# Patient Record
Sex: MALE | Race: WHITE | NOT HISPANIC OR LATINO | Employment: FULL TIME
[De-identification: names, ages, dates, MRNs, and addresses within clinical notes are randomized per-mention and may not be internally consistent; named-entity substitution may affect disease eponyms.]

---

## 2017-04-08 DIAGNOSIS — I10 ESSENTIAL HYPERTENSION WITH GOAL BLOOD PRESSURE LESS THAN 140/90: ICD-10-CM

## 2017-04-08 DIAGNOSIS — E78.5 HYPERLIPIDEMIA LDL GOAL <130: ICD-10-CM

## 2017-04-10 RX ORDER — HYDROCHLOROTHIAZIDE 25 MG/1
TABLET ORAL
Qty: 90 TABLET | Refills: 1 | Status: SHIPPED | OUTPATIENT
Start: 2017-04-10 | End: 2017-10-03

## 2017-04-10 RX ORDER — ATORVASTATIN CALCIUM 40 MG/1
TABLET, FILM COATED ORAL
Qty: 90 TABLET | Refills: 1 | Status: SHIPPED | OUTPATIENT
Start: 2017-04-10 | End: 2017-10-03

## 2017-04-10 NOTE — TELEPHONE ENCOUNTER
atorvastatin (LIPITOR) 40 MG tablet   40 mg, DAILY 1 ordered  Edit     Summary: Take 1 tablet (40 mg) by mouth daily, Disp-90 tablet, R-1, E-Prescribe   Dose, Route, Frequency: 40 mg, Oral, DAILY  Start: 10/14/2016  Ord/Sold: 10/14/2016 (O)  Report  Taking:   Long-term:   Pharmacy: [place] [id number] [place] [address] Dose History       Patient Sig: Take 1 tablet (40 mg) by mouth daily       Ordered on: 10/14/2016       Authorized by: ANCA STEPHENSON       Dispense: 90 tablet          Last Office Visit with [facility], [facility] or [facility] prescribing provider: 10-       Lab Results   Component Value Date    CHOL 163 10/14/2016     Lab Results   Component Value Date    HDL 60 10/14/2016     Lab Results   Component Value Date    LDL 84 10/14/2016     Lab Results   Component Value Date    TRIG 93 10/14/2016     Lab Results   Component Value Date    CHOLHDLRATIO 3.1 07/13/2015               hydrochlorothiazide (HYDRODIURIL) 25 MG tablet   25 mg, DAILY 1 ordered  Edit     Summary: Take 1 tablet (25 mg) by mouth daily, Disp-90 tablet, R-1, E-Prescribe   Dose, Route, Frequency: 25 mg, Oral, DAILY  Start: 10/14/2016  Ord/Sold: 10/14/2016 (O)  Report  Taking:   Long-term:   Pharmacy: [place] [id number] [place] [address] Dose History       Patient Sig: Take 1 tablet (25 mg) by mouth daily       Ordered on: 10/14/2016       Authorized by: ANCA STEPHENSON       Dispense: 90 tablet          Last Office Visit with [facility] or [facility] prescribing provider: 10-       Potassium   Date Value Ref Range Status   10/14/2016 4.0 3.4 - 5.3 mmol/L Final     Creatinine   Date Value Ref Range Status   10/14/2016 1.02 0.66 - 1.25 mg/dL Final     BP Readings from Last 3 Encounters:   10/14/16 130/82   10/23/15 128/80   10/19/15 129/65

## 2017-08-05 ENCOUNTER — HEALTH MAINTENANCE LETTER (OUTPATIENT)
Age: 57
End: 2017-08-05

## 2017-10-03 DIAGNOSIS — E78.5 HYPERLIPIDEMIA LDL GOAL <130: ICD-10-CM

## 2017-10-03 DIAGNOSIS — I10 ESSENTIAL HYPERTENSION WITH GOAL BLOOD PRESSURE LESS THAN 140/90: ICD-10-CM

## 2017-10-03 NOTE — TELEPHONE ENCOUNTER
atorvastatin      Last Written Prescription Date: 4/10/17  Last Fill Quantity: 90, # refills: 1  Last Office Visit with [facility], [facility] or [facility] prescribing provider: 10/14/16       Lab Results   Component Value Date    CHOL 163 10/14/2016     Lab Results   Component Value Date    HDL 60 10/14/2016     Lab Results   Component Value Date    LDL 84 10/14/2016     Lab Results   Component Value Date    TRIG 93 10/14/2016     Lab Results   Component Value Date    CHOLHDLRATIO 3.1 07/13/2015     hydrochlorothiazide       Last Written Prescription Date: 4/10/17  Last Fill Quantity: 90, # refills: 1  Last Office Visit with [facility], [facility] or [facility] prescribing provider: 10/14/16       Potassium   Date Value Ref Range Status   10/14/2016 4.0 3.4 - 5.3 mmol/L Final     Creatinine   Date Value Ref Range Status   10/14/2016 1.02 0.66 - 1.25 mg/dL Final     BP Readings from Last 3 Encounters:   10/14/16 130/82   10/23/15 128/80   10/19/15 129/65

## 2017-10-04 RX ORDER — HYDROCHLOROTHIAZIDE 25 MG/1
25 TABLET ORAL DAILY
Qty: 30 TABLET | Refills: 0 | Status: SHIPPED | OUTPATIENT
Start: 2017-10-04 | End: 2017-10-31

## 2017-10-04 RX ORDER — ATORVASTATIN CALCIUM 40 MG/1
40 TABLET, FILM COATED ORAL DAILY
Qty: 30 TABLET | Refills: 0 | Status: SHIPPED | OUTPATIENT
Start: 2017-10-04 | End: 2017-11-01

## 2017-10-04 NOTE — TELEPHONE ENCOUNTER
Medication is being filled for 1 time refill only due to:  Patient needs to be seen because it has been more than one year since last visit.     Mahin Lerner RN

## 2017-10-31 DIAGNOSIS — I10 ESSENTIAL HYPERTENSION WITH GOAL BLOOD PRESSURE LESS THAN 140/90: ICD-10-CM

## 2017-11-01 DIAGNOSIS — E78.5 HYPERLIPIDEMIA LDL GOAL <130: ICD-10-CM

## 2017-11-02 RX ORDER — HYDROCHLOROTHIAZIDE 25 MG/1
TABLET ORAL
Qty: 30 TABLET | Refills: 0 | Status: SHIPPED | OUTPATIENT
Start: 2017-11-02 | End: 2017-12-22

## 2017-11-02 NOTE — TELEPHONE ENCOUNTER
Routing refill request to provider for review/approval because:  Juanita given x1 and patient did not follow up, please advise    Kelsi Barreto RN

## 2017-11-02 NOTE — TELEPHONE ENCOUNTER
Pharmacy calling to check on status of refill.    hydrochlorothiazide (HYDRODIURIL) 25 MG tablet      Last Written Prescription Date: 10-4-17  Last Fill Quantity: 30, # refills: 0  Last Office Visit with [facility] or [facility] prescribing provider: 10-14-16       Potassium   Date Value Ref Range Status   10/14/2016 4.0 3.4 - 5.3 mmol/L Final     Creatinine   Date Value Ref Range Status   10/14/2016 1.02 0.66 - 1.25 mg/dL Final     BP Readings from Last 3 Encounters:   10/14/16 130/82   10/23/15 128/80   10/19/15 129/65

## 2017-11-22 RX ORDER — ATORVASTATIN CALCIUM 40 MG/1
TABLET, FILM COATED ORAL
Qty: 30 TABLET | Refills: 0 | Status: SHIPPED | OUTPATIENT
Start: 2017-11-22 | End: 2017-12-22

## 2017-12-13 ENCOUNTER — MYC MEDICAL ADVICE (OUTPATIENT)
Dept: FAMILY MEDICINE | Facility: CLINIC | Age: 57
End: 2017-12-13

## 2017-12-13 DIAGNOSIS — Z12.5 SCREENING FOR PROSTATE CANCER: ICD-10-CM

## 2017-12-13 DIAGNOSIS — E78.5 HYPERLIPIDEMIA LDL GOAL <130: ICD-10-CM

## 2017-12-13 DIAGNOSIS — Z00.00 HEALTHCARE MAINTENANCE: Primary | ICD-10-CM

## 2017-12-13 NOTE — TELEPHONE ENCOUNTER
Ordered BMP and lipids per health maintenance. Will route to PCP in case other labs are needed.    Mahin Lerner RN

## 2017-12-20 ENCOUNTER — MYC REFILL (OUTPATIENT)
Dept: FAMILY MEDICINE | Facility: CLINIC | Age: 57
End: 2017-12-20

## 2017-12-20 DIAGNOSIS — E78.5 HYPERLIPIDEMIA LDL GOAL <130: ICD-10-CM

## 2017-12-20 DIAGNOSIS — Z12.5 SCREENING FOR PROSTATE CANCER: ICD-10-CM

## 2017-12-20 DIAGNOSIS — N52.9 IMPOTENCE OF ORGANIC ORIGIN: ICD-10-CM

## 2017-12-20 DIAGNOSIS — Z00.00 HEALTHCARE MAINTENANCE: ICD-10-CM

## 2017-12-20 LAB
ANION GAP SERPL CALCULATED.3IONS-SCNC: 8 MMOL/L (ref 3–14)
BUN SERPL-MCNC: 16 MG/DL (ref 7–30)
CALCIUM SERPL-MCNC: 8.8 MG/DL (ref 8.5–10.1)
CHLORIDE SERPL-SCNC: 104 MMOL/L (ref 94–109)
CHOLEST SERPL-MCNC: 142 MG/DL
CO2 SERPL-SCNC: 29 MMOL/L (ref 20–32)
CREAT SERPL-MCNC: 0.91 MG/DL (ref 0.66–1.25)
GFR SERPL CREATININE-BSD FRML MDRD: 86 ML/MIN/1.7M2
GLUCOSE SERPL-MCNC: 94 MG/DL (ref 70–99)
HDLC SERPL-MCNC: 52 MG/DL
LDLC SERPL CALC-MCNC: 62 MG/DL
NONHDLC SERPL-MCNC: 90 MG/DL
POTASSIUM SERPL-SCNC: 3.8 MMOL/L (ref 3.4–5.3)
PSA SERPL-ACNC: 0.6 UG/L (ref 0–4)
SODIUM SERPL-SCNC: 141 MMOL/L (ref 133–144)
TRIGL SERPL-MCNC: 141 MG/DL

## 2017-12-20 PROCEDURE — 80061 LIPID PANEL: CPT | Performed by: PHYSICIAN ASSISTANT

## 2017-12-20 PROCEDURE — G0103 PSA SCREENING: HCPCS | Performed by: PHYSICIAN ASSISTANT

## 2017-12-20 PROCEDURE — 36415 COLL VENOUS BLD VENIPUNCTURE: CPT | Performed by: PHYSICIAN ASSISTANT

## 2017-12-20 PROCEDURE — 80048 BASIC METABOLIC PNL TOTAL CA: CPT | Performed by: PHYSICIAN ASSISTANT

## 2017-12-21 RX ORDER — SILDENAFIL 100 MG/1
100 TABLET, FILM COATED ORAL DAILY PRN
Qty: 12 TABLET | Refills: 2 | Status: SHIPPED | OUTPATIENT
Start: 2017-12-21 | End: 2019-06-26

## 2017-12-22 ENCOUNTER — OFFICE VISIT (OUTPATIENT)
Dept: FAMILY MEDICINE | Facility: CLINIC | Age: 57
End: 2017-12-22
Payer: COMMERCIAL

## 2017-12-22 VITALS
HEART RATE: 60 BPM | WEIGHT: 313 LBS | DIASTOLIC BLOOD PRESSURE: 80 MMHG | BODY MASS INDEX: 43.82 KG/M2 | SYSTOLIC BLOOD PRESSURE: 144 MMHG | TEMPERATURE: 98.7 F | HEIGHT: 71 IN

## 2017-12-22 DIAGNOSIS — R91.1 PULMONARY NODULE: ICD-10-CM

## 2017-12-22 DIAGNOSIS — N28.9 FUNCTION KIDNEY DECREASED: ICD-10-CM

## 2017-12-22 DIAGNOSIS — Z12.11 SCREEN FOR COLON CANCER: ICD-10-CM

## 2017-12-22 DIAGNOSIS — R06.83 SNORING: ICD-10-CM

## 2017-12-22 DIAGNOSIS — E78.5 HYPERLIPIDEMIA LDL GOAL <130: ICD-10-CM

## 2017-12-22 DIAGNOSIS — Z00.00 ROUTINE GENERAL MEDICAL EXAMINATION AT A HEALTH CARE FACILITY: Primary | ICD-10-CM

## 2017-12-22 DIAGNOSIS — I10 ESSENTIAL HYPERTENSION WITH GOAL BLOOD PRESSURE LESS THAN 140/90: ICD-10-CM

## 2017-12-22 DIAGNOSIS — Z23 NEED FOR PROPHYLACTIC VACCINATION AND INOCULATION AGAINST INFLUENZA: ICD-10-CM

## 2017-12-22 DIAGNOSIS — N52.9 IMPOTENCE OF ORGANIC ORIGIN: ICD-10-CM

## 2017-12-22 DIAGNOSIS — M25.512 ACUTE PAIN OF LEFT SHOULDER: ICD-10-CM

## 2017-12-22 DIAGNOSIS — N28.1 RENAL CYST: ICD-10-CM

## 2017-12-22 PROCEDURE — 99213 OFFICE O/P EST LOW 20 MIN: CPT | Mod: 25 | Performed by: PHYSICIAN ASSISTANT

## 2017-12-22 PROCEDURE — 90686 IIV4 VACC NO PRSV 0.5 ML IM: CPT | Performed by: PHYSICIAN ASSISTANT

## 2017-12-22 PROCEDURE — 99396 PREV VISIT EST AGE 40-64: CPT | Mod: 25 | Performed by: PHYSICIAN ASSISTANT

## 2017-12-22 PROCEDURE — 90471 IMMUNIZATION ADMIN: CPT | Performed by: PHYSICIAN ASSISTANT

## 2017-12-22 RX ORDER — ATORVASTATIN CALCIUM 40 MG/1
40 TABLET, FILM COATED ORAL DAILY
Qty: 90 TABLET | Refills: 3 | Status: SHIPPED | OUTPATIENT
Start: 2017-12-22 | End: 2018-12-25

## 2017-12-22 RX ORDER — LISINOPRIL AND HYDROCHLOROTHIAZIDE 12.5; 2 MG/1; MG/1
1 TABLET ORAL DAILY
Qty: 90 TABLET | Refills: 1 | Status: SHIPPED | OUTPATIENT
Start: 2017-12-22 | End: 2018-06-23

## 2017-12-22 RX ORDER — HYDROCHLOROTHIAZIDE 25 MG/1
25 TABLET ORAL DAILY
Qty: 90 TABLET | Refills: 3 | Status: SHIPPED | OUTPATIENT
Start: 2017-12-22 | End: 2017-12-22

## 2017-12-22 NOTE — NURSING NOTE
Prior to injection verified patient identity using patient's name and date of birth.    Brandie Dewitt CMA ([facility])

## 2017-12-22 NOTE — PATIENT INSTRUCTIONS
1. Change to combo drug - Lisinopril / HCTZ 20/12.5 - recheck in 2 weeks with my nurse for a BP check and a kidney blood test   2. To Schedule CT scan and Ultrasound (probably do on the same day) call:  [facility] Scheduling Phone Number: [phone]  Or   [facility] Scheduling Phone number: [phone]   3. Look up rotator cuff exercises on YouTube     Preventive Health Recommendations  Male Ages 50 - 64    Yearly exam:             See your health care provider every year in order to  o   Review health changes.   o   Discuss preventive care.    o   Review your medicines if your doctor has prescribed any.     Have a cholesterol test every 5 years, or more frequently if you are at risk for high cholesterol/heart disease.     Have a diabetes test (fasting glucose) every three years. If you are at risk for diabetes, you should have this test more often.     Have a colonoscopy at age 50, or have a yearly FIT test (stool test). These exams will check for colon cancer.      Talk with your health care provider about whether or not a prostate cancer screening test (PSA) is right for you.    You should be tested each year for STDs (sexually transmitted diseases), if you re at risk.     Shots: Get a flu shot each year. Get a tetanus shot every 10 years.     Nutrition:    Eat at least 5 servings of fruits and vegetables daily.     Eat whole-grain bread, whole-wheat pasta and brown rice instead of white grains and rice.     Talk to your provider about Calcium and Vitamin D.     Lifestyle    Exercise for at least 150 minutes a week (30 minutes a day, 5 days a week). This will help you control your weight and prevent disease.     Limit alcohol to one drink per day.     No smoking.     Wear sunscreen to prevent skin cancer.     See your dentist every six months for an exam and cleaning.     See your eye doctor every 1 to 2 years.

## 2017-12-22 NOTE — PROGRESS NOTES
SUBJECTIVE:   CC: Car Torres is an 57 year old male who presents for preventative health visit.     Physical   Annual:     Getting at least 3 servings of Calcium per day::  Yes    Bi-annual eye exam::  NO    Dental care twice a year::  NO    Sleep apnea or symptoms of sleep apnea::  None    Diet::  Regular (no restrictions)    Frequency of exercise::  None    Taking medications regularly::  Yes    Medication side effects::  None    Additional concerns today::  YES (Sleep Referral)              Today's PHQ-2 Score: PHQ-2 ( 1999 Pfizer) 12/22/2017   Q1: Little interest or pleasure in doing things 0   Q2: Feeling down, depressed or hopeless 0   PHQ-2 Score 0   Q1: Little interest or pleasure in doing things Not at all   Q2: Feeling down, depressed or hopeless Not at all   PHQ-2 Score 0       Abuse: Current or Past(Physical, Sexual or Emotional)- No  Do you feel safe in your environment - Yes    Social History   Substance Use Topics     Smoking status: Former Smoker     Packs/day: 1.00     Years: 25.00     Types: Cigarettes, Cigars     Quit date: 2/1/1998     Smokeless tobacco: Never Used     Alcohol use 6.0 oz/week      Comment: case beer/week     Alcohol Use 12/22/2017   If you drink alcohol, do you typically have greater than 3 drinks per day OR greater than 7 drinks per week?   Yes   AUDIT SCORE  6     AUDIT - Alcohol Use Disorders Identification Test - Reproduced from the World Health Organization Audit 2001 (Second Edition) 12/22/2017   1.  How often do you have a drink containing alcohol? 4 or more times a week   2.  How many drinks containing alcohol do you have on a typical day when you are drinking? 1 or 2   3.  How often do you have five or more drinks on one occasion? Monthly   4.  How often during the last year have you found that you were not able to stop drinking once you had started? Never   5.  How often during the last year have you failed to do what was normally expected of you because of  drinking? Never   6.  How often during the last year have you needed a first drink in the morning to get yourself going after a heavy drinking session? Never   7.  How often during the last year have you had a feeling of guilt or remorse after drinking? Never   8.  How often during the last year have you been unable to remember what happened the night before because of your drinking? Never   9.  Have you or someone else been injured because of your drinking? No   10. Has a relative, friend, doctor or other health care worker been concerned about your drinking or suggested you cut down? No   TOTAL SCORE 6         Last PSA: PSA   Date Value Ref Range Status   12/20/2017 0.60 0 - 4 ug/L Final     Comment:     Assay Method:  Chemiluminescence using Siemens Vista analyzer       Reviewed orders with patient. Reviewed health maintenance and updated orders accordingly - Yes  Labs reviewed in EPIC    Reviewed and updated as needed this visit by clinical staff         Reviewed and updated as needed this visit by Provider            Review of Systems  C: NEGATIVE for fever, chills, change in weight  I: NEGATIVE for worrisome rashes, moles or lesions  E: NEGATIVE for vision changes or irritation  ENT: NEGATIVE for ear, mouth and throat problems  R: NEGATIVE for significant cough or SOB  CV: NEGATIVE for chest pain, palpitations or peripheral edema  GI: NEGATIVE for nausea, abdominal pain, heartburn, or change in bowel habits   male: negative for dysuria, hematuria, decreased urinary stream, erectile dysfunction, urethral discharge  M: NEGATIVE for significant arthralgias or myalgia  N: NEGATIVE for weakness, dizziness or paresthesias  P: NEGATIVE for changes in mood or affect    OBJECTIVE:   There were no vitals taken for this visit.    Physical Exam  EYES: Eyes grossly normal to inspection, PERRL and conjunctivae and sclerae normal  HENT: ear canals and TM's normal, nose and mouth without ulcers or lesions  NECK: no  adenopathy, no asymmetry, masses, or scars and thyroid normal to palpation  RESP: lungs clear to auscultation - no rales, rhonchi or wheezes  CV: regular rate and rhythm, normal S1 S2, no S3 or S4, no murmur, click or rub, no peripheral edema and peripheral pulses strong  ABDOMEN: soft, nontender, no hepatosplenomegaly, no masses and bowel sounds normal  MS: no gross musculoskeletal defects noted, no edema  SKIN: no suspicious lesions or rashes  NEURO: Normal strength and tone, mentation intact and speech normal  PSYCH: mentation appears normal, affect normal/bright  LYMPH: no cervical, supraclavicular, axillary, or inguinal adenopathy    ASSESSMENT/PLAN:   (Z00.00) Routine general medical examination at a health care facility  (primary encounter diagnosis)  Comment: Well person   Plan: Diet, exercise, wellness and other preventive recommendations related to health maintenance were discussed.  Follow up as needed for acute issues.  Physical exam in 1 year.     (Z12.11) Screen for colon cancer  Comment:   Plan: GASTROENTEROLOGY ADULT REF PROCEDURE ONLY        Reminder     (E78.5) Hyperlipidemia LDL goal <130  Comment:   Plan: atorvastatin (LIPITOR) 40 MG tablet        Refills     (I10) Essential hypertension with goal blood pressure less than 140/90  Comment:   Plan: lisinopril-hydrochlorothiazide         (PRINZIDE/ZESTORETIC) 20-12.5 MG per tablet,         Basic metabolic panel  (Ca, Cl, CO2, Creat,         Gluc, K, Na, BUN), DISCONTINUED:         hydrochlorothiazide (HYDRODIURIL) 25 MG tablet        Elevated x 2 - reviewed - recommend change, should probably be on ACE / HCTZ combo - changed, will have him return to recheck BPs in a few weeks. This prescription is given with a discussion of side effects, risks and proper use.  Instructions are given to follow up if not improving or symptoms change or worsen as discussed.     (Z23) Need for prophylactic vaccination and inoculation against influenza  Comment:   Plan:  "FLU VAC, SPLIT VIRUS IM > 3 YO (QUADRIVALENT)         [88377], Vaccine Administration, Initial         [83724]        Given     (N28.9) Function kidney decreased  Comment:   Plan: US Renal Complete        Chronic - history of a stable right renal cyst - recommend repeat US - if unable to fully visualize, CT scan    (R91.1) Pulmonary nodule  Comment:   Plan: CT Chest w/o Contrast        Needs repeat / retest. Orders placed. Was stable from last scan.     (R06.83) Snoring  Comment:   Plan: SLEEP EVALUATION & MANAGEMENT REFERRAL - ADULT         -[facility] [place]          [phone] (Age 15 and up)        Recommend sleep study. Orders placed.     (N28.1) Renal cyst  Comment:   Plan: US Renal Complete        As noted     (M25.512) Acute pain of left shoulder  Comment:   Plan: Left shoulder - pain - reviewed - recommend some home therapies, could consider sub acromial injection if needed.     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 His smoking use included Cigarettes and Cigars. He has a 25.00 pack-year smoking history. He has never used smokeless tobacco.      Estimated body mass index is 44.63 kg/(m^2) as calculated from the following:    Height as of 10/14/16: 5' 11\" (1.803 m).    Weight as of 10/14/16: 320 lb (145.2 kg).         Counseling Resources:  ATP IV Guidelines  Pooled Cohorts Equation Calculator  FRAX Risk Assessment  ICSI Preventive Guidelines  Dietary Guidelines for Americans, 2010  USDA's MyPlate  ASA Prophylaxis  Lung CA Screening    ANCA STEPHENSON PA-C  [facility] for HPI/ROS submitted by the patient on 12/22/2017   PHQ-2 Score: 0    Injectable Influenza Immunization Documentation    1.  Is the person to be vaccinated sick today?   No    2. Does the person to be vaccinated have an allergy to a " component   of the vaccine?   No  Egg Allergy Algorithm Link    3. Has the person to be vaccinated ever had a serious reaction   to influenza vaccine in the past?   No    4. Has the person to be vaccinated ever had Guillain-Barré syndrome?   No    Form completed by Brandie Dewitt CMA ([facility])

## 2018-01-04 ENCOUNTER — ALLIED HEALTH/NURSE VISIT (OUTPATIENT)
Dept: NURSING | Facility: CLINIC | Age: 58
End: 2018-01-04
Payer: COMMERCIAL

## 2018-01-04 VITALS
WEIGHT: 313 LBS | DIASTOLIC BLOOD PRESSURE: 72 MMHG | SYSTOLIC BLOOD PRESSURE: 122 MMHG | BODY MASS INDEX: 43.82 KG/M2 | HEIGHT: 71 IN | HEART RATE: 60 BPM

## 2018-01-04 DIAGNOSIS — I10 HYPERTENSION GOAL BP (BLOOD PRESSURE) < 140/90: Primary | ICD-10-CM

## 2018-01-04 DIAGNOSIS — I10 ESSENTIAL HYPERTENSION WITH GOAL BLOOD PRESSURE LESS THAN 140/90: ICD-10-CM

## 2018-01-04 DIAGNOSIS — R06.83 SNORING: ICD-10-CM

## 2018-01-04 PROCEDURE — 36415 COLL VENOUS BLD VENIPUNCTURE: CPT | Performed by: PHYSICIAN ASSISTANT

## 2018-01-04 PROCEDURE — 80048 BASIC METABOLIC PNL TOTAL CA: CPT | Performed by: PHYSICIAN ASSISTANT

## 2018-01-04 PROCEDURE — 99207 ZZC NO CHARGE NURSE ONLY: CPT

## 2018-01-04 NOTE — PATIENT INSTRUCTIONS
PATIENT INSTRUCTIONS     1.  You will continue current medication regimen unchanged    2.  FOLLOW UP:   Follow up with Provider - 6 months     3.  Limit total daily sodium to 1500-2000mg per day  Get some exercise, try for 8 hours of sleep a night.  Today we used a large cuff on your right arm.      BP Readings from Last 3 Encounters:   01/04/18 122/72   12/22/17 144/80   10/14/16 130/82     Pulse Readings from Last 1 Encounters:   01/04/18 60       Today we talked about...    You will need baseline lab tests done within 12 months of beginning a new blood pressure medication and during/after medication adjustment is complete.    Last Labs:  Sodium   Date Value Ref Range Status   12/20/2017 141 133 - 144 mmol/L Final      Potassium   Date Value Ref Range Status   12/20/2017 3.8 3.4 - 5.3 mmol/L Final     Urea Nitrogen   Date Value Ref Range Status   12/20/2017 16 7 - 30 mg/dL Final     Creatinine   Date Value Ref Range Status   12/20/2017 0.91 0.66 - 1.25 mg/dL Final     GFR Estimate   Date Value Ref Range Status   12/20/2017 86 >60 mL/min/1.7m2 Final     Comment:     Non  GFR Calc       Your pharmacist is a great resource for questions regarding your medication's side effects and potential interactions.     If you are having a side effect from your medication, please contact your primary provider.     If you believe you are experiencing a life threatening reaction to your medication call 911 immediately.     Nikole Peter RN

## 2018-01-04 NOTE — MR AVS SNAPSHOT
After Visit Summary   1/4/2018    Car Torres    MRN: [id number]           Patient Information     Date Of Birth          1960        Visit Information        Provider Department      1/4/2018 3:00 PM [facility] RN [facility]        Today's Diagnoses     Essential hypertension with goal blood pressure less than 140/90        Snoring          Care Instructions    PATIENT INSTRUCTIONS     1.  You will continue current medication regimen unchanged    2.  FOLLOW UP:   Follow up with Provider - 6 months     3.  Limit total daily sodium to 1500-2000mg per day    Your BP Goal is: less than <130/80 consistently    Today we used a large cuff on your right arm.      BP Readings from Last 3 Encounters:   01/04/18 122/72   12/22/17 144/80   10/14/16 130/82     Pulse Readings from Last 1 Encounters:   01/04/18 60       Today we talked about...    You will need baseline lab tests done within 12 months of beginning a new blood pressure medication and during/after medication adjustment is complete.    Last Labs:  Sodium   Date Value Ref Range Status   12/20/2017 141 133 - 144 mmol/L Final      Potassium   Date Value Ref Range Status   12/20/2017 3.8 3.4 - 5.3 mmol/L Final     Urea Nitrogen   Date Value Ref Range Status   12/20/2017 16 7 - 30 mg/dL Final     Creatinine   Date Value Ref Range Status   12/20/2017 0.91 0.66 - 1.25 mg/dL Final     GFR Estimate   Date Value Ref Range Status   12/20/2017 86 >60 mL/min/1.7m2 Final     Comment:     Non  GFR Calc       Your pharmacist is a great resource for questions regarding your medication's side effects and potential interactions.     If you are having a side effect from your medication, please contact your primary provider.     If you believe you are experiencing a life threatening reaction to your medication call 911 immediately.     Nikole Peter RN             Follow-ups after your visit        Your next 10 appointments already  "scheduled     Jan 04, 2018  3:30 PM CST   [facility]    [address] [address] [phone]           Please do not eat 10-12 hours before your appointment if you are coming in fasting for labs on lipids, cholesterol, or glucose (sugar). This does not apply to pregnant women. Water, hot tea and black coffee (with nothing added) are okay. Do not drink other fluids, diet soda or chew gum.              Who to contact     If you have questions or need follow up information about today's clinic visit or your schedule please contact [facility] [place] directly at [phone].  Normal or non-critical lab and imaging results will be communicated to you by ProTendershart, letter or phone within 4 business days after the clinic has received the results. If you do not hear from us within 7 days, please contact the clinic through Kickplayt or phone. If you have a critical or abnormal lab result, we will notify you by phone as soon as possible.  Submit refill requests through Crowdrally or call your pharmacy and they will forward the refill request to us. Please allow 3 business days for your refill to be completed.          Additional Information About Your Visit        Crowdrally Information     Crowdrally gives you secure access to your electronic health record. If you see a primary care provider, you can also send messages to your care team and make appointments. If you have questions, please call your primary care clinic.  If you do not have a primary care provider, please call [phone] and they will assist you.        Care EveryWhere ID     This is your Care EveryWhere ID. This could be used by other organizations to access your [place] medical records  [id number]        Your Vitals Were     Pulse Height BMI (Body Mass Index)             60 5' 11\" (1.803 m) 43.65 kg/m2          Blood Pressure from Last 3 Encounters: "   01/04/18 122/72   12/22/17 144/80   10/14/16 130/82    Weight from Last 3 Encounters:   01/04/18 (!) 313 lb (142 kg)   12/22/17 (!) 313 lb (142 kg)   10/14/16 (!) 320 lb (145.2 kg)              We Performed the Following     Basic metabolic panel  (Ca, Cl, CO2, Creat, Gluc, K, Na, BUN)     SLEEP EVALUATION & MANAGEMENT REFERRAL - ADULT -[facility] [place]  [phone] (Age 15 and up)        Primary Care Provider Office Phone # Fax #    Fransico Hylton PA-C [phone]       [address] [place] [id number]        Equal Access to Services     MONTSERRAT ESCOBAR : Hadii aad ku hadasho Soomaali, waaxda luqadaha, qaybta kaalmada adeegyada, dianne mcgrawin carmen hinojosa. So [facility] [phone].    ATENCIÓN: Si habla español, tiene a peres disposición servicios gratuitos de asistencia lingüística. Llame al [phone].    We comply with applicable federal civil rights laws and [place] laws. We do not discriminate on the basis of race, color, national origin, age, disability, sex, sexual orientation, or gender identity.            Thank you!     Thank you for choosing [facility]  for your care. Our goal is always to provide you with excellent care. Hearing back from our patients is one way we can continue to improve our services. Please take a few minutes to complete the written survey that you may receive in the mail after your visit with us. Thank you!             Your Updated Medication List - Protect others around you: Learn how to safely use, store and throw away your medicines at www.disposemymeds.org.          This list is accurate as of: 1/4/18  3:20 PM.  Always use your most recent med list.                   Brand Name Dispense Instructions for use Diagnosis    aspirin 325 MG EC tablet     100    ONE DAILY    Unspecified essential hypertension, Mixed hyperlipidemia       atorvastatin 40 MG tablet    LIPITOR    90 tablet    Take 1 tablet (40  mg) by mouth daily    Hyperlipidemia LDL goal <130       lisinopril-hydrochlorothiazide 20-12.5 MG per tablet    PRINZIDE/ZESTORETIC    90 tablet    Take 1 tablet by mouth daily (Cancel the HCTZ - new med)    Essential hypertension with goal blood pressure less than 140/90       MULTI VITAMIN MENS PO      None Entered        sildenafil 100 MG tablet    VIAGRA    12 tablet    Take 1 tablet (100 mg) by mouth daily as needed    Impotence of organic origin       vitamin B complex with vitamin C Tabs tablet      Take 1 tablet by mouth daily        VITAMIN D PO      Take 5,000 Units by mouth daily One tablet twice daily

## 2018-01-04 NOTE — Clinical Note
Blood pressure looks good today in clinic 122/72 after you added HCT to his Lisinopril labs repeated Just FYI.

## 2018-01-04 NOTE — PROGRESS NOTES
DATA: PCP: Naun Hylton    Indications for Blood Pressure (BP) monitoring: High reading in clinic         ACTION: Signs and Symptoms:  Headaches?: no  Chest pain?: no  Shortness of breath?: no  Edema?: no  Visual problems?: no  Parathesia?: no  Epitaxis?: no  Dizziness?: no  Hematuria?: no    BP:   BP Readings from Last 1 Encounters:   01/04/18 122/72     60     Diabetic?: no  Heart Disease?: no  Smoking?: no  Alcohol usage?: no  Low sodium diet?: no  Exercise?: no  Checks blood pressure at home?: na       *BP is 130/80 or greater for diabetics or heart disease? no  *BP is 140/90 or greater for non-diabetics? no  *Pulse under 60? no  *Pulse over 110? no    Discussed with patient symptoms of high blood pressure, best ways to measure blood pressure, how blood pressure affects health, risk factors for high blood pressure, and lifestyle changes to help prevent/control high blood pressure.        RESPONSE: Patient verbalizes understanding, denies questions or concerns, and agrees with plan.         PLAN: Patient left in ambulatory condition. Will call or follow-up in clinic during the interim as needed.        CC: Chart to   Naun Hylton

## 2018-01-05 LAB
ANION GAP SERPL CALCULATED.3IONS-SCNC: 6 MMOL/L (ref 3–14)
BUN SERPL-MCNC: 16 MG/DL (ref 7–30)
CALCIUM SERPL-MCNC: 8.5 MG/DL (ref 8.5–10.1)
CHLORIDE SERPL-SCNC: 103 MMOL/L (ref 94–109)
CO2 SERPL-SCNC: 28 MMOL/L (ref 20–32)
CREAT SERPL-MCNC: 1.03 MG/DL (ref 0.66–1.25)
GFR SERPL CREATININE-BSD FRML MDRD: 74 ML/MIN/1.7M2
GLUCOSE SERPL-MCNC: 99 MG/DL (ref 70–99)
POTASSIUM SERPL-SCNC: 4.1 MMOL/L (ref 3.4–5.3)
SODIUM SERPL-SCNC: 137 MMOL/L (ref 133–144)

## 2018-01-06 ENCOUNTER — OFFICE VISIT (OUTPATIENT)
Dept: URGENT CARE | Facility: URGENT CARE | Age: 58
End: 2018-01-06
Payer: COMMERCIAL

## 2018-01-06 VITALS
SYSTOLIC BLOOD PRESSURE: 142 MMHG | RESPIRATION RATE: 18 BRPM | WEIGHT: 315 LBS | DIASTOLIC BLOOD PRESSURE: 72 MMHG | HEART RATE: 64 BPM | BODY MASS INDEX: 45.05 KG/M2 | OXYGEN SATURATION: 95 % | TEMPERATURE: 98 F

## 2018-01-06 DIAGNOSIS — J02.9 SORE THROAT: ICD-10-CM

## 2018-01-06 DIAGNOSIS — I10 HYPERTENSION GOAL BP (BLOOD PRESSURE) < 140/90: ICD-10-CM

## 2018-01-06 DIAGNOSIS — H66.002 ACUTE SUPPURATIVE OTITIS MEDIA OF LEFT EAR WITHOUT SPONTANEOUS RUPTURE OF TYMPANIC MEMBRANE, RECURRENCE NOT SPECIFIED: Primary | ICD-10-CM

## 2018-01-06 LAB
DEPRECATED S PYO AG THROAT QL EIA: NORMAL
SPECIMEN SOURCE: NORMAL

## 2018-01-06 PROCEDURE — 87081 CULTURE SCREEN ONLY: CPT | Performed by: INTERNAL MEDICINE

## 2018-01-06 PROCEDURE — 87880 STREP A ASSAY W/OPTIC: CPT | Performed by: INTERNAL MEDICINE

## 2018-01-06 PROCEDURE — 99214 OFFICE O/P EST MOD 30 MIN: CPT | Performed by: INTERNAL MEDICINE

## 2018-01-06 RX ORDER — AMOXICILLIN 875 MG
875 TABLET ORAL 2 TIMES DAILY
Qty: 20 TABLET | Refills: 0 | Status: SHIPPED | OUTPATIENT
Start: 2018-01-06 | End: 2018-05-08

## 2018-01-06 NOTE — PROGRESS NOTES
SUBJECTIVE:  Car Torres is an 57 year old male who presents for bad cold.  Started about ten days ago.  Has a cough and nasal congestion.  Not seem to improve and is getting worse.  Has sore throat over past three days.  No fevers, chills or sweats.  No n/v/d.  Appetite a little less than usual.  Cough is not productive.  Getting some sinus and ear pressure.  No recent travel.  No known exposures but is around people at work.  Took some aspirin which helps some.  No skin rashes.         has a past medical history of Unspecified essential hypertension. hyperlipidemia, ED  Social History     Social History     Marital status:      Spouse name: Luzma     Number of children: 3     Years of education: 14     Occupational History     [employer] Other     Social History Main Topics     Smoking status: Former Smoker     Packs/day: 1.00     Years: 25.00     Types: Cigarettes, Cigars     Quit date: 2/1/1998     Smokeless tobacco: Never Used     Alcohol use 6.0 oz/week      Comment: case beer/week     Drug use: No     Sexual activity: Not Currently     Partners: Female     Birth control/ protection: Female Surgical, None      Comment:      Other Topics Concern     Parent/Sibling W/ Cabg, Mi Or Angioplasty Before 65f 55m? No     Social History Narrative     Family History   Problem Relation Age of Onset     CANCER Mother      uterine     Other Cancer Mother      endometrial     C.A.D. Father      Hypertension Father      Breast Cancer Maternal Grandmother      Other Cancer Maternal Grandmother      Lung/brain     Hypertension Brother      Breast Cancer Other      Breast Cancer Cousin      Other Cancer Other        ALLERGIES:  Review of patient's allergies indicates no known allergies.    Current Outpatient Prescriptions   Medication     amoxicillin (AMOXIL) 875 MG tablet     atorvastatin (LIPITOR) 40 MG tablet     vitamin B complex with vitamin C (VITAMIN  B COMPLEX) TABS tablet      lisinopril-hydrochlorothiazide (PRINZIDE/ZESTORETIC) 20-12.5 MG per tablet     sildenafil (VIAGRA) 100 MG tablet     Cholecalciferol (VITAMIN D PO)     MULTI VITAMIN MENS PO     ASPIRIN 325 MG OR TBEC     No current facility-administered medications for this visit.          ROS:  ROS is done and is negative for general, constitutional, eye, ENT, Respiratory, cardiovascular, GI, , Skin, musculoskeletal except as noted elsewhere.      OBJECTIVE:  /72  Pulse 64  Temp 98  F (36.7  C) (Oral)  Resp 18  Wt (!) 323 lb (146.5 kg)  SpO2 95%  BMI 45.05 kg/m2  GENERAL APPEARANCE: Alert, in no acute distress  EYES: normal  EARS: Rt TM: bulging with clear fluid, no erythema. Lt TM: erythematous and bulging  NOSE:mild clear discharge and mildly inflamed mucosa  OROPHARYNX:mild erythema, no tonsillar hypertrophy and no exudates present  NECK:No adenopathy,masses or thyromegaly  RESP: normal and clear to auscultation  CV:regular rate and rhythm and no murmurs, clicks, or gallops  ABDOMEN: Abdomen soft, non-tender. BS normal. No masses, organomegaly  SKIN: no ulcers, lesions or rash  MUSCULOSKELETAL:Musculoskeletal normal      RESULTS  Results for orders placed or performed in visit on 01/06/18   Strep, Rapid Screen   Result Value Ref Range    Specimen Description Throat     Rapid Strep A Screen       NEGATIVE: No Group A streptococcal antigen detected by immunoassay, await culture report.   .  No results found for this or any previous visit (from the past 48 hour(s)).    ASSESSMENT/PLAN:    ASSESSMENT / PLAN:  (H66.002) Acute suppurative otitis media of left ear without spontaneous rupture of tympanic membrane, recurrence not specified  (primary encounter diagnosis)  Comment: after having URI sxs for a while  Plan: amoxicillin (AMOXIL) 875 MG tablet        Reviewed medication instructions and side effects. Follow up if experiences side effects.. I reviewed supportive care, expected course, and signs of concern.  Follow  up as needed or if he does not improve within 7 day(s) or if worsens in any way.  Reviewed red flag symptoms and is to go to the ER if experiences any of these.    (I10) Hypertension goal BP (blood pressure) < 140/90  Comment: BP slightly elevated today, likely due to illness  Plan: continue meds and f/u with pharmacy or pcp for recheck of BP in 1-2 weeks.    (J02.9) Sore throat  Comment: negative rapid strep.  Likely due to post-nasal drainage  Plan: Strep, Rapid Screen, Beta strep group A culture        I reviewed supportive care, otc meds including ibuprofen and flonase, expected course, and signs of concern.  Follow up as needed or if he does not improve within 7 day(s) or if worsens in any way.  Reviewed red flag symptoms and is to go to the ER if experiences any of these.  If strep cx negative or positive, pt is to continue on amox as prescribed for OM.      See [facility] for orders, medications, letters, patient instructions    Kortney Ross M.D.

## 2018-01-06 NOTE — MR AVS SNAPSHOT
After Visit Summary   1/6/2018    Car Torres    MRN: [id number]           Patient Information     Date Of Birth          1960        Visit Information        Provider Department      1/6/2018 9:10 AM Kortney Ross MD [facility]        Today's Diagnoses     Acute suppurative otitis media of left ear without spontaneous rupture of tympanic membrane, recurrence not specified    -  1    Hypertension goal BP (blood pressure) < 140/90        Sore throat           Follow-ups after your visit        Follow-up notes from your care team     Return in about 1 week (around 1/13/2018), or if symptoms worsen or fail to improve, for follow up with primary doctor.      Who to contact     If you have questions or need follow up information about today's clinic visit or your schedule please contact [facility] directly at [phone].  Normal or non-critical lab and imaging results will be communicated to you by [employer], letter or phone within 4 business days after the clinic has received the results. If you do not hear from us within 7 days, please contact the clinic through [employer] or phone. If you have a critical or abnormal lab result, we will notify you by phone as soon as possible.  Submit refill requests through [employer] or call your pharmacy and they will forward the refill request to us. Please allow 3 business days for your refill to be completed.          Additional Information About Your Visit        MyChart Information     [employer] gives you secure access to your electronic health record. If you see a primary care provider, you can also send messages to your care team and make appointments. If you have questions, please call your primary care clinic.  If you do not have a primary care provider, please call [phone] and they will assist you.        Care EveryWhere ID     This is your Care EveryWhere ID. This could be used by other organizations to access your  [facility] medical records  [id number]        Your Vitals Were     Pulse Temperature Respirations Pulse Oximetry BMI (Body Mass Index)       64 98  F (36.7  C) (Oral) 18 95% 45.05 kg/m2        Blood Pressure from Last 3 Encounters:   01/06/18 142/72   01/04/18 122/72   12/22/17 144/80    Weight from Last 3 Encounters:   01/06/18 (!) 323 lb (146.5 kg)   01/04/18 (!) 313 lb (142 kg)   12/22/17 (!) 313 lb (142 kg)              We Performed the Following     Beta strep group A culture     Strep, Rapid Screen          Today's Medication Changes          These changes are accurate as of: 1/6/18  9:58 AM.  If you have any questions, ask your nurse or doctor.               Start taking these medicines.        Dose/Directions    amoxicillin 875 MG tablet   Commonly known as:  AMOXIL   Used for:  Acute suppurative otitis media of left ear without spontaneous rupture of tympanic membrane, recurrence not specified   Started by:  Kortney Ross MD        Dose:  875 mg   Take 1 tablet (875 mg) by mouth 2 times daily   Quantity:  20 tablet   Refills:  0            Where to get your medicines      These medications were sent to [place] [address] [place] [facility] [address]  [address] [facility] [address]     Phone:  [phone]     amoxicillin 875 MG tablet                Primary Care Provider Office Phone # Fax #    Fransico Leonardo Hylton PA-C [phone]       [address] [place] [address]        Equal Access to Services     ZAINAB ESCOBAR AH: Hadii gustavo ku hadasho Soomaali, waaxda luqadaha, qaybta kaalmada adeegyada, waxay idirizwan hinojosa. So [facility] [phone].    ATENCIÓN: Si habla español, tiene a peres disposición servicios gratuitos de asistencia lingüística. Llame al [phone].    We comply with applicable federal civil rights laws and [place] laws. We do not discriminate on the basis of race, color, national origin, age, disability, sex, sexual  orientation, or gender identity.            Thank you!     Thank you for choosing [facility]  for your [facility]. Our goal is always to provide you with excellent care. Hearing back from our patients is one way we can continue to improve our services. Please take a few minutes to complete the written survey that you may receive in the mail after your visit with us. Thank you!             Your Updated Medication List - Protect others around you: Learn how to safely use, store and throw away your medicines at www.disposemymeds.org.          This list is accurate as of: 1/6/18  9:58 AM.  Always use your most recent med list.                   Brand Name Dispense Instructions for use Diagnosis    amoxicillin 875 MG tablet    AMOXIL    20 tablet    Take 1 tablet (875 mg) by mouth 2 times daily    Acute suppurative otitis media of left ear without spontaneous rupture of tympanic membrane, recurrence not specified       aspirin 325 MG EC tablet     100    ONE DAILY    Unspecified essential hypertension, Mixed hyperlipidemia       atorvastatin 40 MG tablet    LIPITOR    90 tablet    Take 1 tablet (40 mg) by mouth daily    Hyperlipidemia LDL goal <130       lisinopril-hydrochlorothiazide 20-12.5 MG per tablet    PRINZIDE/ZESTORETIC    90 tablet    Take 1 tablet by mouth daily (Cancel the HCTZ - new med)    Essential hypertension with goal blood pressure less than 140/90       MULTI VITAMIN MENS PO      None Entered        sildenafil 100 MG tablet    VIAGRA    12 tablet    Take 1 tablet (100 mg) by mouth daily as needed    Impotence of organic origin       vitamin B complex with vitamin C Tabs tablet      Take 1 tablet by mouth daily        VITAMIN D PO      Take 5,000 Units by mouth daily One tablet twice daily

## 2018-01-06 NOTE — NURSING NOTE
"Car Torres's goals for this visit include: sore throat and sinus lenore  He requests these members of his care team be copied on today's visit information:     PCP: Fransico Hylton    Referring Provider:  No referring provider defined for this encounter.    Chief Complaint   Patient presents with     Sinus Problem     x 2 weeks     Pharyngitis       Initial /72  Pulse 64  Temp 98  F (36.7  C) (Oral)  Resp 18  Wt (!) 323 lb (146.5 kg)  SpO2 95%  BMI 45.05 kg/m2 Estimated body mass index is 45.05 kg/(m^2) as calculated from the following:    Height as of 1/4/18: 5' 11\" (1.803 m).    Weight as of this encounter: 323 lb (146.5 kg).  Medication Reconciliation: complete    "

## 2018-01-07 LAB
BACTERIA SPEC CULT: NORMAL
SPECIMEN SOURCE: NORMAL

## 2018-04-23 ENCOUNTER — RADIANT APPOINTMENT (OUTPATIENT)
Dept: ULTRASOUND IMAGING | Facility: CLINIC | Age: 58
End: 2018-04-23
Attending: PHYSICIAN ASSISTANT
Payer: COMMERCIAL

## 2018-04-23 ENCOUNTER — RADIANT APPOINTMENT (OUTPATIENT)
Dept: CT IMAGING | Facility: CLINIC | Age: 58
End: 2018-04-23
Attending: PHYSICIAN ASSISTANT
Payer: COMMERCIAL

## 2018-04-23 DIAGNOSIS — R91.1 PULMONARY NODULE: ICD-10-CM

## 2018-04-23 DIAGNOSIS — N28.1 RENAL CYST: ICD-10-CM

## 2018-04-23 DIAGNOSIS — N28.9 FUNCTION KIDNEY DECREASED: ICD-10-CM

## 2018-04-23 PROCEDURE — 76770 US EXAM ABDO BACK WALL COMP: CPT

## 2018-04-23 PROCEDURE — 71250 CT THORAX DX C-: CPT | Mod: TC

## 2018-05-08 ENCOUNTER — OFFICE VISIT (OUTPATIENT)
Dept: FAMILY MEDICINE | Facility: CLINIC | Age: 58
End: 2018-05-08
Payer: COMMERCIAL

## 2018-05-08 VITALS
HEART RATE: 68 BPM | TEMPERATURE: 98.1 F | HEIGHT: 71 IN | WEIGHT: 300 LBS | DIASTOLIC BLOOD PRESSURE: 68 MMHG | SYSTOLIC BLOOD PRESSURE: 126 MMHG | BODY MASS INDEX: 42 KG/M2

## 2018-05-08 DIAGNOSIS — R91.1 PULMONARY NODULE: ICD-10-CM

## 2018-05-08 DIAGNOSIS — N28.1 RENAL CYST: ICD-10-CM

## 2018-05-08 DIAGNOSIS — M79.672 PAIN IN BOTH FEET: ICD-10-CM

## 2018-05-08 DIAGNOSIS — I25.10 CORONARY ARTERY CALCIFICATION: ICD-10-CM

## 2018-05-08 DIAGNOSIS — N52.9 ERECTILE DYSFUNCTION, UNSPECIFIED ERECTILE DYSFUNCTION TYPE: Primary | ICD-10-CM

## 2018-05-08 DIAGNOSIS — M79.671 PAIN IN BOTH FEET: ICD-10-CM

## 2018-05-08 PROCEDURE — 99214 OFFICE O/P EST MOD 30 MIN: CPT | Performed by: PHYSICIAN ASSISTANT

## 2018-05-08 PROCEDURE — 36415 COLL VENOUS BLD VENIPUNCTURE: CPT | Performed by: PHYSICIAN ASSISTANT

## 2018-05-08 PROCEDURE — 84403 ASSAY OF TOTAL TESTOSTERONE: CPT | Performed by: PHYSICIAN ASSISTANT

## 2018-05-08 NOTE — MR AVS SNAPSHOT
After Visit Summary   5/8/2018    Car Torres    MRN: [id number]           Patient Information     Date Of Birth          1960        Visit Information        Provider Department      5/8/2018 5:20 PM Fransico Hylton PA-C [facility]        Today's Diagnoses     Erectile dysfunction, unspecified erectile dysfunction type    -  1    Renal cyst        Coronary artery calcification        Pulmonary nodule        Pain in both feet          Care Instructions    To Schedule Blood Flow / Ultrasound test for feet - call to schedule:  [facility] Scheduling Phone Number: [phone]  Or   [facility] Scheduling Phone number: [phone]     Topamax - read about for weight loss - can help keep weight off           Follow-ups after your visit        Additional Services     CARDIOLOGY EVAL ADULT REFERRAL       Preferred location:  [facility] [phone]   [email or web address]    Please be aware that coverage of these services is subject to the terms and limitations of your health insurance plan.  Call member services at your health plan with any benefit or coverage questions.      Please bring the following to your appointment:  Any x-rays, CTs or MRIs which have been performed. Contact the facility where they were done to arrange for  prior to your scheduled appointment.    List of current medications  This referral request   Any documents/labs given to you for this referral                  Your next 10 appointments already scheduled     Jun 08, 2018  3:00 PM CDT   New Sleep Patient with FANI Phillip   [facility] ([facility])    [address] [place] [id number] [phone]              Future tests that were ordered for you today     Open Future Orders        Priority Expected Expires  "Ordered    US REX Doppler No Exercise Routine  5/8/2019 5/8/2018            Who to contact     If you have questions or need follow up information about today's clinic visit or your schedule please contact [facility] directly at [phone].  Normal or non-critical lab and imaging results will be communicated to you by MyChart, letter or phone within 4 business days after the clinic has received the results. If you do not hear from us within 7 days, please contact the clinic through [employer] or phone. If you have a critical or abnormal lab result, we will notify you by phone as soon as possible.  Submit refill requests through [employer]olane or call your pharmacy and they will forward the refill request to us. Please allow 3 business days for your refill to be completed.          Additional Information About Your Visit        [employer] Information     [employer] gives you secure access to your electronic health record. If you see a primary care provider, you can also send messages to your care team and make appointments. If you have questions, please call your primary care clinic.  If you do not have a primary care provider, please call [phone] and they will assist you.        Care EveryWhere ID     This is your Care EveryWhere ID. This could be used by other organizations to access your [facility] medical records  [id number]        Your Vitals Were     Pulse Temperature Height BMI (Body Mass Index)          68 98.1  F (36.7  C) (Oral) 5' 11\" (1.803 m) 41.84 kg/m2         Blood Pressure from Last 3 Encounters:   05/08/18 126/68   01/06/18 142/72   01/04/18 122/72    Weight from Last 3 Encounters:   05/08/18 300 lb (136.1 kg)   01/06/18 (!) 323 lb (146.5 kg)   01/04/18 (!) 313 lb (142 kg)              We Performed the Following     CARDIOLOGY EVAL ADULT REFERRAL     Testosterone, total        Primary Care Provider Office Phone # Fax #    Fransico Hylton PA-C [phone]       [address] " [facility] [address]        Equal Access to Services     MONTSERRAT ESCOBAR : Hadii aad ku hadjose evanna Sojoon, waaxda luqadaha, qaybta kaalmahoma aguilar, dianne hinojosa. So [facility] [phone].    ATENCIÓN: Si habla español, tiene a peres disposición servicios gratuitos de asistencia lingüística. Ralphame al [phone].    We comply with applicable federal civil rights laws and [place] laws. We do not discriminate on the basis of race, color, national origin, age, disability, sex, sexual orientation, or gender identity.            Thank you!     Thank you for choosing [facility]  for your care. Our goal is always to provide you with excellent care. Hearing back from our patients is one way we can continue to improve our services. Please take a few minutes to complete the written survey that you may receive in the mail after your visit with us. Thank you!             Your Updated Medication List - Protect others around you: Learn how to safely use, store and throw away your medicines at www.disposemymeds.org.          This list is accurate as of 5/8/18  5:41 PM.  Always use your most recent med list.                   Brand Name Dispense Instructions for use Diagnosis    aspirin 325 MG EC tablet     100    ONE DAILY    Unspecified essential hypertension, Mixed hyperlipidemia       atorvastatin 40 MG tablet    LIPITOR    90 tablet    Take 1 tablet (40 mg) by mouth daily    Hyperlipidemia LDL goal <130       lisinopril-hydrochlorothiazide 20-12.5 MG per tablet    PRINZIDE/ZESTORETIC    90 tablet    Take 1 tablet by mouth daily (Cancel the HCTZ - new med)    Essential hypertension with goal blood pressure less than 140/90       MULTI VITAMIN MENS PO      None Entered        sildenafil 100 MG tablet    VIAGRA    12 tablet    Take 1 tablet (100 mg) by mouth daily as needed    Impotence of organic origin       vitamin B complex with vitamin C Tabs tablet      Take 1 tablet by  mouth daily        VITAMIN D PO      Take 5,000 Units by mouth daily One tablet twice daily

## 2018-05-08 NOTE — PROGRESS NOTES
SUBJECTIVE:   Car Torres is a 57 year old male who presents to clinic today for the following health issues:    Patient is here to follow up on erectile dysfunction. Reports issues getting and keeping an erection. He reports that his wife is worried it is a prostate problem. He doesn't have any LUTS symptoms however. She just wants to be sure it isn't his prostate.    Follow up on US Renal Complete from 4/23/18 - he had a repeat CT scan to confirm stability of a renal cyst. Right kidney stable non enhanced 3.5 cm x 2 - stable and reassuring.     CT scan chest to check on pulmonary nodules also reviewed and stable - he did have coronary artery calcifications that he wishes to discuss. No symptoms of chest pain or pressure.    Patient Active Problem List   Diagnosis     Sciatica     Obesity     Nonspecific abnormal electrocardiogram (ECG) (EKG)     Hyperlipidemia LDL goal <130     Hypertension goal BP (blood pressure) < 140/90     Function kidney decreased     Renal cyst     Diverticulosis of large intestine without hemorrhage     Pulmonary nodule     Essential hypertension with goal blood pressure less than 140/90     Coronary artery calcification      Current Outpatient Prescriptions   Medication     ASPIRIN 325 MG OR TBEC     atorvastatin (LIPITOR) 40 MG tablet     Cholecalciferol (VITAMIN D PO)     lisinopril-hydrochlorothiazide (PRINZIDE/ZESTORETIC) 20-12.5 MG per tablet     MULTI VITAMIN MENS PO     sildenafil (VIAGRA) 100 MG tablet     vitamin B complex with vitamin C (VITAMIN  B COMPLEX) TABS tablet     No current facility-administered medications for this visit.         Problem list and histories reviewed & adjusted, as indicated.  Additional history: as documented    Labs reviewed in EPIC    Reviewed and updated as needed this visit by clinical staff       Reviewed and updated as needed this visit by Provider         ROS:  Constitutional, HEENT, cardiovascular, pulmonary, gi and gu systems are  "negative, except as otherwise noted.    OBJECTIVE:     /68 (Cuff Size: Adult Large)  Pulse 68  Temp 98.1  F (36.7  C) (Oral)  Ht 5' 11\" (1.803 m)  Wt 300 lb (136.1 kg)  BMI 41.84 kg/m2  Body mass index is 41.84 kg/(m^2).  GENERAL: healthy, alert and no distress  HENT: ear canals and TM's normal, nose and mouth without ulcers or lesions  NECK: no adenopathy, no asymmetry, masses, or scars and thyroid normal to palpation  RESP: lungs clear to auscultation - no rales, rhonchi or wheezes  CV: regular rate and rhythm, normal S1 S2, no S3 or S4, no murmur, click or rub, no peripheral edema and peripheral pulses strong      ASSESSMENT/PLAN:     (N52.9) Erectile dysfunction, unspecified erectile dysfunction type  (primary encounter diagnosis)  Comment:   Plan: Testosterone, total        Reviewed - I reassured him that erectile dysfunction is not generally a symptom of prostate issues. Prostate health discussed. The Sildenafil didn't work. Will check Testosterone level     (N28.1) Renal cyst  Comment:   Plan: Reassurance given - this has been stable x 2 - 3.5 cm without change 2 years previous. Can simply monitor for symptoms.    (I25.10,  I25.84) Coronary artery calcification  Comment:   Plan: CARDIOLOGY EVAL ADULT REFERRAL        Reviewed - he is asymptomatic. Has had normal stress echo in the past. Discussed that he is currently on statin and aspirin and BP is controlled which are our main preventives - recommend see cardiology as it sounds like his wife is concerned about this.     (R91.1) Pulmonary nodule  Comment:   Plan: Reassurance - these are stable x a few rechecks    (M79.671,  M79.672) Pain in both feet  Comment:   Plan: US ERX Doppler No Exercise        Bilateral, toward end of day, spends hours on feet at work. Discussed options - wants to get US to check for claudicative cause.     ANCA STEPHENSON PA-C  [facility]  "

## 2018-05-08 NOTE — PATIENT INSTRUCTIONS
To Schedule Blood Flow / Ultrasound test for feet - call to schedule:  [facility] Scheduling Phone Number: [phone]  Or   [facility] Scheduling Phone number: [phone]     Topamax - read about for weight loss - can help keep weight off

## 2018-05-10 ENCOUNTER — RADIANT APPOINTMENT (OUTPATIENT)
Dept: ULTRASOUND IMAGING | Facility: CLINIC | Age: 58
End: 2018-05-10
Attending: PHYSICIAN ASSISTANT
Payer: COMMERCIAL

## 2018-05-10 DIAGNOSIS — M79.671 PAIN IN BOTH FEET: ICD-10-CM

## 2018-05-10 DIAGNOSIS — M79.672 PAIN IN BOTH FEET: ICD-10-CM

## 2018-05-10 LAB — TESTOST SERPL-MCNC: 100 NG/DL (ref 240–950)

## 2018-05-10 PROCEDURE — 93922 UPR/L XTREMITY ART 2 LEVELS: CPT

## 2018-06-05 ENCOUNTER — OFFICE VISIT (OUTPATIENT)
Dept: CARDIOLOGY | Facility: CLINIC | Age: 58
End: 2018-06-05
Payer: COMMERCIAL

## 2018-06-05 VITALS
BODY MASS INDEX: 41.42 KG/M2 | SYSTOLIC BLOOD PRESSURE: 133 MMHG | OXYGEN SATURATION: 98 % | HEART RATE: 58 BPM | DIASTOLIC BLOOD PRESSURE: 73 MMHG | WEIGHT: 297 LBS

## 2018-06-05 DIAGNOSIS — I10 HYPERTENSION GOAL BP (BLOOD PRESSURE) < 140/90: ICD-10-CM

## 2018-06-05 DIAGNOSIS — E78.5 HYPERLIPIDEMIA LDL GOAL <100: ICD-10-CM

## 2018-06-05 DIAGNOSIS — I25.10 CORONARY ARTERY CALCIFICATION: Primary | ICD-10-CM

## 2018-06-05 PROCEDURE — 99204 OFFICE O/P NEW MOD 45 MIN: CPT | Performed by: INTERNAL MEDICINE

## 2018-06-05 NOTE — LETTER
6/5/2018    RE: Car Torres  [address]  [facility] [address]       Dear Colleague,    Thank you for the opportunity to participate in the care of your patient, Car Torres, at the [facility] HEART AT [facility] at [facility]. Please see a copy of my visit note below.    Referring provider: Fransico Hylton PA-C  Chief complaint: No complaints    HPI: Mr. Car Torres is a 58 year old  male with PMH significant for HTN, HLD, and morbid obesity.     He has recently underwent CT of the chest without contrast for lung nodule follow-up which showed stable lung nodules and coronary artery atherosclerotic calcifications.  He is referred by FANI Hylton for for further follow-up on coronary calcification.  Mr. Torres works at a company for 12 hours a day, his work involves a lot of physical activity and night shifts. The patient denies a history of exertional chest discomfort or dyspnea, PND, orthopnea, pedal edema, palpitations, lightheadedness, and syncope.  He has hx of HTN and HLD both of which are well controlled on lisinopril hydrochlorothiazide 20/12.5 and atorvastatin 40 mg. He also takes  mg.  He is a former smoker. No history of diabetes.  No history of prior cardiovascular disease. No family history of premature coronary disease.     His prior cardiac workup include a nuclear stress test in 2012 which showed normal LV function with no ischemia.      Medications, personal, family, and social history reviewed with patient and revised.    PAST MEDICAL HISTORY:  Coronary artery calcification  Hypertension  Hyperlipidemia  Former smoker  Morbid obesity  Stable lung nodules    CURRENT MEDICATIONS:  Current Outpatient Prescriptions   Medication Sig Dispense Refill     ASPIRIN 325 MG OR TBEC ONE DAILY 100 0     atorvastatin (LIPITOR) 40 MG tablet Take 1 tablet (40 mg) by mouth daily 90 tablet 3     Cholecalciferol  (VITAMIN D PO) Take 5,000 Units by mouth daily One tablet twice daily       lisinopril-hydrochlorothiazide (PRINZIDE/ZESTORETIC) 20-12.5 MG per tablet Take 1 tablet by mouth daily (Cancel the HCTZ - new med) 90 tablet 1     MULTI VITAMIN MENS PO None Entered       sildenafil (VIAGRA) 100 MG tablet Take 1 tablet (100 mg) by mouth daily as needed 12 tablet 2     vitamin B complex with vitamin C (VITAMIN  B COMPLEX) TABS tablet Take 1 tablet by mouth daily         PAST SURGICAL HISTORY:  Past Surgical History:   Procedure Laterality Date     SURGICAL HISTORY OF -       surgery on left index finger       ALLERGIES:   No Known Allergies    FAMILY HISTORY:  Family History   Problem Relation Age of Onset     CANCER Mother      uterine     Other Cancer Mother      endometrial     C.A.D. Father      Hypertension Father      Breast Cancer Maternal Grandmother      Other Cancer Maternal Grandmother      Lung/brain     Hypertension Brother      Breast Cancer Other      Breast Cancer Cousin      Other Cancer Other          SOCIAL HISTORY:  Social History   Substance Use Topics     Smoking status: Former Smoker     Packs/day: 1.00     Years: 25.00     Types: Cigarettes, Cigars     Quit date: 2/1/1998     Smokeless tobacco: Never Used     Alcohol use 6.0 oz/week      Comment: case beer/week       ROS:   Constitutional: No fever, chills, or sweats. Weight stable.   ENT: No visual disturbance, ear ache, epistaxis, sore throat.   Cardiovascular: As per HPI.   Respiratory: No cough, hemoptysis.    GI: No nausea, vomiting, hematemesis, melena, or hematochezia.   : No hematuria.   Integument: Negative.   Psychiatric: Negative.   Hematologic:  No easy bruising, no easy bleeding.  Neuro: Negative.   Endocrinology: No significant heat or cold intolerance   Musculoskeletal: No myalgia.    Exam:  /73  Pulse 58  Wt 134.7 kg (297 lb)  SpO2 98%  BMI 41.42 kg/m2  GENERAL APPEARANCE: healthy, alert and no distress  HEENT: no icterus,  no central cyanosis  LYMPH/NECK: no adenopathy, no asymmetry, JVP not elevated, no carotid bruits.  RESPIRATORY: lungs clear to auscultation - no rales, rhonchi or wheezes, no use of accessory muscles, no retractions, respirations are unlabored, normal respiratory rate  CARDIOVASCULAR: regular rhythm, normal S1, S2, no S3 or S4 and no murmur, click or rub, precordium quiet with normal PMI.  GI: soft, non tender, obese +  EXTREMITIES: peripheral pulses normal, no edema  NEURO: alert and oriented to person/place/time, normal speech,and affect  VASC: Radial, dorsalis pedis and posterior tibialis pulses are normal in volumes and symmetric bilaterally.   SKIN: no ecchymoses, no rashes     I have reviewed the labs and made my comment in the assessment and plan.    Labs:  CBC RESULTS:   Lab Results   Component Value Date    WBC 6.4 10/19/2015    RBC 5.14 10/19/2015    HGB 16.1 10/19/2015    HCT 46.1 10/19/2015    MCV 90 10/19/2015    MCH 31.3 10/19/2015    MCHC 34.9 10/19/2015    RDW 12.7 10/19/2015     10/19/2015       BMP RESULTS:  Lab Results   Component Value Date     01/04/2018    POTASSIUM 4.1 01/04/2018    CHLORIDE 103 01/04/2018    CO2 28 01/04/2018    ANIONGAP 6 01/04/2018    GLC 99 01/04/2018    BUN 16 01/04/2018    CR 1.03 01/04/2018    GFRESTIMATED 74 01/04/2018    GFRESTBLACK 89 01/04/2018    SHAHID 8.5 01/04/2018     LE US REX Doppler 5/10/2018  FINDINGS:  Right REX: 1.09.  Left REX: 1.07.    IMPRESSION: Normal ABIs bilaterally without evidence of arterial  insufficiency.    CT Chest without contrast 4/23/2018    1. There are several small indeterminate lung nodules bilaterally  which are stable for more than two years and are considered benign.  2. Old granulomatous disease.  3. Coronary artery atherosclerotic calcifications.    EKG 10/19/2015  Sinus bradycardia 49 bpm, otherwise normal.    Assessment and Plan:   Mr. Car Torres is a 58 year old  male with PMH significant for HTN, HLD, and  morbid obesity. He has no complaints.  His blood pressure and hyperlipidemia are well controlled.  Since he has multiple coronary coronary risk factors (age, HTN, HL, former smoker)  I recommended coronary artery calcium scoring for CVD screening purposes.    I recommended exercise, losing weight and tracking blood pressure at home.  I will review his CAC score and give information to the patient.  I will defer follow-up to Larry Hylton.    I did recommend to decrease the dose of ASA but he said  mg helps with his back pain as well. Otherwise no medication changes during this visit.    A total of 40 minutes spent face-toface with greater than 50% of the time spent in counseling and coordinating cares of the issues above.     Please donot hesitate to contact me if you have any questions or concerns. Again, thank you for allowing me to participate in the care of your patient.    Nikita DONAHUE MD  [facility] Division of Cardiology  Pager [phone]    Fransico Carlos PA-C

## 2018-06-05 NOTE — PROGRESS NOTES
Referring provider: Fransico Hylton PA-C  Chief complaint: No complaints    HPI: Mr. Car Torres is a 58 year old  male with PMH significant for HTN, HLD, and morbid obesity.     He has recently underwent CT of the chest without contrast for lung nodule follow-up which showed stable lung nodules and coronary artery atherosclerotic calcifications.  He is referred by FANI Hylton for for further follow-up on coronary calcification.  Mr. Torres works at a company for 12 hours a day, his work involves a lot of physical activity and night shifts. The patient denies a history of exertional chest discomfort or dyspnea, PND, orthopnea, pedal edema, palpitations, lightheadedness, and syncope.  He has hx of HTN and HLD both of which are well controlled on lisinopril hydrochlorothiazide 20/12.5 and atorvastatin 40 mg. He also takes  mg.  He is a former smoker. No history of diabetes.  No history of prior cardiovascular disease. No family history of premature coronary disease.     His prior cardiac workup include a nuclear stress test in 2012 which showed normal LV function with no ischemia.      Medications, personal, family, and social history reviewed with patient and revised.    PAST MEDICAL HISTORY:  Coronary artery calcification  Hypertension  Hyperlipidemia  Former smoker  Morbid obesity  Stable lung nodules    CURRENT MEDICATIONS:  Current Outpatient Prescriptions   Medication Sig Dispense Refill     ASPIRIN 325 MG OR TBEC ONE DAILY 100 0     atorvastatin (LIPITOR) 40 MG tablet Take 1 tablet (40 mg) by mouth daily 90 tablet 3     Cholecalciferol (VITAMIN D PO) Take 5,000 Units by mouth daily One tablet twice daily       lisinopril-hydrochlorothiazide (PRINZIDE/ZESTORETIC) 20-12.5 MG per tablet Take 1 tablet by mouth daily (Cancel the HCTZ - new med) 90 tablet 1     MULTI VITAMIN MENS PO None Entered       sildenafil (VIAGRA) 100 MG tablet Take 1 tablet (100 mg) by mouth daily as needed 12 tablet 2      vitamin B complex with vitamin C (VITAMIN  B COMPLEX) TABS tablet Take 1 tablet by mouth daily         PAST SURGICAL HISTORY:  Past Surgical History:   Procedure Laterality Date     SURGICAL HISTORY OF -       surgery on left index finger       ALLERGIES:   No Known Allergies    FAMILY HISTORY:  Family History   Problem Relation Age of Onset     CANCER Mother      uterine     Other Cancer Mother      endometrial     C.A.D. Father      Hypertension Father      Breast Cancer Maternal Grandmother      Other Cancer Maternal Grandmother      Lung/brain     Hypertension Brother      Breast Cancer Other      Breast Cancer Cousin      Other Cancer Other          SOCIAL HISTORY:  Social History   Substance Use Topics     Smoking status: Former Smoker     Packs/day: 1.00     Years: 25.00     Types: Cigarettes, Cigars     Quit date: 2/1/1998     Smokeless tobacco: Never Used     Alcohol use 6.0 oz/week      Comment: case beer/week       ROS:   Constitutional: No fever, chills, or sweats. Weight stable.   ENT: No visual disturbance, ear ache, epistaxis, sore throat.   Cardiovascular: As per HPI.   Respiratory: No cough, hemoptysis.    GI: No nausea, vomiting, hematemesis, melena, or hematochezia.   : No hematuria.   Integument: Negative.   Psychiatric: Negative.   Hematologic:  No easy bruising, no easy bleeding.  Neuro: Negative.   Endocrinology: No significant heat or cold intolerance   Musculoskeletal: No myalgia.    Exam:  /73  Pulse 58  Wt 134.7 kg (297 lb)  SpO2 98%  BMI 41.42 kg/m2  GENERAL APPEARANCE: healthy, alert and no distress  HEENT: no icterus, no central cyanosis  LYMPH/NECK: no adenopathy, no asymmetry, JVP not elevated, no carotid bruits.  RESPIRATORY: lungs clear to auscultation - no rales, rhonchi or wheezes, no use of accessory muscles, no retractions, respirations are unlabored, normal respiratory rate  CARDIOVASCULAR: regular rhythm, normal S1, S2, no S3 or S4 and no murmur, click or rub,  precordium quiet with normal PMI.  GI: soft, non tender, obese +  EXTREMITIES: peripheral pulses normal, no edema  NEURO: alert and oriented to person/place/time, normal speech,and affect  VASC: Radial, dorsalis pedis and posterior tibialis pulses are normal in volumes and symmetric bilaterally.   SKIN: no ecchymoses, no rashes     I have reviewed the labs and made my comment in the assessment and plan.    Labs:  CBC RESULTS:   Lab Results   Component Value Date    WBC 6.4 10/19/2015    RBC 5.14 10/19/2015    HGB 16.1 10/19/2015    HCT 46.1 10/19/2015    MCV 90 10/19/2015    MCH 31.3 10/19/2015    MCHC 34.9 10/19/2015    RDW 12.7 10/19/2015     10/19/2015       BMP RESULTS:  Lab Results   Component Value Date     01/04/2018    POTASSIUM 4.1 01/04/2018    CHLORIDE 103 01/04/2018    CO2 28 01/04/2018    ANIONGAP 6 01/04/2018    GLC 99 01/04/2018    BUN 16 01/04/2018    CR 1.03 01/04/2018    GFRESTIMATED 74 01/04/2018    GFRESTBLACK 89 01/04/2018    SHAHID 8.5 01/04/2018     LE US REX Doppler 5/10/2018  FINDINGS:  Right REX: 1.09.  Left REX: 1.07.    IMPRESSION: Normal ABIs bilaterally without evidence of arterial  insufficiency.    CT Chest without contrast 4/23/2018    1. There are several small indeterminate lung nodules bilaterally  which are stable for more than two years and are considered benign.  2. Old granulomatous disease.  3. Coronary artery atherosclerotic calcifications.    EKG 10/19/2015  Sinus bradycardia 49 bpm, otherwise normal.    Assessment and Plan:   Mr. Car Torres is a 58 year old  male with PMH significant for HTN, HLD, and morbid obesity. He has no complaints.  His blood pressure and hyperlipidemia are well controlled.  Since he has multiple coronary coronary risk factors (age, HTN, HL, former smoker)  I recommended coronary artery calcium scoring for CVD screening purposes.    I recommended exercise, losing weight and tracking blood pressure at home.  I will review his CAC score  and give information to the patient.  I will defer follow-up to Larry Hylton.    I did recommend to decrease the dose of ASA but he said  mg helps with his back pain as well. Otherwise no medication changes during this visit.    A total of 40 minutes spent face-toface with greater than 50% of the time spent in counseling and coordinating cares of the issues above.     Please donot hesitate to contact me if you have any questions or concerns. Again, thank you for allowing me to participate in the care of your patient.    Nikita DONAHUE MD  [facility] Division of Cardiology  Pager [phone]    cc Fransico Hylton PA-C

## 2018-06-05 NOTE — NURSING NOTE
Med Reconcile: Reviewed and verified all current medications with the patient. The updated medication list was printed and given to the patient.    Return Appointment: Patient given instructions regarding scheduling next clinic visit, with PCP or PRN with cardiology. Patient demonstrated understanding of this information and agreed to call with further questions or concerns.    Please take BP's for 1 week and get back to us.    Pt to have CT calcium score completed. Pt to call U to schedule on his own due to work schedule.    Patient stated he understood all health information given and agreed to call with further questions or concerns.    Ada Marino RN

## 2018-06-05 NOTE — NURSING NOTE
"Chief Complaint   Patient presents with     New Patient     New pt appt with Dr. Grubbs for coronary artery calcification found on recent CT chest scan. Asymptomatic. Has had normal stress echo in the past.        Initial /75 (BP Location: Left arm, Patient Position: Chair, Cuff Size: Adult Large)  Pulse 61  Wt 134.7 kg (297 lb)  SpO2 98%  BMI 41.42 kg/m2 Estimated body mass index is 41.42 kg/(m^2) as calculated from the following:    Height as of 5/8/18: 1.803 m (5' 11\").    Weight as of this encounter: 134.7 kg (297 lb)..  BP completed using cuff size: keyana Finch MA    "

## 2018-06-05 NOTE — PATIENT INSTRUCTIONS
Thank you for coming to the [facility] @ [facility]; please note the following instructions:    1. There are no changes to your medications today.    2. Please call the [facility] scheduling line at [phone] to schedule your Calcium Score Test at your convenience.     3. Take your blood pressures for 1 week and get back to us with the readings either on [employer] or at [phone].    4. Follow up with your Primary Care Provider.      If you have any questions regarding your visit please contact your care team:     Cardiology  Telephone Number   [place] ELDER, RACHAEL TREADWELL, RACHAEL KELLEY, CARLTON HOLLAND MA   [phone]    *After hours: [phone]   For scheduling appts:     [phone] or    [phone] (select option 1)    *After hours: [phone]     For the Device Clinic (Pacemakers and ICD's)  RN's :  Tricia Jolley   During business hours: [phone]    *After business hours:  [phone] (select option 4)      Normal test result notifications will be released via [employer] or mailed within 7 business days.  All other test results, will be communicated via telephone once reviewed by your cardiologist.    If you need a medication refill please contact your pharmacy.  Please allow 3 business days for your refill to be completed.    As always, thank you for trusting us with your health care needs!

## 2018-06-07 PROBLEM — I25.10 CORONARY ARTERY CALCIFICATION: Chronic | Status: ACTIVE | Noted: 2018-05-08

## 2018-06-07 PROBLEM — N52.9 ED (ERECTILE DYSFUNCTION): Status: ACTIVE | Noted: 2018-06-07

## 2018-06-08 ENCOUNTER — OFFICE VISIT (OUTPATIENT)
Dept: SLEEP MEDICINE | Facility: CLINIC | Age: 58
End: 2018-06-08
Payer: COMMERCIAL

## 2018-06-08 VITALS
SYSTOLIC BLOOD PRESSURE: 126 MMHG | WEIGHT: 295 LBS | DIASTOLIC BLOOD PRESSURE: 80 MMHG | HEART RATE: 67 BPM | BODY MASS INDEX: 41.3 KG/M2 | HEIGHT: 71 IN

## 2018-06-08 DIAGNOSIS — I25.10 CORONARY ARTERY CALCIFICATION: Chronic | ICD-10-CM

## 2018-06-08 DIAGNOSIS — R53.83 MALAISE AND FATIGUE: ICD-10-CM

## 2018-06-08 DIAGNOSIS — R06.89 DYSPNEA AND RESPIRATORY ABNORMALITY: Primary | ICD-10-CM

## 2018-06-08 DIAGNOSIS — R53.81 MALAISE AND FATIGUE: ICD-10-CM

## 2018-06-08 DIAGNOSIS — R06.00 DYSPNEA AND RESPIRATORY ABNORMALITY: Primary | ICD-10-CM

## 2018-06-08 DIAGNOSIS — I10 ESSENTIAL HYPERTENSION: ICD-10-CM

## 2018-06-08 PROCEDURE — 99244 OFF/OP CNSLTJ NEW/EST MOD 40: CPT | Performed by: PHYSICIAN ASSISTANT

## 2018-06-08 NOTE — PROGRESS NOTES
Sleep Consultation:    Date on this visit: 6/8/2018    Car Torres is sent by Fransico Hylton for a sleep consultation regarding sleep disordered breathing.    Primary Physician: Fransico Hylton     Chief Complaint   Patient presents with     Sleep Problem     snoring      Car goes to sleep between 7 and 9 PM during the week. He wakes up between 1-3 AM with an alarm. He falls asleep in 5 minutes.  Car denies difficulty falling asleep.  He wakes up 0-3 times a night for 1-5 minutes before falling back to sleep.  Car wakes up to go to the bathroom.  On weekends, Car goes to sleep between 10 PM and 12 AM.  He wakes up at 9:00 AM without an alarm. He falls asleep in 5 minutes.  Patient gets 6-7 hours of sleep per night.     Patient does not use electronics in bed, watch TV in bed and read in bed.     Car does not do shift work.      Car does snore every night and snoring is very loud. Patient does not have a regular bed partner. There is report of kicking.  He does not have witnessed apneas. They always sleep separately.  Patient sleeps on his side and stomach. He denies no morning headaches, morning confusion and restless legs. Car denies any bruxism, sleep walking, sleep talking, dream enactment, cataplexy and hypnogogic/hypnopompic hallucinations. He reports infrequent sleep paralysis.     Car denies difficulty breathing through his nose, claustrophobia and reflux at night.      Car has gained 5-10 pounds in the last year.  Patient describes themself as a night person.  He would prefer to go to sleep at 11:00 PM and wake up at 8:00 AM.  Patient's Lowman Sleepiness score 11/24 consistent with some daytime sleepiness.      Car naps 5 times per week for 15 minutes, feels refreshed after naps. He takes some inadvertant naps.  He denies falling asleep while driving. Patient was counseled on the importance of driving while alert, to pull over if drowsy, or nap before  getting into the vehicle if sleepy.  He uses 1-2 cups/day of coffee. Last caffeine intake is usually before noon.    Allergies:    No Known Allergies    Medications:    Current Outpatient Prescriptions   Medication Sig Dispense Refill     ASPIRIN 325 MG OR TBEC ONE DAILY 100 0     atorvastatin (LIPITOR) 40 MG tablet Take 1 tablet (40 mg) by mouth daily 90 tablet 3     Blood Pressure Monitor KIT 1 Units daily 1 kit 0     Cholecalciferol (VITAMIN D PO) Take 5,000 Units by mouth daily One tablet twice daily       lisinopril-hydrochlorothiazide (PRINZIDE/ZESTORETIC) 20-12.5 MG per tablet Take 1 tablet by mouth daily (Cancel the HCTZ - new med) 90 tablet 1     MULTI VITAMIN MENS PO None Entered       sildenafil (VIAGRA) 100 MG tablet Take 1 tablet (100 mg) by mouth daily as needed 12 tablet 2     vitamin B complex with vitamin C (VITAMIN  B COMPLEX) TABS tablet Take 1 tablet by mouth daily         Problem List:  Patient Active Problem List    Diagnosis Date Noted     Hypertension goal BP (blood pressure) < 140/90 10/20/2010     Priority: High     Hyperlipidemia LDL goal <130 06/11/2010     Priority: High     Nonspecific abnormal electrocardiogram (ECG) (EKG) 08/23/2007     Priority: High     Stress testing normal.       History of sinus bradycardia 06/07/2018     Priority: Medium     ED (erectile dysfunction) 06/07/2018     Priority: Medium     Coronary artery calcification 05/08/2018     Priority: Medium     Per CT CHEST WITHOUT CONTRAST April 23, 2018       Renal cyst 11/18/2015     Priority: Medium     Simple, non enhanced, 3.5 cm on right kidney, Bosniak 1 - x 2 stable findings       Pulmonary nodule 11/18/2015     Priority: Medium     Incidental finding on CT scan, right posterior lung - considered benign / stable        Function kidney decreased 10/21/2010     Priority: Medium     60 to 80 - have discussed Lisinopril - will consider        Sciatica 02/27/2006     Priority: Medium     Obesity 02/27/2006      Priority: Medium        Past Medical/Surgical History:  Past Medical History:   Diagnosis Date     Coronary artery calcification 5/8/2018     Diverticulosis of large intestine without hemorrhage 11/18/2015    Incidental finding on CT scan       Unspecified essential hypertension      Past Surgical History:   Procedure Laterality Date     SURGICAL HISTORY OF -       surgery on left index finger       Social History:  Social History     Social History     Marital status:      Spouse name: Luzma     Number of children: 3     Years of education: 14     Occupational History     [employer] Other     Social History Main Topics     Smoking status: Former Smoker     Packs/day: 1.00     Years: 25.00     Types: Cigarettes, Cigars     Quit date: 2/1/1998     Smokeless tobacco: Never Used     Alcohol use 6.0 oz/week      Comment: case beer/week     Drug use: No     Sexual activity: Not Currently     Partners: Female     Birth control/ protection: Female Surgical, None      Comment:      Other Topics Concern     Parent/Sibling W/ Cabg, Mi Or Angioplasty Before 65f 55m? No     Social History Narrative       Family History:  Family History   Problem Relation Age of Onset     CANCER Mother      uterine     Other Cancer Mother      endometrial     C.A.D. Father      Hypertension Father      Breast Cancer Maternal Grandmother      Other Cancer Maternal Grandmother      Lung/brain     Hypertension Brother      Breast Cancer Other      Breast Cancer Cousin      Other Cancer Other        Review of Systems:  CONSTITUTIONAL: NEGATIVE for weight gain/loss, fever, chills, sweats or night sweats, drug allergies.  EYES: NEGATIVE for changes in vision, blind spots, double vision.  ENT: NEGATIVE for ear pain, sore throat, sinus pain, post-nasal drip, runny nose, bloody nose  CARDIAC: NEGATIVE for fast heartbeats or fluttering in chest, chest pain or pressure, breathlessness when lying flat, swollen legs or swollen  "feet.  NEUROLOGIC: NEGATIVE headaches, weakness or numbness in the arms or legs.  DERMATOLOGIC: NEGATIVE for rashes, new moles or change in mole(s)  PULMONARY: NEGATIVE SOB at rest, SOB with activity, dry cough, productive cough, coughing up blood, wheezing or whistling when breathing.    GASTROINTESTINAL: NEGATIVE for nausea or vomitting, loose or watery stools, fat or grease in stools, constipation, abdominal pain, bowel movements black in color or blood noted.  GENITOURINARY: NEGATIVE for pain during urination, blood in urine, urinating more frequently than usual, irregular menstrual periods.  MUSCULOSKELETAL: POSITIVE uscle pain, bone or joint pain. NEGATIVE for swollen joints.  ENDOCRINE: NEGATIVE for increased thirst or urination, diabetes.  LYMPHATIC: NEGATIVE for swollen lymph nodes, lumps or bumps in the breasts or nipple discharge.    Physical Examination:  Vitals: /80  Pulse 67  Ht 1.803 m (5' 11\")  Wt 133.8 kg (295 lb)  BMI 41.14 kg/m2  BMI= Body mass index is 41.14 kg/(m^2).         [place] Total Score 6/8/2018   Total score - [place] 11     GENERAL APPEARANCE: healthy, alert and no distress  EYES: Eyes grossly normal to inspection, PERRL and conjunctivae and sclerae normal  HENT: ear canals and TM's normal, nose and mouth without ulcers or lesions, oropharynx crowded and tongue base enlarged  NECK: no adenopathy and no asymmetry, masses, or scars  RESP: lungs clear to auscultation - no rales, rhonchi or wheezes  CV: regular rates and rhythm and normal S1 S2, no S3 or S4  MS: extremities normal- no gross deformities noted  NEURO: Normal strength and tone, mentation intact and speech normal  PSYCH: mentation appears normal and affect normal/bright  Mallampati Class: IV.  Tonsillar Stage: 3  extending beyond pillars.    Last Basic Metabolic Panel:  Lab Results   Component Value Date     01/04/2018      Lab Results   Component Value Date    POTASSIUM 4.1 01/04/2018     Lab Results " "  Component Value Date    CHLORIDE 103 01/04/2018     Lab Results   Component Value Date    SHAHID 8.5 01/04/2018     Lab Results   Component Value Date    CO2 28 01/04/2018     Lab Results   Component Value Date    BUN 16 01/04/2018     Lab Results   Component Value Date    CR 1.03 01/04/2018     Lab Results   Component Value Date    GLC 99 01/04/2018     TSH   Date Value Ref Range Status   10/14/2016 0.72 0.40 - 4.00 mU/L Final   ]  Impression:  Patient has features and risk factors for possible obstructive sleep apnea including: BMI of 41, loud snoring, daytime sleepiness(ESS 11), difficulty maintaining sleep, crowded oropharynx, neck circumference of 18.5\"  and co-morbid HTN. The STOP-BANG score is 7/8.     Plan:    1. Schedule a Home Sleep Apnea Testing to evaluate for obstructive sleep apnea.    2. Advised his against drowsy driving.    3. Recommend weight management.     Literature provided regarding sleep apnea and sleep hygiene.      He will follow up with me in approximately one day after his sleep study has been completed to review the results and discuss plan of care.       Home Sleep Apnea Testing reviewed.  Obstructive sleep apnea reviewed.  Complications of untreated sleep apnea were reviewed.    Jamarcus Solorio PA-C    CC: Fransico Hylton        "

## 2018-06-08 NOTE — PATIENT INSTRUCTIONS

## 2018-06-08 NOTE — MR AVS SNAPSHOT
After Visit Summary   6/8/2018    Car Torres    MRN: [id number]           Patient Information     Date Of Birth          1960        Visit Information        Provider Department      6/8/2018 3:00 PM Jamarcus Solorio PA [facility]        Today's Diagnoses     Dyspnea and respiratory abnormality    -  1    Class 3 obesity due to excess calories with serious comorbidity and body mass index (BMI) of 40.0 to 44.9 in adult (H)        Essential hypertension        Malaise and fatigue        Coronary artery calcification          Care Instructions      Your BMI is Body mass index is 41.14 kg/(m^2).  Weight management is a personal decision.  If you are interested in exploring weight loss strategies, the following discussion covers the approaches that may be successful. Body mass index (BMI) is one way to tell whether you are at a healthy weight, overweight, or obese. It measures your weight in relation to your height.  A BMI of 18.5 to 24.9 is in the healthy range. A person with a BMI of 25 to 29.9 is considered overweight, and someone with a BMI of 30 or greater is considered obese. More than two-thirds of American adults are considered overweight or obese.  Being overweight or obese increases the risk for further weight gain. Excess weight may lead to heart disease and diabetes.  Creating and following plans for healthy eating and physical activity may help you improve your health.  Weight control is part of healthy lifestyle and includes exercise, emotional health, and healthy eating habits. Careful eating habits lifelong are the mainstay of weight control. Though there are significant health benefits from weight loss, long-term weight loss with diet alone may be very difficult to achieve- studies show long-term success with dietary management in less than 10% of people. Attaining a healthy weight may be especially difficult to achieve in those with severe obesity. In some cases,  medications, devices and surgical management might be considered.  What can you do?  If you are overweight or obese and are interested in methods for weight loss, you should discuss this with your provider.     Consider reducing daily calorie intake by 500 calories.     Keep a food journal.     Avoiding skipping meals, consider cutting portions instead.    Diet combined with exercise helps maintain muscle while optimizing fat loss. Strength training is particularly important for building and maintaining muscle mass. Exercise helps reduce stress, increase energy, and improves fitness. Increasing exercise without diet control, however, may not burn enough calories to loose weight.       Start walking three days a week 10-20 minutes at a time    Work towards walking thirty minutes five days a week     Eventually, increase the speed of your walking for 1-2 minutes at time    In addition, we recommend that you review healthy lifestyles and methods for weight loss available through the National Institutes of Health patient information sites:  http://win.niddk.nih.gov/publications/index.htm    And look into health and wellness programs that may be available through your health insurance provider, employer, local community center, or sidney club.    Weight management plan: Patient was referred to their PCP to discuss a diet and exercise plan.              Follow-ups after your visit        Future tests that were ordered for you today     Open Future Orders        Priority Expected Expires Ordered    HST-Home Sleep Apnea Test Routine  12/8/2018 6/8/2018            Who to contact     If you have questions or need follow up information about today's clinic visit or your schedule please contact [facility] directly at [phone].  Normal or non-critical lab and imaging results will be communicated to you by MyChart, letter or phone within 4 business days after the clinic has received the results. If you do not  "hear from us within 7 days, please contact the clinic through Coferon or phone. If you have a critical or abnormal lab result, we will notify you by phone as soon as possible.  Submit refill requests through Coferon or call your pharmacy and they will forward the refill request to us. Please allow 3 business days for your refill to be completed.          Additional Information About Your Visit        mFoundryhart Information     Coferon gives you secure access to your electronic health record. If you see a primary care provider, you can also send messages to your care team and make appointments. If you have questions, please call your primary care clinic.  If you do not have a primary care provider, please call [phone] and they will assist you.        Care EveryWhere ID     This is your Care EveryWhere ID. This could be used by other organizations to access your [place] medical records  [id number]        Your Vitals Were     Pulse Height BMI (Body Mass Index)             67 1.803 m (5' 11\") 41.14 kg/m2          Blood Pressure from Last 3 Encounters:   06/08/18 126/80   06/05/18 133/73   05/08/18 126/68    Weight from Last 3 Encounters:   06/08/18 133.8 kg (295 lb)   06/05/18 134.7 kg (297 lb)   05/08/18 136.1 kg (300 lb)               Primary Care Provider Office Phone # Fax #    Fransico Hylton PA-C [phone]       [address] [facility] [address]        Equal Access to Services     MONTSERRAT ESCOBAR : Hadii gustavo ku hadasho Soomaali, waaxda luqadaha, qaybta kaalmada adeegyada, dianne kenny . So [facility] [phone].    ATENCIÓN: Si habla español, tiene a peres disposición servicios gratuitos de asistencia lingüística. Corbin al [phone].    We comply with applicable federal civil rights laws and [place] laws. We do not discriminate on the basis of race, color, national origin, age, disability, sex, sexual orientation, or gender identity.            Thank you!  "    Thank you for choosing [facility]  for your care. Our goal is always to provide you with excellent care. Hearing back from our patients is one way we can continue to improve our services. Please take a few minutes to complete the written survey that you may receive in the mail after your visit with us. Thank you!             Your Updated Medication List - Protect others around you: Learn how to safely use, store and throw away your medicines at www.disposemymeds.org.          This list is accurate as of 6/8/18  3:45 PM.  Always use your most recent med list.                   Brand Name Dispense Instructions for use Diagnosis    aspirin 325 MG EC tablet     100    ONE DAILY    Unspecified essential hypertension, Mixed hyperlipidemia       atorvastatin 40 MG tablet    LIPITOR    90 tablet    Take 1 tablet (40 mg) by mouth daily    Hyperlipidemia LDL goal <130       Blood Pressure Monitor Kit     1 kit    1 Units daily    Hypertension goal BP (blood pressure) < 140/90       lisinopril-hydrochlorothiazide 20-12.5 MG per tablet    PRINZIDE/ZESTORETIC    90 tablet    Take 1 tablet by mouth daily (Cancel the HCTZ - new med)    Essential hypertension with goal blood pressure less than 140/90       MULTI VITAMIN MENS PO      None Entered        sildenafil 100 MG tablet    VIAGRA    12 tablet    Take 1 tablet (100 mg) by mouth daily as needed    Impotence of organic origin       vitamin B complex with vitamin C Tabs tablet      Take 1 tablet by mouth daily        VITAMIN D PO      Take 5,000 Units by mouth daily One tablet twice daily

## 2018-06-23 DIAGNOSIS — I10 ESSENTIAL HYPERTENSION WITH GOAL BLOOD PRESSURE LESS THAN 140/90: ICD-10-CM

## 2018-06-25 RX ORDER — LISINOPRIL AND HYDROCHLOROTHIAZIDE 12.5; 2 MG/1; MG/1
TABLET ORAL
Qty: 90 TABLET | Refills: 1 | Status: SHIPPED | OUTPATIENT
Start: 2018-06-25 | End: 2018-12-25

## 2018-07-17 ENCOUNTER — MYC MEDICAL ADVICE (OUTPATIENT)
Dept: CARDIOLOGY | Facility: CLINIC | Age: 58
End: 2018-07-17

## 2018-07-17 ENCOUNTER — RADIANT APPOINTMENT (OUTPATIENT)
Dept: CT IMAGING | Facility: CLINIC | Age: 58
End: 2018-07-17
Attending: INTERNAL MEDICINE
Payer: COMMERCIAL

## 2018-07-17 DIAGNOSIS — R93.1 HIGH CORONARY ARTERY CALCIUM SCORE: Primary | ICD-10-CM

## 2018-07-17 DIAGNOSIS — I25.10 CORONARY ARTERY CALCIFICATION: ICD-10-CM

## 2018-07-17 PROCEDURE — 75571 CT HRT W/O DYE W/CA TEST: CPT | Performed by: INTERNAL MEDICINE

## 2018-07-18 ENCOUNTER — OFFICE VISIT (OUTPATIENT)
Dept: SLEEP MEDICINE | Facility: CLINIC | Age: 58
End: 2018-07-18
Payer: COMMERCIAL

## 2018-07-18 DIAGNOSIS — R06.83 SNORING: Primary | ICD-10-CM

## 2018-07-18 DIAGNOSIS — R53.81 MALAISE AND FATIGUE: ICD-10-CM

## 2018-07-18 DIAGNOSIS — R53.83 MALAISE AND FATIGUE: ICD-10-CM

## 2018-07-18 DIAGNOSIS — I10 ESSENTIAL HYPERTENSION: ICD-10-CM

## 2018-07-18 DIAGNOSIS — R06.00 DYSPNEA AND RESPIRATORY ABNORMALITY: ICD-10-CM

## 2018-07-18 DIAGNOSIS — R06.89 DYSPNEA AND RESPIRATORY ABNORMALITY: ICD-10-CM

## 2018-07-18 PROCEDURE — G0399 HOME SLEEP TEST/TYPE 3 PORTA: HCPCS | Performed by: INTERNAL MEDICINE

## 2018-07-18 NOTE — PROGRESS NOTES
Pt is completing a home sleep test. Patients wife was instructed on how to put on the Noxturnal T3 device and associated equipment before going to bed and given the opportunity to practice putting it on before leaving the sleep center. Patients wife was reminded to bring the home sleep test kit back to the center tomorrow, at agreed upon time for download and reporting.

## 2018-07-18 NOTE — MR AVS SNAPSHOT
After Visit Summary   7/18/2018    Car Torres    MRN: [id number]           Patient Information     Date Of Birth          1960        Visit Information        Provider Department      7/18/2018 10:00 AM BK BED 5 [facility]        Today's Diagnoses     Snoring    -  1       Follow-ups after your visit        Your next 10 appointments already scheduled     Jul 19, 2018  8:30 AM CDT   HST Drop Off with BK BED 5   [facility] [place]    [address] [facility] [address] [facility] [address]  [facility] [address]   [phone]            Jul 19, 2018  9:00 AM CDT   Return Sleep Patient with FANI Phillip   [facility] [place]    [address] [address]   [phone]            Jul 31, 2018 10:00 AM CDT   Ech Stress Test with FKECHR1, FK STRESS RM   [facility] ([facility])    [address] [place] [id number]   [phone]           1. Please bring or wear a comfortable two-piece outfit and walking shoes. 2. Stop eating 3 hours before the test. You may drink water or juice. 3. Stop all caffeine 12 hours before the test. This includes coffee, tea, soda pop, chocolate and certain medicines (such as Anacin and Excederin). Also avoid decaf coffee and tea, as these contain small amounts of caffeine. 4. No alcohol, smoking or use of other tobacco products for 12 hours before the test. 5. Refer to your provider instructions to see if you need to stop any medications (such as beta-blockers or nitrates) for this test. 6. For patients with diabetes: - If you take insulin, call your diabetes care team. Ask if you should take a   dose the morning of your test. - If you take diabetes medicine by mouth, dont take it on the morning of your test. Bring it with you to take after the test. (If you have  questions, call your diabetes care team) 7. When you arrive, please tell us if: - You have diabetes. - You have taken Viagra, Cialis or Levitra in the past 48 hours. 8. For any questions that cannot be answered, please contact the ordering physician 9. Please do not wear perfumes or scented lotions on the day of your exam.              Future tests that were ordered for you today     Open Future Orders        Priority Expected Expires Ordered    Exercise Stress Echocardiogram Routine 7/17/2018 7/17/2019 7/17/2018            Who to contact     If you have questions or need follow up information about today's clinic visit or your schedule please contact [facility] directly at [phone].  Normal or non-critical lab and imaging results will be communicated to you by Idle Gaminghart, letter or phone within 4 business days after the clinic has received the results. If you do not hear from us within 7 days, please contact the clinic through To8tot or phone. If you have a critical or abnormal lab result, we will notify you by phone as soon as possible.  Submit refill requests through CLASEMOVIL or call your pharmacy and they will forward the refill request to us. Please allow 3 business days for your refill to be completed.          Additional Information About Your Visit        CLASEMOVIL Information     CLASEMOVIL gives you secure access to your electronic health record. If you see a primary care provider, you can also send messages to your care team and make appointments. If you have questions, please call your primary care clinic.  If you do not have a primary care provider, please call [phone] and they will assist you.        Care EveryWhere ID     This is your Care EveryWhere ID. This could be used by other organizations to access your [facility] medical records  [id number]         Blood Pressure from Last 3 Encounters:   06/08/18 126/80   06/05/18 133/73   05/08/18 126/68    Weight from Last 3 Encounters:    06/08/18 133.8 kg (295 lb)   06/05/18 134.7 kg (297 lb)   05/08/18 136.1 kg (300 lb)              Today, you had the following     No orders found for display       Primary Care Provider Office Phone # Fax #    Fransico Hylton PA-C [phone]       [address] [facility] [address]        Equal Access to Services     MONTSERRAT ESCOBAR : Hadii aad ku hadasho Soomaali, waaxda luqadaha, qaybta kaalmada adeegyada, waxay idiin hayaan adeeg kharash ladarlene . So [facility] [phone].    ATENCIÓN: Si katrinala espmicah, tiene a peres disposición servicios gratuitos de asistencia lingüística. Ralphame al [phone].    We comply with applicable federal civil rights laws and [place] laws. We do not discriminate on the basis of race, color, national origin, age, disability, sex, sexual orientation, or gender identity.            Thank you!     Thank you for choosing [facility]  for your care. Our goal is always to provide you with excellent care. Hearing back from our patients is one way we can continue to improve our services. Please take a few minutes to complete the written survey that you may receive in the mail after your visit with us. Thank you!             Your Updated Medication List - Protect others around you: Learn how to safely use, store and throw away your medicines at www.disposemymeds.org.          This list is accurate as of 7/18/18 10:08 AM.  Always use your most recent med list.                   Brand Name Dispense Instructions for use Diagnosis    aspirin 325 MG EC tablet     100    ONE DAILY    Unspecified essential hypertension, Mixed hyperlipidemia       atorvastatin 40 MG tablet    LIPITOR    90 tablet    Take 1 tablet (40 mg) by mouth daily    Hyperlipidemia LDL goal <130       Blood Pressure Monitor Kit     1 kit    1 Units daily    Hypertension goal BP (blood pressure) < 140/90       lisinopril-hydrochlorothiazide 20-12.5 MG per tablet    PRINZIDE/ZESTORETIC    90  tablet    TAKE 1 TABLET BY MOUTH DAILY    Essential hypertension with goal blood pressure less than 140/90       MULTI VITAMIN MENS PO      None Entered        sildenafil 100 MG tablet    VIAGRA    12 tablet    Take 1 tablet (100 mg) by mouth daily as needed    Impotence of organic origin       vitamin B complex with vitamin C Tabs tablet      Take 1 tablet by mouth daily        VITAMIN D PO      Take 5,000 Units by mouth daily One tablet twice daily

## 2018-07-19 ENCOUNTER — APPOINTMENT (OUTPATIENT)
Dept: SLEEP MEDICINE | Facility: CLINIC | Age: 58
End: 2018-07-19
Payer: COMMERCIAL

## 2018-07-19 ENCOUNTER — OFFICE VISIT (OUTPATIENT)
Dept: SLEEP MEDICINE | Facility: CLINIC | Age: 58
End: 2018-07-19
Payer: COMMERCIAL

## 2018-07-19 VITALS
HEIGHT: 71 IN | WEIGHT: 302 LBS | BODY MASS INDEX: 42.28 KG/M2 | SYSTOLIC BLOOD PRESSURE: 131 MMHG | DIASTOLIC BLOOD PRESSURE: 71 MMHG | HEART RATE: 52 BPM | OXYGEN SATURATION: 96 %

## 2018-07-19 DIAGNOSIS — G47.33 OSA (OBSTRUCTIVE SLEEP APNEA): Primary | ICD-10-CM

## 2018-07-19 PROCEDURE — 99214 OFFICE O/P EST MOD 30 MIN: CPT | Performed by: PHYSICIAN ASSISTANT

## 2018-07-19 NOTE — NURSING NOTE
"Chief Complaint   Patient presents with     Study Results     HST results       Initial /71  Pulse 52  Ht 1.803 m (5' 11\")  Wt 137 kg (302 lb)  SpO2 96%  BMI 42.12 kg/m2 Estimated body mass index is 42.12 kg/(m^2) as calculated from the following:    Height as of this encounter: 1.803 m (5' 11\").    Weight as of this encounter: 137 kg (302 lb).    Medication Reconciliation: complete      "

## 2018-07-19 NOTE — PATIENT INSTRUCTIONS

## 2018-07-19 NOTE — PROGRESS NOTES
"Home Sleep Apnea Testing Results Visit:    Chief Complaint   Patient presents with     Study Results     HST results       Car Torres is a 58 year old male who returns to [facility] after having had Home Sleep Apnea Testing.  He presented with loud snoring, daytime sleepiness(ESS 11), difficulty maintaining sleep, crowded oropharynx, neck circumference of 18.5\"  and co-morbid HTN.    Estimated body mass index is 42.12 kg/(m^2) as calculated from the following:    Height as of this encounter: 1.803 m (5' 11\").    Weight as of this encounter: 137 kg (302 lb).  Total score - Barrett: 11 (2018  2:00 PM)   STOP-BAN/8      Home Sleep Apnea Testing - 18: 295 lbs 0 oz: AHI 45.3/hr. Supine AHI 61.9/hr.   Oxygen [place] of 66%.  Baseline 93%.  Sp02 =< 88% for 47 minutes  He slept on his back (22%), prone (16.8%), left (35.8) and right (20.7%) sides.   Analysis time: 299.2 minutes. Patient went to bed 2 hours earlier than HST was set for.     Signal quality of Oxymeter 99% Good  Nasal Cannula 100% Good  RIP belts 100% Good.     Car Torres reports that he slept well .     Home Sleep Apnea Testing was reviewed in detail today with Car and a copy given to him for his records.    Past medical/surgical history, family history, social history, medications and allergies were reviewed.      /71  Pulse 52  Ht 1.803 m (5' 11\")  Wt 137 kg (302 lb)  SpO2 96%  BMI 42.12 kg/m2    Impression/Plan:  Severe Obstructive Sleep Apnea.   Sleep associated hypoxemia was present.     Treatment options discussed today including  auto-CPAP at 6-16 cmH2O, positional therapy or polysomnography with full night PAP titration. Recommend an all night titration PSG.   Elected treatment with auto-CPAP at 6-16 cmH2O.  Overnight oximetry to follow up on sleep related hypoxemia once the patient is established on CPAP therapy.     25 minutes spent with patient with >50% spent in counseling and " coordination of care TIA, hypoxemia and hypoventilation.      Jamarcus Solorio PA-C    CC:  Fransico Hylton

## 2018-07-19 NOTE — PROGRESS NOTES
This HSAT was performed using a Noxturnal T3 device which recorded snore, sound, movement activity, body position, nasal pressure, oronasal thermal airflow, pulse, oximetry and both chest and abdominal respiratory effort. HSAT data was restricted to the time patient states they were in bed.     HSAT was scored using 1B 4% hypopnea rule.     HST AHI (Non-PAT): 45.3  Snoring was reported as moderate.  Time with SpO2 below 89% was 47 minutes.   Overall signal quality was good     Pt will follow up with sleep provider to determine appropriate therapy.

## 2018-07-19 NOTE — MR AVS SNAPSHOT
After Visit Summary   7/19/2018    Car Torres    MRN: [id number]           Patient Information     Date Of Birth          1960        Visit Information        Provider Department      7/19/2018 9:00 AM Jamarcus Solorio PA [facility]        Today's Diagnoses     TIA (obstructive sleep apnea)    -  1      Care Instructions      Your BMI is Body mass index is 42.12 kg/(m^2).  Weight management is a personal decision.  If you are interested in exploring weight loss strategies, the following discussion covers the approaches that may be successful. Body mass index (BMI) is one way to tell whether you are at a healthy weight, overweight, or obese. It measures your weight in relation to your height.  A BMI of 18.5 to 24.9 is in the healthy range. A person with a BMI of 25 to 29.9 is considered overweight, and someone with a BMI of 30 or greater is considered obese. More than two-thirds of American adults are considered overweight or obese.  Being overweight or obese increases the risk for further weight gain. Excess weight may lead to heart disease and diabetes.  Creating and following plans for healthy eating and physical activity may help you improve your health.  Weight control is part of healthy lifestyle and includes exercise, emotional health, and healthy eating habits. Careful eating habits lifelong are the mainstay of weight control. Though there are significant health benefits from weight loss, long-term weight loss with diet alone may be very difficult to achieve- studies show long-term success with dietary management in less than 10% of people. Attaining a healthy weight may be especially difficult to achieve in those with severe obesity. In some cases, medications, devices and surgical management might be considered.  What can you do?  If you are overweight or obese and are interested in methods for weight loss, you should discuss this with your provider.     Consider  reducing daily calorie intake by 500 calories.     Keep a food journal.     Avoiding skipping meals, consider cutting portions instead.    Diet combined with exercise helps maintain muscle while optimizing fat loss. Strength training is particularly important for building and maintaining muscle mass. Exercise helps reduce stress, increase energy, and improves fitness. Increasing exercise without diet control, however, may not burn enough calories to loose weight.       Start walking three days a week 10-20 minutes at a time    Work towards walking thirty minutes five days a week     Eventually, increase the speed of your walking for 1-2 minutes at time    In addition, we recommend that you review healthy lifestyles and methods for weight loss available through the National Institutes of Health patient information sites:  http://win.niddk.nih.gov/publications/index.htm    And look into health and wellness programs that may be available through your health insurance provider, employer, local community center, or sidney club.    Weight management plan: Patient was referred to their PCP to discuss a diet and exercise plan.              Follow-ups after your visit        Your next 10 appointments already scheduled     Jul 25, 2018  1:30 PM CDT   PAP SETUP with CHAPARRITA CARLOS   [facility] ([facility])    [address] [place] [id number]   [phone]            Jul 31, 2018 10:00 AM CDT   Ech Stress Test with FKECHR1, FK STRESS RM   [facility] ([facility])    [address] [place] [id number] [phone]           1. Please bring or wear a comfortable two-piece outfit and walking shoes. 2. Stop eating 3 hours before the test. You may drink water or juice. 3. Stop all caffeine 12 hours before the test. This includes coffee, tea, soda pop, chocolate and certain medicines (such as Anacin  and Excederin). Also avoid decaf coffee and tea, as these contain small amounts of caffeine. 4. No alcohol, smoking or use of other tobacco products for 12 hours before the test. 5. Refer to your provider instructions to see if you need to stop any medications (such as beta-blockers or nitrates) for this test. 6. For patients with diabetes: - If you take insulin, call your diabetes care team. Ask if you should take a   dose the morning of your test. - If you take diabetes medicine by mouth, dont take it on the morning of your test. Bring it with you to take after the test. (If you have questions, call your diabetes care team) 7. When you arrive, please tell us if: - You have diabetes. - You have taken Viagra, Cialis or Levitra in the past 48 hours. 8. For any questions that cannot be answered, please contact the ordering physician 9. Please do not wear perfumes or scented lotions on the day of your exam.            Sep 17, 2018  3:20 PM CDT   Return Sleep Patient with FANI Phillip   [facility] ([facility])    [address] [place] [id number] [phone]              Who to contact     If you have questions or need follow up information about today's clinic visit or your schedule please contact [facility] directly at [phone].  Normal or non-critical lab and imaging results will be communicated to you by MyChart, letter or phone within 4 business days after the clinic has received the results. If you do not hear from us within 7 days, please contact the clinic through Imcompanyhart or phone. If you have a critical or abnormal lab result, we will notify you by phone as soon as possible.  Submit refill requests through Slated or call your pharmacy and they will forward the refill request to us. Please allow 3 business days for your refill to be completed.          Additional Information About Your Visit        Slated  "Information     Jamil gives you secure access to your electronic health record. If you see a primary care provider, you can also send messages to your care team and make appointments. If you have questions, please call your primary care clinic.  If you do not have a primary care provider, please call [phone] and they will assist you.        Care EveryWhere ID     This is your Care EveryWhere ID. This could be used by other organizations to access your [place] medical records  [id number]        Your Vitals Were     Pulse Height Pulse Oximetry BMI (Body Mass Index)          52 1.803 m (5' 11\") 96% 42.12 kg/m2         Blood Pressure from Last 3 Encounters:   07/19/18 131/71   06/08/18 126/80   06/05/18 133/73    Weight from Last 3 Encounters:   07/19/18 137 kg (302 lb)   06/08/18 133.8 kg (295 lb)   06/05/18 134.7 kg (297 lb)              We Performed the Following     Sleep Comprehensive DME        Primary Care Provider Office Phone # Fax #    Fransico Hylton PA-C [phone]       [address] [facility] [place] [address]        Equal Access to Services     [facility] Center[facility] : Hadii aad ku hadasho Soomaali, waaxda luqadaha, qaybta kaalmada adeegyada, dianne hinojosa. So [facility] [phone].    ATENCIÓN: Si habla español, tiene a peres disposición servicios gratuitos de asistencia lingüística. Corbin al [phone].    We comply with applicable federal civil rights laws and [place] laws. We do not discriminate on the basis of race, color, national origin, age, disability, sex, sexual orientation, or gender identity.            Thank you!     Thank you for choosing [facility]  for your care. Our goal is always to provide you with excellent care. Hearing back from our patients is one way we can continue to improve our services. Please take a few minutes to complete the written survey that you may receive in the mail after your visit with us. Thank you!      "        Your Updated Medication List - Protect others around you: Learn how to safely use, store and throw away your medicines at www.disposemymeds.org.          This list is accurate as of 7/19/18  9:20 AM.  Always use your most recent med list.                   Brand Name Dispense Instructions for use Diagnosis    aspirin 325 MG EC tablet     100    ONE DAILY    Unspecified essential hypertension, Mixed hyperlipidemia       atorvastatin 40 MG tablet    LIPITOR    90 tablet    Take 1 tablet (40 mg) by mouth daily    Hyperlipidemia LDL goal <130       Blood Pressure Monitor Kit     1 kit    1 Units daily    Hypertension goal BP (blood pressure) < 140/90       lisinopril-hydrochlorothiazide 20-12.5 MG per tablet    PRINZIDE/ZESTORETIC    90 tablet    TAKE 1 TABLET BY MOUTH DAILY    Essential hypertension with goal blood pressure less than 140/90       MULTI VITAMIN MENS PO      None Entered        sildenafil 100 MG tablet    VIAGRA    12 tablet    Take 1 tablet (100 mg) by mouth daily as needed    Impotence of organic origin       vitamin B complex with vitamin C Tabs tablet      Take 1 tablet by mouth daily        VITAMIN D PO      Take 5,000 Units by mouth daily One tablet twice daily

## 2018-07-23 NOTE — PROCEDURES
"HOME SLEEP STUDY INTERPRETATION    Patient: Car Torres  MRN: [id number]  YOB: 1960  Study Date: 7/18/2018  Referring Provider: Fransico Hylton  Ordering Provider: FANI Joe     Indications for Home Study: Car Torres is a 58 year old male with symptoms suggestive of obstructive sleep apnea.    Estimated body mass index is 42.12 kg/(m^2) as calculated from the following:    Height as of 7/19/18: 1.803 m (5' 11\").    Weight as of 7/19/18: 137 kg (302 lb).  Total score - [place]: 11 (6/8/2018  2:00 PM)  StopBang Total Score: 7 (7/19/2018  9:13 AM)    Data: A full night home sleep study was performed recording the standard physiologic parameters including body position, movement, sound, nasal pressure, thermal oral airflow, chest and abdominal movements with respiratory inductance plethysmography, and oxygen saturation by pulse oximetry. Pulse rate was estimated by oximetry recording. This study was considered adequate based on > 4 hours of quality oximetry and respiratory recording. As specified by the AASM Manual for the Scoring of Sleep and Associated events, version 2.3, Rule VIII.D 1B, 4% oxygen desaturation scoring for hypopneas is used as a standard of care on all home sleep apnea testing.    Analysis Time:  299 minutes    Respiration:   Sleep Associated Hypoxemia: episodic hypoxemia was present. Baseline oxygen saturation was 91%.  Time with saturation less than or equal to 88% was 47 minutes. The lowest oxygen saturation was 66%.   Snoring: Snoring was present.  Respiratory events: The home study revealed a presence of 99 obstructive apneas and 5 mixed and central apneas. There were 122 hypopneas resulting in a combined apnea/hypopnea index [AHI] of 45.3 events per hour.  AHI was 61.9 per hour supine, 53.8 per hour prone, 40.9 per hour on left side, and 33.9 per hour on right side.   Pattern: Excluding events noted above, respiratory rate and pattern was Normal.    Position: " Percent of time spent: supine - 22%, prone - 17%, on left - 36%, on right - 21%.    Heart Rate: By pulse oximetry normal rate was noted.     Assessment:   Severe obstructive sleep apnea.  Sleep associated hypoxemia was present.    Recommendations:  Consider auto-CPAP at 5-18 cmH2O, polysomnography with full night PAP titration or surgical options.  Suggest optimizing sleep hygiene and avoiding sleep deprivation.  Weight management.    Diagnosis Code(s): Obstructive Sleep Apnea G47.33, Hypoxemia G47.36    Vincent Pimentel MD, July 23, 2018   Diplomate, American Board of Internal Medicine, Sleep Medicine

## 2018-07-25 ENCOUNTER — DOCUMENTATION ONLY (OUTPATIENT)
Dept: SLEEP MEDICINE | Facility: CLINIC | Age: 58
End: 2018-07-25
Payer: COMMERCIAL

## 2018-07-25 NOTE — PROGRESS NOTES
Patient was offered choice of vendor and chose [facility].  Car Torres was set up at [place] on July 25, 2018 Patient received a Resmed AirSense 10 Auto. Pressures were set at 6-16 cm H2O.   Patient s ramp is 5 cm H2O for Auto and FLEX/EPR is EPR, 2.  Patient received a [employer] Mask name: Eson 2 Nasal mask Size Large, heated tubing and heated humidifier.  Patient is enrolled in the STM Program and does need to meet compliance. Patient has a follow up on 09/17/18 with FANI Joe.    Fern Keller

## 2018-07-30 ENCOUNTER — DOCUMENTATION ONLY (OUTPATIENT)
Dept: SLEEP MEDICINE | Facility: CLINIC | Age: 58
End: 2018-07-30

## 2018-07-30 NOTE — PROGRESS NOTES
3 DAY STM VISIT    Diagnostic AHI:  45.3    Patient contacted for 3 day STM visit  Message left for patient to return call     Device type: Auto-CPAP  PAP settings from order::  CPAP min 6 cm  H20       CPAP max 16 cm  H20  Mask type:    Nasal Mask     Device settings from machine      Min CPAP 6.0            Max CPAP 16.0      Assessment: Nightly usage over four hours.  Action plan: Pt to have f/u 14 day STM visit.  Patient has a follow up visit scheduled:   yes within 31-90 days of set up.

## 2018-07-31 ENCOUNTER — RADIANT APPOINTMENT (OUTPATIENT)
Dept: CARDIOLOGY | Facility: CLINIC | Age: 58
End: 2018-07-31
Attending: INTERNAL MEDICINE
Payer: COMMERCIAL

## 2018-07-31 DIAGNOSIS — R93.1 HIGH CORONARY ARTERY CALCIUM SCORE: ICD-10-CM

## 2018-07-31 DIAGNOSIS — R93.1 AGATSTON CAC SCORE, >400: Primary | ICD-10-CM

## 2018-07-31 PROCEDURE — 40000264 ECHO STRESS TEST WITH DEFINITY: Performed by: INTERNAL MEDICINE

## 2018-07-31 PROCEDURE — 93016 CV STRESS TEST SUPVJ ONLY: CPT | Performed by: INTERNAL MEDICINE

## 2018-07-31 PROCEDURE — 93321 DOPPLER ECHO F-UP/LMTD STD: CPT | Mod: 26 | Performed by: INTERNAL MEDICINE

## 2018-07-31 PROCEDURE — 93325 DOPPLER ECHO COLOR FLOW MAPG: CPT | Mod: 26 | Performed by: INTERNAL MEDICINE

## 2018-07-31 PROCEDURE — 93325 DOPPLER ECHO COLOR FLOW MAPG: CPT | Mod: TC | Performed by: INTERNAL MEDICINE

## 2018-07-31 PROCEDURE — 93350 STRESS TTE ONLY: CPT | Mod: TC | Performed by: INTERNAL MEDICINE

## 2018-07-31 PROCEDURE — 93350 STRESS TTE ONLY: CPT | Mod: 26 | Performed by: INTERNAL MEDICINE

## 2018-07-31 PROCEDURE — 93352 ADMIN ECG CONTRAST AGENT: CPT | Performed by: INTERNAL MEDICINE

## 2018-07-31 PROCEDURE — 93321 DOPPLER ECHO F-UP/LMTD STD: CPT | Mod: TC | Performed by: INTERNAL MEDICINE

## 2018-07-31 PROCEDURE — 93017 CV STRESS TEST TRACING ONLY: CPT | Performed by: INTERNAL MEDICINE

## 2018-07-31 PROCEDURE — 93018 CV STRESS TEST I&R ONLY: CPT | Performed by: INTERNAL MEDICINE

## 2018-07-31 RX ADMIN — Medication 5 ML: at 10:30

## 2018-08-01 ENCOUNTER — TELEPHONE (OUTPATIENT)
Dept: CARDIOLOGY | Facility: CLINIC | Age: 58
End: 2018-08-01

## 2018-08-01 NOTE — TELEPHONE ENCOUNTER
Left message for patient to call clinic to discuss scheduling  SE.  India Stern RN      Notes Recorded by Nikita Grubbs MD on 2018 at 12:55 PM  Your test was normal but unfortunately you were not able to reach target heat rate. I have called you but could not reach you. I would like you to reach target heart to fully assess your heart function. I will order a chemical stress test. They will give you dobutamine to increase the heart rate.Then we will take echo pictures as they did today. Let us know what you think.

## 2018-08-08 NOTE — TELEPHONE ENCOUNTER
Patient is scheduled for a dobutamine stress echo at [facility] ([address], [facility] [address], [phone]) on August 15th 10:45 am arrival [employer].      Cardiac Testing: Patient given instructions regarding dobutamine stress echocardiogram . Discussed purpose, preparation, procedure and when to expect results reported back to the patient. Patient demonstrated understanding of this information and agreed to call with further questions or concerns.      India Stern RN

## 2018-08-09 ENCOUNTER — DOCUMENTATION ONLY (OUTPATIENT)
Dept: SLEEP MEDICINE | Facility: CLINIC | Age: 58
End: 2018-08-09

## 2018-08-09 NOTE — PROGRESS NOTES
14 DAY STM VISIT    Diagnostic AHI:  45.3 HST    Message left for patient to return call     Assessment: Pt not meeting objective benchmarks for compliance     Action plan: waiting for patient to return call.  and pt to have 30 day STM visit.    Device type: Auto-CPAP  PAP settings: CPAP min 6.0 cm  H20     CPAP max 16.0 cm  H20    95th% pressure 11.5 cm   Mask type:  Nasal Mask  Objective measures: 14 day rolling measures      Compliance  50 %      Leak  30.8 lpm  last  upload      AHI 2.72   last  upload      Average number of minutes 181     Average hours of usage 3          Objective measure goal  Compliance   Goal >70%  Leak   Goal < 24 lpm  AHI  Goal < 5  Usage  Goal >240

## 2018-08-15 ENCOUNTER — HOSPITAL ENCOUNTER (OUTPATIENT)
Dept: CARDIOLOGY | Facility: CLINIC | Age: 58
Discharge: HOME | End: 2018-08-15
Attending: INTERNAL MEDICINE | Admitting: INTERNAL MEDICINE
Payer: COMMERCIAL

## 2018-08-15 DIAGNOSIS — R93.1 AGATSTON CAC SCORE, >400: ICD-10-CM

## 2018-08-15 DIAGNOSIS — I25.10 CORONARY ARTERY DISEASE: ICD-10-CM

## 2018-08-15 PROCEDURE — 93018 CV STRESS TEST I&R ONLY: CPT | Performed by: INTERNAL MEDICINE

## 2018-08-15 PROCEDURE — 93325 DOPPLER ECHO COLOR FLOW MAPG: CPT | Mod: 26 | Performed by: INTERNAL MEDICINE

## 2018-08-15 PROCEDURE — 93350 STRESS TTE ONLY: CPT | Mod: 26 | Performed by: INTERNAL MEDICINE

## 2018-08-15 PROCEDURE — 93321 DOPPLER ECHO F-UP/LMTD STD: CPT | Mod: 26 | Performed by: INTERNAL MEDICINE

## 2018-08-15 PROCEDURE — 25000128 H RX IP 250 OP 636: Performed by: INTERNAL MEDICINE

## 2018-08-15 PROCEDURE — 40000264 ECHO DOBUTAMINE STRESS TEST WITH DEFINITY

## 2018-08-15 PROCEDURE — 25500064 ZZH RX 255 OP 636: Performed by: INTERNAL MEDICINE

## 2018-08-15 PROCEDURE — 93016 CV STRESS TEST SUPVJ ONLY: CPT | Performed by: INTERNAL MEDICINE

## 2018-08-15 PROCEDURE — 25000125 ZZHC RX 250: Performed by: INTERNAL MEDICINE

## 2018-08-15 RX ORDER — METOPROLOL TARTRATE 1 MG/ML
1-30 INJECTION, SOLUTION INTRAVENOUS
Status: DISPENSED | OUTPATIENT
Start: 2018-08-15 | End: 2018-08-15

## 2018-08-15 RX ORDER — SODIUM CHLORIDE 9 MG/ML
INJECTION, SOLUTION INTRAVENOUS CONTINUOUS
Status: ACTIVE | OUTPATIENT
Start: 2018-08-15 | End: 2018-08-15

## 2018-08-15 RX ORDER — DOBUTAMINE HYDROCHLORIDE 200 MG/100ML
10-50 INJECTION INTRAVENOUS CONTINUOUS
Status: ACTIVE | OUTPATIENT
Start: 2018-08-15 | End: 2018-08-15

## 2018-08-15 RX ADMIN — METOROPROLOL TARTRATE 5 MG: 5 INJECTION, SOLUTION INTRAVENOUS at 11:28

## 2018-08-15 RX ADMIN — PERFLUTREN 7 ML: 6.52 INJECTION, SUSPENSION INTRAVENOUS at 11:28

## 2018-08-15 RX ADMIN — DOBUTAMINE IN DEXTROSE 30 MCG/KG/MIN: 200 INJECTION, SOLUTION INTRAVENOUS at 11:09

## 2018-08-15 RX ADMIN — ATROPINE SULFATE 1 MG: 0.4 INJECTION, SOLUTION INTRAMUSCULAR; INTRAVENOUS; SUBCUTANEOUS at 11:15

## 2018-08-15 NOTE — PROGRESS NOTES
Pt here for dobutamine stress test. Test, meds and side effects reviewed with patient. Achieved target HR at 50 mcg Dobutamine and a total of 1.0mg IV atropine. Gave a total of 5mg IV Metoprolol to bring HR back to baseline. Post monitoring complete and VSS. Pt escorted out to the gold waiting room.

## 2018-08-27 ENCOUNTER — DOCUMENTATION ONLY (OUTPATIENT)
Dept: SLEEP MEDICINE | Facility: CLINIC | Age: 58
End: 2018-08-27

## 2018-08-27 NOTE — PROGRESS NOTES
30 DAY STM VISIT    Diagnostic AHI:  45.3 HST    Message left for patient to return call     Assessment: Pt not meeting objective benchmarks for compliance     Action plan: waiting for patient to return call.  and 2 week STM recheck appt scheduled  Patient has a follow up visit with FANI Joe on 9/17/18.   Device type: Auto-CPAP  PAP settings: CPAP min 6.0 cm  H20     CPAP max 16.0 cm  H20    95th% pressure 11.2 cm  H20   Mask type:  Nasal Mask  Objective measures: 14 day rolling measures      Compliance  28 %      Leak  27.6 lpm  last  upload      AHI 1.84   last  upload      Average number of minutes 175      Objective measure goal  Compliance   Goal >70%  Leak   Goal < 24 lpm  AHI  Goal < 5  Usage  Goal >240

## 2018-08-29 NOTE — ADDENDUM NOTE
Encounter addended by: Jennifer Shah on: 8/29/2018  3:20 PM<BR>     Actions taken: Imaging Exam begun, Order Reconciliation Section accessed

## 2018-09-11 ENCOUNTER — DOCUMENTATION ONLY (OUTPATIENT)
Dept: SLEEP MEDICINE | Facility: CLINIC | Age: 58
End: 2018-09-11
Payer: COMMERCIAL

## 2018-09-11 NOTE — PROGRESS NOTES
STM Re-Check: Patient at 50% compliance but uses his machine almost nightly. Patient see's his Provider on 9/17/2018 will do STM Re-Check after Provider visit.

## 2018-09-17 ENCOUNTER — OFFICE VISIT (OUTPATIENT)
Dept: SLEEP MEDICINE | Facility: CLINIC | Age: 58
End: 2018-09-17
Payer: COMMERCIAL

## 2018-09-17 VITALS
DIASTOLIC BLOOD PRESSURE: 72 MMHG | HEIGHT: 71 IN | OXYGEN SATURATION: 98 % | HEART RATE: 65 BPM | WEIGHT: 307 LBS | BODY MASS INDEX: 42.98 KG/M2 | SYSTOLIC BLOOD PRESSURE: 139 MMHG

## 2018-09-17 DIAGNOSIS — G47.33 OSA (OBSTRUCTIVE SLEEP APNEA): Primary | ICD-10-CM

## 2018-09-17 PROCEDURE — 99213 OFFICE O/P EST LOW 20 MIN: CPT | Performed by: PHYSICIAN ASSISTANT

## 2018-09-17 NOTE — PROGRESS NOTES
"Obstructive Sleep Apnea - PAP Follow-Up Visit:    Chief Complaint   Patient presents with     CPAP Follow Up       Car Torres comes in today for follow-up of their severe sleep apnea, managed with CPAP.     Car Torres is a 58 year old male who initially presented with loud snoring, daytime sleepiness(ESS 11), difficulty maintaining sleep, crowded oropharynx, neck circumference of 18.5\"  and co-morbid HTN.     Total score - [place]: 11 (2018  2:00 PM)   STOP-BAN/8        Home Sleep Apnea Testing - 18: 295 lbs 0 oz: AHI 45.3/hr. Supine AHI 61.9/hr.   Oxygen [place] of 66%.  Baseline 93%.  Sp02 =< 88% for 47 minutes  He slept on his back (22%), prone (16.8%), left (35.8) and right (20.7%) sides.   Analysis time: 299.2 minutes. Patient went to bed 2 hours earlier than HST was set for.    Overall, he rates the experience with PAP as 8 (0 poor, 10 great). The mask is comfortable.  He is not snoring with the mask on. He is not having gasp arousals.  He is not having significant oral/nasal dryness. The pressure is comfortable.     His PAP interface is Nasal Pillows.    Bedtime is typically 1900. Usually it takes about 5 min minutes to fall asleep with the mask on. Wake time is typically 0100.  Patient is using PAP therapy 5 hours per night. The patient is usually getting 5 hours of sleep per night.    He does feel rested in the morning.    Total score - [place]: 2 (2018  3:00 PM)    ResMed   Auto-PAP 6.0 - 16.0 cmH2O 30 day usage data:    56% of days with > 4 hours of use. 2/30 days with no use.   Average use 249 minutes per day.   95%ile Leak 29.4 L/min.   CPAP 95% pressure 10.8 cm.   AHI 1.99 events per hour.     He states he is still getting used to using CPAP, much better last 2 weeks. He also noted decrease in daytime sleepiness.     Past medical/surgical history, family history, social history, medications and allergies were reviewed.      Problem List:  Patient Active Problem List    " "Diagnosis Date Noted     Hypertension goal BP (blood pressure) < 140/90 10/20/2010     Priority: High     Hyperlipidemia LDL goal <130 06/11/2010     Priority: High     Nonspecific abnormal electrocardiogram (ECG) (EKG) 08/23/2007     Priority: High     Stress testing normal.       TIA (obstructive sleep apnea) 07/19/2018     Priority: Medium     Home Sleep Apnea Testing - 7/18/18: 295 lbs 0 oz: AHI 45.3/hr. Supine AHI 61.9/hr. Oxygen [place] of 66%.  Baseline 93%.  Sp02 =< 88% for 47 minutes.       History of sinus bradycardia 06/07/2018     Priority: Medium     ED (erectile dysfunction) 06/07/2018     Priority: Medium     Coronary artery calcification 05/08/2018     Priority: Medium     Per CT CHEST WITHOUT CONTRAST April 23, 2018. CT calcium score planned.        Renal cyst 11/18/2015     Priority: Medium     Simple, non enhanced, 3.5 cm on right kidney, Bosniak 1 - x 2 stable findings       Pulmonary nodule 11/18/2015     Priority: Medium     Incidental finding on CT scan, right posterior lung - considered benign / stable        Function kidney decreased 10/21/2010     Priority: Medium     60 to 80 - have discussed Lisinopril - will consider        Obesity 02/27/2006     Priority: Medium     Sciatica 02/27/2006     Priority: Low        /72  Pulse 65  Ht 1.803 m (5' 11\")  Wt 139.3 kg (307 lb)  SpO2 98%  BMI 42.82 kg/m2    Impression/Plan:     Severe Sleep apnea-  Modest compliance. AHI appears under good control on auto-CPAP 6-15 cm/H20.  Increase use.     Car Torres will follow up in about 4 week(s).     Fifteen minutes spent with patient, all of which were spent face-to-face counseling, consulting, coordinating plan of care regarding TIA.      Jamarcus Solorio PA-C      "

## 2018-09-17 NOTE — PATIENT INSTRUCTIONS

## 2018-09-17 NOTE — MR AVS SNAPSHOT
After Visit Summary   9/17/2018    Car Torres    MRN: [id number]           Patient Information     Date Of Birth          1960        Visit Information        Provider Department      9/17/2018 3:20 PM Jamarcus Solorio PA [facility]        Today's Diagnoses     TIA (obstructive sleep apnea)    -  1      Care Instructions      Your BMI is Body mass index is 42.82 kg/(m^2).  Weight management is a personal decision.  If you are interested in exploring weight loss strategies, the following discussion covers the approaches that may be successful. Body mass index (BMI) is one way to tell whether you are at a healthy weight, overweight, or obese. It measures your weight in relation to your height.  A BMI of 18.5 to 24.9 is in the healthy range. A person with a BMI of 25 to 29.9 is considered overweight, and someone with a BMI of 30 or greater is considered obese. More than two-thirds of American adults are considered overweight or obese.  Being overweight or obese increases the risk for further weight gain. Excess weight may lead to heart disease and diabetes.  Creating and following plans for healthy eating and physical activity may help you improve your health.  Weight control is part of healthy lifestyle and includes exercise, emotional health, and healthy eating habits. Careful eating habits lifelong are the mainstay of weight control. Though there are significant health benefits from weight loss, long-term weight loss with diet alone may be very difficult to achieve- studies show long-term success with dietary management in less than 10% of people. Attaining a healthy weight may be especially difficult to achieve in those with severe obesity. In some cases, medications, devices and surgical management might be considered.  What can you do?  If you are overweight or obese and are interested in methods for weight loss, you should discuss this with your provider.     Consider  reducing daily calorie intake by 500 calories.     Keep a food journal.     Avoiding skipping meals, consider cutting portions instead.    Diet combined with exercise helps maintain muscle while optimizing fat loss. Strength training is particularly important for building and maintaining muscle mass. Exercise helps reduce stress, increase energy, and improves fitness. Increasing exercise without diet control, however, may not burn enough calories to loose weight.       Start walking three days a week 10-20 minutes at a time    Work towards walking thirty minutes five days a week     Eventually, increase the speed of your walking for 1-2 minutes at time    In addition, we recommend that you review healthy lifestyles and methods for weight loss available through the National Institutes of Health patient information sites:  http://win.niddk.nih.gov/publications/index.htm    And look into health and wellness programs that may be available through your health insurance provider, employer, local community center, or sidney club.    Weight management plan: Patient was referred to their PCP to discuss a diet and exercise plan.              Follow-ups after your visit        Follow-up notes from your care team     Return in about 4 weeks (around 10/15/2018).      Your next 10 appointments already scheduled     Oct 22, 2018  3:20 PM CDT   Return Sleep Patient with FANI Phillip   [facility] ([facility] [place]    [address] [place] [address] [phone]              Who to contact     If you have questions or need follow up information about today's clinic visit or your schedule please contact [facility] directly at [phone].  Normal or non-critical lab and imaging results will be communicated to you by MyChart, letter or phone within 4 business days after the clinic has received the results. If you do not hear from us within 7  "days, please contact the clinic through [employer] or phone. If you have a critical or abnormal lab result, we will notify you by phone as soon as possible.  Submit refill requests through [employer] or call your pharmacy and they will forward the refill request to us. Please allow 3 business days for your refill to be completed.          Additional Information About Your Visit        Bioline[employer]hart Information     [employer] gives you secure access to your electronic health record. If you see a primary care provider, you can also send messages to your care team and make appointments. If you have questions, please call your primary care clinic.  If you do not have a primary care provider, please call [phone] and they will assist you.        Care EveryWhere ID     This is your Care EveryWhere ID. This could be used by other organizations to access your [facility] medical records  [id number]        Your Vitals Were     Pulse Height Pulse Oximetry BMI (Body Mass Index)          65 1.803 m (5' 11\") 98% 42.82 kg/m2         Blood Pressure from Last 3 Encounters:   09/17/18 139/72   07/19/18 131/71   06/08/18 126/80    Weight from Last 3 Encounters:   09/17/18 139.3 kg (307 lb)   07/19/18 137 kg (302 lb)   06/08/18 133.8 kg (295 lb)              Today, you had the following     No orders found for display       Primary Care Provider Office Phone # Fax #    Fransico Hylton PA-C [phone]       [address] [facility] [address]        Equal Access to Services     MONTSERRAT ESCOBAR : Hadii aad ku hadasho Soomaali, waaxda luqadaha, qaybta kaalmada adeegyada, waxay omer hinojosa. So [facility] [phone].    ATENCIÓN: Si habla español, tiene a peres disposición servicios gratuitos de asistencia lingüística. Llame al [phone].    We comply with applicable federal civil rights laws and [place] laws. We do not discriminate on the basis of race, color, national origin, age, disability, sex, sexual " orientation, or gender identity.            Thank you!     Thank you for choosing [facility]  for your care. Our goal is always to provide you with excellent care. Hearing back from our patients is one way we can continue to improve our services. Please take a few minutes to complete the written survey that you may receive in the mail after your visit with us. Thank you!             Your Updated Medication List - Protect others around you: Learn how to safely use, store and throw away your medicines at www.disposemymeds.org.          This list is accurate as of 9/17/18  3:42 PM.  Always use your most recent med list.                   Brand Name Dispense Instructions for use Diagnosis    aspirin 325 MG EC tablet     100    ONE DAILY    Unspecified essential hypertension, Mixed hyperlipidemia       atorvastatin 40 MG tablet    LIPITOR    90 tablet    Take 1 tablet (40 mg) by mouth daily    Hyperlipidemia LDL goal <130       Blood Pressure Monitor Kit     1 kit    1 Units daily    Hypertension goal BP (blood pressure) < 140/90       lisinopril-hydrochlorothiazide 20-12.5 MG per tablet    PRINZIDE/ZESTORETIC    90 tablet    TAKE 1 TABLET BY MOUTH DAILY    Essential hypertension with goal blood pressure less than 140/90       MULTI VITAMIN MENS PO      None Entered        sildenafil 100 MG tablet    VIAGRA    12 tablet    Take 1 tablet (100 mg) by mouth daily as needed    Impotence of organic origin       vitamin B complex with vitamin C Tabs tablet      Take 1 tablet by mouth daily        VITAMIN D PO      Take 5,000 Units by mouth daily One tablet twice daily

## 2018-09-17 NOTE — NURSING NOTE
"Chief Complaint   Patient presents with     CPAP Follow Up       Initial /75  Pulse 65  Ht 1.803 m (5' 11\")  Wt 139.3 kg (307 lb)  SpO2 98%  BMI 42.82 kg/m2 Estimated body mass index is 42.82 kg/(m^2) as calculated from the following:    Height as of this encounter: 1.803 m (5' 11\").    Weight as of this encounter: 139.3 kg (307 lb).    Medication Reconciliation: complete      "

## 2018-09-24 ENCOUNTER — DOCUMENTATION ONLY (OUTPATIENT)
Dept: SLEEP MEDICINE | Facility: CLINIC | Age: 58
End: 2018-09-24

## 2018-09-24 NOTE — PROGRESS NOTES
[facility] Recheck Visit     Data only recheck     Assessment: Pt meeting objective benchmarks.     Action plan: Patient has a follow up visit with FANI Joe on 10/22/18.   Device type: Auto-CPAP  PAP settings: CPAP min 6 cm  H20     CPAP max 16 cm  H20    95th% pressure 11.2 cm  H20   Objective measures: 14 day rolling measures      Compliance  78 %      Leak  22.63 lpm  last  upload      AHI 1.49   last  upload      Average number of minutes 339    Diagnostic AHI:  45.3    Objective measure goal  Compliance   Goal >70%  Leak   Goal < 10%  AHI  Goal < 5  Usage  Goal >240

## 2018-10-22 ENCOUNTER — OFFICE VISIT (OUTPATIENT)
Dept: SLEEP MEDICINE | Facility: CLINIC | Age: 58
End: 2018-10-22
Payer: COMMERCIAL

## 2018-10-22 VITALS
HEIGHT: 71 IN | SYSTOLIC BLOOD PRESSURE: 131 MMHG | WEIGHT: 298 LBS | HEART RATE: 65 BPM | BODY MASS INDEX: 41.72 KG/M2 | OXYGEN SATURATION: 97 % | DIASTOLIC BLOOD PRESSURE: 76 MMHG

## 2018-10-22 DIAGNOSIS — G47.33 OSA (OBSTRUCTIVE SLEEP APNEA): ICD-10-CM

## 2018-10-22 DIAGNOSIS — G47.33 OSA (OBSTRUCTIVE SLEEP APNEA): Primary | ICD-10-CM

## 2018-10-22 DIAGNOSIS — G47.33 OSA ON CPAP: Primary | ICD-10-CM

## 2018-10-22 PROCEDURE — 99213 OFFICE O/P EST LOW 20 MIN: CPT | Performed by: PHYSICIAN ASSISTANT

## 2018-10-22 NOTE — PATIENT INSTRUCTIONS

## 2018-10-22 NOTE — MR AVS SNAPSHOT
After Visit Summary   10/22/2018    Car Torres    MRN: [id number]           Patient Information     Date Of Birth          1960        Visit Information        Provider Department      10/22/2018 3:20 PM Jamarcus Solorio PA [facility]        Today's Diagnoses     TIA (obstructive sleep apnea)    -  1      Care Instructions      Your BMI is Body mass index is 41.56 kg/(m^2).  Weight management is a personal decision.  If you are interested in exploring weight loss strategies, the following discussion covers the approaches that may be successful. Body mass index (BMI) is one way to tell whether you are at a healthy weight, overweight, or obese. It measures your weight in relation to your height.  A BMI of 18.5 to 24.9 is in the healthy range. A person with a BMI of 25 to 29.9 is considered overweight, and someone with a BMI of 30 or greater is considered obese. More than two-thirds of American adults are considered overweight or obese.  Being overweight or obese increases the risk for further weight gain. Excess weight may lead to heart disease and diabetes.  Creating and following plans for healthy eating and physical activity may help you improve your health.  Weight control is part of healthy lifestyle and includes exercise, emotional health, and healthy eating habits. Careful eating habits lifelong are the mainstay of weight control. Though there are significant health benefits from weight loss, long-term weight loss with diet alone may be very difficult to achieve- studies show long-term success with dietary management in less than 10% of people. Attaining a healthy weight may be especially difficult to achieve in those with severe obesity. In some cases, medications, devices and surgical management might be considered.  What can you do?  If you are overweight or obese and are interested in methods for weight loss, you should discuss this with your provider.     Consider  reducing daily calorie intake by 500 calories.     Keep a food journal.     Avoiding skipping meals, consider cutting portions instead.    Diet combined with exercise helps maintain muscle while optimizing fat loss. Strength training is particularly important for building and maintaining muscle mass. Exercise helps reduce stress, increase energy, and improves fitness. Increasing exercise without diet control, however, may not burn enough calories to loose weight.       Start walking three days a week 10-20 minutes at a time    Work towards walking thirty minutes five days a week     Eventually, increase the speed of your walking for 1-2 minutes at time    In addition, we recommend that you review healthy lifestyles and methods for weight loss available through the National Institutes of Health patient information sites:  http://win.niddk.nih.gov/publications/index.htm    And look into health and wellness programs that may be available through your health insurance provider, employer, local community center, or sidney club.    Weight management plan: Patient was referred to their PCP to discuss a diet and exercise plan.                  Follow-ups after your visit        Follow-up notes from your care team     Return in 1 year (on 10/22/2019) for PAP follow up.      Your next 10 appointments already scheduled     Oct 23, 2018  2:30 PM CDT   Oximetry Drop Off with BK BED 5   [facility] ([facility] [place])    [address] [place] [address] [phone]              Future tests that were ordered for you today     Open Future Orders        Priority Expected Expires Ordered    Overnight oximetry study Routine  4/20/2019 10/22/2018            Who to contact     If you have questions or need follow up information about today's clinic visit or your schedule please contact [facility] directly at [phone].  Normal or non-critical lab and  "imaging results will be communicated to you by MyChart, letter or phone within 4 business days after the clinic has received the results. If you do not hear from us within 7 days, please contact the clinic through [employer] or phone. If you have a critical or abnormal lab result, we will notify you by phone as soon as possible.  Submit refill requests through [employer] or call your pharmacy and they will forward the refill request to us. Please allow 3 business days for your refill to be completed.          Additional Information About Your Visit        [employer] Information     [employer] gives you secure access to your electronic health record. If you see a primary care provider, you can also send messages to your care team and make appointments. If you have questions, please call your primary care clinic.  If you do not have a primary care provider, please call [phone] and they will assist you.        Care EveryWhere ID     This is your Care EveryWhere ID. This could be used by other organizations to access your [facility] medical records  [id number]        Your Vitals Were     Pulse Height Pulse Oximetry BMI (Body Mass Index)          65 1.803 m (5' 11\") 97% 41.56 kg/m2         Blood Pressure from Last 3 Encounters:   10/22/18 131/76   09/17/18 139/72   07/19/18 131/71    Weight from Last 3 Encounters:   10/22/18 135.2 kg (298 lb)   09/17/18 139.3 kg (307 lb)   07/19/18 137 kg (302 lb)              We Performed the Following     Sleep Comprehensive DME        Primary Care Provider Office Phone # Fax #    Fransico Hylton PA-C [phone]       [facility] [address]        Equal Access to Services     ZAINAB [facility] HospitalJOYCE : Hadii gustavo García, waaxda luqadaha, qaybta kaalmadianne goyal. So [facility] [phone].    ATENCIÓN: Si habla español, tiene a peres disposición servicios gratuitos de asistencia lingüística. Llame al " [phone].    We comply with applicable federal civil rights laws and [place] laws. We do not discriminate on the basis of race, color, national origin, age, disability, sex, sexual orientation, or gender identity.            Thank you!     Thank you for choosing [facility]  for your care. Our goal is always to provide you with excellent care. Hearing back from our patients is one way we can continue to improve our services. Please take a few minutes to complete the written survey that you may receive in the mail after your visit with us. Thank you!             Your Updated Medication List - Protect others around you: Learn how to safely use, store and throw away your medicines at www.disposemymeds.org.          This list is accurate as of 10/22/18  3:42 PM.  Always use your most recent med list.                   Brand Name Dispense Instructions for use Diagnosis    aspirin 325 MG EC tablet     100    ONE DAILY    Unspecified essential hypertension, Mixed hyperlipidemia       atorvastatin 40 MG tablet    LIPITOR    90 tablet    Take 1 tablet (40 mg) by mouth daily    Hyperlipidemia LDL goal <130       Blood Pressure Monitor Kit     1 kit    1 Units daily    Hypertension goal BP (blood pressure) < 140/90       lisinopril-hydrochlorothiazide 20-12.5 MG per tablet    PRINZIDE/ZESTORETIC    90 tablet    TAKE 1 TABLET BY MOUTH DAILY    Essential hypertension with goal blood pressure less than 140/90       MULTI VITAMIN MENS PO      None Entered        sildenafil 100 MG tablet    VIAGRA    12 tablet    Take 1 tablet (100 mg) by mouth daily as needed    Impotence of organic origin       vitamin B complex with vitamin C Tabs tablet      Take 1 tablet by mouth daily        VITAMIN D PO      Take 5,000 Units by mouth daily One tablet twice daily

## 2018-10-22 NOTE — MR AVS SNAPSHOT
After Visit Summary   10/22/2018    Car Torres    MRN: [id number]           Patient Information     Date Of Birth          1960        Visit Information        Provider Department      10/22/2018 3:00 PM BK BED 5 [facility]        Today's Diagnoses     TIA on CPAP    -  1       Follow-ups after your visit        Your next 10 appointments already scheduled     Oct 23, 2018  2:30 PM CDT   Oximetry Drop Off with BK BED 5   [facility] [place] [place]    [address] [place] [id number] [phone]              Future tests that were ordered for you today     Open Future Orders        Priority Expected Expires Ordered    Overnight oximetry study Routine  4/20/2019 10/22/2018            Who to contact     If you have questions or need follow up information about today's clinic visit or your schedule please contact [facility] directly at [phone].  Normal or non-critical lab and imaging results will be communicated to you by [employer], letter or phone within 4 business days after the clinic has received the results. If you do not hear from us within 7 days, please contact the clinic through [employer] or phone. If you have a critical or abnormal lab result, we will notify you by phone as soon as possible.  Submit refill requests through [employer] or call your pharmacy and they will forward the refill request to us. Please allow 3 business days for your refill to be completed.          Additional Information About Your Visit        MyChart Information     [employer] gives you secure access to your electronic health record. If you see a primary care provider, you can also send messages to your care team and make appointments. If you have questions, please call your primary care clinic.  If you do not have a primary care provider, please call [phone] and they will assist you.        Care EveryWhere  ID     This is your Care EveryWhere ID. This could be used by other organizations to access your [place] [facility] medical records  [id number]         Blood Pressure from Last 3 Encounters:   10/22/18 131/76   09/17/18 139/72   07/19/18 131/71    Weight from Last 3 Encounters:   10/22/18 135.2 kg (298 lb)   09/17/18 139.3 kg (307 lb)   07/19/18 137 kg (302 lb)              Today, you had the following     No orders found for display       Primary Care Provider Office Phone # Fax #    Fransico Hylton PA-C [phone]       [address] [facility] [address]        Equal Access to Services     MONTSERRAT ESCOBAR : Hadii gustavo García, waaxda luqadaha, qaybta kaalmada adeegyada, dianne hinojosa. So [facility] [phone].    ATENCIÓN: Si habla español, tiene a peres disposición servicios gratuitos de asistencia lingüística. LlSelect [facility], [place] [phone].    We comply with applicable federal civil rights laws and [place] laws. We do not discriminate on the basis of race, color, national origin, age, disability, sex, sexual orientation, or gender identity.            Thank you!     Thank you for choosing [facility]  for your care. Our goal is always to provide you with excellent care. Hearing back from our patients is one way we can continue to improve our services. Please take a few minutes to complete the written survey that you may receive in the mail after your visit with us. Thank you!             Your Updated Medication List - Protect others around you: Learn how to safely use, store and throw away your medicines at www.disposemymeds.org.          This list is accurate as of 10/22/18  3:48 PM.  Always use your most recent med list.                   Brand Name Dispense Instructions for use Diagnosis    aspirin 325 MG EC tablet     100    ONE DAILY    Unspecified essential hypertension, Mixed hyperlipidemia       atorvastatin 40 MG tablet    LIPITOR    90 tablet     Take 1 tablet (40 mg) by mouth daily    Hyperlipidemia LDL goal <130       Blood Pressure Monitor Kit     1 kit    1 Units daily    Hypertension goal BP (blood pressure) < 140/90       lisinopril-hydrochlorothiazide 20-12.5 MG per tablet    PRINZIDE/ZESTORETIC    90 tablet    TAKE 1 TABLET BY MOUTH DAILY    Essential hypertension with goal blood pressure less than 140/90       MULTI VITAMIN MENS PO      None Entered        sildenafil 100 MG tablet    VIAGRA    12 tablet    Take 1 tablet (100 mg) by mouth daily as needed    Impotence of organic origin       vitamin B complex with vitamin C Tabs tablet      Take 1 tablet by mouth daily        VITAMIN D PO      Take 5,000 Units by mouth daily One tablet twice daily

## 2018-10-22 NOTE — PROGRESS NOTES
"Obstructive Sleep Apnea - PAP Follow-Up Visit:    Chief Complaint   Patient presents with     CPAP Follow Up       Car Torres comes in today for follow-up of their severe sleep apnea, managed with CPAP.     Car Torres is a 58 year old male who initially presented with loud snoring, daytime sleepiness(ESS 11), difficulty maintaining sleep, crowded oropharynx, neck circumference of 18.5\"  and co-morbid HTN.     Total score - [place]: 11 (2018  2:00 PM)   STOP-BAN/8        Home Sleep Apnea Testing - 18: 295 lbs 0 oz: AHI 45.3/hr. Supine AHI 61.9/hr.   Oxygen [place] of 66%.  Baseline 93%.  Sp02 =< 88% for 47 minutes  He slept on his back (22%), prone (16.8%), left (35.8) and right (20.7%) sides.   Analysis time: 299.2 minutes. Patient went to bed 2 hours earlier than HST was set for.    2018 visit:  Modest compliance. AHI appears under good control on auto-CPAP 6-15 cm/H20.  Increase CPAP use.     Today:  Overall, he rates the experience with PAP as 8 (0 poor, 10 great). The mask is comfortable.     He is not snoring with the mask on. He is not having gasp arousals.  He is not having significant oral/nasal dryness. The pressure is comfortable.     His PAP interface is Nasal mask.    Bedtime is typically 1900. Usually it takes about 5 min minutes to fall asleep with the mask on. Wake time is typically 0100.  Patient is using PAP therapy 6 hours per night. The patient is usually getting 6 hours of sleep per night.    He does feel rested in the morning.    Total score - [place]: 2 (2018  3:00 PM)    [facility]   Auto-PAP 6.0 - 16.0 cmH2O 30 day usage data:    97% of days with > 4 hours of use. 0/30 days with no use.   Average use 5 hours and 50 minutes per day.   95%ile Leak 25.37 L/min.   CPAP 95% pressure 10.9 cm.   AHI 1.4 events per hour.     He reports CPAP is going well. No concerns.    Past medical/surgical history, family history, social history, medications and allergies were reviewed.  " "    Problem List:  Patient Active Problem List    Diagnosis Date Noted     Hypertension goal BP (blood pressure) < 140/90 10/20/2010     Priority: High     Hyperlipidemia LDL goal <130 06/11/2010     Priority: High     Nonspecific abnormal electrocardiogram (ECG) (EKG) 08/23/2007     Priority: High     Stress testing normal.       TIA (obstructive sleep apnea) 07/19/2018     Priority: Medium     Home Sleep Apnea Testing - 7/18/18: 295 lbs 0 oz: AHI 45.3/hr. Supine AHI 61.9/hr. Oxygen [place] of 66%.  Baseline 93%.  Sp02 =< 88% for 47 minutes.       History of sinus bradycardia 06/07/2018     Priority: Medium     ED (erectile dysfunction) 06/07/2018     Priority: Medium     Coronary artery calcification 05/08/2018     Priority: Medium     Per CT CHEST WITHOUT CONTRAST April 23, 2018. CT calcium score planned.        Renal cyst 11/18/2015     Priority: Medium     Simple, non enhanced, 3.5 cm on right kidney, Bosniak 1 - x 2 stable findings       Pulmonary nodule 11/18/2015     Priority: Medium     Incidental finding on CT scan, right posterior lung - considered benign / stable        Function kidney decreased 10/21/2010     Priority: Medium     60 to 80 - have discussed Lisinopril - will consider        Sciatica 02/27/2006     Priority: Medium     Obesity 02/27/2006     Priority: Medium        /76  Pulse 65  Ht 1.803 m (5' 11\")  Wt 135.2 kg (298 lb)  SpO2 97%  BMI 41.56 kg/m2    Impression/Plan:    Severe Sleep apnea with sleep related hypoxemia-  Tolerating PAP well. Daytime symptoms are improved.   Continue current treatment  Overnight oximetry.     Car Torres will follow up in about 1 year or sooner if any concerns.     Twenty minutes spent with patient, all of which were spent face-to-face counseling, consulting, coordinating plan of care regarding TIA and sleep related hypoxemia.      Jamarcus Solorio PA-C      "

## 2018-10-22 NOTE — NURSING NOTE
"Chief Complaint   Patient presents with     CPAP Follow Up       Initial /76  Pulse 65  Ht 1.803 m (5' 11\")  Wt 135.2 kg (298 lb)  SpO2 97%  BMI 41.56 kg/m2 Estimated body mass index is 41.56 kg/(m^2) as calculated from the following:    Height as of this encounter: 1.803 m (5' 11\").    Weight as of this encounter: 135.2 kg (298 lb).    Medication Reconciliation: complete      "

## 2018-10-23 ENCOUNTER — DOCUMENTATION ONLY (OUTPATIENT)
Dept: SLEEP MEDICINE | Facility: CLINIC | Age: 58
End: 2018-10-23
Payer: COMMERCIAL

## 2018-10-24 NOTE — NURSING NOTE
Pt brought in Over night oximetry. Did you CPAP, but he did not bring cpap with. Unable to get download off modem.     Mariama Carpenter MA on 10/24/2018 at 12:52 PM

## 2018-12-25 ENCOUNTER — TELEPHONE (OUTPATIENT)
Dept: FAMILY MEDICINE | Facility: CLINIC | Age: 58
End: 2018-12-25

## 2018-12-25 DIAGNOSIS — I10 ESSENTIAL HYPERTENSION WITH GOAL BLOOD PRESSURE LESS THAN 140/90: ICD-10-CM

## 2018-12-25 DIAGNOSIS — E78.5 HYPERLIPIDEMIA LDL GOAL <130: ICD-10-CM

## 2018-12-25 NOTE — LETTER
[address] [address] [phone]                                                                                                January 15, 2019    Car Torres  [address] [facility] [address]      Dear Aiyana Torres,      We have attempted to reach you regarding your refill request, but have been unsuccessful.    We have received a refill request for one of your medications. We have sent a refill of your medication to your pharmacy, however your provider has noted that you will need to be seen for a follow up visit in order to continue this medication.    Please contact us at [phone] to schedule an appointment with your provider before your next refill is due.      Thank you,      Your Health Care Team at the [facility]

## 2018-12-26 NOTE — TELEPHONE ENCOUNTER
"Requested Prescriptions   Pending Prescriptions Disp Refills     lisinopril-hydrochlorothiazide (PRINZIDE/ZESTORETIC) 20-12.5 MG tablet [Pharmacy Med Name: LISINOPRIL-HCTZ 20-12.5 MG TAB] 90 tablet 1     Sig: TAKE 1 TABLET BY MOUTH DAILY    Last Written Prescription Date:  06/25/18  Last Fill Quantity: 90,  # refills: 1   Last office visit: 5/8/2018 with prescribing provider:     Future Office Visit:      Diuretics (Including Combos) Protocol Passed - 12/25/2018  1:17 AM       Passed - Blood pressure under 140/90 in past 12 months    BP Readings from Last 3 Encounters:   10/22/18 131/76   09/17/18 139/72   07/19/18 131/71                Passed - Recent (12 mo) or future (30 days) visit within the authorizing provider's specialty    Patient had office visit in the last 12 months or has a visit in the next 30 days with authorizing provider or within the authorizing provider's specialty.  See \"Patient Info\" tab in inbasket, or \"Choose Columns\" in Meds & Orders section of the refill encounter.             Passed - Patient is age 18 or older       Passed - Normal serum creatinine on file in past 12 months    Recent Labs   Lab Test 01/04/18  1521   CR 1.03             Passed - Normal serum potassium on file in past 12 months    Recent Labs   Lab Test 01/04/18  1521   POTASSIUM 4.1                   Passed - Normal serum sodium on file in past 12 months    Recent Labs   Lab Test 01/04/18  1521                 atorvastatin (LIPITOR) 40 MG tablet [Pharmacy Med Name: ATORVASTATIN 40 MG TABLET] 90 tablet 3     Sig: TAKE 1 TABLET BY MOUTH EVERY DAY    Last Written Prescription Date:  12/22/17  Last Fill Quantity: 90,  # refills: 3   Last office visit: 5/8/2018 with prescribing provider:     Future Office Visit:      Statins Protocol Failed - 12/25/2018  1:17 AM       Failed - LDL on file in past 12 months    Recent Labs   Lab Test 12/20/17  0845   LDL 62            Passed - No abnormal creatine kinase in past 12 months    " "No lab results found.            Passed - Recent (12 mo) or future (30 days) visit within the authorizing provider's specialty    Patient had office visit in the last 12 months or has a visit in the next 30 days with authorizing provider or within the authorizing provider's specialty.  See \"Patient Info\" tab in inbasket, or \"Choose Columns\" in Meds & Orders section of the refill encounter.             Passed - Patient is age 18 or older          "

## 2018-12-27 RX ORDER — ATORVASTATIN CALCIUM 40 MG/1
TABLET, FILM COATED ORAL
Qty: 30 TABLET | Refills: 0 | Status: SHIPPED | OUTPATIENT
Start: 2018-12-27 | End: 2019-02-23

## 2018-12-27 RX ORDER — LISINOPRIL AND HYDROCHLOROTHIAZIDE 12.5; 2 MG/1; MG/1
TABLET ORAL
Qty: 90 TABLET | Refills: 1 | Status: SHIPPED | OUTPATIENT
Start: 2018-12-27 | End: 2019-06-21

## 2018-12-27 NOTE — TELEPHONE ENCOUNTER
Message left for patient to return call regarding message below. Patient overdue for annual physical.

## 2018-12-27 NOTE — TELEPHONE ENCOUNTER
Lipitor- due for fasting labs, vanessa sent. Prinizide prescription approved per [facility] Ref[facility] Protocol.  Zoe Bro RN

## 2019-01-04 NOTE — TELEPHONE ENCOUNTER
LMOM for patient to call back to schedule annual physical with fasting labs.    Thanks!  Mahin Schultz

## 2019-01-25 ENCOUNTER — DOCUMENTATION ONLY (OUTPATIENT)
Dept: SLEEP MEDICINE | Facility: CLINIC | Age: 59
End: 2019-01-25

## 2019-01-25 NOTE — PROGRESS NOTES
6 month STM    STM Recheck Visit     Diagnostic AHI:  45.3 HST    Data only recheck     Assessment: Pt meeting objective benchmarks.     Action plan:  pt to follow up per provider request       Device type: Auto-CPAP  PAP settings: CPAP min 6.0 cm  H20     CPAP max 16.0 cm  H20    95th% pressure 10.4 cm  H20   Objective measures: 14 day rolling measures      Compliance  70 %      Leak  30.08 lpm  last  upload      AHI 2.15   last  upload      Average number of minutes 264      Objective measure goal  Compliance   Goal >70%  Leak   Goal < 24 lpm  AHI  Goal < 5  Usage  Goal >240

## 2019-02-23 DIAGNOSIS — E78.5 HYPERLIPIDEMIA LDL GOAL <100: Primary | ICD-10-CM

## 2019-02-23 DIAGNOSIS — E78.5 HYPERLIPIDEMIA LDL GOAL <130: ICD-10-CM

## 2019-02-25 RX ORDER — ATORVASTATIN CALCIUM 40 MG/1
40 TABLET, FILM COATED ORAL DAILY
Qty: 30 TABLET | Refills: 0 | Status: SHIPPED | OUTPATIENT
Start: 2019-02-25 | End: 2019-03-29

## 2019-02-25 NOTE — TELEPHONE ENCOUNTER
Routing refill request to provider for review/approval because:  Juanita given x1 and patient did not follow up, please advise  It appears patient did not respond to calls or letter sent.    Radha Hoffmann RN  [facility]

## 2019-02-25 NOTE — TELEPHONE ENCOUNTER
"Requested Prescriptions   Pending Prescriptions Disp Refills     atorvastatin (LIPITOR) 40 MG tablet [Pharmacy Med Name: ATORVASTATIN 40 MG TABLET]  Last Written Prescription Date:  12/27/2018  Last Fill Quantity: 30 tablet,  # refills: 0   Last office visit: 5/8/2018 with prescribing provider:  ESPINOZA Hylton   Future Office Visit:     30 tablet 0     Sig: TAKE 1 TABLET BY MOUTH EVERY DAY    Statins Protocol Failed - 2/23/2019 11:16 AM       Failed - LDL on file in past 12 months    Recent Labs   Lab Test 12/20/17  0845   LDL 62            Passed - No abnormal creatine kinase in past 12 months    No lab results found.            Passed - Recent (12 mo) or future (30 days) visit within the authorizing provider's specialty    Patient had office visit in the last 12 months or has a visit in the next 30 days with authorizing provider or within the authorizing provider's specialty.  See \"Patient Info\" tab in inbasket, or \"Choose Columns\" in Meds & Orders section of the refill encounter.             Passed - Medication is active on med list       Passed - Patient is age 18 or older          "

## 2019-03-25 DIAGNOSIS — E78.5 HYPERLIPIDEMIA LDL GOAL <100: ICD-10-CM

## 2019-03-25 NOTE — TELEPHONE ENCOUNTER
"Requested Prescriptions   Pending Prescriptions Disp Refills     atorvastatin (LIPITOR) 40 MG tablet [Pharmacy Med Name: ATORVASTATIN 40 MG TABLET]  Last Written Prescription Date:  2/25/2019  Last Fill Quantity: 30 tablet,  # refills: 0   Last office visit: 5/8/2018 with prescribing provider:  ESPINOZA Hylton   Future Office Visit:     30 tablet 0     Sig: TAKE 1 TABLET (40 MG) BY MOUTH DAILY +++RELATED VISIT DUE+++    Statins Protocol Failed - 3/25/2019  1:35 AM       Failed - LDL on file in past 12 months    Recent Labs   Lab Test 12/20/17  0845   LDL 62            Passed - No abnormal creatine kinase in past 12 months    No lab results found.            Passed - Recent (12 mo) or future (30 days) visit within the authorizing provider's specialty    Patient had office visit in the last 12 months or has a visit in the next 30 days with authorizing provider or within the authorizing provider's specialty.  See \"Patient Info\" tab in inbasket, or \"Choose Columns\" in Meds & Orders section of the refill encounter.             Passed - Medication is active on med list       Passed - Patient is age 18 or older          "

## 2019-03-26 RX ORDER — ATORVASTATIN CALCIUM 40 MG/1
40 TABLET, FILM COATED ORAL DAILY
Qty: 30 TABLET | Refills: 0 | OUTPATIENT
Start: 2019-03-26

## 2019-03-26 NOTE — TELEPHONE ENCOUNTER
Juanita was already given by provider, and patient was notified he is due for a visit.    Request for refill denied.    Kristian Washington RN

## 2019-03-29 DIAGNOSIS — E78.5 HYPERLIPIDEMIA LDL GOAL <100: ICD-10-CM

## 2019-03-29 NOTE — TELEPHONE ENCOUNTER
"Requested Prescriptions   Pending Prescriptions Disp Refills     atorvastatin (LIPITOR) 40 MG tablet [Pharmacy Med Name: ATORVASTATIN 40 MG TABLET]  Last Written Prescription Date:  2/25/2019  Last Fill Quantity: 30 tablet,  # refills: 0   Last office visit: 5/8/2018 with prescribing provider:  ESPINOZA Hylton   Future Office Visit:     30 tablet 0     Sig: TAKE 1 TABLET (40 MG) BY MOUTH DAILY +++RELATED VISIT DUE+++    Statins Protocol Failed - 3/29/2019  4:42 PM       Failed - LDL on file in past 12 months    Recent Labs   Lab Test 12/20/17  0845   LDL 62            Passed - No abnormal creatine kinase in past 12 months    No lab results found.            Passed - Recent (12 mo) or future (30 days) visit within the authorizing provider's specialty    Patient had office visit in the last 12 months or has a visit in the next 30 days with authorizing provider or within the authorizing provider's specialty.  See \"Patient Info\" tab in inbasket, or \"Choose Columns\" in Meds & Orders section of the refill encounter.             Passed - Medication is active on med list       Passed - Patient is age 18 or older          "

## 2019-04-01 RX ORDER — ATORVASTATIN CALCIUM 40 MG/1
40 TABLET, FILM COATED ORAL DAILY
Qty: 21 TABLET | Refills: 0 | Status: SHIPPED | OUTPATIENT
Start: 2019-04-01 | End: 2019-04-20

## 2019-04-20 DIAGNOSIS — E78.5 HYPERLIPIDEMIA LDL GOAL <100: ICD-10-CM

## 2019-04-22 RX ORDER — ATORVASTATIN CALCIUM 40 MG/1
40 TABLET, FILM COATED ORAL DAILY
Qty: 21 TABLET | Refills: 0 | Status: SHIPPED | OUTPATIENT
Start: 2019-04-22 | End: 2019-05-11

## 2019-04-22 NOTE — TELEPHONE ENCOUNTER
"Requested Prescriptions   Pending Prescriptions Disp Refills     atorvastatin (LIPITOR) 40 MG tablet [Pharmacy Med Name: ATORVASTATIN 40 MG TABLET]  Last Written Prescription Date:  4/1/2019  Last Fill Quantity: 21 tablet,  # refills: 0   Last office visit: 5/8/2018 with prescribing provider:  ESPINOZA Hylton   Future Office Visit:     21 tablet 0     Sig: TAKE 1 TABLET (40 MG) BY MOUTH DAILY +++RELATED VISIT DUE+++       Statins Protocol Failed - 4/20/2019  8:31 AM        Failed - LDL on file in past 12 months     Recent Labs   Lab Test 12/20/17  0845   LDL 62             Passed - No abnormal creatine kinase in past 12 months     No lab results found.             Passed - Recent (12 mo) or future (30 days) visit within the authorizing provider's specialty     Patient had office visit in the last 12 months or has a visit in the next 30 days with authorizing provider or within the authorizing provider's specialty.  See \"Patient Info\" tab in inbasket, or \"Choose Columns\" in Meds & Orders section of the refill encounter.              Passed - Medication is active on med list        Passed - Patient is age 18 or older          "

## 2019-05-11 DIAGNOSIS — E78.5 HYPERLIPIDEMIA LDL GOAL <100: ICD-10-CM

## 2019-05-13 NOTE — TELEPHONE ENCOUNTER
"Requested Prescriptions   Pending Prescriptions Disp Refills     atorvastatin (LIPITOR) 40 MG tablet [Pharmacy Med Name: ATORVASTATIN 40 MG TABLET]  Last Written Prescription Date:  4/22/2019  Last Fill Quantity: 21 tablet,  # refills: 0   Last office visit: 5/8/2018 with prescribing provider:  ESPINOZA Hylton   Future Office Visit:     21 tablet 0     Sig: TAKE 1 TABLET (40 MG) BY MOUTH DAILY +++RELATED VISIT DUE+++       Statins Protocol Failed - 5/11/2019  8:32 AM        Failed - LDL on file in past 12 months     Recent Labs   Lab Test 12/20/17  0845   LDL 62             Failed - Recent (12 mo) or future (30 days) visit within the authorizing provider's specialty     Patient had office visit in the last 12 months or has a visit in the next 30 days with authorizing provider or within the authorizing provider's specialty.  See \"Patient Info\" tab in inbasket, or \"Choose Columns\" in Meds & Orders section of the refill encounter.              Passed - No abnormal creatine kinase in past 12 months     No lab results found.             Passed - Medication is active on med list        Passed - Patient is age 18 or older          "

## 2019-05-14 RX ORDER — ATORVASTATIN CALCIUM 40 MG/1
TABLET, FILM COATED ORAL
Qty: 1 TABLET | Refills: 0 | Status: SHIPPED | OUTPATIENT
Start: 2019-05-14 | End: 2019-07-09

## 2019-06-21 DIAGNOSIS — I10 ESSENTIAL HYPERTENSION WITH GOAL BLOOD PRESSURE LESS THAN 140/90: ICD-10-CM

## 2019-06-21 NOTE — TELEPHONE ENCOUNTER
"Requested Prescriptions   Pending Prescriptions Disp Refills     lisinopril-hydrochlorothiazide (PRINZIDE/ZESTORETIC) 20-12.5 MG tablet [Pharmacy Med Name: LISINOPRIL-HCTZ 20-12.5 MG TAB]  Last Written Prescription Date:  12/27/2018  Last Fill Quantity: 90 TABLET,  # refills: 1   Last office visit: 5/8/2018 with prescribing provider:  ESPINOZA STEPHENSON   Future Office Visit:     90 tablet 1     Sig: TAKE 1 TABLET BY MOUTH DAILY       Diuretics (Including Combos) Protocol Failed - 6/21/2019  1:02 AM        Failed - Recent (12 mo) or future (30 days) visit within the authorizing provider's specialty     Patient had office visit in the last 12 months or has a visit in the next 30 days with authorizing provider or within the authorizing provider's specialty.  See \"Patient Info\" tab in inbasket, or \"Choose Columns\" in Meds & Orders section of the refill encounter.              Failed - Normal serum creatinine on file in past 12 months     Recent Labs   Lab Test 01/04/18  1521   CR 1.03              Failed - Normal serum potassium on file in past 12 months     Recent Labs   Lab Test 01/04/18  1521   POTASSIUM 4.1                    Failed - Normal serum sodium on file in past 12 months     Recent Labs   Lab Test 01/04/18  1521                 Passed - Blood pressure under 140/90 in past 12 months     BP Readings from Last 3 Encounters:   10/22/18 131/76   09/17/18 139/72   07/19/18 131/71                 Passed - Medication is active on med list        Passed - Patient is age 18 or older          "

## 2019-06-25 RX ORDER — LISINOPRIL AND HYDROCHLOROTHIAZIDE 12.5; 2 MG/1; MG/1
1 TABLET ORAL DAILY
Qty: 30 TABLET | Refills: 0 | Status: SHIPPED | OUTPATIENT
Start: 2019-06-25 | End: 2019-07-09

## 2019-06-26 ENCOUNTER — MYC MEDICAL ADVICE (OUTPATIENT)
Dept: FAMILY MEDICINE | Facility: CLINIC | Age: 59
End: 2019-06-26

## 2019-06-26 ENCOUNTER — MYC REFILL (OUTPATIENT)
Dept: FAMILY MEDICINE | Facility: CLINIC | Age: 59
End: 2019-06-26

## 2019-06-26 DIAGNOSIS — N52.9 IMPOTENCE OF ORGANIC ORIGIN: ICD-10-CM

## 2019-06-26 DIAGNOSIS — I10 ESSENTIAL HYPERTENSION WITH GOAL BLOOD PRESSURE LESS THAN 140/90: ICD-10-CM

## 2019-06-26 DIAGNOSIS — E78.5 HYPERLIPIDEMIA LDL GOAL <100: ICD-10-CM

## 2019-06-26 RX ORDER — LISINOPRIL AND HYDROCHLOROTHIAZIDE 12.5; 2 MG/1; MG/1
1 TABLET ORAL DAILY
Qty: 30 TABLET | Refills: 0 | Status: CANCELLED | OUTPATIENT
Start: 2019-06-26

## 2019-06-26 RX ORDER — SILDENAFIL 100 MG/1
100 TABLET, FILM COATED ORAL DAILY PRN
Qty: 12 TABLET | Refills: 2 | Status: CANCELLED | OUTPATIENT
Start: 2019-06-26

## 2019-06-27 RX ORDER — ATORVASTATIN CALCIUM 40 MG/1
40 TABLET, FILM COATED ORAL DAILY
Qty: 14 TABLET | Refills: 0 | Status: SHIPPED | OUTPATIENT
Start: 2019-06-27 | End: 2019-07-09

## 2019-06-27 RX ORDER — SILDENAFIL 100 MG/1
100 TABLET, FILM COATED ORAL DAILY PRN
Qty: 6 TABLET | Refills: 5 | Status: SHIPPED | OUTPATIENT
Start: 2019-06-27 | End: 2019-07-09

## 2019-06-27 NOTE — TELEPHONE ENCOUNTER
"Requested Prescriptions   Pending Prescriptions Disp Refills     sildenafil (VIAGRA) 100 MG tablet [Pharmacy Med Name: SILDENAFIL 100 MG TABLET]  Last Written Prescription Date:  12/21/2017  Last Fill Quantity: 12 tablet,  # refills: 2   Last office visit: 5/8/2018 with prescribing provider:  ESPINOZA Hylton   Future Office Visit:   Next 5 appointments (look out 90 days)    Jul 09, 2019  4:20 PM CDT  PHYSICAL with Fransico Hylton PA-C  [address] [address]  [phone]          6 tablet 5     Sig: TAKE 1 TABLET (100 MG) BY MOUTH DAILY AS NEEDED       Erectile Dysfuction Protocol Passed - 6/26/2019  8:14 PM        Passed - Absence of nitrates on medication list        Passed - Absence of Alpha Blockers on Med list        Passed - Recent (12 mo) or future (30 days) visit within the authorizing provider's specialty     Patient had office visit in the last 12 months or has a visit in the next 30 days with authorizing provider or within the authorizing provider's specialty.  See \"Patient Info\" tab in inbasket, or \"Choose Columns\" in Meds & Orders section of the refill encounter.              Passed - Medication is active on med list        Passed - Patient is age 18 or older                atorvastatin (LIPITOR) 40 MG tablet [Pharmacy Med Name: ATORVASTATIN 40 MG TABLET]  Last Written Prescription Date:  5/14/2019  Last Fill Quantity: 1 tablet,  # refills: 0   Last office visit: 5/8/2018 with prescribing provider:  ESPINOZA Hylton   Future Office Visit:   Next 5 appointments (look out 90 days)    Jul 09, 2019  4:20 PM CDT  PHYSICAL with Fransico Hylton PA-C  [address] [id number] [phone]          21 tablet 0     Sig: TAKE 1 TABLET (40 MG) BY MOUTH DAILY +++RELATED VISIT DUE+++       Statins Protocol Failed - 6/26/2019  8:14 PM        Failed " "- LDL on file in past 12 months     Recent Labs   Lab Test 12/20/17  0845   LDL 62             Passed - No abnormal creatine kinase in past 12 months     No lab results found.             Passed - Recent (12 mo) or future (30 days) visit within the authorizing provider's specialty     Patient had office visit in the last 12 months or has a visit in the next 30 days with authorizing provider or within the authorizing provider's specialty.  See \"Patient Info\" tab in inbasket, or \"Choose Columns\" in Meds & Orders section of the refill encounter.              Passed - Medication is active on med list        Passed - Patient is age 18 or older          "

## 2019-06-27 NOTE — TELEPHONE ENCOUNTER
"Routing to PCP to please advise. Ok to refill? Patient has follow up appointment scheduled for 7/9/19.    Patient sent [employer] message that he will be leaving for vacation on Friday and requests refills prior to leaving. Patient has been given vanessa refills several times for Atorvastatin.    Sadia Solano RN       Requested Prescriptions   Pending Prescriptions Disp Refills     sildenafil (VIAGRA) 100 MG tablet [Pharmacy Med Name: SILDENAFIL 100 MG TABLET] 6 tablet 5     Sig: TAKE 1 TABLET (100 MG) BY MOUTH DAILY AS NEEDED       Erectile Dysfuction Protocol Passed - 6/26/2019  8:14 PM        Passed - Absence of nitrates on medication list        Passed - Absence of Alpha Blockers on Med list        Passed - Recent (12 mo) or future (30 days) visit within the authorizing provider's specialty     Patient had office visit in the last 12 months or has a visit in the next 30 days with authorizing provider or within the authorizing provider's specialty.  See \"Patient Info\" tab in inbasket, or \"Choose Columns\" in Meds & Orders section of the refill encounter.              Passed - Medication is active on med list        Passed - Patient is age 18 or older        atorvastatin (LIPITOR) 40 MG tablet [Pharmacy Med Name: ATORVASTATIN 40 MG TABLET] 21 tablet 0     Sig: TAKE 1 TABLET (40 MG) BY MOUTH DAILY +++RELATED VISIT DUE+++       Statins Protocol Failed - 6/26/2019  8:14 PM        Failed - LDL on file in past 12 months     Recent Labs   Lab Test 12/20/17  0845   LDL 62             Passed - No abnormal creatine kinase in past 12 months     No lab results found.             Passed - Recent (12 mo) or future (30 days) visit within the authorizing provider's specialty     Patient had office visit in the last 12 months or has a visit in the next 30 days with authorizing provider or within the authorizing provider's specialty.  See \"Patient Info\" tab in inbasket, or \"Choose Columns\" in Meds & Orders section of the refill " encounter.              Passed - Medication is active on med list        Passed - Patient is age 18 or older

## 2019-06-27 NOTE — TELEPHONE ENCOUNTER
"Requested Prescriptions   Pending Prescriptions Disp Refills     lisinopril-hydrochlorothiazide (PRINZIDE/ZESTORETIC) 20-12.5 MG tablet  This may be a duplicate refill request.  Last Written Prescription Date:  6/25/2019  Last Fill Quantity: 30 tablet,  # refills: 0   Last office visit: 5/8/2018 with prescribing provider:  ESPINOZA Hylton   Future Office Visit:   Next 5 appointments (look out 90 days)    Jul 09, 2019  4:20 PM CDT  PHYSICAL with Fransico Hylton PA-C  [facility] [address] [place] [id number] [phone]          30 tablet 0     Sig: Take 1 tablet by mouth daily +++DUE FOR OFFICE VISIT+++       Diuretics (Including Combos) Protocol Failed - 6/27/2019  8:42 AM        Failed - Normal serum creatinine on file in past 12 months     Recent Labs   Lab Test 01/04/18  1521   CR 1.03              Failed - Normal serum potassium on file in past 12 months     Recent Labs   Lab Test 01/04/18  1521   POTASSIUM 4.1              Failed - Normal serum sodium on file in past 12 months     Recent Labs   Lab Test 01/04/18  1521                 Passed - Blood pressure under 140/90 in past 12 months     BP Readings from Last 3 Encounters:   10/22/18 131/76   09/17/18 139/72   07/19/18 131/71             Passed - Recent (12 mo) or future (30 days) visit within the authorizing provider's specialty     Patient had office visit in the last 12 months or has a visit in the next 30 days with authorizing provider or within the authorizing provider's specialty.  See \"Patient Info\" tab in inbasket, or \"Choose Columns\" in Meds & Orders section of the refill encounter.              Passed - Medication is active on med list        Passed - Patient is age 18 or older          "

## 2019-06-28 NOTE — TELEPHONE ENCOUNTER
Refused, as is duplicate. Juanita sent by PCP on 6/25/19.  MyChart sent to patient and will close encounter.    Eli Posada RN

## 2019-07-09 ENCOUNTER — OFFICE VISIT (OUTPATIENT)
Dept: FAMILY MEDICINE | Facility: CLINIC | Age: 59
End: 2019-07-09
Payer: COMMERCIAL

## 2019-07-09 VITALS
SYSTOLIC BLOOD PRESSURE: 130 MMHG | WEIGHT: 306 LBS | BODY MASS INDEX: 42.68 KG/M2 | TEMPERATURE: 98 F | HEART RATE: 61 BPM | DIASTOLIC BLOOD PRESSURE: 79 MMHG | OXYGEN SATURATION: 98 %

## 2019-07-09 DIAGNOSIS — E78.5 HYPERLIPIDEMIA LDL GOAL <100: ICD-10-CM

## 2019-07-09 DIAGNOSIS — Z00.00 ROUTINE GENERAL MEDICAL EXAMINATION AT A HEALTH CARE FACILITY: Primary | ICD-10-CM

## 2019-07-09 DIAGNOSIS — Z13.220 SCREENING FOR HYPERLIPIDEMIA: ICD-10-CM

## 2019-07-09 DIAGNOSIS — N52.9 IMPOTENCE OF ORGANIC ORIGIN: ICD-10-CM

## 2019-07-09 DIAGNOSIS — M25.572 PAIN IN JOINT INVOLVING ANKLE AND FOOT, LEFT: ICD-10-CM

## 2019-07-09 DIAGNOSIS — E66.01 MORBID OBESITY (H): ICD-10-CM

## 2019-07-09 DIAGNOSIS — I10 ESSENTIAL HYPERTENSION WITH GOAL BLOOD PRESSURE LESS THAN 140/90: ICD-10-CM

## 2019-07-09 DIAGNOSIS — Z23 NEED FOR SHINGLES VACCINE: ICD-10-CM

## 2019-07-09 PROCEDURE — 80048 BASIC METABOLIC PNL TOTAL CA: CPT | Performed by: PHYSICIAN ASSISTANT

## 2019-07-09 PROCEDURE — 99396 PREV VISIT EST AGE 40-64: CPT | Performed by: PHYSICIAN ASSISTANT

## 2019-07-09 PROCEDURE — 36415 COLL VENOUS BLD VENIPUNCTURE: CPT | Performed by: PHYSICIAN ASSISTANT

## 2019-07-09 PROCEDURE — G0103 PSA SCREENING: HCPCS | Performed by: PHYSICIAN ASSISTANT

## 2019-07-09 PROCEDURE — 80061 LIPID PANEL: CPT | Performed by: PHYSICIAN ASSISTANT

## 2019-07-09 RX ORDER — LISINOPRIL AND HYDROCHLOROTHIAZIDE 12.5; 2 MG/1; MG/1
1 TABLET ORAL DAILY
Qty: 90 TABLET | Refills: 1 | Status: SHIPPED | OUTPATIENT
Start: 2019-07-09 | End: 2020-01-09

## 2019-07-09 RX ORDER — ATORVASTATIN CALCIUM 40 MG/1
40 TABLET, FILM COATED ORAL DAILY
Qty: 90 TABLET | Refills: 3 | Status: SHIPPED | OUTPATIENT
Start: 2019-07-09 | End: 2019-07-09

## 2019-07-09 RX ORDER — SILDENAFIL 100 MG/1
100 TABLET, FILM COATED ORAL DAILY PRN
Qty: 6 TABLET | Refills: 5 | Status: SHIPPED | OUTPATIENT
Start: 2019-07-09 | End: 2020-08-11

## 2019-07-09 RX ORDER — ATORVASTATIN CALCIUM 40 MG/1
40 TABLET, FILM COATED ORAL DAILY
Qty: 90 TABLET | Refills: 3 | COMMUNITY
Start: 2019-07-09 | End: 2020-07-07

## 2019-07-09 ASSESSMENT — ENCOUNTER SYMPTOMS
CHILLS: 0
NERVOUS/ANXIOUS: 0
DYSURIA: 0
MYALGIAS: 1
EYE PAIN: 0
ARTHRALGIAS: 1
ABDOMINAL PAIN: 0
HEMATOCHEZIA: 0
PALPITATIONS: 0
DIZZINESS: 0
FEVER: 0
FREQUENCY: 0
WEAKNESS: 0
JOINT SWELLING: 0
HEARTBURN: 0
PARESTHESIAS: 0
SORE THROAT: 0
SHORTNESS OF BREATH: 0
CONSTIPATION: 0
NAUSEA: 0
HEADACHES: 0
HEMATURIA: 0
COUGH: 0
DIARRHEA: 0

## 2019-07-09 NOTE — PROGRESS NOTES
SUBJECTIVE:   CC: Car Torres is an 59 year old male who presents for preventative health visit.     Healthy Habits:     Getting at least 3 servings of Calcium per day:  Yes    Bi-annual eye exam:  NO    Dental care twice a year:  NO    Sleep apnea or symptoms of sleep apnea:  Sleep apnea    Diet:  Other    Frequency of exercise:  None    Taking medications regularly:  Yes    Medication side effects:  None    PHQ-2 Total Score: 0    Additional concerns today:  Yes    Patient would like his left foot. Has been having pain for at least a year. Patient can't recall if X-Rays were ever done. Thinks overuse and standing long periods is the main cause.     Patient is fasting for labs.     Today's PHQ-2 Score:   PHQ-2 (  [employer]) 2019   Q1: Little interest or pleasure in doing things 0   Q2: Feeling down, depressed or hopeless 0   PHQ-2 Score 0   Q1: Little interest or pleasure in doing things Not at all   Q2: Feeling down, depressed or hopeless Not at all   PHQ-2 Score 0       Abuse: Current or Past(Physical, Sexual or Emotional)- No  Do you feel safe in your environment? Yes    Social History     Tobacco Use     Smoking status: Former Smoker     Packs/day: 1.00     Years: 25.00     Pack years: 25.00     Types: Cigarettes, Cigars     Last attempt to quit: 1998     Years since quittin.4     Smokeless tobacco: Never Used   Substance Use Topics     Alcohol use: Yes     Alcohol/week: 6.0 oz     Comment: case beer/week     If you drink alcohol do you typically have >3 drinks per day or >7 drinks per week? No     Alcohol Use 2019   Prescreen: >3 drinks/day or >7 drinks/week? No   Prescreen: >3 drinks/day or >7 drinks/week? -   AUDIT SCORE  -     AUDIT - Alcohol Use Disorders Identification Test - Reproduced from the World Health Organization Audit 2001 (Second Edition) 2017   1.  How often do you have a drink containing alcohol? 4 or more times a week   2.  How many drinks containing alcohol do you  have on a typical day when you are drinking? 1 or 2   3.  How often do you have five or more drinks on one occasion? Monthly   4.  How often during the last year have you found that you were not able to stop drinking once you had started? Never   5.  How often during the last year have you failed to do what was normally expected of you because of drinking? Never   6.  How often during the last year have you needed a first drink in the morning to get yourself going after a heavy drinking session? Never   7.  How often during the last year have you had a feeling of guilt or remorse after drinking? Never   8.  How often during the last year have you been unable to remember what happened the night before because of your drinking? Never   9.  Have you or someone else been injured because of your drinking? No   10. Has a relative, friend, doctor or other health care worker been concerned about your drinking or suggested you cut down? No   TOTAL SCORE 6       Last PSA:   PSA   Date Value Ref Range Status   12/20/2017 0.60 0 - 4 ug/L Final     Comment:     Assay Method:  Chemiluminescence using Siemens Vista analyzer       Reviewed orders with patient. Reviewed health maintenance and updated orders accordingly - Yes  Lab work is in process    Reviewed and updated as needed this visit by clinical staff  Tobacco  Allergies  Meds  Med Hx  Surg Hx  Fam Hx  Soc Hx        Reviewed and updated as needed this visit by Provider            Review of Systems   Constitutional: Negative for chills and fever.   HENT: Negative for congestion, ear pain, hearing loss and sore throat.    Eyes: Negative for pain and visual disturbance.   Respiratory: Negative for cough and shortness of breath.    Cardiovascular: Negative for chest pain, palpitations and peripheral edema.   Gastrointestinal: Negative for abdominal pain, constipation, diarrhea, heartburn, hematochezia and nausea.   Genitourinary: Negative for discharge, dysuria,  frequency, genital sores, hematuria, impotence and urgency.   Musculoskeletal: Positive for arthralgias and myalgias. Negative for joint swelling.   Skin: Negative for rash.   Neurological: Negative for dizziness, weakness, headaches and paresthesias.   Psychiatric/Behavioral: Negative for mood changes. The patient is not nervous/anxious.        OBJECTIVE:   /79 (BP Location: Left arm, Patient Position: Sitting, Cuff Size: Adult Large)   Pulse 61   Temp 98  F (36.7  C) (Oral)   Wt 138.8 kg (306 lb)   SpO2 98%   BMI 42.68 kg/m      Physical Exam  GENERAL: healthy, alert and no distress  EYES: Eyes grossly normal to inspection, PERRL and conjunctivae and sclerae normal  HENT: ear canals and TM's normal, nose and mouth without ulcers or lesions  NECK: no adenopathy, no asymmetry, masses, or scars and thyroid normal to palpation  RESP: lungs clear to auscultation - no rales, rhonchi or wheezes  CV: regular rate and rhythm, normal S1 S2, no S3 or S4, no murmur, click or rub, no peripheral edema and peripheral pulses strong  ABDOMEN: soft, nontender, no hepatosplenomegaly, no masses and bowel sounds normal  MS: tight right piriformis, left ankle joint is tender over the cubonavicular area, otherwise no gross musculoskeletal defects noted, no edema  SKIN: no suspicious lesions or rashes  NEURO: Normal strength and tone, mentation intact and speech normal  PSYCH: mentation appears normal, affect normal/bright  LYMPH: no cervical, supraclavicular, axillary, or inguinal adenopathy    Diagnostic Test Results:  Labs reviewed in Epic    ASSESSMENT/PLAN:   (Z00.00) Routine general medical examination at a health care facility  (primary encounter diagnosis)  Comment: Well person   Plan: Prostate spec antigen screen        Diet, exercise, wellness and other preventive recommendations related to health maintenance were discussed.  Follow up as needed for acute issues.  Physical exam in 1 year.     (I10) Essential  "hypertension with goal blood pressure less than 140/90  Comment:   Plan: BASIC METABOLIC PANEL,         lisinopril-hydrochlorothiazide         (PRINZIDE/ZESTORETIC) 20-12.5 MG tablet        BP is stable today     (E66.01) Morbid obesity (H)  Comment:   Plan: Counseling on weight loss provided     (E78.5) Hyperlipidemia LDL goal <100  Comment:   Plan: atorvastatin (LIPITOR) 40 MG tablet,         DISCONTINUED: atorvastatin (LIPITOR) 40 MG         tablet        Refills given. Stable. He has no symptoms of ASCVD    (N52.9) Impotence of organic origin  Comment:   Plan: sildenafil (VIAGRA) 100 MG tablet        Refills. Stable. Using occasionally.     (Z13.220) Screening for hyperlipidemia  Comment:   Plan: Lipid panel reflex to direct LDL Fasting        Check labs     (Z23) Need for shingles vaccine  Comment:   Plan: Will consider     (M25.572) Pain in joint involving ankle and foot, left  Comment:   Plan: PODIATRY/FOOT & ANKLE SURGERY REFERRAL        Left ankle - refer to podiatry.       COUNSELING:   Reviewed preventive health counseling, as reflected in patient instructions    Estimated body mass index is 42.68 kg/m  as calculated from the following:    Height as of 10/22/18: 1.803 m (5' 11\").    Weight as of this encounter: 138.8 kg (306 lb).          reports that he quit smoking about 21 years ago. His smoking use included cigarettes and cigars. He has a 25.00 pack-year smoking history. He has never used smokeless tobacco.      Counseling Resources:  ATP IV Guidelines  Pooled Cohorts Equation Calculator  FRAX Risk Assessment  ICSI Preventive Guidelines  Dietary Guidelines for Americans, 2010  USDA's MyPlate  ASA Prophylaxis  Lung CA Screening    ANCA STEPHENSON PA-C  [facility]  "

## 2019-07-10 LAB
ANION GAP SERPL CALCULATED.3IONS-SCNC: 6 MMOL/L (ref 3–14)
BUN SERPL-MCNC: 26 MG/DL (ref 7–30)
CALCIUM SERPL-MCNC: 9 MG/DL (ref 8.5–10.1)
CHLORIDE SERPL-SCNC: 106 MMOL/L (ref 94–109)
CHOLEST SERPL-MCNC: 166 MG/DL
CO2 SERPL-SCNC: 27 MMOL/L (ref 20–32)
CREAT SERPL-MCNC: 1.38 MG/DL (ref 0.66–1.25)
GFR SERPL CREATININE-BSD FRML MDRD: 55 ML/MIN/{1.73_M2}
GLUCOSE SERPL-MCNC: 83 MG/DL (ref 70–99)
HDLC SERPL-MCNC: 76 MG/DL
LDLC SERPL CALC-MCNC: 77 MG/DL
NONHDLC SERPL-MCNC: 90 MG/DL
POTASSIUM SERPL-SCNC: 4.2 MMOL/L (ref 3.4–5.3)
PSA SERPL-ACNC: 0.36 UG/L (ref 0–4)
SODIUM SERPL-SCNC: 139 MMOL/L (ref 133–144)
TRIGL SERPL-MCNC: 66 MG/DL

## 2019-07-16 ENCOUNTER — MYC MEDICAL ADVICE (OUTPATIENT)
Dept: FAMILY MEDICINE | Facility: CLINIC | Age: 59
End: 2019-07-16

## 2019-07-16 DIAGNOSIS — N52.9 IMPOTENCE OF ORGANIC ORIGIN: Primary | ICD-10-CM

## 2019-07-16 NOTE — TELEPHONE ENCOUNTER
"Routing [employer] message to PCP to please advise.     Patient saw you last in clinic on 7/9/19, note pasted below.     Sadia Solano, RN    \"(Z00.00) Routine general medical examination at a health care facility  (primary encounter diagnosis)  Comment: Well person   Plan: Prostate spec antigen screen        Diet, exercise, wellness and other preventive recommendations related to health maintenance were discussed.  Follow up as needed for acute issues.  Physical exam in 1 year.      (I10) Essential hypertension with goal blood pressure less than 140/90  Comment:   Plan: BASIC METABOLIC PANEL,         lisinopril-hydrochlorothiazide         (PRINZIDE/ZESTORETIC) 20-12.5 MG tablet        BP is stable today      (E66.01) Morbid obesity (H)  Comment:   Plan: Counseling on weight loss provided      (E78.5) Hyperlipidemia LDL goal <100  Comment:   Plan: atorvastatin (LIPITOR) 40 MG tablet,         DISCONTINUED: atorvastatin (LIPITOR) 40 MG         tablet        Refills given. Stable. He has no symptoms of ASCVD     (N52.9) Impotence of organic origin  Comment:   Plan: sildenafil (VIAGRA) 100 MG tablet        Refills. Stable. Using occasionally.      (Z13.220) Screening for hyperlipidemia  Comment:   Plan: Lipid panel reflex to direct LDL Fasting        Check labs      (Z23) Need for shingles vaccine  Comment:   Plan: Will consider      (M25.572) Pain in joint involving ankle and foot, left  Comment:   Plan: PODIATRY/FOOT & ANKLE SURGERY REFERRAL        Left ankle - refer to podiatry.\"  "

## 2019-07-17 RX ORDER — TADALAFIL 20 MG/1
TABLET ORAL
Qty: 6 TABLET | Refills: 5 | Status: SHIPPED | OUTPATIENT
Start: 2019-07-17 | End: 2020-08-11

## 2019-09-26 ENCOUNTER — OFFICE VISIT (OUTPATIENT)
Dept: OPTOMETRY | Facility: CLINIC | Age: 59
End: 2019-09-26
Payer: COMMERCIAL

## 2019-09-26 DIAGNOSIS — Z01.00 EMMETROPIA: ICD-10-CM

## 2019-09-26 DIAGNOSIS — Z01.00 ENCOUNTER FOR EXAMINATION OF EYES AND VISION WITHOUT ABNORMAL FINDINGS: Primary | ICD-10-CM

## 2019-09-26 DIAGNOSIS — H52.4 PRESBYOPIA: ICD-10-CM

## 2019-09-26 PROCEDURE — 92004 COMPRE OPH EXAM NEW PT 1/>: CPT | Performed by: OPTOMETRIST

## 2019-09-26 PROCEDURE — 92015 DETERMINE REFRACTIVE STATE: CPT | Performed by: OPTOMETRIST

## 2019-09-26 ASSESSMENT — VISUAL ACUITY
OS_SC: 20/80 -1
METHOD: SNELLEN - LINEAR
OS_SC: 20/20
OD_SC: 20/20
OD_SC: 20/80 -1

## 2019-09-26 ASSESSMENT — EXTERNAL EXAM - LEFT EYE: OS_EXAM: NORMAL

## 2019-09-26 ASSESSMENT — CONF VISUAL FIELD
OS_NORMAL: 1
OD_NORMAL: 1

## 2019-09-26 ASSESSMENT — EXTERNAL EXAM - RIGHT EYE: OD_EXAM: NORMAL

## 2019-09-26 ASSESSMENT — TONOMETRY
IOP_METHOD: APPLANATION
OD_IOP_MMHG: 19
OS_IOP_MMHG: 17

## 2019-09-26 ASSESSMENT — CUP TO DISC RATIO
OD_RATIO: 0.35
OS_RATIO: 0.25

## 2019-09-26 ASSESSMENT — REFRACTION_MANIFEST
OS_SPHERE: PLANO
OD_SPHERE: PLANO
OS_CYLINDER: SPHERE
OD_CYLINDER: SPHERE
OD_ADD: +2.25
OS_ADD: +2.25

## 2019-09-26 ASSESSMENT — SLIT LAMP EXAM - LIDS
COMMENTS: NORMAL
COMMENTS: NORMAL

## 2019-09-26 NOTE — PATIENT INSTRUCTIONS
Car was advised of today's exam findings.  Fill glasses prescription  Allow 2 weeks to adapt to change in glasses  Copy of glasses Rx provided today.    Return in 2 years for eye exam or sooner if needed.    The effects of the dilating drops last for 4- 6 hours.  You will be more sensitive to light and vision will be blurry up close.  Mydriatic sunglasses were given if needed.      Fransico Hines O.D.  [facility] [place]  [place]  [address]    [phone]

## 2019-09-26 NOTE — PROGRESS NOTES
Chief Complaint   Patient presents with     Annual Eye Exam      Accompanied by self  Last Eye Exam: 10 years ago  Dilated Previously: Yes    What are you currently using to see?  Readers +2.00       Distance Vision Acuity: Satisfied with vision    Near Vision Acuity: Not satisfied     Eye Comfort: good  Do you use eye drops? : No  Occupation or Hobbies: maintenance    Radha Focusak, [employer]          Medical, surgical and family histories reviewed and updated 9/26/2019.       OBJECTIVE: See Ophthalmology exam    ASSESSMENT:    ICD-10-CM    1. Encounter for examination of eyes and vision without abnormal findings Z01.00 EYE EXAM (SIMPLE-NONBILLABLE)   2. Emmetropia Z01.00 EYE EXAM (SIMPLE-NONBILLABLE)     REFRACTION   3. Presbyopia H52.4 EYE EXAM (SIMPLE-NONBILLABLE)     REFRACTION      PLAN:     Patient Instructions   Cra was advised of today's exam findings.  Fill glasses prescription  Allow 2 weeks to adapt to change in glasses  Copy of glasses Rx provided today.    Return in 2 years for eye exam or sooner if needed.    The effects of the dilating drops last for 4- 6 hours.  You will be more sensitive to light and vision will be blurry up close.  Mydriatic sunglasses were given if needed.      Fransico Hines O.D.  [facility] [place]  [address] [place] [address]. [place]  [place]  [address]    [phone]

## 2019-09-26 NOTE — LETTER
9/26/2019         RE: Car Torres  [address] [facility] [address]        Dear Colleague,    Thank you for referring your patient, Car Torres, to the [facility]. Please see a copy of my visit note below.    Chief Complaint   Patient presents with     Annual Eye Exam      Accompanied by self  Last Eye Exam: 10 years ago  Dilated Previously: Yes    What are you currently using to see?  Readers +2.00       Distance Vision Acuity: Satisfied with vision    Near Vision Acuity: Not satisfied     Eye Comfort: good  Do you use eye drops? : No  Occupation or Hobbies: maintenance    Radha Kosak, [employer]          Medical, surgical and family histories reviewed and updated 9/26/2019.       OBJECTIVE: See Ophthalmology exam    ASSESSMENT:    ICD-10-CM    1. Encounter for examination of eyes and vision without abnormal findings Z01.00 EYE EXAM (SIMPLE-NONBILLABLE)   2. Emmetropia Z01.00 EYE EXAM (SIMPLE-NONBILLABLE)     REFRACTION   3. Presbyopia H52.4 EYE EXAM (SIMPLE-NONBILLABLE)     REFRACTION      PLAN:     Patient Instructions   Car was advised of today's exam findings.  Fill glasses prescription  Allow 2 weeks to adapt to change in glasses  Copy of glasses Rx provided today.    Return in 2 years for eye exam or sooner if needed.    The effects of the dilating drops last for 4- 6 hours.  You will be more sensitive to light and vision will be blurry up close.  Mydriatic sunglasses were given if needed.      Fransico Hines O.D.  [facility]  [address]  [place]  [address]    [phone]           Again, thank you for allowing me to participate in the care of your patient.        Sincerely,        Fransico Hines OD

## 2019-10-28 ENCOUNTER — OFFICE VISIT (OUTPATIENT)
Dept: SLEEP MEDICINE | Facility: CLINIC | Age: 59
End: 2019-10-28
Payer: COMMERCIAL

## 2019-10-28 VITALS
BODY MASS INDEX: 42.7 KG/M2 | DIASTOLIC BLOOD PRESSURE: 84 MMHG | HEIGHT: 71 IN | OXYGEN SATURATION: 97 % | WEIGHT: 305 LBS | HEART RATE: 65 BPM | SYSTOLIC BLOOD PRESSURE: 149 MMHG

## 2019-10-28 DIAGNOSIS — G47.33 OSA (OBSTRUCTIVE SLEEP APNEA): Primary | ICD-10-CM

## 2019-10-28 PROCEDURE — 99213 OFFICE O/P EST LOW 20 MIN: CPT | Performed by: PHYSICIAN ASSISTANT

## 2019-10-28 ASSESSMENT — MIFFLIN-ST. JEOR: SCORE: 2210.6

## 2019-10-28 NOTE — PATIENT INSTRUCTIONS
Your BMI is Body mass index is 42.54 kg/m .  Weight management is a personal decision.  If you are interested in exploring weight loss strategies, the following discussion covers the approaches that may be successful. Body mass index (BMI) is one way to tell whether you are at a healthy weight, overweight, or obese. It measures your weight in relation to your height.  A BMI of 18.5 to 24.9 is in the healthy range. A person with a BMI of 25 to 29.9 is considered overweight, and someone with a BMI of 30 or greater is considered obese. More than two-thirds of American adults are considered overweight or obese.  Being overweight or obese increases the risk for further weight gain. Excess weight may lead to heart disease and diabetes.  Creating and following plans for healthy eating and physical activity may help you improve your health.  Weight control is part of healthy lifestyle and includes exercise, emotional health, and healthy eating habits. Careful eating habits lifelong are the mainstay of weight control. Though there are significant health benefits from weight loss, long-term weight loss with diet alone may be very difficult to achieve- studies show long-term success with dietary management in less than 10% of people. Attaining a healthy weight may be especially difficult to achieve in those with severe obesity. In some cases, medications, devices and surgical management might be considered.  What can you do?  If you are overweight or obese and are interested in methods for weight loss, you should discuss this with your provider.     Consider reducing daily calorie intake by 500 calories.     Keep a food journal.     Avoiding skipping meals, consider cutting portions instead.    Diet combined with exercise helps maintain muscle while optimizing fat loss. Strength training is particularly important for building and maintaining muscle mass. Exercise helps reduce stress, increase energy, and improves fitness.  Increasing exercise without diet control, however, may not burn enough calories to loose weight.       Start walking three days a week 10-20 minutes at a time    Work towards walking thirty minutes five days a week     Eventually, increase the speed of your walking for 1-2 minutes at time    In addition, we recommend that you review healthy lifestyles and methods for weight loss available through the National Institutes of Health patient information sites:  http://win.niddk.nih.gov/publications/index.htm    And look into health and wellness programs that may be available through your health insurance provider, employer, local community center, or sidney club.    Weight management plan: Patient was referred to their PCP to discuss a diet and exercise plan.

## 2019-10-28 NOTE — PROGRESS NOTES
"Obstructive Sleep Apnea - PAP Follow-Up Visit:    Chief Complaint   Patient presents with     CPAP Follow Up       Car Torres comes in today for follow-up of their severe sleep apnea, managed with CPAP.     aCr Torres is a 58 year old male who initially presented with loud snoring, daytime sleepiness(ESS 11), difficulty maintaining sleep, crowded oropharynx, neck circumference of 18.5\"  and co-morbid HTN.     Total score - [place]: 11 (2018 2:00 PM)   STOP-BAN/8     Home Sleep Apnea Testing - 18: 295 lbs 0 oz: AHI 45.3/hr. Supine AHI 61.9/hr.   Oxygen [place] of 66%. Baseline 93%. Sp02 =< 88% for 47 minutes  He slept on his back (22%), prone (16.8%), left (35.8) and right (20.7%) sides.   Analysis time: 299.2 minutes. Patient went to bed 2 hours earlier than HST was set for.    Oximetry results were obtained for the date of 10/22/2018.  The patient was on a treatment of CPAP 6-16 cm/H20.  The study showed a valid recording time of ~6 hours with a 4% desaturation index of 2.2.  The basal oxygen saturation was 93.8% and the lowest SpO2 was 88%.  The patient spent 0 minutes below 88%.    Overall, he rates the experience with PAP as 9 (0 poor, 10 great). The mask is comfortable.    The mask is not leaking .  He is not snoring with the mask on. He is not having gasp arousals.  He is not having significant oral/nasal dryness. The pressure is comfortable.     His PAP interface is Nasal Mask.    Bedtime is typically varies. Usually it takes about 10 min minutes to fall asleep with the mask on. Wake time is typically varies.  Patient is using PAP therapy 6 hours per night. The patient is usually getting 6 hours of sleep per night.    He does feel rested in the morning.    Total score - [place]: 6 (10/28/2019  3:00 PM)    MI Total Score: 0    ResMed   Auto-PAP 6.0 - 16.0 cmH2O 30 day usage data:    73% of days with > 4 hours of use. 2/30 days with no use.   Average use 279 minutes per day.   95%ile Leak " "24.04 L/min.   CPAP 95% pressure 11.1 cm.   AHI 1.09 events per hour.     Past medical/surgical history, family history, social history, medications and allergies were reviewed.      Problem List:  Patient Active Problem List    Diagnosis Date Noted     Hypertension goal BP (blood pressure) < 140/90 10/20/2010     Priority: High     Hyperlipidemia LDL goal <130 06/11/2010     Priority: High     Nonspecific abnormal electrocardiogram (ECG) (EKG) 08/23/2007     Priority: High     Stress testing normal.       TIA (obstructive sleep apnea) 07/19/2018     Priority: Medium     Home Sleep Apnea Testing - 7/18/18: 295 lbs 0 oz: AHI 45.3/hr. Supine AHI 61.9/hr. Oxygen [place] of 66%.  Baseline 93%.  Sp02 =< 88% for 47 minutes.       History of sinus bradycardia 06/07/2018     Priority: Medium     ED (erectile dysfunction) 06/07/2018     Priority: Medium     Coronary artery calcification 05/08/2018     Priority: Medium     Per CT CHEST WITHOUT CONTRAST April 23, 2018. CT calcium score planned.        Renal cyst 11/18/2015     Priority: Medium     Simple, non enhanced, 3.5 cm on right kidney, Bosniak 1 - x 2 stable findings       Pulmonary nodule 11/18/2015     Priority: Medium     Incidental finding on CT scan, right posterior lung - considered benign / stable        Function kidney decreased 10/21/2010     Priority: Medium     60 to 80 - have discussed Lisinopril - will consider        Obesity 02/27/2006     Priority: Medium     Sciatica 02/27/2006     Priority: Low        BP (!) 149/84   Pulse 65   Ht 1.803 m (5' 11\")   Wt 138.3 kg (305 lb)   SpO2 97%   BMI 42.54 kg/m      Impression/Plan:    Severe obstructive Sleep apnea.   Tolerating PAP well. Daytime symptoms are improved.   Continue current treatment.   Comprehensive DME    Car Torres will follow up in about 1 year or sooner if any concerns.     Fifteen minutes spent with patient, all of which were spent face-to-face counseling, consulting, coordinating plan of " care.      Jamarcus Solorio PA-C

## 2019-11-05 ENCOUNTER — HEALTH MAINTENANCE LETTER (OUTPATIENT)
Age: 59
End: 2019-11-05

## 2020-01-08 DIAGNOSIS — I10 ESSENTIAL HYPERTENSION WITH GOAL BLOOD PRESSURE LESS THAN 140/90: ICD-10-CM

## 2020-01-08 NOTE — TELEPHONE ENCOUNTER
"Requested Prescriptions   Pending Prescriptions Disp Refills     lisinopril-hydrochlorothiazide (PRINZIDE/ZESTORETIC) 20-12.5 MG tablet [Pharmacy Med Name: LISINOPRIL-HCTZ 20-12.5 MG TAB]  Last Written Prescription Date:  7/9/2019  Last Fill Quantity: 90 tabs,  # refills: 1   Last office visit: 7/9/2019 with prescribing provider:  Warner   Future Office Visit:   90 tablet 1     Sig: TAKE 1 TABLET BY MOUTH EVERY DAY       Diuretics (Including Combos) Protocol Failed - 1/8/2020  2:33 AM        Failed - Blood pressure under 140/90 in past 12 months     BP Readings from Last 3 Encounters:   10/28/19 (!) 149/84   07/09/19 130/79   10/22/18 131/76                 Failed - Normal serum creatinine on file in past 12 months     Recent Labs   Lab Test 07/09/19  1700   CR 1.38*              Passed - Recent (12 mo) or future (30 days) visit within the authorizing provider's specialty     Patient has had an office visit with the authorizing provider or a provider within the authorizing providers department within the previous 12 mos or has a future within next 30 days. See \"Patient Info\" tab in inbasket, or \"Choose Columns\" in Meds & Orders section of the refill encounter.              Passed - Medication is active on med list        Passed - Patient is age 18 or older        Passed - Normal serum potassium on file in past 12 months     Recent Labs   Lab Test 07/09/19  1700   POTASSIUM 4.2                    Passed - Normal serum sodium on file in past 12 months     Recent Labs   Lab Test 07/09/19  1700                  "

## 2020-01-09 RX ORDER — LISINOPRIL AND HYDROCHLOROTHIAZIDE 12.5; 2 MG/1; MG/1
1 TABLET ORAL DAILY
Qty: 90 TABLET | Refills: 1 | Status: SHIPPED | OUTPATIENT
Start: 2020-01-09 | End: 2020-07-07

## 2020-01-09 NOTE — TELEPHONE ENCOUNTER
Routing refill request to provider for review/approval because:  Labs out of range:  Cr  BP Readings from Last 3 Encounters:   10/28/19 (!) 149/84   07/09/19 130/79   10/22/18 131/76       Margo Sun RN, BSN, PHN  [facility]: [place]

## 2020-07-04 DIAGNOSIS — I10 ESSENTIAL HYPERTENSION WITH GOAL BLOOD PRESSURE LESS THAN 140/90: ICD-10-CM

## 2020-07-06 ENCOUNTER — TELEPHONE (OUTPATIENT)
Dept: FAMILY MEDICINE | Facility: CLINIC | Age: 60
End: 2020-07-06

## 2020-07-06 DIAGNOSIS — E78.5 HYPERLIPIDEMIA LDL GOAL <100: ICD-10-CM

## 2020-07-07 RX ORDER — ATORVASTATIN CALCIUM 40 MG/1
40 TABLET, FILM COATED ORAL DAILY
Qty: 90 TABLET | Refills: 0 | Status: SHIPPED | OUTPATIENT
Start: 2020-07-07 | End: 2020-09-30

## 2020-07-07 RX ORDER — LISINOPRIL AND HYDROCHLOROTHIAZIDE 12.5; 2 MG/1; MG/1
TABLET ORAL
Qty: 90 TABLET | Refills: 0 | Status: SHIPPED | OUTPATIENT
Start: 2020-07-07 | End: 2020-10-16

## 2020-07-07 NOTE — TELEPHONE ENCOUNTER
"Routing refill request to provider for review/approval because:  Medication is reported/historical  Patient needs to be seen because:  Due for yearly visit    Lipitor  Last Written Prescription Date:  7/9/2019  Last Fill Quantity: 90,  # refills: 3   Last office visit: 7/9/2019 with prescribing provider:  Sreedhar   Future Office Visit:      Requested Prescriptions   Pending Prescriptions Disp Refills     atorvastatin (LIPITOR) 40 MG tablet 90 tablet 3     Sig: Take 1 tablet (40 mg) by mouth daily       Statins Protocol Passed - 7/6/2020  9:51 AM        Passed - LDL on file in past 12 months     Recent Labs   Lab Test 07/09/19  1700   LDL 77             Passed - No abnormal creatine kinase in past 12 months     No lab results found.             Passed - Recent (12 mo) or future (30 days) visit within the authorizing provider's specialty     Patient has had an office visit with the authorizing provider or a provider within the authorizing providers department within the previous 12 mos or has a future within next 30 days. See \"Patient Info\" tab in inbasket, or \"Choose Columns\" in Meds & Orders section of the refill encounter.              Passed - Medication is active on med list        Passed - Patient is age 18 or older           Magaly Huerta RN on 7/7/2020 at 11:40 AM    "

## 2020-07-07 NOTE — TELEPHONE ENCOUNTER
Routing refill request to provider for review/approval because:  Labs not current:  Cr  BP out of range.     Margo Sun, RN, BSN, PHN  [facility]: [place]

## 2020-07-08 NOTE — TELEPHONE ENCOUNTER
Left voicemail for patient to return call to clinic to schedule visit for further refills.     Zora Couch MA

## 2020-07-10 NOTE — TELEPHONE ENCOUNTER
3rd attempt.    Called patient and left a VM to schedule physical/ med check.    Kylah Navarrete

## 2020-07-19 ENCOUNTER — MYC MEDICAL ADVICE (OUTPATIENT)
Dept: FAMILY MEDICINE | Facility: CLINIC | Age: 60
End: 2020-07-19

## 2020-07-20 ENCOUNTER — MYC MEDICAL ADVICE (OUTPATIENT)
Dept: FAMILY MEDICINE | Facility: CLINIC | Age: 60
End: 2020-07-20

## 2020-07-20 NOTE — TELEPHONE ENCOUNTER
He needs to go to the ER. I'd suggest the [facility] [place].    It may take some convincing, let them know I spoke with him - I want to make sure he doesn't have something wrong with the nerves in his ears, his brain, etc.     Thanks.  Fransico Hylton, AMALIA, PA-C

## 2020-07-20 NOTE — TELEPHONE ENCOUNTER
Routing to PCP.    Patient's wife is accessing his MyChart to communicate her concerns for his increased hallucinations, visual and audio, as well as paranoia.    There is no consent to communicate on file.    Please advise on what steps to take, ED?        Catalina Law RN

## 2020-07-20 NOTE — TELEPHONE ENCOUNTER
Patient/family was instructed to return call to [facility], directly on the RN Call back line at [phone].      There is no consent on file to speak with wife.  He will need to give verbal consent.  Please refer to previous [employer] message that was sent.  Patient having active hallucinations and Naun Hylton PA-C wants him to be evaluated at [facility]/ [facility]    Catalina Law RN

## 2020-07-29 ENCOUNTER — MYC MEDICAL ADVICE (OUTPATIENT)
Dept: FAMILY MEDICINE | Facility: CLINIC | Age: 60
End: 2020-07-29

## 2020-07-29 NOTE — TELEPHONE ENCOUNTER
I don't know how to evaluate or manage acute onset hallucinations / hearing voices, etc. It is vital that he have imaging, lab work done and we can't emergently order that in the clinic. I want to make sure he doesn't have some kind of brain injury or growth causing these things that needs immediate evaluation which is why sending him to the ER is the best option.    If he still 100% refuses, we could get him in anywhere, but I'd recommend sooner over later. We could reach out to Dr. Law with psychiatry to get her advice as well. I don't know her RN name or contact info.    Thanks.  Fransico Hylton, MPAS, PA-C

## 2020-07-29 NOTE — TELEPHONE ENCOUNTER
Family response noted, and there was also another response after this noting that patient agreed to go to ED. Closing encounter at this time.    Eli Posada RN

## 2020-07-29 NOTE — TELEPHONE ENCOUNTER
Provider response noted.  RN can see that patient/family has read [employer] message, but they're still not scheduled and no ED visit notes noted.  Another [employer] message sent with provider's recent recommendation and will await response.      Eli Posada RN

## 2020-08-02 ENCOUNTER — APPOINTMENT (OUTPATIENT)
Dept: CT IMAGING | Facility: CLINIC | Age: 60
End: 2020-08-02
Attending: EMERGENCY MEDICINE
Payer: COMMERCIAL

## 2020-08-02 ENCOUNTER — HOSPITAL ENCOUNTER (EMERGENCY)
Facility: CLINIC | Age: 60
Discharge: HOME | End: 2020-08-02
Attending: EMERGENCY MEDICINE | Admitting: EMERGENCY MEDICINE
Payer: COMMERCIAL

## 2020-08-02 VITALS
SYSTOLIC BLOOD PRESSURE: 115 MMHG | DIASTOLIC BLOOD PRESSURE: 62 MMHG | HEART RATE: 57 BPM | TEMPERATURE: 97.7 F | OXYGEN SATURATION: 95 % | RESPIRATION RATE: 16 BRPM

## 2020-08-02 DIAGNOSIS — R44.3 HALLUCINATIONS: ICD-10-CM

## 2020-08-02 DIAGNOSIS — F15.90 STIMULANT USE DISORDER: ICD-10-CM

## 2020-08-02 LAB
ALBUMIN SERPL-MCNC: 3.7 G/DL (ref 3.4–5)
ALP SERPL-CCNC: 86 U/L (ref 40–150)
ALT SERPL W P-5'-P-CCNC: 27 U/L (ref 0–70)
AMPHETAMINES UR QL SCN: POSITIVE
ANION GAP SERPL CALCULATED.3IONS-SCNC: 5 MMOL/L (ref 3–14)
AST SERPL W P-5'-P-CCNC: 15 U/L (ref 0–45)
BARBITURATES UR QL: NEGATIVE
BASOPHILS # BLD AUTO: 0 10E9/L (ref 0–0.2)
BASOPHILS NFR BLD AUTO: 0.5 %
BENZODIAZ UR QL: NEGATIVE
BILIRUB SERPL-MCNC: 0.5 MG/DL (ref 0.2–1.3)
BUN SERPL-MCNC: 28 MG/DL (ref 7–30)
CALCIUM SERPL-MCNC: 8.9 MG/DL (ref 8.5–10.1)
CANNABINOIDS UR QL SCN: NEGATIVE
CHLORIDE SERPL-SCNC: 105 MMOL/L (ref 94–109)
CO2 SERPL-SCNC: 28 MMOL/L (ref 20–32)
COCAINE UR QL: NEGATIVE
CREAT SERPL-MCNC: 1.17 MG/DL (ref 0.66–1.25)
DIFFERENTIAL METHOD BLD: NORMAL
EOSINOPHIL # BLD AUTO: 0.5 10E9/L (ref 0–0.7)
EOSINOPHIL NFR BLD AUTO: 8.5 %
ERYTHROCYTE [DISTWIDTH] IN BLOOD BY AUTOMATED COUNT: 12.9 % (ref 10–15)
ETHANOL UR QL SCN: NEGATIVE
GFR SERPL CREATININE-BSD FRML MDRD: 66 ML/MIN/{1.73_M2}
GLUCOSE SERPL-MCNC: 100 MG/DL (ref 70–99)
HCT VFR BLD AUTO: 43.9 % (ref 40–53)
HGB BLD-MCNC: 15 G/DL (ref 13.3–17.7)
IMM GRANULOCYTES # BLD: 0 10E9/L (ref 0–0.4)
IMM GRANULOCYTES NFR BLD: 0.2 %
LYMPHOCYTES # BLD AUTO: 1.7 10E9/L (ref 0.8–5.3)
LYMPHOCYTES NFR BLD AUTO: 29.2 %
MCH RBC QN AUTO: 31 PG (ref 26.5–33)
MCHC RBC AUTO-ENTMCNC: 34.2 G/DL (ref 31.5–36.5)
MCV RBC AUTO: 91 FL (ref 78–100)
MONOCYTES # BLD AUTO: 0.5 10E9/L (ref 0–1.3)
MONOCYTES NFR BLD AUTO: 8.8 %
NEUTROPHILS # BLD AUTO: 3 10E9/L (ref 1.6–8.3)
NEUTROPHILS NFR BLD AUTO: 52.8 %
NRBC # BLD AUTO: 0 10*3/UL
NRBC BLD AUTO-RTO: 0 /100
OPIATES UR QL SCN: NEGATIVE
PLATELET # BLD AUTO: 200 10E9/L (ref 150–450)
POTASSIUM SERPL-SCNC: 4 MMOL/L (ref 3.4–5.3)
PROT SERPL-MCNC: 6.9 G/DL (ref 6.8–8.8)
RBC # BLD AUTO: 4.84 10E12/L (ref 4.4–5.9)
SODIUM SERPL-SCNC: 138 MMOL/L (ref 133–144)
TSH SERPL DL<=0.005 MIU/L-ACNC: 1.42 MU/L (ref 0.4–4)
WBC # BLD AUTO: 5.7 10E9/L (ref 4–11)

## 2020-08-02 PROCEDURE — 99284 EMERGENCY DEPT VISIT MOD MDM: CPT | Mod: Z6 | Performed by: EMERGENCY MEDICINE

## 2020-08-02 PROCEDURE — 80053 COMPREHEN METABOLIC PANEL: CPT | Performed by: EMERGENCY MEDICINE

## 2020-08-02 PROCEDURE — 80320 DRUG SCREEN QUANTALCOHOLS: CPT | Performed by: EMERGENCY MEDICINE

## 2020-08-02 PROCEDURE — 85025 COMPLETE CBC W/AUTO DIFF WBC: CPT | Performed by: EMERGENCY MEDICINE

## 2020-08-02 PROCEDURE — 80307 DRUG TEST PRSMV CHEM ANLYZR: CPT | Performed by: EMERGENCY MEDICINE

## 2020-08-02 PROCEDURE — 99285 EMERGENCY DEPT VISIT HI MDM: CPT | Mod: 25 | Performed by: EMERGENCY MEDICINE

## 2020-08-02 PROCEDURE — 90791 PSYCH DIAGNOSTIC EVALUATION: CPT

## 2020-08-02 PROCEDURE — 70450 CT HEAD/BRAIN W/O DYE: CPT

## 2020-08-02 PROCEDURE — 84443 ASSAY THYROID STIM HORMONE: CPT | Performed by: EMERGENCY MEDICINE

## 2020-08-02 ASSESSMENT — ENCOUNTER SYMPTOMS
DIFFICULTY URINATING: 0
SHORTNESS OF BREATH: 0
ABDOMINAL PAIN: 0
HALLUCINATIONS: 1
ARTHRALGIAS: 0
NECK STIFFNESS: 0
FEVER: 0
CONFUSION: 0
EYE REDNESS: 0
HEADACHES: 0
COLOR CHANGE: 0

## 2020-08-02 NOTE — DISCHARGE INSTRUCTIONS
Follow-up with your primary care provider.  Please follow-up with psychiatry as needed.  I highly commend you cease any non-regulated dietary supplements as there is no way to tell exactly what they contain.    Follow-up with your primary care provider.  Return to the emergency department as needed for any new or worsening symptoms.

## 2020-08-02 NOTE — ED PROVIDER NOTES
"    [facility] EMERGENCY DEPARTMENT ([facility])   August 2, 2020  ED 16B  11:46 AM   History     Chief Complaint   Patient presents with     Hallucinations     in past few weeks pt believes that he is seeing and hearing recordings trhat have taken place in his house. pt blames it on Ring doorbell that was recently installed pt believes somehow the camera has recorded porn in his house some involving his wife and him.     The history is provided by the patient, the spouse and medical records.     Car Torres is a 60 year old male with history of hypertension, obstructive sleep apnea who presents with new auditory hallucinations.  Patient and wife recently installed a Nest security system and then afterwards started experiencing auditory and visual hallucinations, hearing a man and a woman talking and having sex and seeing shadowy silhouettes of him and his wife around the house and on the security system.  He became suspicious that she was having an affair, and noted that he sleeps downstairs and works 12-hour days and so they do have some time apart.  He notes hearing voices and conversations on his phone that sounded like an open juliane.  He felt the voices were \"burned into the phone\" and it was distressing to him to the point where he made his wife get him a new phone and get rid of the old one.  She has done so and he has not had any further strange voices from the phone.  He also asked his wife to uninstall Nest from his phone.  He states that his wife had nicknamed security system \"Love Shack\" and wonders if the Nest  is playing a prank on them. No history of prior mental health diagnosis. 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  Wife states that the job is very stressful because the  is horrible to work for and he just has chronically felt unwell with this.  Patient does " drink beer once in a while, not every day.  Wife wonders if stress is causing his symptoms.    Epic records reviewed, patient's wife had contacted patient's care team concerned for new auditory and visual hallucinations and paranoia on on 2020. They recommended he be brought to the ER for evaluation.     Later patient had a positive drug screen.  Patient was asked if he was using any substances and he admitted to using Kratom.    PAST MEDICAL HISTORY:   Past Medical History:   Diagnosis Date     Coronary artery calcification 2018     Diverticulosis of large intestine without hemorrhage 2015    Incidental finding on CT scan         PAST SURGICAL HISTORY:   Past Surgical History:   Procedure Laterality Date     SURGICAL HISTORY OF -       surgery on left index finger       Past medical history, past surgical history, medications, and allergies were reviewed with the patient. Additional pertinent items: None    FAMILY HISTORY:   Family History   Problem Relation Age of Onset     Cancer Mother         uterine     Other Cancer Mother         endometrial     C.A.D. Father      Hypertension Father      Breast Cancer Maternal Grandmother      Other Cancer Maternal Grandmother         Lung/brain     Hypertension Brother      Breast Cancer Other      Breast Cancer Cousin      Other Cancer Other      Glaucoma No family hx of      Macular Degeneration No family hx of        SOCIAL HISTORY:   Social History     Tobacco Use     Smoking status: Former Smoker     Packs/day: 1.00     Years: 25.00     Pack years: 25.00     Types: Cigarettes, Cigars     Last attempt to quit: 1998     Years since quittin.5     Smokeless tobacco: Never Used   Substance Use Topics     Alcohol use: Yes     Alcohol/week: 10.0 standard drinks     Comment: drinking some during the week.      Social history was reviewed with the patient. Additional pertinent items: None      Discharge Medication List as of 2020  3:11 PM       CONTINUE these medications which have NOT CHANGED    Details   ASPIRIN 325 MG OR TBEC ONE DAILY, Disp-100, R-0, Historical      atorvastatin (LIPITOR) 40 MG tablet Take 1 tablet (40 mg) by mouth daily, Disp-90 tablet,R-0, E-Prescribe      Blood Pressure Monitor KIT 1 Units daily, Disp-1 kit, R-0, Local Print      Cholecalciferol (VITAMIN D PO) Take 5,000 Units by mouth daily One tablet twice daily, Historical      lisinopril-hydrochlorothiazide (ZESTORETIC) 20-12.5 MG tablet TAKE 1 TABLET BY MOUTH EVERY DAY, Disp-90 tablet,R-0, E-Prescribe      MULTI VITAMIN MENS PO None Entered, Historical      sildenafil (VIAGRA) 100 MG tablet Take 1 tablet (100 mg) by mouth daily as needed, Disp-6 tablet, R-5, E-Prescribe      tadalafil (CIALIS) 20 MG tablet 1/2 to 1 tablet, 30 minutes to 1 hour before sex, Disp-6 tablet, R-5, E-Prescribe      vitamin B complex with vitamin C (VITAMIN  B COMPLEX) TABS tablet Take 1 tablet by mouth daily, Historical              No Known Allergies     Review of Systems   Constitutional: Negative for fever.   HENT: Negative for congestion.    Eyes: Negative for redness.   Respiratory: Negative for shortness of breath.    Cardiovascular: Negative for chest pain.   Gastrointestinal: Negative for abdominal pain.   Genitourinary: Negative for difficulty urinating.   Musculoskeletal: Negative for arthralgias and neck stiffness.   Skin: Negative for color change.   Neurological: Negative for headaches.   Psychiatric/Behavioral: Positive for hallucinations. Negative for confusion.     A complete review of systems was performed with pertinent positives and negatives noted in the HPI, and all other systems negative.       Physical Exam   BP: (!) 140/78  Pulse: 57  Heart Rate: 56  Temp: 97.7  F (36.5  C)  Resp: 16  SpO2: 98 %      Physical Exam  Constitutional:       General: He is not in acute distress.     Appearance: He is not diaphoretic.   HENT:      Head: Normocephalic.      Mouth/Throat:       Pharynx: No oropharyngeal exudate.   Eyes:      Extraocular Movements: Extraocular movements intact.   Neck:      Musculoskeletal: Neck supple.   Cardiovascular:      Heart sounds: Normal heart sounds.   Pulmonary:      Effort: No respiratory distress.      Breath sounds: Normal breath sounds.   Abdominal:      General: There is no distension.      Palpations: Abdomen is soft.      Tenderness: There is no abdominal tenderness.   Musculoskeletal:         General: No deformity.   Skin:     General: Skin is dry.   Neurological:      Mental Status: He is alert.      Comments: alert   Psychiatric:         Behavior: Behavior normal.         ED Course        Procedures            Results for orders placed or performed during the hospital encounter of 08/02/20 (from the past 24 hour(s))   CBC with platelets differential   Result Value Ref Range    WBC 5.7 4.0 - 11.0 10e9/L    RBC Count 4.84 4.4 - 5.9 10e12/L    Hemoglobin 15.0 13.3 - 17.7 g/dL    Hematocrit 43.9 40.0 - 53.0 %    MCV 91 78 - 100 fl    MCH 31.0 26.5 - 33.0 pg    MCHC 34.2 31.5 - 36.5 g/dL    RDW 12.9 10.0 - 15.0 %    Platelet Count 200 150 - 450 10e9/L    Diff Method Automated Method     % Neutrophils 52.8 %    % Lymphocytes 29.2 %    % Monocytes 8.8 %    % Eosinophils 8.5 %    % Basophils 0.5 %    % Immature Granulocytes 0.2 %    Nucleated RBCs 0 0 /100    Absolute Neutrophil 3.0 1.6 - 8.3 10e9/L    Absolute Lymphocytes 1.7 0.8 - 5.3 10e9/L    Absolute Monocytes 0.5 0.0 - 1.3 10e9/L    Absolute Eosinophils 0.5 0.0 - 0.7 10e9/L    Absolute Basophils 0.0 0.0 - 0.2 10e9/L    Abs Immature Granulocytes 0.0 0 - 0.4 10e9/L    Absolute Nucleated RBC 0.0    TSH with free T4 reflex   Result Value Ref Range    TSH 1.42 0.40 - 4.00 mU/L   Comprehensive metabolic panel   Result Value Ref Range    Sodium 138 133 - 144 mmol/L    Potassium 4.0 3.4 - 5.3 mmol/L    Chloride 105 94 - 109 mmol/L    Carbon Dioxide 28 20 - 32 mmol/L    Anion Gap 5 3 - 14 mmol/L    Glucose 100 (H)  70 - 99 mg/dL    Urea Nitrogen 28 7 - 30 mg/dL    Creatinine 1.17 0.66 - 1.25 mg/dL    GFR Estimate 66 >60 mL/min/[1.73_m2]    GFR Estimate If Black 77 >60 mL/min/[1.73_m2]    Calcium 8.9 8.5 - 10.1 mg/dL    Bilirubin Total 0.5 0.2 - 1.3 mg/dL    Albumin 3.7 3.4 - 5.0 g/dL    Protein Total 6.9 6.8 - 8.8 g/dL    Alkaline Phosphatase 86 40 - 150 U/L    ALT 27 0 - 70 U/L    AST 15 0 - 45 U/L   CT Head w/o Contrast    Narrative    CT SCAN OF THE HEAD WITHOUT CONTRAST   8/2/2020 1:30 PM     HISTORY: New delusions/hallucinations with no previous history of  psychotic symptoms.    TECHNIQUE:  Axial images of the head and coronal reformations without  IV contrast material. Radiation dose for this scan was reduced using  automated exposure control, adjustment of the mA and/or kV according  to patient size, or iterative reconstruction technique.    COMPARISON: None.    FINDINGS:  Mild volume loss is present. The cerebral hemispheres,  brainstem, and cerebellum otherwise demonstrate normal morphology and  attenuation. No evidence of acute ischemia, hemorrhage, mass, mass  effect or hydrocephalus. Mild to moderate paranasal sinus mucosal  thickening. Tympanic and mastoid cavities are well aerated.      Impression    IMPRESSION:  No acute intracranial abnormality.    JASMINA KHAN MD   Drug abuse screen 6 urine (chem dep)   Result Value Ref Range    Amphetamine Qual Urine Positive (A) NEG^Negative    Barbiturates Qual Urine Negative NEG^Negative    Benzodiazepine Qual Urine Negative NEG^Negative    Cannabinoids Qual Urine Negative NEG^Negative    Cocaine Qual Urine Negative NEG^Negative    Ethanol Qual Urine Negative NEG^Negative    Opiates Qualitative Urine Negative NEG^Negative     Medications - No data to display   Results for orders placed or performed during the hospital encounter of 08/02/20   CT Head w/o Contrast     Status: None    Narrative    CT SCAN OF THE HEAD WITHOUT CONTRAST   8/2/2020 1:30 PM     HISTORY: New  delusions/hallucinations with no previous history of  psychotic symptoms.    TECHNIQUE:  Axial images of the head and coronal reformations without  IV contrast material. Radiation dose for this scan was reduced using  automated exposure control, adjustment of the mA and/or kV according  to patient size, or iterative reconstruction technique.    COMPARISON: None.    FINDINGS:  Mild volume loss is present. The cerebral hemispheres,  brainstem, and cerebellum otherwise demonstrate normal morphology and  attenuation. No evidence of acute ischemia, hemorrhage, mass, mass  effect or hydrocephalus. Mild to moderate paranasal sinus mucosal  thickening. Tympanic and mastoid cavities are well aerated.      Impression    IMPRESSION:  No acute intracranial abnormality.    JASMINA KHAN MD   CBC with platelets differential     Status: None   Result Value Ref Range    WBC 5.7 4.0 - 11.0 10e9/L    RBC Count 4.84 4.4 - 5.9 10e12/L    Hemoglobin 15.0 13.3 - 17.7 g/dL    Hematocrit 43.9 40.0 - 53.0 %    MCV 91 78 - 100 fl    MCH 31.0 26.5 - 33.0 pg    MCHC 34.2 31.5 - 36.5 g/dL    RDW 12.9 10.0 - 15.0 %    Platelet Count 200 150 - 450 10e9/L    Diff Method Automated Method     % Neutrophils 52.8 %    % Lymphocytes 29.2 %    % Monocytes 8.8 %    % Eosinophils 8.5 %    % Basophils 0.5 %    % Immature Granulocytes 0.2 %    Nucleated RBCs 0 0 /100    Absolute Neutrophil 3.0 1.6 - 8.3 10e9/L    Absolute Lymphocytes 1.7 0.8 - 5.3 10e9/L    Absolute Monocytes 0.5 0.0 - 1.3 10e9/L    Absolute Eosinophils 0.5 0.0 - 0.7 10e9/L    Absolute Basophils 0.0 0.0 - 0.2 10e9/L    Abs Immature Granulocytes 0.0 0 - 0.4 10e9/L    Absolute Nucleated RBC 0.0    TSH with free T4 reflex     Status: None   Result Value Ref Range    TSH 1.42 0.40 - 4.00 mU/L   Drug abuse screen 6 urine (chem dep)     Status: Abnormal   Result Value Ref Range    Amphetamine Qual Urine Positive (A) NEG^Negative    Barbiturates Qual Urine Negative NEG^Negative    Benzodiazepine  Qual Urine Negative NEG^Negative    Cannabinoids Qual Urine Negative NEG^Negative    Cocaine Qual Urine Negative NEG^Negative    Ethanol Qual Urine Negative NEG^Negative    Opiates Qualitative Urine Negative NEG^Negative   Comprehensive metabolic panel     Status: Abnormal   Result Value Ref Range    Sodium 138 133 - 144 mmol/L    Potassium 4.0 3.4 - 5.3 mmol/L    Chloride 105 94 - 109 mmol/L    Carbon Dioxide 28 20 - 32 mmol/L    Anion Gap 5 3 - 14 mmol/L    Glucose 100 (H) 70 - 99 mg/dL    Urea Nitrogen 28 7 - 30 mg/dL    Creatinine 1.17 0.66 - 1.25 mg/dL    GFR Estimate 66 >60 mL/min/[1.73_m2]    GFR Estimate If Black 77 >60 mL/min/[1.73_m2]    Calcium 8.9 8.5 - 10.1 mg/dL    Bilirubin Total 0.5 0.2 - 1.3 mg/dL    Albumin 3.7 3.4 - 5.0 g/dL    Protein Total 6.9 6.8 - 8.8 g/dL    Alkaline Phosphatase 86 40 - 150 U/L    ALT 27 0 - 70 U/L    AST 15 0 - 45 U/L            Assessments & Plan (with Medical Decision Making)   60-year-old male presents to us with a chief complaint of hallucinations.  Differential includes but not limited to psychosis, substance abuse, metabolic abnormality, intercranial abnormality.  Patient appeared alert and oriented while he was here.  CT scan of the patient's head was unremarkable.  TSH and labs are normal as well.  Drug screen was significant for positive amphetamines.  Discussed this finding with the patient.  He denied any methamphetamine speed or abuse of stimulant medications.  He did note to us that he did he had been taking the supplement kratom.  Is possible this has been altered with an amphetamine.  Patient's wife did note that he had used stimulants such as methamphetamine at times in the past.  Patient denies using it recently.  It is possible the patient is not being truthful about methamphetamine use.  Regardless I advised him to cease any unregulated supplements.  He was eval by the mental health  as well.  He was given the number to follow-up with psychiatry  should he feel the need.  He has intermittent hallucinations could certainly be accounted for by stimulant and methamphetamine abuse.  He and his wife are comfortable with discharge home and will follow-up as recommended.  I have reviewed the nursing notes.    I have reviewed the findings, diagnosis, plan and need for follow up with the patient.    Discharge Medication List as of 8/2/2020  3:11 PM          Final diagnoses:   Stimulant use disorder   Hallucinations       8/2/2020   [facility], [place], EMERGENCY DEPARTMENT     Chuck Gee,   08/02/20 1551

## 2020-08-02 NOTE — ED AVS SNAPSHOT
[facility], [place], Emergency Department  [address]  [facility] [address]  Phone:  [phone]  Fax:  [phone]                                    Car Torres   MRN: [id number]    Department:  [facility], Emergency Department   Date of Visit:  8/2/2020           After Visit Summary Signature Page    I have received my discharge instructions, and my questions have been answered. I have discussed any challenges I see with this plan with the nurse or doctor.    ..........................................................................................................................................  Patient/Patient Representative Signature      ..........................................................................................................................................  Patient Representative Print Name and Relationship to Patient    ..................................................               ................................................  Date                                   Time    ..........................................................................................................................................  Reviewed by Signature/Title    ...................................................              ..............................................  Date                                               Time          22EPIC Rev 08/18

## 2020-08-11 ENCOUNTER — OFFICE VISIT (OUTPATIENT)
Dept: FAMILY MEDICINE | Facility: CLINIC | Age: 60
End: 2020-08-11
Payer: COMMERCIAL

## 2020-08-11 VITALS
DIASTOLIC BLOOD PRESSURE: 68 MMHG | WEIGHT: 287.5 LBS | SYSTOLIC BLOOD PRESSURE: 121 MMHG | HEART RATE: 63 BPM | BODY MASS INDEX: 41.16 KG/M2 | OXYGEN SATURATION: 96 % | HEIGHT: 70 IN | TEMPERATURE: 99.3 F

## 2020-08-11 DIAGNOSIS — I25.10 CORONARY ARTERY CALCIFICATION: Chronic | ICD-10-CM

## 2020-08-11 DIAGNOSIS — R79.89 LOW TESTOSTERONE: ICD-10-CM

## 2020-08-11 DIAGNOSIS — I10 HYPERTENSION GOAL BP (BLOOD PRESSURE) < 140/90: Chronic | ICD-10-CM

## 2020-08-11 DIAGNOSIS — F19.90 DRUG USE: ICD-10-CM

## 2020-08-11 DIAGNOSIS — E66.01 MORBID OBESITY (H): ICD-10-CM

## 2020-08-11 DIAGNOSIS — Z00.00 ROUTINE GENERAL MEDICAL EXAMINATION AT A HEALTH CARE FACILITY: Primary | ICD-10-CM

## 2020-08-11 DIAGNOSIS — G47.33 OSA (OBSTRUCTIVE SLEEP APNEA): Chronic | ICD-10-CM

## 2020-08-11 DIAGNOSIS — N52.9 IMPOTENCE OF ORGANIC ORIGIN: ICD-10-CM

## 2020-08-11 DIAGNOSIS — E78.5 HYPERLIPIDEMIA LDL GOAL <130: Chronic | ICD-10-CM

## 2020-08-11 DIAGNOSIS — M79.672 LEFT FOOT PAIN: ICD-10-CM

## 2020-08-11 PROCEDURE — 99000 SPECIMEN HANDLING OFFICE-LAB: CPT | Performed by: PHYSICIAN ASSISTANT

## 2020-08-11 PROCEDURE — 36415 COLL VENOUS BLD VENIPUNCTURE: CPT | Performed by: PHYSICIAN ASSISTANT

## 2020-08-11 PROCEDURE — 84403 ASSAY OF TOTAL TESTOSTERONE: CPT | Performed by: PHYSICIAN ASSISTANT

## 2020-08-11 PROCEDURE — 80307 DRUG TEST PRSMV CHEM ANLYZR: CPT | Mod: 90 | Performed by: PHYSICIAN ASSISTANT

## 2020-08-11 PROCEDURE — 99396 PREV VISIT EST AGE 40-64: CPT | Performed by: PHYSICIAN ASSISTANT

## 2020-08-11 RX ORDER — TADALAFIL 20 MG/1
TABLET ORAL
Qty: 6 TABLET | Refills: 5 | Status: SHIPPED | OUTPATIENT
Start: 2020-08-11 | End: 2021-10-07

## 2020-08-11 ASSESSMENT — MIFFLIN-ST. JEOR: SCORE: 2112.21

## 2020-08-11 NOTE — PROGRESS NOTES
3  SUBJECTIVE:   CC: Car Torres is an 60 year old male who presents for preventive health visit.     Healthy Habits:    Do you get at least three servings of calcium containing foods daily (dairy, green leafy vegetables, etc.)? yes    Amount of exercise or daily activities, outside of work: No    Problems taking medications regularly No    Medication side effects: No    Have you had an eye exam in the past two years? yes    Do you see a dentist twice per year? no    Do you have sleep apnea, excessive snoring or daytime drowsiness?yes    Today's PHQ-2 Score:   PHQ-2 (  [employer]) 2020   Q1: Little interest or pleasure in doing things 0 0   Q2: Feeling down, depressed or hopeless 0 0   PHQ-2 Score 0 0   Q1: Little interest or pleasure in doing things - Not at all   Q2: Feeling down, depressed or hopeless - Not at all   PHQ-2 Score - 0   Abuse: Current or Past(Physical, Sexual or Emotional)- No  Do you feel safe in your environment? Yes    Have you ever done Advance Care Planning? (For example, a Health Directive, POLST, or a discussion with a medical provider or your loved ones about your wishes): No, advance care planning information given to patient to review.  Patient declined advance care planning discussion at this time.    Social History     Tobacco Use     Smoking status: Former Smoker     Packs/day: 1.00     Years: 25.00     Pack years: 25.00     Types: Cigarettes, Cigars     Last attempt to quit: 1998     Years since quittin.5     Smokeless tobacco: Never Used   Substance Use Topics     Alcohol use: Yes     Alcohol/week: 10.0 standard drinks     Comment: drinking some during the week.      If you drink alcohol do you typically have >3 drinks per day or >7 drinks per week? No                      Last PSA:   PSA   Date Value Ref Range Status   2019 0.36 0 - 4 ug/L Final     Comment:     Assay Method:  Chemiluminescence using Siemens Vista analyzer       Reviewed orders with  "patient. Reviewed health maintenance and updated orders accordingly - Yes  Lab work is in process    Reviewed and updated as needed this visit by clinical staff  Tobacco  Allergies  Meds  Med Hx  Surg Hx  Fam Hx  Soc Hx        Reviewed and updated as needed this visit by Provider            ROS:  CONSTITUTIONAL: NEGATIVE for fever, chills, change in weight  INTEGUMENTARY/SKIN: NEGATIVE for worrisome rashes, moles or lesions  EYES: NEGATIVE for vision changes or irritation  ENT: NEGATIVE for ear, mouth and throat problems  RESP: NEGATIVE for significant cough or SOB  CV: NEGATIVE for chest pain, palpitations or peripheral edema  GI: NEGATIVE for nausea, abdominal pain, heartburn, or change in bowel habits   male: negative for dysuria, hematuria, decreased urinary stream, erectile dysfunction, urethral discharge  MUSCULOSKELETAL: NEGATIVE for significant arthralgias or myalgia  NEURO: NEGATIVE for weakness, dizziness or paresthesias  PSYCHIATRIC: NEGATIVE for changes in mood or affect    OBJECTIVE:   Ht 1.781 m (5' 10.12\")   Wt 130.4 kg (287 lb 8 oz)   BMI 41.11 kg/m    EXAM:  GENERAL: healthy, alert and no distress  EYES: Eyes grossly normal to inspection, PERRL and conjunctivae and sclerae normal  HENT: ear canals and TM's normal, nose and mouth without ulcers or lesions  NECK: no adenopathy, no asymmetry, masses, or scars and thyroid normal to palpation  RESP: lungs clear to auscultation - no rales, rhonchi or wheezes  CV: regular rate and rhythm, normal S1 S2, no S3 or S4, no murmur, click or rub, no peripheral edema and peripheral pulses strong  ABDOMEN: soft, nontender, no hepatosplenomegaly, no masses and bowel sounds normal  MS: no gross musculoskeletal defects noted, no edema  SKIN: no suspicious lesions or rashes  NEURO: Normal strength and tone, mentation intact and speech normal  PSYCH: mentation appears normal, affect normal/bright  LYMPH: no cervical, supraclavicular, axillary, or inguinal " "adenopathy    Diagnostic Test Results:  Labs reviewed in Epic    ASSESSMENT/PLAN:   (Z00.00) Routine general medical examination at a health care facility  (primary encounter diagnosis)  Comment: Well person   Plan: Diet, exercise, wellness and other preventive recommendations related to health maintenance were discussed.  Follow up as needed for acute issues.  Physical exam in 1 year.     (I10) Hypertension goal BP (blood pressure) < 140/90  Comment:   Plan: Stable     (E78.5) Hyperlipidemia LDL goal <130  Comment:   Plan: Stable     (G47.33) TIA (obstructive sleep apnea)  Comment:   Plan: Using CPAP     (E66.01) Morbid obesity (H)  Comment:   Plan: Counseling     (M79.672) Left foot pain  Comment:   Plan: Orthopedic & Spine  Referral        Ongoing issues. Chronic. Refer to ortho / foot ankle for help    (I25.10,  I25.84) Coronary artery calcification  Comment:   Plan: History of. Is on statin, aspirin. 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 Matthew     (F19.90) Drug use  Comment:   Plan: Drug  Screen Comprehensive, Urine w/o Reported         Meds (Pain Care Package)        Counseling on cessation of Kratom. See ER notes. He's not longer hallucinating since being off.     (R79.89) Low testosterone  Comment:   Plan: Testosterone, total        He wants to recheck. History of. \"Tired\" a lot - but works 12 hour days.     COUNSELING:  Reviewed preventive health counseling, as reflected in patient instructions    Estimated body mass index is 41.11 kg/m  as calculated from the following:    Height as of this encounter: 1.781 m (5' 10.12\").    Weight as of this encounter: 130.4 kg (287 lb 8 oz).     reports that he quit smoking about 22 years ago. His smoking use included cigarettes and cigars. He has a 25.00 pack-year smoking history. He has never used smokeless tobacco.    Counseling Resources:  ATP IV Guidelines  Pooled Cohorts " Equation Calculator  FRAX Risk Assessment  ICSI Preventive Guidelines  Dietary Guidelines for Americans, 2010  USDA's MyPlate  ASA Prophylaxis  Lung CA Screening    ANCA STEPHENSON PA-C  [facility]

## 2020-08-13 LAB — TESTOST SERPL-MCNC: 356 NG/DL (ref 240–950)

## 2020-08-15 LAB — COMPREHEN DRUG ANALYSIS UR: NORMAL

## 2020-08-25 ENCOUNTER — ANCILLARY PROCEDURE (OUTPATIENT)
Dept: GENERAL RADIOLOGY | Facility: CLINIC | Age: 60
End: 2020-08-25
Attending: PODIATRIST
Payer: COMMERCIAL

## 2020-08-25 ENCOUNTER — OFFICE VISIT (OUTPATIENT)
Dept: PODIATRY | Facility: CLINIC | Age: 60
End: 2020-08-25
Attending: PHYSICIAN ASSISTANT
Payer: COMMERCIAL

## 2020-08-25 VITALS
DIASTOLIC BLOOD PRESSURE: 78 MMHG | WEIGHT: 280.6 LBS | OXYGEN SATURATION: 98 % | SYSTOLIC BLOOD PRESSURE: 151 MMHG | HEART RATE: 70 BPM | BODY MASS INDEX: 40.13 KG/M2

## 2020-08-25 DIAGNOSIS — M21.6X2 PRONATION OF BOTH FEET: Primary | ICD-10-CM

## 2020-08-25 DIAGNOSIS — M21.6X1 PRONATION OF BOTH FEET: Primary | ICD-10-CM

## 2020-08-25 DIAGNOSIS — M21.6X2 PRONATION OF BOTH FEET: ICD-10-CM

## 2020-08-25 DIAGNOSIS — M79.672 LEFT FOOT PAIN: ICD-10-CM

## 2020-08-25 DIAGNOSIS — M25.572 SINUS TARSI SYNDROME OF LEFT ANKLE: ICD-10-CM

## 2020-08-25 DIAGNOSIS — M19.072 ARTHRITIS OF FOOT, LEFT: ICD-10-CM

## 2020-08-25 DIAGNOSIS — M21.6X1 PRONATION OF BOTH FEET: ICD-10-CM

## 2020-08-25 PROCEDURE — 99243 OFF/OP CNSLTJ NEW/EST LOW 30: CPT | Performed by: PODIATRIST

## 2020-08-25 PROCEDURE — 73630 X-RAY EXAM OF FOOT: CPT | Mod: LT

## 2020-08-25 PROCEDURE — 73610 X-RAY EXAM OF ANKLE: CPT | Mod: LT

## 2020-08-25 NOTE — PATIENT INSTRUCTIONS
We wish you continued good healing. If you have any questions or concerns, please do not hesitate to contact us at [phone]    Please remember to call and schedule a follow up appointment if one was recommended at your earliest convenience.   PODIATRY CLINIC HOURS  TELEPHONE NUMBER    Dr. Mihir Torres D.P.[facility]    Clinics:  [facility]    [facility]   Tuesday 1PM-6PM  [place]  Wednesday 7AM-2PM  [facility]  Thursday 10AM-6PM  [place]  Friday 7AM-3PM  [place]  Specialty schedulers:   [phone] to make an appointment with any Specialty Provider.        Urgent Care locations:    [facility] Monday-Friday 5 pm - 9 pm. Saturday-Sunday 9 am -5pm    Monday-Friday 11 am - 9 pm Saturday 9 am - 5 pm     Monday-Sunday 12 noon-8PM [phone]     If you need a medication refill, please contact us you may need lab work and/or a follow up visit prior to your refill (i.e. Antifungal medications).    [employer] (secure e-mail communication and access to your chart) to send a message or to make an appointment.    If MRI needed please call [place] [facility] at [phone]        Jerry to follow up with Primary Care provider regarding elevated blood pressure.

## 2020-08-25 NOTE — LETTER
8/25/2020         RE: Car Torres  [address]  [place] [address]        Dear Colleague,    Thank you for referring your patient, Car Torres, to the [facility]. Please see a copy of my visit note below.    S: Patient seen today in consult from Naun Hylton and complains of foot pain.  Points to sinus tarsi of left foot.  Has had this for many years.  Describes it as a burning pain.  Aggrevated by activity and relieved by rest.  Slowly getting worse.    Positive history of post static dyskinesia.  Works at a job where standing a lot.  OTC orthotics minimal help.   Had orthotics many years ago.    ROS:  A 10-point review of systems was performed and is positive for that noted in the HPI and as seen above.  All other areas are negative.        No Known Allergies    Current Outpatient Medications   Medication Sig Dispense Refill     ASPIRIN 325 MG OR TBEC ONE DAILY 100 0     atorvastatin (LIPITOR) 40 MG tablet Take 1 tablet (40 mg) by mouth daily 90 tablet 0     Cholecalciferol (VITAMIN D PO) Take 5,000 Units by mouth daily One tablet twice daily       lisinopril-hydrochlorothiazide (ZESTORETIC) 20-12.5 MG tablet TAKE 1 TABLET BY MOUTH EVERY DAY 90 tablet 0     MULTI VITAMIN MENS PO None Entered       tadalafil (CIALIS) 20 MG tablet 1/2 to 1 tablet, 30 minutes to 1 hour before sex 6 tablet 5     vitamin B complex with vitamin C (VITAMIN  B COMPLEX) TABS tablet Take 1 tablet by mouth daily         Patient Active Problem List   Diagnosis     Sciatica     Morbid obesity (H)     Nonspecific abnormal electrocardiogram (ECG) (EKG)     Hyperlipidemia LDL goal <130     Hypertension goal BP (blood pressure) < 140/90     Function kidney decreased     Renal cyst     Pulmonary nodule     Coronary artery calcification     History of sinus bradycardia     ED (erectile dysfunction)     TIA (obstructive sleep apnea)       Past Medical History:   Diagnosis Date     Coronary artery calcification  2018     Diverticulosis of large intestine without hemorrhage 2015    Incidental finding on CT scan         Past Surgical History:   Procedure Laterality Date     SURGICAL HISTORY OF -       surgery on left index finger       Family History   Problem Relation Age of Onset     Cancer Mother         uterine     Other Cancer Mother         endometrial     C.A.D. Father      Hypertension Father      Breast Cancer Maternal Grandmother      Other Cancer Maternal Grandmother         Lung/brain     Hypertension Brother      Breast Cancer Other      Breast Cancer Cousin      Other Cancer Other      Glaucoma No family hx of      Macular Degeneration No family hx of        Social History     Tobacco Use     Smoking status: Former Smoker     Packs/day: 1.00     Years: 25.00     Pack years: 25.00     Types: Cigarettes, Cigars     Last attempt to quit: 1998     Years since quittin.5     Smokeless tobacco: Never Used   Substance Use Topics     Alcohol use: Yes     Alcohol/week: 10.0 standard drinks     Comment: drinking some during the week.          Exam:    Vitals: BP (!) 151/78   Pulse 70   Wt 127.3 kg (280 lb 9.6 oz)   SpO2 98%   BMI 40.13 kg/m    BMI: Body mass index is 40.13 kg/m .  Height: Data Unavailable    Constitutional/ general:  Pt is in no apparent distress, appears well-nourished.  Cooperative with history and physical exam.     Psych:  The patient answered questions appropriately.  Normal affect.  Seems to have reasonable expectations, in terms of treatment.     Eyes:  Visual scanning/ tracking without deficit.     Ears:  Response to auditory stimuli is normal.  negative hearing aid devices.  Auricles in proper alignment.     Lymphatic:  Popliteal lymph nodes not enlarged.     Lungs:  Non labored breathing, non labored speech. No cough.  No audible wheezing. Even, quiet breathing.       Vascular:  positive pedal pulses bilaterally for both the DP and PT arteries.  CFT < 3 sec. bilateral ankle  edema and varicosities noted.  Scant pedal hair growth noted.    Neuro:  Alert and oriented x 3. Coordinated gait.  Light touch sensation is intact to the L4, L5, S1 distributions. No obvious deficits.  No evidence of neurological-based weakness, spasticity, or contracture in the lower extremities.      Derm: Skin thin and shiny with trophic changes noted.     Musculoskeletal:    Lower extremity muscle strength is normal.  Patient is ambulatory without an assistive device or brace.   Pronated arch with weightbearing with the left worse than right.  No forefoot or rear foot deformities noted.  Normal ROM all fore foot and rearfoot joints on the right and the left rear foot has limited range of motion..     No pain with stressing any muscle compartments.  No erythema edema or ecchymosis or masses noted.  Pain over left sinus tarsi.   Slight left medial arch pain but hard to localize    Radiographic Exam:  X-Ray Findings:   FOOT LEFT THREE OR MORE VIEWS   ANKLE LEFT THREE OR MORE VIEWS   8/25/2020 6:07 PM      HISTORY: Left foot pain. Pronation of both feet.     COMPARISON: None.                                                                      IMPRESSION: Mild hallux valgus with bulbous first metatarsal head.  Mild pes planus with mild midfoot degenerative changes. No evidence of  fracture.     Ankle mortise is intact. No evidence of acute fracture or subluxation  involving the ankle. Posterior subtalar joint poorly seen likely due  to patient positioning.    A:  Pronation with left sinus tarsi syndrome.       Left rear foot arthritis    P:  X-rays taken today.  Explained to patient that his left foot is more pronated than his right.  Discussed the cause of this with the patient and how this relates to flat foot.   Discussed that he does have some midfoot arthritis and discussed cause of sinus tarsi syndrome Rx for orthotics.  Discussed importance of wearing these in a good shoe at all times until this resolves.   Patient standing long hours at work.  If orthotics not helping then also wrote prescription for [place] AFO to wear while he is at work.  We discussed how this brace is worn and I will do a better job of supporting his left rear foot.  RETURN TO CLINIC PRN.  Thank you for allowing me participate in the care of this patient.        Mihir Torres DPM DPM, FACFAS      Again, thank you for allowing me to participate in the care of your patient.        Sincerely,        Mihir Torres DPM

## 2020-08-26 NOTE — PROGRESS NOTES
S: Patient seen today in consult from Naun Hylton and complains of foot pain.  Points to sinus tarsi of left foot.  Has had this for many years.  Describes it as a burning pain.  Aggrevated by activity and relieved by rest.  Slowly getting worse.    Positive history of post static dyskinesia.  Works at a job where standing a lot.  OTC orthotics minimal help.   Had orthotics many years ago.    ROS:  A 10-point review of systems was performed and is positive for that noted in the HPI and as seen above.  All other areas are negative.        No Known Allergies    Current Outpatient Medications   Medication Sig Dispense Refill     ASPIRIN 325 MG OR TBEC ONE DAILY 100 0     atorvastatin (LIPITOR) 40 MG tablet Take 1 tablet (40 mg) by mouth daily 90 tablet 0     Cholecalciferol (VITAMIN D PO) Take 5,000 Units by mouth daily One tablet twice daily       lisinopril-hydrochlorothiazide (ZESTORETIC) 20-12.5 MG tablet TAKE 1 TABLET BY MOUTH EVERY DAY 90 tablet 0     MULTI VITAMIN MENS PO None Entered       tadalafil (CIALIS) 20 MG tablet 1/2 to 1 tablet, 30 minutes to 1 hour before sex 6 tablet 5     vitamin B complex with vitamin C (VITAMIN  B COMPLEX) TABS tablet Take 1 tablet by mouth daily         Patient Active Problem List   Diagnosis     Sciatica     Morbid obesity (H)     Nonspecific abnormal electrocardiogram (ECG) (EKG)     Hyperlipidemia LDL goal <130     Hypertension goal BP (blood pressure) < 140/90     Function kidney decreased     Renal cyst     Pulmonary nodule     Coronary artery calcification     History of sinus bradycardia     ED (erectile dysfunction)     TIA (obstructive sleep apnea)       Past Medical History:   Diagnosis Date     Coronary artery calcification 5/8/2018     Diverticulosis of large intestine without hemorrhage 11/18/2015    Incidental finding on CT scan         Past Surgical History:   Procedure Laterality Date     SURGICAL HISTORY OF -       surgery on left index finger       Family  History   Problem Relation Age of Onset     Cancer Mother         uterine     Other Cancer Mother         endometrial     C.A.D. Father      Hypertension Father      Breast Cancer Maternal Grandmother      Other Cancer Maternal Grandmother         Lung/brain     Hypertension Brother      Breast Cancer Other      Breast Cancer Cousin      Other Cancer Other      Glaucoma No family hx of      Macular Degeneration No family hx of        Social History     Tobacco Use     Smoking status: Former Smoker     Packs/day: 1.00     Years: 25.00     Pack years: 25.00     Types: Cigarettes, Cigars     Last attempt to quit: 1998     Years since quittin.5     Smokeless tobacco: Never Used   Substance Use Topics     Alcohol use: Yes     Alcohol/week: 10.0 standard drinks     Comment: drinking some during the week.          Exam:    Vitals: BP (!) 151/78   Pulse 70   Wt 127.3 kg (280 lb 9.6 oz)   SpO2 98%   BMI 40.13 kg/m    BMI: Body mass index is 40.13 kg/m .  Height: Data Unavailable    Constitutional/ general:  Pt is in no apparent distress, appears well-nourished.  Cooperative with history and physical exam.     Psych:  The patient answered questions appropriately.  Normal affect.  Seems to have reasonable expectations, in terms of treatment.     Eyes:  Visual scanning/ tracking without deficit.     Ears:  Response to auditory stimuli is normal.  negative hearing aid devices.  Auricles in proper alignment.     Lymphatic:  Popliteal lymph nodes not enlarged.     Lungs:  Non labored breathing, non labored speech. No cough.  No audible wheezing. Even, quiet breathing.       Vascular:  positive pedal pulses bilaterally for both the DP and PT arteries.  CFT < 3 sec. bilateral ankle edema and varicosities noted.  Scant pedal hair growth noted.    Neuro:  Alert and oriented x 3. Coordinated gait.  Light touch sensation is intact to the L4, L5, S1 distributions. No obvious deficits.  No evidence of neurological-based  weakness, spasticity, or contracture in the lower extremities.      Derm: Skin thin and shiny with trophic changes noted.     Musculoskeletal:    Lower extremity muscle strength is normal.  Patient is ambulatory without an assistive device or brace.   Pronated arch with weightbearing with the left worse than right.  No forefoot or rear foot deformities noted.  Normal ROM all fore foot and rearfoot joints on the right and the left rear foot has limited range of motion..     No pain with stressing any muscle compartments.  No erythema edema or ecchymosis or masses noted.  Pain over left sinus tarsi.   Slight left medial arch pain but hard to localize    Radiographic Exam:  X-Ray Findings:   FOOT LEFT THREE OR MORE VIEWS   ANKLE LEFT THREE OR MORE VIEWS   8/25/2020 6:07 PM      HISTORY: Left foot pain. Pronation of both feet.     COMPARISON: None.                                                                      IMPRESSION: Mild hallux valgus with bulbous first metatarsal head.  Mild pes planus with mild midfoot degenerative changes. No evidence of  fracture.     Ankle mortise is intact. No evidence of acute fracture or subluxation  involving the ankle. Posterior subtalar joint poorly seen likely due  to patient positioning.    A:  Pronation with left sinus tarsi syndrome.       Left rear foot arthritis    P:  X-rays taken today.  Explained to patient that his left foot is more pronated than his right.  Discussed the cause of this with the patient and how this relates to flat foot.   Discussed that he does have some midfoot arthritis and discussed cause of sinus tarsi syndrome Rx for orthotics.  Discussed importance of wearing these in a good shoe at all times until this resolves.  Patient standing long hours at work.  If orthotics not helping then also wrote prescription for [place] AFO to wear while he is at work.  We discussed how this brace is worn and I will do a better job of supporting his left rear foot.   RETURN TO CLINIC PRN.  Thank you for allowing me participate in the care of this patient.        Mihir Torres, MERLENEM DPM, FACFAS

## 2020-09-22 ENCOUNTER — VIRTUAL VISIT (OUTPATIENT)
Dept: FAMILY MEDICINE | Facility: CLINIC | Age: 60
End: 2020-09-22
Payer: COMMERCIAL

## 2020-09-22 ENCOUNTER — MYC MEDICAL ADVICE (OUTPATIENT)
Dept: SLEEP MEDICINE | Facility: CLINIC | Age: 60
End: 2020-09-22

## 2020-09-22 DIAGNOSIS — R44.0 AUDITORY HALLUCINATIONS: ICD-10-CM

## 2020-09-22 DIAGNOSIS — F22 PARANOIA (H): Primary | ICD-10-CM

## 2020-09-22 DIAGNOSIS — F52.8 HYPERSEXUALITY: ICD-10-CM

## 2020-09-22 DIAGNOSIS — G47.33 OSA (OBSTRUCTIVE SLEEP APNEA): Primary | ICD-10-CM

## 2020-09-22 PROCEDURE — 99214 OFFICE O/P EST MOD 30 MIN: CPT | Mod: 95 | Performed by: PHYSICIAN ASSISTANT

## 2020-09-22 RX ORDER — OLANZAPINE 5 MG/1
5 TABLET ORAL AT BEDTIME
Qty: 30 TABLET | Refills: 0 | Status: SHIPPED | OUTPATIENT
Start: 2020-09-22 | End: 2020-10-13

## 2020-09-22 NOTE — PROGRESS NOTES
"Car Torres is a 60 year old male who is being evaluated via a billable video visit.      The patient has been notified of following:     \"This video visit will be conducted via a call between you and your physician/provider. We have found that certain health care needs can be provided without the need for an in-person physical exam.  This service lets us provide the care you need with a video conversation.  If a prescription is necessary we can send it directly to your pharmacy.  If lab work is needed we can place an order for that and you can then stop by our lab to have the test done at a later time.    Video visits are billed at different rates depending on your insurance coverage. Please reach out to your insurance provider with any questions.    If during the course of the call the physician/provider feels a video visit is not appropriate, you will not be charged for this service.\"    Patient has given verbal consent for Video visit? Yes, (Mary Torres, Patient's spouse consented)  How would you like to obtain your AVS? MyChart  If you are dropped from the video visit, the video invite should be resent to: Text to cell phone: [phone]  Will anyone else be joining your video visit? No    Subjective     Car Torres is a 60 year old male who presents today via video visit for the following health issues:    HPI      Patient's spouse Mary would like to discuss patient's mental health. Jerry is still paranoid. Convinced there are voices \"upstairs.\" Thinks his wife has another man in the house. He thinks he is seeing things at times. He texts his wife all day long about things he's heard or seen.    See ER report. CT brain normal and blood work up normal. Urine showing positive methamphetamine. Recheck by me was also positive. Jerry's wife Destiny says that he swears he's off meth. He was also using Kratom which he swears he's been off of.    Destiny says he's also hypersexual and always wants to " be by her, wants to initiate sex, is verbally irritable toward her and accusatory. This is not at all who he has been in the past.     No head injuries or known family history of dementia.     Patient Active Problem List   Diagnosis     Sciatica     Morbid obesity (H)     Nonspecific abnormal electrocardiogram (ECG) (EKG)     Hyperlipidemia LDL goal <130     Hypertension goal BP (blood pressure) < 140/90     Function kidney decreased     Renal cyst     Pulmonary nodule     Coronary artery calcification     History of sinus bradycardia     ED (erectile dysfunction)     TIA (obstructive sleep apnea)      Current Outpatient Medications   Medication     ASPIRIN 325 MG OR TBEC     atorvastatin (LIPITOR) 40 MG tablet     Cholecalciferol (VITAMIN D PO)     lisinopril-hydrochlorothiazide (ZESTORETIC) 20-12.5 MG tablet     MULTI VITAMIN MENS PO     OLANZapine (ZYPREXA) 5 MG tablet     tadalafil (CIALIS) 20 MG tablet     vitamin B complex with vitamin C (VITAMIN  B COMPLEX) TABS tablet     No current facility-administered medications for this visit.            Video Start Time: 4:00 pm      Review of Systems   Constitutional, HEENT, cardiovascular, pulmonary, gi and gu systems are negative, except as otherwise noted.      Objective           Vitals:  No vitals were obtained today due to virtual visit.    Physical Exam     GENERAL: Healthy, alert and no distress            Assessment & Plan       ICD-10-CM    1. Paranoia (H)  F22 OLANZapine (ZYPREXA) 5 MG tablet     MENTAL HEALTH REFERRAL  - Adult; Psychiatry; Psychiatry; FMG: Collaborative Care Psychiatry Service/Bridge to Long-Term Psychiatry as indicated ([phone]); Yes; Psychosis; Yes; We will contact you to schedule the appointment or please call w...     MR Brain w/o & w Contrast   2. Auditory hallucinations  R44.0 OLANZapine (ZYPREXA) 5 MG tablet     MENTAL HEALTH REFERRAL  - Adult; Psychiatry; Psychiatry; FMG: Collaborative Care Psychiatry Service/Bridge to  Long-Term Psychiatry as indicated ([phone]); Yes; Psychosis; Yes; We will contact you to schedule the appointment or please call w...     MR Brain w/o & w Contrast   3. Hypersexuality  F52.8 MR Brain w/o & w Contrast      Unclear. Concerning.  Recommend MRI with contrast to further delineate for brain changes.  Advise seeing psychiatry, but I think this may be more neurologic.   It could absolutely be related to substance abuse so a further drug test may be indicated.  Offered medication to reduce paranoia, anxiety, his auditory hallucinations.  Start Zyprexa, very low dose.     Follow up after MRI    No follow-ups on file.    ANCA STEPHENSON PA-C  [facility]      Video-Visit Details    Type of service:  Video Visit    Video End Time:430 PM    Originating Location (pt. Location): Home    Distant Location (provider location):  [facility]     Platform used for Video Visit: AMALIA Jerome, PA-C

## 2020-09-22 NOTE — PATIENT INSTRUCTIONS
[facility] Imaging Scheduling Phone Number: [phone]  Or   [facility] Imaging Scheduling Phone number: [phone]

## 2020-09-30 DIAGNOSIS — E78.5 HYPERLIPIDEMIA LDL GOAL <100: ICD-10-CM

## 2020-09-30 RX ORDER — ATORVASTATIN CALCIUM 40 MG/1
TABLET, FILM COATED ORAL
Qty: 90 TABLET | Refills: 1 | Status: SHIPPED | OUTPATIENT
Start: 2020-09-30 | End: 2021-04-16

## 2020-09-30 NOTE — TELEPHONE ENCOUNTER
Routing refill request to provider for review/approval because:  Juanita given x1 and patient did not follow up, please advise      Ashlee Shaikh RN

## 2020-09-30 NOTE — TELEPHONE ENCOUNTER
Routing refill request to provider for review/approval because:  Labs not current:  MARCELLO Shaikh RN

## 2020-10-01 ENCOUNTER — MYC MEDICAL ADVICE (OUTPATIENT)
Dept: SLEEP MEDICINE | Facility: CLINIC | Age: 60
End: 2020-10-01

## 2020-10-02 NOTE — TELEPHONE ENCOUNTER
Called 10/2 and left voicemail for Car to please return call to schedule CPAP follow up.    Mahin Leigh  Sleep Clinic Specialist - [place]

## 2020-10-06 ENCOUNTER — ANCILLARY PROCEDURE (OUTPATIENT)
Dept: MRI IMAGING | Facility: CLINIC | Age: 60
End: 2020-10-06
Attending: PHYSICIAN ASSISTANT
Payer: COMMERCIAL

## 2020-10-06 ENCOUNTER — ANCILLARY PROCEDURE (OUTPATIENT)
Dept: GENERAL RADIOLOGY | Facility: CLINIC | Age: 60
End: 2020-10-06
Attending: PHYSICIAN ASSISTANT
Payer: COMMERCIAL

## 2020-10-06 DIAGNOSIS — R44.0 AUDITORY HALLUCINATIONS: ICD-10-CM

## 2020-10-06 DIAGNOSIS — F52.8 HYPERSEXUALITY: ICD-10-CM

## 2020-10-06 DIAGNOSIS — F22 PARANOIA (H): ICD-10-CM

## 2020-10-06 DIAGNOSIS — Z98.890 HX OF METAL REMOVED FROM EYE: ICD-10-CM

## 2020-10-06 PROCEDURE — A9585 GADOBUTROL INJECTION: HCPCS | Mod: JW | Performed by: PHYSICIAN ASSISTANT

## 2020-10-06 PROCEDURE — 70030 X-RAY EYE FOR FOREIGN BODY: CPT | Mod: TC

## 2020-10-06 PROCEDURE — 70553 MRI BRAIN STEM W/O & W/DYE: CPT | Mod: TC

## 2020-10-06 RX ORDER — GADOBUTROL 604.72 MG/ML
12.5 INJECTION INTRAVENOUS ONCE
Status: COMPLETED | OUTPATIENT
Start: 2020-10-06 | End: 2020-10-06

## 2020-10-06 RX ADMIN — GADOBUTROL 12.5 ML: 604.72 INJECTION INTRAVENOUS at 11:58

## 2020-10-07 ENCOUNTER — MYC MEDICAL ADVICE (OUTPATIENT)
Dept: FAMILY MEDICINE | Facility: CLINIC | Age: 60
End: 2020-10-07

## 2020-10-08 ENCOUNTER — DOCUMENTATION ONLY (OUTPATIENT)
Dept: SLEEP MEDICINE | Facility: CLINIC | Age: 60
End: 2020-10-08

## 2020-10-08 NOTE — PROGRESS NOTES
Called patient on October 8, 2020 regarding travel CPAP.  Left detailed message stating that insurance does not cover travel CPAP, so it would be an out-of-pocket expense.  Provided list of travel devices carried by [employer] for research and reference for possible online purchase options as well.     Travel CPAP [facility]:  1) Resmed AirMini - Patient would also need to purchase AirMini tube and Resmed mask (Airfit F20, Airfit N20 or Airfit P10 only).  Patient's current [employer] Nasal mask would not work with the AirMini.  2) Transcend 365 - Can use with current mask.  3) Transcend 3 - Can use with current mask.    Provided [employer] [facility] phone number ([phone]) to call if patient would like to schedule an appointment to purchase travel CPAP.

## 2020-10-12 ENCOUNTER — TELEPHONE (OUTPATIENT)
Dept: SLEEP MEDICINE | Facility: CLINIC | Age: 60
End: 2020-10-12

## 2020-10-12 DIAGNOSIS — R44.0 AUDITORY HALLUCINATIONS: ICD-10-CM

## 2020-10-12 DIAGNOSIS — F22 PARANOIA (H): ICD-10-CM

## 2020-10-12 NOTE — TELEPHONE ENCOUNTER
RETURNED PT CALL AND LVM FOR THE PT TO CALL [facility] IF FURTHER QUESTIONS REGARDING TRAVEL CPAP MACHINES [phone].

## 2020-10-13 RX ORDER — OLANZAPINE 5 MG/1
TABLET ORAL
Qty: 30 TABLET | Refills: 0 | Status: SHIPPED | OUTPATIENT
Start: 2020-10-13 | End: 2020-11-13

## 2020-10-13 NOTE — TELEPHONE ENCOUNTER
"Requested Prescriptions   Pending Prescriptions Disp Refills     OLANZapine (ZYPREXA) 5 MG tablet [Pharmacy Med Name: OLANZAPINE 5 MG TABLET] 30 tablet 0     Sig: TAKE 1 TABLET BY MOUTH AT BEDTIME       Antipsychotic Medications Failed - 10/12/2020  3:05 AM        Failed - Blood pressure under 140/90 in past 12 months     BP Readings from Last 3 Encounters:   08/25/20 (!) 151/78   08/11/20 121/68   08/02/20 115/62                 Failed - Lipid panel on file within the past 12 months     Recent Labs   Lab Test 07/09/19  1700 07/13/15  1222 07/13/15  1222   CHOL 166   < > 154   TRIG 66   < > 182*   HDL 76   < > 50   LDL 77   < > 68   NHDL 90   < >  --    VLDL  --   --  36*   CHOLHDLRATIO  --   --  3.1    < > = values in this interval not displayed.               Passed - Patient is 12 years of age or older        Passed - CBC on file in past 12 months     Recent Labs   Lab Test 08/02/20  1221   WBC 5.7   RBC 4.84   HGB 15.0   HCT 43.9                    Passed - Heart Rate on file within past 12 months     Pulse Readings from Last 3 Encounters:   08/25/20 70   08/11/20 63   08/02/20 57               Passed - A1c or Glucose on file in past 12 months     Recent Labs   Lab Test 08/02/20  1221   *       Please review patients last 3 weights. If a weight gain of >10 lbs exists, you may refill the prescription once after instructing the patient to schedule an appointment within the next 30 days.    Wt Readings from Last 3 Encounters:   08/25/20 127.3 kg (280 lb 9.6 oz)   08/11/20 130.4 kg (287 lb 8 oz)   10/28/19 138.3 kg (305 lb)             Passed - Medication is active on med list        Passed - Recent (6 mo) or future (30 days) visit within the authorizing provider's specialty     Patient had office visit in the last 6 months or has a visit in the next 30 days with authorizing provider or within the authorizing provider's specialty.  See \"Patient Info\" tab in inbasket, or \"Choose Columns\" in Meds & " Orders section of the refill encounter.

## 2020-10-13 NOTE — TELEPHONE ENCOUNTER
Routing refill request to provider for review/approval because:   Labs are not up to date      Rossy Beck RN

## 2020-10-16 DIAGNOSIS — I10 ESSENTIAL HYPERTENSION WITH GOAL BLOOD PRESSURE LESS THAN 140/90: ICD-10-CM

## 2020-10-20 ENCOUNTER — VIRTUAL VISIT (OUTPATIENT)
Dept: FAMILY MEDICINE | Facility: CLINIC | Age: 60
End: 2020-10-20
Payer: COMMERCIAL

## 2020-10-20 DIAGNOSIS — R46.89 BEHAVIORAL CHANGE: Primary | ICD-10-CM

## 2020-10-20 DIAGNOSIS — R82.5 POSITIVE URINE DRUG SCREEN: ICD-10-CM

## 2020-10-20 PROCEDURE — 99213 OFFICE O/P EST LOW 20 MIN: CPT | Mod: 95 | Performed by: PHYSICIAN ASSISTANT

## 2020-10-20 RX ORDER — LISINOPRIL AND HYDROCHLOROTHIAZIDE 12.5; 2 MG/1; MG/1
1 TABLET ORAL DAILY
Qty: 90 TABLET | Refills: 1 | Status: SHIPPED | OUTPATIENT
Start: 2020-10-20 | End: 2021-04-13

## 2020-10-20 RX ORDER — OLANZAPINE 10 MG/1
10 TABLET ORAL AT BEDTIME
Qty: 30 TABLET | Refills: 1 | Status: SHIPPED | OUTPATIENT
Start: 2020-10-20 | End: 2020-11-17

## 2020-10-20 NOTE — PROGRESS NOTES
"Car Torres is a 60 year old male who is being evaluated via a billable video visit.      The patient has been notified of following:     \"This video visit will be conducted via a call between you and your physician/provider. We have found that certain health care needs can be provided without the need for an in-person physical exam.  This service lets us provide the care you need with a video conversation.  If a prescription is necessary we can send it directly to your pharmacy.  If lab work is needed we can place an order for that and you can then stop by our lab to have the test done at a later time.    Video visits are billed at different rates depending on your insurance coverage.  Please reach out to your insurance provider with any questions.    If during the course of the call the physician/provider feels a video visit is not appropriate, you will not be charged for this service.\"    Patient has given verbal consent for Video visit? Yes  How would you like to obtain your AVS? MyChart  If you are dropped from the video visit, the video invite should be resent to: Text to cell phone: [phone]  Will anyone else be joining your video visit? NoSubjective     Car Torres is a 60 year old male who presents today via video visit for the following health issues:    HPI     Medication Followup of Olanzapine    Taking Medication as prescribed: yes    Side Effects:  No    Medication Helping Symptoms:  Yes    Destiny spouse is wanting to discuss neurology referral.   His wife says he is still \"not himself.\" Irritable, hearing some voices. Convinced she has men over at his house all the time. He sits in the garage and listens for sounds.  He's better since the start of Olanzapine, but not resolved  Very hypersexual  Seems like some of his behaviors are \"neurologic.\" No focal symptoms.  MRI brain normal  He denies use of meth or Kratom, but his wife Destiny isn't as convinced.     Patient Active Problem List "   Diagnosis     Sciatica     Morbid obesity (H)     Nonspecific abnormal electrocardiogram (ECG) (EKG)     Hyperlipidemia LDL goal <130     Hypertension goal BP (blood pressure) < 140/90     Function kidney decreased     Renal cyst     Pulmonary nodule     Coronary artery calcification     History of sinus bradycardia     ED (erectile dysfunction)     TIA (obstructive sleep apnea)      Current Outpatient Medications   Medication     ASPIRIN 325 MG OR TBEC     atorvastatin (LIPITOR) 40 MG tablet     Cholecalciferol (VITAMIN D PO)     lisinopril-hydrochlorothiazide (ZESTORETIC) 20-12.5 MG tablet     MULTI VITAMIN MENS PO     OLANZapine (ZYPREXA) 10 MG tablet     OLANZapine (ZYPREXA) 5 MG tablet     tadalafil (CIALIS) 20 MG tablet     vitamin B complex with vitamin C (VITAMIN  B COMPLEX) TABS tablet     No current facility-administered medications for this visit.            Video Start Time: This turned into a phone visit. Was not able to connect.   530 PM start time.    Patient Active Problem List   Diagnosis     Sciatica     Morbid obesity (H)     Nonspecific abnormal electrocardiogram (ECG) (EKG)     Hyperlipidemia LDL goal <130     Hypertension goal BP (blood pressure) < 140/90     Function kidney decreased     Renal cyst     Pulmonary nodule     Coronary artery calcification     History of sinus bradycardia     ED (erectile dysfunction)     TIA (obstructive sleep apnea)      Current Outpatient Medications   Medication     ASPIRIN 325 MG OR TBEC     atorvastatin (LIPITOR) 40 MG tablet     Cholecalciferol (VITAMIN D PO)     lisinopril-hydrochlorothiazide (ZESTORETIC) 20-12.5 MG tablet     MULTI VITAMIN MENS PO     OLANZapine (ZYPREXA) 10 MG tablet     OLANZapine (ZYPREXA) 5 MG tablet     tadalafil (CIALIS) 20 MG tablet     vitamin B complex with vitamin C (VITAMIN  B COMPLEX) TABS tablet     No current facility-administered medications for this visit.         Review of Systems   Constitutional, HEENT,  "cardiovascular, pulmonary, gi and gu systems are negative, except as otherwise noted.      Objective           Vitals:  No vitals were obtained today due to virtual visit.    Physical Exam   No exam, unable to connect via video.          Assessment & Plan     Behavioral change  Still unclear  Concern for continued substance use  Recommend recheck urine when able  Refer to neurology as family thinks \"he has symptoms of parkinson's.\" I am not as convinced, but I think a neurology work up is reasonable.   Increase Zyprex to 10 mg daily.  Follow up with psychiatry when that appointment is available.  - NEUROLOGY ADULT REFERRAL  - OLANZapine (ZYPREXA) 10 MG tablet; Take 1 tablet (10 mg) by mouth At Bedtime    Positive urine drug screen  Concern for meth or Kratom use. I think his paranoia and behavioral changes could absolutely be meth related. I pressured a further work up on this issue again.              No follow-ups on file.    ANCA STEPHENSON PA-C  [facility]      Video-Visit Details    Type of service:  Video Visit    Video End Time:NA - this phone visit was 20 minutes long    Originating Location (pt. Location): Home    Distant Location (provider location):  [facility]     Platform used for Video Visit: NA - unable to connect.           "

## 2020-10-26 NOTE — NURSING NOTE
"Chief Complaint   Patient presents with     Sleep Problem     snoring        Initial /80  Pulse 67  Ht 1.803 m (5' 11\")  Wt 133.8 kg (295 lb)  BMI 41.14 kg/m2 Estimated body mass index is 41.14 kg/(m^2) as calculated from the following:    Height as of this encounter: 1.803 m (5' 11\").    Weight as of this encounter: 133.8 kg (295 lb).    Medication Reconciliation: complete    Neck circumference: 18.5 inches / 47 centimeters.      " Pt informed would need to be seen to get rx for ringworm.

## 2020-11-05 ENCOUNTER — MYC MEDICAL ADVICE (OUTPATIENT)
Dept: FAMILY MEDICINE | Facility: CLINIC | Age: 60
End: 2020-11-05

## 2020-11-05 NOTE — TELEPHONE ENCOUNTER
RN replied to patient via [facility]. See message for details.     Margo Sun RN, BSN, PHN  [facility]: [place]

## 2020-11-13 ENCOUNTER — OFFICE VISIT (OUTPATIENT)
Dept: FAMILY MEDICINE | Facility: CLINIC | Age: 60
End: 2020-11-13
Payer: COMMERCIAL

## 2020-11-13 VITALS
OXYGEN SATURATION: 97 % | BODY MASS INDEX: 41.46 KG/M2 | HEIGHT: 70 IN | DIASTOLIC BLOOD PRESSURE: 78 MMHG | WEIGHT: 289.6 LBS | SYSTOLIC BLOOD PRESSURE: 144 MMHG | TEMPERATURE: 97 F | HEART RATE: 61 BPM

## 2020-11-13 DIAGNOSIS — M25.511 ACUTE PAIN OF RIGHT SHOULDER: Primary | ICD-10-CM

## 2020-11-13 PROCEDURE — 99213 OFFICE O/P EST LOW 20 MIN: CPT | Performed by: PHYSICIAN ASSISTANT

## 2020-11-13 RX ORDER — PREDNISONE 20 MG/1
40 TABLET ORAL DAILY
Qty: 10 TABLET | Refills: 0 | Status: SHIPPED | OUTPATIENT
Start: 2020-11-13 | End: 2020-11-18

## 2020-11-13 ASSESSMENT — MIFFLIN-ST. JEOR: SCORE: 2121.78

## 2020-11-13 NOTE — PROGRESS NOTES
Subjective     Car Torres is a 60 year old male who presents to clinic today for the following health issues:    HPI         Musculoskeletal problem/pain  Onset/Duration: 3 weeks ago  Description  Location: shoulder - right, feels like pinching in the shoulder blade  Joint Swelling: no  Redness: no  Pain: YES, achy pain when patient reaches for something, lifting it up, turning steering wheel, handing something to someone  Warmth: no  Intensity:  mild  Progression of Symptoms:  improving  Accompanying signs and symptoms:   Fevers: no  Numbness/tingling/weakness: YES, weakness  History  Trauma to the area: no  Recent illness:  no  Previous similar problem: no  Previous evaluation:  no  Precipitating or alleviating factors:  Aggravating factors include: lifting  Therapies tried and outcome: Tens unit, Ice, massage made it hurt more    Patient Active Problem List   Diagnosis     Sciatica     Morbid obesity (H)     Nonspecific abnormal electrocardiogram (ECG) (EKG)     Hyperlipidemia LDL goal <130     Hypertension goal BP (blood pressure) < 140/90     Function kidney decreased     Renal cyst     Pulmonary nodule     Coronary artery calcification     History of sinus bradycardia     ED (erectile dysfunction)     TIA (obstructive sleep apnea)      Current Outpatient Medications   Medication     ASPIRIN 325 MG OR TBEC     atorvastatin (LIPITOR) 40 MG tablet     Cholecalciferol (VITAMIN D PO)     lisinopril-hydrochlorothiazide (ZESTORETIC) 20-12.5 MG tablet     MULTI VITAMIN MENS PO     OLANZapine (ZYPREXA) 10 MG tablet     predniSONE (DELTASONE) 20 MG tablet     tadalafil (CIALIS) 20 MG tablet     vitamin B complex with vitamin C (VITAMIN  B COMPLEX) TABS tablet     No current facility-administered medications for this visit.         Review of Systems   Constitutional, HEENT, cardiovascular, pulmonary, gi and gu systems are negative, except as otherwise noted.      Objective    BP (!) 144/78 (BP Location: Right arm,  "Patient Position: Chair, Cuff Size: Adult Large)   Pulse 61   Temp 97  F (36.1  C) (Tympanic)   Ht 1.781 m (5' 10.12\")   Wt 131.4 kg (289 lb 9.6 oz)   SpO2 97%   BMI 41.41 kg/m    Body mass index is 41.41 kg/m .  Physical Exam   GENERAL: healthy, alert and no distress  MS: Right shoulder moderately positive neer, hawkin, empty can, tender supraspinatus, normal Bicep tendon exam otherwise.             Assessment & Plan     Acute pain of right shoulder  Ongoing for a few weeks. BERRY, home therapies advised. Recommend steroid taper. Declines injection, PT, etc. Follow up if this fails to resolve.   - predniSONE (DELTASONE) 20 MG tablet; Take 2 tablets (40 mg) by mouth daily for 5 days        No follow-ups on file.    ANCA STEPHENSON PA-C  [facility]    "

## 2020-11-14 DIAGNOSIS — R46.89 BEHAVIORAL CHANGE: ICD-10-CM

## 2020-11-17 RX ORDER — OLANZAPINE 10 MG/1
TABLET ORAL
Qty: 30 TABLET | Refills: 1 | Status: SHIPPED | OUTPATIENT
Start: 2020-11-17 | End: 2020-12-15

## 2020-11-17 NOTE — TELEPHONE ENCOUNTER
Routing refill request to provider for review/approval because:  Labs out of range:  blood pressure  Labs not current:  lipids

## 2020-12-10 ENCOUNTER — VIRTUAL VISIT (OUTPATIENT)
Dept: PSYCHIATRY | Facility: CLINIC | Age: 60
End: 2020-12-10
Attending: PHYSICIAN ASSISTANT
Payer: COMMERCIAL

## 2020-12-10 DIAGNOSIS — F33.1 MAJOR DEPRESSIVE DISORDER, RECURRENT EPISODE, MODERATE (H): Primary | ICD-10-CM

## 2020-12-10 PROCEDURE — 99204 OFFICE O/P NEW MOD 45 MIN: CPT | Mod: 95 | Performed by: NURSE PRACTITIONER
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
## 2020-12-10 NOTE — PATIENT INSTRUCTIONS
1.  Add Fluoxetine 20 mg daily  2.  Continue Olanzapine 10 mg at bedtime        Continue all other medical directions per primary care provider.     Continue all other medications as reviewed per electronic medical record today.     Safety plan reviewed. To the Emergency Department as needed or call after hours crisis line at [phone] or [phone]. [place] Crisis Text Line: Text [place] to 974458  or  Suicide LifeLine Chat: suicideLiquid Accounts.org/chat/    To schedule individual or family therapy, call [facility] at [phone].     Schedule an appointment with me in 6 weeks or sooner as needed.  Call [facility] at [phone] to schedule.    Follow up with primary care provider as planned or for acute medical concerns.    Call the psychiatric nurse line with medication questions or concerns at [phone].    [employer] may be used to communicate with your provider, but this is not intended to be used for emergencies.    Crisis Resources:    National Suicide Prevention Lifeline: 933.854.2810 (TTY: 832.295.2003). Call anytime for help.  (www.suicidepreventionlifeline.org)  National [place] on Mental Illness (www.ana maría.org): 832.605.9369 or 928-325-8975.   Mental Health Association (www.mentalhealth.org): 806.812.5381 or 144-658-0218.  [place] Crisis Text Line: Text [place] to 556000  Suicide LifeLine Chat: suicideLiquid Accounts.org/chat

## 2020-12-11 ENCOUNTER — VIRTUAL VISIT (OUTPATIENT)
Dept: FAMILY MEDICINE | Facility: CLINIC | Age: 60
End: 2020-12-11
Payer: COMMERCIAL

## 2020-12-11 DIAGNOSIS — M79.89 LEG SWELLING: Primary | ICD-10-CM

## 2020-12-11 DIAGNOSIS — M79.672 PAIN IN BOTH FEET: ICD-10-CM

## 2020-12-11 DIAGNOSIS — R53.83 FATIGUE, UNSPECIFIED TYPE: ICD-10-CM

## 2020-12-11 DIAGNOSIS — M79.671 PAIN IN BOTH FEET: ICD-10-CM

## 2020-12-11 PROCEDURE — 99207 PR NO CHARGE LOS: CPT | Performed by: NURSE PRACTITIONER

## 2020-12-11 NOTE — PROGRESS NOTES
"Car Torres is a 60 year old male who is being evaluated via a billable video visit.      The patient has been notified of following:     \"This video visit will be conducted via a call between you and your physician/provider. We have found that certain health care needs can be provided without the need for an in-person physical exam.  This service lets us provide the care you need with a video conversation.  If a prescription is necessary we can send it directly to your pharmacy.  If lab work is needed we can place an order for that and you can then stop by our lab to have the test done at a later time.    Video visits are billed at different rates depending on your insurance coverage.  Please reach out to your insurance provider with any questions.    If during the course of the call the physician/provider feels a video visit is not appropriate, you will not be charged for this service.\"     Patient has given verbal consent for Video visit? Yes  How would you like to obtain your AVS? MyChart  If you are dropped from the video visit, the video invite should be resent to:   Will anyone else be joining your video visit? wife      Subjective     Car Torres is a 60 year old male who presents today via video visit for the following health issues:    HPI     Musculoskeletal problem/pain  Onset/Duration: started today  Description  Location: bilateral leg & ankles   Joint Swelling: YES both, worse in the left   Redness: wife states theres redness in the front of his legs that's chronic   Pain: YES   Warmth: no  Progression of Symptoms:  Unsure   Accompanying signs and symptoms:   Fevers: no fevers, but tired/fatigue more than normal   Numbness/tingling/weakness: no  History  Trauma to the area: no  Recent illness:  no   Previous similar problem: not to this extent.   Precipitating or alleviating factors:  Aggravating factors include: walking  Therapies tried and outcome: Nothing, he does have an orthotic boot " "he uses on the left side for flat feet.     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 He is on his feet for 12 hours per day. His wife states that he does not remember coming home last night but he reports it being from being exhausted. He has chronic pain to his feet/ankles but today it is severe to a point he can hardly walk. He also has some degree of leg swelling at baseline but today it is worse, significantly worse on the left side. Denies recent leg injury. Denies shortness of breath, chest pain and palpitations. His wife states he gets winded at times which isn't new for him but maybe a little worse lately. She states he is falling asleep during todays appointment. He has an appointment with neurology on 11/23/20 for overall \"health stuff\" per report. He had a brain MRI on 10/6/20. Last had a stress echo in 2018. He started prozac today but the pain and swelling was present prior to taking the medication. He started zyprexa in September 2020. Denies recent diet changes. He does not add salt to his diet. He has ate today. Denies dizziness, light headedness and headaches. He has not taken anything for pain yet.     BP was obtained during visit: 109/49, re-checked and it was 115/49 to right upper arm while pt was supine. Pulse 71.     Video Start Time: 1338    Review of Systems   Constitutional, HEENT, pulmonary, gi and gu systems are negative, except leg swelling, fatigue, foot pain       Objective           Vitals:  No vitals were obtained today due to virtual visit.    Physical Exam     GENERAL: alert and no distress  RESP: No audible wheeze, cough, or visible cyanosis.  No visible retractions or increased work of breathing.    SKIN: Visible skin clear. No significant rash, abnormal pigmentation or lesions.  LEGS: visible swelling noted to bilat lower extremities without significant erythema noted    NEURO: Mentation and speech appropriate for age.  PSYCH: " Mentation appears normal, affect normal/bright, judgement and insight intact, normal speech and appearance well-groomed.          Assessment & Plan     Leg swelling  Due to significant foot/ankle pain, fatigue and sudden increase in leg swelling, recommended that he be evaluated in urgent care today. pts spouse states she will  Let him rest for an hour and then take him however hes not happy about it.     Pain in both feet  See above     Fatigue, unspecified type  See above             Return today (on 12/11/2020).    Radha Culp NP  [facility]      Video-Visit Details    Type of service:  Video Visit    Video End Time: 1359    Originating Location (pt. Location): Home    Distant Location (provider location):  [facility]     Platform used for Video Visit: [employer]Well

## 2020-12-14 ENCOUNTER — VIRTUAL VISIT (OUTPATIENT)
Dept: FAMILY MEDICINE | Facility: CLINIC | Age: 60
End: 2020-12-14
Payer: COMMERCIAL

## 2020-12-14 DIAGNOSIS — R53.83 OTHER FATIGUE: Primary | ICD-10-CM

## 2020-12-14 DIAGNOSIS — R60.9 EDEMA, UNSPECIFIED TYPE: ICD-10-CM

## 2020-12-14 DIAGNOSIS — D64.9 LOW HEMOGLOBIN: ICD-10-CM

## 2020-12-14 DIAGNOSIS — E66.01 MORBID OBESITY (H): ICD-10-CM

## 2020-12-14 DIAGNOSIS — F22 PARANOIA (H): ICD-10-CM

## 2020-12-14 DIAGNOSIS — R53.1 WEAKNESS: ICD-10-CM

## 2020-12-14 DIAGNOSIS — G47.33 OSA (OBSTRUCTIVE SLEEP APNEA): Chronic | ICD-10-CM

## 2020-12-14 PROCEDURE — 99214 OFFICE O/P EST MOD 30 MIN: CPT | Mod: 95 | Performed by: PHYSICIAN ASSISTANT

## 2020-12-14 NOTE — PROGRESS NOTES
"Car Torres is a 60 year old male who is being evaluated via a billable video visit.      The patient has been notified of following:     \"This video visit will be conducted via a call between you and your physician/provider. We have found that certain health care needs can be provided without the need for an in-person physical exam.  This service lets us provide the care you need with a video conversation.  If a prescription is necessary we can send it directly to your pharmacy.  If lab work is needed we can place an order for that and you can then stop by our lab to have the test done at a later time.    Video visits are billed at different rates depending on your insurance coverage.  Please reach out to your insurance provider with any questions.    If during the course of the call the physician/provider feels a video visit is not appropriate, you will not be charged for this service.\"    Patient has given verbal consent for Video visit? Yes  How would you like to obtain your AVS? MyChart  If you are dropped from the video visit, the video invite should be resent to: Text to cell phone: [phone]  Will anyone else be joining your video visit? No    Subjective     Car Torres is a 60 year old male who presents today via video visit for the following health issues:    HPI     ED/UC Followup:    Facility:  [facility]  Date of visit: 12/11/2020  Reason for visit: Leg swelling  Current Status: Patient has been sleeping, weak, swelling is not as bad.      See ER note    Behavior changes this past year. Paranoid, worried, thinks his wife is keeping a man in their attic. This is improving. Is on Zyprexa and now Psychiatry added Fluoxetine. His wife says he continues to exhibit strange behaviors, checking, driving by the house to see if there's someone with her. She notes that she has never and would never engage in any cheating behaviors, so his concerns are unclear and " "strange.    Did see psychiatry to establish care last week. Note not complete, but Jerry's wife says the visit was good.    More recently, this past 3-4 weeks, he's been extremely fatigued, no energy \"sleeping 12 hours every day and napping\" when not working. His CPAP is being used and his wife says he doesn't snore when wearing it.    He can \"barely get up\" because he's weak. He has bilateral leg edema. Did go to ER last Friday, work up included cardiac work up which was negative. BNP normal, CBC showed mildly low hgb, otherwise no findings on lab, EKG. They recommended outpatient echo and recommended a reevaluation of sleep apnea.    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 His wife says she doesn't think he's used in a few months.     Patient Active Problem List   Diagnosis     Sciatica     Morbid obesity (H)     Nonspecific abnormal electrocardiogram (ECG) (EKG)     Hyperlipidemia LDL goal <130     Hypertension goal BP (blood pressure) < 140/90     Function kidney decreased     Renal cyst     Pulmonary nodule     Coronary artery calcification     History of sinus bradycardia     ED (erectile dysfunction)     TIA (obstructive sleep apnea)      Current Outpatient Medications   Medication     ASPIRIN 325 MG OR TBEC     atorvastatin (LIPITOR) 40 MG tablet     Cholecalciferol (VITAMIN D PO)     FLUoxetine (PROZAC) 20 MG capsule     lisinopril-hydrochlorothiazide (ZESTORETIC) 20-12.5 MG tablet     MULTI VITAMIN MENS PO     OLANZapine (ZYPREXA) 10 MG tablet     tadalafil (CIALIS) 20 MG tablet     vitamin B complex with vitamin C (VITAMIN  B COMPLEX) TABS tablet     No current facility-administered medications for this visit.          Video Start Time: 725 AM      Review of Systems   Constitutional, HEENT, cardiovascular, pulmonary, GI, , musculoskeletal, neuro, skin, endocrine and psych systems are negative, except as otherwise noted.      Objective           Vitals:  No vitals " were obtained today due to virtual visit.    Physical Exam     GENERAL: Healthy, alert and no distress  Video visit primarily with wife who has authority / clearance to act on his behalf.          Assessment & Plan     Other fatigue  Unclear    Paranoia (H)  Ongoing, unclear. Substance use vs neurologic cause vs? Will be seeing neurology in a week.     Weakness  Unclear. Recommend outpatient echo. Needs follow up on his lower hemoglobin to evaluate for anemia  - Echocardiogram Complete; Future    Edema, unspecified type  As noted. Unclear. Eval as noted  - Echocardiogram Complete; Future    Morbid obesity (H)  Not contributing in this case    TIA (obstructive sleep apnea)  Sounds like his machine is working. I advised they contact sleep clinic to determine if checking it is a good idea.    Low hemoglobin  As noted - check labs. Severe anemia may be a cause for fatigue, his hgb is just slightly low - but this can be normal in the setting of very low hemoglobin.  - Ferritin; Future  - CBC with platelets and differential; Future  - Vitamin B12; Future  - Folate; Future  Follow up after results          No follow-ups on file.    ANCA STEPHENSON PA-C  [facility]      Video-Visit Details    Type of service:  Video Visit    Video End Time:755    Originating Location (pt. Location): Home    Distant Location (provider location):  [facility]     Platform used for Video Visit: Rec[employer]

## 2020-12-14 NOTE — PATIENT INSTRUCTIONS
To schedule imaging:   [facility] Scheduling Phone number: [phone]

## 2020-12-15 ENCOUNTER — MYC REFILL (OUTPATIENT)
Dept: FAMILY MEDICINE | Facility: CLINIC | Age: 60
End: 2020-12-15

## 2020-12-15 DIAGNOSIS — R46.89 BEHAVIORAL CHANGE: ICD-10-CM

## 2020-12-15 DIAGNOSIS — D64.9 LOW HEMOGLOBIN: ICD-10-CM

## 2020-12-15 LAB
BASOPHILS # BLD AUTO: 0 10E9/L (ref 0–0.2)
BASOPHILS NFR BLD AUTO: 0.4 %
DIFFERENTIAL METHOD BLD: NORMAL
EOSINOPHIL # BLD AUTO: 0.4 10E9/L (ref 0–0.7)
EOSINOPHIL NFR BLD AUTO: 5.1 %
ERYTHROCYTE [DISTWIDTH] IN BLOOD BY AUTOMATED COUNT: 13.2 % (ref 10–15)
FERRITIN SERPL-MCNC: 27 NG/ML (ref 26–388)
FOLATE SERPL-MCNC: 86.1 NG/ML
HCT VFR BLD AUTO: 47 % (ref 40–53)
HGB BLD-MCNC: 15.7 G/DL (ref 13.3–17.7)
LYMPHOCYTES # BLD AUTO: 1.6 10E9/L (ref 0.8–5.3)
LYMPHOCYTES NFR BLD AUTO: 23.1 %
MCH RBC QN AUTO: 30 PG (ref 26.5–33)
MCHC RBC AUTO-ENTMCNC: 33.4 G/DL (ref 31.5–36.5)
MCV RBC AUTO: 90 FL (ref 78–100)
MONOCYTES # BLD AUTO: 0.6 10E9/L (ref 0–1.3)
MONOCYTES NFR BLD AUTO: 8.5 %
NEUTROPHILS # BLD AUTO: 4.5 10E9/L (ref 1.6–8.3)
NEUTROPHILS NFR BLD AUTO: 62.9 %
PLATELET # BLD AUTO: 238 10E9/L (ref 150–450)
RBC # BLD AUTO: 5.24 10E12/L (ref 4.4–5.9)
VIT B12 SERPL-MCNC: 740 PG/ML (ref 193–986)
WBC # BLD AUTO: 7.1 10E9/L (ref 4–11)

## 2020-12-15 PROCEDURE — 82728 ASSAY OF FERRITIN: CPT | Performed by: PHYSICIAN ASSISTANT

## 2020-12-15 PROCEDURE — 36415 COLL VENOUS BLD VENIPUNCTURE: CPT | Performed by: PHYSICIAN ASSISTANT

## 2020-12-15 PROCEDURE — 85025 COMPLETE CBC W/AUTO DIFF WBC: CPT | Performed by: PHYSICIAN ASSISTANT

## 2020-12-15 PROCEDURE — 82746 ASSAY OF FOLIC ACID SERUM: CPT | Performed by: PHYSICIAN ASSISTANT

## 2020-12-15 PROCEDURE — 82607 VITAMIN B-12: CPT | Performed by: PHYSICIAN ASSISTANT

## 2020-12-16 ENCOUNTER — TELEPHONE (OUTPATIENT)
Dept: SLEEP MEDICINE | Facility: CLINIC | Age: 60
End: 2020-12-16

## 2020-12-17 ENCOUNTER — VIRTUAL VISIT (OUTPATIENT)
Dept: SLEEP MEDICINE | Facility: CLINIC | Age: 60
End: 2020-12-17
Payer: COMMERCIAL

## 2020-12-17 ENCOUNTER — ANCILLARY PROCEDURE (OUTPATIENT)
Dept: CARDIOLOGY | Facility: CLINIC | Age: 60
End: 2020-12-17
Attending: PHYSICIAN ASSISTANT
Payer: COMMERCIAL

## 2020-12-17 DIAGNOSIS — G47.34 NOCTURNAL HYPOXEMIA: ICD-10-CM

## 2020-12-17 DIAGNOSIS — R60.9 EDEMA, UNSPECIFIED TYPE: ICD-10-CM

## 2020-12-17 DIAGNOSIS — G47.33 OSA (OBSTRUCTIVE SLEEP APNEA): Primary | ICD-10-CM

## 2020-12-17 DIAGNOSIS — R53.1 WEAKNESS: ICD-10-CM

## 2020-12-17 PROCEDURE — 93306 TTE W/DOPPLER COMPLETE: CPT | Performed by: INTERNAL MEDICINE

## 2020-12-17 PROCEDURE — 99214 OFFICE O/P EST MOD 30 MIN: CPT | Mod: 95 | Performed by: PHYSICIAN ASSISTANT

## 2020-12-17 RX ORDER — OLANZAPINE 10 MG/1
10 TABLET ORAL AT BEDTIME
Qty: 90 TABLET | Refills: 3 | Status: SHIPPED | OUTPATIENT
Start: 2020-12-17 | End: 2021-01-18 | Stop reason: DRUGHIGH

## 2020-12-17 NOTE — PROGRESS NOTES
"Car Torres is a 60 year old male who is being evaluated via a billable video visit.      The patient has been notified of following:     \"This video visit will be conducted via a call between you and your physician/provider. We have found that certain health care needs can be provided without the need for an in-person physical exam.  This service lets us provide the care you need with a video conversation.  If a prescription is necessary we can send it directly to your pharmacy.  If lab work is needed we can place an order for that and you can then stop by our lab to have the test done at a later time.    Video visits are billed at different rates depending on your insurance coverage.  Please reach out to your insurance provider with any questions.    If during the course of the call the physician/provider feels a video visit is not appropriate, you will not be charged for this service.\"    Patient has given verbal consent for Video visit? Yes  How would you like to obtain your AVS? MyChart  If you are dropped from the video visit, the video invite should be resent to: Send to e-mail at: [email or web address]  Will anyone else be joining your video visit? Yes: wife. How would they like to receive their invitation? Text to cell phone: same      Video-Visit Details    Type of service:  Video Visit    Video Start Time: 11:24 AM  Video End Time: 11:49 AM    Originating Location (pt. Location): Home    Distant Location (provider location):  [facility]     Platform used for Video Visit: Well    Obstructive Sleep Apnea - PAP Follow-Up Visit:    Chief Complaint   Patient presents with     RECHECK     mariya        Car Torres comes in today for follow-up of their severe sleep apnea, managed with CPAP.     I spoke with his wife. She reports that he sleeps too much. He is sleepy during the day. He was recently seen at the emergency room for bilateral leg swelling. He was advised " "to optimize sleep apnea treatment.     Car Torres is a 58 year old male who initially presented with loud snoring, daytime sleepiness(ESS 11), difficulty maintaining sleep, crowded oropharynx, neck circumference of 18.5\"  and co-morbid HTN.     Total score - [place]: 11 (2018 2:00 PM)   STOP-BAN/8     Home Sleep Apnea Testing - 18: 295 lbs 0 oz: AHI 45.3/hr. Supine AHI 61.9/hr.   Oxygen [place] of 66%. Baseline 93%. Sp02 =< 88% for 47 minutes  He slept on his back (22%), prone (16.8%), left (35.8) and right (20.7%) sides.   Analysis time: 299.2 minutes. Patient went to bed 2 hours earlier than HST was set for.    Oximetry results were obtained for the date of 10/22/2018.  The patient was on a treatment of CPAP 6-16 cm/H20.  The study showed a valid recording time of ~6 hours with a 4% desaturation index of 2.2.  The basal oxygen saturation was 93.8% and the lowest SpO2 was 88%.  The patient spent 0 minutes below 88%.    Overall, the patient rates his experience with PAP as --- (0 poor, 10 great). The mask is comfortable. The mask is not leaking.  He is not snoring with the mask on. He is not having gasp arousals. He is not having any oral or nasal dryness. The pressure settings are comfortable.    His PAP interface is Nasal Mask.    He works rotating shifts. Bedtime is typically 3-4 AM. Usually it takes about 1--15 minutes to fall asleep with the mask on. Wake time is typically 11 AM.  Patient is using PAP therapy --- hours per night. The patient is usually getting 6 hours of sleep per night.    He does not feel rested in the morning.    Total score - [place]: 22 (2020  9:27 AM)      MI Total Score: 0      ResMed   Auto-PAP 6.0 - 16.0 cmH2O 30 day usage data:    36% of days with > 4 hours of use. 6/30 days with no use.   Average use 228 minutes per day.   95%ile Leak 52.5 L/min.   CPAP 95% pressure 11.2 cm.   AHI 4.02 events per hour.     Echo 2020  Interpretation Summary  Global and " regional left ventricular function is normal with an EF of 60-65%.  Mild concentric wall thickening consistent with left ventricular hypertrophy  is present.  Right ventricular function, chamber size, wall motion, and thickness are  normal.  Grade II or moderate diastolic dysfunction.  IVC diameter and respiratory changes fall into an intermediate range  suggesting an RA pressure of 8 mmHg.  No significant valvular abnormalities present.  No significant change from prior.    Past medical/surgical history, family history, social history, medications and allergies were reviewed.      Problem List:  Patient Active Problem List    Diagnosis Date Noted     Hypertension goal BP (blood pressure) < 140/90 10/20/2010     Priority: High     Hyperlipidemia LDL goal <130 06/11/2010     Priority: High     Nonspecific abnormal electrocardiogram (ECG) (EKG) 08/23/2007     Priority: High     Stress testing normal.       TIA (obstructive sleep apnea) 07/19/2018     Priority: Medium     Home Sleep Apnea Testing - 7/18/18: 295 lbs 0 oz: AHI 45.3/hr. Supine AHI 61.9/hr. Oxygen [place] of 66%.  Baseline 93%.  Sp02 =< 88% for 47 minutes.       History of sinus bradycardia 06/07/2018     Priority: Medium     ED (erectile dysfunction) 06/07/2018     Priority: Medium     Coronary artery calcification 05/08/2018     Priority: Medium     Per CT CHEST WITHOUT CONTRAST April 23, 2018. CT calcium score planned.        Renal cyst 11/18/2015     Priority: Medium     Simple, non enhanced, 3.5 cm on right kidney, Bosniak 1 - x 2 stable findings       Pulmonary nodule 11/18/2015     Priority: Medium     Incidental finding on CT scan, right posterior lung - considered benign / stable        Function kidney decreased 10/21/2010     Priority: Medium     60 to 80 - have discussed Lisinopril - will consider        Morbid obesity (H) 02/27/2006     Priority: Medium     Sciatica 02/27/2006     Priority: Low        There were no vitals taken for this  visit.    Impression/Plan:  1. Severe obstructive sleep apnea with sleep related hypoxemia-  Modest CPAP compliance. Patient was counseled to use CPAP with all sleep.   Sleep apnea reviewed. Also reviewed consequences of untreated severe sleep apnea.   Reviewed coping with rotating shifts.   Will change to auto-CPAP 8-16 cm/H20.  WatchPAT to follow up on hypoxemia and check efficacy of CPAP.       The patient is instructed to follow up with me after the results of WatchPat are available to discuss the results.     Jamarcus Solorio PA-C

## 2020-12-17 NOTE — TELEPHONE ENCOUNTER
Routing refill request to provider for review/approval because:  Labs not current:  Lipid panel  Patient needs to be seen because:  Blood pressure    Pending Prescriptions:                       Disp   Refills    OLANZapine (ZYPREXA) 10 MG tablet          30 tab*1        Sig: Take 1 tablet (10 mg) by mouth At Bedtime      Catalina Law RN

## 2020-12-18 ENCOUNTER — VIRTUAL VISIT (OUTPATIENT)
Dept: FAMILY MEDICINE | Facility: CLINIC | Age: 60
End: 2020-12-18
Payer: COMMERCIAL

## 2020-12-18 DIAGNOSIS — R79.0 LOW FERRITIN: ICD-10-CM

## 2020-12-18 DIAGNOSIS — Z11.59 ENCOUNTER FOR SCREENING FOR OTHER VIRAL DISEASES: ICD-10-CM

## 2020-12-18 DIAGNOSIS — I10 HYPERTENSION GOAL BP (BLOOD PRESSURE) < 140/90: Chronic | ICD-10-CM

## 2020-12-18 DIAGNOSIS — Z12.11 SPECIAL SCREENING FOR MALIGNANT NEOPLASMS, COLON: ICD-10-CM

## 2020-12-18 DIAGNOSIS — E66.01 MORBID OBESITY (H): ICD-10-CM

## 2020-12-18 DIAGNOSIS — R53.83 OTHER FATIGUE: ICD-10-CM

## 2020-12-18 DIAGNOSIS — G47.33 OSA (OBSTRUCTIVE SLEEP APNEA): Primary | Chronic | ICD-10-CM

## 2020-12-18 DIAGNOSIS — Z11.59 ENCOUNTER FOR SCREENING FOR OTHER VIRAL DISEASES: Primary | ICD-10-CM

## 2020-12-18 PROCEDURE — 99213 OFFICE O/P EST LOW 20 MIN: CPT | Mod: 95 | Performed by: PHYSICIAN ASSISTANT

## 2020-12-18 PROCEDURE — U0003 INFECTIOUS AGENT DETECTION BY NUCLEIC ACID (DNA OR RNA); SEVERE ACUTE RESPIRATORY SYNDROME CORONAVIRUS 2 (SARS-COV-2) (CORONAVIRUS DISEASE [COVID-19]), AMPLIFIED PROBE TECHNIQUE, MAKING USE OF HIGH THROUGHPUT TECHNOLOGIES AS DESCRIBED BY CMS-2020-01-R: HCPCS | Performed by: INTERNAL MEDICINE

## 2020-12-18 NOTE — PROGRESS NOTES
"Car Torres is a 60 year old male who is being evaluated via a billable video visit.      The patient has been notified of following:     \"This video visit will be conducted via a call between you and your physician/provider. We have found that certain health care needs can be provided without the need for an in-person physical exam.  This service lets us provide the care you need with a video conversation.  If a prescription is necessary we can send it directly to your pharmacy.  If lab work is needed we can place an order for that and you can then stop by our lab to have the test done at a later time.    Video visits are billed at different rates depending on your insurance coverage.  Please reach out to your insurance provider with any questions.    If during the course of the call the physician/provider feels a video visit is not appropriate, you will not be charged for this service.\"    Patient has given verbal consent for Video visit? Yes  How would you like to obtain your AVS? MyChart  If you are dropped from the video visit, the video invite should be resent to: Text to cell phone: [phone]  Will anyone else be joining your video visit? No    Subjective     Car Torres is a 60 year old male who presents today via video visit for the following health issues:    MESHA Hernandez wanted to discuss about his current condition and test results.   Discuss Jerry's plan for returning to work.     Ongoing fatigue, weakness, see prior note.    Sleep clinic suggested adjustments and better adherence to CPAP.     They want to reduce hours at work and put on some limitations    Labs and echocardiogram were normal. He did have a mild finding of LVH which is consistent with his hypertension but EF was normal.     Labs showed mild low ferritin, otherwise no concerns.    Patient Active Problem List   Diagnosis     Sciatica     Morbid obesity (H)     Nonspecific abnormal electrocardiogram (ECG) (EKG)     " Hyperlipidemia LDL goal <130     Hypertension goal BP (blood pressure) < 140/90     Function kidney decreased     Renal cyst     Pulmonary nodule     Coronary artery calcification     History of sinus bradycardia     ED (erectile dysfunction)     TIA (obstructive sleep apnea)      Current Outpatient Medications   Medication     ASPIRIN 325 MG OR TBEC     atorvastatin (LIPITOR) 40 MG tablet     Cholecalciferol (VITAMIN D PO)     FLUoxetine (PROZAC) 20 MG capsule     lisinopril-hydrochlorothiazide (ZESTORETIC) 20-12.5 MG tablet     MULTI VITAMIN MENS PO     OLANZapine (ZYPREXA) 10 MG tablet     tadalafil (CIALIS) 20 MG tablet     vitamin B complex with vitamin C (VITAMIN  B COMPLEX) TABS tablet     No current facility-administered medications for this visit.            Video Start Time: 920 AM    Review of Systems   Constitutional, HEENT, cardiovascular, pulmonary, GI, , musculoskeletal, neuro, skin, endocrine and psych systems are negative, except as otherwise noted.      Objective             Physical Exam   Video visit failed so moved to a phone visit           Assessment & Plan     TIA (obstructive sleep apnea)  Adjusted CPAP per his sleep clinic, continue monitoring     Other fatigue  Cause is not completely delineated. Will monitor to see if the CPAP changes help    Special screening for malignant neoplasms, colon  Due and advised due to low normal ferritin  - GASTROENTEROLOGY ADULT REF PROCEDURE ONLY; Future    Low ferritin  As noted - recommend iron replacement for 4-6 weeks.   - GASTROENTEROLOGY ADULT REF PROCEDURE ONLY; Future        No follow-ups on file.    ANCA STEPHENSON PA-C  [facility]      Video-Visit Details    Type of service:  Video Visit    Video End Time:940 AM    Originating Location (pt. Location): Home    Distant Location (provider location):  [facility]     Platform used for Video Visit: Vilma

## 2020-12-18 NOTE — LETTER
[facility]  [address] [id number] [phone]          December 18, 2020    RE:  Car Torres                                                                                                                                                       [address] [facility] [address]            To whom it may concern:    Jerry returns to work on 12/28/2020. We want him to start back with some restrictions going forward for 3 weeks.     The following requirements:   8 hour shifts  No climbing on anything over 3 steps  Needs to be allowed to take scheduled 15 minute breaks for every 4 hours of working, not including his lunch break.  This starts on 12/28/20 and goes to January 1/17/2020. We will reassess the last week and adjust if needed.     Sincerely,    Fransico Hylton

## 2020-12-19 LAB
SARS-COV-2 RNA SPEC QL NAA+PROBE: NOT DETECTED
SPECIMEN SOURCE: NORMAL

## 2020-12-21 ENCOUNTER — MYC MEDICAL ADVICE (OUTPATIENT)
Dept: FAMILY MEDICINE | Facility: CLINIC | Age: 60
End: 2020-12-21

## 2020-12-22 ENCOUNTER — HOSPITAL ENCOUNTER (OUTPATIENT)
Facility: AMBULATORY SURGERY CENTER | Age: 60
Discharge: HOME | End: 2020-12-22
Attending: INTERNAL MEDICINE | Admitting: INTERNAL MEDICINE
Payer: COMMERCIAL

## 2020-12-22 VITALS
BODY MASS INDEX: 40.6 KG/M2 | OXYGEN SATURATION: 97 % | DIASTOLIC BLOOD PRESSURE: 54 MMHG | HEART RATE: 55 BPM | HEIGHT: 71 IN | WEIGHT: 290 LBS | TEMPERATURE: 97.5 F | SYSTOLIC BLOOD PRESSURE: 103 MMHG | RESPIRATION RATE: 16 BRPM

## 2020-12-22 LAB — COLONOSCOPY: NORMAL

## 2020-12-22 PROCEDURE — 88305 TISSUE EXAM BY PATHOLOGIST: CPT | Performed by: PATHOLOGY

## 2020-12-22 PROCEDURE — 45385 COLONOSCOPY W/LESION REMOVAL: CPT | Mod: 33

## 2020-12-22 RX ORDER — SIMETHICONE
LIQUID (ML) MISCELLANEOUS PRN
Status: DISCONTINUED | OUTPATIENT
Start: 2020-12-22 | End: 2020-12-22 | Stop reason: HOSPADM

## 2020-12-22 RX ORDER — FENTANYL CITRATE 50 UG/ML
INJECTION, SOLUTION INTRAMUSCULAR; INTRAVENOUS PRN
Status: DISCONTINUED | OUTPATIENT
Start: 2020-12-22 | End: 2020-12-22 | Stop reason: HOSPADM

## 2020-12-22 RX ORDER — LIDOCAINE 40 MG/G
CREAM TOPICAL
Status: DISCONTINUED | OUTPATIENT
Start: 2020-12-22 | End: 2020-12-23 | Stop reason: HOSPADM

## 2020-12-22 RX ORDER — ONDANSETRON 2 MG/ML
4 INJECTION INTRAMUSCULAR; INTRAVENOUS
Status: DISCONTINUED | OUTPATIENT
Start: 2020-12-22 | End: 2020-12-23 | Stop reason: HOSPADM

## 2020-12-22 ASSESSMENT — MIFFLIN-ST. JEOR: SCORE: 2137.56

## 2020-12-22 NOTE — DISCHARGE INSTRUCTIONS
Discharge Instructions after Colonoscopy  or Sigmoidoscopy    Today you had a ____ Colonoscopy ____ Sigmoidoscopy    Activity and Diet  You were given medicine for pain. You may be dizzy or sleepy.  For 24 hours:    Do not drive or use heavy equipment.    Do not make important decisions.    Do not drink any alcohol.  You may return to your normal diet and medicines.    Discomfort    Air was placed in your colon during the exam in order to see it. Walking helps to pass the air.    You may take Tylenol (acetaminophen) for pain unless your doctor has told you not to.  Do not take aspirin or ibuprofen (Advil, Motrin, or other anti-inflammatory  drugs) for _____ days.    Follow-up  ____ We took small tissue samples or polyps to study. Your doctor will call you with the results  within two weeks.    When to call:    Call right away if you have:    Unusual pain in belly or chest pain not relieved with passing air.    More than 1 to 2 Tablespoons of bleeding from your rectum.    Fever above 100.6  F (37.5  C).    If you have severe pain, bleeding, or shortness of breath, go to an emergency room.    If you have questions, call:  Monday to Friday, 7 a.m. to 4:30 p.m.  Endoscopy: [phone] (We may have to call you back)    After hours  Hospital: [phone] (Ask for the GI fellow on call)

## 2020-12-23 ENCOUNTER — VIRTUAL VISIT (OUTPATIENT)
Dept: NEUROLOGY | Facility: CLINIC | Age: 60
End: 2020-12-23
Attending: PHYSICIAN ASSISTANT
Payer: COMMERCIAL

## 2020-12-23 DIAGNOSIS — R41.89 COGNITIVE CHANGE: ICD-10-CM

## 2020-12-23 DIAGNOSIS — F22 PARANOIA (H): Primary | ICD-10-CM

## 2020-12-23 LAB — COPATH REPORT: NORMAL

## 2020-12-23 PROCEDURE — 99203 OFFICE O/P NEW LOW 30 MIN: CPT | Mod: 95 | Performed by: INTERNAL MEDICINE

## 2020-12-23 ASSESSMENT — PAIN SCALES - GENERAL: PAINLEVEL: MILD PAIN (2)

## 2020-12-23 NOTE — PROGRESS NOTES
"Car Torres is a 60 year old male who is being evaluated via a billable video visit.      The patient has been notified of following:     \"This video visit will be conducted via a call between you and your physician/provider. We have found that certain health care needs can be provided without the need for an in-person physical exam.  This service lets us provide the care you need with a video conversation.  If a prescription is necessary we can send it directly to your pharmacy.  If lab work is needed we can place an order for that and you can then stop by our lab to have the test done at a later time.    Video visits are billed at different rates depending on your insurance coverage.  Please reach out to your insurance provider with any questions.    If during the course of the call the physician/provider feels a video visit is not appropriate, you will not be charged for this service.\"    Patient has given verbal consent for Video visit? Yes  How would you like to obtain your AVS? MyChart  If you are dropped from the video visit, the video invite should be resent to: Send to e-mail at: [email or web address]  Will anyone else be joining your video visit? No        Video-Visit Details    Type of service:  Video Visit    Video Start Time: 10:02 AM  Video End Time: 10:52 AM    Originating Location (pt. Location): Home    Distant Location (provider location):  [facility] [place]     Platform used for Video Visit: Amol Varela MD        "

## 2020-12-23 NOTE — PROGRESS NOTES
"[facility] Neurology New Patient Visit    Car Torres MRN# [id number]   Age: 60 year old YOB: 1960     Requesting physician: Fransico Archer     Reason for Consultation: neurological cause of cognitive changes      History of Presenting Symptoms:   Car Torres is a 60 year old male who presents today for evaluation of neurological cause of cognitive changes.    Wife has noticed symptoms of exhaustion, difficulties staying awake, slowness of movement over the last 2 years or so. 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     Since the summer wife has noticed new paranoia, delusions, hallucinations, and anxiety.      and wife got a NEST camera over the summer. Jerry believed that the camera was hacked by someone and this person was recording things. This person would send things to him over the phone like pornographic images. Wife never saw any of these images. They decided to get rid of the NEST camera, but Jerry still brings it up.     At the end of July, beginning of August, patient was having visual hallucinations. Patient described a party going on, people dancing, abnormal stripes. He would drive around the neighborhood looking for the party. Wife took him to the ER. They did a drug screen and it was positive for amphetamines. He had been taking supplement \"Kratom\" for pain. It was thought the Kratom caused the false positive and the ER doctor told them that this medication was associated with hallucinations. He has not taken the Kratom since and he is no longer having visual hallucination now.    Sometime in the fall patient started hearing a voice outside his room at night. The voice was more prominent when the fan was on. He could never understand the voice due to mumbling. Patient was started on Zyprexa and this has helped the auditory hallucinations. There is plan to reduce the dose due " to somnolence.     He uses a CPAP at night and they increase oxygen use up recently. He has sleep apnea. Patient takes multiple naps throughout the day due to somnolence. According to wife there is not a clear fluctuating alertness to him throughout the day, but he is tired a lot.     Patient has become hypersexual as well since the summer, which is a big change from prior. Patient is paranoid that wife has a boyfriend. Patient has frequently come home expecting wife there with her boyfriend.     Jerry denies any problems with his memory. Wife states he will forget conversations from time to time.     Wife has noticed that his hand will intermittently jump, but no clear tremors.     Wife denies any history of patient acting out his dreams.     Patient is currently on medical leave due to psychiatric symptoms. He has no history of psychiatric symptoms prior to this summer. He has never needed to be on a medicine for anxiety or depression.       Past Medical History:     Patient Active Problem List   Diagnosis     Sciatica     Morbid obesity (H)     Nonspecific abnormal electrocardiogram (ECG) (EKG)     Hyperlipidemia LDL goal <130     Hypertension goal BP (blood pressure) < 140/90     Function kidney decreased     Renal cyst     Pulmonary nodule     Coronary artery calcification     History of sinus bradycardia     ED (erectile dysfunction)     TIA (obstructive sleep apnea)     Past Medical History:   Diagnosis Date     Coronary artery calcification 5/8/2018     Diverticulosis of large intestine without hemorrhage 11/18/2015    Incidental finding on CT scan          Past Surgical History:     Past Surgical History:   Procedure Laterality Date     COLONOSCOPY N/A 12/22/2020    Procedure: COLONOSCOPY, WITH POLYPECTOMY, CLIP;  Surgeon: Mihir Lang MD;  Location: [facility] OR     SURGICAL HISTORY OF -       surgery on left index finger        Social History:     Social History     Tobacco Use     Smoking status: Former  Smoker     Packs/day: 1.00     Years: 25.00     Pack years: 25.00     Types: Cigarettes, Cigars     Quit date: 1998     Years since quittin.9     Smokeless tobacco: Never Used   Substance Use Topics     Alcohol use: Yes     Alcohol/week: 10.0 standard drinks     Comment: drinking some during the week.      Drug use: No        Family History:     Family History   Problem Relation Age of Onset     Cancer Mother         uterine     Other Cancer Mother         endometrial     C.A.D. Father      Hypertension Father      Breast Cancer Maternal Grandmother      Other Cancer Maternal Grandmother         Lung/brain     Hypertension Brother      Breast Cancer Other      Breast Cancer Cousin      Other Cancer Other      Glaucoma No family hx of      Macular Degeneration No family hx of         Medications:     Current Outpatient Medications   Medication Sig     ASPIRIN 325 MG OR TBEC ONE DAILY     atorvastatin (LIPITOR) 40 MG tablet TAKE 1 TABLET BY MOUTH EVERY DAY     Cholecalciferol (VITAMIN D PO) Take 5,000 Units by mouth daily One tablet twice daily     FLUoxetine (PROZAC) 20 MG capsule Take 1 capsule (20 mg) by mouth daily     lisinopril-hydrochlorothiazide (ZESTORETIC) 20-12.5 MG tablet Take 1 tablet by mouth daily     MULTI VITAMIN MENS PO None Entered     OLANZapine (ZYPREXA) 10 MG tablet Take 1 tablet (10 mg) by mouth At Bedtime     tadalafil (CIALIS) 20 MG tablet 1/2 to 1 tablet, 30 minutes to 1 hour before sex     vitamin B complex with vitamin C (VITAMIN  B COMPLEX) TABS tablet Take 1 tablet by mouth daily     No current facility-administered medications for this visit.         Allergies:   No Known Allergies     Review of Systems:   As above     Physical Exam:   Vitals: There were no vitals taken for this visit.   General: Seated comfortably in no acute distress.  HEENT: Neck supple with normal range of motion.   Skin: No rashes  Neurologic:     Mental Status: TICS-m score of 31/51 (-1 date, -6 immediate  recall, -6 delayed recall, -5 serial 7s, -1 name of , -1 for repetition of [employer]). Fully alert and oriented with the exception of saying date was 12/22 instead of 12/23. Mildly impaired attention. Unable to do serial 7s correctly. Can count backwards from 20-1. On immediate and delayed memory recall score 4/10. Fund of knowledge is slightly limited. Language normal, speech clear and fluent, no paraphasic errors. Normal speech volume.     Cranial Nerves: EOMI with normal smooth pursuit. Facial movements symmetric. Hearing not formally tested but intact to conversation.  No dysarthria. Normal facial expression, no masked fasces.      Motor: No tremors or other abnormal movements observed. No bradykinesia on bilateral finger taps of hand open/close bilaterally.     Sensory:Negative Romberg.      Coordination: Finger-nose-finger without dysmetria.     Gait: Antalgic and slightly slowed, but steady casual gait. Unable to appreciate shuffling component on video.          Data: Pertinent prior to visit   Imaging:  MRI brain 10/16/2020  IMPRESSION:  1. Exam mildly limited by motion artifact.  2. Few minimal nonspecific white matter lesions.  3. No evidence for intracranial hemorrhage, acute infarct, or any  focal mass lesions.    Procedures:  None    Laboratory:  TSH, CMP wnl 8/2020  B12, CBC wnl 12/2020         Assessment and Plan:   Assessment:  Car Torres is a 60 year old male who presents today for evaluation of neurological cause of cognitive changes. Since summer of 2020 family has noticed new onset paranoia, delusions, auditory hallucinations, brief visual hallucinations (now resolved), hypersexuality and anxiety. He is currently on medical leave for these symptoms. He is currently on Zyprexa and Prosac. In addition wife has noticed symptoms of increasing fatigue, slowness of gait, daytime somnolence. TICS-m score of 31/51 with deficits in attention, memory, fund of knowledge.    It is unusual  to develop psychiatric symptoms later in life, without preceding history of such symptoms. Kratom use over the summer could have potentially resulted in psychosis, but would have expected symptoms to resolve if this was the only cause. These factors raise the possible of a neurodegenerative process. Lewy body disease most commonly mimics psychiatric disease. Limited physical examination as above did not show any evidence of parkinsonism. MRI brain performed was unremarkable. Discussed pursuing more detailed cognitive assessment with neuropsychology and then follow-up in neurology clinic for in person examination.     Miguel Varela MD   of Neurology  [facility]

## 2020-12-23 NOTE — LETTER
"    12/23/2020         RE: Car Torres  [address]  [place] [address]        Dear Colleague,    Thank you for referring your patient, Car Torres, to the [facility] [place]. Please see a copy of my visit note below.    [facility] Neurology New Patient Visit    Car Torres MRN# [id number]   Age: 60 year old YOB: 1960     Requesting physician: Fransico Archer     Reason for Consultation: neurological cause of cognitive changes      History of Presenting Symptoms:   Car Torres is a 60 year old male who presents today for evaluation of neurological cause of cognitive changes.    Wife has noticed symptoms of exhaustion, difficulties staying awake, slowness of movement over the last 2 years or so. 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     Since the summer wife has noticed new paranoia, delusions, hallucinations, and anxiety.      and wife got a NEST camera over the summer. Jerry believed that the camera was hacked by someone and this person was recording things. This person would send things to him over the phone like pornographic images. Wife never saw any of these images. They decided to get rid of the NEST camera, but Jerry still brings it up.     At the end of July, beginning of August, patient was having visual hallucinations. Patient described a party going on, people dancing, abnormal stripes. He would drive around the neighborhood looking for the party. Wife took him to the ER. They did a drug screen and it was positive for amphetamines. He had been taking supplement \"Kratom\" for pain. It was thought the Kratom caused the false positive and the ER doctor told them that this medication was associated with hallucinations. He has not taken the Kratom since and he is no longer having visual hallucination now.    Sometime in the fall patient " started hearing a voice outside his room at night. The voice was more prominent when the fan was on. He could never understand the voice due to mumbling. Patient was started on Zyprexa and this has helped the auditory hallucinations. There is plan to reduce the dose due to somnolence.     He uses a CPAP at night and they increase oxygen use up recently. He has sleep apnea. Patient takes multiple naps throughout the day due to somnolence. According to wife there is not a clear fluctuating alertness to him throughout the day, but he is tired a lot.     Patient has become hypersexual as well since the summer, which is a big change from prior. Patient is paranoid that wife has a boyfriend. Patient has frequently come home expecting wife there with her boyfriend.     Jerry denies any problems with his memory. Wife states he will forget conversations from time to time.     Wife has noticed that his hand will intermittently jump, but no clear tremors.     Wife denies any history of patient acting out his dreams.     Patient is currently on medical leave due to psychiatric symptoms. He has no history of psychiatric symptoms prior to this summer. He has never needed to be on a medicine for anxiety or depression.       Past Medical History:     Patient Active Problem List   Diagnosis     Sciatica     Morbid obesity (H)     Nonspecific abnormal electrocardiogram (ECG) (EKG)     Hyperlipidemia LDL goal <130     Hypertension goal BP (blood pressure) < 140/90     Function kidney decreased     Renal cyst     Pulmonary nodule     Coronary artery calcification     History of sinus bradycardia     ED (erectile dysfunction)     TIA (obstructive sleep apnea)     Past Medical History:   Diagnosis Date     Coronary artery calcification 5/8/2018     Diverticulosis of large intestine without hemorrhage 11/18/2015    Incidental finding on CT scan          Past Surgical History:     Past Surgical History:   Procedure Laterality Date      COLONOSCOPY N/A 2020    Procedure: COLONOSCOPY, WITH POLYPECTOMY, CLIP;  Surgeon: Mihir Lang MD;  Location: [facility] OR     SURGICAL HISTORY OF -       surgery on left index finger        Social History:     Social History     Tobacco Use     Smoking status: Former Smoker     Packs/day: 1.00     Years: 25.00     Pack years: 25.00     Types: Cigarettes, Cigars     Quit date: 1998     Years since quittin.9     Smokeless tobacco: Never Used   Substance Use Topics     Alcohol use: Yes     Alcohol/week: 10.0 standard drinks     Comment: drinking some during the week.      Drug use: No        Family History:     Family History   Problem Relation Age of Onset     Cancer Mother         uterine     Other Cancer Mother         endometrial     C.A.D. Father      Hypertension Father      Breast Cancer Maternal Grandmother      Other Cancer Maternal Grandmother         Lung/brain     Hypertension Brother      Breast Cancer Other      Breast Cancer Cousin      Other Cancer Other      Glaucoma No family hx of      Macular Degeneration No family hx of         Medications:     Current Outpatient Medications   Medication Sig     ASPIRIN 325 MG OR TBEC ONE DAILY     atorvastatin (LIPITOR) 40 MG tablet TAKE 1 TABLET BY MOUTH EVERY DAY     Cholecalciferol (VITAMIN D PO) Take 5,000 Units by mouth daily One tablet twice daily     FLUoxetine (PROZAC) 20 MG capsule Take 1 capsule (20 mg) by mouth daily     lisinopril-hydrochlorothiazide (ZESTORETIC) 20-12.5 MG tablet Take 1 tablet by mouth daily     MULTI VITAMIN MENS PO None Entered     OLANZapine (ZYPREXA) 10 MG tablet Take 1 tablet (10 mg) by mouth At Bedtime     tadalafil (CIALIS) 20 MG tablet 1/2 to 1 tablet, 30 minutes to 1 hour before sex     vitamin B complex with vitamin C (VITAMIN  B COMPLEX) TABS tablet Take 1 tablet by mouth daily     No current facility-administered medications for this visit.         Allergies:   No Known Allergies     Review of Systems:   As  above     Physical Exam:   Vitals: There were no vitals taken for this visit.   General: Seated comfortably in no acute distress.  HEENT: Neck supple with normal range of motion.   Skin: No rashes  Neurologic:     Mental Status: TICS-m score of 31/51 (-1 date, -6 immediate recall, -6 delayed recall, -5 serial 7s, -1 name of , -1 for repetition of [employer]). Fully alert and oriented with the exception of saying date was 12/22 instead of 12/23. Mildly impaired attention. Unable to do serial 7s correctly. Can count backwards from 20-1. On immediate and delayed memory recall score 4/10. Fund of knowledge is slightly limited. Language normal, speech clear and fluent, no paraphasic errors. Normal speech volume.     Cranial Nerves: EOMI with normal smooth pursuit. Facial movements symmetric. Hearing not formally tested but intact to conversation.  No dysarthria. Normal facial expression, no masked fasces.      Motor: No tremors or other abnormal movements observed. No bradykinesia on bilateral finger taps of hand open/close bilaterally.     Sensory:Negative Romberg.      Coordination: Finger-nose-finger without dysmetria.     Gait: Antalgic and slightly slowed, but steady casual gait. Unable to appreciate shuffling component on video.          Data: Pertinent prior to visit   Imaging:  MRI brain 10/16/2020  IMPRESSION:  1. Exam mildly limited by motion artifact.  2. Few minimal nonspecific white matter lesions.  3. No evidence for intracranial hemorrhage, acute infarct, or any  focal mass lesions.    Procedures:  None    Laboratory:  TSH, CMP wnl 8/2020  B12, CBC wnl 12/2020         Assessment and Plan:   Assessment:  Car Torres is a 60 year old male who presents today for evaluation of neurological cause of cognitive changes. Since summer of 2020 family has noticed new onset paranoia, delusions, auditory hallucinations, brief visual hallucinations (now resolved), hypersexuality and anxiety. He is  "currently on medical leave for these symptoms. He is currently on Zyprexa and Prosac. In addition wife has noticed symptoms of increasing fatigue, slowness of gait, daytime somnolence. TICS-m score of 31/51 with deficits in attention, memory, fund of knowledge.    It is unusual to develop psychiatric symptoms later in life, without preceding history of such symptoms. Kratom use over the summer could have potentially resulted in psychosis, but would have expected symptoms to resolve if this was the only cause. These factors raise the possible of a neurodegenerative process. Lewy body disease most commonly mimics psychiatric disease. Limited physical examination as above did not show any evidence of parkinsonism. MRI brain performed was unremarkable. Discussed pursuing more detailed cognitive assessment with neuropsychology and then follow-up in neurology clinic for in person examination.     Miguel Varela MD   of Neurology  [facility]            Car Torres is a 60 year old male who is being evaluated via a billable video visit.      The patient has been notified of following:     \"This video visit will be conducted via a call between you and your physician/provider. We have found that certain health care needs can be provided without the need for an in-person physical exam.  This service lets us provide the care you need with a video conversation.  If a prescription is necessary we can send it directly to your pharmacy.  If lab work is needed we can place an order for that and you can then stop by our lab to have the test done at a later time.    Video visits are billed at different rates depending on your insurance coverage.  Please reach out to your insurance provider with any questions.    If during the course of the call the physician/provider feels a video visit is not appropriate, you will not be charged for this service.\"    Patient has given verbal consent for Video " visit? Yes  How would you like to obtain your AVS? MyChart  If you are dropped from the video visit, the video invite should be resent to: Send to e-mail at: [email or web address]  Will anyone else be joining your video visit? No        Video-Visit Details    Type of service:  Video Visit    Video Start Time: 10:02 AM  Video End Time: 10:52 AM    Originating Location (pt. Location): Home    Distant Location (provider location):  [facility] [place]     Platform used for Video Visit: Amol Varela MD            Again, thank you for allowing me to participate in the care of your patient.        Sincerely,        Miguel Varela MD

## 2020-12-23 NOTE — NURSING NOTE
"Car Torres is a 60 year old male who is being evaluated via a billable video visit.      The patient has been notified of following:      \"This video visit will be conducted via a call between you and your physician/provider. We have found that certain health care needs can be provided without the need for an in-person physical exam.  This service lets us provide the care you need with a video conversation.  If a prescription is necessary we can send it directly to your pharmacy.  If lab work is needed we can place an order for that and you can then stop by our lab to have the test done at a later time.    Video visits are billed at different rates depending on your insurance coverage.  Please reach out to your insurance provider with any questions.    If during the course of the call the physician/provider feels a video visit is not appropriate, you will not be charged for this service.\"    Patient has given verbal consent for Video visit? Yes  How would you like to obtain your AVS? MyChart  If you are dropped from the video visit, the video invite should be resent to: Text to cell phone: [phone]  Will anyone else be joining your video visit? Wife Destiny       [id number], MEDICAL ASSISTANT         "

## 2020-12-23 NOTE — PATIENT INSTRUCTIONS
Schedule neuropsychological appointment     Schedule follow-up for in person neurology visit 1-2 weeks after neuropsychological testing

## 2020-12-28 ENCOUNTER — TELEPHONE (OUTPATIENT)
Dept: GASTROENTEROLOGY | Facility: OUTPATIENT CENTER | Age: 60
End: 2020-12-28

## 2020-12-29 ENCOUNTER — MYC MEDICAL ADVICE (OUTPATIENT)
Dept: FAMILY MEDICINE | Facility: CLINIC | Age: 60
End: 2020-12-29

## 2020-12-29 NOTE — RESULT ENCOUNTER NOTE
Jerry,   During your colonoscopy we removed a polyp.  The polyp was examined and found  to be a tubular adenoma by our pathologists.  This is a benign (not cancerous) growth that can be easily removed at the time of colonoscopy.  There is a small risk of adenomas progressing to cancer over years if they are left in the colon unattended.  This polyp was removed completely.      Given these results and the fact that your colon didn't get cleared out completely after that preparation, I recommend you have another colonoscopy in 1 year.  It would be best to do a 2 day preparation for the next colonoscopy.      This information is being copied to your usual provider and or anyone else who referred you to see me.      Mihir Lang MD  Associate Professor of Medicine  [facility]   Division of Gastroenterology, Hepatology, and Nutrition

## 2020-12-30 NOTE — TELEPHONE ENCOUNTER
Form completed by Dania Snider, faxed to [phone], and [employer] message sent.    Mahin Schultz

## 2021-01-05 DIAGNOSIS — Z11.59 ENCOUNTER FOR SCREENING FOR OTHER VIRAL DISEASES: Primary | ICD-10-CM

## 2021-01-11 ENCOUNTER — VIRTUAL VISIT (OUTPATIENT)
Dept: FAMILY MEDICINE | Facility: CLINIC | Age: 61
End: 2021-01-11
Payer: COMMERCIAL

## 2021-01-11 DIAGNOSIS — R53.1 WEAKNESS: ICD-10-CM

## 2021-01-11 DIAGNOSIS — R41.89 COGNITIVE CHANGE: Primary | ICD-10-CM

## 2021-01-11 DIAGNOSIS — F22 PARANOIA (H): ICD-10-CM

## 2021-01-11 DIAGNOSIS — F52.8 HYPERSEXUALITY: ICD-10-CM

## 2021-01-11 PROCEDURE — 99213 OFFICE O/P EST LOW 20 MIN: CPT | Mod: 95 | Performed by: PHYSICIAN ASSISTANT

## 2021-01-11 NOTE — PROGRESS NOTES
"Jerry is a 60 year old who is being evaluated via a billable video visit.      How would you like to obtain your AVS? MyChart  If the video visit is dropped, the invitation should be resent by: Text to cell phone: [phone]  Will anyone else be joining your video visit? No    Video Start Time: Unable to get Amwell to connect. Changed to a phone visit today. Video attempt start was 3:00 PM.     Subjective     Jerry is a 60 year old who presents to clinic today for the following health issues  accompanied by his spouse:    MESHA Hernandez wanted to follow up regarding patient going back to work.     See neurology notes.    He is going to have a neuropsych work up at some point.    Jerry is apparently more rested, less pain, less confusion and fatigue. He's not as paranoid and worried that she is \"with her boyfriend.\" His hypersexual behaviors have improved a bit. He's more himself. Sleep, rest, stress reduction has been helpful.    Patient Active Problem List   Diagnosis     Sciatica     Morbid obesity (H)     Nonspecific abnormal electrocardiogram (ECG) (EKG)     Hyperlipidemia LDL goal <130     Hypertension goal BP (blood pressure) < 140/90     Function kidney decreased     Renal cyst     Pulmonary nodule     Coronary artery calcification     History of sinus bradycardia     ED (erectile dysfunction)     TIA (obstructive sleep apnea)      Current Outpatient Medications   Medication     ASPIRIN 325 MG OR TBEC     atorvastatin (LIPITOR) 40 MG tablet     Cholecalciferol (VITAMIN D PO)     FLUoxetine (PROZAC) 20 MG capsule     lisinopril-hydrochlorothiazide (ZESTORETIC) 20-12.5 MG tablet     MULTI VITAMIN MENS PO     OLANZapine (ZYPREXA) 10 MG tablet     tadalafil (CIALIS) 20 MG tablet     vitamin B complex with vitamin C (VITAMIN  B COMPLEX) TABS tablet     No current facility-administered medications for this visit.           Review of Systems   Constitutional, HEENT, cardiovascular, pulmonary, GI, , " musculoskeletal, neuro, skin, endocrine and psych systems are negative, except as otherwise noted.      Objective           Vitals:  No vitals were obtained today due to virtual visit.    Physical Exam   GENERAL: Healthy, alert and no distress      ICD-10-CM    1. Cognitive change  R41.89    2. Paranoia (H)  F22    3. Hypersexuality  F52.8    4. Weakness  R53.1       Improving. Some mild paranoia and hypersexual behaviors, but improving.   Sleep has helped.  Neuro visit reviewed.  Planned further neuropsych evaluation at some point  Follow up with me as needed, they have visits with specialty coming up to see if his symptoms are worth working up further.            Video-Visit Details    Type of service:  Video Visit transitioned to phone due to Luminate being useless.     Video End Time:320 PM - phone     Originating Location (pt. Location): Home    Distant Location (provider location):  [facility]     Platform used for Video Visit: Vilma redman

## 2021-01-15 ENCOUNTER — OFFICE VISIT (OUTPATIENT)
Dept: SLEEP MEDICINE | Facility: CLINIC | Age: 61
End: 2021-01-15
Payer: COMMERCIAL

## 2021-01-15 DIAGNOSIS — G47.33 OSA (OBSTRUCTIVE SLEEP APNEA): ICD-10-CM

## 2021-01-15 DIAGNOSIS — G47.34 NOCTURNAL HYPOXEMIA: ICD-10-CM

## 2021-01-15 PROCEDURE — 95800 SLP STDY UNATTENDED: CPT | Performed by: INTERNAL MEDICINE

## 2021-01-15 NOTE — Clinical Note
Interesting that clinic notes mentioning recent hypersexuality along with worsening hypersomnolence.....

## 2021-01-15 NOTE — PROGRESS NOTES
Device has been registered and shipped via [employer] on 1/15/21. Patient was notified that package was mailed out.     Instructed pt's wife and labeled watchpat that he needs to use cpap when doing test.     Mariama Carpenter MA on 1/15/2021 at 12:38 PM

## 2021-01-18 ENCOUNTER — VIRTUAL VISIT (OUTPATIENT)
Dept: PSYCHOLOGY | Facility: CLINIC | Age: 61
End: 2021-01-18
Payer: COMMERCIAL

## 2021-01-18 DIAGNOSIS — F33.0 MAJOR DEPRESSIVE DISORDER, RECURRENT EPISODE, MILD WITH ATYPICAL FEATURES (H): Primary | ICD-10-CM

## 2021-01-18 PROCEDURE — 99214 OFFICE O/P EST MOD 30 MIN: CPT | Mod: 95 | Performed by: NURSE PRACTITIONER

## 2021-01-18 RX ORDER — FLUOXETINE 10 MG/1
10 CAPSULE ORAL DAILY
Qty: 90 CAPSULE | Refills: 0 | Status: SHIPPED | OUTPATIENT
Start: 2021-01-18 | End: 2021-02-02 | Stop reason: DRUGHIGH

## 2021-01-18 RX ORDER — OLANZAPINE 5 MG/1
5 TABLET ORAL AT BEDTIME
Qty: 90 TABLET | Refills: 0 | Status: SHIPPED | OUTPATIENT
Start: 2021-01-18 | End: 2021-02-02 | Stop reason: DRUGHIGH

## 2021-01-18 RX ORDER — FERROUS SULFATE 325(65) MG
325 TABLET ORAL
COMMUNITY
End: 2021-02-26

## 2021-01-18 ASSESSMENT — ANXIETY QUESTIONNAIRES
6. BECOMING EASILY ANNOYED OR IRRITABLE: NOT AT ALL
7. FEELING AFRAID AS IF SOMETHING AWFUL MIGHT HAPPEN: NOT AT ALL
IF YOU CHECKED OFF ANY PROBLEMS ON THIS QUESTIONNAIRE, HOW DIFFICULT HAVE THESE PROBLEMS MADE IT FOR YOU TO DO YOUR WORK, TAKE CARE OF THINGS AT HOME, OR GET ALONG WITH OTHER PEOPLE: NOT DIFFICULT AT ALL
1. FEELING NERVOUS, ANXIOUS, OR ON EDGE: NOT AT ALL
GAD7 TOTAL SCORE: 0
5. BEING SO RESTLESS THAT IT IS HARD TO SIT STILL: NOT AT ALL
2. NOT BEING ABLE TO STOP OR CONTROL WORRYING: NOT AT ALL
3. WORRYING TOO MUCH ABOUT DIFFERENT THINGS: NOT AT ALL

## 2021-01-18 ASSESSMENT — PATIENT HEALTH QUESTIONNAIRE - PHQ9
5. POOR APPETITE OR OVEREATING: NOT AT ALL
SUM OF ALL RESPONSES TO PHQ QUESTIONS 1-9: 10

## 2021-01-18 NOTE — PATIENT INSTRUCTIONS
1.  Increase fluoxetine to 30 mg daily    2.  Decrease olanzapine to 5 mg at bedtime    3.  Consider changing to Abilify from olanzapine at your next visit    4.  Message sent to neurologist to inquire about the status neuropsychology testing      Continue all other medications as reviewed per electronic medical record today.     Safety plan reviewed. To the Emergency Department as needed or call after hours crisis line at [phone] or [phone]. [place] Crisis Text Line. Text [place] to 146018 or Suicide LifeLine Chat: Lucidworks.org/chat/    To schedule individual or family therapy, call [facility] at [phone].    Schedule an appointment with me in February or sooner as needed. Call [facility] at [phone] to schedule.    Follow up with primary care provider as planned or for acute medical concerns.    Call the psychiatric nurse line with medication questions or concerns at [phone].    [employer] may be used to communicate with your provider, but this is not intended to be used for emergencies.    Crisis Resources:    National Suicide Prevention Lifeline: 273.435.6260 (TTY: 928.822.4171). Call anytime for help.  (www.suicidepreventionlifeline.org)  National [place] on Mental Illness (www.ana maría.org): 157.623.2301 or 735-016-9786.   Mental Health Association (www.mentalhealth.org): 644.630.8577 or 441-891-6146.  [place] Crisis Text Line: Text [place] to 665642  Suicide LifeLine Chat: suicideNfoshare.org/chat

## 2021-01-18 NOTE — PROGRESS NOTES
"    Outpatient Psychiatric Progress Note    Name: Car Torres   : 1960                    Primary Care Provider: ANCA STEPHENSON PA-C   Therapist: None       Chief Complaint   Patient presents with     Medication Follow-up     LOV: 12/10/20 - Pt's wife states that the Alanzapine has been cut in half and all medications need to be rx'd for 90 days or the insurance company will not cover them.         PHQ-9 scores:  PHQ-9 SCORE 2021   PHQ-9 Total Score 10       SHAHNAZ-7 scores:  SHAHNAZ-7 SCORE 2021   Total Score 0       Patient Identification:    Patient is a 60 year old year old,   White American male  who presents for return visit with me.  Patient is currently employed full time. Patient attended the session with his wife , who they agreed to have interview with. Patient prefers to be called: \"Jerry\".    Interim History:    I last saw Car Torres for outpatient psychiatry Consultation on December 10, 2020.     During that appointment, he and his wife shared information about their concerns Car is mood changes.  In July of this year he began using methamphetamine and  Kratom.  His wife is concerned about cognitive changes that have happened including increased thoughts that his house was being watched by cameras he had been accusing his wife of having other people in the home.  Testing has yielded negative findings up to this point.  At this visit , Car admits to work stress that precipitated his mood symptoms of depression and anxiety.  Recently he changed his work shift and it is anticipated that things will go better for him.  Due to his ongoing psychosis, the olanzapine will continue at 10 mg at bedtime.  I am adding fluoxetine at 20 mg to assist with his depression state. .     Current medications include:      ASPIRIN 325 MG OR TBEC, ONE DAILY       atorvastatin (LIPITOR) 40 MG tablet, TAKE 1 TABLET BY MOUTH EVERY DAY       Cholecalciferol (VITAMIN D PO), Take 5,000 Units " by mouth daily One tablet twice daily       ferrous sulfate (FEROSUL) 325 (65 Fe) MG tablet, Take 325 mg by mouth daily (with breakfast)       FLUoxetine (PROZAC) 20 MG capsule, Take 1 capsule (20 mg) by mouth daily       lisinopril-hydrochlorothiazide (ZESTORETIC) 20-12.5 MG tablet, Take 1 tablet by mouth daily       MULTI VITAMIN MENS PO, None Entered       NONFORMULARY, PROMAX - Testosterone Booster       OLANZapine (ZYPREXA) 10 MG tablet, Take 1 tablet (10 mg) by mouth At Bedtime (Patient taking differently: Take 5 mg by mouth At Bedtime )       tadalafil (CIALIS) 20 MG tablet, 1/2 to 1 tablet, 30 minutes to 1 hour before sex       vitamin B complex with vitamin C (VITAMIN  B COMPLEX) TABS tablet, Take 1 tablet by mouth daily    No current facility-administered medications on file prior to visit.        The [place] Prescription Monitoring Program has been reviewed and there are no concerns about diversionary activity for controlled substances at this time.      I was able to review most recent Primary Care Provider, specialty provider, and therapy visit notes that I have access to.     Today, patient reports that he has gone back to work the week after the holidays.    She is waiting for neuropsych testing to be scheduled.  He feels tired today.  He is anxious of going back to work.  Since taking the Prozac he noticed at first that he felt better able to get out of bed.  Since decreasing the dose of Olanzapine he noticed no changes in his sleep.  When he goes to bed at night he sometimes feels like people are getting  Into his house.  He does not drink alcohol.  He is not using Kratom.  He does not smoke cigarettes since age 38 years old.       has a past medical history of Coronary artery calcification (5/8/2018) and Diverticulosis of large intestine without hemorrhage (11/18/2015). He also has no past medical history of Arthritis, Cancer (H), Cerebral infarction (H), Congestive heart failure (H), Congestive  heart failure, unspecified, COPD (chronic obstructive pulmonary disease) (H), Depressive disorder, Diabetes (H), History of blood transfusion, Thyroid disease, or Uncomplicated asthma.    Social history updates:    Jerry lives with his wife of over 40 years.  Sometimes he worries that she is going to leave him.  His wife expressed some concerns about him purchasing a microphone in order to listen to her talking to her friends.    Substance use updates:    He does not drink alcohol nor does he use craton  Tobacco use: No    Vital Signs:   There were no vitals taken for this visit.    Labs:    Most recent laboratory results reviewed and no new labs.     Review of Systems:  10 systems (general, cardiovascular, respiratory, eyes, ENT, endocrine, GI, , M/S, neurological) were reviewed. Most pertinent finding(s) is/are: Some memory deficits, generalized pain, no chest pain, no skin rashes. The remaining systems are all unremarkable.    Mental Status Examination:  Appearance:  awake, alert  Attitude:  cooperative   Eye Contact:  Unable to assess  Gait and Station: No assistive Devices used and No dizziness or falls  Psychomotor Behavior:  Unable to assess  Oriented to:  time, person, and place  Attention Span and Concentration:  Normal  Speech:   paucity of speech and Speaks: English  Mood:  depressed  Affect:  restricted range  Associations:  no loose associations  Thought Process:  linear  Thought Content:  no evidence of suicidal ideation or homicidal ideation, auditory hallucinations present, no visual hallucinations present, obsessions present and Distrust of his wife's intentions  Recent and Remote Memory:  fair Not formally assessed. No amnesia.  Fund of Knowledge: appropriate  Insight:  fair  Judgment:  fair  Impulse Control:  fair    Suicide Risk Assessment:  Today Car Torres reports that he is having no thoughts to want to harm himself or to hurt other people. In addition, there are notable risk factors  for self-harm, including anxiety, withdrawing and mood change. However, risk is mitigated by commitment to family, sobriety, history of seeking help when needed, future oriented, denies suicidal intent or plan and denies homicidal ideation, intent, or plan. Therefore, based on all available evidence including the factors cited above, Car Torres does not appear to be at imminent risk for self-harm, does not meet criteria for a 72-hr hold, and therefore remains appropriate for ongoing outpatient level of care.  A thorough assessment of risk factors related to suicide and self-harm have been reviewed and are noted above. The patient convincingly denies suicidality on several occasions. Local community safety resources printed and reviewed for patient to use if needed. There was no deceit detected, and the patient presented in a manner that was believable.     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 atypical features    Medical comorbidities include:   Patient Active Problem List    Diagnosis Date Noted     Hypertension goal BP (blood pressure) < 140/90 10/20/2010     Priority: High     Hyperlipidemia LDL goal <130 06/11/2010     Priority: High     Nonspecific abnormal electrocardiogram (ECG) (EKG) 08/23/2007     Priority: High     Stress testing normal.       TIA (obstructive sleep apnea) 07/19/2018     Priority: Medium     Home Sleep Apnea Testing - 7/18/18: 295 lbs 0 oz: AHI 45.3/hr. Supine AHI 61.9/hr. Oxygen [place] of 66%.  Baseline 93%.  Sp02 =< 88% for 47 minutes.       History of sinus bradycardia 06/07/2018     Priority: Medium     ED (erectile dysfunction) 06/07/2018     Priority: Medium     Coronary artery calcification 05/08/2018     Priority: Medium     Per CT CHEST WITHOUT CONTRAST April 23, 2018. CT calcium score planned.        Renal cyst 11/18/2015     Priority: Medium     Simple, non enhanced, 3.5 cm on right kidney, Bosniak 1 - x 2 stable  findings       Pulmonary nodule 11/18/2015     Priority: Medium     Incidental finding on CT scan, right posterior lung - considered benign / stable        Function kidney decreased 10/21/2010     Priority: Medium     60 to 80 - have discussed Lisinopril - will consider        Sciatica 02/27/2006     Priority: Medium     Morbid obesity (H) 02/27/2006     Priority: Medium       Assessment:    Car Torres visited with me along with his wife.  His primary care provider had decreased his dose of olanzapine to 5 mg at bedtime.  Due to concerns of side effects, at our next visit we will discuss the possibility of changing from olanzapine to Abilify to help with his depression and ruminating thoughts.  Jerry's wife continues to report ongoing suspicions that Jerry has to the point that he purchased a microphone to turn on in the house to listen to her conversations.  He tells me that he is afraid that she is going to leave him.  Jerry also admits to continued thoughts of thinking people are breaking into his home when he goes to bed at night.  Recently he had an extended leave from his job but now has started back.  He tells me that just thinking about going back to work today has created some anxiety with him him.  Since starting the Prozac, he initially felt like it helped him be better able to get out of bed in the morning.  I increased the dose to 30 mg daily.  Finally, he and his wife are awaiting to get neuropsychological testing ordered and I sent a message to his neurologist to inquire about the status..    Medication side effects and alternatives were reviewed. Health promotion activities recommended and reviewed today. All questions addressed. Education and counseling completed regarding risks and benefits of medications and psychotherapy options.    Treatment Plan:        1.  Increase fluoxetine to 30 mg daily    2.  Decrease olanzapine to 5 mg at bedtime    3.  Consider changing to Abilify from olanzapine at  your next visit    4.  Message sent to neurologist to inquire about the status neuropsychology testing      Continue all other medications as reviewed per electronic medical record today.     Safety plan reviewed. To the Emergency Department as needed or call after hours crisis line at [phone] or [phone]. [place] Crisis Text Line. Text [place] to 418459 or Suicide LifeLine Chat: suicideDigitalMR.org/chat/    To schedule individual or family therapy, call [facility] at [phone].    Schedule an appointment with me in February or sooner as needed. Call [facility] at [phone] to schedule.    Follow up with primary care provider as planned or for acute medical concerns.    Call the psychiatric nurse line with medication questions or concerns at [phone].    [employer] may be used to communicate with your provider, but this is not intended to be used for emergencies.    Crisis Resources:    National Suicide Prevention Lifeline: 185.652.8329 (TTY: 808.699.9926). Call anytime for help.  (www.suicidepreventionlifeline.org)  National [place] on Mental Illness (www.ana maría.org): 903.386.3249 or 103-916-4527.   Mental Health Association (www.mentalhealth.org): 220.888.3237 or 334-565-9802.  [place] Crisis Text Line: Text [place] to 463612  Suicide LifeLine Chat: suicideDigitalMR.org/chat    Administrative Billing:   Time spent with patient was 30 minutes and greater than 50% of time or 20 minutes was spent in counseling and coordination of care regarding above diagnoses and treatment plan.    Patient Status:  Patient will continue to be seen for ongoing consultation and stabilization.    Signed:   BELEN Singh-BC   Psychiatry

## 2021-01-19 ASSESSMENT — ANXIETY QUESTIONNAIRES: GAD7 TOTAL SCORE: 0

## 2021-01-20 PROBLEM — N52.9 ED (ERECTILE DYSFUNCTION): Chronic | Status: ACTIVE | Noted: 2018-06-07

## 2021-01-20 PROBLEM — Z86.79 HISTORY OF SINUS BRADYCARDIA: Status: ACTIVE | Noted: 2018-06-07

## 2021-01-20 NOTE — PROCEDURES
"WatchPAT - HOME SLEEP STUDY INTERPRETATION    Patient: Car Torres  MRN: [id number]  YOB: 1960  Study Date: 1/15/2021  Referring Provider: Fransico Hylton;  Ordering Provider: FANI Joe    Chain of custody patient verification was not enabled.       Indications for Home Study: Car Torres is a 60 year old male with a history of severe obstructive sleep apnea on CPAP treatment    Estimated body mass index is 40.45 kg/m  as calculated from the following:    Height as of 12/22/20: 1.803 m (5' 11\").    Weight as of 12/22/20: 131.5 kg (290 lb).  Total score - [place]: 22 (12/17/2020  9:27 AM)      Data: A full night home sleep study was performed recording the standard physiologic parameters including peripheral arterial tonometry (PAT), sound/snoring, body position,  movement, sound, and oxygen saturation by pulse oximetry. Pulse rate was estimated by oximetry recording. Sleep staging (wake, REM, light, and deep sleep) was derived from PAT signal.  This study was considered adequate based on > 4 hours of quality oximetry and respiratory recording. As specified by the AASM Manual for the Scoring of Sleep and Associated events, version 2.3, Rule VIII.D 1B, 4% oxygen desaturation scoring for hypopneas is used as a standard of care on all home sleep apnea testing.    Total Recording Time: 9 hrs, 39 min  Total Sleep Time: 8 hrs, 24 min  % of Sleep Time REM: 16%    Respiratory:  Respiratory events: The PAT respiratory disturbance index [pRDI] was 5.9 events per hour.  The PAT apnea/hypopnea index [pAHI] was 4.8 events per hour.  VITO was 4.6 events per hour.  During REM sleep the pAHI was 8.5.  Sleep Associated Hypoxemia: sustained hypoxemia was not present. Mean oxygen saturation was 97%.  Minimum was 85%.  Time with saturation less than 88% was 0.3 minutes.    Heart Rate: By pulse oximetry normal rate was noted.     Position: Percent of time spent: supine - 67%, prone - 4%, on right - 7%, " on left - 23%.  pAHI was 4.5 per hour supine, n/a per hour prone, 5.9 per hour on right side, and 3.8 per hour on left side.     Assessment:   Mild residual REM-related obstructive sleep apnea.  Sleep associated hypoxemia was not present.  Study presumably done on PAP     Recommendations:  Continue current treatments.   Consider increase in pressures  Suggest optimizing sleep hygiene and avoiding sleep deprivation.  Weight management.    Diagnosis Code(s): Obstructive Sleep Apnea G47.33, Hypoxemia G47.36    Vincent Pimentel MD, January 20, 2021   Diplomate, American Board of Internal Medicine, Sleep Medicine

## 2021-01-20 NOTE — PROGRESS NOTES
WATCHPAT was scored using 1B 4% hypopnea rule and it is ready for interpretation.     PAHI: 4.8.     Pt will follow up with sleep provider to determine appropriate therapy.     Ordering Provider, Jamarcus Solorio PA Charles O. BA, [facility], [facility] System Clinical Specialist 1/20/2021

## 2021-01-25 ENCOUNTER — OFFICE VISIT (OUTPATIENT)
Dept: LAB | Facility: CLINIC | Age: 61
End: 2021-01-25
Payer: COMMERCIAL

## 2021-01-25 DIAGNOSIS — Z11.59 ENCOUNTER FOR SCREENING FOR OTHER VIRAL DISEASES: ICD-10-CM

## 2021-01-25 LAB
LABORATORY COMMENT REPORT: NORMAL
SARS-COV-2 RNA RESP QL NAA+PROBE: NEGATIVE
SARS-COV-2 RNA RESP QL NAA+PROBE: NORMAL
SPECIMEN SOURCE: NORMAL
SPECIMEN SOURCE: NORMAL

## 2021-01-25 PROCEDURE — U0003 INFECTIOUS AGENT DETECTION BY NUCLEIC ACID (DNA OR RNA); SEVERE ACUTE RESPIRATORY SYNDROME CORONAVIRUS 2 (SARS-COV-2) (CORONAVIRUS DISEASE [COVID-19]), AMPLIFIED PROBE TECHNIQUE, MAKING USE OF HIGH THROUGHPUT TECHNOLOGIES AS DESCRIBED BY CMS-2020-01-R: HCPCS | Performed by: INTERNAL MEDICINE

## 2021-01-25 PROCEDURE — U0005 INFEC AGEN DETEC AMPLI PROBE: HCPCS | Performed by: INTERNAL MEDICINE

## 2021-01-28 ENCOUNTER — HOSPITAL ENCOUNTER (OUTPATIENT)
Facility: AMBULATORY SURGERY CENTER | Age: 61
Discharge: HOME | End: 2021-01-28
Attending: INTERNAL MEDICINE | Admitting: INTERNAL MEDICINE
Payer: COMMERCIAL

## 2021-01-28 VITALS
TEMPERATURE: 97.3 F | SYSTOLIC BLOOD PRESSURE: 111 MMHG | RESPIRATION RATE: 12 BRPM | BODY MASS INDEX: 40.6 KG/M2 | WEIGHT: 290 LBS | OXYGEN SATURATION: 97 % | DIASTOLIC BLOOD PRESSURE: 57 MMHG | HEART RATE: 49 BPM | HEIGHT: 71 IN

## 2021-01-28 LAB — UPPER GI ENDOSCOPY: NORMAL

## 2021-01-28 PROCEDURE — 88305 TISSUE EXAM BY PATHOLOGIST: CPT | Mod: GC | Performed by: PATHOLOGY

## 2021-01-28 PROCEDURE — 43239 EGD BIOPSY SINGLE/MULTIPLE: CPT

## 2021-01-28 RX ORDER — FENTANYL CITRATE 50 UG/ML
INJECTION, SOLUTION INTRAMUSCULAR; INTRAVENOUS PRN
Status: DISCONTINUED | OUTPATIENT
Start: 2021-01-28 | End: 2021-01-28 | Stop reason: HOSPADM

## 2021-01-28 RX ORDER — LIDOCAINE 40 MG/G
CREAM TOPICAL
Status: DISCONTINUED | OUTPATIENT
Start: 2021-01-28 | End: 2021-01-29 | Stop reason: HOSPADM

## 2021-01-28 RX ORDER — ONDANSETRON 2 MG/ML
4 INJECTION INTRAMUSCULAR; INTRAVENOUS
Status: DISCONTINUED | OUTPATIENT
Start: 2021-01-28 | End: 2021-01-29 | Stop reason: HOSPADM

## 2021-01-28 ASSESSMENT — MIFFLIN-ST. JEOR: SCORE: 2137.56

## 2021-01-28 NOTE — DISCHARGE INSTRUCTIONS
Discharge Instructions after  Upper Endoscopy (EGD)    Activity and Diet  You were given medicine for pain. You may be dizzy or sleepy.  For 24 hours:    Do not drive or use heavy equipment.    Do not make important decisions.    Do not drink any alcohol.  ___ You may return to your regular diet.    Discomfort  You may have a sore throat for 2 to 3 days. It may help to:    Avoid hot liquids for 24 hours.    Use sore throat lozenges.    Gargle as needed with salt water up to 4 times a day. Mix 1 cup of warm water  with 1 teaspoon of salt. Do not swallow.  ___ Your esophagus was dilated (opened) or banded during the exam:    Drink only cool liquids for the rest of the day. Eat a soft diet for the next few days.    You may have a sore chest for 2 to 3 days.    You may take Tylenol (acetaminophen) for pain unless your doctor has told you not to.    Do not take aspirin or ibuprofen (Advil, Motrin) or other NSAIDS  (anti-inflammatory drugs) for ___ days.    Follow-up  ___ We took small tissue samples for study. If you do not have a follow-up visit scheduled,  call your provider s office in 2 weeks for the results.    Other instructions________________________________________________________    When to call us:  Problems are rare. Call right away if you have:    Unusual throat pain or trouble swallowing    Unusual pain in belly or chest that is not relieved by belching or passing air    Black stools (tar-like looking bowel movement)    Temperature above 100.6  F. (37.5  C).    If you vomit blood or have severe pain, go to an emergency room.    If you have questions, call:  Monday to Friday, 8 a.m. to 4:30 p.m.: Endoscopy: [phone] (We may have to call you back)    After hours: Hospital: [phone] (Ask for the GI fellow on call)

## 2021-01-29 LAB — COPATH REPORT: NORMAL

## 2021-02-01 ENCOUNTER — MYC MEDICAL ADVICE (OUTPATIENT)
Dept: PSYCHOLOGY | Facility: CLINIC | Age: 61
End: 2021-02-01

## 2021-02-01 DIAGNOSIS — F33.0 MAJOR DEPRESSIVE DISORDER, RECURRENT EPISODE, MILD WITH ATYPICAL FEATURES (H): Primary | ICD-10-CM

## 2021-02-01 NOTE — TELEPHONE ENCOUNTER
Have Jerry decrease the fluoxetine to 20 mg daily to decrease over activity.  We could also increase his olanzapine to  7.5 mg at bedtime too decrease racing thoughts and paranoia.

## 2021-02-02 RX ORDER — OLANZAPINE 7.5 MG/1
7.5 TABLET, FILM COATED ORAL AT BEDTIME
Qty: 90 TABLET | Refills: 0 | Status: SHIPPED | OUTPATIENT
Start: 2021-02-02 | End: 2021-04-16

## 2021-02-02 NOTE — TELEPHONE ENCOUNTER
Sent script for 7.5 mg olanzapine tab to pharmacy and discontinued Fluoxetine 10 mg dose.  He can also take 1 and 1/2 tabs of the 5 mg dose until the 7.5 mg dose is picked up.

## 2021-02-05 ENCOUNTER — VIRTUAL VISIT (OUTPATIENT)
Dept: FAMILY MEDICINE | Facility: CLINIC | Age: 61
End: 2021-02-05
Payer: COMMERCIAL

## 2021-02-05 ENCOUNTER — VIRTUAL VISIT (OUTPATIENT)
Dept: SLEEP MEDICINE | Facility: CLINIC | Age: 61
End: 2021-02-05
Payer: COMMERCIAL

## 2021-02-05 DIAGNOSIS — F22 PARANOIA (H): Primary | ICD-10-CM

## 2021-02-05 DIAGNOSIS — F69 BEHAVIOR PROBLEM, ADULT: ICD-10-CM

## 2021-02-05 DIAGNOSIS — G47.33 OSA (OBSTRUCTIVE SLEEP APNEA): Primary | ICD-10-CM

## 2021-02-05 PROCEDURE — 99213 OFFICE O/P EST LOW 20 MIN: CPT | Mod: 95 | Performed by: PHYSICIAN ASSISTANT

## 2021-02-05 PROCEDURE — 99214 OFFICE O/P EST MOD 30 MIN: CPT | Mod: 95 | Performed by: PHYSICIAN ASSISTANT

## 2021-02-05 NOTE — PROGRESS NOTES
Jerry is a 60 year old who is being evaluated via a billable video visit.      How would you like to obtain your AVS? MyChart  If the video visit is dropped, the invitation should be resent by: Text to cell phone: [phone]  Will anyone else be joining your video visit? No    Video Start Time: 300 PM   Assessment & Plan     Paranoia (H)  See neurology visit. Planning to have neuropsych eval. They might check [facility] as Jerry's wife is not wanting to wait as long as it will take to go to [facility]. Still unclear. 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 Defer to neurology, sleep and neuropsych as the work ups continue. Things are improved however per Destiny. He is working. He is less paranoid and not talking about the boyfriend that he keeps imagining she has. He is not violent or dangerous. She is overwhelmed but holding on. Denies other concerns.     Behavior problem, adult      No follow-ups on file.    ANCA STEPHENSON PA-C  [facility]     Jerry is a 60 year old who presents to clinic today for the following health issues accompanied by his spouse:    HPI       Wife Mary wanted to follow up on patient's condition. See notes by neurology and sleep medicine.    Patient Active Problem List   Diagnosis     Sciatica     Morbid obesity (H)     Hyperlipidemia LDL goal <130     Hypertension goal BP (blood pressure) < 140/90     CKD (chronic kidney disease) stage 2, GFR 60-89 ml/min     Renal cyst     Diverticulosis of large intestine without hemorrhage     Pulmonary nodule     Coronary artery calcification     History of sinus bradycardia     ED (erectile dysfunction)     TIA (obstructive sleep apnea)      Current Outpatient Medications   Medication     ASPIRIN 325 MG OR TBEC     atorvastatin (LIPITOR) 40 MG tablet     Cholecalciferol (VITAMIN D PO)     ferrous  sulfate (FEROSUL) 325 (65 Fe) MG tablet     FLUoxetine (PROZAC) 20 MG capsule     lisinopril-hydrochlorothiazide (ZESTORETIC) 20-12.5 MG tablet     MULTI VITAMIN MENS PO     NONFORMULARY     OLANZapine (ZYPREXA) 7.5 MG tablet     tadalafil (CIALIS) 20 MG tablet     vitamin B complex with vitamin C (VITAMIN  B COMPLEX) TABS tablet     No current facility-administered medications for this visit.          Review of Systems   Constitutional, HEENT, cardiovascular, pulmonary, GI, , musculoskeletal, neuro, skin, endocrine and psych systems are negative, except as otherwise noted.      Objective           Vitals:  No vitals were obtained today due to virtual visit.    Physical Exam   GENERAL: Healthy, alert and no distress  EYES: Eyes grossly normal to inspection.  No discharge or erythema, or obvious scleral/conjunctival abnormalities.  RESP: No audible wheeze, cough, or visible cyanosis.  No visible retractions or increased work of breathing.    SKIN: Visible skin clear. No significant rash, abnormal pigmentation or lesions.  NEURO: Cranial nerves grossly intact.  Mentation and speech appropriate for age.  PSYCH: Mentation appears normal, affect normal/bright, judgement and insight intact, normal speech and appearance well-groomed.            Video-Visit Details    Type of service:  Video Visit    Video End Time: 320 PM     Originating Location (pt. Location): Home    Distant Location (provider location):  [facility]     Platform used for Video Visit: OfficeDrop

## 2021-02-05 NOTE — PROGRESS NOTES
Jerry is a 60 year old who is being evaluated via a billable video visit.      How would you like to obtain your AVS? MyChart  If the video visit is dropped, the invitation should be resent by: Text to cell phone: [phone]  Will anyone else be joining your video visit? No     Mariama Carpenter MA on 2/5/2021 at 9:46 AM        Video Start Time: 10:32 AM  Video-Visit Details    Type of service:  Video Visit    Video End Time:10:56 AM    Originating Location (pt. Location): Home    Distant Location (provider location):  [facility]     Platform used for Video Visit: qcue    Home Sleep Apnea Testing Results Visit:    Chief Complaint   Patient presents with     Study Results       Car Torres is a 60 year old male who returns to [facility] after having had Home Sleep Apnea Testing.  He presented with history of TIA on CPAP treatment and sleepiness.    This was a watchPAT and interpreted by Dr Pimentel:  Data: A full night home sleep study was performed recording the standard physiologic parameters including peripheral arterial tonometry (PAT), sound/snoring, body position,  movement, sound, and oxygen saturation by pulse oximetry. Pulse rate was estimated by oximetry recording. Sleep staging (wake, REM, light, and deep sleep) was derived from PAT signal.  This study was considered adequate based on > 4 hours of quality oximetry and respiratory recording. As specified by the AASM Manual for the Scoring of Sleep and Associated events, version 2.3, Rule VIII.D 1B, 4% oxygen desaturation scoring for hypopneas is used as a standard of care on all home sleep apnea testing.     Total Recording Time: 9 hrs, 39 min  Total Sleep Time: 8 hrs, 24 min  % of Sleep Time REM: 16%     Respiratory:  Respiratory events: The PAT respiratory disturbance index [pRDI] was 5.9 events per hour.  The PAT apnea/hypopnea index [pAHI] was 4.8 events per hour.  VITO was 4.6 events  "per hour.  During REM sleep the pAHI was 8.5.  Sleep Associated Hypoxemia: sustained hypoxemia was not present. Mean oxygen saturation was 97%.  Minimum was 85%.  Time with saturation less than 88% was 0.3 minutes.     Heart Rate: By pulse oximetry normal rate was noted.      Position: Percent of time spent: supine - 67%, prone - 4%, on right - 7%, on left - 23%.  pAHI was 4.5 per hour supine, n/a per hour prone, 5.9 per hour on right side, and 3.8 per hour on left side.       Home Sleep Apnea Testing was reviewed in detail today with Car and a copy given to him for his records.      In Review:  Car Torres is a 60 year old male who initially presented with loud snoring, daytime sleepiness(ESS 11), difficulty maintaining sleep, crowded oropharynx, neck circumference of 18.5\"  and co-morbid HTN.       Total score - [place]: 11 (2018 2:00 PM)   STOP-BAN/8       Home Sleep Apnea Testing - 18: 295 lbs 0 oz: AHI 45.3/hr. Supine AHI 61.9/hr.   Oxygen [place] of 66%. Baseline 93%. Sp02 =< 88% for 47 minutes   He slept on his back (22%), prone (16.8%), left (35.8) and right (20.7%) sides.   Analysis time: 299.2 minutes. Patient went to bed 2 hours earlier than HST was set for.     Oximetry results were obtained for the date of 10/22/2018.  The patient was on a treatment of CPAP 6-16 cm/H20.  The study showed a valid recording time of ~6 hours with a 4% desaturation index of 2.2.  The basal oxygen saturation was 93.8% and the lowest SpO2 was 88%.  The patient spent 0 minutes below 88%.      Bedtime is typically 1-3 AM. Usually it takes about 10 minutes to fall asleep with the mask on. Wake time is typically 10-11 AM.  Patient is using PAP therapy 7 hours per night. The patient is usually getting 7 hours of sleep per night.    He does feel rested in the morning.    Total score - [place]: 22 (2020  9:27 AM). ESS is 6 today.       No data recorded    ResMed   Auto-PAP 8.0 - 16.0 cmH2O 30 day usage data: "    93% of days with > 4 hours of use. 0/30 days with no use.   Average use 404 minutes per day.   95%ile Leak 44.86 L/min.   CPAP 95% pressure 11.5 cm.   AHI 5.36 events per hour.     They report excessive daytime sleepiness has improved. The wife thinks that there was some sexual hyperactivity associated with his sleepiness which has also resolved.       Past medical/surgical history, family history, social history, medications and allergies were reviewed.      Problem List:  Patient Active Problem List    Diagnosis Date Noted     Hypertension goal BP (blood pressure) < 140/90 10/20/2010     Priority: High     Hyperlipidemia LDL goal <130 06/11/2010     Priority: High     TIA (obstructive sleep apnea) 07/19/2018     Priority: Medium     Home Sleep Apnea Testing - 7/18/18: 295 lbs 0 oz: AHI 45.3/hr. Supine AHI 61.9/hr. Oxygen [place] of 66%.  Baseline 93%.  Sp02 =< 88% for 47 minutes.       History of sinus bradycardia 06/07/2018     Priority: Medium     ED (erectile dysfunction) 06/07/2018     Priority: Medium     Coronary artery calcification 05/08/2018     Priority: Medium     Per CT CHEST WITHOUT CONTRAST April 23, 2018. CT calcium score planned.        Renal cyst 11/18/2015     Priority: Medium     Simple, non enhanced, 3.5 cm on right kidney, Bosniak 1 - x 2 stable findings       Diverticulosis of large intestine without hemorrhage 11/18/2015     Priority: Medium     Incidental finding on CT scan        Pulmonary nodule 11/18/2015     Priority: Medium     Incidental finding on CT scan, right posterior lung - considered benign / stable        CKD (chronic kidney disease) stage 2, GFR 60-89 ml/min 10/21/2010     Priority: Medium     60 to 80 - have discussed Lisinopril - will consider        Morbid obesity (H) 02/27/2006     Priority: Medium     Sciatica 02/27/2006     Priority: Low        There were no vitals taken for this visit.    Impression/Plan:  Mild residual REM-related obstructive sleep apnea.  Sleep  associated hypoxemia was not present.    Tolerating PAP well. Daytime symptoms are improved.   Change to auto-CPAP 10-16 cm/H20.  Comprehensive DME    Follow up in 3 months.     Jamarcus Solorio PA-C

## 2021-02-07 NOTE — RESULT ENCOUNTER NOTE
Jerry,  During your upper endoscopy we took biopsies from your stomach duodenum (the first part of your small intestine right below the stomach).  These samples were assessed by our pathologists and found to be unremarkable.  There is nothing about these pathology  results that requires changing management such as further  treatments, tests or follow up.      This is good news.  There is nothing else that needs to be done at this time.     This information has been communicated to your usual physician or if there was another referring physician.     Mihir Lang MD  Associate Professor of Medicine  [facility]   Division of Gastroenterology, Hepatology, and Nutrition

## 2021-02-26 ENCOUNTER — VIRTUAL VISIT (OUTPATIENT)
Dept: PSYCHIATRY | Facility: CLINIC | Age: 61
End: 2021-02-26
Payer: COMMERCIAL

## 2021-02-26 DIAGNOSIS — F33.0 MAJOR DEPRESSIVE DISORDER, RECURRENT EPISODE, MILD WITH ATYPICAL FEATURES (H): Primary | ICD-10-CM

## 2021-02-26 PROCEDURE — 99214 OFFICE O/P EST MOD 30 MIN: CPT | Mod: 95 | Performed by: NURSE PRACTITIONER

## 2021-02-26 ASSESSMENT — ANXIETY QUESTIONNAIRES
2. NOT BEING ABLE TO STOP OR CONTROL WORRYING: NOT AT ALL
GAD7 TOTAL SCORE: 0
5. BEING SO RESTLESS THAT IT IS HARD TO SIT STILL: NOT AT ALL
3. WORRYING TOO MUCH ABOUT DIFFERENT THINGS: NOT AT ALL
IF YOU CHECKED OFF ANY PROBLEMS ON THIS QUESTIONNAIRE, HOW DIFFICULT HAVE THESE PROBLEMS MADE IT FOR YOU TO DO YOUR WORK, TAKE CARE OF THINGS AT HOME, OR GET ALONG WITH OTHER PEOPLE: NOT DIFFICULT AT ALL
1. FEELING NERVOUS, ANXIOUS, OR ON EDGE: NOT AT ALL
6. BECOMING EASILY ANNOYED OR IRRITABLE: NOT AT ALL
7. FEELING AFRAID AS IF SOMETHING AWFUL MIGHT HAPPEN: NOT AT ALL

## 2021-02-26 ASSESSMENT — PATIENT HEALTH QUESTIONNAIRE - PHQ9
SUM OF ALL RESPONSES TO PHQ QUESTIONS 1-9: 8
5. POOR APPETITE OR OVEREATING: NOT AT ALL

## 2021-02-26 NOTE — PATIENT INSTRUCTIONS
1.  Continue olanzapine 7.5 mg at bedtime    2.  Continue fluoxetine 30 mg daily    3.  Complete neuropsych testing March 26    4.  We will meet after your follow-up appointment with your neurologist to consider future medication choices.      Continue all other medications as reviewed per electronic medical record today.     Safety plan reviewed. To the Emergency Department as needed or call after hours crisis line at [phone] or [phone]. [place] Crisis Text Line. Text [place] to 443855 or Suicide LifeLine Chat: IdealSeat.org/chat/    To schedule individual or family therapy, call [facility] at [phone].    Schedule an appointment with me in April or sooner as needed. Call [facility] at [phone] to schedule.    Follow up with primary care provider as planned or for acute medical concerns.    Call the psychiatric nurse line with medication questions or concerns at [phone].    [employer] may be used to communicate with your provider, but this is not intended to be used for emergencies.    Crisis Resources:    National Suicide Prevention Lifeline: 725.275.8663 (TTY: 123.465.9726). Call anytime for help.  (www.suicidepreventionlifeline.org)  National [place] on Mental Illness (www.ana maría.org): 992.119.5122 or 257-685-3878.   Mental Health Association (www.mentalhealth.org): 756.940.1673 or 823-291-1749.  [place] Crisis Text [place]: Text [place] to 832403  Suicide LifeLine Chat: IdealSeat.org/chat

## 2021-02-26 NOTE — PROGRESS NOTES
"Jerry is a 60 year old who is being evaluated via a billable telephone visit.      What phone number would you like to be contacted at? [phone]  How would you like to obtain your AVS? [facility]            Outpatient Psychiatric Progress Note    Name: aCr Torres   : 1960                    Primary Care Provider: ANCA STEPHENSON PA-C   Therapist: None    PHQ-9 scores:  PHQ-9 SCORE 2021   PHQ-9 Total Score 10 8       SHAHNAZ-7 scores:  SHAHNAZ-7 SCORE 2021   Total Score 0 0       Patient Identification:    Patient is a 60 year old year old,   White American male  who presents for return visit with me.  Patient is currently employed full time. Patient attended the session with His wife , who they agreed to have interview with. Patient prefers to be called: \" Jerry\".    Interim History:    I last saw Car Torres for outpatient psychiatry Return Visit on 2021.     During that appointment, he visited with me along with his wife.  His primary care provider had decreased his dose of olanzapine to 5 mg at bedtime.  Due to concerns of side effects, at our next visit we will discuss the possibility of changing from olanzapine to Abilify to help with his depression and ruminating thoughts.  Jerry's wife continues to report ongoing suspicions that Jerry has to the point that he purchased a microphone to turn on in the house to listen to her conversations.  He tells me that he is afraid that she is going to leave him.  Jerry also admits to continued thoughts of thinking people are breaking into his home when he goes to bed at night.  Recently he had an extended leave from his job but now has started back.  He tells me that just thinking about going back to work today has created some anxiety with him him.  Since starting the Prozac, he initially felt like it helped him be better able to get out of bed in the morning.  I increased the dose to 30 mg daily.  Finally, he and " his wife are awaiting to get neuropsychological testing ordered and I sent a message to his neurologist to inquire about the status..    .     Current medications include: ASPIRIN 325 MG OR TBEC, ONE DAILY  atorvastatin (LIPITOR) 40 MG tablet, TAKE 1 TABLET BY MOUTH EVERY DAY  Cholecalciferol (VITAMIN D PO), Take 5,000 Units by mouth daily One tablet twice daily  FLUoxetine (PROZAC) 20 MG capsule, Take 1 capsule (20 mg) by mouth daily With 10 mg for a total of 30 mg daily  lisinopril-hydrochlorothiazide (ZESTORETIC) 20-12.5 MG tablet, Take 1 tablet by mouth daily  MULTI VITAMIN MENS PO, None Entered  OLANZapine (ZYPREXA) 7.5 MG tablet, Take 1 tablet (7.5 mg) by mouth At Bedtime  tadalafil (CIALIS) 20 MG tablet, 1/2 to 1 tablet, 30 minutes to 1 hour before sex  vitamin B complex with vitamin C (VITAMIN  B COMPLEX) TABS tablet, Take 1 tablet by mouth daily  ferrous sulfate (FEROSUL) 325 (65 Fe) MG tablet, Take 325 mg by mouth daily (with breakfast)  NONFORMULARY, PROMAX - Testosterone Booster    No current facility-administered medications on file prior to visit.        The [place] Prescription Monitoring Program has been reviewed and there are no concerns about diversionary activity for controlled substances at this time.      I was able to review most recent Primary Care Provider, specialty provider, and therapy visit notes that I have access to.     Today, patient reports that his mother .  He feels like he has been handling this fine.  He feels like he has been doing fine on the medications.    He tells me that he has been having no ups and downs.  He denies being suspicious.  He reports no suicide thoughts. He has no thoughts that his wife has a boyfriend.  He thinks that she deserves better.  He has nothing to give to her after feeling spent from work.      His wife reports that since the medication change he feels less depressed.  His paranoia has lessened and has less thoughts of people spying on him.   She wonders if he is covering up how he is really feeling.  His wife feels like her presence can be comforting to Jerry neuropsych testing is ordered for March 26.  She wants him to meet in person. with the psychologist.  She has not heard him talk about not wanting to live.       has a past medical history of Nonspecific abnormal electrocardiogram (ECG) (EKG) (08/23/2007). He also has no past medical history of Arthritis, Cancer (H), Cerebral infarction (H), Congestive heart failure (H), Congestive heart failure, unspecified, COPD (chronic obstructive pulmonary disease) (H), Depressive disorder, Diabetes (H), History of blood transfusion, Thyroid disease, or Uncomplicated asthma.    Social history updates:    Jerry lives with his wife and works full-time.    Substance use updates:    He has occasional alcohol use  Tobacco use: Yes Cigarettes  Ready to quit?  No  Nicotine Replacement Therapy tried: None    Vital Signs:   There were no vitals taken for this visit.    Labs:    Most recent laboratory results reviewed and no new labs.     Review of Systems:  10 systems (general, cardiovascular, respiratory, eyes, ENT, endocrine, GI, , M/S, neurological) were reviewed. Most pertinent finding(s) is/are: She denies any chest pain, no shortness of breath, no skin rashes, no headaches. The remaining systems are all unremarkable.    Mental Status Examination:  Appearance:  awake, alert  Attitude:  evasive   Eye Contact:  Unable to assess  Gait and Station: No assistive Devices used and No dizziness or falls  Psychomotor Behavior:  Unable to assess  Oriented to:  time, person, and place  Attention Span and Concentration:  Normal  Speech:   clear, coherent and Speaks: English  Mood:  anxious and depressed  Affect:  restricted range  Associations:  no loose associations  Thought Process:  linear  Thought Content:  no evidence of suicidal ideation or homicidal ideation, no auditory hallucinations present and no visual  hallucinations present  Recent and Remote Memory:  fair Not formally assessed. No amnesia.  Fund of Knowledge: appropriate  Insight:  fair  Judgment:  fair  Impulse Control:  fair    Suicide Risk Assessment:  Today Car Torres reports that he is having no thoughts to want to end his life or to harm other people. In addition, there are notable risk factors for self-harm, including anxiety, psychosis, hopelessness, withdrawing and mood change. However, risk is mitigated by commitment to family, history of seeking help when needed, future oriented, denies suicidal intent or plan and denies homicidal ideation, intent, or plan. Therefore, based on all available evidence including the factors cited above, Car Torres does not appear to be at imminent risk for self-harm, does not meet criteria for a 72-hr hold, and therefore remains appropriate for ongoing outpatient level of care.  A thorough assessment of risk factors related to suicide and self-harm have been reviewed and are noted above. The patient convincingly denies suicidality on several occasions. Local community safety resources printed and reviewed for patient to use if needed. There was no deceit detected, and the patient presented in a manner that was believable.     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 Supine AHI 61.9/hr. Oxygen [place] of 66%.  Baseline 93%.  Sp02 =< 88% for 47 minutes.       History of sinus bradycardia 06/07/2018     Priority: Medium     ED (erectile dysfunction) 06/07/2018     Priority: Medium      Coronary artery calcification 05/08/2018     Priority: Medium     Per CT CHEST WITHOUT CONTRAST April 23, 2018. CT calcium score planned.        Renal cyst 11/18/2015     Priority: Medium     Simple, non enhanced, 3.5 cm on right kidney, Bosniak 1 - x 2 stable findings       Diverticulosis of large intestine without hemorrhage 11/18/2015     Priority: Medium     Incidental finding on CT scan        Pulmonary nodule 11/18/2015     Priority: Medium     Incidental finding on CT scan, right posterior lung - considered benign / stable        CKD (chronic kidney disease) stage 2, GFR 60-89 ml/min 10/21/2010     Priority: Medium     60 to 80 - have discussed Lisinopril - will consider        Sciatica 02/27/2006     Priority: Medium     Morbid obesity (H) 02/27/2006     Priority: Medium       Assessment:    Car Torres seems to have settled down with the medication changes.  His wife thinks that he may be hiding his true thoughts related to thinking she has a boyfriend and suspicions of people coming into their home, despite his denial of any such thoughts.  Neuropsychological testing is starting in March to rule out reasons for cognitive changes.  In the meantime he will continue with olanzapine 7.5 mg at bedtime and fluoxetine 30 mg daily.  We will meet after the findings are reviewed with Jerry and his wife to determine future medication options, if needed..    Medication side effects and alternatives were reviewed. Health promotion activities recommended and reviewed today. All questions addressed. Education and counseling completed regarding risks and benefits of medications and psychotherapy options.     Treatment Plan:    1.  Continue olanzapine 7.5 mg at bedtime    2.  Continue fluoxetine 30 mg daily    3.  Complete neuropsych testing March 26    4.  We will meet after your follow-up appointment with your neurologist to consider future medication choices.      Continue all other medications as reviewed per  electronic medical record today.     Safety plan reviewed. To the Emergency Department as needed or call after hours crisis line at [phone] or [phone]. [place] Crisis Text Line. Text [place] to 785224 or Suicide LifeLine Chat: [email or web address].org/chat/    To schedule individual or family therapy, call [facility] at [phone].    Schedule an appointment with me in April or sooner as needed. Call [facility] at [phone] to schedule.    Follow up with primary care provider as planned or for acute medical concerns.    Call the psychiatric nurse line with medication questions or concerns at [phone].    Service Seeking may be used to communicate with your provider, but this is not intended to be used for emergencies.    Crisis Resources:    National Suicide Prevention Lifeline: 945.294.1852 (TTY: 670.973.2540). Call anytime for help.  (www.suicidepreventionlifeline.org)  National [place] on Mental Illness (www.ana maría.org): 278.442.7687 or 226-576-1170.   Mental Health Association (www.mentalhealth.org): 722.953.4790 or 853-088-6889.  [place] Crisis Text Line: Text [place] to 169059  Suicide LifeLine Chat: suicideAppHarbor.org/chat      Administrative Billing:   Time spent with patient was 30 minutes and greater than 50% of time or 20 minutes was spent in counseling and coordination of care regarding above diagnoses and treatment plan.    Patient Status:  Patient will continue to be seen for ongoing consultation and stabilization.    Signed:   BELEN Singh-BC   Psychiatry

## 2021-02-27 ASSESSMENT — ANXIETY QUESTIONNAIRES: GAD7 TOTAL SCORE: 0

## 2021-03-08 NOTE — MR AVS SNAPSHOT
After Visit Summary   6/5/2018    Car Torres    MRN: [id number]           Patient Information     Date Of Birth          1960        Visit Information        Provider Department      6/5/2018 3:30 PM Nikita Grubbs MD [facility] PHYSICIANS HEART AT [facility]        Today's Diagnoses     Coronary artery calcification    -  1    Hypertension goal BP (blood pressure) < 140/90          Care Instructions    Thank you for coming to the [facility] @ [facility]; please note the following instructions:    1. There are no changes to your medications today.    2. Please call the [facility] scheduling line at [phone] to schedule your Calcium Score Test at your convenience.     3. Take your blood pressures for 1 week and get back to us with the readings either on Qingguo or at [phone].    4. Follow up with your Primary Care Provider.      If you have any questions regarding your visit please contact your care team:     Cardiology  Telephone Number   India ELDER, RACHAEL TREADWELL, RACHAEL KELLEY, CARLTON HOLLAND MA   [phone]    *After hours: [phone]   For scheduling appts:     [phone] or    [phone] (select option 1)    *After hours: [phone]     For the Device Clinic (Pacemakers and ICD's)  RN's :  Tricia Jolley   During business hours: [phone]    *After business hours:  [phone] (select option 4)      Normal test result notifications will be released via Qingguo or mailed within 7 business days.  All other test results, will be communicated via telephone once reviewed by your cardiologist.    If you need a medication refill please contact your pharmacy.  Please allow 3 business days for your refill to be completed.    As always, thank you for trusting us with your health care needs!            Follow-ups after your visit        Your next 10 appointments already scheduled     Jun 08, 2018  3:00 PM CDT    New Sleep Patient with FANI Phillip   [facility] ([facility])    [address] [address] [id number] [phone]              Future tests that were ordered for you today     Open Future Orders        Priority Expected Expires Ordered    CT Coronary Calcium Scan Routine  6/5/2019 6/5/2018            Who to contact     If you have questions or need follow up information about today's clinic visit or your schedule please contact [facility] PHYSICIANS HEART AT [facility] directly at [phone].  Normal or non-critical lab and imaging results will be communicated to you by MyChart, letter or phone within 4 business days after the clinic has received the results. If you do not hear from us within 7 days, please contact the clinic through AmVact or phone. If you have a critical or abnormal lab result, we will notify you by phone as soon as possible.  Submit refill requests through Contin[employer] Control or call your pharmacy and they will forward the refill request to us. Please allow 3 business days for your refill to be completed.          Additional Information About Your Visit        MyChart Information     [employer] gives you secure access to your electronic health record. If you see a primary care provider, you can also send messages to your care team and make appointments. If you have questions, please call your primary care clinic.  If you do not have a primary care provider, please call [phone] and they will assist you.        Care EveryWhere ID     This is your Care EveryWhere ID. This could be used by other organizations to access your [place] medical records  [id number]        Your Vitals Were     Pulse Pulse Oximetry BMI (Body Mass Index)             61 98% 41.42 kg/m2          Blood Pressure from Last 3 Encounters:   06/05/18 154/75   05/08/18 126/68   01/06/18 142/72    Weight from Last 3 Encounters:   06/05/18 134.7  kg (297 lb)   05/08/18 136.1 kg (300 lb)   01/06/18 (!) 146.5 kg (323 lb)                 Today's Medication Changes          These changes are accurate as of 6/5/18  4:17 PM.  If you have any questions, ask your nurse or doctor.               Start taking these medicines.        Dose/Directions    Blood Pressure Monitor Kit   Used for:  Hypertension goal BP (blood pressure) < 140/90   Started by:  Nikita Grubbs MD        Dose:  1 Units   1 Units daily   Quantity:  1 kit   Refills:  0            Where to get your medicines      Some of these will need a paper prescription and others can be bought over the counter.  Ask your nurse if you have questions.     Bring a paper prescription for each of these medications     Blood Pressure Monitor Kit                Primary Care Provider Office Phone # Fax #    Fransico Hylton PA-C [phone]       [address] [facility] [address]        Equal Access to Services     MONTSERRAT ESCOBAR : Hadii aad zonia hadasho Soomaali, waaxda luqadaha, qaybta kaalmada adeegyada, waxay mariluzin hayeverton kenny . So [facility] [phone].    ATENCIÓN: Si habla español, tiene a peres disposición servicios gratuitos de asistencia lingüística. Ralphindia al [phone].    We comply with applicable federal civil rights laws and [place] laws. We do not discriminate on the basis of race, color, national origin, age, disability, sex, sexual orientation, or gender identity.            Thank you!     Thank you for choosing [facility] PHYSICIANS HEART AT [facility]  for your care. Our goal is always to provide you with excellent care. Hearing back from our patients is one way we can continue to improve our services. Please take a few minutes to complete the written survey that you may receive in the mail after your visit with us. Thank you!             Your Updated Medication List - Protect others around you: Learn how to safely use, store and throw away your  medicines at www.disposemymeds.org.          This list is accurate as of 6/5/18  4:17 PM.  Always use your most recent med list.                   Brand Name Dispense Instructions for use Diagnosis    aspirin 325 MG EC tablet     100    ONE DAILY    Unspecified essential hypertension, Mixed hyperlipidemia       atorvastatin 40 MG tablet    LIPITOR    90 tablet    Take 1 tablet (40 mg) by mouth daily    Hyperlipidemia LDL goal <130       Blood Pressure Monitor Kit     1 kit    1 Units daily    Hypertension goal BP (blood pressure) < 140/90       lisinopril-hydrochlorothiazide 20-12.5 MG per tablet    PRINZIDE/ZESTORETIC    90 tablet    Take 1 tablet by mouth daily (Cancel the HCTZ - new med)    Essential hypertension with goal blood pressure less than 140/90       MULTI VITAMIN MENS PO      None Entered        sildenafil 100 MG tablet    VIAGRA    12 tablet    Take 1 tablet (100 mg) by mouth daily as needed    Impotence of organic origin       vitamin B complex with vitamin C Tabs tablet      Take 1 tablet by mouth daily        VITAMIN D PO      Take 5,000 Units by mouth daily One tablet twice daily           Hemigard Intro: Due to skin fragility and wound tension, it was decided to use HEMIGARD adhesive retention suture devices to permit a linear closure. The skin was cleaned and dried for a 6cm distance away from the wound. Excessive hair, if present, was removed to allow for adhesion.

## 2021-03-16 NOTE — MR AVS SNAPSHOT
After Visit Summary   12/22/2017    Car Torres    MRN: [id number]           Patient Information     Date Of Birth          1960        Visit Information        Provider Department      12/22/2017 1:00 PM Fransico Hylton PA-C [facility]        Today's Diagnoses     Routine general medical examination at a health care facility    -  1    Screen for colon cancer        Hyperlipidemia LDL goal <130        Essential hypertension with goal blood pressure less than 140/90        Need for prophylactic vaccination and inoculation against influenza        Function kidney decreased        Pulmonary nodule        Snoring        Renal cyst        Acute pain of left shoulder        Impotence of organic origin          Care Instructions      1. Change to combo drug - Lisinopril / HCTZ 20/12.5 - recheck in 2 weeks with my nurse for a BP check and a kidney blood test   2. To Schedule CT scan and Ultrasound (probably do on the same day) call:  [facility] Imaging Scheduling Phone Number: [phone]  Or   [facility] Imaging Scheduling Phone number: [phone]   3. Look up rotator cuff exercises on YouTube     Preventive Health Recommendations  Male Ages 50 - 64    Yearly exam:             See your health care provider every year in order to  o   Review health changes.   o   Discuss preventive care.    o   Review your medicines if your doctor has prescribed any.     Have a cholesterol test every 5 years, or more frequently if you are at risk for high cholesterol/heart disease.     Have a diabetes test (fasting glucose) every three years. If you are at risk for diabetes, you should have this test more often.     Have a colonoscopy at age 50, or have a yearly FIT test (stool test). These exams will check for colon cancer.      Talk with your health care provider about whether or not a prostate cancer screening test (PSA) is right for you.    You should be  tested each year for STDs (sexually transmitted diseases), if you re at risk.     Shots: Get a flu shot each year. Get a tetanus shot every 10 years.     Nutrition:    Eat at least 5 servings of fruits and vegetables daily.     Eat whole-grain bread, whole-wheat pasta and brown rice instead of white grains and rice.     Talk to your provider about Calcium and Vitamin D.     Lifestyle    Exercise for at least 150 minutes a week (30 minutes a day, 5 days a week). This will help you control your weight and prevent disease.     Limit alcohol to one drink per day.     No smoking.     Wear sunscreen to prevent skin cancer.     See your dentist every six months for an exam and cleaning.     See your eye doctor every 1 to 2 years.            Follow-ups after your visit        Additional Services     GASTROENTEROLOGY ADULT REF PROCEDURE ONLY       Last Lab Result: Creatinine (mg/dL)       Date                     Value                 12/20/2017               0.91             ----------  Body mass index is 43.65 kg/(m^2).     Needed:  No  Language:  English    Patient will be contacted to schedule procedure.     Please be aware that coverage of these services is subject to the terms and limitations of your health insurance plan.  Call member services at your health plan with any benefit or coverage questions.  Any procedures must be performed at a [place] facility OR coordinated by your clinic's referral office.    Please bring the following with you to your appointment:    (1) Any X-Rays, CTs or MRIs which have been performed.  Contact the facility where they were done to arrange for  prior to your scheduled appointment.    (2) List of current medications   (3) This referral request   (4) Any documents/labs given to you for this referral            SLEEP EVALUATION & MANAGEMENT REFERRAL - [facility] [place]  [phone] (Age 15 and up)       Please be aware that coverage of  these services is subject to the terms and limitations of your health insurance plan.  Call member services at your health plan with any benefit or coverage questions.      Please bring the following to your appointment:    >>   List of current medications   >>   This referral request   >>   Any documents/labs given to you for this referral                      Future tests that were ordered for you today     Open Future Orders        Priority Expected Expires Ordered    US Renal Complete Routine  12/22/2018 12/22/2017    CT Chest w/o Contrast Routine  12/22/2018 12/22/2017    SLEEP EVALUATION & MANAGEMENT REFERRAL - ADULT -[facility]  [phone] (Age 15 and up) Routine  12/22/2018 12/22/2017    Basic metabolic panel  (Ca, Cl, CO2, Creat, Gluc, K, Na, BUN) Routine 1/5/2018 1/26/2018 12/22/2017            Who to contact     If you have questions or need follow up information about today's clinic visit or your schedule please contact [facility] directly at [phone].  Normal or non-critical lab and imaging results will be communicated to you by MyChart, letter or phone within 4 business days after the clinic has received the results. If you do not hear from us within 7 days, please contact the clinic through Franchise Fund[employer] or phone. If you have a critical or abnormal lab result, we will notify you by phone as soon as possible.  Submit refill requests through [email or web address] or call your pharmacy and they will forward the refill request to us. Please allow 3 business days for your refill to be completed.          Additional Information About Your Visit        [email or web address] Information     [email or web address] gives you secure access to your electronic health record. If you see a primary care provider, you can also send messages to your care team and make appointments. If you have questions, please call your primary care clinic.  If you do not have a primary care provider, please call [phone] and  "they will assist you.        Care EveryWhere ID     This is your Care EveryWhere ID. This could be used by other organizations to access your [place] medical records  [id number]        Your Vitals Were     Pulse Temperature Height BMI (Body Mass Index)          60 98.7  F (37.1  C) (Oral) 5' 11\" (1.803 m) 43.65 kg/m2         Blood Pressure from Last 3 Encounters:   12/22/17 164/80   10/14/16 130/82   10/23/15 128/80    Weight from Last 3 Encounters:   12/22/17 (!) 313 lb (142 kg)   10/14/16 (!) 320 lb (145.2 kg)   10/23/15 (!) 307 lb (139.3 kg)              We Performed the Following     FLU VAC, SPLIT VIRUS IM > 3 YO (QUADRIVALENT) [30138]     GASTROENTEROLOGY ADULT REF PROCEDURE ONLY     Vaccine Administration, Initial [05006]          Today's Medication Changes          These changes are accurate as of: 12/22/17  1:56 PM.  If you have any questions, ask your nurse or doctor.               Start taking these medicines.        Dose/Directions    lisinopril-hydrochlorothiazide 20-12.5 MG per tablet   Commonly known as:  PRINZIDE/ZESTORETIC   Used for:  Essential hypertension with goal blood pressure less than 140/90   Started by:  Fransico Hylton PA-C        Dose:  1 tablet   Take 1 tablet by mouth daily (Cancel the HCTZ - new med)   Quantity:  90 tablet   Refills:  1         These medicines have changed or have updated prescriptions.        Dose/Directions    atorvastatin 40 MG tablet   Commonly known as:  LIPITOR   This may have changed:  See the new instructions.   Used for:  Hyperlipidemia LDL goal <130   Changed by:  Fransico Hylton PA-C        Dose:  40 mg   Take 1 tablet (40 mg) by mouth daily   Quantity:  90 tablet   Refills:  3         Stop taking these medicines if you haven't already. Please contact your care team if you have questions.     hydrochlorothiazide 25 MG tablet   Commonly known as:  HYDRODIURIL   Stopped by:  Fransico Hylton PA-C                Where to get your medicines    "   These medications were sent to [place] [id number] [facility] [place] - [address]  [address] [place] [address]     Phone:  [phone]     atorvastatin 40 MG tablet    lisinopril-hydrochlorothiazide 20-12.5 MG per tablet                Primary Care Provider Office Phone # Fax #    Fransico Leonardo Hylton PA-C [phone]       [address] [address]        Equal Access to Services     MONTSERRAT ESCOBAR : Hadii aad ku hadasho Soomaali, waaxda luqadaha, qaybta kaalmada adeegyada, waxay idiin hayaan adeeg kharash zoya . So [facility] [phone].    ATENCIÓN: Si habla español, tiene a peres disposición servicios gratuitos de asistencia lingüística. [facility] [phone].    We comply with applicable federal civil rights laws and [place] laws. We do not discriminate on the basis of race, color, national origin, age, disability, sex, sexual orientation, or gender identity.            Thank you!     Thank you for choosing [facility]  for your care. Our goal is always to provide you with excellent care. Hearing back from our patients is one way we can continue to improve our services. Please take a few minutes to complete the written survey that you may receive in the mail after your visit with us. Thank you!             Your Updated Medication List - Protect others around you: Learn how to safely use, store and throw away your medicines at www.disposemymeds.org.          This list is accurate as of: 12/22/17  1:56 PM.  Always use your most recent med list.                   Brand Name Dispense Instructions for use Diagnosis    aspirin 325 MG EC tablet     100    ONE DAILY    Unspecified essential hypertension, Mixed hyperlipidemia       atorvastatin 40 MG tablet    LIPITOR    90 tablet    Take 1 tablet (40 mg) by mouth daily    Hyperlipidemia LDL goal <130       lisinopril-hydrochlorothiazide 20-12.5 MG per tablet    PRINZIDE/ZESTORETIC    90 tablet     Take 1 tablet by mouth daily (Cancel the HCTZ - new med)    Essential hypertension with goal blood pressure less than 140/90       MULTI VITAMIN MENS PO      None Entered        sildenafil 100 MG tablet    VIAGRA    12 tablet    Take 1 tablet (100 mg) by mouth daily as needed    Impotence of organic origin       vitamin B complex with vitamin C Tabs tablet      Take 1 tablet by mouth daily        VITAMIN D PO      Take 5,000 Units by mouth daily One tablet twice daily           English

## 2021-03-24 ENCOUNTER — DOCUMENTATION ONLY (OUTPATIENT)
Dept: SLEEP MEDICINE | Facility: CLINIC | Age: 61
End: 2021-03-24

## 2021-03-24 NOTE — PROGRESS NOTES
3/24/2021- JL- SCHEDULED APPT FOR 3/25/2021 IN [facility] TO CHANGE PRESURES ON S9 MACHINE TO [id number].

## 2021-03-26 ENCOUNTER — OFFICE VISIT (OUTPATIENT)
Dept: NEUROPSYCHOLOGY | Facility: CLINIC | Age: 61
End: 2021-03-26
Attending: INTERNAL MEDICINE
Payer: COMMERCIAL

## 2021-03-26 DIAGNOSIS — F41.9 ANXIETY: ICD-10-CM

## 2021-03-26 DIAGNOSIS — F33.1 MAJOR DEPRESSIVE DISORDER, RECURRENT EPISODE, MODERATE (H): ICD-10-CM

## 2021-03-26 DIAGNOSIS — F03.90 SENILE DEMENTIA, UNCOMPLICATED (H): Primary | ICD-10-CM

## 2021-03-26 DIAGNOSIS — R41.844 FRONTAL LOBE AND EXECUTIVE FUNCTION DEFICIT: ICD-10-CM

## 2021-03-26 PROCEDURE — 96133 NRPSYC TST EVAL PHYS/QHP EA: CPT | Performed by: CLINICAL NEUROPSYCHOLOGIST

## 2021-03-26 PROCEDURE — 96116 NUBHVL XM PHYS/QHP 1ST HR: CPT | Performed by: CLINICAL NEUROPSYCHOLOGIST

## 2021-03-26 PROCEDURE — 96139 PSYCL/NRPSYC TST TECH EA: CPT | Performed by: CLINICAL NEUROPSYCHOLOGIST

## 2021-03-26 PROCEDURE — 96138 PSYCL/NRPSYC TECH 1ST: CPT | Performed by: CLINICAL NEUROPSYCHOLOGIST

## 2021-03-26 PROCEDURE — 96132 NRPSYC TST EVAL PHYS/QHP 1ST: CPT | Performed by: CLINICAL NEUROPSYCHOLOGIST
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
## 2021-03-26 NOTE — NURSING NOTE
Pt was seen for neuropsychological evaluation at the request of Dr. Miguel Varela for the purposes of diagnostic clarification and treatment planning. 210 minutes of test administration and scoring were provided by this writer. Please see Dr. Jorgito Adam's report for a full interpretation of the findings.    Mak Greene  Psychometrist

## 2021-03-27 NOTE — PROGRESS NOTES
NAME  Car Torres   MCKNIGHT  3/26/21     MRN  [id number]   PROVIDER  JANNETTE       [birth year]    [facility]     AGE  60    STATION  Outpatient     SEX  Male           HANDEDNESS Right           EDUCATION 13                        ORIENTATION            Time  -5           Personal Info.           Place            Presidents                         WAIS-IV             FSIQ: 0 WMI: 74          VSI: 0 PSI: 66          CHARITY: ~86 RDS: 8                         Raw SS          Similarities  28 11          Block Design 32 9          Matrix Reasoning 9 6          Digit Span  19 6          Arithmetic  8 5          Symbol Search 12 4          Coding  29 4                       CAM[place]-O COMPLEX FIGURE             Raw T %ile         Copy  29  2-5         Time to Copy 194  >16                      WRAT-4               SS %ile GE         Reading  83 13 8.1                      COWAT FAS             Raw 33            SS 8            T 42                         ANIMAL FLUENCY            Raw 12            SS 6            T 34                          [place]STON NAMING TEST           Raw 56 /60           SS 10            %ile 62-79                         COMPLEX IDEATIONAL MATERIAL           Raw 11            SS 9            T 44                         TRAIL MAKING TEST             Time Errors SSa %ile         A 47 0 7 28         B 198 2 4 <7                      WCST               Raw %ile          # Categories 1 6-10          Total Errors  38 2          Perseveative Err. 31 1          FTMS:  0                                      FABY   H         Raw 23            %ile 40                         GROOVED PEGBOARD             Raw Drops SS T          0 5 38          1 5 37                      BDI-II             Raw 20            Interp. MODERATE                         JAIRO             Raw 9            Interp. MILD                          WMS-IV LOGICAL MEMORY YA          Raw SSa/%ile           LM I 10 3           LM II 9  "5           Recog. 21 10-16                         HVLT   1           Raw T          Trial 1  4           Trial 2  4           Trial 3  5           Learning  1           Total Recall  13 <20          Delayed Recall 2 <20          Percent Retention 40% <20          True Positives 11           False Positives 1           Disc. Index  10 44                        BVMT   1           Raw T/%ile          Trial 1  2           Trial 2  3           Trial 3  3           Learning  1           Total Recall  8 24          Delayed Recall 2 <20          Percent Retention 33% <1          Recognition Hits 3           Recognition F.P 0           Disc. Index  3 3-5                       LETTER CANCELLATION            Time 157\"            Errors 13                         DCT             E-Score 11                "

## 2021-04-06 ENCOUNTER — IMMUNIZATION (OUTPATIENT)
Dept: PEDIATRICS | Facility: CLINIC | Age: 61
End: 2021-04-06
Payer: COMMERCIAL

## 2021-04-06 PROCEDURE — 0001A PR COVID VAC PFIZER DIL RECON 30 MCG/0.3 ML IM: CPT

## 2021-04-06 PROCEDURE — 91300 PR COVID VAC PFIZER DIL RECON 30 MCG/0.3 ML IM: CPT

## 2021-04-10 DIAGNOSIS — I10 ESSENTIAL HYPERTENSION WITH GOAL BLOOD PRESSURE LESS THAN 140/90: ICD-10-CM

## 2021-04-12 NOTE — PROGRESS NOTES
[facility] Neurology Follow Up Visit    Car Torres MRN# [id number]   Age: 60 year old YOB: 1960     Brief history of symptoms: The patient was initially seen in neurologic consultation on 12/23/2020 for evaluation of cognitive changes. Please see the comprehensive neurologic consultation note from that date in the Epic records for details.     Interval history:   Patient recently completed neuropsychological testing and is here to discuss results. Patient denies any significant issues today. He has not noticed any issues with his cognition. He continues to work full time. He had a fall at work recently where he tripped, which resulted in him burning his left arm. Wife is very concerned about the safety of his work. He works as a . If mistakes are made at work it could result in a serious injury to himself or someone else. Patient does not share these concerns. Patient denies any issues with his driving either.     Patient is following with a psychiatrist for his psychosis. He recently decreased the dose of prosac due to sexual side effects. He is also taking zyprexa.       Past Medical History:     Patient Active Problem List   Diagnosis     Sciatica     Morbid obesity (H)     Hyperlipidemia LDL goal <130     Hypertension goal BP (blood pressure) < 140/90     CKD (chronic kidney disease) stage 2, GFR 60-89 ml/min     Renal cyst     Diverticulosis of large intestine without hemorrhage     Pulmonary nodule     Coronary artery calcification     History of sinus bradycardia     ED (erectile dysfunction)     TIA (obstructive sleep apnea)     Past Medical History:   Diagnosis Date     Nonspecific abnormal electrocardiogram (ECG) (EKG) 08/23/2007    Stress testing normal.         Past Surgical History:     Past Surgical History:   Procedure Laterality Date     COLONOSCOPY N/A 12/22/2020    Procedure: COLONOSCOPY, WITH POLYPECTOMY, CLIP;  Surgeon: Mihir Lang MD;  Location[place] [facility] [place] [place]      ESOPHAGOSCOPY, GASTROSCOPY, DUODENOSCOPY (EGD), COMBINED N/A 2021    Procedure: ESOPHAGOGASTRODUODENOSCOPY, WITH BIOPSY;  Surgeon: Mihir Lang MD;  Location: [facility] OR     SURGICAL HISTORY OF -       surgery on left index finger        Social History:     Social History     Tobacco Use     Smoking status: Former Smoker     Packs/day: 1.00     Years: 25.00     Pack years: 25.00     Types: Cigarettes, Cigars     Quit date: 1998     Years since quittin.2     Smokeless tobacco: Never Used   Substance Use Topics     Alcohol use: Yes     Alcohol/week: 10.0 standard drinks     Comment: 2-3 beers intermittently     Drug use: No        Family History:     Family History   Problem Relation Age of Onset     Cancer Mother         uterine     Other Cancer Mother         endometrial     C.A.D. Father      Hypertension Father      Breast Cancer Maternal Grandmother      Other Cancer Maternal Grandmother         Lung/brain     Hypertension Brother      Breast Cancer Other      Breast Cancer Cousin      Other Cancer Other      Glaucoma No family hx of      Macular Degeneration No family hx of         Medications:     Current Outpatient Medications   Medication Sig     ASPIRIN 325 MG OR TBEC ONE DAILY     atorvastatin (LIPITOR) 40 MG tablet TAKE 1 TABLET BY MOUTH EVERY DAY     Cholecalciferol (VITAMIN D PO) Take 5,000 Units by mouth daily One tablet twice daily     FLUoxetine (PROZAC) 20 MG capsule Take 1 capsule (20 mg) by mouth daily With 10 mg for a total of 30 mg daily     lisinopril-hydrochlorothiazide (ZESTORETIC) 20-12.5 MG tablet Take 1 tablet by mouth daily     MULTI VITAMIN MENS PO None Entered     NONFORMULARY PROMAX - Testosterone Booster     OLANZapine (ZYPREXA) 7.5 MG tablet Take 1 tablet (7.5 mg) by mouth At Bedtime     tadalafil (CIALIS) 20 MG tablet 1/2 to 1 tablet, 30 minutes to 1 hour before sex     vitamin B complex with vitamin C (VITAMIN  B COMPLEX) TABS tablet Take 1 tablet by mouth daily     No  current facility-administered medications for this visit.         Allergies:   No Known Allergies     Review of Systems:   As above     Physical Exam:   Vitals: /64 (BP Location: Right arm, Patient Position: Sitting)   Pulse 55   Wt 130.5 kg (287 lb 11.2 oz)   BMI 40.13 kg/m     General: Seated comfortably in no acute distress.  Lungs: breathing comfortably  Neurologic:     Mental Status: MoCA score 24/30 (5/5 visuospatial/executive, 3/3 naming, 2/2 list of digits, 0/1 list of letters, 1/3 serial 7s, 1/2 repeating sentence, 1/1 fluency, 2/2 abstraction, 3/5 delayed recall, 6/6 orientation).     Cranial Nerves: Visual fields intact. EOMI with normal smooth pursuit. Facial sensation intact/symmetric. Facial movements symmetric. Hearing not formally tested but intact to conversation. Palate elevation symmetric, uvula midline. No dysarthria. Shoulder shrug strong bilaterally. Tongue protrusion midline.     Motor: No tremors or other abnormal movements observed. Muscle tone normal throughout. Normal/symmetric rapid finger tapping. Strength 5/5 throughout upper and lower extremities.     Deep Tendon Reflexes: 2+/symmetric throughout upper and lower extremities.     Sensory: Intact/symmetric to light touch throughout upper and lower extremities. Negative Romberg.      Coordination: Finger-nose-finger and heel-shin intact without dysmetria. Rapid alternating movements intact/symmetric with normal speed and rhythm.     Gait: Slight slowed, but otherwise steady casual gait. Able to walk on toes, heels and tandem with mild difficulties.     Data reviewed on previous visits    Imaging:  MRI brain 10/16/2020  IMPRESSION:  1. Exam mildly limited by motion artifact.  2. Few minimal nonspecific white matter lesions.  3. No evidence for intracranial hemorrhage, acute infarct, or any  focal mass lesions.     Laboratory:  TSH, CMP wnl 8/2020  B12, CBC wnl 12/2020    Pertinent Investigations since last visit:   None          Assessment and Plan:   Assessment:  Patient is a 61 y/o male who presents for follow-up of paranoia, hallucinations, delusions, and cognitive changes, which have developed over the last year. Patient recently had neuropsychological testing with Dr Adam which was concerning for significant dysfunction in frontal and subcortical networks. In the clinical context this is concerning for a neurodegenerative process causing mild dementia. On MoCA testing today patient scored 24/30 with most deficits related to inattentiveness. Etiology of cognitive changes is unclear at this juncture. Frontotemporal dementia is a strong consideration. Lewy body dementia can present with psychosis but patient does not have other features of the disease. An atypical presentation of Alzheimer's disease is possible. We discussed that PET brain would be helpful in this case to further clarify diagnosis. We discussed that I do not think it is safe for patient to perform his job, given he works in a setting where mistakes could lead to serious injury of himself or someone else. We discussed that the best option at this juncture is to look into applying for long term disability. Patient should also refrain from driving until he has a driving evaluation. Information for driving evaluation at [facility] was given. Patient is currently following with psychiatry for management of psychosis.        Plan:  - PET brain   - Driving evaluation   - Start application for long term disability   - Continue to follow-up with psychiatrist    Follow up in Neurology clinic in 4 weeks or earlier as needed should new concerns arise.    Miguel Varela MD   of Neurology  [facility]    The total time of this encounter amounted to 78 minutes. This time included time spent with the patient, prep work, ordering tests, and performing post visit documentation.

## 2021-04-13 ENCOUNTER — OFFICE VISIT (OUTPATIENT)
Dept: NEUROLOGY | Facility: CLINIC | Age: 61
End: 2021-04-13
Payer: COMMERCIAL

## 2021-04-13 VITALS
BODY MASS INDEX: 40.13 KG/M2 | DIASTOLIC BLOOD PRESSURE: 64 MMHG | HEART RATE: 55 BPM | SYSTOLIC BLOOD PRESSURE: 120 MMHG | WEIGHT: 287.7 LBS

## 2021-04-13 DIAGNOSIS — G31.09 FRONTOTEMPORAL DEMENTIA (H): ICD-10-CM

## 2021-04-13 DIAGNOSIS — F02.80 FRONTOTEMPORAL DEMENTIA (H): ICD-10-CM

## 2021-04-13 DIAGNOSIS — R41.89 COGNITIVE CHANGE: Primary | ICD-10-CM

## 2021-04-13 DIAGNOSIS — F03.91 DEMENTIA WITH BEHAVIORAL DISTURBANCE, UNSPECIFIED DEMENTIA TYPE: ICD-10-CM

## 2021-04-13 PROCEDURE — 99215 OFFICE O/P EST HI 40 MIN: CPT | Performed by: INTERNAL MEDICINE

## 2021-04-13 PROCEDURE — 99417 PROLNG OP E/M EACH 15 MIN: CPT | Performed by: INTERNAL MEDICINE

## 2021-04-13 RX ORDER — LISINOPRIL AND HYDROCHLOROTHIAZIDE 12.5; 2 MG/1; MG/1
TABLET ORAL
Qty: 90 TABLET | Refills: 1 | Status: SHIPPED | OUTPATIENT
Start: 2021-04-13 | End: 2021-10-05

## 2021-04-13 RX ORDER — OMEGA-3 FATTY ACIDS/FISH OIL 300-1000MG
CAPSULE ORAL DAILY
COMMUNITY
End: 2023-06-05

## 2021-04-13 NOTE — NURSING NOTE
Car Torres's goals for this visit include: return      PCP: Fransico Hylton    Referring Provider:  No referring provider defined for this encounter.    /64 (BP Location: Right arm, Patient Position: Sitting)   Pulse 55   Wt 130.5 kg (287 lb 11.2 oz)   BMI 40.13 kg/m      Do you need any medication refills at today's visit? Lashell Watson LPN

## 2021-04-13 NOTE — LETTER
4/13/2021         RE: Car Torres  [address]  [place] [facility] [address]        Dear Colleague,    Thank you for referring your patient, Car Torres, to the [facility]UROLOG[facility] [place]. Please see a copy of my visit note below.    [facility] Neurology Follow Up Visit    Car Torres MRN# [id number]   Age: 60 year old YOB: 1960     Brief history of symptoms: The patient was initially seen in neurologic consultation on 12/23/2020 for evaluation of cognitive changes. Please see the comprehensive neurologic consultation note from that date in the Epic records for details.     Interval history:   Patient recently completed neuropsychological testing and is here to discuss results. Patient denies any significant issues today. He has not noticed any issues with his cognition. He continues to work full time. He had a fall at work recently where he tripped, which resulted in him burning his left arm. Wife is very concerned about the safety of his work. He works as a . If mistakes are made at work it could result in a serious injury to himself or someone else. Patient does not share these concerns. Patient denies any issues with his driving either.     Patient is following with a psychiatrist for his psychosis. He recently decreased the dose of prosac due to sexual side effects. He is also taking zyprexa.       Past Medical History:     Patient Active Problem List   Diagnosis     Sciatica     Morbid obesity (H)     Hyperlipidemia LDL goal <130     Hypertension goal BP (blood pressure) < 140/90     CKD (chronic kidney disease) stage 2, GFR 60-89 ml/min     Renal cyst     Diverticulosis of large intestine without hemorrhage     Pulmonary nodule     Coronary artery calcification     History of sinus bradycardia     ED (erectile dysfunction)     TIA (obstructive sleep apnea)     Past Medical History:   Diagnosis Date     Nonspecific abnormal electrocardiogram (ECG)  (EKG) 2007    Stress testing normal.         Past Surgical History:     Past Surgical History:   Procedure Laterality Date     COLONOSCOPY N/A 2020    Procedure: COLONOSCOPY, WITH POLYPECTOMY, CLIP;  Surgeon: Mihir Lang MD;  Location: [facility] [place]     ESOPHAGOSCOPY, GASTROSCOPY, DUODENOSCOPY (EGD), COMBINED N/A 2021    Procedure: ESOPHAGOGASTRODUODENOSCOPY, WITH BIOPSY;  Surgeon: Mihir Lang MD;  Location: [facility] [place] OR     SURGICAL HISTORY OF -       surgery on left index finger        Social History:     Social History     Tobacco Use     Smoking status: Former Smoker     Packs/day: 1.00     Years: 25.00     Pack years: 25.00     Types: Cigarettes, Cigars     Quit date: 1998     Years since quittin.2     Smokeless tobacco: Never Used   Substance Use Topics     Alcohol use: Yes     Alcohol/week: 10.0 standard drinks     Comment: 2-3 beers intermittently     Drug use: No        Family History:     Family History   Problem Relation Age of Onset     Cancer Mother         uterine     Other Cancer Mother         endometrial     C.A.D. Father      Hypertension Father      Breast Cancer Maternal Grandmother      Other Cancer Maternal Grandmother         Lung/brain     Hypertension Brother      Breast Cancer Other      Breast Cancer Cousin      Other Cancer Other      Glaucoma No family hx of      Macular Degeneration No family hx of         Medications:     Current Outpatient Medications   Medication Sig     ASPIRIN 325 MG OR TBEC ONE DAILY     atorvastatin (LIPITOR) 40 MG tablet TAKE 1 TABLET BY MOUTH EVERY DAY     Cholecalciferol (VITAMIN D PO) Take 5,000 Units by mouth daily One tablet twice daily     FLUoxetine (PROZAC) 20 MG capsule Take 1 capsule (20 mg) by mouth daily With 10 mg for a total of 30 mg daily     lisinopril-hydrochlorothiazide (ZESTORETIC) 20-12.5 MG tablet Take 1 tablet by mouth daily     MULTI VITAMIN MENS PO None Entered     NONFORMULARY PROMAX - Testosterone Booster      OLANZapine (ZYPREXA) 7.5 MG tablet Take 1 tablet (7.5 mg) by mouth At Bedtime     tadalafil (CIALIS) 20 MG tablet 1/2 to 1 tablet, 30 minutes to 1 hour before sex     vitamin B complex with vitamin C (VITAMIN  B COMPLEX) TABS tablet Take 1 tablet by mouth daily     No current facility-administered medications for this visit.         Allergies:   No Known Allergies     Review of Systems:   As above     Physical Exam:   Vitals: /64 (BP Location: Right arm, Patient Position: Sitting)   Pulse 55   Wt 130.5 kg (287 lb 11.2 oz)   BMI 40.13 kg/m     General: Seated comfortably in no acute distress.  Lungs: breathing comfortably  Neurologic:     Mental Status: MoCA score 24/30 (5/5 visuospatial/executive, 3/3 naming, 2/2 list of digits, 0/1 list of letters, 1/3 serial 7s, 1/2 repeating sentence, 1/1 fluency, 2/2 abstraction, 3/5 delayed recall, 6/6 orientation).     Cranial Nerves: Visual fields intact. EOMI with normal smooth pursuit. Facial sensation intact/symmetric. Facial movements symmetric. Hearing not formally tested but intact to conversation. Palate elevation symmetric, uvula midline. No dysarthria. Shoulder shrug strong bilaterally. Tongue protrusion midline.     Motor: No tremors or other abnormal movements observed. Muscle tone normal throughout. Normal/symmetric rapid finger tapping. Strength 5/5 throughout upper and lower extremities.     Deep Tendon Reflexes: 2+/symmetric throughout upper and lower extremities.     Sensory: Intact/symmetric to light touch throughout upper and lower extremities. Negative Romberg.      Coordination: Finger-nose-finger and heel-shin intact without dysmetria. Rapid alternating movements intact/symmetric with normal speed and rhythm.     Gait: Slight slowed, but otherwise steady casual gait. Able to walk on toes, heels and tandem with mild difficulties.     Data reviewed on previous visits    Imaging:  MRI brain 10/16/2020  IMPRESSION:  1. Exam mildly limited by motion  artifact.  2. Few minimal nonspecific white matter lesions.  3. No evidence for intracranial hemorrhage, acute infarct, or any  focal mass lesions.     Laboratory:  TSH, CMP wnl 8/2020  B12, CBC wnl 12/2020    Pertinent Investigations since last visit:   None         Assessment and Plan:   Assessment:  Patient is a 61 y/o male who presents for follow-up of paranoia, hallucinations, delusions, and cognitive changes, which have developed over the last year. Patient recently had neuropsychological testing with Dr Adam which was concerning for significant dysfunction in frontal and subcortical networks. In the clinical context this is concerning for a neurodegenerative process causing mild dementia. On MoCA testing today patient scored 24/30 with most deficits related to inattentiveness. Etiology of cognitive changes is unclear at this juncture. Frontotemporal dementia is a strong consideration. Lewy body dementia can present with psychosis but patient does not have other features of the disease. An atypical presentation of Alzheimer's disease is possible. We discussed that PET brain would be helpful in this case to further clarify diagnosis. We discussed that I do not think it is safe for patient to perform his job, given he works in a setting where mistakes could lead to serious injury of himself or someone else. We discussed that the best option at this juncture is to look into applying for long term disability. Patient should also refrain from driving until he has a driving evaluation. Information for driving evaluation at [facility] was given. Patient is currently following with psychiatry for management of psychosis.        Plan:  - PET brain   - Driving evaluation   - Start application for long term disability   - Continue to follow-up with psychiatrist    Follow up in Neurology clinic in 4 weeks or earlier as needed should new concerns arise.    Miguel Varela MD   of  Neurology  [facility]    The total time of this encounter amounted to 78 minutes. This time included time spent with the patient, prep work, ordering tests, and performing post visit documentation.        Again, thank you for allowing me to participate in the care of your patient.        Sincerely,        Miguel Varela MD

## 2021-04-15 ENCOUNTER — MYC MEDICAL ADVICE (OUTPATIENT)
Dept: PSYCHIATRY | Facility: CLINIC | Age: 61
End: 2021-04-15

## 2021-04-15 ENCOUNTER — PATIENT OUTREACH (OUTPATIENT)
Dept: CARE COORDINATION | Facility: CLINIC | Age: 61
End: 2021-04-15

## 2021-04-15 DIAGNOSIS — F33.0 MAJOR DEPRESSIVE DISORDER, RECURRENT EPISODE, MILD WITH ATYPICAL FEATURES (H): ICD-10-CM

## 2021-04-15 NOTE — PROGRESS NOTES
Social Work Telephone  Note  [facility]     Patient Name:  Car Torres  /Age:  1960 (60 year old)    Referral Source: Dr Varela - neurology  Reason for Referral:  Financial      received a return call from patient's wife, Akanksha via telephone on 4/15/21. Akanksha shared additional information pertaining to her and Jerry's financial.s  Discussed options and plans to start process in filing for SSDI.  Provided wife with information for Disability HUB - [phone].   Akanksha is planning to forward Jerry's supervisor letter from [employer] stating unable to work and planning to see if employer will tell them to file for STD or if they will lay him off.  Akanksha understands the process to both of these and plans to await Jerry's employers recommendations.  Also discussed medical insurance coverage options.  Akanksha shared that she receives SSDI and has Medicare with Jerry's insurance as her secondary insurance.  Guided Akanksha to connect with [facility]  to file for MA or MNSure.      Akanksha was very appreciative of information and resources.  Encouraged her to call with any additional concerns or questions. Sw will continue to assist as needed.             Rossy Parks, EVA, [facility]    [facility]  [phone]  [email or web address]

## 2021-04-15 NOTE — PROGRESS NOTES
Social Work Telephone Note  [facility]     Patient Name:  Car Torres  /Age:  1960 (60 year old)    Referral Source: Neurology - Dr Varela  Reason for Referral:  resources     contacted Patient's wifeKoko via telephone on 4/15/21. Messages were left on Patient's voicemail to be called back.  Sw had received a consult following a neurology appointment to connect with patient and family regarding assistance with STD and LTD process.  Spoke with wife this morning and she was at a medical appointment and unable to talk at this time.  Provided writer contact information and plan is for wifeLuzma to call writer at a more convenient time.  Sw will continue to assist as needed.          EVA Ramos, [facility]    [facility]  [phone]  [email or web address]

## 2021-04-15 NOTE — TELEPHONE ENCOUNTER
Behavioral Intake : Please contact patient to make follow up appt with Taniya Monahan: Medication inconsistencies in patient medication list with Fluoxetine 10 mg, please review.    Date of Last Office Visit: 2/26/21  Date of Next Office Visit: none scheduled   No shows since last visit: none  Cancellations since last visit: none    Medication requested: Fluoxetine 20 mg cap Date last ordered: 1/18/21 Qty: 90 Refills: 0   Medication requested: Fluoxetine 10 mg cap Date last ordered: 1/18/21 Qty: 90 Refills: 0  Medication requested: Olanzapine 7.5 mg  Date last ordered: 2/2/21 Qty: 90 Refills: 0    Review of MN ?: NA    Lapse in medication adherence greater than 5 days?: no  If yes, call patient and gather details: NA  Medication refill request verified as identical to current order?: identical to provider's note  Result of Last DAM, VPA, Li+ Level, CBC, or Carbamazepine Level (at or since last visit): NA    []Medication refilled per  Medication Refill in Ambulatory Care  policy.  [x]Medication unable to be refilled by RN due to criteria not met as indicated below:    []Eligibility - not seen in the last year   [x]Supervision - no future appointment   []Compliance - no shows, cancellations or lapse in therapy   [x]Verification - order discrepancy    []Controlled medication   []Medication not included in policy   []90-day supply request   []Other

## 2021-04-16 ENCOUNTER — TELEPHONE (OUTPATIENT)
Dept: FAMILY MEDICINE | Facility: CLINIC | Age: 61
End: 2021-04-16

## 2021-04-16 ENCOUNTER — TRANSFERRED RECORDS (OUTPATIENT)
Dept: HEALTH INFORMATION MANAGEMENT | Facility: CLINIC | Age: 61
End: 2021-04-16

## 2021-04-16 DIAGNOSIS — E78.5 HYPERLIPIDEMIA LDL GOAL <100: ICD-10-CM

## 2021-04-16 RX ORDER — OLANZAPINE 7.5 MG/1
7.5 TABLET, FILM COATED ORAL AT BEDTIME
Qty: 90 TABLET | Refills: 0 | Status: CANCELLED | OUTPATIENT
Start: 2021-04-16

## 2021-04-16 RX ORDER — OLANZAPINE 7.5 MG/1
7.5 TABLET, FILM COATED ORAL AT BEDTIME
Qty: 90 TABLET | Refills: 0 | Status: SHIPPED | OUTPATIENT
Start: 2021-04-16 | End: 2021-05-28

## 2021-04-16 RX ORDER — FLUOXETINE 10 MG/1
10 CAPSULE ORAL DAILY
Qty: 90 CAPSULE | Refills: 0 | Status: SHIPPED | OUTPATIENT
Start: 2021-04-16 | End: 2021-05-28

## 2021-04-17 NOTE — TELEPHONE ENCOUNTER
Routing refill request to provider for review/approval because:  Labs not current:    Lab Results   Component Value Date    CHOL 166 07/09/2019     Lab Results   Component Value Date    HDL 76 07/09/2019     Lab Results   Component Value Date    LDL 77 07/09/2019     Lab Results   Component Value Date    TRIG 66 07/09/2019     Lab Results   Component Value Date    CHOLHDLRATIO 3.1 07/13/2015

## 2021-04-19 RX ORDER — ATORVASTATIN CALCIUM 40 MG/1
40 TABLET, FILM COATED ORAL DAILY
Qty: 90 TABLET | Refills: 1 | Status: SHIPPED | OUTPATIENT
Start: 2021-04-19 | End: 2021-10-05

## 2021-04-21 ENCOUNTER — ANCILLARY PROCEDURE (OUTPATIENT)
Dept: PET IMAGING | Facility: CLINIC | Age: 61
End: 2021-04-21
Attending: INTERNAL MEDICINE
Payer: COMMERCIAL

## 2021-04-21 DIAGNOSIS — F02.80 FRONTOTEMPORAL DEMENTIA (H): ICD-10-CM

## 2021-04-21 DIAGNOSIS — G31.09 FRONTOTEMPORAL DEMENTIA (H): ICD-10-CM

## 2021-04-21 DIAGNOSIS — R41.89 COGNITIVE CHANGE: ICD-10-CM

## 2021-04-21 LAB — GLUCOSE SERPL-MCNC: 100 MG/DL (ref 70–99)

## 2021-04-22 ENCOUNTER — OFFICE VISIT (OUTPATIENT)
Dept: FAMILY MEDICINE | Facility: CLINIC | Age: 61
End: 2021-04-22
Payer: COMMERCIAL

## 2021-04-22 VITALS
BODY MASS INDEX: 41.66 KG/M2 | WEIGHT: 291 LBS | SYSTOLIC BLOOD PRESSURE: 118 MMHG | HEART RATE: 53 BPM | HEIGHT: 70 IN | DIASTOLIC BLOOD PRESSURE: 54 MMHG | RESPIRATION RATE: 13 BRPM | TEMPERATURE: 98.5 F | OXYGEN SATURATION: 98 %

## 2021-04-22 DIAGNOSIS — L97.529 ULCER OF LEFT FOOT, UNSPECIFIED ULCER STAGE (H): Primary | ICD-10-CM

## 2021-04-22 PROCEDURE — 99213 OFFICE O/P EST LOW 20 MIN: CPT | Performed by: FAMILY MEDICINE

## 2021-04-22 RX ORDER — CEPHALEXIN 500 MG/1
500 CAPSULE ORAL 2 TIMES DAILY
Qty: 14 CAPSULE | Refills: 0 | Status: SHIPPED | OUTPATIENT
Start: 2021-04-22 | End: 2021-08-30

## 2021-04-22 ASSESSMENT — MIFFLIN-ST. JEOR: SCORE: 2130.6

## 2021-04-22 ASSESSMENT — PAIN SCALES - GENERAL: PAINLEVEL: MODERATE PAIN (5)

## 2021-04-22 NOTE — PROGRESS NOTES
"    Assessment & Plan     Ulcer of left foot, unspecified ulcer stage (H)  More like venous ulcer at lateral left ankle.  No history of Diabetes.  Good sensations and capillary refills  Keep clean and dry  - cephALEXin (KEFLEX) 500 MG capsule; Take 1 capsule (500 mg) by mouth 2 times daily      No follow-ups on file.    Christ Silva MD  [facility] is a 60 year old who presents for the following health issues  Patient is here with sore to his left ankle lasting about 5 or more days. Patient stated that he soaked them water with honey.   No history of injuries, no history of diabetes    Review of Systems   Constitutional, HEENT, cardiovascular, pulmonary, gi and gu systems are negative, except as otherwise noted.      Objective    Pulse 53   Temp 98.5  F (36.9  C) (Oral)   Resp 13   Ht 1.785 m (5' 10.28\")   Wt 132 kg (291 lb)   SpO2 98%   BMI 41.43 kg/m    Body mass index is 41.43 kg/m .  Physical Exam   GENERAL: healthy, alert and no distress  SKIN: left, lateral ankle, an area of 1 cm by 1 cm of redness, surrounded by erythema, no discharges  PSYCH: mentation appears normal, affect normal/bright  Diabetic foot exam: normal DP and PT pulses, no trophic changes or ulcerative lesions, normal sensory exam and normal monofilament exam    Christ Silva MD        "

## 2021-04-23 ENCOUNTER — DOCUMENTATION ONLY (OUTPATIENT)
Dept: NEUROLOGY | Facility: CLINIC | Age: 61
End: 2021-04-23

## 2021-04-23 ENCOUNTER — TELEPHONE (OUTPATIENT)
Dept: NEUROLOGY | Facility: CLINIC | Age: 61
End: 2021-04-23

## 2021-04-23 DIAGNOSIS — E78.5 HYPERLIPIDEMIA LDL GOAL <100: ICD-10-CM

## 2021-04-23 DIAGNOSIS — F03.91 DEMENTIA WITH BEHAVIORAL DISTURBANCE, UNSPECIFIED DEMENTIA TYPE: Primary | ICD-10-CM

## 2021-04-23 LAB
CHOLEST SERPL-MCNC: 161 MG/DL
HDLC SERPL-MCNC: 79 MG/DL
LDLC SERPL CALC-MCNC: 65 MG/DL
NONHDLC SERPL-MCNC: 82 MG/DL
TRIGL SERPL-MCNC: 87 MG/DL

## 2021-04-23 PROCEDURE — 80061 LIPID PANEL: CPT | Performed by: PHYSICIAN ASSISTANT

## 2021-04-23 PROCEDURE — 36415 COLL VENOUS BLD VENIPUNCTURE: CPT | Performed by: PHYSICIAN ASSISTANT

## 2021-04-23 RX ORDER — DONEPEZIL HYDROCHLORIDE 5 MG/1
5 TABLET, FILM COATED ORAL DAILY
Qty: 30 TABLET | Refills: 4 | Status: SHIPPED | OUTPATIENT
Start: 2021-04-23 | End: 2021-05-19

## 2021-04-23 NOTE — TELEPHONE ENCOUNTER
Called wife regarding PET scan which was unrevealing. We discussed trial of low dose donepezil 5 mg nightly to see if this improves cognition at all.     Miguel Varela MD

## 2021-04-26 ENCOUNTER — PATIENT OUTREACH (OUTPATIENT)
Dept: CARE COORDINATION | Facility: CLINIC | Age: 61
End: 2021-04-26

## 2021-04-26 NOTE — PROGRESS NOTES
Social Work Telephone Message Note  [facility]     Patient Name:  Car Torres  /Age:  1960 (60 year old)    Referral Source: Neurology  Reason for Referral:  Disability guidance     contacted patient's wife, Akanksha via telephone on 21.  Sw had received a consult to connect with patients wife regarding disability.  We had previously spoken regarding this and wife's plan was to see if he would qualify for STD via his employer and waiting for a response from them.  At that time had also provided her with Disability HUB contact information and guided her through process.  Akanksha, herself, is on SSDI and is aware and understanding of process due to her own experience.    When speaking to Akanksha today, she shared that they are in the midst of applying for STD via Jerry's employer.  She had just sent them a release, so she is able to speak to them on behalf of Jerry.   Jerry doesn't have a LTD benefit because it was waived.  Encouraged her to connect with Disability HUB to start process for SSDI.  Akanksha shared they they are going to [place] at the end of this week with their adult children and she is planning to start that process when they return.  Confirmed that she still had their contact information from previous conversation.  Also, confirmed she had writer contact information and encouraged her to call with any additional concerns or questions. Nando will continue to assist as needed.          EVA Ramos, [facility]    [facility]  [phone]  [email or web address]

## 2021-04-27 ENCOUNTER — IMMUNIZATION (OUTPATIENT)
Dept: PEDIATRICS | Facility: CLINIC | Age: 61
End: 2021-04-27
Attending: INTERNAL MEDICINE
Payer: COMMERCIAL

## 2021-04-27 PROCEDURE — 91300 PR COVID VAC PFIZER DIL RECON 30 MCG/0.3 ML IM: CPT

## 2021-04-27 PROCEDURE — 0002A PR COVID VAC PFIZER DIL RECON 30 MCG/0.3 ML IM: CPT

## 2021-04-30 ENCOUNTER — DOCUMENTATION ONLY (OUTPATIENT)
Dept: NEUROLOGY | Facility: CLINIC | Age: 61
End: 2021-04-30

## 2021-05-19 ENCOUNTER — OFFICE VISIT (OUTPATIENT)
Dept: NEUROLOGY | Facility: CLINIC | Age: 61
End: 2021-05-19
Payer: COMMERCIAL

## 2021-05-19 VITALS
WEIGHT: 310 LBS | BODY MASS INDEX: 44.13 KG/M2 | SYSTOLIC BLOOD PRESSURE: 138 MMHG | DIASTOLIC BLOOD PRESSURE: 81 MMHG | HEART RATE: 52 BPM | RESPIRATION RATE: 16 BRPM

## 2021-05-19 DIAGNOSIS — F03.91 DEMENTIA WITH BEHAVIORAL DISTURBANCE, UNSPECIFIED DEMENTIA TYPE: ICD-10-CM

## 2021-05-19 PROCEDURE — 99215 OFFICE O/P EST HI 40 MIN: CPT | Performed by: INTERNAL MEDICINE

## 2021-05-19 RX ORDER — DONEPEZIL HYDROCHLORIDE 10 MG/1
10 TABLET, FILM COATED ORAL DAILY
Qty: 90 TABLET | Refills: 1 | Status: SHIPPED | OUTPATIENT
Start: 2021-05-19 | End: 2021-08-09

## 2021-05-19 NOTE — PROGRESS NOTES
[facility] Neurology Follow Up Visit    Car Torres MRN# [id number]   Age: 60 year old YOB: 1960     Brief history of symptoms: The patient was initially seen in neurologic consultation on 12/23/2020 for evaluation of cognitive changes. Please see the comprehensive neurologic consultation note from that date in the Epic records for details.     Interval history:   He has not heard any of the same voices. He thinks the last time was about a month ago. Patient and  were in [place] a few weeks ago and this was enjoyable. Patient's mood have been much improved. They were watching TV in [place] and there was a muffled voice on TV. Jerry told his wife that this is what the voices in his head sound like.     Short term memory has been a little worse. He will forget some conversations that he had with his wife. Patient started Aricept without side effects. No clear positive benefits have been appreciated with the medication either.     Patient had driving evaluation and passed without restrictions.     Jerry sleeps a lot during the day. He is moving slower compared to before.       Past Medical History:     Patient Active Problem List   Diagnosis     Sciatica     Morbid obesity (H)     Hyperlipidemia LDL goal <130     Hypertension goal BP (blood pressure) < 140/90     CKD (chronic kidney disease) stage 2, GFR 60-89 ml/min     Renal cyst     Diverticulosis of large intestine without hemorrhage     Pulmonary nodule     Coronary artery calcification     History of sinus bradycardia     ED (erectile dysfunction)     TIA (obstructive sleep apnea)     Past Medical History:   Diagnosis Date     Nonspecific abnormal electrocardiogram (ECG) (EKG) 08/23/2007    Stress testing normal.         Past Surgical History:     Past Surgical History:   Procedure Laterality Date     COLONOSCOPY N/A 12/22/2020    Procedure: COLONOSCOPY, WITH POLYPECTOMY, CLIP;  Surgeon: Mihir Lang MD;  Location: [facility] OR     ESOPHAGOSCOPY,  GASTROSCOPY, DUODENOSCOPY (EGD), COMBINED N/A 2021    Procedure: ESOPHAGOGASTRODUODENOSCOPY, WITH BIOPSY;  Surgeon: Mihir Lang MD;  Location: [facility] OR     SURGICAL HISTORY OF -       surgery on left index finger        Social History:     Social History     Tobacco Use     Smoking status: Former Smoker     Packs/day: 1.00     Years: 25.00     Pack years: 25.00     Types: Cigarettes, Cigars     Quit date: 1998     Years since quittin.3     Smokeless tobacco: Never Used   Substance Use Topics     Alcohol use: Yes     Alcohol/week: 10.0 standard drinks     Comment: 2-3 beers intermittently     Drug use: No        Family History:     Family History   Problem Relation Age of Onset     Cancer Mother         uterine     Other Cancer Mother         endometrial     C.A.D. Father      Hypertension Father      Breast Cancer Maternal Grandmother      Other Cancer Maternal Grandmother         Lung/brain     Hypertension Brother      Breast Cancer Other      Breast Cancer Cousin      Other Cancer Other      Glaucoma No family hx of      Macular Degeneration No family hx of         Medications:     Current Outpatient Medications   Medication Sig     ASPIRIN 325 MG OR TBEC ONE DAILY     atorvastatin (LIPITOR) 40 MG tablet Take 1 tablet (40 mg) by mouth daily     cephALEXin (KEFLEX) 500 MG capsule Take 1 capsule (500 mg) by mouth 2 times daily     Cholecalciferol (VITAMIN D PO) Take 5,000 Units by mouth daily One tablet twice daily     donepezil (ARICEPT) 5 MG tablet Take 1 tablet (5 mg) by mouth daily     FLUoxetine (PROZAC) 10 MG capsule Take 1 capsule (10 mg) by mouth daily     FLUoxetine (PROZAC) 20 MG capsule Take 1 capsule (20 mg) by mouth daily With 10 mg for a total of 30 mg daily     lisinopril-hydrochlorothiazide (ZESTORETIC) 20-12.5 MG tablet TAKE 1 TABLET BY MOUTH EVERY DAY     MULTI VITAMIN MENS PO None Entered     NEW MED daily Multivitamin shake- levell brand     NONFORMULARY PROMAX - Testosterone  Booster     OLANZapine (ZYPREXA) 7.5 MG tablet Take 1 tablet (7.5 mg) by mouth At Bedtime     omega 3 1000 MG CAPS Take by mouth daily     tadalafil (CIALIS) 20 MG tablet 1/2 to 1 tablet, 30 minutes to 1 hour before sex     TURMERIC PO Take by mouth daily     vitamin B complex with vitamin C (VITAMIN  B COMPLEX) TABS tablet Take 1 tablet by mouth daily     No current facility-administered medications for this visit.         Allergies:   No Known Allergies     Review of Systems:   As above     Physical Exam:   Vitals: There were no vitals taken for this visit.   General: Seated comfortably in no acute distress.  Lungs: breathing comfortably  Neurologic:     Mental Status: Patient is alert and fully oriented. Mildly inattentive. Able to do initial serial 7, but none after that. 4/5 on delayed recall. Able to repeat sentence if he is attentive enough. Able to name the current and last president. Forgot the name of the president before Trump. Able to do simple math questions (dollar amount of 6 quarters).      Cranial Nerves: Visual fields intact. EOMI with normal smooth pursuit. Facial sensation intact/symmetric. Facial movements symmetric. Hearing not formally tested but intact to conversation. Palate elevation symmetric, uvula midline. No dysarthria. Shoulder shrug strong bilaterally. Tongue protrusion midline.     Motor: No tremors or other abnormal movements observed. Muscle tone normal throughout. Normal/symmetric rapid finger tapping. Strength 5/5 throughout upper and lower extremities.     Sensory: Intact/symmetric to light touch throughout upper and lower extremities. Negative Romberg.      Coordination: Finger-nose-finger and heel-shin intact without dysmetria. Rapid alternating movements intact/symmetric with normal speed and rhythm.     Gait: Slight slowed, but otherwise steady casual gait. Able to walk on toes, heels and tandem with mild difficulties.     Data reviewed on previous visits    Imaging:  MRI  brain 10/16/2020  IMPRESSION:  1. Exam mildly limited by motion artifact.  2. Few minimal nonspecific white matter lesions.  3. No evidence for intracranial hemorrhage, acute infarct, or any  focal mass lesions.     Laboratory:  TSH, CMP wnl 8/2020  B12, CBC wnl 12/2020    Pertinent Investigations since last visit:   PET brain 4/2021  IMPRESSION:  No significant metabolic deficits when compared to  control database. This may be due to the possible underlying dementia  being too early to characterize, versus the patient's symptomatology  being due to some other process than dementia.         Assessment and Plan:   Assessment:  Patient is a 59 y/o male who presents for follow-up of paranoia, hallucinations, delusions, and cognitive changes, which slowly progressed starting spring 2020. Patient has no prior history of psychiatric disease. Patient had neuropsychological testing with Dr Adam in 3/2021 which was concerning for significant dysfunction in frontal and subcortical networks, and in the clinical context, concerning for a neurodegenerative process causing mild dementia. PET brain was completed without any revealing abnormalities. In terms of patient's specific diagnosis frontotemporal dementia is a strong consideration. Lewy body dementia can present with psychosis but patient does not have other features of the disease. An atypical presentation of Alzheimer's disease is possible. We discussed increasing Aricept to 10 mg nightly. I do not think it is safe for Jerry to complete the duties of his job at this time.     Plan:  - Increase Donepezil to 10 mg nightly   - Continue to follow-up with psychiatrist - currently on Zyprexa 7.5 mg nightly and Prosac 30 mg daily    Follow up in Neurology clinic in 2 months or earlier as needed should new concerns arise.    Miguel Varela MD   of Neurology  [facility]    The total time of this encounter amounted to 40 minutes. This time included  time spent with the patient, prep work, ordering tests, and performing post visit documentation.

## 2021-05-19 NOTE — LETTER
5/19/2021         RE: Car Torres  [address]  [facility] [address]        Dear Colleague,    Thank you for referring your patient, Car Torres, to the [facility] [place]. Please see a copy of my visit note below.    [facility] Neurology Follow Up Visit    Car Torres MRN# [id number]   Age: 60 year old YOB: 1960     Brief history of symptoms: The patient was initially seen in neurologic consultation on 12/23/2020 for evaluation of cognitive changes. Please see the comprehensive neurologic consultation note from that date in the Epic records for details.     Interval history:   He has not heard any of the same voices. He thinks the last time was about a month ago. Patient and  were in [place] a few weeks ago and this was enjoyable. Patient's mood have been much improved. They were watching TV in [place] and there was a muffled voice on TV. Jerry told his wife that this is what the voices in his head sound like.     Short term memory has been a little worse. He will forget some conversations that he had with his wife. Patient started Aricept without side effects. No clear positive benefits have been appreciated with the medication either.     Patient had driving evaluation and passed without restrictions.     Jerry sleeps a lot during the day. He is moving slower compared to before.       Past Medical History:     Patient Active Problem List   Diagnosis     Sciatica     Morbid obesity (H)     Hyperlipidemia LDL goal <130     Hypertension goal BP (blood pressure) < 140/90     CKD (chronic kidney disease) stage 2, GFR 60-89 ml/min     Renal cyst     Diverticulosis of large intestine without hemorrhage     Pulmonary nodule     Coronary artery calcification     History of sinus bradycardia     ED (erectile dysfunction)     TIA (obstructive sleep apnea)     Past Medical History:   Diagnosis Date     Nonspecific abnormal electrocardiogram (ECG) (EKG)  2007    Stress testing normal.         Past Surgical History:     Past Surgical History:   Procedure Laterality Date     COLONOSCOPY N/A 2020    Procedure: COLONOSCOPY, WITH POLYPECTOMY, CLIP;  Surgeon: Mihir Lang MD;  Location: [facility] OR     ESOPHAGOSCOPY, GASTROSCOPY, DUODENOSCOPY (EGD), COMBINED N/A 2021    Procedure: ESOPHAGOGASTRODUODENOSCOPY, WITH BIOPSY;  Surgeon: Mihir Lang MD;  Location: [facility] OR     SURGICAL HISTORY OF -       surgery on left index finger        Social History:     Social History     Tobacco Use     Smoking status: Former Smoker     Packs/day: 1.00     Years: 25.00     Pack years: 25.00     Types: Cigarettes, Cigars     Quit date: 1998     Years since quittin.3     Smokeless tobacco: Never Used   Substance Use Topics     Alcohol use: Yes     Alcohol/week: 10.0 standard drinks     Comment: 2-3 beers intermittently     Drug use: No        Family History:     Family History   Problem Relation Age of Onset     Cancer Mother         uterine     Other Cancer Mother         endometrial     C.A.D. Father      Hypertension Father      Breast Cancer Maternal Grandmother      Other Cancer Maternal Grandmother         Lung/brain     Hypertension Brother      Breast Cancer Other      Breast Cancer Cousin      Other Cancer Other      Glaucoma No family hx of      Macular Degeneration No family hx of         Medications:     Current Outpatient Medications   Medication Sig     ASPIRIN 325 MG OR TBEC ONE DAILY     atorvastatin (LIPITOR) 40 MG tablet Take 1 tablet (40 mg) by mouth daily     cephALEXin (KEFLEX) 500 MG capsule Take 1 capsule (500 mg) by mouth 2 times daily     Cholecalciferol (VITAMIN D PO) Take 5,000 Units by mouth daily One tablet twice daily     donepezil (ARICEPT) 5 MG tablet Take 1 tablet (5 mg) by mouth daily     FLUoxetine (PROZAC) 10 MG capsule Take 1 capsule (10 mg) by mouth daily     FLUoxetine (PROZAC) 20 MG capsule Take 1 capsule (20 mg) by mouth  daily With 10 mg for a total of 30 mg daily     lisinopril-hydrochlorothiazide (ZESTORETIC) 20-12.5 MG tablet TAKE 1 TABLET BY MOUTH EVERY DAY     MULTI VITAMIN MENS PO None Entered     NEW MED daily Multivitamin shake- levell brand     NONFORMULARY PROMAX - Testosterone Booster     OLANZapine (ZYPREXA) 7.5 MG tablet Take 1 tablet (7.5 mg) by mouth At Bedtime     omega 3 1000 MG CAPS Take by mouth daily     tadalafil (CIALIS) 20 MG tablet 1/2 to 1 tablet, 30 minutes to 1 hour before sex     TURMERIC PO Take by mouth daily     vitamin B complex with vitamin C (VITAMIN  B COMPLEX) TABS tablet Take 1 tablet by mouth daily     No current facility-administered medications for this visit.         Allergies:   No Known Allergies     Review of Systems:   As above     Physical Exam:   Vitals: There were no vitals taken for this visit.   General: Seated comfortably in no acute distress.  Lungs: breathing comfortably  Neurologic:     Mental Status: Patient is alert and fully oriented. Mildly inattentive. Able to do initial serial 7, but none after that. 4/5 on delayed recall. Able to repeat sentence if he is attentive enough. Able to name the current and last president. Forgot the name of the president before Trump. Able to do simple math questions (dollar amount of 6 quarters).      Cranial Nerves: Visual fields intact. EOMI with normal smooth pursuit. Facial sensation intact/symmetric. Facial movements symmetric. Hearing not formally tested but intact to conversation. Palate elevation symmetric, uvula midline. No dysarthria. Shoulder shrug strong bilaterally. Tongue protrusion midline.     Motor: No tremors or other abnormal movements observed. Muscle tone normal throughout. Normal/symmetric rapid finger tapping. Strength 5/5 throughout upper and lower extremities.     Sensory: Intact/symmetric to light touch throughout upper and lower extremities. Negative Romberg.      Coordination: Finger-nose-finger and heel-shin intact  without dysmetria. Rapid alternating movements intact/symmetric with normal speed and rhythm.     Gait: Slight slowed, but otherwise steady casual gait. Able to walk on toes, heels and tandem with mild difficulties.     Data reviewed on previous visits    Imaging:  MRI brain 10/16/2020  IMPRESSION:  1. Exam mildly limited by motion artifact.  2. Few minimal nonspecific white matter lesions.  3. No evidence for intracranial hemorrhage, acute infarct, or any  focal mass lesions.     Laboratory:  TSH, CMP wnl 8/2020  B12, CBC wnl 12/2020    Pertinent Investigations since last visit:   PET brain 4/2021  IMPRESSION:  No significant metabolic deficits when compared to  control database. This may be due to the possible underlying dementia  being too early to characterize, versus the patient's symptomatology  being due to some other process than dementia.         Assessment and Plan:   Assessment:  Patient is a 61 y/o male who presents for follow-up of paranoia, hallucinations, delusions, and cognitive changes, which slowly progressed starting spring 2020. Patient has no prior history of psychiatric disease. Patient had neuropsychological testing with Dr Adam in 3/2021 which was concerning for significant dysfunction in frontal and subcortical networks, and in the clinical context, concerning for a neurodegenerative process causing mild dementia. PET brain was completed without any revealing abnormalities. In terms of patient's specific diagnosis frontotemporal dementia is a strong consideration. Lewy body dementia can present with psychosis but patient does not have other features of the disease. An atypical presentation of Alzheimer's disease is possible. We discussed increasing Aricept to 10 mg nightly. I do not think it is safe for Jerry to complete the duties of his job at this time.     Plan:  - Increase Donepezil to 10 mg nightly   - Continue to follow-up with psychiatrist - currently on Zyprexa 7.5 mg nightly and  Prosac 30 mg daily    Follow up in Neurology clinic in 2 months or earlier as needed should new concerns arise.    Miguel Varela MD   of Neurology  [facility]    The total time of this encounter amounted to 40 minutes. This time included time spent with the patient, prep work, ordering tests, and performing post visit documentation.        Again, thank you for allowing me to participate in the care of your patient.        Sincerely,        Miguel Varela MD

## 2021-05-25 ENCOUNTER — MYC MEDICAL ADVICE (OUTPATIENT)
Dept: NEUROLOGY | Facility: CLINIC | Age: 61
End: 2021-05-25

## 2021-05-25 NOTE — TELEPHONE ENCOUNTER
Office note from 5/19 was fax to [phone], fax number and case number was provided in [employer] message, per patient office note to be fax

## 2021-05-28 ENCOUNTER — VIRTUAL VISIT (OUTPATIENT)
Dept: PSYCHIATRY | Facility: CLINIC | Age: 61
End: 2021-05-28
Payer: COMMERCIAL

## 2021-05-28 DIAGNOSIS — F33.0 MAJOR DEPRESSIVE DISORDER, RECURRENT EPISODE, MILD WITH ATYPICAL FEATURES (H): ICD-10-CM

## 2021-05-28 PROCEDURE — 99214 OFFICE O/P EST MOD 30 MIN: CPT | Mod: 95 | Performed by: NURSE PRACTITIONER

## 2021-05-28 RX ORDER — FLUOXETINE 10 MG/1
10 CAPSULE ORAL DAILY
Qty: 90 CAPSULE | Refills: 0 | Status: SHIPPED | OUTPATIENT
Start: 2021-05-28 | End: 2021-08-30 | Stop reason: DRUGHIGH

## 2021-05-28 RX ORDER — OLANZAPINE 7.5 MG/1
7.5 TABLET, FILM COATED ORAL AT BEDTIME
Qty: 90 TABLET | Refills: 0 | Status: SHIPPED | OUTPATIENT
Start: 2021-05-28 | End: 2021-08-30

## 2021-05-28 ASSESSMENT — PATIENT HEALTH QUESTIONNAIRE - PHQ9
SUM OF ALL RESPONSES TO PHQ QUESTIONS 1-9: 0
5. POOR APPETITE OR OVEREATING: NOT AT ALL

## 2021-05-28 ASSESSMENT — ANXIETY QUESTIONNAIRES
GAD7 TOTAL SCORE: 0
3. WORRYING TOO MUCH ABOUT DIFFERENT THINGS: NOT AT ALL
IF YOU CHECKED OFF ANY PROBLEMS ON THIS QUESTIONNAIRE, HOW DIFFICULT HAVE THESE PROBLEMS MADE IT FOR YOU TO DO YOUR WORK, TAKE CARE OF THINGS AT HOME, OR GET ALONG WITH OTHER PEOPLE: NOT DIFFICULT AT ALL
7. FEELING AFRAID AS IF SOMETHING AWFUL MIGHT HAPPEN: NOT AT ALL
6. BECOMING EASILY ANNOYED OR IRRITABLE: NOT AT ALL
5. BEING SO RESTLESS THAT IT IS HARD TO SIT STILL: NOT AT ALL
1. FEELING NERVOUS, ANXIOUS, OR ON EDGE: NOT AT ALL
2. NOT BEING ABLE TO STOP OR CONTROL WORRYING: NOT AT ALL

## 2021-05-28 NOTE — PROGRESS NOTES
"How would you like to obtain your AVS? Jamil  Telephone visit [phone]      Location of patient:  home   [address] [facility] [address]  Any new OTC medications: No  Recent height or weight: Yes  Recent BP/HR: Yes  Alcohol use:  rare If yes, how many drinks per week: zero   Current other substance use (including Vaping):  Denies  Any updates with mental health (hospital stay, ER, etc) that Lara should know about: No  Taking medications every day: Yes  If female: Birth control: No  What is the most important thing you would like addressed today: recheck on meds            Outpatient Psychiatric Progress Note    Name: Car Torres   : 1960                    Primary Care Provider: ANCA STEPHENSON PA-C   Therapist: None    PHQ-9 scores:  PHQ-9 SCORE 2021   PHQ-9 Total Score 10 8       SHAHNAZ-7 scores:  SHAHNAZ-7 SCORE 2021   Total Score 0 0       Patient Identification:    Patient is a 61 year old year old,   White Not  or  male  who presents for return visit with me.  Patient is currently on medical leave from His place of employment. Patient attended the session with His wife , who they agreed to have interview with. Patient prefers to be called: \" Jerry\".    Interim History:    I last saw Car Torres for outpatient psychiatry Return Visit on 2021.     During that appointment, he seemed to have settled down with the medication changes.  His wife thinks that he may be hiding his true thoughts related to thinking she has a boyfriend and suspicions of people coming into their home, despite his denial of any such thoughts.  Neuropsychological testing is starting in March to rule out reasons for cognitive changes.  In the meantime he will continue with olanzapine 7.5 mg at bedtime and fluoxetine 30 mg daily.  We will meet after the findings are reviewed with Jerry and his wife to determine future medication options, if " needed..    .     Current medications include: ASPIRIN 325 MG OR TBEC, ONE DAILY  atorvastatin (LIPITOR) 40 MG tablet, Take 1 tablet (40 mg) by mouth daily  cephALEXin (KEFLEX) 500 MG capsule, Take 1 capsule (500 mg) by mouth 2 times daily  Cholecalciferol (VITAMIN D PO), Take 5,000 Units by mouth daily One tablet twice daily  donepezil (ARICEPT) 10 MG tablet, Take 1 tablet (10 mg) by mouth daily  FLUoxetine (PROZAC) 10 MG capsule, Take 1 capsule (10 mg) by mouth daily  FLUoxetine (PROZAC) 20 MG capsule, Take 1 capsule (20 mg) by mouth daily With 10 mg for a total of 30 mg daily  lisinopril-hydrochlorothiazide (ZESTORETIC) 20-12.5 MG tablet, TAKE 1 TABLET BY MOUTH EVERY DAY  MULTI VITAMIN MENS PO, None Entered  NEW MED, daily Multivitamin shake- levell brand  NONFORMULARY, PROMAX - Testosterone Booster  OLANZapine (ZYPREXA) 7.5 MG tablet, Take 1 tablet (7.5 mg) by mouth At Bedtime  omega 3 1000 MG CAPS, Take by mouth daily  tadalafil (CIALIS) 20 MG tablet, 1/2 to 1 tablet, 30 minutes to 1 hour before sex  TURMERIC PO, Take by mouth daily  vitamin B complex with vitamin C (VITAMIN  B COMPLEX) TABS tablet, Take 1 tablet by mouth daily    No current facility-administered medications on file prior to visit.        The [place] Prescription Monitoring Program has been reviewed and there are no concerns about diversionary activity for controlled substances at this time.      I was able to review most recent Primary Care Provider, specialty provider, and therapy visit notes that I have access to.     Today, patient reports that he has made no changes in his medications.  He feels like they have helped him be in a better mood and has gotten his sense.   of humor back.  He has been working on crafts.  He has not been working.  He is on short term disability.  He is not overly worried about money yet.  He started medication for short term memory loss.  Abnormalities in frontal lobe detected.   No issues with trusting his  wife and no fears of people breaking into his house.  He has had less episodes of getting stuck with coming up with the right words to say.  He goes to bed at midnight and wakes up at 10 AM.  His wife feels like Jerry is doing better.       has a past medical history of Nonspecific abnormal electrocardiogram (ECG) (EKG) (08/23/2007). He also has no past medical history of Arthritis, Cancer (H), Cerebral infarction (H), Congestive heart failure (H), Congestive heart failure, unspecified, COPD (chronic obstructive pulmonary disease) (H), Depressive disorder, Diabetes (H), History of blood transfusion, Thyroid disease, or Uncomplicated asthma.    Social history updates:    He lives with his wife. He is on medical leave from work with the possibility of applying for disability.      Substance use updates:    Rare use of alcohol - limited to two beers per social event  Tobacco use: No    Vital Signs:   There were no vitals taken for this visit.    Labs:    Most recent laboratory results reviewed and no new labs.     Review of Systems:  10 systems (general, cardiovascular, respiratory, eyes, ENT, endocrine, GI, , M/S, neurological) were reviewed. Most pertinent finding(s) is/are: no severe pain, sciatica left leg, no chest pain, no shortness of breath, no headaches, no skin rashes. The remaining systems are all unremarkable.    Mental Status Examination:  Appearance:  awake, alert  Attitude:  cooperative   Eye Contact:  unable to assess  Gait and Station: No assistive Devices used and No dizziness or falls  Psychomotor Behavior:  unable to assess  Oriented to:  time, person, and place  Attention Span and Concentration:  Normal  Speech:   clear, coherent and Speaks: English  Mood:  anxious, depressed and better  Affect:  appropriate and in normal range  Associations:  no loose associations  Thought Process:  goal oriented  Thought Content:  no evidence of suicidal ideation or homicidal ideation, no auditory hallucinations  present and no visual hallucinations present  Recent and Remote Memory:  fair Not formally assessed. No amnesia.  Fund of Knowledge: appropriate  Insight:  good  Judgment:  intact  Impulse Control:  intact    Suicide Risk Assessment:  Today Car Torres reports that he is having no thoughts of wanting to end his life or to harm others. In addition, there are notable risk factors for self-harm, including anxiety and comorbid medical condition of Cognitive decline. However, risk is mitigated by commitment to family, sobriety, history of seeking help when needed, future oriented, denies suicidal intent or plan and denies homicidal ideation, intent, or plan. Therefore, based on all available evidence including the factors cited above, Car Torres does not appear to be at imminent risk for self-harm, does not meet criteria for a 72-hr hold, and therefore remains appropriate for ongoing outpatient level of care.  A thorough assessment of risk factors related to suicide and self-harm have been reviewed and are noted above. The patient convincingly denies suicidality on several occasions. Local community safety resources printed and reviewed for patient to use if needed. There was no deceit detected, and the patient presented in a manner that was believable.     DSM5 Diagnosis:  Other Neurodevelopmental Disorders  315.8 (F88) Other Specified Neurodevelopmental Disorder  296.31 (F33.0) Major Depressive Disorder, Recurrent Episode, Mild _ and With atypical features  300.02 (F41.1) Generalized Anxiety Disorder    Medical comorbidities include:   Patient Active Problem List    Diagnosis Date Noted     Hypertension goal BP (blood pressure) < 140/90 10/20/2010     Priority: High     Hyperlipidemia LDL goal <130 06/11/2010     Priority: High     TIA (obstructive sleep apnea) 07/19/2018     Priority: Medium     Home Sleep Apnea Testing - 7/18/18: 295 lbs 0 oz: AHI 45.3/hr. Supine AHI 61.9/hr. Oxygen [place] of 66%.   Baseline 93%.  Sp02 =< 88% for 47 minutes.       History of sinus bradycardia 06/07/2018     Priority: Medium     ED (erectile dysfunction) 06/07/2018     Priority: Medium     Coronary artery calcification 05/08/2018     Priority: Medium     Per CT CHEST WITHOUT CONTRAST April 23, 2018. CT calcium score planned.        Renal cyst 11/18/2015     Priority: Medium     Simple, non enhanced, 3.5 cm on right kidney, Bosniak 1 - x 2 stable findings       Diverticulosis of large intestine without hemorrhage 11/18/2015     Priority: Medium     Incidental finding on CT scan        Pulmonary nodule 11/18/2015     Priority: Medium     Incidental finding on CT scan, right posterior lung - considered benign / stable        CKD (chronic kidney disease) stage 2, GFR 60-89 ml/min 10/21/2010     Priority: Medium     60 to 80 - have discussed Lisinopril - will consider        Sciatica 02/27/2006     Priority: Medium     Morbid obesity (H) 02/27/2006     Priority: Medium       Assessment:    Car Torres and his wife Mary are pleased with Jerry's progress.  He was started on Aricept by his neurologist.  He is also on leave from work and it is highly unlikely that he will return due to safety concerns.  Since being at home he has engaged in craft activities, sleeping better, and his depression and anxiety symptoms have diminished.  He still has occasional short-term memory lapses but seems to be less paranoid and fearful of people breaking into his home.  He will continue with fluoxetine 30 mg daily and olanzapine 7.5 mg at bedtime.  He reports no sleep problems.  I will see him 1 more visit in 3 months and if things are stable he will return to his primary care provider for future medication refills..    Medication side effects and alternatives were reviewed. Health promotion activities recommended and reviewed today. All questions addressed. Education and counseling completed regarding risks and benefits of medications and  psychotherapy options.    Treatment Plan:        1.  Donepezil 10 mg daily-prescribed by neurologist    2.  Olanzapine 7.5 mg at bedtime    3.  Fluoxetine 30 mg daily      Continue all other medications as reviewed per electronic medical record today.     Safety plan reviewed. To the Emergency Department as needed or call after hours crisis line at [phone] or [phone]. [place] Crisis Text [place]. Text [place] to 638208 or Suicide LifeLine Chat: suicidetracx.org/chat/    To schedule individual or family therapy, call [facility] at [phone].    Schedule an appointment with me in 3 months or sooner as needed. Call [facility] at [phone] to schedule.    Follow up with primary care provider as planned or for acute medical concerns.    Call the psychiatric nurse line with medication questions or concerns at [phone].    AcEmpirehart may be used to communicate with your provider, but this is not intended to be used for emergencies.    Crisis Resources:    National Suicide Prevention Lifeline: 536.409.2992 (TTY: 852.586.9570). Call anytime for help.  (www.suicidepreventionlifeline.org)  National [place] on Mental Illness (www.ana maría.org): 898.249.3677 or 854-048-8945.   Mental Health Association (www.mentalhealth.org): 283.382.2846 or 463-830-1244.  [place] Crisis [place]: Text [place] to 520562  Suicide LifeLine Chat: suicidetracx.org/chat    Administrative Billing:   Time spent with patient was 30 minutes and greater than 50% of time or 20 minutes was spent in counseling and coordination of care regarding above diagnoses and treatment plan.    Patient Status:  Patient will continue to be seen for ongoing consultation and stabilization.    Signed:   BELEN Singh-BC   Psychiatry

## 2021-05-28 NOTE — PATIENT INSTRUCTIONS
1.  Donepezil 10 mg daily-prescribed by neurologist    2.  Olanzapine 7.5 mg at bedtime    3.  Fluoxetine 30 mg daily      Continue all other medications as reviewed per electronic medical record today.     Safety plan reviewed. To the Emergency Department as needed or call after hours crisis line at [phone] or [phone]. [place] Crisis [place]. Text [place] to 525681 or Suicide LifeLine Chat: Snappy Chow.org/chat/    To schedule individual or family therapy, call [facility] at [phone].    Schedule an appointment with me in 3 months or sooner as needed. Call [facility] at [phone] to schedule.    Follow up with primary care provider as planned or for acute medical concerns.    Call the psychiatric nurse line with medication questions or concerns at [phone].    Donya Labshart may be used to communicate with your provider, but this is not intended to be used for emergencies.    Crisis Resources:    National Suicide Prevention Lifeline: 857.109.6859 (TTY: 255.993.1884). Call anytime for help.  (www.suicidepreventionlifeline.org)  National [place] on Mental Illness (www.ana maría.org): 205.302.3653 or 040-520-9608.   Mental Health Association (www.mentalhealth.org): 876.151.7464 or 511-320-5284.  [place] Crisis [place]: Text [place] to 996760  Suicide LifeLine Chat: suicideMetropolist.org/chat

## 2021-05-29 ASSESSMENT — ANXIETY QUESTIONNAIRES: GAD7 TOTAL SCORE: 0

## 2021-07-12 ENCOUNTER — MYC REFILL (OUTPATIENT)
Dept: PSYCHIATRY | Facility: CLINIC | Age: 61
End: 2021-07-12

## 2021-07-12 DIAGNOSIS — F33.0 MAJOR DEPRESSIVE DISORDER, RECURRENT EPISODE, MILD WITH ATYPICAL FEATURES (H): ICD-10-CM

## 2021-07-14 RX ORDER — FLUOXETINE 10 MG/1
10 CAPSULE ORAL DAILY
Qty: 90 CAPSULE | Refills: 0 | OUTPATIENT
Start: 2021-07-14

## 2021-07-21 ENCOUNTER — OFFICE VISIT (OUTPATIENT)
Dept: NEUROLOGY | Facility: CLINIC | Age: 61
End: 2021-07-21
Payer: COMMERCIAL

## 2021-07-21 VITALS
DIASTOLIC BLOOD PRESSURE: 78 MMHG | RESPIRATION RATE: 20 BRPM | SYSTOLIC BLOOD PRESSURE: 146 MMHG | BODY MASS INDEX: 45.98 KG/M2 | HEART RATE: 55 BPM | WEIGHT: 315 LBS

## 2021-07-21 DIAGNOSIS — F02.80 FRONTOTEMPORAL DEMENTIA (H): Primary | ICD-10-CM

## 2021-07-21 DIAGNOSIS — G31.09 FRONTOTEMPORAL DEMENTIA (H): Primary | ICD-10-CM

## 2021-07-21 PROCEDURE — 99215 OFFICE O/P EST HI 40 MIN: CPT | Performed by: INTERNAL MEDICINE

## 2021-07-21 NOTE — LETTER
July 21, 2021    Reg: Car Torres  [address]  [facility] [place] [address]    To whom it may concern,     Jerry is a patient of mine who follows in Neurology clinic for cognitive changes. I have seen him at multiple visits, most recently in clinic today. Patient has been given a diagnosis of probable frontotemporal dementia. Given his cognitive changes, he is unable to perform the duties of his job and I am recommending that he stop working.     Please reach out to me with any questions or concerns.     Miguel Varela MD   of Neurology  [facility]

## 2021-07-21 NOTE — Clinical Note
Hi All,     Can someone fax this letter to the following number?     [phone]    Also include patient's case number on the fax.   Case #: [id number]    Thanks    Miguel

## 2021-07-21 NOTE — NURSING NOTE
Letter regarding disability was faxed to [phone]. Confirmation of delivery was received.   Gisselle Mobley RN   Neurology Care Coordinator

## 2021-07-21 NOTE — PROGRESS NOTES
[facility] Neurology Follow Up Visit    Car Torres MRN# [id number]   Age: 60 year old YOB: 1960     Brief history of symptoms: The patient was initially seen in neurologic consultation on 12/23/2020 for evaluation of cognitive changes. Please see the comprehensive neurologic consultation note from that date in the Epic records for details.     Interval history:   Jerry denies any significant paranoia, similar to what he was experiencing previously. He denies seeing anything that is not there or hearing voices.  He reports good mood. He hasn't noticed significant problems with his memory, but wife notices that he is sometimes forgetful. He spends much of his day drawing or looking up information on watches. Both of these habits are relatively new. Patient's walking has slowed down and he had one fall.        Past Medical History:     Patient Active Problem List   Diagnosis     Sciatica     Morbid obesity (H)     Hyperlipidemia LDL goal <130     Hypertension goal BP (blood pressure) < 140/90     CKD (chronic kidney disease) stage 2, GFR 60-89 ml/min     Renal cyst     Diverticulosis of large intestine without hemorrhage     Pulmonary nodule     Coronary artery calcification     History of sinus bradycardia     ED (erectile dysfunction)     TIA (obstructive sleep apnea)     Past Medical History:   Diagnosis Date     Nonspecific abnormal electrocardiogram (ECG) (EKG) 08/23/2007    Stress testing normal.         Past Surgical History:     Past Surgical History:   Procedure Laterality Date     COLONOSCOPY N/A 12/22/2020    Procedure: COLONOSCOPY, WITH POLYPECTOMY, CLIP;  Surgeon: Mihir Lang MD;  Location: [facility] OR     ESOPHAGOSCOPY, GASTROSCOPY, DUODENOSCOPY (EGD), COMBINED N/A 1/28/2021    Procedure: ESOPHAGOGASTRODUODENOSCOPY, WITH BIOPSY;  Surgeon: Mihir Lang MD;  Location: [facility] OR     SURGICAL HISTORY OF -       surgery on left index finger        Social History:     Social History     Tobacco Use      Smoking status: Former Smoker     Packs/day: 1.00     Years: 25.00     Pack years: 25.00     Types: Cigarettes, Cigars     Quit date: 1998     Years since quittin.4     Smokeless tobacco: Never Used   Substance Use Topics     Alcohol use: Yes     Alcohol/week: 10.0 standard drinks     Comment: 2-3 beers intermittently     Drug use: No        Family History:     Family History   Problem Relation Age of Onset     Cancer Mother         uterine     Other Cancer Mother         endometrial     C.A.D. Father      Hypertension Father      Breast Cancer Maternal Grandmother      Other Cancer Maternal Grandmother         Lung/brain     Hypertension Brother      Breast Cancer Other      Breast Cancer Cousin      Other Cancer Other      Glaucoma No family hx of      Macular Degeneration No family hx of         Medications:     Current Outpatient Medications   Medication Sig     ASPIRIN 325 MG OR TBEC ONE DAILY     atorvastatin (LIPITOR) 40 MG tablet Take 1 tablet (40 mg) by mouth daily     cephALEXin (KEFLEX) 500 MG capsule Take 1 capsule (500 mg) by mouth 2 times daily (Patient not taking: Reported on 2021)     Cholecalciferol (VITAMIN D PO) Take 5,000 Units by mouth daily One tablet twice daily     donepezil (ARICEPT) 10 MG tablet Take 1 tablet (10 mg) by mouth daily     FLUoxetine (PROZAC) 10 MG capsule Take 1 capsule (10 mg) by mouth daily     FLUoxetine (PROZAC) 20 MG capsule Take 1 capsule (20 mg) by mouth daily With 10 mg for a total of 30 mg daily     lisinopril-hydrochlorothiazide (ZESTORETIC) 20-12.5 MG tablet TAKE 1 TABLET BY MOUTH EVERY DAY     MULTI VITAMIN MENS PO None Entered     NEW MED daily Multivitamin shake- levell brand     NONFORMULARY PROMAX - Testosterone Booster     OLANZapine (ZYPREXA) 7.5 MG tablet Take 1 tablet (7.5 mg) by mouth At Bedtime     omega 3 1000 MG CAPS Take by mouth daily     tadalafil (CIALIS) 20 MG tablet 1/2 to 1 tablet, 30 minutes to 1 hour before sex     TURMERIC PO  Take by mouth daily     vitamin B complex with vitamin C (VITAMIN  B COMPLEX) TABS tablet Take 1 tablet by mouth daily     No current facility-administered medications for this visit.        Allergies:   No Known Allergies     Review of Systems:   As above     Physical Exam:   Vitals: There were no vitals taken for this visit.   General: Seated comfortably in no acute distress.  Lungs: breathing comfortably  Neurologic:     Mental Status: MoCA 25/30 (-1 repeating sentence, -2 serial 7s, -2 delayed recall)     Cranial Nerves: Visual fields intact. EOMI with normal smooth pursuit. Facial sensation intact/symmetric. Facial movements symmetric. Hearing not formally tested but intact to conversation. Palate elevation symmetric, uvula midline. No dysarthria. Shoulder shrug strong bilaterally. Tongue protrusion midline.     Motor: No tremors or other abnormal movements observed. Muscle tone normal throughout. Normal/symmetric rapid finger tapping. Strength 5/5 throughout upper and lower extremities.     Sensory: Intact/symmetric to light touch throughout upper and lower extremities. Negative Romberg. Vibration is mildly decreased at the great toes, otherwise intact.      Coordination: Finger-nose-finger and heel-shin intact without dysmetria. Rapid alternating movements intact/symmetric with normal speed and rhythm.     Gait: Slowed, but otherwise steady casual gait. Able to walk on toes, heels and tandem with moderate difficulties.     Data reviewed on previous visits    Imaging:  MRI brain 10/16/2020  IMPRESSION:  1. Exam mildly limited by motion artifact.  2. Few minimal nonspecific white matter lesions.  3. No evidence for intracranial hemorrhage, acute infarct, or any  focal mass lesions.     Laboratory:  TSH, CMP wnl 8/2020  B12, CBC wnl 12/2020    PET brain 4/2021  IMPRESSION:  No significant metabolic deficits when compared to  control database. This may be due to the possible underlying dementia  being too early  to characterize, versus the patient's symptomatology  being due to some other process than dementia.    Pertinent Investigations since last visit:   None         Assessment and Plan:   Assessment:  Patient is a 61 y/o male who presents for follow-up of paranoia, hallucinations, delusions, and cognitive changes, which slowly progressed starting spring 2020. Patient has no prior history of psychiatric disease. Patient had neuropsychological testing with Dr Adam in 3/2021 which was concerning for significant dysfunction in frontal and subcortical networks, and in the clinical context, concerning for a neurodegenerative process causing mild dementia. PET brain was completed without any revealing abnormalities. In terms of patient's specific diagnosis frontotemporal dementia is the leading consideration. Lewy body dementia can present with psychosis but patient does not have other clear features of the disease. There is some prior history suggestive of possible REM sleep behavioral disorder, but this is not a current issue. An atypical presentation of Alzheimer's disease is also possible. We we increase Aricept to 10 mg nightly (previously not done due to confusion over dosage). Given cognitive deficits that have been demonstrated across multiple visits I am recommending that Jerry not return to work. Family will look into applying for long term disability.     Plan:  - Increase Donepezil to 10 mg nightly   - Continue to follow-up with psychiatrist - currently on Zyprexa 7.5 mg nightly and Prosac 30 mg daily    Follow up in Neurology clinic in 3 months or earlier as needed should new concerns arise.    Miguel Varela MD   of Neurology  [facility]    The total time of this encounter amounted to 50 minutes. This time included time spent with the patient, prep work, ordering tests, and performing post visit documentation.

## 2021-07-21 NOTE — LETTER
7/21/2021         RE: Car Torres  [address]  [facility] [address]        Dear Colleague,    Thank you for referring your patient, Car Torres, to the [facility] NEUROLOG[facility] [place]. Please see a copy of my visit note below.    [facility] Neurology Follow Up Visit    Car Torres MRN# [id number]   Age: 60 year old YOB: 1960     Brief history of symptoms: The patient was initially seen in neurologic consultation on 12/23/2020 for evaluation of cognitive changes. Please see the comprehensive neurologic consultation note from that date in the Epic records for details.     Interval history:   Jerry denies any significant paranoia, similar to what he was experiencing previously. He denies seeing anything that is not there or hearing voices.  He reports good mood. He hasn't noticed significant problems with his memory, but wife notices that he is sometimes forgetful. He spends much of his day drawing or looking up information on watches. Both of these habits are relatively new. Patient's walking has slowed down and he had one fall.        Past Medical History:     Patient Active Problem List   Diagnosis     Sciatica     Morbid obesity (H)     Hyperlipidemia LDL goal <130     Hypertension goal BP (blood pressure) < 140/90     CKD (chronic kidney disease) stage 2, GFR 60-89 ml/min     Renal cyst     Diverticulosis of large intestine without hemorrhage     Pulmonary nodule     Coronary artery calcification     History of sinus bradycardia     ED (erectile dysfunction)     TIA (obstructive sleep apnea)     Past Medical History:   Diagnosis Date     Nonspecific abnormal electrocardiogram (ECG) (EKG) 08/23/2007    Stress testing normal.         Past Surgical History:     Past Surgical History:   Procedure Laterality Date     COLONOSCOPY N/A 12/22/2020    Procedure: COLONOSCOPY, WITH POLYPECTOMY, CLIP;  Surgeon: Mihir Lang MD;  Location: [facility] OR     ESOPHAGOSCOPY,  GASTROSCOPY, DUODENOSCOPY (EGD), COMBINED N/A 2021    Procedure: ESOPHAGOGASTRODUODENOSCOPY, WITH BIOPSY;  Surgeon: Mihir Lang MD;  Location: [facility] OR     SURGICAL HISTORY OF -       surgery on left index finger        Social History:     Social History     Tobacco Use     Smoking status: Former Smoker     Packs/day: 1.00     Years: 25.00     Pack years: 25.00     Types: Cigarettes, Cigars     Quit date: 1998     Years since quittin.4     Smokeless tobacco: Never Used   Substance Use Topics     Alcohol use: Yes     Alcohol/week: 10.0 standard drinks     Comment: 2-3 beers intermittently     Drug use: No        Family History:     Family History   Problem Relation Age of Onset     Cancer Mother         uterine     Other Cancer Mother         endometrial     C.A.D. Father      Hypertension Father      Breast Cancer Maternal Grandmother      Other Cancer Maternal Grandmother         Lung/brain     Hypertension Brother      Breast Cancer Other      Breast Cancer Cousin      Other Cancer Other      Glaucoma No family hx of      Macular Degeneration No family hx of         Medications:     Current Outpatient Medications   Medication Sig     ASPIRIN 325 MG OR TBEC ONE DAILY     atorvastatin (LIPITOR) 40 MG tablet Take 1 tablet (40 mg) by mouth daily     cephALEXin (KEFLEX) 500 MG capsule Take 1 capsule (500 mg) by mouth 2 times daily (Patient not taking: Reported on 2021)     Cholecalciferol (VITAMIN D PO) Take 5,000 Units by mouth daily One tablet twice daily     donepezil (ARICEPT) 10 MG tablet Take 1 tablet (10 mg) by mouth daily     FLUoxetine (PROZAC) 10 MG capsule Take 1 capsule (10 mg) by mouth daily     FLUoxetine (PROZAC) 20 MG capsule Take 1 capsule (20 mg) by mouth daily With 10 mg for a total of 30 mg daily     lisinopril-hydrochlorothiazide (ZESTORETIC) 20-12.5 MG tablet TAKE 1 TABLET BY MOUTH EVERY DAY     MULTI VITAMIN MENS PO None Entered     NEW MED daily Multivitamin shake- [facility]  brand     NONFORMULARY PROMAX - Testosterone Booster     OLANZapine (ZYPREXA) 7.5 MG tablet Take 1 tablet (7.5 mg) by mouth At Bedtime     omega 3 1000 MG CAPS Take by mouth daily     tadalafil (CIALIS) 20 MG tablet 1/2 to 1 tablet, 30 minutes to 1 hour before sex     TURMERIC PO Take by mouth daily     vitamin B complex with vitamin C (VITAMIN  B COMPLEX) TABS tablet Take 1 tablet by mouth daily     No current facility-administered medications for this visit.        Allergies:   No Known Allergies     Review of Systems:   As above     Physical Exam:   Vitals: There were no vitals taken for this visit.   General: Seated comfortably in no acute distress.  Lungs: breathing comfortably  Neurologic:     Mental Status: MoCA 25/30 (-1 repeating sentence, -2 serial 7s, -2 delayed recall)     Cranial Nerves: Visual fields intact. EOMI with normal smooth pursuit. Facial sensation intact/symmetric. Facial movements symmetric. Hearing not formally tested but intact to conversation. Palate elevation symmetric, uvula midline. No dysarthria. Shoulder shrug strong bilaterally. Tongue protrusion midline.     Motor: No tremors or other abnormal movements observed. Muscle tone normal throughout. Normal/symmetric rapid finger tapping. Strength 5/5 throughout upper and lower extremities.     Sensory: Intact/symmetric to light touch throughout upper and lower extremities. Negative Romberg. Vibration is mildly decreased at the great toes, otherwise intact.      Coordination: Finger-nose-finger and heel-shin intact without dysmetria. Rapid alternating movements intact/symmetric with normal speed and rhythm.     Gait: Slowed, but otherwise steady casual gait. Able to walk on toes, heels and tandem with moderate difficulties.     Data reviewed on previous visits    Imaging:  MRI brain 10/16/2020  IMPRESSION:  1. Exam mildly limited by motion artifact.  2. Few minimal nonspecific white matter lesions.  3. No evidence for intracranial  hemorrhage, acute infarct, or any  focal mass lesions.     Laboratory:  TSH, CMP wnl 8/2020  B12, CBC wnl 12/2020    PET brain 4/2021  IMPRESSION:  No significant metabolic deficits when compared to  control database. This may be due to the possible underlying dementia  being too early to characterize, versus the patient's symptomatology  being due to some other process than dementia.    Pertinent Investigations since last visit:   None         Assessment and Plan:   Assessment:  Patient is a 61 y/o male who presents for follow-up of paranoia, hallucinations, delusions, and cognitive changes, which slowly progressed starting spring 2020. Patient has no prior history of psychiatric disease. Patient had neuropsychological testing with Dr Adam in 3/2021 which was concerning for significant dysfunction in frontal and subcortical networks, and in the clinical context, concerning for a neurodegenerative process causing mild dementia. PET brain was completed without any revealing abnormalities. In terms of patient's specific diagnosis frontotemporal dementia is the leading consideration. Lewy body dementia can present with psychosis but patient does not have other clear features of the disease. There is some prior history suggestive of possible REM sleep behavioral disorder, but this is not a current issue. An atypical presentation of Alzheimer's disease is also possible. We we increase Aricept to 10 mg nightly (previously not done due to confusion over dosage). Given cognitive deficits that have been demonstrated across multiple visits I am recommending that Jerry not return to work. Family will look into applying for long term disability.     Plan:  - Increase Donepezil to 10 mg nightly   - Continue to follow-up with psychiatrist - currently on Zyprexa 7.5 mg nightly and Prosac 30 mg daily    Follow up in Neurology clinic in 3 months or earlier as needed should new concerns arise.    Miguel Varela MD  Assistant  Professor of Neurology  [facility]    The total time of this encounter amounted to 50 minutes. This time included time spent with the patient, prep work, ordering tests, and performing post visit documentation.        Again, thank you for allowing me to participate in the care of your patient.        Sincerely,        Miguel Varela MD

## 2021-07-21 NOTE — LETTER
July 21, 2021    Car Torres  [address]  [facility] [address]    To whom it may concern,     Jerry is a possible of mine who follows in Neurology clinic for cognitive changes. I have seen him at multiple visits, most recently in clinic today. Patient has been given a diagnosis of probable frontotemporal dementia. Given his cognitive changes, he is unable to perform the duties of his job and I am recommending that he stop working.     Please reach out to me with any questions or concerns.     Miguel Varela MD   of Neurology  [facility]

## 2021-07-21 NOTE — LETTER
July 21, 2021    Reg: Car Torres  [address]  [facility] [address]    To whom it may concern,     Jerry is a patient of mine who follows in Neurology clinic for cognitive changes. I have seen him at multiple visits, most recently in clinic today. Patient has been given a diagnosis of probable frontotemporal dementia. Given his cognitive changes, he is unable to perform the duties of his job and I am recommending that he stop working.     Please reach out to me with any questions or concerns.     Miguel Varela MD   of Neurology  [facility]

## 2021-08-30 ENCOUNTER — VIRTUAL VISIT (OUTPATIENT)
Dept: PSYCHIATRY | Facility: CLINIC | Age: 61
End: 2021-08-30
Payer: COMMERCIAL

## 2021-08-30 DIAGNOSIS — F33.0 MAJOR DEPRESSIVE DISORDER, RECURRENT EPISODE, MILD WITH ATYPICAL FEATURES (H): ICD-10-CM

## 2021-08-30 PROCEDURE — 99214 OFFICE O/P EST MOD 30 MIN: CPT | Mod: 95 | Performed by: NURSE PRACTITIONER

## 2021-08-30 RX ORDER — OLANZAPINE 7.5 MG/1
7.5 TABLET, FILM COATED ORAL AT BEDTIME
Qty: 90 TABLET | Refills: 1 | Status: SHIPPED | OUTPATIENT
Start: 2021-08-30 | End: 2021-11-19

## 2021-08-30 RX ORDER — FLUOXETINE 40 MG/1
40 CAPSULE ORAL DAILY
Qty: 90 CAPSULE | Refills: 1 | Status: SHIPPED | OUTPATIENT
Start: 2021-08-30 | End: 2021-11-19

## 2021-08-30 ASSESSMENT — ANXIETY QUESTIONNAIRES
2. NOT BEING ABLE TO STOP OR CONTROL WORRYING: NOT AT ALL
1. FEELING NERVOUS, ANXIOUS, OR ON EDGE: NOT AT ALL
3. WORRYING TOO MUCH ABOUT DIFFERENT THINGS: NOT AT ALL
7. FEELING AFRAID AS IF SOMETHING AWFUL MIGHT HAPPEN: NOT AT ALL
5. BEING SO RESTLESS THAT IT IS HARD TO SIT STILL: NOT AT ALL
6. BECOMING EASILY ANNOYED OR IRRITABLE: NOT AT ALL
GAD7 TOTAL SCORE: 0

## 2021-08-30 ASSESSMENT — PATIENT HEALTH QUESTIONNAIRE - PHQ9
5. POOR APPETITE OR OVEREATING: NOT AT ALL
SUM OF ALL RESPONSES TO PHQ QUESTIONS 1-9: 1

## 2021-08-30 NOTE — PROGRESS NOTES
"Location of patient:  [place], [place]    Any new OTC medications: No  Recent height or weight: No  Recent BP/HR: No  Alcohol use:  Yes If yes, how many drinks per week: only in social setting, max of 2 drinks   Current other substance use (including Vaping):  Denies  Any updates with mental health (hospital stay, ER, etc) that Taniya should know about: No  Taking medications every day: Yes  If female: Birth control: n/a  What is the most important thing you would like addressed today: discuss medications.           Outpatient Psychiatric Progress Note    Name: Car Torres   : 1960                    Primary Care Provider: ANCA STEPHENSON PA-C   Therapist: None    PHQ-9 scores:  PHQ-9 SCORE 2021   PHQ-9 Total Score 8 0 1       SHAHNAZ-7 scores:  SHAHNAZ-7 SCORE 2021   Total Score 0 0 0       Patient Identification:    Patient is a 61 year old year old,   White Not  or  male  who presents for return visit with me.  Patient is currently on medical leave from Full-time job. Patient attended the session with His wife Mary , who they agreed to have interview with. Patient prefers to be called: \" Jerry\".    Interim History:    I last saw Car Torres for outpatient psychiatry Return Visit on May 28, 2021.     During that appointment, he and his wife Mary were pleased with Jerry's progress.  He was started on Aricept by his neurologist.  He is also on leave from work and it is highly unlikely that he will return due to safety concerns.  Since being at home he has engaged in craft activities, sleeping better, and his depression and anxiety symptoms have diminished.  He still has occasional short-term memory lapses but seems to be less paranoid and fearful of people breaking into his home.  He will continue with fluoxetine 30 mg daily and olanzapine 7.5 mg at bedtime.  He reports no sleep problems.  I will see him 1 more visit in 3 months and " if things are stable he will return to his primary care provider for future medication refills..    .     Current medications include: ASPIRIN 325 MG OR TBEC, ONE DAILY  atorvastatin (LIPITOR) 40 MG tablet, Take 1 tablet (40 mg) by mouth daily  Cholecalciferol (VITAMIN D PO), Take 5,000 Units by mouth daily One tablet twice daily  donepezil (ARICEPT) 10 MG tablet, Take 1 tablet (10 mg) by mouth daily  FLUoxetine (PROZAC) 10 MG capsule, Take 1 capsule (10 mg) by mouth daily  FLUoxetine (PROZAC) 20 MG capsule, Take 1 capsule (20 mg) by mouth daily With 10 mg for a total of 30 mg daily  lisinopril-hydrochlorothiazide (ZESTORETIC) 20-12.5 MG tablet, TAKE 1 TABLET BY MOUTH EVERY DAY  MULTI VITAMIN MENS PO, None Entered  NEW MED, daily Multivitamin shake- [employer] brand  NONFORMULARY, PROMAX - Testosterone Booster  OLANZapine (ZYPREXA) 7.5 MG tablet, Take 1 tablet (7.5 mg) by mouth At Bedtime  omega 3 1000 MG CAPS, Take by mouth daily  tadalafil (CIALIS) 20 MG tablet, 1/2 to 1 tablet, 30 minutes to 1 hour before sex  TURMERIC PO, Take by mouth daily  vitamin B complex with vitamin C (VITAMIN  B COMPLEX) TABS tablet, Take 1 tablet by mouth daily    No current facility-administered medications on file prior to visit.       The [place] Prescription Monitoring Program has been reviewed and there are no concerns about diversionary activity for controlled substances at this time.      I was able to review most recent Primary Care Provider, specialty provider, and therapy visit notes that I have access to.     Today, patient reports that he is not able to go back to work any more.  He wished that he knew how his finances will work.  He is getting short term and long term disability payments.  He tells me that he has fronto temporal dementia.  He remembers that he had hallucinations and had thoughts of things going on that were not.  His wife tells him that he sleeps too much.  Sometimes he sleeps during the day.  He feels like  he is in a holding pattern.  He does not want to start anything without knowing what his finances are like.   His wife has had to work with Jerry's employer to make sure Jerry gets his benefits.       has a past medical history of Nonspecific abnormal electrocardiogram (ECG) (EKG) (2007). He also has no past medical history of Arthritis, Cancer (H), Cerebral infarction (H), Congestive heart failure (H), Congestive heart failure, unspecified, COPD (chronic obstructive pulmonary disease) (H), Depressive disorder, Diabetes (H), History of blood transfusion, Thyroid disease, or Uncomplicated asthma.    Social history updates:    He has not left his house for leisure activities.  He visits with his children regularly.  His mother  2021.  Substance use updates:    No alcohol use reported - rare in social settings  Tobacco use: No    Vital Signs:   There were no vitals taken for this visit.    Labs:    Most recent laboratory results reviewed and no new labs.     Review of Systems:  10 systems (general, cardiovascular, respiratory, eyes, ENT, endocrine, GI, , M/S, neurological) were reviewed. Most pertinent finding(s) is/are: he reports no chest pain, no shortness of breath, his fingers hurt from arthritis. The remaining systems are all unremarkable.    Mental Status Examination:  Appearance:  awake, alert  Attitude:  cooperative   Eye Contact:  unable to assess  Gait and Station: No assistive Devices used and No dizziness or falls  Psychomotor Behavior:  unable to assess  Oriented to:  time, person, and place  Attention Span and Concentration:  Normal  Speech:   clear, coherent and Speaks: English  Mood:  anxious and depressed  Affect:  mood congruent  Associations:  no loose associations  Thought Process:  linear  Thought Content:  no evidence of suicidal ideation or homicidal ideation, no auditory hallucinations present and no visual hallucinations present  Recent and Remote Memory:  fair Not formally  assessed. No amnesia.  Fund of Knowledge: appropriate  Insight:  fair  Judgment:  fair  Impulse Control:  intact    Suicide Risk Assessment:  Today Car Torres reports that he has no thoughts to harm himself or others. In addition, there are notable risk factors for self-harm, including anxiety, comorbid medical condition of Dementia and withdrawing. However, risk is mitigated by commitment to family, sobriety, future oriented, denies suicidal intent or plan and denies homicidal ideation, intent, or plan. Therefore, based on all available evidence including the factors cited above, Car Torres does not appear to be at imminent risk for self-harm, does not meet criteria for a 72-hr hold, and therefore remains appropriate for ongoing outpatient level of care.  A thorough assessment of risk factors related to suicide and self-harm have been reviewed and are noted above. The patient convincingly denies suicidality on several occasions. Local community safety resources printed and reviewed for patient to use if needed. There was no deceit detected, and the patient presented in a manner that was believable.     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    Medical comorbidities include:   Patient Active Problem List    Diagnosis Date Noted     Hypertension goal BP (blood pressure) < 140/90 10/20/2010     Priority: High     Hyperlipidemia LDL goal <130 06/11/2010     Priority: High     TIA (obstructive sleep apnea) 07/19/2018     Priority: Medium     Home Sleep Apnea Testing - 7/18/18: 295 lbs 0 oz: AHI 45.3/hr. Supine AHI 61.9/hr. Oxygen [place] of 66%.  Baseline 93%.  Sp02 =< 88% for 47 minutes.       History of sinus bradycardia 06/07/2018     Priority: Medium     ED (erectile dysfunction) 06/07/2018     Priority: Medium     Coronary artery calcification 05/08/2018     Priority: Medium     Per CT CHEST WITHOUT CONTRAST April 23, 2018.  "CT calcium score planned.        Renal cyst 11/18/2015     Priority: Medium     Simple, non enhanced, 3.5 cm on right kidney, Bosniak 1 - x 2 stable findings       Diverticulosis of large intestine without hemorrhage 11/18/2015     Priority: Medium     Incidental finding on CT scan        Pulmonary nodule 11/18/2015     Priority: Medium     Incidental finding on CT scan, right posterior lung - considered benign / stable        CKD (chronic kidney disease) stage 2, GFR 60-89 ml/min 10/21/2010     Priority: Medium     60 to 80 - have discussed Lisinopril - will consider        Sciatica 02/27/2006     Priority: Medium     Morbid obesity (H) 02/27/2006     Priority: Medium       Assessment:    Car Torres has been on medical leave from his job with plans to go on long-term disability followed by Social Security disability.  He will not be returning to his position.  As he adjusts to the new way of living his life, his wife is concerned about depression.  While Jerry tells me things are all right he tells me he is in a \"holding pattern\".  His main concern is being able to manage his life financially without being gainfully employed.  His days consist of watching television or working on craft projects in his shop.  His wife is concerned that he is sleeping \"too much\".  Since taking the olanzapine he has stopped hallucinating and having feelings that his house is being broken into by intruders.  He will continue with the olanzapine at 7.5 mg at bedtime.  Fluoxetine is been increased to 40 mg daily to assist with depression symptoms..    Medication side effects and alternatives were reviewed. Health promotion activities recommended and reviewed today. All questions addressed. Education and counseling completed regarding risks and benefits of medications and psychotherapy options.    Treatment Plan:        1.  Increase fluoxetine to 40 mg daily    2.  Continue olanzapine 7.5 mg at bedtime    3.  Continue follow-up with " your neurologist      Continue all other medications as reviewed per electronic medical record today.     Safety plan reviewed. To the Emergency Department as needed or call after hours crisis line at [phone] or [phone]. [place] Crisis Text Line. Text [place] to 164408 or Suicide LifeLine Chat: suicideGood Eggs.org/chat/    To schedule individual or family therapy, call [facility] at [phone].    Schedule an appointment with me in November or sooner as needed. Call [facility] at [phone] to schedule.    Follow up with primary care provider as planned or for acute medical concerns.    Call the psychiatric nurse line with medication questions or concerns at [phone].    Tour Raiser may be used to communicate with your provider, but this is not intended to be used for emergencies.    Crisis Resources:    National Suicide Prevention Lifeline: 795.103.2952 (TTY: 554.546.9264). Call anytime for help.  (www.suicidepreventionlifeline.org)  National [place] on Mental Illness (www.ana maría.org): 657.211.8615 or 470-029-5900.   Mental Health Association (www.mentalhealth.org): 823.182.1681 or 568-336-5382.  [place] Crisis Text Line: Text [place] to 688242  Suicide LifeLine Chat: suicideGood Eggs.org/chat    Administrative Billing:   Time spent with patient was 30 minutes and greater than 50% of time or 20 minutes was spent in counseling and coordination of care regarding above diagnoses and treatment plan.    Patient Status:  Patient will continue to be seen for ongoing consultation and stabilization.    Signed:   BELEN Singh-BC   Psychiatry

## 2021-08-30 NOTE — PATIENT INSTRUCTIONS
1.  Increase fluoxetine to 40 mg daily    2.  Continue olanzapine 7.5 mg at bedtime    3.  Continue follow-up with your neurologist      Continue all other medications as reviewed per electronic medical record today.     Safety plan reviewed. To the Emergency Department as needed or call after hours crisis line at [phone] or [phone]. [place] Crisis Text Line. Text [place] to 134729 or Suicide LifeLine Chat: suicideGdeSlon.org/chat/    To schedule individual or family therapy, call [facility] at [phone].    Schedule an appointment with me in November or sooner as needed. Call [facility] at [phone] to schedule.    Follow up with primary care provider as planned or for acute medical concerns.    Call the psychiatric nurse line with medication questions or concerns at [phone].    NewsCastict may be used to communicate with your provider, but this is not intended to be used for emergencies.    Crisis Resources:    National Suicide Prevention Lifeline: 602.917.4377 (TTY: 246.917.3954). Call anytime for help.  (www.suicidepreventionlifeline.org)  National [place] on Mental Illness (www.ana maría.org): 910.167.4825 or 572-200-8809.   Mental Health Association (www.mentalhealth.org): 833.405.4527 or 171-694-3785.  [place] Crisis Text Line: Text [place] to 804667  Suicide LifeLine Chat: suicideGdeSlon.org/chat

## 2021-08-31 ASSESSMENT — ANXIETY QUESTIONNAIRES: GAD7 TOTAL SCORE: 0

## 2021-09-19 ENCOUNTER — HEALTH MAINTENANCE LETTER (OUTPATIENT)
Age: 61
End: 2021-09-19

## 2021-10-05 DIAGNOSIS — I10 ESSENTIAL HYPERTENSION WITH GOAL BLOOD PRESSURE LESS THAN 140/90: ICD-10-CM

## 2021-10-05 DIAGNOSIS — E78.5 HYPERLIPIDEMIA LDL GOAL <100: ICD-10-CM

## 2021-10-05 RX ORDER — ATORVASTATIN CALCIUM 40 MG/1
TABLET, FILM COATED ORAL
Qty: 90 TABLET | Refills: 0 | Status: SHIPPED | OUTPATIENT
Start: 2021-10-05 | End: 2022-01-03

## 2021-10-05 RX ORDER — LISINOPRIL AND HYDROCHLOROTHIAZIDE 12.5; 2 MG/1; MG/1
TABLET ORAL
Qty: 90 TABLET | Refills: 1 | Status: SHIPPED | OUTPATIENT
Start: 2021-10-05 | End: 2022-04-22

## 2021-10-05 NOTE — TELEPHONE ENCOUNTER
Routing refill request to provider for review/approval because:  Labs not current:     BP under 140/90 in past 12 months    **Scheduled for next visit 10/28/21    Last Comprehensive Metabolic Panel:    Sodium   Date Value Ref Range Status   08/02/2020 138 133 - 144 mmol/L Final     Potassium   Date Value Ref Range Status   08/02/2020 4.0 3.4 - 5.3 mmol/L Final     Chloride   Date Value Ref Range Status   08/02/2020 105 94 - 109 mmol/L Final     Carbon Dioxide   Date Value Ref Range Status   08/02/2020 28 20 - 32 mmol/L Final     Anion Gap   Date Value Ref Range Status   08/02/2020 5 3 - 14 mmol/L Final     Glucose   Date Value Ref Range Status   04/21/2021 100 (A) 70 - 99 mg/dL Final     Urea Nitrogen   Date Value Ref Range Status   08/02/2020 28 7 - 30 mg/dL Final     Creatinine   Date Value Ref Range Status   08/02/2020 1.17 0.66 - 1.25 mg/dL Final     GFR Estimate   Date Value Ref Range Status   08/02/2020 66 >60 mL/min/[1.73_m2] Final     Comment:     Non  GFR Calc  Starting 12/18/2018, serum creatinine based estimated GFR (eGFR) will be   calculated using the Chronic Kidney Disease Epidemiology Collaboration   (CKD-EPI) equation.       Calcium   Date Value Ref Range Status   08/02/2020 8.9 8.5 - 10.1 mg/dL Final      Destiny Reed RN

## 2021-10-13 ASSESSMENT — ENCOUNTER SYMPTOMS
SHORTNESS OF BREATH: 0
DIARRHEA: 0
EYE PAIN: 0
PALPITATIONS: 0
NAUSEA: 0
HEMATOCHEZIA: 0
HEMATURIA: 0
FREQUENCY: 0
ABDOMINAL PAIN: 0
FEVER: 0
JOINT SWELLING: 1
MYALGIAS: 0
WEAKNESS: 0
SORE THROAT: 0
ARTHRALGIAS: 1
CHILLS: 0
NERVOUS/ANXIOUS: 0
DIZZINESS: 0
HEARTBURN: 0
PARESTHESIAS: 0
CONSTIPATION: 0
COUGH: 0
HEADACHES: 0
DYSURIA: 0

## 2021-10-14 ENCOUNTER — OFFICE VISIT (OUTPATIENT)
Dept: FAMILY MEDICINE | Facility: CLINIC | Age: 61
End: 2021-10-14
Payer: COMMERCIAL

## 2021-10-14 VITALS
BODY MASS INDEX: 44.1 KG/M2 | DIASTOLIC BLOOD PRESSURE: 68 MMHG | HEIGHT: 71 IN | WEIGHT: 315 LBS | OXYGEN SATURATION: 98 % | TEMPERATURE: 97.1 F | SYSTOLIC BLOOD PRESSURE: 124 MMHG | HEART RATE: 49 BPM | RESPIRATION RATE: 16 BRPM

## 2021-10-14 DIAGNOSIS — F02.80 FRONTAL DEMENTIA (H): ICD-10-CM

## 2021-10-14 DIAGNOSIS — I10 HYPERTENSION GOAL BP (BLOOD PRESSURE) < 140/90: Chronic | ICD-10-CM

## 2021-10-14 DIAGNOSIS — E66.01 MORBID OBESITY (H): Chronic | ICD-10-CM

## 2021-10-14 DIAGNOSIS — I25.10 CORONARY ARTERY CALCIFICATION: Chronic | ICD-10-CM

## 2021-10-14 DIAGNOSIS — G47.33 OSA (OBSTRUCTIVE SLEEP APNEA): Chronic | ICD-10-CM

## 2021-10-14 DIAGNOSIS — N18.2 CKD (CHRONIC KIDNEY DISEASE) STAGE 2, GFR 60-89 ML/MIN: Chronic | ICD-10-CM

## 2021-10-14 DIAGNOSIS — G31.09 FRONTAL DEMENTIA (H): ICD-10-CM

## 2021-10-14 DIAGNOSIS — Z00.00 ROUTINE GENERAL MEDICAL EXAMINATION AT A HEALTH CARE FACILITY: Primary | ICD-10-CM

## 2021-10-14 PROCEDURE — 90471 IMMUNIZATION ADMIN: CPT | Performed by: PHYSICIAN ASSISTANT

## 2021-10-14 PROCEDURE — 90682 RIV4 VACC RECOMBINANT DNA IM: CPT | Performed by: PHYSICIAN ASSISTANT

## 2021-10-14 PROCEDURE — G0103 PSA SCREENING: HCPCS | Performed by: PHYSICIAN ASSISTANT

## 2021-10-14 PROCEDURE — 99396 PREV VISIT EST AGE 40-64: CPT | Mod: 25 | Performed by: PHYSICIAN ASSISTANT

## 2021-10-14 PROCEDURE — 90472 IMMUNIZATION ADMIN EACH ADD: CPT | Performed by: PHYSICIAN ASSISTANT

## 2021-10-14 PROCEDURE — 80048 BASIC METABOLIC PNL TOTAL CA: CPT | Performed by: PHYSICIAN ASSISTANT

## 2021-10-14 PROCEDURE — 36415 COLL VENOUS BLD VENIPUNCTURE: CPT | Performed by: PHYSICIAN ASSISTANT

## 2021-10-14 PROCEDURE — 80061 LIPID PANEL: CPT | Performed by: PHYSICIAN ASSISTANT

## 2021-10-14 PROCEDURE — 90750 HZV VACC RECOMBINANT IM: CPT | Performed by: PHYSICIAN ASSISTANT

## 2021-10-14 ASSESSMENT — ENCOUNTER SYMPTOMS
FEVER: 0
HEADACHES: 0
COUGH: 0
ARTHRALGIAS: 1
DIZZINESS: 0
CONSTIPATION: 0
SHORTNESS OF BREATH: 0
FREQUENCY: 0
DYSURIA: 0
EYE PAIN: 0
HEMATURIA: 0
HEMATOCHEZIA: 0
HEARTBURN: 0
DIARRHEA: 0
MYALGIAS: 0
PALPITATIONS: 0
CHILLS: 0
JOINT SWELLING: 1
ABDOMINAL PAIN: 0
SORE THROAT: 0
NAUSEA: 0
NERVOUS/ANXIOUS: 0
WEAKNESS: 0
PARESTHESIAS: 0

## 2021-10-14 ASSESSMENT — MIFFLIN-ST. JEOR: SCORE: 2264.29

## 2021-10-14 ASSESSMENT — PAIN SCALES - GENERAL: PAINLEVEL: NO PAIN (0)

## 2021-10-14 NOTE — PATIENT INSTRUCTIONS
1. Hand pain - can do hot soaks if you want. Epsom salt soaks - then do cold water / back and forth, 30 seconds hot/30 seconds cold, repeat for a few minutes, Voltaren Gel - sold over the counter. Do 3-4 times a day.     Preventive Health Recommendations  Male Ages 50 - 64    Yearly exam:             See your health care provider every year in order to  o   Review health changes.   o   Discuss preventive care.    o   Review your medicines if your doctor has prescribed any.     Have a cholesterol test every 5 years, or more frequently if you are at risk for high cholesterol/heart disease.     Have a diabetes test (fasting glucose) every three years. If you are at risk for diabetes, you should have this test more often.     Have a colonoscopy at age 50, or have a yearly FIT test (stool test). These exams will check for colon cancer.      Talk with your health care provider about whether or not a prostate cancer screening test (PSA) is right for you.    You should be tested each year for STDs (sexually transmitted diseases), if you re at risk.     Shots: Get a flu shot each year. Get a tetanus shot every 10 years.     Nutrition:    Eat at least 5 servings of fruits and vegetables daily.     Eat whole-grain bread, whole-wheat pasta and brown rice instead of white grains and rice.     Get adequate Calcium and Vitamin D.     Lifestyle    Exercise for at least 150 minutes a week (30 minutes a day, 5 days a week). This will help you control your weight and prevent disease.     Limit alcohol to one drink per day.     No smoking.     Wear sunscreen to prevent skin cancer.     See your dentist every six months for an exam and cleaning.     See your eye doctor every 1 to 2 years.

## 2021-10-14 NOTE — PROGRESS NOTES
SUBJECTIVE:   CC: Car Torres is an 61 year old male who presents for preventative health visit.     Patient has been advised of split billing requirements and indicates understanding: Yes  Healthy Habits:     Getting at least 3 servings of Calcium per day:  NO    Bi-annual eye exam:  Yes    Dental care twice a year:  NO    Sleep apnea or symptoms of sleep apnea:  Daytime drowsiness, Excessive snoring and Sleep apnea    Diet:  Regular (no restrictions)    Frequency of exercise:  None    Taking medications regularly:  Yes    Medication side effects:  None    PHQ-2 Total Score: 0    Additional concerns today:  Yes      Patient is fasting today.       Today's PHQ-2 Score:   PHQ-2 (  [employer]) 10/13/2021   Q1: Little interest or pleasure in doing things 0   Q2: Feeling down, depressed or hopeless 0   PHQ-2 Score 0   Q1: Little interest or pleasure in doing things Not at all   Q2: Feeling down, depressed or hopeless Not at all   PHQ-2 Score 0     Abuse: Current or Past(Physical, Sexual or Emotional)- No  Do you feel safe in your environment? Yes      Social History     Tobacco Use     Smoking status: Former Smoker     Packs/day: 1.00     Years: 25.00     Pack years: 25.00     Types: Cigarettes, Cigars     Quit date: 1998     Years since quittin.7     Smokeless tobacco: Never Used   Substance Use Topics     Alcohol use: Yes     Alcohol/week: 10.0 standard drinks     Comment: 2-3 beers intermittently     If you drink alcohol do you typically have >3 drinks per day or >7 drinks per week? No    Alcohol Use 10/14/2021   Prescreen: >3 drinks/day or >7 drinks/week? -   Prescreen: >3 drinks/day or >7 drinks/week? No   AUDIT SCORE  -       Last PSA:   PSA   Date Value Ref Range Status   2019 0.36 0 - 4 ug/L Final     Comment:     Assay Method:  Chemiluminescence using Siemens Vista analyzer       Reviewed orders with patient. Reviewed health maintenance and updated orders accordingly - Yes  Lab work is in  "process    Reviewed and updated as needed this visit by clinical staff  Tobacco  Allergies  Meds  Problems  Med Hx  Surg Hx  Fam Hx  Soc Hx          Reviewed and updated as needed this visit by Provider  Tobacco  Allergies  Meds  Problems  Med Hx  Surg Hx  Fam Hx             Review of Systems   Constitutional: Negative for chills and fever.   HENT: Negative for congestion, ear pain, hearing loss and sore throat.    Eyes: Negative for pain and visual disturbance.   Respiratory: Negative for cough and shortness of breath.    Cardiovascular: Negative for chest pain, palpitations and peripheral edema.   Gastrointestinal: Negative for abdominal pain, constipation, diarrhea, heartburn, hematochezia and nausea.   Genitourinary: Positive for impotence and urgency. Negative for discharge, dysuria, frequency, genital sores and hematuria.   Musculoskeletal: Positive for arthralgias and joint swelling. Negative for myalgias.   Skin: Negative for rash.   Neurological: Negative for dizziness, weakness, headaches and paresthesias.   Psychiatric/Behavioral: Negative for mood changes. The patient is not nervous/anxious.        OBJECTIVE:   /68 (BP Location: Right arm, Patient Position: Chair, Cuff Size: Adult Large)   Pulse (!) 49   Temp 97.1  F (36.2  C) (Tympanic)   Resp 16   Ht 1.795 m (5' 10.67\")   Wt 145.2 kg (320 lb 3.2 oz)   SpO2 98%   BMI 45.08 kg/m      Physical Exam  GENERAL: healthy, alert and no distress  EYES: Eyes grossly normal to inspection, PERRL and conjunctivae and sclerae normal  HENT: ear canals and TM's normal, nose and mouth without ulcers or lesions  NECK: no adenopathy, no asymmetry, masses, or scars and thyroid normal to palpation  RESP: lungs clear to auscultation - no rales, rhonchi or wheezes  CV: regular rate and rhythm, normal S1 S2, no S3 or S4, no murmur, click or rub, no peripheral edema and peripheral pulses strong  ABDOMEN: soft, nontender, no hepatosplenomegaly, no " "masses and bowel sounds normal  MS: no gross musculoskeletal defects noted, no edema  SKIN: no suspicious lesions or rashes  NEURO: Normal strength and tone, mentation intact and speech normal  PSYCH: mentation appears normal, affect normal/bright  LYMPH: no cervical, supraclavicular, axillary, or inguinal adenopathy    Diagnostic Test Results:  Labs reviewed in Epic    ASSESSMENT/PLAN:   (Z00.00) Routine general medical examination at a health care facility  (primary encounter diagnosis)  Comment: Well person   Plan: BASIC METABOLIC PANEL, Basic metabolic panel          (Ca, Cl, CO2, Creat, Gluc, K, Na, BUN), Lipid         panel reflex to direct LDL Fasting, PSA, screen        Diet, exercise, wellness and other preventive recommendations related to health maintenance were discussed.  Follow up as needed for acute issues.  Physical exam in 1 year.     (N18.2) CKD (chronic kidney disease) stage 2, GFR 60-89 ml/min  Comment:   Plan: Stable. Doing well.    (I25.10,  I25.84) Coronary artery calcification  Comment:   Plan: Asymptomatic. On aspirin and statin. No concerns.     (I10) Hypertension goal BP (blood pressure) < 140/90  Comment:   Plan: BP stable. Pulse is always low and asymptomatic.     (E66.01) Morbid obesity (H)  Comment:   Plan: Counseling on weight. He isn't working and is sedentary. We discussed a plan to add more exercise and better diet. He declines anything else     (G47.33) TIA (obstructive sleep apnea)  Comment:   Plan: Using CPAP    (G31.09,  F02.80) Frontal dementia (H)  Comment:   Plan: Seeing neurology. See their notes.        Patient has been advised of split billing requirements and indicates understanding: Yes  COUNSELING:   Reviewed preventive health counseling, as reflected in patient instructions    Estimated body mass index is 45.08 kg/m  as calculated from the following:    Height as of this encounter: 1.795 m (5' 10.67\").    Weight as of this encounter: 145.2 kg (320 lb 3.2 oz).   "       He reports that he quit smoking about 23 years ago. His smoking use included cigarettes and cigars. He has a 25.00 pack-year smoking history. He has never used smokeless tobacco.      Counseling Resources:  ATP IV Guidelines  Pooled Cohorts Equation Calculator  FRAX Risk Assessment  ICSI Preventive Guidelines  Dietary Guidelines for Americans, 2010  USDA's MyPlate  ASA Prophylaxis  Lung CA Screening    ANCA STEPHENSON PA-C  [facility]

## 2021-10-15 LAB
ANION GAP SERPL CALCULATED.3IONS-SCNC: 7 MMOL/L (ref 3–14)
BUN SERPL-MCNC: 28 MG/DL (ref 7–30)
CALCIUM SERPL-MCNC: 9.3 MG/DL (ref 8.5–10.1)
CHLORIDE BLD-SCNC: 106 MMOL/L (ref 94–109)
CHOLEST SERPL-MCNC: 156 MG/DL
CO2 SERPL-SCNC: 22 MMOL/L (ref 20–32)
CREAT SERPL-MCNC: 1.27 MG/DL (ref 0.66–1.25)
FASTING STATUS PATIENT QL REPORTED: YES
GFR SERPL CREATININE-BSD FRML MDRD: 60 ML/MIN/1.73M2
GLUCOSE BLD-MCNC: 107 MG/DL (ref 70–99)
HDLC SERPL-MCNC: 59 MG/DL
LDLC SERPL CALC-MCNC: 69 MG/DL
NONHDLC SERPL-MCNC: 97 MG/DL
POTASSIUM BLD-SCNC: 4.3 MMOL/L (ref 3.4–5.3)
PSA SERPL-MCNC: 0.37 UG/L (ref 0–4)
SODIUM SERPL-SCNC: 135 MMOL/L (ref 133–144)
TRIGL SERPL-MCNC: 140 MG/DL

## 2021-10-15 NOTE — TELEPHONE ENCOUNTER
"Requested Prescriptions   Pending Prescriptions Disp Refills     lisinopril-hydrochlorothiazide (PRINZIDE/ZESTORETIC) 20-12.5 MG per tablet [Pharmacy Med Name: LISINOPRIL-HCTZ 20-12.5 MG TAB]  Last Written Prescription Date:  12/22/2017  Last Fill Quantity: 90 tablet,  # refills: 1   Last office visit: 5/8/2018 with prescribing provider:  ernestine robles   Future Office Visit:     90 tablet 1     Sig: TAKE 1 TABLET BY MOUTH DAILY    Diuretics (Including Combos) Protocol Passed    6/23/2018  5:52 PM       Passed - Blood pressure under 140/90 in past 12 months    BP Readings from Last 3 Encounters:   06/08/18 126/80   06/05/18 133/73   05/08/18 126/68          Passed - Recent (12 mo) or future (30 days) visit within the authorizing provider's specialty    Patient had office visit in the last 12 months or has a visit in the next 30 days with authorizing provider or within the authorizing provider's specialty.  See \"Patient Info\" tab in inbasket, or \"Choose Columns\" in Meds & Orders section of the refill encounter.           Passed - Patient is age 18 or older       Passed - Normal serum creatinine on file in past 12 months    Recent Labs   Lab Test  01/04/18   1521   CR  1.03          Passed - Normal serum potassium on file in past 12 months    Recent Labs   Lab Test  01/04/18   1521   POTASSIUM  4.1          Passed - Normal serum sodium on file in past 12 months    Recent Labs   Lab Test  01/04/18   1521   NA  137                "
Prescription approved per [facility] Refill Protocol.  Zoe Bro RN   
180.28

## 2021-10-20 ENCOUNTER — OFFICE VISIT (OUTPATIENT)
Dept: NEUROLOGY | Facility: CLINIC | Age: 61
End: 2021-10-20
Payer: COMMERCIAL

## 2021-10-20 VITALS
DIASTOLIC BLOOD PRESSURE: 61 MMHG | HEART RATE: 57 BPM | SYSTOLIC BLOOD PRESSURE: 111 MMHG | WEIGHT: 315 LBS | BODY MASS INDEX: 45.05 KG/M2

## 2021-10-20 DIAGNOSIS — F03.91 DEMENTIA WITH BEHAVIORAL DISTURBANCE, UNSPECIFIED DEMENTIA TYPE: Primary | ICD-10-CM

## 2021-10-20 PROCEDURE — 99214 OFFICE O/P EST MOD 30 MIN: CPT | Performed by: INTERNAL MEDICINE

## 2021-10-20 ASSESSMENT — PAIN SCALES - GENERAL: PAINLEVEL: NO PAIN (0)

## 2021-10-20 NOTE — PROGRESS NOTES
[facility] Neurology Follow Up Visit    Car Torres MRN# [id number]   Age: 60 year old YOB: 1960     Brief history of symptoms: The patient was initially seen in neurologic consultation on 12/23/2020 for evaluation of cognitive changes. Please see the comprehensive neurologic consultation note from that date in the Epic records for details.     Interval history:   Things are stable today. The only paranoia that wife has noticed is he needs to have phones out of the bedroom, but he gets concerns that people may be recording activities in the bedroom. He reports good mood. He denies any hallucinations.     Patient's walking is slow, but he hasn't had any falls.     He continues to art projects frequently. He also watches a lot of TV.    They are still waiting on disability approval.     He denies any side effects with medications.       Past Medical History:     Patient Active Problem List   Diagnosis     Sciatica     Morbid obesity (H)     Hyperlipidemia LDL goal <130     Hypertension goal BP (blood pressure) < 140/90     CKD (chronic kidney disease) stage 2, GFR 60-89 ml/min     Renal cyst     Diverticulosis of large intestine without hemorrhage     Pulmonary nodule     Coronary artery calcification     History of sinus bradycardia     ED (erectile dysfunction)     TIA (obstructive sleep apnea)     Past Medical History:   Diagnosis Date     Nonspecific abnormal electrocardiogram (ECG) (EKG) 08/23/2007    Stress testing normal.         Past Surgical History:     Past Surgical History:   Procedure Laterality Date     COLONOSCOPY N/A 12/22/2020    Procedure: COLONOSCOPY, WITH POLYPECTOMY, CLIP;  Surgeon: Mihir Lang MD;  Location: [facility] [place]     ESOPHAGOSCOPY, GASTROSCOPY, DUODENOSCOPY (EGD), COMBINED N/A 1/28/2021    Procedure: ESOPHAGOGASTRODUODENOSCOPY, WITH BIOPSY;  Surgeon: Mihir Lang MD;  Location: [facility] [place]     SURGICAL HISTORY OF -       surgery on left index finger        Social History:      Social History     Tobacco Use     Smoking status: Former Smoker     Packs/day: 1.00     Years: 25.00     Pack years: 25.00     Types: Cigarettes, Cigars     Quit date: 1998     Years since quittin.7     Smokeless tobacco: Never Used   Substance Use Topics     Alcohol use: Yes     Alcohol/week: 10.0 standard drinks     Comment: 2-3 beers intermittently     Drug use: No        Family History:     Family History   Problem Relation Age of Onset     Cancer Mother         uterine     Other Cancer Mother         endometrial     C.A.D. Father      Hypertension Father      Breast Cancer Maternal Grandmother      Other Cancer Maternal Grandmother         Lung/brain     Hypertension Brother      Breast Cancer Other      Breast Cancer Cousin      Other Cancer Other      Glaucoma No family hx of      Macular Degeneration No family hx of         Medications:     Current Outpatient Medications   Medication Sig     ASPIRIN 325 MG OR TBEC ONE DAILY     atorvastatin (LIPITOR) 40 MG tablet TAKE 1 TABLET BY MOUTH EVERY DAY     Cholecalciferol (VITAMIN D PO) Take 5,000 Units by mouth daily One tablet twice daily     donepezil (ARICEPT) 10 MG tablet Take 1 tablet (10 mg) by mouth daily     FLUoxetine (PROZAC) 40 MG capsule Take 1 capsule (40 mg) by mouth daily     lisinopril-hydrochlorothiazide (ZESTORETIC) 20-12.5 MG tablet TAKE 1 TABLET BY MOUTH EVERY DAY     MULTI VITAMIN MENS PO None Entered     NEW MED daily Multivitamin shake- levell brand     NONFORMULARY PROMAX - Testosterone Booster     OLANZapine (ZYPREXA) 7.5 MG tablet Take 1 tablet (7.5 mg) by mouth At Bedtime     omega 3 1000 MG CAPS Take by mouth daily     tadalafil (CIALIS) 20 MG tablet TAKE 1/2 TO 1 TABLET BY MOUTH 30 MINUTES TO 1 HOUR BEFORE SEX     TURMERIC PO Take by mouth daily     vitamin B complex with vitamin C (VITAMIN  B COMPLEX) TABS tablet Take 1 tablet by mouth daily     No current facility-administered medications for this visit.         Allergies:   No Known Allergies     Review of Systems:   As above     Physical Exam:   Vitals: /61 (BP Location: Right arm, Patient Position: Sitting, Cuff Size: Adult Large)   Pulse 57   Wt 145.2 kg (320 lb)   BMI 45.05 kg/m     General: Seated comfortably in no acute distress.  Lungs: breathing comfortably  Neurologic:     Mental Status: MoCA 22/30 (-1 repeating sentence, -2 serial 7s, -1 delayed recall, -2 orientation, -1 list of letters, -1 fluency)     Cranial Nerves:  No dysarthria.      Motor: No tremors or other abnormal movements observed. Normal/symmetric rapid finger tapping.      Sensory: Intact/symmetric to light touch throughout upper and lower extremities. Negative Romberg.     Coordination: Finger-nose-finger and heel-shin intact without dysmetria.      Gait: Slowed, but otherwise steady casual gait. Able to walk on toes, heels and tandem with moderate difficulties.     Data reviewed on previous visits    Imaging:  MRI brain 10/16/2020  IMPRESSION:  1. Exam mildly limited by motion artifact.  2. Few minimal nonspecific white matter lesions.  3. No evidence for intracranial hemorrhage, acute infarct, or any  focal mass lesions.     Laboratory:  TSH, CMP wnl 8/2020  B12, CBC wnl 12/2020    PET brain 4/2021  IMPRESSION:  No significant metabolic deficits when compared to  control database. This may be due to the possible underlying dementia  being too early to characterize, versus the patient's symptomatology  being due to some other process than dementia.    Pertinent Investigations since last visit:   None         Assessment and Plan:   Assessment:  Patient is a 61 y/o male who presents for follow-up of paranoia, hallucinations, delusions, and cognitive changes, which slowly progressed starting spring 2020. Patient has no prior history of psychiatric disease. Patient had neuropsychological testing with Dr Adam in 3/2021 which was concerning for significant dysfunction in frontal and  subcortical networks, and in the clinical context, concerning for a neurodegenerative process causing mild dementia. PET brain was completed without any revealing abnormalities. In terms of patient's specific diagnosis frontotemporal dementia is the leading consideration. Lewy body dementia can present with psychosis but patient does not have other clear features of the disease. There is some prior history suggestive of possible REM sleep behavioral disorder, but this is not a current issue. An atypical presentation of Alzheimer's disease is also possible. Symptoms are stable to improved at follow-up today. We will continue current medications. Patient/family is going to consider repeating neuropsychology testing with Dr Adam in the upcoming year.    Plan:  - Continue Donepezil 10 mg nightly   - Continue to follow-up with psychiatrist - currently on Zyprexa 7.5 mg nightly and Prosac 40 mg daily  - Consider repeat neuropsychology testing    Follow up in Neurology clinic in 6 months or earlier as needed should new concerns arise.    Miguel Varela MD   of Neurology  [facility]

## 2021-10-20 NOTE — LETTER
10/20/2021         RE: Car Torres  [address]  [facility] [address]        Dear Colleague,    Thank you for referring your patient, Car Torres, to the [facility] [place]. Please see a copy of my visit note below.    [facility] Neurology Follow Up Visit    Car Torres MRN# [id number]   Age: 60 year old YOB: 1960     Brief history of symptoms: The patient was initially seen in neurologic consultation on 12/23/2020 for evaluation of cognitive changes. Please see the comprehensive neurologic consultation note from that date in the Epic records for details.     Interval history:   Things are stable today. The only paranoia that wife has noticed is he needs to have phones out of the bedroom, but he gets concerns that people may be recording activities in the bedroom. He reports good mood. He denies any hallucinations.     Patient's walking is slow, but he hasn't had any falls.     He continues to art projects frequently. He also watches a lot of TV.    They are still waiting on disability approval.     He denies any side effects with medications.       Past Medical History:     Patient Active Problem List   Diagnosis     Sciatica     Morbid obesity (H)     Hyperlipidemia LDL goal <130     Hypertension goal BP (blood pressure) < 140/90     CKD (chronic kidney disease) stage 2, GFR 60-89 ml/min     Renal cyst     Diverticulosis of large intestine without hemorrhage     Pulmonary nodule     Coronary artery calcification     History of sinus bradycardia     ED (erectile dysfunction)     TIA (obstructive sleep apnea)     Past Medical History:   Diagnosis Date     Nonspecific abnormal electrocardiogram (ECG) (EKG) 08/23/2007    Stress testing normal.         Past Surgical History:     Past Surgical History:   Procedure Laterality Date     COLONOSCOPY N/A 12/22/2020    Procedure: COLONOSCOPY, WITH POLYPECTOMY, CLIP;  Surgeon: Mihir Lang MD;  Location: [facility]  OR     ESOPHAGOSCOPY, GASTROSCOPY, DUODENOSCOPY (EGD), COMBINED N/A 2021    Procedure: ESOPHAGOGASTRODUODENOSCOPY, WITH BIOPSY;  Surgeon: Mihir Lang MD;  Location: [facility] OR     SURGICAL HISTORY OF -       surgery on left index finger        Social History:     Social History     Tobacco Use     Smoking status: Former Smoker     Packs/day: 1.00     Years: 25.00     Pack years: 25.00     Types: Cigarettes, Cigars     Quit date: 1998     Years since quittin.7     Smokeless tobacco: Never Used   Substance Use Topics     Alcohol use: Yes     Alcohol/week: 10.0 standard drinks     Comment: 2-3 beers intermittently     Drug use: No        Family History:     Family History   Problem Relation Age of Onset     Cancer Mother         uterine     Other Cancer Mother         endometrial     C.A.D. Father      Hypertension Father      Breast Cancer Maternal Grandmother      Other Cancer Maternal Grandmother         Lung/brain     Hypertension Brother      Breast Cancer Other      Breast Cancer Cousin      Other Cancer Other      Glaucoma No family hx of      Macular Degeneration No family hx of         Medications:     Current Outpatient Medications   Medication Sig     ASPIRIN 325 MG OR TBEC ONE DAILY     atorvastatin (LIPITOR) 40 MG tablet TAKE 1 TABLET BY MOUTH EVERY DAY     Cholecalciferol (VITAMIN D PO) Take 5,000 Units by mouth daily One tablet twice daily     donepezil (ARICEPT) 10 MG tablet Take 1 tablet (10 mg) by mouth daily     FLUoxetine (PROZAC) 40 MG capsule Take 1 capsule (40 mg) by mouth daily     lisinopril-hydrochlorothiazide (ZESTORETIC) 20-12.5 MG tablet TAKE 1 TABLET BY MOUTH EVERY DAY     MULTI VITAMIN MENS PO None Entered     NEW MED daily Multivitamin shake- levell brand     NONFORMULARY PROMAX - Testosterone Booster     OLANZapine (ZYPREXA) 7.5 MG tablet Take 1 tablet (7.5 mg) by mouth At Bedtime     omega 3 1000 MG CAPS Take by mouth daily     tadalafil (CIALIS) 20 MG tablet TAKE 1/2 TO  1 TABLET BY MOUTH 30 MINUTES TO 1 HOUR BEFORE SEX     TURMERIC PO Take by mouth daily     vitamin B complex with vitamin C (VITAMIN  B COMPLEX) TABS tablet Take 1 tablet by mouth daily     No current facility-administered medications for this visit.        Allergies:   No Known Allergies     Review of Systems:   As above     Physical Exam:   Vitals: /61 (BP Location: Right arm, Patient Position: Sitting, Cuff Size: Adult Large)   Pulse 57   Wt 145.2 kg (320 lb)   BMI 45.05 kg/m     General: Seated comfortably in no acute distress.  Lungs: breathing comfortably  Neurologic:     Mental Status: MoCA 22/30 (-1 repeating sentence, -2 serial 7s, -1 delayed recall, -2 orientation, -1 list of letters, -1 fluency)     Cranial Nerves:  No dysarthria.      Motor: No tremors or other abnormal movements observed. Normal/symmetric rapid finger tapping.      Sensory: Intact/symmetric to light touch throughout upper and lower extremities. Negative Romberg.     Coordination: Finger-nose-finger and heel-shin intact without dysmetria.      Gait: Slowed, but otherwise steady casual gait. Able to walk on toes, heels and tandem with moderate difficulties.     Data reviewed on previous visits    Imaging:  MRI brain 10/16/2020  IMPRESSION:  1. Exam mildly limited by motion artifact.  2. Few minimal nonspecific white matter lesions.  3. No evidence for intracranial hemorrhage, acute infarct, or any  focal mass lesions.     Laboratory:  TSH, CMP wnl 8/2020  B12, CBC wnl 12/2020    PET brain 4/2021  IMPRESSION:  No significant metabolic deficits when compared to  control database. This may be due to the possible underlying dementia  being too early to characterize, versus the patient's symptomatology  being due to some other process than dementia.    Pertinent Investigations since last visit:   None         Assessment and Plan:   Assessment:  Patient is a 61 y/o male who presents for follow-up of paranoia, hallucinations, delusions,  and cognitive changes, which slowly progressed starting spring 2020. Patient has no prior history of psychiatric disease. Patient had neuropsychological testing with Dr Adam in 3/2021 which was concerning for significant dysfunction in frontal and subcortical networks, and in the clinical context, concerning for a neurodegenerative process causing mild dementia. PET brain was completed without any revealing abnormalities. In terms of patient's specific diagnosis frontotemporal dementia is the leading consideration. Lewy body dementia can present with psychosis but patient does not have other clear features of the disease. There is some prior history suggestive of possible REM sleep behavioral disorder, but this is not a current issue. An atypical presentation of Alzheimer's disease is also possible. Symptoms are stable to improved at follow-up today. We will continue current medications. Patient/family is going to consider repeating neuropsychology testing with Dr Adam in the upcoming year.    Plan:  - Continue Donepezil 10 mg nightly   - Continue to follow-up with psychiatrist - currently on Zyprexa 7.5 mg nightly and Prosac 40 mg daily  - Consider repeat neuropsychology testing    Follow up in Neurology clinic in 6 months or earlier as needed should new concerns arise.    Miguel Varela MD   of Neurology  [facility]        Again, thank you for allowing me to participate in the care of your patient.        Sincerely,        Miguel Varela MD

## 2021-10-21 ENCOUNTER — MYC MEDICAL ADVICE (OUTPATIENT)
Dept: FAMILY MEDICINE | Facility: CLINIC | Age: 61
End: 2021-10-21

## 2021-10-22 ENCOUNTER — ALLIED HEALTH/NURSE VISIT (OUTPATIENT)
Dept: FAMILY MEDICINE | Facility: CLINIC | Age: 61
End: 2021-10-22
Payer: COMMERCIAL

## 2021-10-22 DIAGNOSIS — Z23 NEED FOR TETANUS BOOSTER: Primary | ICD-10-CM

## 2021-10-22 PROCEDURE — 90714 TD VACC NO PRESV 7 YRS+ IM: CPT

## 2021-10-22 PROCEDURE — 99207 PR NO CHARGE NURSE ONLY: CPT

## 2021-10-22 PROCEDURE — 90471 IMMUNIZATION ADMIN: CPT

## 2021-11-19 ENCOUNTER — VIRTUAL VISIT (OUTPATIENT)
Dept: PSYCHIATRY | Facility: CLINIC | Age: 61
End: 2021-11-19
Payer: MEDICAID

## 2021-11-19 DIAGNOSIS — F33.0 MAJOR DEPRESSIVE DISORDER, RECURRENT EPISODE, MILD WITH ATYPICAL FEATURES (H): Primary | ICD-10-CM

## 2021-11-19 PROCEDURE — 99214 OFFICE O/P EST MOD 30 MIN: CPT | Mod: 95 | Performed by: NURSE PRACTITIONER

## 2021-11-19 RX ORDER — OLANZAPINE 7.5 MG/1
7.5 TABLET, FILM COATED ORAL AT BEDTIME
Qty: 90 TABLET | Refills: 1 | Status: SHIPPED | OUTPATIENT
Start: 2021-11-19 | End: 2022-01-03 | Stop reason: DRUGHIGH

## 2021-11-19 RX ORDER — FLUOXETINE 40 MG/1
40 CAPSULE ORAL DAILY
Qty: 90 CAPSULE | Refills: 1 | Status: SHIPPED | OUTPATIENT
Start: 2021-11-19 | End: 2022-04-26

## 2021-11-19 NOTE — PROGRESS NOTES
"Jerry is a 61 year old who is being evaluated via a billable telephone visit.      What phone number would you like to be contacted at? [phone]   How would you like to obtain your AVS? Jamil  Phone call duration: 30 minutes        Outpatient Psychiatric Progress Note    Name: Car Torres   : 1960                    Primary Care Provider: ANCA STEPHENSON PA-C   Therapist: None    PHQ-9 scores:  PHQ-9 SCORE 2021   PHQ-9 Total Score 8 0 1       SHAHNAZ-7 scores:  SHAHNAZ-7 SCORE 2021   Total Score 0 0 0       Patient Identification:    Patient is a 61 year old year old,   White Not  or  male  who presents for return visit with me.  Patient is currently unemployed. Patient attended the session with His wife , who they agreed to have interview with. Patient prefers to be called: \" Jerry\".    Current medications include: ASPIRIN 325 MG OR TBEC, ONE DAILY  atorvastatin (LIPITOR) 40 MG tablet, TAKE 1 TABLET BY MOUTH EVERY DAY  Cholecalciferol (VITAMIN D PO), Take 5,000 Units by mouth daily One tablet twice daily  donepezil (ARICEPT) 10 MG tablet, Take 1 tablet (10 mg) by mouth daily  FLUoxetine (PROZAC) 40 MG capsule, Take 1 capsule (40 mg) by mouth daily  lisinopril-hydrochlorothiazide (ZESTORETIC) 20-12.5 MG tablet, TAKE 1 TABLET BY MOUTH EVERY DAY  MULTI VITAMIN MENS PO, None Entered  NEW MED, daily Multivitamin shake- levell brand  NONFORMULARY, PROMAX - Testosterone Booster  OLANZapine (ZYPREXA) 7.5 MG tablet, Take 1 tablet (7.5 mg) by mouth At Bedtime  omega 3 1000 MG CAPS, Take by mouth daily  tadalafil (CIALIS) 20 MG tablet, TAKE 1/2 TO 1 TABLET BY MOUTH 30 MINUTES TO 1 HOUR BEFORE SEX  TURMERIC PO, Take by mouth daily  vitamin B complex with vitamin C (VITAMIN  B COMPLEX) TABS tablet, Take 1 tablet by mouth daily    No current facility-administered medications on file prior to visit.       The [place] Prescription Monitoring " Program has been reviewed and there are no concerns about diversionary activity for controlled substances at this time.      I was able to review most recent Primary Care Provider, specialty provider, and therapy visit notes that I have access to.     Today, patient reports that he was terminated in October and has been approved for disability longterm.  They are awaiting approval for social security disability.  He has applied for food stamps and energy assistance.  He has medical assistance.  He walks on his treadmill and watches television.  He is stressed out about financial problems.  He has a dementia diagnosis.  His wife reported that he has become incontinent and has had some weakness.       has a past medical history of Nonspecific abnormal electrocardiogram (ECG) (EKG) (08/23/2007).He has no past medical history of Arthritis, Cancer (H), Cerebral infarction (H), Congestive heart failure (H), Congestive heart failure, unspecified, COPD (chronic obstructive pulmonary disease) (H), Depressive disorder, Diabetes (H), History of blood transfusion, Thyroid disease, or Uncomplicated asthma.    Social history updates:    Jerry is living with his wife and is now unemployed with financial stressors.    Substance use updates:    No alcohol use reported  Tobacco use: No    Vital Signs:   There were no vitals taken for this visit.    Labs:    Most recent laboratory results reviewed and no new labs.     Mental Status Examination:  Appearance:  awake, alert  Attitude:  cooperative   Eye Contact:  Unable to assess  Gait and Station: No assistive Devices used and No dizziness or falls  Psychomotor Behavior:  Unable to assess  Oriented to:  time, person, and place  Attention Span and Concentration:  Normal  Speech:   clear, coherent and Speaks: English  Mood:  depressed  Affect:  mood congruent  Associations:  no loose associations  Thought Process:  linear  Thought Content:  no evidence of suicidal ideation or homicidal  ideation, no auditory hallucinations present and no visual hallucinations present  Recent and Remote Memory:  fair Not formally assessed. No amnesia.  Fund of Knowledge: appropriate  Insight:  fair  Judgment:  fair  Impulse Control:  fair    Suicide Risk Assessment:  Today Car Torres reports that he is having no thoughts to want to end his life or to harm other people. In addition, there are notable risk factors for self-harm, including anxiety, comorbid medical condition of Dementia and withdrawing. However, risk is mitigated by commitment to family, history of seeking help when needed, future oriented, denies suicidal intent or plan and denies homicidal ideation, intent, or plan. Therefore, based on all available evidence including the factors cited above, Car Torres does not appear to be at imminent risk for self-harm, does not meet criteria for a 72-hr hold, and therefore remains appropriate for ongoing outpatient level of care.  A thorough assessment of risk factors related to suicide and self-harm have been reviewed and are noted above. The patient convincingly denies suicidality on several occasions. Local community safety resources printed and reviewed for patient to use if needed. There was no deceit detected, and the patient presented in a manner that was believable.     DSM5 Diagnosis:  296.32 (F33.1) Major Depressive Disorder, Recurrent Episode, Moderate _ and With atypical features    Medical comorbidities include:   Patient Active Problem List    Diagnosis Date Noted     Hypertension goal BP (blood pressure) < 140/90 10/20/2010     Priority: High     Hyperlipidemia LDL goal <130 06/11/2010     Priority: High     TIA (obstructive sleep apnea) 07/19/2018     Priority: Medium     Home Sleep Apnea Testing - 7/18/18: 295 lbs 0 oz: AHI 45.3/hr. Supine AHI 61.9/hr. Oxygen [place] of 66%.  Baseline 93%.  Sp02 =< 88% for 47 minutes.       History of sinus bradycardia 06/07/2018     Priority: Medium      ED (erectile dysfunction) 06/07/2018     Priority: Medium     Coronary artery calcification 05/08/2018     Priority: Medium     Per CT CHEST WITHOUT CONTRAST April 23, 2018. CT calcium score planned.        Renal cyst 11/18/2015     Priority: Medium     Simple, non enhanced, 3.5 cm on right kidney, Bosniak 1 - x 2 stable findings       Diverticulosis of large intestine without hemorrhage 11/18/2015     Priority: Medium     Incidental finding on CT scan        Pulmonary nodule 11/18/2015     Priority: Medium     Incidental finding on CT scan, right posterior lung - considered benign / stable        CKD (chronic kidney disease) stage 2, GFR 60-89 ml/min 10/21/2010     Priority: Medium     60 to 80 - have discussed Lisinopril - will consider        Sciatica 02/27/2006     Priority: Medium     Morbid obesity (H) 02/27/2006     Priority: Medium       Assessment:    Car Torres recently was terminated from his job in October.  His wife informed me that dog is received a diagnosis of dementia and is now applying for benefits.  In the meantime they are accessing community support.  With the dementia diagnosis he has been prescribed donepezil by his neurologist.  I did refer Jerry to behavior health home care services in order to access other community supports that might be available to him during this transition time.  He will continue Abilify 7.5 mg daily as he reports lessening of paranoia and thoughts that have a delusional content regarding his wife.  He will continue fluoxetine 40 mg daily.  His wife voiced no other concerns at this time other than continued forgetfulness and now the emergence of some incontinence..    Medication side effects and alternatives were reviewed. Health promotion activities recommended and reviewed today. All questions addressed. Education and counseling completed regarding risks and benefits of medications and psychotherapy options.    Treatment Plan:        1.  Continue  fluoxetine 40 mg daily    2.  Continue Abilify 7.5 mg daily    3.  Refer to behavior health home care services to access community supports    4.  Donepezil being prescribed by your neurologist        Continue all other medications as reviewed per electronic medical record today.     Safety plan reviewed. To the Emergency Department as needed or call after hours crisis line at [phone] or [phone]. [place] Crisis Text [place]. Text [place] to 664798 or Suicide LifeLine Chat: suicidebCommunities.org/chat/    To schedule individual or family therapy, call [facility] at [phone].    Schedule an appointment with me in 2 months or sooner as needed. Call [facility] at [phone] to schedule.    Follow up with primary care provider as planned or for acute medical concerns.    Call the psychiatric nurse line with medication questions or concerns at [phone].    The Campaign Solutionhart may be used to communicate with your provider, but this is not intended to be used for emergencies.    Crisis Resources:    National Suicide Prevention Lifeline: 338.506.2534 (TTY: 197.159.7768). Call anytime for help.  (www.suicidepreventionlifeline.org)  National [place] on Mental Illness (www.ana maría.org): 711.335.1184 or 589-741-2881.   Mental Health Association (www.mentalhealth.org): 908.199.1089 or 315-853-5073.  [place] Crisis Text [place]: Text [place] to 293416  Suicide LifeLine Chat: suicidebCommunities.org/chat    Administrative Billing:   Time spent with patient was 30 minutes and greater than 50% of time or 20 minutes was spent in counseling and coordination of care regarding above diagnoses and treatment plan.    Patient Status:  Patient will continue to be seen for ongoing consultation and stabilization.    Signed:   BELEN Singh-BC   Psychiatry

## 2021-11-19 NOTE — PATIENT INSTRUCTIONS
1.  Continue fluoxetine 40 mg daily    2.  Continue Abilify 7.5 mg daily    3.  Refer to behavior health home care services to access community supports    4.  Donepezil being prescribed by your neurologist        Continue all other medications as reviewed per electronic medical record today.     Safety plan reviewed. To the Emergency Department as needed or call after hours crisis line at [phone] or [phone]. [place] Crisis Text [place]. Text [place] to 987644 or Suicide LifeLine Chat: suicideDreamDry.org/chat/    To schedule individual or family therapy, call [facility] at [phone].    Schedule an appointment with me in 2 months or sooner as needed. Call [facility] at [phone] to schedule.    Follow up with primary care provider as planned or for acute medical concerns.    Call the psychiatric nurse line with medication questions or concerns at [phone].    Telirishart may be used to communicate with your provider, but this is not intended to be used for emergencies.    Crisis Resources:    National Suicide Prevention Lifeline: 961.966.2032 (TTY: 953.406.4906). Call anytime for help.  (www.suicidepreventionlifeline.org)  National [place] on Mental Illness (www.ana maría.org): 501.875.4559 or 485-407-1644.   Mental Health Association (www.mentalhealth.org): 221.798.8983 or 861-883-4111.  [place] Crisis [place]: Text [place] to 573063  Suicide LifeLine Chat: suicideDreamDry.org/chat

## 2021-11-22 ENCOUNTER — DOCUMENTATION ONLY (OUTPATIENT)
Dept: OTHER | Facility: CLINIC | Age: 61
End: 2021-11-22
Payer: MEDICAID

## 2021-11-23 ENCOUNTER — VIRTUAL VISIT (OUTPATIENT)
Dept: BEHAVIORAL HEALTH | Facility: CLINIC | Age: 61
End: 2021-11-23
Payer: MEDICAID

## 2021-11-23 DIAGNOSIS — R69 DIAGNOSIS DEFERRED: Primary | ICD-10-CM

## 2021-11-23 NOTE — PROGRESS NOTES
Behavioral Health Home Services  No data recorded      Social Work Care Navigator Note      Patient: Car Torres  Date: November 23, 2021  Preferred Name: Jerry    Previous PHQ-9:   PHQ-9 SCORE 2/26/2021 5/28/2021 8/30/2021   PHQ-9 Total Score 8 0 1     Previous SHAHNAZ-7:   SHAHNAZ-7 SCORE 2/26/2021 5/28/2021 8/30/2021   Total Score 0 0 0     ALMA LEVEL:  ALMA Score (Last Two) 5/4/2012   ALMA Raw Score 37   Activation Score 49.9   ALMA Level 2       Preferred Contact:  No data recorded    Type of Contact Today: Phone call (patient / identified key support person reached)      Data: (subjective / Objective):    [facility] Introduction:  Hi my name is FRANCESCA Almonte from your [facility] primary care clinic.     I work closely with your primary care provider, Fransico Hylton.     If it's ok I'd like to talk about some new services available to you, at no out of pocket cost to you.      Before we get started can you verify your insurance for me? Medicaid    What social work or case management services do you receive? (If so, are you receiving ACT or TCM?).  None      Getting to Know You - Whole Person Care:  This new service is called Behavioral Health Home services, which is designed to support you as a whole person beyond just your medical needs.      I'm here to be a central point of contact for your healthcare needs and to help with:    Housing    Transportation    Financial resources    Comprehensive Health needs (appointment help, medication costs, etc.)    Employment    Education    Health Insurance applications    And connecting with social supports or community resources    Out of the things I mentioned what would you find helpful?  Financial Resources, Comprehensive Health Needs, SSDI, and connecting with social supports or community resources.    Patient's spouse and POA met with [facility] today to discuss Behavioral Health Home (BHH) services for patient. Patient's spouse reports patient recently diagnosed with  frontal lobe dementia, possibly Lewy Body Dementia, but further diagnostics need to completed for comprehensive diagnosis.    Patient's spouse reports patient has not worked since April and short-term disability ended in Oct. Spouse reports she receives SSDI income and patient has applied for SSDI, currently at 43% completion.     Spouse reports they own their own home with the mortgage being paid until June of next year. Spouse reports supportive friends and family helping with basic needs and food. Spouse reports patient recently (Nov) applied for EAP, SNAP and cash benefits through the [facility] but have not heard back yet about approval.    Patient reports she does have a durable POA and [facility] does have Health Care POA in place, paperwork filed. Patient reports she will work on getting durable POA forwarded to [facility]. [facility] and spouse discussed future needs such as, [facility] waver program, [facility] worker, Elder Law  and additional group supports for caregivers and their families for frontal lobe dementia. CC, patient and spouse to meet via video for Health & Wellness Assessment in early December.      Diagnostic Assessment completed 12/10/2020 by Taniya Estrada NP    Patient response to [facility] Service offering:   Patient enrolled in [facility] today - Patient provided verbal authorization for Behavioral Health Home ([facility]) enrollment forms and electronic Release of Information, faxed to HIM. [facility] sent Jalousiert message to patient and spouse with [facility] welcome letter and Behavioral Health Home ([facility]) brochure today.    Edgardo Lee [facility]  [facility] [place] ([facility])   [facility]  [phone]

## 2021-11-23 NOTE — Clinical Note
Hello,    I was able to meet with patient's spouse and POA today and enrolled the patient in the Behavioral Health Home ([facility]) program.     I have an upcoming Health & Wellness video visit with the patient and his spouse for the Behavioral Health Home ([facility]) Health & Wellness Assessment in early Dec. I will be following up with the patient and his spouse at least once a month to meet the patient's wellness goals and provide additional mental health support.    Take harika,  Edgardo Lee, LICSW Behavioral Health Home ([facility])   [facility]  [phone]  November 23, 2021

## 2021-12-02 ENCOUNTER — VIRTUAL VISIT (OUTPATIENT)
Dept: BEHAVIORAL HEALTH | Facility: CLINIC | Age: 61
End: 2021-12-02

## 2021-12-02 DIAGNOSIS — R69 DIAGNOSIS DEFERRED: Primary | ICD-10-CM

## 2021-12-02 DIAGNOSIS — Z12.11 SCREENING FOR COLON CANCER: Primary | ICD-10-CM

## 2021-12-02 ASSESSMENT — ANXIETY QUESTIONNAIRES
4. TROUBLE RELAXING: NOT AT ALL
IF YOU CHECKED OFF ANY PROBLEMS ON THIS QUESTIONNAIRE, HOW DIFFICULT HAVE THESE PROBLEMS MADE IT FOR YOU TO DO YOUR WORK, TAKE CARE OF THINGS AT HOME, OR GET ALONG WITH OTHER PEOPLE: NOT DIFFICULT AT ALL
1. FEELING NERVOUS, ANXIOUS, OR ON EDGE: NOT AT ALL
7. FEELING AFRAID AS IF SOMETHING AWFUL MIGHT HAPPEN: NOT AT ALL
2. NOT BEING ABLE TO STOP OR CONTROL WORRYING: NOT AT ALL
5. BEING SO RESTLESS THAT IT IS HARD TO SIT STILL: NOT AT ALL
3. WORRYING TOO MUCH ABOUT DIFFERENT THINGS: NOT AT ALL
6. BECOMING EASILY ANNOYED OR IRRITABLE: NOT AT ALL
GAD7 TOTAL SCORE: 0

## 2021-12-02 NOTE — LETTER
Behavioral Health Home ([facility]): Health Action Plan  [facility] Clinic: [place]    Well and Beyond      Name: Car Torres  Preferred Name: Jerry  : 1960  MRN: [id number]      My Goals  Goal Areas: Health; Mental Health; Employment / Volunteer; Financial and Social Service Benefits    Patient stated goals: Jerry would like assistance with his physical and mental health for creating a balanced and healthy lifestyle. Jerry would like assistance/support with SSDI, budgeting and social service assistance to maintain/increase his financial stability.    Strengths related to each goal: Jerry identifies his strengths are he is a good , mentor, good listener, patience, and forgiving.    Services and Supports Needed: The [facility] Team will provide monthly contact with patient in order to monitor progress towards goals.    Activities / Actions of Team to support goal(s): [facility] team will locate appropriate resources that align with patient's goals and promote health and wellness.    Activities / Actions of Patient / Parent / Guardian to support goal(s): It is a requirement that patient's primary care physician is through the [facility] system and that they are on Medical Assistance/Medicaid. If either of these were to change or if patient needs any type of assistance, they are to reach out to their Behavioral Health Home ([facility]) team.        Recommended Referral  Tobacco cessation referrals made?: NA  Mental Health / Chemical Dependency Referrals: Yes  Substance Use Referrals: Not Applicable  Mental Health Referrals: MH Services: [facility], [facility], Community MH Provider; Psychiatry  Community Health Referrals: [facility] Financial Services; [facility] ; Other (see comments)  Dental Referral: Yes        My Team Members and Their Contact Information  Patient Care Team       Relationship Specialty Notifications Start End    Mihir Perez MD PCP - General Internal Medicine - Pediatrics All results, Admissions 21      Phone: [phone] Fax: [phone] [address] [facility] [place] [address]    Fransico Hylton PA-C Assigned PCP   9/27/20     Phone: [phone] Fax: [phone]         [address] [place] [address]    Mihir Torres DPM Assigned Musculoskeletal Provider   10/23/20     Phone: [phone] Fax: [phone] [address] [facility] [address]    Taniya Estrada NP Assigned Behavioral Health Provider   12/13/20     Phone: [phone] Fax: [phone]         [address] [place] [address]    Miguel Varela MD Assigned Neuroscience Provider   12/27/20     Phone: [phone] Fax: [phone] [address] [address]    Edgardo Lee LSW  Clinic All results, Admissions 11/23/21     [facility] - direct phone [phone]          My Wellness Plan  Safety Concerns: None Reported / Observed  Recommendations / Plan for safety concerns: A safety and risk management plan has not been developed at this time, however patient was encouraged to call [facility] / 911 should there be a change in any of these risk factors.  Crisis Plan (emergencies / when urgent support needed): Jerry states he feels comfortable contacting his spouse, Destiny Torres and/or calling the National Suicide Prevention Lifeline at 257-997-9524 or 911 in a crisis or emergency situation.          Car Torres co-developed the Health Action Plan with the [facility] Team and received a copy of this document.  Date Health Action Plan Completed/Updated: 12/02/21

## 2021-12-02 NOTE — Clinical Note
Hello,    I was able to meet with this patient and his spouse. We were able to complete the Health & Wellness Assessment for the Behavioral Health Home ([facility]) Social Work and Case Management program. The patient and his spouse were able to identify wellness goal areas and specific goals they would like to accomplish in the next 6-months. I look forward to meeting with them at least once a month to provide additional support and resources to increase patient engagement and meet his wellness goals.    Take Edgardo shabazz, LICSW Behavioral Health Home ([facility])   [facility]  [phone]

## 2021-12-02 NOTE — LETTER
"Behavioral Health [place] ([facility]): Health Action Plan  [facility] Clinic: [place]    Well and Beyond      Name: Car Torres  Preferred Name: Jerry KAMINSKIB: 1960  MRN: [id number]    2021    [facility]  [address] [place] [address]    Dear Car,    I have enclosed the Health & Wellness Assessment that we completed together,  It includes your goals for Behavioral Health Home ([facility]) Services. As you continue being enrolled in Behavioral Health Home ([facility]) services, I wanted to give you some information so you know what to expect moving forward.    What to Expect on a Monthly Basis: It is a requirement that I make contact with you on a monthly basis (by phone or meeting with you in clinic) to check in on how things are going and if you need any assistance. Please feel free to reach out to your [facility] team between these contacts if you need assistance or have any questions.    What to Expect every Six Months: Every six months, it is a requirement that we complete a Health Action Plan (HAP) review. During this in-clinic appointment, we will monitor our progress towards existing goals and set new goals for the next 6 month time period.    What kinds of support can [facility] offer now that I am enrolled?   - Housing Coordination  - Transportation Resources  - Financial Resources  - Coordination with the [facility] for Benefits (MA, SNAP benefits, etc)  - Disability Eligibility and Benefits  - Employment and Education Coordination  - Disability Related Information and Education Resources  - Referrals for mental health services, chemical dependency assessment/treatment, etc     If you or someone you know is experiencing a mental health crisis and you need help, the following crisis hotlines are available to help.    If you are in immediate danger, call 911.  Suicide Prevention Lifeline: 7-708-632-TALK (6493)  Crisis Text Line Service: Text \"[place]\" to 739262.    [facility]" [facility] - Behavioral Emergency Center  [address], [place] [address]  [phone] [facility] - [employer] [facility] Crisis Response Services (Adults & Children)  [phone]   [facility] - Acute Psychiatric Services (APS)  Assessment & Referral: [phone]  Suicide Hotline: 426.740.3449 [facility] Crisis Team  [phone]   [facility] Adult Mental Health Services   [phone]  Referrals: [phone]  Crisis: [phone] [facility] - Emergency Department  [address] [place] [address]  [phone]   [place] [facility]  4-194-JgecTto (1-952.574.8867) [facility] Mental Health and Resource Crisis Line  [phone]   [facility] - Community Outreach for Psychiatric Emergencies  [phone] [facility] Crisis Services - [facility] Adult Mental Health Services - Crisis Services (24/7)  Information & Referrals: [phone]  Crisis Line: 310.381.2417   [facility] Emergency Center (24/7)  [phone] TDD Crisis Help Line Serving [facility]  [phone]  or [facility]  to 672491    [facility]  Mental Health  [address] [place] [address]  [phone]  [address] [place] [address]  [phone]  web: [email or web address].  Mental-Health    [facility]  Mental Health  [address] [place] [address]  [phone]  web: [facility]  Family Services  [address], [place] [address]  [phone]  web: [facility] Family [facility]   Family Services  [address] [place] [address]  [phone]  web: [email or web address] [email or web address]    [facility] and Human Services Department  [address], [place]  [address]  [phone]  [address] [place] [address]  [phone]  Toll Free: [phone]  web: [place] and human services     Please let me know if you have any questions or if there is anything that we can assist with. I can be reached by phone, Leeviahart message, or by email. I look forward to continuing to work with you!    Sincerely,      Edgardo Lee, [facility]  Behavioral Health Home ([facility])   [phone]  [email or web address]

## 2021-12-02 NOTE — PROGRESS NOTES
Behavioral Health Home Services  [facility] Clinic: [place]        Social Work Care Navigator Note      Patient: Car Torres  Date: December 2, 2021  Preferred Name: Jerry    Previous PHQ-9:   PHQ-9 SCORE 2/26/2021 5/28/2021 8/30/2021   PHQ-9 Total Score 8 0 1     Previous BUNNY-7:   BUNNY-7 SCORE 2/26/2021 5/28/2021 8/30/2021   Total Score 0 0 0     ALMA LEVEL:  ALMA Score (Last Two) 5/4/2012   ALMA Raw Score 37   Activation Score 49.9   ALMA Level 2       Preferred Contact:  Need for : No  Preferred Contact: Cell      Type of Contact Today: Phone call (patient / identified key support person reached)      Data: (subjective / Objective):    Patient came in to complete the comprehensive wellness assessment for Behavioral Health Home Services.  See [facility] Flowsheets for details on the assessment.  See [facility], Behavioral Health Home for a copy of the patient's care plan.    Patient completed PHQ-9 and Bunny-7 assessment today.  CC, patient and patient's spouse Destiny met today to complete Behavioral Health Home ([facility]) Health & Wellness Assessment.    Patient stated Goal Areas:  Health;Mental Health;Employment / Volunteer;Financial and Social Service Benefits    Patient Stated Goals:    Jerry would like assistance with his physical and mental health for creating a balanced and healthy lifestyle. Jerry would like assistance/support with SSDI, budgeting and social service assistance to maintain/increase his financial stability.    Jerry would like assistance with his physical and mental health for creating a balanced and healthy lifestyle.      Jerry would like assistance/support with SSDI, budgeting and social service assistance to maintain/increase his financial stability.      Patient identified strengths:  Jerry identifies his strengths are he is a good , mentor, good listener, patience, and forgiving.      Dental - provider - email     Nutritionist - weight management - daily life     Care giver - FCC therapy -  [employer] Medicare - report back - currently in support group    [place] Choice [place] - [place]     SSDI - 43% completed - [employer] agency    Approved - [employer] disability $1250 and $165 - starting in Jan    Patient reports she applied with [facility] financial assistance for food and [employer] CAP energy assistance - November    [facility] - credit card debt - email

## 2021-12-03 ASSESSMENT — PATIENT HEALTH QUESTIONNAIRE - PHQ9: SUM OF ALL RESPONSES TO PHQ QUESTIONS 1-9: 6

## 2021-12-03 ASSESSMENT — ANXIETY QUESTIONNAIRES: GAD7 TOTAL SCORE: 0

## 2021-12-06 ENCOUNTER — DOCUMENTATION ONLY (OUTPATIENT)
Dept: BEHAVIORAL HEALTH | Facility: CLINIC | Age: 61
End: 2021-12-06
Payer: MEDICAID

## 2021-12-06 DIAGNOSIS — F33.0 MAJOR DEPRESSIVE DISORDER, RECURRENT EPISODE, MILD WITH ATYPICAL FEATURES (H): ICD-10-CM

## 2021-12-08 NOTE — PROGRESS NOTES
Behavioral Health Home Services  [facility] Clinic: [place]        Social Work Care Navigator Note      Patient: Car Torres  Date: December 2, 2021  Preferred Name: Jerry    Previous PHQ-9:   PHQ-9 SCORE 2/26/2021 5/28/2021 8/30/2021   PHQ-9 Total Score 8 0 1     Previous BUNNY-7:   BUNNY-7 SCORE 2/26/2021 5/28/2021 8/30/2021   Total Score 0 0 0     ALMA LEVEL:  ALMA Score (Last Two) 5/4/2012   ALMA Raw Score 37   Activation Score 49.9   ALMA Level 2       Preferred Contact:  Need for : No  Preferred Contact: Cell      Type of Contact Today: Phone call (patient / identified key support person reached)      Data: (subjective / Objective):    Patient came in to complete the comprehensive wellness assessment for Behavioral Health Home Services.  See [facility] Flowsheets for details on the assessment.  See [facility], Behavioral Health Home for a copy of the patient's care plan.    Patient completed PHQ-9 and Bunny-7 assessment today.  CC, patient and patient's spouse Destiny met today to complete Behavioral Health Home ([facility]) Health & Wellness Assessment.    Patient stated Goal Areas:  Health;Mental Health;Employment / Volunteer;Financial and Social Service Benefits    Patient Stated Goals:    Jerry would like assistance with his physical and mental health for creating a balanced and healthy lifestyle. Jerry would like assistance/support with SSDI, budgeting and social service assistance to maintain/increase his financial stability.     Jerry would like assistance with his physical and mental health for creating a balanced and healthy lifestyle. Patient would like assistance and support to manage his physical and mental health issues that can impact his daily living. 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 Patient will need additional testing and support for diagnosis clarification. Patient reports he feels his mental health can be up and down due to his dementia. Patient and  spouse report good and not so good days with memory and mood.     Patient reports he is currently in the process of establishing new primary care provider to help manage his physical wellbeing and support any home services that may need to be implemented moving forward. Patient reports he is working with neurologist to help manage his memory loss. Patient reports he is working with psychiatry to help stabilize mood. Patient reports at this time he is not interested in therapy but may be open to this in the future. [facility] to discuss [facility] provider for additional community mental health support.     Patient's spouse reports she is currently in dementia support group for care givers. CC, patient and spouse discussed possibility of spouse connecting to additional therapeutic support in the future. Patient and spouse are open and agreeable to this possibility but concerned if spouse's insurance (are Medicare) will cover therapy. [facility] to investigate and follow-up with spouse and patient at next appt.  [facility] to monitor and check in with patient and spouse periodically.    Patient reports since his insurance change he has not been to the dentist in over six months and would like to establish with a provider that accepts his insurance. Patient requesting provider list. [facility] emailed provider list today, per patient request.    Patient reports he would like to investigate meeting with nutritionist to help manage recent weight gain and review dietary habits. Patient reports he will meet with new PCP and then discuss this again with [facility] at next appt. [facility] to monitor and follow-up with patient.    Patient and spouse recognize with the patient's current diagnosis he will need additional supports from the [facility] to help him stay with his spouse and caregiver in their home. Patient and spouse would like [facility] to place a referral to [facility] Choice program to see if patient could qualify for wavered or supportive services.  Patient and spouse report they are hoping this will support wellness goals and ability for patient to stay in his own home with his spouse. [facility] faxed referral to [facility] Choice Intake at [phone], attn Intake.      Jerry would like assistance/support with SSDI, budgeting and social service assistance to maintain/increase his financial stability. Patient reports due to his dementia diagnosis he has not worked a full-time job since this past spring. Patient reports this has caused quite a bit of financial strain and anxiety for both himself and his spouse. Patient reports he was able to be on short-term disability through the end of Oct., but this has now run out and they are worried about paying their bills and keeping their home.     Patient's spouse reports they have their mortgage paid until June of 2022 but after this date they will be experiencing housing stabilization concerns. Patient and spouse report they are working to make sure they can keep their home and get their finances in order.     Patient reports he has applied for SSDI with the [employer] agency and this is 43% complete. Patient's spouse reports he was just approved through work to start residential disability in Jan, which will help with their finances.    Patient's spouse reports she has applied for [facility] [employer] assistance at the beginning of Nov for food assistance. Patient's spouse reports she has applied with the [facility] for utility assistance as well.     Patient's spouse reports due to strained income she does have one credit card that has a very high payment. Spouse reports she would like information and support from a financial counseling agency. [facility] was able to email [facility]  information for spouse to review. [facility] to monitor and follow-up with patient and spouse in upcoming appts.    Patient identified strengths:  Jerry identifies his strengths are he is a good , mentor, good listener,  patience, and forgiving.      Edgardo Lee [facility]  Behavioral Health Home ([facility])   [facility]  [phone]        ADDENDUM:  Health Action Plan approved by Behavioral Health Clinician 12/8/2021. [facility] sent patient a copy of approved HAP in the mail. Next Health Action Plan review due in June of 2022.    OLGA Almonte  12/8/2021  7:51 AM

## 2021-12-16 ENCOUNTER — TELEPHONE (OUTPATIENT)
Dept: GASTROENTEROLOGY | Facility: CLINIC | Age: 61
End: 2021-12-16
Payer: MEDICAID

## 2021-12-16 DIAGNOSIS — Z11.59 ENCOUNTER FOR SCREENING FOR OTHER VIRAL DISEASES: ICD-10-CM

## 2021-12-16 NOTE — TELEPHONE ENCOUNTER
Screening Questions  1. Are you active on mychart? y    2. What insurance is in the chart? ma    2.  Ordering/Referring Provider: Mihir Lang MD    3. BMI 46.6, If greater than 40 review exclusion criteria    4.  Respiratory Screening (If yes to any of the following Hospital setting only):     Do you use daily home oxygen? N  Do you have mod to severe Obstructive Sleep Apnea? Y CPAP   Do you have Pulmonary Hypertension? N   Do you have UNCONTROLLED asthma? N    5. Have you had a heart or lung transplant (If yes, please review exclusion criteria) ? N    6. Are you currently on dialysis or have chronic kidney disease? Y CKD    7. Have you had a stroke or Transient ischemic attack (TIA) within 6 months? N    8. In the past 6 months, have you had any heart related issues including cardiomyopathy or heart attack? N                 If yes, did it require cardiac stenting or other implantable device?      9. Do you have any implantable devices in your body (pacemaker, defib, LVAD)? N    10. Do you take nitroglycerin? If yes, how often? N    11. Are you currently taking any blood thinners?N    12. Are you a diabetic? N    13. (Females) Are you currently pregnant?   If yes, how many weeks?      15. Are you taking any prescription pain medications on a routine schedule? N If yes, MAC sedation.    16. Do you have any chemical dependencies such as alcohol, street drugs, or methadone? NIf yes, MAC sedation.    17. Do you have any history of post-traumatic stress syndrome, severe anxiety or history of psychosis? N    18. Do you transfer independently? Y    19.  Do you have any issues with constipation? N    20. Preferred Pharmacy for Pre Prescription [facility]    Scheduling Details    Which Colonoscopy Prep was Sent?: EXTENDED     Procedure Scheduled: COLON  Surgeon: BRO LANG  Date of Procedure: 1/10  Location: [facility]  Caller (Please ask for phone number if not scheduled by patient): SARAH      Sedation Type:  MAC  Conscious Sedation- Needs  for 6 hours after the procedure  MAC/General-Needs  for 24 hours after procedure    Pre-op Required at [facility] [place], [place] and OR for MAC sedation: Y  (if yes advise patient they will need a pre-op prior to procedure)      Is patient on blood thinners? -N (If yes- inform patient to follow up with PCP or provider for follow up instructions)     Informed patient they will need an adult  Y  Cannot take any type of public or medical transportation alone    Pre-Procedure Covid test to be completed at [facility] or Externally: 1/6    Confirmed Nurse will call to complete assessment Y    Additional comments:

## 2021-12-30 ENCOUNTER — MYC MEDICAL ADVICE (OUTPATIENT)
Dept: FAMILY MEDICINE | Facility: CLINIC | Age: 61
End: 2021-12-30
Payer: MEDICAID

## 2022-01-03 ENCOUNTER — OFFICE VISIT (OUTPATIENT)
Dept: FAMILY MEDICINE | Facility: CLINIC | Age: 62
End: 2022-01-03
Payer: COMMERCIAL

## 2022-01-03 VITALS
DIASTOLIC BLOOD PRESSURE: 65 MMHG | OXYGEN SATURATION: 94 % | WEIGHT: 315 LBS | SYSTOLIC BLOOD PRESSURE: 104 MMHG | HEART RATE: 61 BPM | BODY MASS INDEX: 46.88 KG/M2

## 2022-01-03 DIAGNOSIS — N18.2 CKD (CHRONIC KIDNEY DISEASE) STAGE 2, GFR 60-89 ML/MIN: ICD-10-CM

## 2022-01-03 DIAGNOSIS — Z01.818 PREOP GENERAL PHYSICAL EXAM: Primary | ICD-10-CM

## 2022-01-03 DIAGNOSIS — L97.529 ULCER OF LEFT FOOT, UNSPECIFIED ULCER STAGE (H): ICD-10-CM

## 2022-01-03 DIAGNOSIS — F02.818 DEMENTIA ASSOCIATED WITH OTHER UNDERLYING DISEASE WITH BEHAVIORAL DISTURBANCE (H): ICD-10-CM

## 2022-01-03 DIAGNOSIS — E78.5 HYPERLIPIDEMIA LDL GOAL <100: ICD-10-CM

## 2022-01-03 DIAGNOSIS — F29 UNSPECIFIED PSYCHOSIS NOT DUE TO A SUBSTANCE OR KNOWN PHYSIOLOGICAL CONDITION (H): ICD-10-CM

## 2022-01-03 DIAGNOSIS — E66.01 MORBID OBESITY (H): ICD-10-CM

## 2022-01-03 LAB
ANION GAP SERPL CALCULATED.3IONS-SCNC: 2 MMOL/L (ref 3–14)
BUN SERPL-MCNC: 32 MG/DL (ref 7–30)
CALCIUM SERPL-MCNC: 9.6 MG/DL (ref 8.5–10.1)
CHLORIDE BLD-SCNC: 108 MMOL/L (ref 94–109)
CO2 SERPL-SCNC: 29 MMOL/L (ref 20–32)
CREAT SERPL-MCNC: 1.35 MG/DL (ref 0.66–1.25)
GFR SERPL CREATININE-BSD FRML MDRD: 59 ML/MIN/1.73M2
GLUCOSE BLD-MCNC: 98 MG/DL (ref 70–99)
POTASSIUM BLD-SCNC: 4.7 MMOL/L (ref 3.4–5.3)
SODIUM SERPL-SCNC: 139 MMOL/L (ref 133–144)

## 2022-01-03 PROCEDURE — 80048 BASIC METABOLIC PNL TOTAL CA: CPT | Performed by: INTERNAL MEDICINE

## 2022-01-03 PROCEDURE — 36415 COLL VENOUS BLD VENIPUNCTURE: CPT | Performed by: INTERNAL MEDICINE

## 2022-01-03 PROCEDURE — 99214 OFFICE O/P EST MOD 30 MIN: CPT | Performed by: INTERNAL MEDICINE

## 2022-01-03 RX ORDER — ATORVASTATIN CALCIUM 40 MG/1
TABLET, FILM COATED ORAL
Qty: 90 TABLET | Refills: 0 | Status: SHIPPED | OUTPATIENT
Start: 2022-01-03 | End: 2022-03-29

## 2022-01-03 NOTE — TELEPHONE ENCOUNTER
Previously filled by FANI Dick who has since left [place]. Pt is scheduled to see you for establish care visit today 1/3/22 at 2:40pm    Destiny Reed RN  [facility]: [place]  Phone: [phone]  Fax:[phone]

## 2022-01-03 NOTE — H&P (VIEW-ONLY)
[facility]  [address] [place] [address]  [place] [address]  Phone: [phone]  Primary Provider: Iram Kinney  Pre-op Performing Provider: IRAM KINNEY      PREOPERATIVE EVALUATION:  Today's date: 1/3/2022    Car Torres is a 61 year old male who presents for a preoperative evaluation.    Surgical Information:  Surgery/Procedure: Colonoscopy  Surgery Location: [facility]  Surgeon: Dr. Lang  Surgery Date: 1/10/22  Time of Surgery: TBD  Where patient plans to recover: At home with family  Fax number for surgical facility: Note does not need to be faxed, will be available electronically in Epic.    Type of Anesthesia Anticipated: General    Assessment & Plan     The proposed surgical procedure is considered LOW risk.    Problem List Items Addressed This Visit     None                    Medication Instructions:  Patient is to take all scheduled medications on the day of surgery EXCEPT for modifications listed below:  Hold lisinopril-hydrochlorothiazide (BLOOD PRESSURE PILL) the morning of surgery.    RECOMMENDATION:  APPROVAL GIVEN to proceed with proposed procedure, without further diagnostic evaluation.                      Subjective     HPI related to upcoming procedure: Scheduled for colonoscopy.  Problems with the prep 2020, one polyp resected, diverticulosis           Preop Questions 12/30/2021   1. Have you ever had a heart attack or stroke? No   2. Have you ever had surgery on your heart or blood vessels, such as a stent placement, a coronary artery bypass, or surgery on an artery in your head, neck, heart, or legs? No   3. Do you have chest pain with activity? No   4. Do you have a history of  heart failure? No   5. Do you currently have a cold, bronchitis or symptoms of other infection? No   6. Do you have a cough, shortness of breath, or wheezing? NO    7. Do you or anyone in your family have previous history of blood clots? No   8. Do you or does anyone in your  family have a serious bleeding problem such as prolonged bleeding following surgeries or cuts? No   9. Have you ever had problems with anemia or been told to take iron pills? YES -    10. Have you had any abnormal blood loss such as black, tarry or bloody stools? No   11. Have you ever had a blood transfusion? No   12. Are you willing to have a blood transfusion if it is medically needed before, during, or after your surgery? Yes   13. Have you or any of your relatives ever had problems with anesthesia? No   14. Do you have sleep apnea, excessive snoring or daytime drowsiness? YES -    14a. Do you have a CPAP machine? Yes   15. Do you have any artifical heart valves or other implanted medical devices like a pacemaker, defibrillator, or continuous glucose monitor? No   16. Do you have artificial joints? No   17. Are you allergic to latex? No       Health Care Directive:  Patient does not have a Health Care Directive or Living Will: Discussed advance care planning with patient; information given to patient to review.    Preoperative Review of :   reviewed - no record of controlled substances prescribed.      Status of Chronic Conditions:      Review of Systems  CONSTITUTIONAL: NEGATIVE for fever, chills, change in weight  ENT/MOUTH: NEGATIVE for ear, mouth and throat problems  RESP: NEGATIVE for significant cough or SOB  CV: NEGATIVE for chest pain, palpitations or peripheral edema    Patient Active Problem List    Diagnosis Date Noted     Hypertension goal BP (blood pressure) < 140/90 10/20/2010     Priority: High     Hyperlipidemia LDL goal <130 06/11/2010     Priority: High     Major depressive disorder, recurrent episode, mild with atypical features (H) 12/06/2021     Priority: Medium     TIA (obstructive sleep apnea) 07/19/2018     Priority: Medium     Home Sleep Apnea Testing - 7/18/18: 295 lbs 0 oz: AHI 45.3/hr. Supine AHI 61.9/hr. Oxygen [place] of 66%.  Baseline 93%.  Sp02 =< 88% for 47 minutes.        History of sinus bradycardia 06/07/2018     Priority: Medium     ED (erectile dysfunction) 06/07/2018     Priority: Medium     Coronary artery calcification 05/08/2018     Priority: Medium     Per CT CHEST WITHOUT CONTRAST April 23, 2018. CT calcium score planned.        Renal cyst 11/18/2015     Priority: Medium     Simple, non enhanced, 3.5 cm on right kidney, Bosniak 1 - x 2 stable findings       Diverticulosis of large intestine without hemorrhage 11/18/2015     Priority: Medium     Incidental finding on CT scan        Pulmonary nodule 11/18/2015     Priority: Medium     Incidental finding on CT scan, right posterior lung - considered benign / stable        CKD (chronic kidney disease) stage 2, GFR 60-89 ml/min 10/21/2010     Priority: Medium     60 to 80 - have discussed Lisinopril - will consider        Sciatica 02/27/2006     Priority: Medium     Morbid obesity (H) 02/27/2006     Priority: Medium      Past Medical History:   Diagnosis Date     Heart disease 5/8/2018     Nonspecific abnormal electrocardiogram (ECG) (EKG) 08/23/2007    Stress testing normal.      Past Surgical History:   Procedure Laterality Date     COLONOSCOPY N/A 12/22/2020    Procedure: COLONOSCOPY, WITH POLYPECTOMY, CLIP;  Surgeon: Mihir Lang MD;  Location: [facility] [place] OR     ESOPHAGOSCOPY, GASTROSCOPY, DUODENOSCOPY (EGD), COMBINED N/A 1/28/2021    Procedure: ESOPHAGOGASTRODUODENOSCOPY, WITH BIOPSY;  Surgeon: Mihir Lang MD;  Location: [facility] OR     SURGICAL HISTORY OF -       surgery on left index finger     Current Outpatient Medications   Medication Sig Dispense Refill     ASPIRIN 325 MG OR TBEC ONE DAILY 100 0     atorvastatin (LIPITOR) 40 MG tablet TAKE 1 TABLET BY MOUTH EVERY DAY 90 tablet 0     Cholecalciferol (VITAMIN D PO) Take 5,000 Units by mouth daily One tablet twice daily       donepezil (ARICEPT) 10 MG tablet Take 1 tablet (10 mg) by mouth daily 90 tablet 2     FLUoxetine (PROZAC) 40 MG capsule Take 1 capsule (40 mg) by mouth  daily 90 capsule 1     lisinopril-hydrochlorothiazide (ZESTORETIC) 20-12.5 MG tablet TAKE 1 TABLET BY MOUTH EVERY DAY 90 tablet 1     MULTI VITAMIN MENS PO None Entered       NEW MED daily Multivitamin shake- levell brand       NONFORMULARY PROMAX - Testosterone Booster       OLANZapine (ZYPREXA) 7.5 MG tablet Take 1 tablet (7.5 mg) by mouth At Bedtime 90 tablet 1     omega 3 1000 MG CAPS Take by mouth daily       polyethylene glycol (GOLYTELY) 236 g suspension Take 8,000 mLs by mouth once for 1 dose 8000 mL 0     tadalafil (CIALIS) 20 MG tablet TAKE 1/2 TO 1 TABLET BY MOUTH 30 MINUTES TO 1 HOUR BEFORE SEX 6 tablet 5     TURMERIC PO Take by mouth daily       vitamin B complex with vitamin C (VITAMIN  B COMPLEX) TABS tablet Take 1 tablet by mouth daily         No Known Allergies     Social History     Tobacco Use     Smoking status: Former Smoker     Packs/day: 1.00     Years: 25.00     Pack years: 25.00     Types: Cigarettes, Cigars     Start date: 1973     Quit date: 1998     Years since quittin.9     Smokeless tobacco: Never Used     Tobacco comment: N/A not a smoker   Substance Use Topics     Alcohol use: Yes     Alcohol/week: 10.0 standard drinks     Comment: less than 6 per week       History   Drug Use Unknown     Comment: Kratum         Objective     There were no vitals taken for this visit.    Physical Exam  GENERAL APPEARANCE: healthy, alert and no distress  HENT: ear canals and TM's normal and nose and mouth without ulcers or lesions  RESP: lungs clear to auscultation - no rales, rhonchi or wheezes  CV: regular rate and rhythm, normal S1 S2, no S3 or S4 and no murmur, click or rub   ABDOMEN: soft, nontender, no HSM or masses and bowel sounds normal  NEURO: Normal strength and tone, sensory exam grossly normal, mentation intact and speech normal    Recent Labs   Lab Test 10/14/21  1020 12/15/20  1353 20  1221   HGB  --  15.7 15.0   PLT  --  238 200     --  138   POTASSIUM 4.3  --   4.0   CR 1.27*  --  1.17        Diagnostics:  No labs were ordered during this visit.   No EKG required for low risk surgery (cataract, skin procedure, breast biopsy, etc).    Revised Cardiac Risk Index (RCRI):  The patient has the following serious cardiovascular risks for perioperative complications:   - No serious cardiac risks = 0 points     RCRI Interpretation: 0 points: Class I (very low risk - 0.4% complication rate)           Signed Electronically by: Mihir Perez MD  Copy of this evaluation report is provided to requesting physician.

## 2022-01-03 NOTE — PROGRESS NOTES
[facility]  [address] [place] [address]  [place] [address]  Phone: [phone]  Primary Provider: Iram Kinney  Pre-op Performing Provider: IRAM KINNEY      PREOPERATIVE EVALUATION:  Today's date: 1/3/2022    Car Torres is a 61 year old male who presents for a preoperative evaluation.    Surgical Information:  Surgery/Procedure: Colonoscopy  Surgery Location: [facility]  Surgeon: Dr. Lang  Surgery Date: 1/10/22  Time of Surgery: TBD  Where patient plans to recover: At home with family  Fax number for surgical facility: Note does not need to be faxed, will be available electronically in Epic.    Type of Anesthesia Anticipated: General    Assessment & Plan     The proposed surgical procedure is considered LOW risk.    Problem List Items Addressed This Visit     None                    Medication Instructions:  Patient is to take all scheduled medications on the day of surgery EXCEPT for modifications listed below:  Hold lisinopril-hydrochlorothiazide (BLOOD PRESSURE PILL) the morning of surgery.    RECOMMENDATION:  APPROVAL GIVEN to proceed with proposed procedure, without further diagnostic evaluation.                      Subjective     HPI related to upcoming procedure: Scheduled for colonoscopy.  Problems with the prep 2020, one polyp resected, diverticulosis           Preop Questions 12/30/2021   1. Have you ever had a heart attack or stroke? No   2. Have you ever had surgery on your heart or blood vessels, such as a stent placement, a coronary artery bypass, or surgery on an artery in your head, neck, heart, or legs? No   3. Do you have chest pain with activity? No   4. Do you have a history of  heart failure? No   5. Do you currently have a cold, bronchitis or symptoms of other infection? No   6. Do you have a cough, shortness of breath, or wheezing? NO    7. Do you or anyone in your family have previous history of blood clots? No   8. Do you or does anyone in your  family have a serious bleeding problem such as prolonged bleeding following surgeries or cuts? No   9. Have you ever had problems with anemia or been told to take iron pills? YES -    10. Have you had any abnormal blood loss such as black, tarry or bloody stools? No   11. Have you ever had a blood transfusion? No   12. Are you willing to have a blood transfusion if it is medically needed before, during, or after your surgery? Yes   13. Have you or any of your relatives ever had problems with anesthesia? No   14. Do you have sleep apnea, excessive snoring or daytime drowsiness? YES -    14a. Do you have a CPAP machine? Yes   15. Do you have any artifical heart valves or other implanted medical devices like a pacemaker, defibrillator, or continuous glucose monitor? No   16. Do you have artificial joints? No   17. Are you allergic to latex? No       Health Care Directive:  Patient does not have a Health Care Directive or Living Will: Discussed advance care planning with patient; information given to patient to review.    Preoperative Review of :   reviewed - no record of controlled substances prescribed.      Status of Chronic Conditions:      Review of Systems  CONSTITUTIONAL: NEGATIVE for fever, chills, change in weight  ENT/MOUTH: NEGATIVE for ear, mouth and throat problems  RESP: NEGATIVE for significant cough or SOB  CV: NEGATIVE for chest pain, palpitations or peripheral edema    Patient Active Problem List    Diagnosis Date Noted     Hypertension goal BP (blood pressure) < 140/90 10/20/2010     Priority: High     Hyperlipidemia LDL goal <130 06/11/2010     Priority: High     Major depressive disorder, recurrent episode, mild with atypical features (H) 12/06/2021     Priority: Medium     TIA (obstructive sleep apnea) 07/19/2018     Priority: Medium     Home Sleep Apnea Testing - 7/18/18: 295 lbs 0 oz: AHI 45.3/hr. Supine AHI 61.9/hr. Oxygen [place] of 66%.  Baseline 93%.  Sp02 =< 88% for 47 minutes.        History of sinus bradycardia 06/07/2018     Priority: Medium     ED (erectile dysfunction) 06/07/2018     Priority: Medium     Coronary artery calcification 05/08/2018     Priority: Medium     Per CT CHEST WITHOUT CONTRAST April 23, 2018. CT calcium score planned.        Renal cyst 11/18/2015     Priority: Medium     Simple, non enhanced, 3.5 cm on right kidney, Bosniak 1 - x 2 stable findings       Diverticulosis of large intestine without hemorrhage 11/18/2015     Priority: Medium     Incidental finding on CT scan        Pulmonary nodule 11/18/2015     Priority: Medium     Incidental finding on CT scan, right posterior lung - considered benign / stable        CKD (chronic kidney disease) stage 2, GFR 60-89 ml/min 10/21/2010     Priority: Medium     60 to 80 - have discussed Lisinopril - will consider        Sciatica 02/27/2006     Priority: Medium     Morbid obesity (H) 02/27/2006     Priority: Medium      Past Medical History:   Diagnosis Date     Heart disease 5/8/2018     Nonspecific abnormal electrocardiogram (ECG) (EKG) 08/23/2007    Stress testing normal.      Past Surgical History:   Procedure Laterality Date     COLONOSCOPY N/A 12/22/2020    Procedure: COLONOSCOPY, WITH POLYPECTOMY, CLIP;  Surgeon: Mihir Lang MD;  Location: [facility] [place] OR     ESOPHAGOSCOPY, GASTROSCOPY, DUODENOSCOPY (EGD), COMBINED N/A 1/28/2021    Procedure: ESOPHAGOGASTRODUODENOSCOPY, WITH BIOPSY;  Surgeon: Mihir Lang MD;  Location: [facility] OR     SURGICAL HISTORY OF -       surgery on left index finger     Current Outpatient Medications   Medication Sig Dispense Refill     ASPIRIN 325 MG OR TBEC ONE DAILY 100 0     atorvastatin (LIPITOR) 40 MG tablet TAKE 1 TABLET BY MOUTH EVERY DAY 90 tablet 0     Cholecalciferol (VITAMIN D PO) Take 5,000 Units by mouth daily One tablet twice daily       donepezil (ARICEPT) 10 MG tablet Take 1 tablet (10 mg) by mouth daily 90 tablet 2     FLUoxetine (PROZAC) 40 MG capsule Take 1 capsule (40 mg) by mouth  daily 90 capsule 1     lisinopril-hydrochlorothiazide (ZESTORETIC) 20-12.5 MG tablet TAKE 1 TABLET BY MOUTH EVERY DAY 90 tablet 1     MULTI VITAMIN MENS PO None Entered       NEW MED daily Multivitamin shake- levell brand       NONFORMULARY PROMAX - Testosterone Booster       OLANZapine (ZYPREXA) 7.5 MG tablet Take 1 tablet (7.5 mg) by mouth At Bedtime 90 tablet 1     omega 3 1000 MG CAPS Take by mouth daily       polyethylene glycol (GOLYTELY) 236 g suspension Take 8,000 mLs by mouth once for 1 dose 8000 mL 0     tadalafil (CIALIS) 20 MG tablet TAKE 1/2 TO 1 TABLET BY MOUTH 30 MINUTES TO 1 HOUR BEFORE SEX 6 tablet 5     TURMERIC PO Take by mouth daily       vitamin B complex with vitamin C (VITAMIN  B COMPLEX) TABS tablet Take 1 tablet by mouth daily         No Known Allergies     Social History     Tobacco Use     Smoking status: Former Smoker     Packs/day: 1.00     Years: 25.00     Pack years: 25.00     Types: Cigarettes, Cigars     Start date: 1973     Quit date: 1998     Years since quittin.9     Smokeless tobacco: Never Used     Tobacco comment: N/A not a smoker   Substance Use Topics     Alcohol use: Yes     Alcohol/week: 10.0 standard drinks     Comment: less than 6 per week       History   Drug Use Unknown     Comment: Kratum         Objective     There were no vitals taken for this visit.    Physical Exam  GENERAL APPEARANCE: healthy, alert and no distress  HENT: ear canals and TM's normal and nose and mouth without ulcers or lesions  RESP: lungs clear to auscultation - no rales, rhonchi or wheezes  CV: regular rate and rhythm, normal S1 S2, no S3 or S4 and no murmur, click or rub   ABDOMEN: soft, nontender, no HSM or masses and bowel sounds normal  NEURO: Normal strength and tone, sensory exam grossly normal, mentation intact and speech normal    Recent Labs   Lab Test 10/14/21  1020 12/15/20  1353 20  1221   HGB  --  15.7 15.0   PLT  --  238 200     --  138   POTASSIUM 4.3  --   4.0   CR 1.27*  --  1.17        Diagnostics:  No labs were ordered during this visit.   No EKG required for low risk surgery (cataract, skin procedure, breast biopsy, etc).    Revised Cardiac Risk Index (RCRI):  The patient has the following serious cardiovascular risks for perioperative complications:   - No serious cardiac risks = 0 points     RCRI Interpretation: 0 points: Class I (very low risk - 0.4% complication rate)           Signed Electronically by: Mihir Perez MD  Copy of this evaluation report is provided to requesting physician.

## 2022-01-06 ENCOUNTER — LAB (OUTPATIENT)
Dept: LAB | Facility: CLINIC | Age: 62
End: 2022-01-06
Payer: COMMERCIAL

## 2022-01-06 ENCOUNTER — VIRTUAL VISIT (OUTPATIENT)
Dept: BEHAVIORAL HEALTH | Facility: CLINIC | Age: 62
End: 2022-01-06
Payer: COMMERCIAL

## 2022-01-06 DIAGNOSIS — Z11.59 ENCOUNTER FOR SCREENING FOR OTHER VIRAL DISEASES: ICD-10-CM

## 2022-01-06 DIAGNOSIS — R69 DIAGNOSIS DEFERRED: Primary | ICD-10-CM

## 2022-01-06 PROCEDURE — U0005 INFEC AGEN DETEC AMPLI PROBE: HCPCS

## 2022-01-06 PROCEDURE — U0003 INFECTIOUS AGENT DETECTION BY NUCLEIC ACID (DNA OR RNA); SEVERE ACUTE RESPIRATORY SYNDROME CORONAVIRUS 2 (SARS-COV-2) (CORONAVIRUS DISEASE [COVID-19]), AMPLIFIED PROBE TECHNIQUE, MAKING USE OF HIGH THROUGHPUT TECHNOLOGIES AS DESCRIBED BY CMS-2020-01-R: HCPCS

## 2022-01-06 NOTE — PROGRESS NOTES
Behavioral Health Home Services  [facility]: [place]        Social Work Care Navigator Note      Patient: Car Torres  Date: January 6, 2022  Preferred Name: Jerry    Previous PHQ-9:   PHQ-9 SCORE 5/28/2021 8/30/2021 12/2/2021   PHQ-9 Total Score 0 1 6     Previous SHAHNAZ-7:   SHAHNAZ-7 SCORE 5/28/2021 8/30/2021 12/2/2021   Total Score 0 0 0     ALMA LEVEL:  ALMA Score (Last Two) 5/4/2012   ALMA Raw Score 37   Activation Score 49.9   ALMA Level 2       Preferred Contact:  Need for : No  Preferred Contact: Cell      Type of Contact Today: Phone call (patient / identified key support person reached)      Data: (subjective / Objective):  Recent ED/IP Admission or Discharge?   None    Patient Goals:  Goal Areas: Health; Mental Health; Employment / Volunteer; Financial and Social Service Benefits  Patient stated goals: Jerry would like assistance with his physical and mental health for creating a balanced and healthy lifestyle. Jerry would like assistance/support with SSDI, budgeting and social service assistance to maintain/increase his financial stability.        [facility] Core Service Provided:  Comprehensive Care Management: utilized the electronic medical record / patient registry to identify and support patient's health conditions / needs more effectively   Care Transitions: focused on the coordinated and seamless movement of patient between or within different levels of care or settings  Care Coordination: provided care management services/referrals necessary to ensure patient and their identified supports have access to medical, behavioral health, pharmacology and recovery support services.  Ensured that patient's care is integrated across all settings and services.   Individual and Family Support: aimed to help clients reduce barriers to achieving goals, increase health literacy and knowledge about chronic condition(s), increase self-efficacy skills, and improve health outcomes  Referral to Community and Social  Support Services: Provided patient with referrals (see plan section)  Assisted in scheduling an appointment to a referral / service (see plan section)  Coordinated / Confirmed patient's appointment time or referral and transportation plan  Followed-up with patient on whether or not they completed a referral    Current Stressors / Issues / Care Plan Objective Addressed Today:  [facility] and patient were able to meet today for Behavioral Health Home ([facility]) monthly check-in via telehealth visit. All required ROIs have been filed with HIM/patient chart.    [facility] and his spouse today for monthly check-in.    1. Patient reports he and his spouse enjoyed [place] with their children. Patient reports they were able to go on a [place] vacation with their family and thoroughly enjoyed spending an extended time with their children and grandchildren. Patient reports this was a boost to his spirits.    2. Patient reports he completed appt with new PCP. Patient reports visit went well and he liked provider. Patient's spouse was not able to attend appt due to scheduling conflict. Patient and his spouse would like to meet with nutritionist to help manage recent weight gain. [facility] messaged provider for referral.      Patient reports upcoming colonoscopy in the next few days and has been prepping.    3. Patient and spouse met with psychiatrist and decreased medication to help with lethary, increase activity and clarity levels. Patient and spouse report too soon to say if there is a noticeable difference at this time. [facility] messaged care team to reach out to schedule next provider visit.     4. Patient and spouse report they have not heard back from [facility] for [place] [facility] Ref[facility]ed [facility] by [facility] on 12/02. [facility] called [facility] and spoke with Mirtha, who reports [place] [facility] Intake scheduling has a waiting list of at least 10 weeks right now due to increased cases and Covid. [facility] to update patient and spouse at next  visit.    Patient's spouse reports they received a $50 increase in food stamps, which has helped them.    5. Patient's spouse reports she applied in Nov for utility assistance with [employer] agency and is still waiting for approval.     6. Patient reports he has applied for SSDI with the [employer] agency. Patient's spouse reports he was just approved through work to start detention disability in Jan, which will help with their finances. Patient and spouse spoke with Porsche from SSDI called this am to expedite claim.    Patient and spouse report he was recently approved for senior living disability and is receiving $1250 social security [facility] encouraged patient and spouse to reach out to [facility] to update financial status. Patient and spouse in agreement with this and will call to update.     7. Patient's spouse is not sure she will be contacting [employer] for assistance with credit card payments, as she is worried this may affect their credit. [facility] encouraged patient and spouse to call to gather information and make the choice to enroll in programming or not to. Patient's spouse in agreement with this. [facility] to monitor and follow-up in upcoming appts.     8. Patient's spouse reports house payment is paid until June but shruthi needs to look at senior housing. [facility] to monitor and send resources as needed/requested by patient and his spouse.    10. Patient report upcoming appt in April with neurology.      Intervention:  Motivational Interviewing: Expressed Empathy/Understanding, Supported Autonomy, Collaboration, Evocation, Permission to raise concern or advise, Open-ended questions, Reflections: simple and complex, Rolled with resistance: Emphasized patient autonomy, Simple reflection, Complex reflection, Amplified reflection (weaker or stronger meaning), Shifted topic to defuse resistance, Reframed sustain talk in the direction of change and Evoked patient agenda, Change talk (evoked)  and Reframe   Target Behavior(s): Explored thoughts about taking an anti-depressant, Explored and resolved challenges related to taking anti-depressants as prescribed, Explored thoughts and readiness to participate in individual therapy, Explored and resolved challenges to attending appointments as scheduled, Explored patient's knowledge of the impact of exercise on mood, Explored current exercise activities and thoughts about how this influences mood, Explored current social supports and reinforced opportunities to increase engagement and Explored patient's current diet and knowledge of influence on mood    Assessment: (Progress on Goals / Homework):  Patient would benefit from continued coordination in reaching their goals set for the Behavioral Health Home ([facility]) program. [facility] reviewed Health Action Plan goals and will continue to monitor progress and work with patient and their care team.      Plan: (Homework, other):  Patient was encouraged to continue to seek condition-related information and education.      Scheduled a Phone follow up appointment with MARJ YUEN in 4 weeks     Patient has set self-identified goals and will monitor progress until the next appointment on: 02/03/2022.         Edgardo Lee [facility]  Behavioral Health Home ([facility])   [facility]  [phone]

## 2022-01-06 NOTE — Clinical Note
Hi Dr. Perez,    I spoke with the patient and his wife. They are looking for support to help monitor recent weight gain and manage diet. Patient reports he would be interested in meeting with a nutritionist.  Could you place an order for this?    Thank you,  Edgardo Lee [facility]  Behavioral Health [place] ([facility])   [facility]  [phone]

## 2022-01-07 ENCOUNTER — TELEPHONE (OUTPATIENT)
Dept: NEUROLOGY | Facility: CLINIC | Age: 62
End: 2022-01-07
Payer: COMMERCIAL

## 2022-01-07 LAB — SARS-COV-2 RNA RESP QL NAA+PROBE: NEGATIVE

## 2022-01-07 NOTE — TELEPHONE ENCOUNTER
1/7/2022 1st attempt to reschedule 4/20/2022 appointment, sent a My Chart message stating we had several openings on April 26,2022.      Shanika Woody Procedure   Neurology & Neurosurgery Specialties  [facility]   [phone]

## 2022-01-10 ENCOUNTER — ANESTHESIA (OUTPATIENT)
Dept: GASTROENTEROLOGY | Facility: CLINIC | Age: 62
End: 2022-01-10
Payer: COMMERCIAL

## 2022-01-10 ENCOUNTER — HOSPITAL ENCOUNTER (OUTPATIENT)
Facility: CLINIC | Age: 62
Discharge: HOME | End: 2022-01-10
Attending: INTERNAL MEDICINE | Admitting: INTERNAL MEDICINE
Payer: COMMERCIAL

## 2022-01-10 ENCOUNTER — ANESTHESIA EVENT (OUTPATIENT)
Dept: GASTROENTEROLOGY | Facility: CLINIC | Age: 62
End: 2022-01-10
Payer: COMMERCIAL

## 2022-01-10 VITALS
OXYGEN SATURATION: 94 % | DIASTOLIC BLOOD PRESSURE: 67 MMHG | RESPIRATION RATE: 20 BRPM | TEMPERATURE: 98.1 F | BODY MASS INDEX: 46.44 KG/M2 | SYSTOLIC BLOOD PRESSURE: 118 MMHG | HEART RATE: 48 BPM | HEIGHT: 71 IN

## 2022-01-10 DIAGNOSIS — Z98.890 S/P COLONOSCOPY: Primary | ICD-10-CM

## 2022-01-10 LAB — COLONOSCOPY: NORMAL

## 2022-01-10 PROCEDURE — 250N000011 HC RX IP 250 OP 636: Performed by: NURSE ANESTHETIST, CERTIFIED REGISTERED

## 2022-01-10 PROCEDURE — 370N000017 HC ANESTHESIA TECHNICAL FEE, PER MIN: Performed by: INTERNAL MEDICINE

## 2022-01-10 PROCEDURE — 999N000010 HC STATISTIC ANES STAT CODE-CRNA PER MINUTE: Performed by: INTERNAL MEDICINE

## 2022-01-10 PROCEDURE — 45380 COLONOSCOPY AND BIOPSY: CPT | Performed by: INTERNAL MEDICINE

## 2022-01-10 PROCEDURE — 258N000003 HC RX IP 258 OP 636: Performed by: NURSE ANESTHETIST, CERTIFIED REGISTERED

## 2022-01-10 PROCEDURE — 250N000009 HC RX 250: Performed by: NURSE ANESTHETIST, CERTIFIED REGISTERED

## 2022-01-10 PROCEDURE — 88305 TISSUE EXAM BY PATHOLOGIST: CPT | Mod: TC | Performed by: INTERNAL MEDICINE

## 2022-01-10 RX ORDER — PROPOFOL 10 MG/ML
INJECTION, EMULSION INTRAVENOUS CONTINUOUS PRN
Status: DISCONTINUED | OUTPATIENT
Start: 2022-01-10 | End: 2022-01-10

## 2022-01-10 RX ORDER — FERROUS GLUCONATE 324(37.5)
1 TABLET ORAL DAILY
COMMUNITY
Start: 2021-01-01 | End: 2023-08-04

## 2022-01-10 RX ORDER — ONDANSETRON 2 MG/ML
INJECTION INTRAMUSCULAR; INTRAVENOUS PRN
Status: DISCONTINUED | OUTPATIENT
Start: 2022-01-10 | End: 2022-01-10

## 2022-01-10 RX ORDER — FERROUS SULFATE 324(65)MG
TABLET, DELAYED RELEASE (ENTERIC COATED) ORAL
COMMUNITY
End: 2023-06-05

## 2022-01-10 RX ORDER — SODIUM CHLORIDE, SODIUM LACTATE, POTASSIUM CHLORIDE, CALCIUM CHLORIDE 600; 310; 30; 20 MG/100ML; MG/100ML; MG/100ML; MG/100ML
INJECTION, SOLUTION INTRAVENOUS CONTINUOUS PRN
Status: DISCONTINUED | OUTPATIENT
Start: 2022-01-10 | End: 2022-01-10

## 2022-01-10 RX ORDER — LIDOCAINE HYDROCHLORIDE 20 MG/ML
INJECTION, SOLUTION INFILTRATION; PERINEURAL PRN
Status: DISCONTINUED | OUTPATIENT
Start: 2022-01-10 | End: 2022-01-10

## 2022-01-10 RX ORDER — EPHEDRINE SULFATE 50 MG/ML
INJECTION, SOLUTION INTRAMUSCULAR; INTRAVENOUS; SUBCUTANEOUS PRN
Status: DISCONTINUED | OUTPATIENT
Start: 2022-01-10 | End: 2022-01-10

## 2022-01-10 RX ADMIN — Medication 20 MCG: at 14:16

## 2022-01-10 RX ADMIN — Medication 10 MG: at 14:37

## 2022-01-10 RX ADMIN — Medication 5 MG: at 14:32

## 2022-01-10 RX ADMIN — SODIUM CHLORIDE, POTASSIUM CHLORIDE, SODIUM LACTATE AND CALCIUM CHLORIDE: 600; 310; 30; 20 INJECTION, SOLUTION INTRAVENOUS at 14:16

## 2022-01-10 RX ADMIN — PROPOFOL 125 MCG/KG/MIN: 10 INJECTION, EMULSION INTRAVENOUS at 14:16

## 2022-01-10 RX ADMIN — Medication 5 MG: at 14:34

## 2022-01-10 RX ADMIN — LIDOCAINE HYDROCHLORIDE 100 MG: 20 INJECTION, SOLUTION INFILTRATION; PERINEURAL at 14:16

## 2022-01-10 RX ADMIN — Medication 5 MG: at 14:27

## 2022-01-10 RX ADMIN — ONDANSETRON 4 MG: 2 INJECTION INTRAMUSCULAR; INTRAVENOUS at 14:16

## 2022-01-10 NOTE — H&P
Car Torres  [id number]  male  61 year old      Reason for procedure/surgery: screening    Patient Active Problem List   Diagnosis     Sciatica     Morbid obesity (H)     Hyperlipidemia LDL goal <130     Hypertension goal BP (blood pressure) < 140/90     CKD (chronic kidney disease) stage 2, GFR 60-89 ml/min     Renal cyst     Diverticulosis of large intestine without hemorrhage     Pulmonary nodule     Coronary artery calcification     History of sinus bradycardia     ED (erectile dysfunction)     TIA (obstructive sleep apnea)     Major depressive disorder, recurrent episode, mild with atypical features (H)     Dementia associated with other underlying disease with behavioral disturbance (H)     Ulcer of left foot, unspecified ulcer stage (H)     Unspecified psychosis not due to a substance or known physiological condition (H)       Past Surgical History:    Past Surgical History:   Procedure Laterality Date     COLONOSCOPY N/A 2020    Procedure: COLONOSCOPY, WITH POLYPECTOMY, CLIP;  Surgeon: Mihir Lang MD;  Location: [facility] [place]     ESOPHAGOSCOPY, GASTROSCOPY, DUODENOSCOPY (EGD), COMBINED N/A 2021    Procedure: ESOPHAGOGASTRODUODENOSCOPY, WITH BIOPSY;  Surgeon: Mihir Lang MD;  Location: [facility] [place] [facility] OR     SURGICAL HISTORY OF -       surgery on left index finger       Past Medical History:   Past Medical History:   Diagnosis Date     Dementia (H)      Depressive disorder      Heart disease 2018     Hypercholesterolemia      Hypertension goal BP (blood pressure) < 140/90      Nonspecific abnormal electrocardiogram (ECG) (EKG) 2007    Stress testing normal.      Sleep apnea     uses a c-pap, severe       Social History:   Social History     Tobacco Use     Smoking status: Former Smoker     Packs/day: 1.00     Years: 25.00     Pack years: 25.00     Types: Cigarettes, Cigars     Start date: 1973     Quit date: 1998     Years since quittin.9     Smokeless tobacco: Never Used      "Tobacco comment: N/A not a smoker   Substance Use Topics     Alcohol use: Yes     Alcohol/week: 10.0 standard drinks     Comment: less than 6 per week       Family History:   Family History   Problem Relation Age of Onset     Cancer Mother         uterine     Other Cancer Mother         endometrial     Cerebrovascular Disease Mother      Substance Abuse Mother         Alcohol     C.A.D. Father      Hypertension Father      Hyperlipidemia Father      Breast Cancer Maternal Grandmother      Other Cancer Maternal Grandmother         Lung/brain     Substance Abuse Paternal Grandfather      Hypertension Brother      Substance Abuse Brother         Alcohol     Breast Cancer Other      Breast Cancer Cousin      Other Cancer Other      Cerebrovascular Disease Other         Maternal Aunt     Glaucoma No family hx of      Macular Degeneration No family hx of        Allergies: No Known Allergies    Active Medications:   No current outpatient medications on file.       Systemic Review:   CONSTITUTIONAL: NEGATIVE for fever, chills, change in weight  ENT/MOUTH: NEGATIVE for ear, mouth and throat problems  RESP: NEGATIVE for significant cough or SOB  CV: NEGATIVE for chest pain, palpitations or peripheral edema    Physical Examination:   Vital Signs: BP (!) 144/67   Temp 98.1  F (36.7  C) (Skin)   Ht 1.803 m (5' 11\")   SpO2 97%   BMI 46.44 kg/m    GENERAL: healthy, alert and no distress  NECK: no adenopathy, no asymmetry, masses, or scars  RESP: lungs clear to auscultation - no rales, rhonchi or wheezes  CV: regular rate and rhythm, normal S1 S2, no S3 or S4, no murmur, click or rub, no peripheral edema and peripheral pulses strong  ABDOMEN: obese, soft, nontender, no hepatosplenomegaly, no masses and bowel sounds normal  MS: no gross musculoskeletal defects noted, no edema    ASA:   3    Mallampati Score: 2      Plan: Appropriate to proceed as scheduled.      Mihir Lang MD  1/10/2022    PCP:  Mihir Perez    "

## 2022-01-10 NOTE — ANESTHESIA PREPROCEDURE EVALUATION
Anesthesia Pre-Procedure Evaluation    Patient: Car Torres   MRN: [id number] : 1960        Preoperative Diagnosis: Screening for colon cancer [Z12.11]    Procedure : Procedure(s):  COLONOSCOPY          Past Medical History:   Diagnosis Date     Dementia (H)      Depressive disorder      Heart disease 2018     Hypercholesterolemia      Hypertension goal BP (blood pressure) < 140/90      Nonspecific abnormal electrocardiogram (ECG) (EKG) 2007    Stress testing normal.      Sleep apnea     uses a c-pap, severe      Past Surgical History:   Procedure Laterality Date     COLONOSCOPY N/A 2020    Procedure: COLONOSCOPY, WITH POLYPECTOMY, CLIP;  Surgeon: Mihir Lang MD;  Location: [facility] OR     ESOPHAGOSCOPY, GASTROSCOPY, DUODENOSCOPY (EGD), COMBINED N/A 2021    Procedure: ESOPHAGOGASTRODUODENOSCOPY, WITH BIOPSY;  Surgeon: Mihir Lang MD;  Location: [facility] [place] [facility] [place]     SURGICAL HISTORY OF -       surgery on left index finger      No Known Allergies   Social History     Tobacco Use     Smoking status: Former Smoker     Packs/day: 1.00     Years: 25.00     Pack years: 25.00     Types: Cigarettes, Cigars     Start date: 1973     Quit date: 1998     Years since quittin.9     Smokeless tobacco: Never Used     Tobacco comment: N/A not a smoker   Substance Use Topics     Alcohol use: Yes     Alcohol/week: 10.0 standard drinks     Comment: less than 6 per week      Wt Readings from Last 1 Encounters:   22 (!) 151 kg (333 lb)        Anesthesia Evaluation            ROS/MED HX  ENT/Pulmonary:     (+) sleep apnea, uses CPAP,     Neurologic:     (+) dementia,     Cardiovascular:     (+) hypertension--CAD ---    METS/Exercise Tolerance:     Hematologic:       Musculoskeletal:       GI/Hepatic:       Renal/Genitourinary:     (+) renal disease, type: CRI,     Endo:     (+) Obesity,     Psychiatric/Substance Use:     (+) psychiatric history depression     Infectious Disease:        Malignancy:       Other:            Physical Exam    Airway        Mallampati: III   TM distance: > 3 FB   Neck ROM: full   Mouth opening: > 3 cm    Respiratory Devices and Support         Dental  no notable dental history         Cardiovascular   cardiovascular exam normal          Pulmonary           (+) decreased breath sounds           OUTSIDE LABS:  CBC:   Lab Results   Component Value Date    WBC 7.1 12/15/2020    WBC 5.7 08/02/2020    HGB 15.7 12/15/2020    HGB 15.0 08/02/2020    HCT 47.0 12/15/2020    HCT 43.9 08/02/2020     12/15/2020     08/02/2020     BMP:   Lab Results   Component Value Date     01/03/2022     10/14/2021    POTASSIUM 4.7 01/03/2022    POTASSIUM 4.3 10/14/2021    CHLORIDE 108 01/03/2022    CHLORIDE 106 10/14/2021    CO2 29 01/03/2022    CO2 22 10/14/2021    BUN 32 (H) 01/03/2022    BUN 28 10/14/2021    CR 1.35 (H) 01/03/2022    CR 1.27 (H) 10/14/2021    GLC 98 01/03/2022     (H) 10/14/2021     COAGS:   Lab Results   Component Value Date    PTT 27 08/18/2007    INR 0.96 08/18/2007     POC: No results found for: BGM, HCG, HCGS  HEPATIC:   Lab Results   Component Value Date    ALBUMIN 3.7 08/02/2020    PROTTOTAL 6.9 08/02/2020    ALT 27 08/02/2020    AST 15 08/02/2020    ALKPHOS 86 08/02/2020    BILITOTAL 0.5 08/02/2020     OTHER:   Lab Results   Component Value Date    SHAHID 9.6 01/03/2022    MAG 2.4 (H) 07/21/2014    LIPASE 75 08/18/2007    AMYLASE 57 08/18/2007    TSH 1.42 08/02/2020       Anesthesia Plan    ASA Status:  3   NPO Status:  NPO Appropriate    Anesthesia Type: MAC.              Consents    Anesthesia Plan(s) and associated risks, benefits, and realistic alternatives discussed. Questions answered and patient/representative(s) expressed understanding.    - Discussed:     - Discussed with:  Patient         Postoperative Care    Pain management: IV analgesics, Multi-modal analgesia.   PONV prophylaxis: Ondansetron (or other 5HT-3)      Comments:    Other Comments: Propofol infusion  precedex bolus prn            Gio Wynn MD

## 2022-01-10 NOTE — ANESTHESIA POSTPROCEDURE EVALUATION
Patient: Car Torres    Procedure: Procedure(s):  COLONOSCOPY, WITH POLYPECTOMY AND BIOPSY       Diagnosis:Screening for colon cancer [Z12.11]  Diagnosis Additional Information: No value filed.    Anesthesia Type:  MAC    Note:  Disposition: Outpatient   Postop Pain Control: Uneventful            Sign Out: Well controlled pain   PONV: No   Neuro/Psych: Uneventful            Sign Out: Acceptable/Baseline neuro status   Airway/Respiratory: Uneventful            Sign Out: Acceptable/Baseline resp. status   CV/Hemodynamics: Uneventful            Sign Out: Acceptable CV status   Other NRE: NONE   DID A NON-ROUTINE EVENT OCCUR? No           Last vitals:  Vitals Value Taken Time   /57 01/10/22 1448   Temp     Pulse 49 01/10/22 1450   Resp 19 01/10/22 1450   SpO2 95 % 01/10/22 1450   Vitals shown include unvalidated device data.    Electronically Signed By: Gio Wynn MD  January 10, 2022  2:51 PM

## 2022-01-10 NOTE — ANESTHESIA CARE TRANSFER NOTE
Patient: Car Torres    Procedure: Procedure(s):  COLONOSCOPY, WITH POLYPECTOMY AND BIOPSY       Diagnosis: Screening for colon cancer [Z12.11]  Diagnosis Additional Information: No value filed.    Anesthesia Type:   MAC     Note:    Oropharynx: oropharynx clear of all foreign objects and spontaneously breathing  Level of Consciousness: drowsy  Oxygen Supplementation: face mask  Level of Supplemental Oxygen (L/min / FiO2): 8  Independent Airway: airway patency satisfactory and stable  Dentition: dentition unchanged  Vital Signs Stable: post-procedure vital signs reviewed and stable  Report to RN Given: handoff report given  Patient transferred to: PACU  Comments: At end of procedure, spontaneous respirations, patient alert to voice. Oxygen via facemask at 8 liters per minute to PACU. Oxygen tubing connected to wall O2 in PACU, SpO2, NiBP, and EKG monitors and alarms on and functioning, report on patient's clinical status given to PACU RN, RN questions answered.  Handoff Report: Identifed the Patient, Identified the Reponsible Provider, Reviewed the pertinent medical history, Discussed the surgical course, Reviewed Intra-OP anesthesia mangement and issues during anesthesia, Set expectations for post-procedure period and Allowed opportunity for questions and acknowledgement of understanding      Vitals:  Vitals Value Taken Time   BP     Temp     Pulse     Resp     SpO2         Electronically Signed By: KLAUS Huang CRNA  January 10, 2022  2:42 PM

## 2022-01-11 LAB
PATH REPORT.COMMENTS IMP SPEC: NORMAL
PATH REPORT.COMMENTS IMP SPEC: NORMAL
PATH REPORT.FINAL DX SPEC: NORMAL
PATH REPORT.GROSS SPEC: NORMAL
PATH REPORT.MICROSCOPIC SPEC OTHER STN: NORMAL
PHOTO IMAGE: NORMAL

## 2022-01-11 PROCEDURE — 88305 TISSUE EXAM BY PATHOLOGIST: CPT | Mod: 26 | Performed by: PATHOLOGY

## 2022-01-27 DIAGNOSIS — F33.0 MAJOR DEPRESSIVE DISORDER, RECURRENT EPISODE, MILD WITH ATYPICAL FEATURES (H): ICD-10-CM

## 2022-01-27 RX ORDER — OLANZAPINE 5 MG/1
5 TABLET ORAL AT BEDTIME
Qty: 30 TABLET | Refills: 1 | OUTPATIENT
Start: 2022-01-27

## 2022-01-27 NOTE — TELEPHONE ENCOUNTER
Medication requested: OLANZapine (ZYPREXA) 5 MG tablet Date last ordered: 1/3/22 Qty: 30 Refills: 1    Deny--Too soon. 1 Refill on file. Next due 3/3/22

## 2022-02-03 ENCOUNTER — VIRTUAL VISIT (OUTPATIENT)
Dept: BEHAVIORAL HEALTH | Facility: CLINIC | Age: 62
End: 2022-02-03
Payer: COMMERCIAL

## 2022-02-03 DIAGNOSIS — E66.01 MORBID OBESITY (H): ICD-10-CM

## 2022-02-03 DIAGNOSIS — R69 DIAGNOSIS DEFERRED: Primary | ICD-10-CM

## 2022-02-03 NOTE — Clinical Note
"Hi Dr. Perez and Taniya,    I was able to meet with this patient today. Please see attached.    Dr. Perez - patient and his spouse are concerned about weight gain and healthy diet. They are wondering if you could place a referral with a nutritionist. Can you place the order for this or reach out to patient and spouse to discuss?    Taniya - Patient and spouse reporting lower dose on medication helping with lethargy, increased activity and improving clarity levels. Patient reports one day over the past month he experienced a panic attack. Patient reports feeling like \"ants were crawling on me.\" Patient reports panic attack may have been triggered by family stressors and relationship concerns with their godson. Patient reports periods of anxiety but they are usually manageable. Patient and spouse would like to discuss possible medication options with psychiatry provider at next visit. I messaged the scheduling team to reach out to the patient and his spouse.    Thank you,  FRANCESCA Almonte  "

## 2022-02-03 NOTE — PROGRESS NOTES
Behavioral Health Home Services  [facility]: [place]        Social Work Care Navigator Note      Patient: Car Torres  Date: February 3, 2022  Preferred Name: Jerry    Previous PHQ-9:   PHQ-9 SCORE 5/28/2021 8/30/2021 12/2/2021   PHQ-9 Total Score 0 1 6     Previous SHAHNAZ-7:   SHAHNAZ-7 SCORE 5/28/2021 8/30/2021 12/2/2021   Total Score 0 0 0     ALMA LEVEL:  ALMA Score (Last Two) 5/4/2012   ALMA Raw Score 37   Activation Score 49.9   ALMA Level 2       Preferred Contact:  Need for : No  Preferred Contact: Cell      Type of Contact Today: Phone call (patient / identified key support person reached)      Data: (subjective / Objective):  Recent ED/IP Admission or Discharge?   None    Patient Goals:  Goal Areas: Health; Mental Health; Employment / Volunteer; Financial and Social Service Benefits  Patient stated goals: Jerry would like assistance with his physical and mental health for creating a balanced and healthy lifestyle. Jerry would like assistance/support with SSDI, budgeting and social service assistance to maintain/increase his financial stability.        [facility] Core Service Provided:  Comprehensive Care Management: utilized the electronic medical record / patient registry to identify and support patient's health conditions / needs more effectively   Care Transitions: focused on the coordinated and seamless movement of patient between or within different levels of care or settings  Care Coordination: provided care management services/referrals necessary to ensure patient and their identified supports have access to medical, behavioral health, pharmacology and recovery support services.  Ensured that patient's care is integrated across all settings and services.   Individual and Family Support: aimed to help clients reduce barriers to achieving goals, increase health literacy and knowledge about chronic condition(s), increase self-efficacy skills, and improve health outcomes  Referral to Community and Social  Support Services: Provided patient with referrals (see plan section)  Assisted in scheduling an appointment to a referral / service (see plan section)  Coordinated / Confirmed patient's appointment time or referral and transportation plan  Followed-up with patient on whether or not they completed a referral    Current Stressors / Issues / Care Plan Objective Addressed Today:  SWCC and patient were able to meet today for Behavioral Health Home ([facility]) monthly check-in via telehealth visit. All required ROIs have been filed with HIM/patient chart.    CC, patient and patient's spouse, Destiny met this afternoon for warm transfer to new , Cande Hnedrix within the Behavioral Health Home ([facility]) program. Patient reports agreement for case transfer, as of March 1st and appreciates transfer to new Behavioral Health Home ([facility]) [facility] today. Patient reports he would like new [facility] contact information sent through [employer]. [facility] messaged patient with contact information today.    1. Patient and his spouse report they are still interested in meeting with a nutritionist to help monitor physical wellness goals to manage weight and healthy diet. [facility] messaged PCP and asked if nutrition order could be placed. [facility] to follow-up with patient and his spouse at next visit.    2. Patient reports his SSDI claim has been delayed. Patient reports he will need to meet with SSDI psychologist to complete neuro psych testing. Patient and spouse are [facility].     Patient reports he has applied for SSDI with the [employer] agency. Patient's spouse reports he was just approved through work to start assisted disability in Jan, which will help with their finances.    Patient and spouse report he was recently approved for correction disability and is receiving $1250 social security $166 [employer] [facility]. [facility] encouraged patient and spouse to reach out to [facility] to update financial status. Patient  "and spouse in agreement with this and will call to update.     3. Patient reports colonoscopy completed, procedure went as expected and the results were good.    4. Patient and spouse report their [place] assessment with [facility] has been scheduled for March 27th. [facility] to monitor and follow-up with patient after completion of assessments with results.    5. Patient and spouse report decrease in medication helping with lethargy, increased activity and improving clarity levels. Patient reports one day over the past month he experienced a panic attack. Patient reports feeling like \"ants were crawling on me.\" Patient reports panic attack may have been triggered by family stressors and relationship concerns with their godson. Patient reports periods of anxiety but they are usually manageable. Patient and spouse would like to discuss possible medication options with psychiatry provider at next visit. [facility] messaged provider with update and messaged scheduling team to schedule follow-up visit today.     6.  Patient's spouse reports she applied in Nov for utility assistance with [employer] agency and is still waiting for approval. [facility], patient and patient's spouse discussed following up with agency by the end of the month, as services have been short staffed due to Covid, request are taking longer to be approved. [facility] to monitor and follow-up with patient at next visit.    7. Patient's spouse reports house payment is paid until June but darryly needs to look at senior housing. Patient and spouse continue to remain undecided if this is something they want to pursue and will let [facility] know if/when they would like assistance with resources. [facility] to monitor and send resources as needed/requested by patient and his spouse.    8.  Patient report upcoming appt in April with neurology.      Intervention:  Motivational Interviewing: Expressed Empathy/Understanding, Supported Autonomy, Collaboration, Evocation, Permission to raise concern " or advise, Open-ended questions, Reflections: simple and complex, Rolled with resistance: Emphasized patient autonomy, Simple reflection, Complex reflection, Amplified reflection (weaker or stronger meaning), Shifted topic to defuse resistance, Reframed sustain talk in the direction of change and Evoked patient agenda, Change talk (evoked) and Reframe   Target Behavior(s): Explored thoughts about taking an anti-depressant, Explored and resolved challenges related to taking anti-depressants as prescribed, Explored thoughts and readiness to participate in individual therapy, Explored and resolved challenges to attending appointments as scheduled, Explored patient's knowledge of the impact of exercise on mood, Explored current exercise activities and thoughts about how this influences mood, Explored current social supports and reinforced opportunities to increase engagement and Explored patient's current diet and knowledge of influence on mood    Assessment: (Progress on Goals / Homework):  Patient would benefit from continued coordination in reaching their goals set for the Behavioral Health Home ([facility]) program. SWCC reviewed Health Action Plan goals and will continue to monitor progress and work with patient and their care team.      Plan: (Homework, other):  Patient was encouraged to continue to seek condition-related information and education.      Scheduled a Phone follow up appointment with SW  in 4 weeks     Patient has set self-identified goals and will monitor progress until the next appointment on: 03/02/2022.        Edgardo Lee [facility]  Behavioral Health Home ([facility])   [facility]  [phone]  February 3, 2022  4:31 PM                  Next 5 appointments (look out 90 days)    Apr 26, 2022  1:00 PM  (Arrive by 12:45 PM)  Return Visit with Miguel Varela MD  [facility] [place] ([facility] [place]) [address] [address] [place] [address]  [place] [id number] [phone]

## 2022-02-21 ENCOUNTER — HOSPITAL ENCOUNTER (OUTPATIENT)
Dept: NUTRITION | Facility: CLINIC | Age: 62
Discharge: HOME | End: 2022-02-21
Attending: INTERNAL MEDICINE | Admitting: INTERNAL MEDICINE
Payer: COMMERCIAL

## 2022-02-21 DIAGNOSIS — E66.01 MORBID OBESITY (H): ICD-10-CM

## 2022-02-21 PROCEDURE — 97802 MEDICAL NUTRITION INDIV IN: CPT | Mod: GT,95

## 2022-02-21 ASSESSMENT — PATIENT HEALTH QUESTIONNAIRE - PHQ9
SUM OF ALL RESPONSES TO PHQ QUESTIONS 1-9: 7
SUM OF ALL RESPONSES TO PHQ QUESTIONS 1-9: 7
10. IF YOU CHECKED OFF ANY PROBLEMS, HOW DIFFICULT HAVE THESE PROBLEMS MADE IT FOR YOU TO DO YOUR WORK, TAKE CARE OF THINGS AT HOME, OR GET ALONG WITH OTHER PEOPLE: EXTREMELY DIFFICULT

## 2022-02-21 NOTE — PROGRESS NOTES
OUTPATIENT CLINICAL NUTRITION SERVICES ASSESSMENT    REASON FOR ASSESSMENT  Car Torres referred by Dr. Chris Ash for MNT related to morbid obesity.      Patient accompanied by wife      ASSESSMENT   Nutrition History:  - Information obtained from patient and patient wife    -PMH:  Patient Active Problem List   Diagnosis     Sciatica     Morbid obesity (H)     Hyperlipidemia LDL goal <130     Hypertension goal BP (blood pressure) < 140/90     CKD (chronic kidney disease) stage 2, GFR 60-89 ml/min     Renal cyst     Diverticulosis of large intestine without hemorrhage     Pulmonary nodule     Coronary artery calcification     History of sinus bradycardia     ED (erectile dysfunction)     TIA (obstructive sleep apnea)     Major depressive disorder, recurrent episode, mild with atypical features (H)     Dementia associated with other underlying disease with behavioral disturbance (H)     Ulcer of left foot, unspecified ulcer stage (H)     Unspecified psychosis not due to a substance or known physiological condition (H)       Diet Recall:  Breakfast: Coretta typically wakes up around 12. He will usually have a fruit smoothie made with OJ and some mixed nuts  Lunch: Typically coretta and his wife do not have lunch.  Dinner: Usually a frozen meal or a meal made in the crock pot (typically a chicken meal with vegetables and potatoes  Snack: Occasionally coretta will have some chips he will usually have 3 large cookies in the evening.  Beverages: Coretta does not drink a lot of water- he typically will do 2 % milk 2 glasses through out the day and a reduced sugar Gatorade. Occassionally he will have a monster  Dining out: Patient and wife do not dine out often.        Nutritional Details:   -Food allergies: None  -Food sensitivities: None  -GI concerns: None  -Appetite: Coretta explained his appetite is pretty satisfied through out the day  -Role of cooking: Patients wife is the primary cook at home  -Role of food shopping:  Patient wife is the primary . They typically order groceries online.    Physical Activity:  Days per week: 0  Duration: 0-had a previous goal of 5 minutes on the treadmill  Activity type: Previously walking    NUTRITION FOCUSED PHYSICAL ASSESSMENT (NFPA) FOR DIAGNOSING MALNUTRITION  Yes  No:  Virtual visit conduceted         Observed:   No nutrition-related physical findings observed    Obtained from Chart/Interdisciplinary Team:  Foot ulcer     LABS  Labs reviewed  Patient last labs 1/3  Sodium/potassium WNL    MEDICATIONS  Medications reviewed  Current Outpatient Medications   Medication     ASPIRIN 325 MG OR TBEC     atorvastatin (LIPITOR) 40 MG tablet     Cholecalciferol (VITAMIN D PO)     donepezil (ARICEPT) 10 MG tablet     Ferrous Gluconate 324 (37.5 Fe) MG TABS     Ferrous Sulfate 324 (65 Fe) MG TBEC     FLUoxetine (PROZAC) 40 MG capsule     lisinopril-hydrochlorothiazide (ZESTORETIC) 20-12.5 MG tablet     MULTI VITAMIN MENS PO     OLANZapine (ZYPREXA) 5 MG tablet     omega 3 1000 MG CAPS     tadalafil (CIALIS) 20 MG tablet     TURMERIC PO     vitamin B complex with vitamin C (VITAMIN  B COMPLEX) TABS tablet     No current facility-administered medications for this encounter.       ANTHROPOMETRICS   Height: 1.803m (5'11)  Weight: 151 kg (333 lb)  BMI (kg/m2): 46.4  Weight Status:  Obesity Grade III BMI >40  IBW: 172 lb (78.1 kg)  %IBW: 193% of IDW  Weight History:   Wt Readings from Last 15 Encounters:   01/03/22 (!) 151 kg (333 lb)   10/20/21 145.2 kg (320 lb)   10/14/21 145.2 kg (320 lb 3.2 oz)   07/21/21 146.5 kg (323 lb)   05/19/21 140.6 kg (310 lb)   04/22/21 132 kg (291 lb)   04/13/21 130.5 kg (287 lb 11.2 oz)   01/28/21 131.5 kg (290 lb)   12/22/20 131.5 kg (290 lb)   11/13/20 131.4 kg (289 lb 9.6 oz)   08/25/20 127.3 kg (280 lb 9.6 oz)   08/11/20 130.4 kg (287 lb 8 oz)   Patient weight appears to be trending up    Dosing weight: 96.3 kg-adjusted BW used    ASSESSED NUTRITION  NEEDS  Estimated Energy Needs: 1,444-1,926 kcals/day (15-20 Kcal/Kg-adjusted BW)  Justification:  (obese)  Estimated Protein Needs: 77-96 grams protein/day (.8-1 g pro/Kg-adjusted weight)  Justification:  (obesity and CKD)  Estimated Fluid Needs: 1,444-1,926 mL/day (1 mL/Kcal)    ASSESSED MALNUTRITION STATUS  % Weight Loss:  None noted  % Intake:  No decreased intake noted  Subcutaneous Fat Loss:  None observed  Loss of Muscle Mass:  None observed  Fluid Retention:  None noted    Malnutrition Diagnosis:  Patient does not meet two of the above criteria necessary for diagnosing malnutrition    DIAGNOSIS   Nutrition Diagnosis: Overweight/obesity related to inappropriate energy intake as evidenced by BMI of 46.4    INTERVENTIONS   Nutrition Prescription   -Educate on healthy diet basic and physical activity; see below.    IMPLEMENTATION   Assessed learning needs and learning preference:  Teaching Method(s) used: Literature  Explanation    Nutrition Education (Content):              a)  Discussed  My plate and building a health plate  -Discuss the importance of macronutrient, portions sizes and metabolic implications                 b)  Provided the following handouts: MyPlate Guide and Weight Loss Tips    Nutrition Education (Application):              a)  Discussed current eating plans / recommended alternative food choices              b)  Patient verbalizes understanding of diet by adhereing to recommendation, vocalizing understanding     Anticipate good compliance   Stage of Change:  preparation  Additional:     GOALS  -Patient will incorporate 5 minutes of some sort of physical activity  -Patient will cut back on cookies int the evening 1-2 instead of 2-3  -Patient will work on portion sizes of food specifically fat.    FOLLOW UP/MONITORING   Progress towards goals will be monitored and evaluated per protocol and Practice Guidelines    Time Spent with Patient  45 minutes

## 2022-02-22 ENCOUNTER — VIRTUAL VISIT (OUTPATIENT)
Dept: BEHAVIORAL HEALTH | Facility: CLINIC | Age: 62
End: 2022-02-22
Payer: COMMERCIAL

## 2022-02-22 ENCOUNTER — VIRTUAL VISIT (OUTPATIENT)
Dept: PSYCHIATRY | Facility: CLINIC | Age: 62
End: 2022-02-22
Payer: COMMERCIAL

## 2022-02-22 DIAGNOSIS — F33.0 MAJOR DEPRESSIVE DISORDER, RECURRENT EPISODE, MILD WITH ATYPICAL FEATURES (H): ICD-10-CM

## 2022-02-22 DIAGNOSIS — F41.0 PANIC DISORDER WITHOUT AGORAPHOBIA: ICD-10-CM

## 2022-02-22 DIAGNOSIS — F33.0 MAJOR DEPRESSIVE DISORDER, RECURRENT EPISODE, MILD WITH ATYPICAL FEATURES (H): Primary | ICD-10-CM

## 2022-02-22 PROCEDURE — 99214 OFFICE O/P EST MOD 30 MIN: CPT | Mod: 95 | Performed by: NURSE PRACTITIONER

## 2022-02-22 PROCEDURE — 90791 PSYCH DIAGNOSTIC EVALUATION: CPT | Mod: 52 | Performed by: COUNSELOR

## 2022-02-22 RX ORDER — OLANZAPINE 5 MG/1
5 TABLET ORAL AT BEDTIME
Qty: 90 TABLET | Refills: 1 | Status: SHIPPED | OUTPATIENT
Start: 2022-02-22 | End: 2022-04-26
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
## 2022-02-22 NOTE — PROGRESS NOTES
"Jerry is a 61 year old who is being evaluated via a billable video visit.      How would you like to obtain your AVS? MyChart  If the video visit is dropped, the invitation should be resent by: Text to cell phone: [phone]  Will anyone else be joining your video visit? No      Video Start Time: 5:08 PM  Video-Visit Details    Type of service:  Video Visit    Video End Time:5:35 PM    Originating Location (pt. Location): Home    Distant Location (provider location):  [facility]     Platform used for Video Visit: [facility]           Outpatient Psychiatric Progress Note    Name: Car Torres   : 1960                    Primary Care Provider: Mihir Perez MD   Therapist: Yes    PHQ-9 scores:  PHQ-9 SCORE 2021   PHQ-9 Total Score [facility] - - 7 (Mild depression)   PHQ-9 Total Score 1 6 7       SHAHNAZ-7 scores:  SHAHNAZ-7 SCORE 2021   Total Score 0 0 0       Patient Identification:    Patient is a 61 year old year old,   White Not  or  male  who presents for return visit with me.  Patient is currently unemployed. Patient attended the session with His wife , who they agreed to have interview with. Patient prefers to be called: \" Jerry\".    Current medications include: ASPIRIN 325 MG OR TBEC, ONE DAILY  atorvastatin (LIPITOR) 40 MG tablet, TAKE 1 TABLET BY MOUTH EVERY DAY  Cholecalciferol (VITAMIN D PO), Take 5,000 Units by mouth daily One tablet twice daily  donepezil (ARICEPT) 10 MG tablet, Take 1 tablet (10 mg) by mouth daily  Ferrous Gluconate 324 (37.5 Fe) MG TABS, 1 tablet daily  Ferrous Sulfate 324 (65 Fe) MG TBEC,   FLUoxetine (PROZAC) 40 MG capsule, Take 1 capsule (40 mg) by mouth daily  lisinopril-hydrochlorothiazide (ZESTORETIC) 20-12.5 MG tablet, TAKE 1 TABLET BY MOUTH EVERY DAY  MULTI VITAMIN MENS PO, None Entered  OLANZapine (ZYPREXA) 5 MG tablet, Take 1 tablet (5 mg) by mouth At " "Bedtime  omega 3 1000 MG CAPS, Take by mouth daily  tadalafil (CIALIS) 20 MG tablet, TAKE 1/2 TO 1 TABLET BY MOUTH 30 MINUTES TO 1 HOUR BEFORE SEX  TURMERIC PO, Take by mouth daily  vitamin B complex with vitamin C (VITAMIN  B COMPLEX) TABS tablet, Take 1 tablet by mouth daily    No current facility-administered medications on file prior to visit.       The [place] Prescription Monitoring Program has been reviewed and there are no concerns about diversionary activity for controlled substances at this time.      I was able to review most recent Primary Care Provider, specialty provider, and therapy visit notes that I have access to.     Today, patient reports that things are going \"all right\".  Nutritionist met with himm yesterday.  He has  Anhedonia, low motivated and sedentary.  He watches a lot of television.  He has been isolating.  He goes out to see grandchildren twice a week and runs small errand.  He does not seem unhappy. He is sleeping 11-12 hours a night.  He does not think that there are cameras around the house.  He gets anxious about waiting to hear how social security has turned out.  His wife is attending a support group through the [facility].  He has to repeat his  neuropsych evaluation.  He needs reminders for ADLs.  He denies use of Kraton.       has a past medical history of Dementia (H), Depressive disorder, Heart disease (5/8/2018), Hypercholesterolemia, Hypertension goal BP (blood pressure) < 140/90, Nonspecific abnormal electrocardiogram (ECG) (EKG) (08/23/2007), and Sleep apnea.    He has no past medical history of Arthritis, Cancer (H), Cerebral infarction (H), Congestive heart failure (H), Congestive heart failure, unspecified, COPD (chronic obstructive pulmonary disease) (H), Diabetes (H), History of blood transfusion, Thyroid disease, or Uncomplicated asthma.    Social history updates:    He is getting financial support through disability pension from work and supportive friends.  "     Substance use updates:    Social alcohol use - limited amounts  Tobacco use: No    Vital Signs:   There were no vitals taken for this visit.    Labs:    Most recent laboratory results reviewed and no new labs.     Mental Status Examination:  Appearance:  awake, alert and casually dressed  Attitude:  cooperative   Eye Contact:  adequate  Gait and Station: No assistive Devices used and No dizziness or falls  Psychomotor Behavior:  fidgeting  Oriented to:  time, person, and place  Attention Span and Concentration:  Normal  Speech:   clear, coherent and Speaks: English  Mood:  anxious, depressed and better  Affect:  mood congruent  Associations:  no loose associations  Thought Process:  linear  Thought Content:  no evidence of suicidal ideation or homicidal ideation, no auditory hallucinations present and no visual hallucinations present  Recent and Remote Memory:  fair Not formally assessed. No amnesia.  Fund of Knowledge: appropriate  Insight:  fair  Judgment:  fair  Impulse Control:  fair    Suicide Risk Assessment:  Today Car Torres reports that he is having no thoughts to want to end his life or to harm other people. In addition, there are notable risk factors for self-harm, including anxiety, withdrawing and mood change. However, risk is mitigated by commitment to family, history of seeking help when needed, future oriented, denies suicidal intent or plan and denies homicidal ideation, intent, or plan. Therefore, based on all available evidence including the factors cited above, Car Torres does not appear to be at imminent risk for self-harm, does not meet criteria for a 72-hr hold, and therefore remains appropriate for ongoing outpatient level of care.  A thorough assessment of risk factors related to suicide and self-harm have been reviewed and are noted above. The patient convincingly denies suicidality on several occasions. Local community safety resources printed and reviewed for patient to  use if needed. There was no deceit detected, and the patient presented in a manner that was believable.     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 atypical features    Medical comorbidities include:   Patient Active Problem List    Diagnosis Date Noted     Hypertension goal BP (blood pressure) < 140/90 10/20/2010     Priority: High     Hyperlipidemia LDL goal <130 06/11/2010     Priority: High     Dementia associated with other underlying disease with behavioral disturbance (H) 01/03/2022     Priority: Medium     Ulcer of left foot, unspecified ulcer stage (H) 01/03/2022     Priority: Medium     Unspecified psychosis not due to a substance or known physiological condition (H) 01/03/2022     Priority: Medium     Major depressive disorder, recurrent episode, mild with atypical features (H) 12/06/2021     Priority: Medium     TIA (obstructive sleep apnea) 07/19/2018     Priority: Medium     Home Sleep Apnea Testing - 7/18/18: 295 lbs 0 oz: AHI 45.3/hr. Supine AHI 61.9/hr. Oxygen [place] of 66%.  Baseline 93%.  Sp02 =< 88% for 47 minutes.       History of sinus bradycardia 06/07/2018     Priority: Medium     ED (erectile dysfunction) 06/07/2018     Priority: Medium     Coronary artery calcification 05/08/2018     Priority: Medium     Per CT CHEST WITHOUT CONTRAST April 23, 2018. CT calcium score planned.        Renal cyst 11/18/2015     Priority: Medium     Simple, non enhanced, 3.5 cm on right kidney, Bosniak 1 - x 2 stable findings       Diverticulosis of large intestine without hemorrhage 11/18/2015     Priority: Medium     Incidental finding on CT scan        Pulmonary nodule 11/18/2015     Priority: Medium     Incidental finding on CT scan, right posterior lung - considered benign / stable        CKD (chronic kidney disease) stage 2, GFR 60-89 ml/min 10/21/2010     Priority: Medium     60 to 80 - have discussed Lisinopril - will consider        Sciatica  02/27/2006     Priority: Medium     Morbid obesity (H) 02/27/2006     Priority: Medium       Assessment:    Car Torres appears to be stabilizing.  He is not making any statements of wanting to end his life.  He informed me today he has no paranoia or thoughts of cameras in his house watching him.  His wife reports she is not suspicious of her actions.  No sleep concerns reported..  As Coretta appears stable, his wife as well as coretta prefer no changes in the medication at this point.    Medication side effects and alternatives were reviewed. Health promotion activities recommended and reviewed today. All questions addressed. Education and counseling completed regarding risks and benefits of medications and psychotherapy options.    Treatment Plan:        1.  Olanzapine 5 mg at bedtime    2.  Fluoxetine 40 mg daily    3.  Talk therapy when able to in order to learn ways to cope with life adjustments and stressors        Continue all other medications as reviewed per electronic medical record today.     Safety plan reviewed. To the Emergency Department as needed or call after hours crisis line at [phone] or [phone]. [place] Crisis Text Line. Text [place] to 891196 or Suicide LifeLine Chat: suicidepreventionlifeline.org/chat/    To schedule individual or family therapy, call [facility] at [phone].    Schedule an appointment with me in 2 months or sooner as needed. Call [facility] at [phone] to schedule.    Follow up with primary care provider as planned or for acute medical concerns.    Call the psychiatric nurse line with medication questions or concerns at [phone].    [employer] may be used to communicate with your provider, but this is not intended to be used for emergencies.    Crisis Resources:    National Suicide Prevention Lifeline: 326.898.1103 (TTY: 773.435.1231). Call anytime for help.  (www.suicidepreventionlifeline.org)  National [place] on Mental  Illness (www.ana maría.org): 373-107-6266 or 197-157-1715.   Mental Health Association (www.mentalhealth.org): 713.847.1862 or 523-775-5740.  [place] Crisis Text [place]: Text [place] to 876395  Suicide LifeLine Chat: suicidepreEnsygnialine.org/chat    Administrative Billing:   Time spent with patient was 30 minutes and greater than 50% of time or 20 minutes was spent in counseling and coordination of care regarding above diagnoses and treatment plan.    Patient Status:  Patient will continue to be seen for ongoing consultation and stabilization.    Signed:   BELEN Singh-BC   Psychiatry

## 2022-02-24 ASSESSMENT — PATIENT HEALTH QUESTIONNAIRE - PHQ9: SUM OF ALL RESPONSES TO PHQ QUESTIONS 1-9: 7

## 2022-03-01 ASSESSMENT — COLUMBIA-SUICIDE SEVERITY RATING SCALE - C-SSRS
2. HAVE YOU ACTUALLY HAD ANY THOUGHTS OF KILLING YOURSELF?: NO
6. HAVE YOU EVER DONE ANYTHING, STARTED TO DO ANYTHING, OR PREPARED TO DO ANYTHING TO END YOUR LIFE?: NO
TOTAL  NUMBER OF ABORTED OR SELF INTERRUPTED ATTEMPTS LIFETIME: NO
TOTAL  NUMBER OF INTERRUPTED ATTEMPTS LIFETIME: NO
ATTEMPT LIFETIME: NO

## 2022-03-03 ENCOUNTER — VIRTUAL VISIT (OUTPATIENT)
Dept: BEHAVIORAL HEALTH | Facility: CLINIC | Age: 62
End: 2022-03-03
Payer: COMMERCIAL

## 2022-03-03 DIAGNOSIS — R69 DIAGNOSIS DEFERRED: Primary | ICD-10-CM

## 2022-03-03 NOTE — PROGRESS NOTES
Behavioral Health Home Services  [facility]: [place]        Social Work Care Navigator Note      Patient: Car Torres  Date: March 3, 2022  Preferred Name: Jerry    Previous PHQ-9:   PHQ-9 SCORE 8/30/2021 12/2/2021 2/21/2022   PHQ-9 Total Score MyChart - - 7 (Mild depression)   PHQ-9 Total Score 1 6 7     Previous SHAHNAZ-7:   SHAHNAZ-7 SCORE 5/28/2021 8/30/2021 12/2/2021   Total Score 0 0 0     ALMA LEVEL:  ALMA Score (Last Two) 5/4/2012   ALMA Raw Score 37   Activation Score 49.9   ALMA Level 2       Preferred Contact:  Need for : No  Preferred Contact: Cell      Type of Contact Today: Phone call (patient / identified key support person reached)      Data: (subjective / Objective):  Recent ED/IP Admission or Discharge?   None    Patient Goals:  Goal Areas: Health; Mental Health; Employment / Volunteer; Financial and Social Service Benefits  Patient stated goals: Jerry would like assistance with his physical and mental health for creating a balanced and healthy lifestyle. Jerry would like assistance/support with SSDI, budgeting and social service assistance to maintain/increase his financial stability.        [facility] Core Service Provided:  Comprehensive Care Management: utilized the electronic medical record / patient registry to identify and support patient's health conditions / needs more effectively   Care Transitions: focused on the coordinated and seamless movement of patient between or within different levels of care or settings  Care Coordination: provided care management services/referrals necessary to ensure patient and their identified supports have access to medical, behavioral health, pharmacology and recovery support services.  Ensured that patient's care is integrated across all settings and services.   Individual and Family Support: aimed to help clients reduce barriers to achieving goals, increase health literacy and knowledge about chronic condition(s), increase self-efficacy skills, and improve  health outcomes    Current Stressors / Issues / Care Plan Objective Addressed Today:  [facility] and patient were able to meet today for Behavioral Health Home ([facility]) monthly check-in via telehealth visit. All required ROIs have been filed with HIM/patient chart.      1. Patient reports he was able to meet with the nutritionist recently and set up goals to work on. This also includes daily exercise either on the treadmill or with weight to work his arms. Patient reports he has been mostly compliant with this plan. The nutrition goals include eating something in the afternoon, having less sweets (cookies), and he needs to watch the amount of butter he's using also. Patient reports he will walk on the treadmill for 15 minutes and is working on building up stamina and balance; he often leans over the the treadmill, so he is working on this. At their last meeting, the nutritionist explained the new standards for eating health, and his partner reports he seems to be more conscious about portion sizes. Patient has another meeting in April, and they are completing monthly check ins for now.     2. Patient has a neuropsych evaluation scheduled for 3/29, which will be added to the SSDI claim. Patient also reports that human resources sent them forms to fill out to see if they may be eligible for more assistance, but they want to wait until they know more about social security. [facility] will follow up at next check in once evaluation is completed.     3. Patient is still scheduled for a [place] Choice assessment at the end of March, so [facility] will check in at next visit.     4. Patient had appointment with his psychiatrist, Taniya, and they didn't make any medication adjustments this time. He had a lowered dose from the last appointment, so they are monitoring this change. Patient reports he's had a little bit of anxiety recently and he feels his mind is going fast about everything. They will monitor this, and reported they don't really want to  put him on more medications. Patient talked to Taniya about this as well.     5. Patient reports he was able to get some assistance from [employer] for heat and electric bills. They took care of a full month and part of this month's bill as well.    6. Patient and his wife explained that they are pondering the option of assisted living in the future, but they are waiting on snf income to start to see how much this will be. They reported that they are waiting for other things to settle before exploring this option so they can assess their financial situation.     7. Patient and his wife reported they are going to visit their friend in [place] because she bought them tickets to fly out. They will be gone 3/7-3/14, so they are excited about this. [facility] will check in at next visit about trip.       Intervention:  Motivational Interviewing: Expressed Empathy/Understanding, Supported Autonomy, Collaboration, Evocation, Permission to raise concern or advise, Open-ended questions and Reflections: simple and complex   Target Behavior(s): Explored thoughts about taking an anti-depressant, Explored and resolved challenges related to taking anti-depressants as prescribed, Explored thoughts and readiness to participate in individual therapy, Explored and resolved challenges to attending appointments as scheduled, Explored patient's knowledge of the impact of exercise on mood, Explored current exercise activities and thoughts about how this influences mood, Explored current social supports and reinforced opportunities to increase engagement and Explored patient's current diet and knowledge of influence on mood    Assessment: (Progress on Goals / Homework):  Patient would benefit from continued coordination in reaching their goals set for the Behavioral Health Home ([facility]) program. [facility] reviewed Health Action Plan goals and will continue to monitor progress and work with patient and their care team.      Plan: (Homework,  other):  Patient was encouraged to continue to seek condition-related information and education.      Scheduled a Phone follow up appointment with MARJ YUEN in 4 weeks     Patient has set self-identified goals and will monitor progress until the next appointment on: 4/7/22.         OLGA Montero  Behavioral Health Home ([facility])   [facility]  [phone]                Next 5 appointments (look out 90 days)    Apr 26, 2022  1:00 PM  (Arrive by 12:45 PM)  Return Visit with Miguel Varela MD  [facility] [place] ([facility]) [id number] [place] [id number] [phone]

## 2022-03-28 DIAGNOSIS — E78.5 HYPERLIPIDEMIA LDL GOAL <100: ICD-10-CM

## 2022-03-29 RX ORDER — ATORVASTATIN CALCIUM 40 MG/1
TABLET, FILM COATED ORAL
Qty: 90 TABLET | Refills: 1 | Status: SHIPPED | OUTPATIENT
Start: 2022-03-29 | End: 2022-09-15

## 2022-03-29 NOTE — TELEPHONE ENCOUNTER
"Requested Prescriptions   Pending Prescriptions Disp Refills     atorvastatin (LIPITOR) 40 MG tablet [Pharmacy Med Name: ATORVASTATIN 40 MG TABLET] 90 tablet 0     Sig: TAKE 1 TABLET BY MOUTH EVERY DAY       Statins Protocol Passed - 3/28/2022  8:31 AM        Passed - LDL on file in past 12 months     Recent Labs   Lab Test 10/14/21  1020   LDL 69             Passed - No abnormal creatine kinase in past 12 months     No lab results found.             Passed - Recent (12 mo) or future (30 days) visit within the authorizing provider's specialty     Patient has had an office visit with the authorizing provider or a provider within the authorizing providers department within the previous 12 mos or has a future within next 30 days. See \"Patient Info\" tab in inbasket, or \"Choose Columns\" in Meds & Orders section of the refill encounter.              Passed - Medication is active on med list        Passed - Patient is age 18 or older           Prescription approved per [facility] Refill Protocol.    "

## 2022-04-04 ENCOUNTER — HOSPITAL ENCOUNTER (OUTPATIENT)
Dept: NUTRITION | Facility: CLINIC | Age: 62
Discharge: HOME | End: 2022-04-04
Attending: INTERNAL MEDICINE | Admitting: INTERNAL MEDICINE
Payer: COMMERCIAL

## 2022-04-04 PROCEDURE — 97802 MEDICAL NUTRITION INDIV IN: CPT | Mod: TEL,95

## 2022-04-04 NOTE — PROGRESS NOTES
OUTPATIENT CLINICAL NUTRITION SERVICES FOLLOW-UP     REASON FOR ASSESSMENT  Car Torres referred by Dr. Perez for MNT related to morbid obesity.      Patient accompanied by wife      NEW FINDINGS:   -Patient and wife had some financial struggles making grocery shopping/ incorporating more vegetables difficult at this time. RD went over SNAP. Patient are receiving benefits.    Previous Nutrition Goals:  1) Patient will incorporate 5 minutes of some sort of physical activity  Evaluation: Met; patient walks for 15 minutes; Patient has balance issues so he uses the sides of treadmills    2) Patient will cut back on cookies int the evening 1-2 instead of 2-3  Evaluation: Met; Patient swapped out cookies for tootsie pops; 3-4 daily    3) Patient will work on portion sizes of food specifically fat.  Evaluation: Met    Previous Nutrition Follow Up / Monitoring:  Progress towards goals will be monitored and evaluated per protocol and Practice Guidelines    Previous Nutrition Diagnosis:   Overweight/obesity related to inappropriate energy intake as evidenced by BMI of 46.4  Evaluation: Improving    Newest Food Record  Breakfast: Fruit smoothie with little OJ with 1 cup of mixed nuts with craisin's and M and M's  Lunch: Protein bar   Dinner: Chicken parmesan chicken, mashed potatoes, and milk   Snack: 3 tootsie pop and [place] and cream cheese pastry   Beverages: 30 kcal Gatorade 2 (lower sugar 12 fluid ounces) No water  Dining out: rarely    Physical Activity:  Days per week: 3-4 days   Duration: 15 minutes  Activity type: walking  Limitations: [place]    ANTHROPOMETRICS   Height: 1.803m (5'11)  Weight: 151 kg (333 lb)- no updated weight in for patient  BMI (kg/m2): 46  Weight Status:  Obesity Grade III BMI >40  IBW: 172 lb (78.1 kg)  Weight History:   Wt Readings from Last 15 Encounters:   01/03/22 (!) 151 kg (333 lb)   10/20/21 145.2 kg (320 lb)   10/14/21 145.2 kg (320 lb 3.2 oz)   07/21/21 146.5 kg (323 lb)    05/19/21 140.6 kg (310 lb)   04/22/21 132 kg (291 lb)   04/13/21 130.5 kg (287 lb 11.2 oz)   01/28/21 131.5 kg (290 lb)   12/22/20 131.5 kg (290 lb)   11/13/20 131.4 kg (289 lb 9.6 oz)   08/25/20 127.3 kg (280 lb 9.6 oz)   08/11/20 130.4 kg (287 lb 8 oz)   10/28/19 138.3 kg (305 lb)   07/09/19 138.8 kg (306 lb)   10/22/18 135.2 kg (298 lb)         Dosing weight: 96.3 kg-adjusted BW used    ASSESSED NUTRITION NEEDS  Estimated Energy Needs: 1,444-1,926 kcals/day (15-20 Kcal/Kg-adjusted BW)  Justification:  (obese)  Estimated Protein Needs: 77-96 grams protein/day (.8-1 g pro/Kg-adjusted weight)  Justification:  (obesity and CKD)  Estimated Fluid Needs: 1,444-1,926 mL/day (1 mL/Kcal)     DIAGNOSIS   Nutrition Diagnosis: Overweight/obesity related to inappropriate energy intake as evidenced by BMI of 46.4    INTERVENTIONS   Nutrition Prescription Went over mindful eating, ways to incorporate more water, and ways to incorporate more physical activity.    IMPLEMENTATION   Assessed learning needs and learning preference:   Teaching Method(s) used: Explanation    Nutrition Education (Content):              a)  Discussed Mindful eating, physical activity, water intakes, reducing sugar intakes          Nutrition Education (Application):              a)  Discussed current eating plans / recommended alternative food choices              b)  Patient verbalizes understanding of diet by listing ways to reduce sugar intakes, identify one time each day to incorporate more water     Anticipate good compliance   Stage of Change:  action  Additional:     GOALS  1) Drink more water  2) 2 tootsies roles a day  3) Increasing physical activity up to 75%    FOLLOW UP/MONITORING   Progress towards goals will be monitored and evaluated per protocol and Practice Guidelines    Time Spent with Patient  45 minutes    Zuleika Soriano RDN, DEJON  Clinical Dietitian  Office: [phone]  Weekend pager: [phone]

## 2022-04-07 ENCOUNTER — VIRTUAL VISIT (OUTPATIENT)
Dept: BEHAVIORAL HEALTH | Facility: CLINIC | Age: 62
End: 2022-04-07
Payer: COMMERCIAL

## 2022-04-07 DIAGNOSIS — R69 DIAGNOSIS DEFERRED: Primary | ICD-10-CM

## 2022-04-12 NOTE — PROGRESS NOTES
Behavioral Health Home Services  [facility]: [place]        Social Work Care Navigator Note      Patient: Car Torres  Date: April 7, 2022  Preferred Name: Jerry    Previous PHQ-9:   PHQ-9 SCORE 8/30/2021 12/2/2021 2/21/2022   PHQ-9 Total Score MyChart - - 7 (Mild depression)   PHQ-9 Total Score 1 6 7     Previous SHAHNAZ-7:   SHAHNAZ-7 SCORE 5/28/2021 8/30/2021 12/2/2021   Total Score 0 0 0     ALMA LEVEL:  AMLA Score (Last Two) 5/4/2012   ALMA Raw Score 37   Activation Score 49.9   ALMA Level 2       Preferred Contact:  Need for : No  Preferred Contact: Cell      Type of Contact Today: Phone call (patient / identified key support person reached)      Data: (subjective / Objective):  Recent ED/IP Admission or Discharge?   None    Patient Goals:  Goal Areas: Health; Mental Health; Employment / Volunteer; Financial and Social Service Benefits  Patient stated goals: Jerry would like assistance with his physical and mental health for creating a balanced and healthy lifestyle. Jerry would like assistance/support with SSDI, budgeting and social service assistance to maintain/increase his financial stability.        [facility] Core Service Provided:  Comprehensive Care Management: utilized the electronic medical record / patient registry to identify and support patient's health conditions / needs more effectively   Care Transitions: focused on the coordinated and seamless movement of patient between or within different levels of care or settings  Care Coordination: provided care management services/referrals necessary to ensure patient and their identified supports have access to medical, behavioral health, pharmacology and recovery support services.  Ensured that patient's care is integrated across all settings and services.   Individual and Family Support: aimed to help clients reduce barriers to achieving goals, increase health literacy and knowledge about chronic condition(s), increase self-efficacy skills, and improve  health outcomes    Current Stressors / Issues / Care Plan Objective Addressed Today:  [facility] and patient were able to meet today for Behavioral Health Home ([facility]) monthly check-in via telehealth visit. All required ROIs have been filed with HIM/patient chart.    1. Patient reported that he is good right now. They have fun on their trip last month to [place], and it was a good vacation visiting their friend.     2. Patient had [place] Choice assessment at the end of last month, and he didn't qualify for PCA. The [place] reported that he could benefit from other services through [employer], so they are waiting for the SMRT disability certification and will wait to hear back about his eligibility.    3. Patient is still waiting to hear about the social security disability as well. He completed the neuro psych evaluation last week too. It was really quick and only took an hour, and it was supposed to take four hours. The questions that were asked, they reported feeling like it didn't seem to dig deep. One thing he remembers was that the  said his processing was really slow.     4. Patient reports that its going good with his nutritionist, and they are trying to increase the compliance to 75%. He has a meeting with her again in a couple months; she is going to send some information about My[employer]ate and other nutrition things.  He also needs to drink more water, so they bought 10oz bottles that are sitting out so they are right in front of him. They reported that its hard to get the type of food he needs because of their low income. He does have fruit smoothies every morning, but they aren't able to incorporate fruit during the day because it's too expensive. Patient is also eating a protein bar in the middle of the afternoon as advised. [facility] suggested food shelves in their area. Patient's wife explained that they don't have a bare cupboard, so she feels they don't quite need to use a food shelf as others may be  more in need. [facility] suggested that she could call and ask if they have plenty of food to take, and she may feel better of there's an excess amount. They agreed to this and are open to [facility] sending a couple food shelves close to their area. She reports that they live right in the middle of [place], [place], [place], and [place], in [place], so anywhere in this area would be best.      [facility] sent a [employer] message with the following options for food shelves in [place]: [employer] [facility]- [address] [place] [address], [phone]; [facility] [place]- [address] [place] [address], [phone]; and [facility]- [address] [place] [address], [phone].     5. Patient reports that he does still have anxiety, but he's been feeling okay. They explained that it's hard to be in their situation and not some kind of anxiety. Patient hasn't asked about the possibility for anxiety medications, but he's already taking other medications that they don't want to add to. They know what to do with anxiety, as far as relaxation techniques, so they focus on practicing these in times of higher anxiety.    Intervention:  Motivational Interviewing: Expressed Empathy/Understanding, Supported Autonomy, Collaboration, Evocation, Permission to raise concern or advise, Open-ended questions and Reflections: simple and complex   Target Behavior(s): Explored thoughts about taking an anti-depressant, Explored and resolved challenges related to taking anti-depressants as prescribed, Explored thoughts and readiness to participate in individual therapy, Explored and resolved challenges to attending appointments as scheduled, Explored patient's knowledge of the impact of exercise on mood, Explored current exercise activities and thoughts about how this influences mood and Explored patient's current diet and knowledge of  influence on mood    Assessment: (Progress on Goals / Homework):  Patient would benefit from continued coordination in reaching their goals set for the Behavioral Health Home ([facility]) program. CC reviewed Health Action Plan goals and will continue to monitor progress and work with patient and their care team.    Plan: (Homework, other):  Patient was encouraged to continue to seek condition-related information and education.      Scheduled a Phone follow up appointment with MARJ  in 4 weeks     Patient has set self-identified goals and will monitor progress until the next appointment on: 5/12/22.       OLGA Montero  Behavioral Health Home ([facility])   [facility]  [phone]      Next 5 appointments (look out 90 days)    Apr 26, 2022  1:00 PM  (Arrive by 12:45 PM)  Return Visit with Miguel Varela MD  [facility] [place] ([facility] [id number] [place] [id number] [phone]

## 2022-04-14 ENCOUNTER — MYC MEDICAL ADVICE (OUTPATIENT)
Dept: BEHAVIORAL HEALTH | Facility: CLINIC | Age: 62
End: 2022-04-14
Payer: COMMERCIAL

## 2022-04-18 NOTE — PATIENT INSTRUCTIONS
1.  Olanzapine 5 mg at bedtime  2.  Fluoxetine 40 mg daily  3.  Talk therapy when able to in order to learn ways to cope with life adjustments and stressors    Continue all other medications as reviewed per electronic medical record today.   Safety plan reviewed. To the Emergency Department as needed or call after hours crisis line at [phone] or [phone]. [place] Crisis Text Line. Text [place] to 159131 or Suicide LifeLine Chat: suicidePractice Ignition.org/chat/  To schedule individual or family therapy, call [facility] at [phone].  Schedule an appointment with me in 2 months or sooner as needed. Call [facility] at [phone] to schedule.  Follow up with primary care provider as planned or for acute medical concerns.  Call the psychiatric nurse line with medication questions or concerns at [phone].  NoteVaulthart may be used to communicate with your provider, but this is not intended to be used for emergencies.    Crisis Resources:    National Suicide Prevention Lifeline: 644.297.3062 (TTY: 358.412.6498). Call anytime for help.  (www.suicidepreventionlifeline.org)  National [place] on Mental Illness (www.ana maría.org): 502.583.1281 or 120-816-4395.   Mental Health Association (www.mentalhealth.org): 643.739.6830 or 446-545-1857.  [place] Crisis Text Line: Text [place] to 661809  Suicide LifeLine Chat: suicidePractice Ignition.org/chat

## 2022-04-22 DIAGNOSIS — I10 ESSENTIAL HYPERTENSION WITH GOAL BLOOD PRESSURE LESS THAN 140/90: ICD-10-CM

## 2022-04-24 NOTE — TELEPHONE ENCOUNTER
"Routing refill request to provider for review/approval because:  Labs out of range:  Cr.       Requested Prescriptions   Pending Prescriptions Disp Refills     lisinopril-hydrochlorothiazide (ZESTORETIC) 20-12.5 MG tablet 90 tablet 1     Sig: Take 1 tablet by mouth daily       Diuretics (Including Combos) Protocol Failed - 4/22/2022 11:37 AM        Failed - Normal serum creatinine on file in past 12 months     Recent Labs   Lab Test 01/03/22  1530   CR 1.35*              Passed - Blood pressure under 140/90 in past 12 months     BP Readings from Last 3 Encounters:   01/10/22 118/67   01/03/22 104/65   10/20/21 111/61                 Passed - Recent (12 mo) or future (30 days) visit within the authorizing provider's specialty     Patient has had an office visit with the authorizing provider or a provider within the authorizing providers department within the previous 12 mos or has a future within next 30 days. See \"Patient Info\" tab in inbasket, or \"Choose Columns\" in Meds & Orders section of the refill encounter.              Passed - Medication is active on med list        Passed - Patient is age 18 or older        Passed - Normal serum potassium on file in past 12 months     Recent Labs   Lab Test 01/03/22  1530   POTASSIUM 4.7                    Passed - Normal serum sodium on file in past 12 months     Recent Labs   Lab Test 01/03/22  1530                ACE Inhibitors (Including Combos) Protocol Failed - 4/22/2022 11:37 AM        Failed - Normal serum creatinine on file in past 12 months     Recent Labs   Lab Test 01/03/22  1530   CR 1.35*       Ok to refill medication if creatinine is low          Passed - Blood pressure under 140/90 in past 12 months     BP Readings from Last 3 Encounters:   01/10/22 118/67   01/03/22 104/65   10/20/21 111/61                 Passed - Recent (12 mo) or future (30 days) visit within the authorizing provider's specialty     Patient has had an office visit with the " "authorizing provider or a provider within the authorizing providers department within the previous 12 mos or has a future within next 30 days. See \"Patient Info\" tab in inbasket, or \"Choose Columns\" in Meds & Orders section of the refill encounter.              Passed - Medication is active on med list        Passed - Patient is age 18 or older        Passed - Normal serum potassium on file in past 12 months     Recent Labs   Lab Test 01/03/22  1530   POTASSIUM 4.7                Maranda Coppola RN    "

## 2022-04-25 RX ORDER — LISINOPRIL AND HYDROCHLOROTHIAZIDE 12.5; 2 MG/1; MG/1
1 TABLET ORAL DAILY
Qty: 90 TABLET | Refills: 4 | Status: SHIPPED | OUTPATIENT
Start: 2022-04-25 | End: 2022-12-05

## 2022-04-25 NOTE — PROGRESS NOTES
[facility] Collaborative Care Psychiatry Service     April 26, 2022      Behavioral Health Clinician Progress Note    Patient Name: Car Torres           Service Type:  Individual      Service Location:   MyChart / Email (patient reached)     Session Start Time: 226pm  Session End Time: 301pm      Session Length: 16 - 37      Attendees: Patient and Spouse / Significant Other     Service Modality:  Video Visit:      Provider verified identity through the following two step process.  Patient provided:  Patient photo and Patient is known previously to provider    Telemedicine Visit: The patient's condition can be safely assessed and treated via synchronous audio and visual telemedicine encounter.      Reason for Telemedicine Visit: Services only offered telehealth    Originating Site (Patient Location): Patient's home    Distant Site (Provider Location): Provider Remote Setting- Home Office    Consent:  The patient/guardian has verbally consented to: the potential risks and benefits of telemedicine (video visit) versus in person care; bill my insurance or make self-payment for services provided; and responsibility for payment of non-covered services.     Patient would like the video invitation sent by:  My Chart    Mode of Communication:  Video Conference via Amwell    As the provider I attest to compliance with applicable laws and regulations related to telemedicine.    Visit Activities (Refresh list every visit): [facility] Only    Diagnostic Assessment Date: 02/22/2022  Treatment Plan Review Date: 07/26/2022  See Flowsheets for today's PHQ-9 and SHAHNAZ-7 results  Previous PHQ-9:   PHQ-9 SCORE 8/30/2021 12/2/2021 2/21/2022   PHQ-9 Total Score MyChart - - 7 (Mild depression)   PHQ-9 Total Score 1 6 7     Previous SHAHNAZ-7:   SHAHNAZ-7 SCORE 5/28/2021 8/30/2021 12/2/2021   Total Score 0 0 0       ALMA LEVEL:  ALMA Score (Last Two) 5/4/2012   ALMA Raw Score 37   Activation Score 49.9   ALMA Level 2       DATA  Extended Session  (60+ minutes): No  Interactive Complexity: No  Crisis: No  [facility] Patient: No    Treatment Objective(s) Addressed in This Session:  Target Behavior(s): exercise, drinking water, being more social    Depressed Mood: Increase interest, engagement, and pleasure in doing things  Decrease frequency and intensity of feeling down, depressed, hopeless  Improve diet, appetite, mindful eating, and / or meal planning    Current Stressors / Issues:   update: Pt reports that things are going well. Talked about having coffee outside when the weather improves. Pt is still experiencing panic attacks and high anxiety. Pt's wife reports that they wanted to a more in [facility] physical. More neuropsych testing done in September with the [facility]. Pt's wife says that they went to [place] and stayed with a friend and relaxed. Watch Netflix at night together.  Pt is trying to do his treadmill everyday and has water on the table in the basement. Pt is feeling like the medication is working. Pt has lost track of days.   Stressors: Pt is waiting to hear from social security.   Side Effects: none  Appetite: unremarkable  Sleep: unremarkable, sleeping 12 hours a night   Substance Use: Pt denies   Caffeine: Pt denies   Interventions: Supportive therapy, sent list of different hobbies  Medication Questions/Requests: review side effects of meds- to be sure he isn't having them, none concerns         Progress on Treatment Objective(s) / Homework:  Satisfactory progress - ACTION (Actively working towards change); Intervened by reinforcing change plan / affirming steps taken    Motivational Interviewing    MI Intervention: Co-Developed Goal: reduce depression, Expressed Empathy/Understanding, Supported Autonomy, Collaboration, Evocation, Permission to raise concern or advise, Open-ended questions, Reflections: simple and complex, Change talk (evoked) and Reframe     Change Talk Expressed by the Patient: Reasons to change Need to change Taking  steps    Provider Response to Change Talk: E - Evoked more info from patient about behavior change, A - Affirmed patient's thoughts, decisions, or attempts at behavior change, R - Reflected patient's change talk and S - Summarized patient's change talk statements    Also provided psychoeducation about behavioral health condition, symptoms, and treatment options    Care Plan review completed: Yes    Medication Review:  No changes to current psychiatric medication(s)    Medication Compliance:  Yes    Changes in Health Issues:   None reported    Chemical Use Review:   Substance Use: Chemical use reviewed, no active concerns identified      Tobacco Use: No current tobacco use.      Assessment: Current Emotional / Mental Status (status of significant symptoms):  Risk status (Self / Other harm or suicidal ideation)  Patient denies a history of suicidal ideation, suicide attempts, self-injurious behavior, homicidal ideation, homicidal behavior and and other safety concerns  Patient denies current fears or concerns for personal safety.  Patient denies current or recent suicidal ideation or behaviors.  Patient denies current or recent homicidal ideation or behaviors.  Patient denies current or recent self injurious behavior or ideation.  Patient denies other safety concerns.  A safety and risk management plan has not been developed at this time, however patient was encouraged to call [place] [phone] should there be a change in any of these risk factors.    Appearance:   Appropriate   Eye Contact:   Good   Psychomotor Behavior: Normal   Attitude:   Cooperative   Orientation:   All  Speech   Rate / Production: Normal    Volume:  Normal   Mood:    Normal  Affect:    Appropriate   Thought Content:  Clear   Thought Form:  Coherent  Logical   Insight:    Good     Diagnoses:  1. Major depressive disorder, recurrent episode, mild with atypical features (H)    2. Panic disorder without agoraphobia        Collateral Reports  Completed:  Communicated with:   Commun[facility]   [facility]    Plan: (Homework, other):  Patient was given information about behavioral services and encouraged to schedule a follow up appointment with the clinic [facility] at the same time as [facility] provider Taniya.  He was also given information about mental health symptoms and treatment options .  CD Recommendations: No indications of CD issues.       ______________________________________________________________________    Integrated Primary Care Behavioral Health Treatment Plan    Patient's Name: Car Torres  YOB: 1960    Date of Creation: 04/26/2022  Date Treatment Plan Last Reviewed/Revised: 04/26/2022    DSM5 Diagnoses:   1. Major depressive disorder, recurrent episode, mild with atypical features (H)    2. Panic disorder without agoraphobia      Psychosocial / Contextual Factors: panic attacks, grand kids, wife, use to be more social  PROMIS (reviewed every 90 days):   PROMIS 10 Global Mental Health Score Global Physical Health Score   4/26/2022 14 12     PROMIS 10 PROMIS TOTAL - SUBSCORES   4/26/2022 26       Referral / Collaboration:  Referral to another professional/service is not indicated at this time.    Anticipated number of session for this episode of care: 4-6  Anticipation frequency of session: as determined by Taniya Estrada  Anticipated Duration of each session: 16-37 minutes  Treatment plan will be reviewed in 90 days or when goals have been changed.       MeasurableTreatment Goal(s) related to diagnosis / functional impairment(s)  Goal 1: Patient will reduce depressive symptoms.    I will know I've met my goal when I can try to look at it.      Objective #A (Patient Action)    Patient will find one activity to do every 3 months that peaks interest.    Status: New - Date: 04/27/2022     Intervention(s)  Therapist will provide support to explore activities pt might be interested in, through CBT,  MI, and Acceptance and Commitment Therapy.      Patient has reviewed and agreed to the above plan.      Shelby Arredondo, [facility]  April 26, 2022    Answers for HPI/ROS submitted by the patient on 4/26/2022  If you checked off any problems, how difficult have these problems made it for you to do your work, take care of things at home, or get along with other people?: Not difficult at all  PHQ9 TOTAL SCORE: 5

## 2022-04-26 ENCOUNTER — VIRTUAL VISIT (OUTPATIENT)
Dept: PSYCHIATRY | Facility: CLINIC | Age: 62
End: 2022-04-26
Payer: COMMERCIAL

## 2022-04-26 ENCOUNTER — OFFICE VISIT (OUTPATIENT)
Dept: NEUROLOGY | Facility: CLINIC | Age: 62
End: 2022-04-26
Payer: COMMERCIAL

## 2022-04-26 ENCOUNTER — VIRTUAL VISIT (OUTPATIENT)
Dept: BEHAVIORAL HEALTH | Facility: CLINIC | Age: 62
End: 2022-04-26
Payer: COMMERCIAL

## 2022-04-26 ENCOUNTER — TELEPHONE (OUTPATIENT)
Dept: BEHAVIORAL HEALTH | Facility: CLINIC | Age: 62
End: 2022-04-26

## 2022-04-26 VITALS
HEART RATE: 49 BPM | WEIGHT: 315 LBS | SYSTOLIC BLOOD PRESSURE: 135 MMHG | BODY MASS INDEX: 47.38 KG/M2 | DIASTOLIC BLOOD PRESSURE: 67 MMHG

## 2022-04-26 DIAGNOSIS — F33.0 MAJOR DEPRESSIVE DISORDER, RECURRENT EPISODE, MILD WITH ATYPICAL FEATURES (H): Primary | ICD-10-CM

## 2022-04-26 DIAGNOSIS — F03.91 DEMENTIA WITH BEHAVIORAL DISTURBANCE, UNSPECIFIED DEMENTIA TYPE: Primary | ICD-10-CM

## 2022-04-26 DIAGNOSIS — R26.89 IMBALANCE: ICD-10-CM

## 2022-04-26 DIAGNOSIS — F33.0 MAJOR DEPRESSIVE DISORDER, RECURRENT EPISODE, MILD WITH ATYPICAL FEATURES (H): ICD-10-CM

## 2022-04-26 DIAGNOSIS — F41.0 PANIC DISORDER WITHOUT AGORAPHOBIA: ICD-10-CM

## 2022-04-26 PROCEDURE — 90832 PSYTX W PT 30 MINUTES: CPT | Mod: 95 | Performed by: COUNSELOR

## 2022-04-26 PROCEDURE — 99214 OFFICE O/P EST MOD 30 MIN: CPT | Mod: 95 | Performed by: NURSE PRACTITIONER

## 2022-04-26 PROCEDURE — 99214 OFFICE O/P EST MOD 30 MIN: CPT | Performed by: INTERNAL MEDICINE

## 2022-04-26 RX ORDER — OLANZAPINE 5 MG/1
5 TABLET ORAL AT BEDTIME
Qty: 90 TABLET | Refills: 1 | Status: SHIPPED | OUTPATIENT
Start: 2022-04-26 | End: 2022-06-29

## 2022-04-26 RX ORDER — FLUOXETINE 40 MG/1
40 CAPSULE ORAL DAILY
Qty: 90 CAPSULE | Refills: 1 | Status: SHIPPED | OUTPATIENT
Start: 2022-04-26 | End: 2022-06-29

## 2022-04-26 ASSESSMENT — PATIENT HEALTH QUESTIONNAIRE - PHQ9
10. IF YOU CHECKED OFF ANY PROBLEMS, HOW DIFFICULT HAVE THESE PROBLEMS MADE IT FOR YOU TO DO YOUR WORK, TAKE CARE OF THINGS AT HOME, OR GET ALONG WITH OTHER PEOPLE: NOT DIFFICULT AT ALL
SUM OF ALL RESPONSES TO PHQ QUESTIONS 1-9: 5
SUM OF ALL RESPONSES TO PHQ QUESTIONS 1-9: 5

## 2022-04-26 NOTE — LETTER
4/26/2022         RE: Car Torres  [address]  [place] [address]        Dear Colleague,    Thank you for referring your patient, Car Torres, to the [facility] [place]. Please see a copy of my visit note below.    [facility] Neurology Follow Up Visit    Car Torres MRN# [id number]   Age: 60 year old YOB: 1960     Brief history of symptoms: The patient was initially seen in neurologic consultation on 12/23/2020 for evaluation of cognitive changes. Please see the comprehensive neurologic consultation note from that date in the Epic records for details.     Interval history:   There has been slight worse in memory since last visit. They went on vacation and patient was unable to reminder previous days activities on several occasions. He is showering less and will forget that it has been a long time since showering. On one occasion he forgot the month. He reports his mood has been ok. He denies any hallucinations or paranoia and wife hasn't noticed significant things as well. He has a continued fixation on watches/clocks, which is new in the last few years. He watches a lot of TV.    His walking has slowed down a lot. He tries to walk on the treadmill slowly. Wife has to watch him to make sure he is safe with this.       Past Medical History:     Patient Active Problem List   Diagnosis     Sciatica     Morbid obesity (H)     Hyperlipidemia LDL goal <130     Hypertension goal BP (blood pressure) < 140/90     CKD (chronic kidney disease) stage 2, GFR 60-89 ml/min     Renal cyst     Diverticulosis of large intestine without hemorrhage     Pulmonary nodule     Coronary artery calcification     History of sinus bradycardia     ED (erectile dysfunction)     TIA (obstructive sleep apnea)     Major depressive disorder, recurrent episode, mild with atypical features (H)     Dementia associated with other underlying disease with behavioral disturbance (H)     Ulcer  of left foot, unspecified ulcer stage (H)     Unspecified psychosis not due to a substance or known physiological condition (H)     Past Medical History:   Diagnosis Date     Dementia (H)      Depressive disorder      Heart disease 2018     Hypercholesterolemia      Hypertension goal BP (blood pressure) < 140/90      Nonspecific abnormal electrocardiogram (ECG) (EKG) 2007    Stress testing normal.      Sleep apnea     uses a c-pap, severe        Past Surgical History:     Past Surgical History:   Procedure Laterality Date     COLONOSCOPY N/A 2020    Procedure: COLONOSCOPY, WITH POLYPECTOMY, CLIP;  Surgeon: Mihir Lang MD;  Location: [facility] OR     COLONOSCOPY N/A 1/10/2022    Procedure: COLONOSCOPY, WITH POLYPECTOMY AND BIOPSY;  Surgeon: Mihir Lang MD;  Location:  GI     ESOPHAGOSCOPY, GASTROSCOPY, DUODENOSCOPY (EGD), COMBINED N/A 2021    Procedure: ESOPHAGOGASTRODUODENOSCOPY, WITH BIOPSY;  Surgeon: Mihir Lang MD;  Location: [facility] OR     SURGICAL HISTORY OF -       surgery on left index finger        Social History:     Social History     Tobacco Use     Smoking status: Former Smoker     Packs/day: 1.00     Years: 25.00     Pack years: 25.00     Types: Cigarettes, Cigars     Start date: 1973     Quit date: 1998     Years since quittin.2     Smokeless tobacco: Never Used     Tobacco comment: N/A not a smoker   Vaping Use     Vaping Use: Never used   Substance Use Topics     Alcohol use: Yes     Alcohol/week: 10.0 standard drinks     Comment: less than 6 per week     Drug use: Not Currently     Types: Amphetamines     Comment: Kratum        Family History:     Family History   Problem Relation Age of Onset     Cancer Mother         uterine     Other Cancer Mother         endometrial     Cerebrovascular Disease Mother      Substance Abuse Mother         Alcohol     C.A.D. Father      Hypertension Father      Hyperlipidemia Father      Breast Cancer Maternal Grandmother       Other Cancer Maternal Grandmother         Lung/brain     Substance Abuse Paternal Grandfather      Hypertension Brother      Substance Abuse Brother         Alcohol     Breast Cancer Other      Breast Cancer Cousin      Other Cancer Other      Cerebrovascular Disease Other         Maternal Aunt     Glaucoma No family hx of      Macular Degeneration No family hx of         Medications:     Current Outpatient Medications   Medication Sig     ASPIRIN 325 MG OR TBEC ONE DAILY     atorvastatin (LIPITOR) 40 MG tablet TAKE 1 TABLET BY MOUTH EVERY DAY     Cholecalciferol (VITAMIN D PO) Take 5,000 Units by mouth daily One tablet twice daily     donepezil (ARICEPT) 10 MG tablet Take 1 tablet (10 mg) by mouth daily     Ferrous Gluconate 324 (37.5 Fe) MG TABS 1 tablet daily     Ferrous Sulfate 324 (65 Fe) MG TBEC      FLUoxetine (PROZAC) 40 MG capsule Take 1 capsule (40 mg) by mouth daily     lisinopril-hydrochlorothiazide (ZESTORETIC) 20-12.5 MG tablet Take 1 tablet by mouth daily     MULTI VITAMIN MENS PO None Entered     OLANZapine (ZYPREXA) 5 MG tablet Take 1 tablet (5 mg) by mouth At Bedtime     omega 3 1000 MG CAPS Take by mouth daily     tadalafil (CIALIS) 20 MG tablet TAKE 1/2 TO 1 TABLET BY MOUTH 30 MINUTES TO 1 HOUR BEFORE SEX     TURMERIC PO Take by mouth daily     vitamin B complex with vitamin C (VITAMIN  B COMPLEX) TABS tablet Take 1 tablet by mouth daily     No current facility-administered medications for this visit.        Allergies:   No Known Allergies     Review of Systems:   As above     Physical Exam:   Vitals: /67 (BP Location: Right arm, Patient Position: Sitting, Cuff Size: Adult Large)   Pulse (!) 49   Wt (!) 154.1 kg (339 lb 11.2 oz)   BMI 47.38 kg/m     General: Seated comfortably in no acute distress.  Lungs: breathing comfortably  Neurologic:     Mental Status: Alert. Difficulties with serial 7s past initial calculation. Oriented to person and place. Knows it's April and the year, but not  the specific date. Doesn't know what country [place] invaded. Able to name a cactus and where wool comes from. 3/5 on delayed recall. Clock drawing and cube copy are intact.      Cranial Nerves:  No dysarthria.      Motor: No tremors or other abnormal movements observed. 5/5 strength in all extremities. Normal muscle tone. Normal/symmetric rapid finger tapping.      Sensory: Intact/symmetric to light touch throughout upper and lower extremities. Negative Romberg.     Coordination: Finger-nose-finger and heel-shin intact without dysmetria.      Gait: Slowed, but otherwise steady casual gait. Able to walk on toes, heels and tandem with moderate difficulties.     Data reviewed on previous visits    Imaging:  MRI brain 10/16/2020  IMPRESSION:  1. Exam mildly limited by motion artifact.  2. Few minimal nonspecific white matter lesions.  3. No evidence for intracranial hemorrhage, acute infarct, or any  focal mass lesions.     Laboratory:  TSH, CMP wnl 8/2020  B12, CBC wnl 12/2020    PET brain 4/2021  IMPRESSION:  No significant metabolic deficits when compared to  control database. This may be due to the possible underlying dementia  being too early to characterize, versus the patient's symptomatology  being due to some other process than dementia.    Pertinent Investigations since last visit:   None         Assessment and Plan:   Assessment:  Patient is a 59 y/o male who presents for follow-up of dementia. Patient initially presented with symptoms of paranoia, hallucinations, delusions, and cognitive changes, which slowly progressed starting spring 2020. He had no prior history of psychiatric disease. Patient was unable to safely perform the duties of his job and retired in 2021. Patient had neuropsychological testing with Dr Adam in 3/2021 which was concerning for significant dysfunction in frontal and subcortical networks, and in the clinical context, concerning for a neurodegenerative process causing mild  dementia. PET brain was completed without any revealing abnormalities. In terms of patient's specific diagnosis frontotemporal dementia is the leading consideration. Lewy body dementia can present with psychosis but patient does not have other clear features of the disease. There is some prior history suggestive of possible REM sleep behavioral disorder, but this is not a current issue. An atypical presentation of Alzheimer's disease is also possible. .    Since last follow-up there has been mild worsening of memory. Psychiatric symptoms appear stable. Repeat neuropsychology testing with Dr Adam was ordered today to look for interval changes and help clarify diagnosis. PT referral was placed today due to imbalance.     Plan:  - Continue Donepezil 10 mg nightly   - Continue to follow-up with psychiatrist - currently on Zyprexa 7.5 mg nightly and Prosac 40 mg daily  - Repeat neuropsychology testing  - PT referral    Follow up in Neurology clinic after neuropsychology testing    Miguel Varela MD   of Neurology  [facility]        Again, thank you for allowing me to participate in the care of your patient.        Sincerely,        Miguel Varela MD

## 2022-04-26 NOTE — PROGRESS NOTES
"Jerry is a 61 year old who is being evaluated via a billable video visit.      How would you like to obtain your AVS? MyChart  If the video visit is dropped, the invitation should be resent by: Text to cell phone: [phone]  Will anyone else be joining your video visit? Wife      Video Start Time: 3:21 PM  Video-Visit Details    Type of service:  Video Visit    Video End Time:3:50 PM    Originating Location (pt. Location): Other car    Distant Location (provider location):  [facility]     Platform used for Video Visit: English Helper     Yudy Leyva VF               Outpatient Psychiatric Progress Note    Name: Car Torres   : 1960                    Primary Care Provider: Mihir Perez MD   Therapist: None    PHQ-9 scores:  PHQ-9 SCORE 2021   PHQ-9 Total Score MyChart - 7 (Mild depression) 5 (Mild depression)   PHQ-9 Total Score 6 7 5       SHAHNAZ-7 scores:  SHAHNAZ-7 SCORE 2021   Total Score 0 0 0       Patient Identification:    Patient is a 61 year old year old,   White Not  or  male  who presents for return visit with me.  Patient is currently unemployed and disabled. Patient attended the session with His wife Destiny , who they agreed to have interview with. Patient prefers to be called: \" Jerry\".    Current medications include: ASPIRIN 325 MG OR TBEC, ONE DAILY  atorvastatin (LIPITOR) 40 MG tablet, TAKE 1 TABLET BY MOUTH EVERY DAY  Cholecalciferol (VITAMIN D PO), Take 5,000 Units by mouth daily One tablet twice daily  donepezil (ARICEPT) 10 MG tablet, Take 1 tablet (10 mg) by mouth daily  Ferrous Gluconate 324 (37.5 Fe) MG TABS, 1 tablet daily  Ferrous Sulfate 324 (65 Fe) MG TBEC,   FLUoxetine (PROZAC) 40 MG capsule, Take 1 capsule (40 mg) by mouth daily  lisinopril-hydrochlorothiazide (ZESTORETIC) 20-12.5 MG tablet, Take 1 tablet by mouth daily  MULTI VITAMIN MENS PO, None " Entered  OLANZapine (ZYPREXA) 5 MG tablet, Take 1 tablet (5 mg) by mouth At Bedtime  omega 3 1000 MG CAPS, Take by mouth daily  tadalafil (CIALIS) 20 MG tablet, TAKE 1/2 TO 1 TABLET BY MOUTH 30 MINUTES TO 1 HOUR BEFORE SEX  TURMERIC PO, Take by mouth daily  vitamin B complex with vitamin C (STRESS TAB) tablet, Take 1 tablet by mouth daily    No current facility-administered medications on file prior to visit.       The [place] Prescription Monitoring Program has been reviewed and there are no concerns about diversionary activity for controlled substances at this time.      I was able to review most recent Primary Care Provider, specialty provider, and therapy visit notes that I have access to.     Today, patient reports that his neuropsychological testing is not scheduled until September.  Destiny feels there has been some memory loss and difficulties with keeping track of time.  Jerry does not seem to notice these things.  He denies having any continued paranoia or suspicion about cameras in his house.  He reports no sleep disturbance.  Jerry is taking his medications as prescribed with the help of his wife.  He continues to wait for the outcome of his disability application.       has a past medical history of Dementia (H), Depressive disorder, Heart disease (5/8/2018), Hypercholesterolemia, Hypertension goal BP (blood pressure) < 140/90, Nonspecific abnormal electrocardiogram (ECG) (EKG) (08/23/2007), and Sleep apnea.    He has no past medical history of Arthritis, Cancer (H), Cerebral infarction (H), Congestive heart failure (H), Congestive heart failure, unspecified, COPD (chronic obstructive pulmonary disease) (H), Diabetes (H), History of blood transfusion, Thyroid disease, or Uncomplicated asthma.    Social history updates:    Dog lives with his wife Destiny.  He is not working at this point and they do have financial stress.    Substance use updates:    No alcohol use reported  Tobacco use: No    Vital Signs:    There were no vitals taken for this visit.    Labs:    Most recent laboratory results reviewed and no new labs.     Mental Status Examination:  Appearance: awake, alert and casual dress  Attitude: cooperative  Eye Contact:  adequate  Gait and Station: No assistive Devices used and No dizziness or falls  Psychomotor Behavior:  no evidence of tardive dyskinesia, dystonia, or tics and intact station, gait and muscle tone  Oriented to:  time, person, and place  Attention Span and Concentration:  Normal  Speech:   clear, coherent, paucity of speech and Speaks: English  Mood:  anxious and depressed  Affect:  mood congruent  Associations:  no loose associations  Thought Process:  linear  Thought Content:  no evidence of suicidal ideation or homicidal ideation, no auditory hallucinations present and no visual hallucinations present  Recent and Remote Memory:  intact Not formally assessed. No amnesia.  Fund of Knowledge: appropriate  Insight:  good  Judgment:  intact  Impulse Control:  intact    Suicide Risk Assessment:  Today Car Torres reports no thoughts to want to end his life or to harm other people. In addition, there are notable risk factors for self-harm, including age, anxiety and mood change. However, risk is mitigated by commitment to family, history of seeking help when needed, future oriented, denies suicidal intent or plan and denies homicidal ideation, intent, or plan. Therefore, based on all available evidence including the factors cited above, Car Torres does not appear to be at imminent risk for self-harm, does not meet criteria for a 72-hr hold, and therefore remains appropriate for ongoing outpatient level of care.  A thorough assessment of risk factors related to suicide and self-harm have been reviewed and are noted above. The patient convincingly denies suicidality on several occasions. Local community safety resources printed and reviewed for patient to use if needed. There was no  deceit detected, and the patient presented in a manner that was believable.     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 atypical features  300.02 (F41.1) Generalized Anxiety Disorder    Medical comorbidities include:   Patient Active Problem List    Diagnosis Date Noted     Hypertension goal BP (blood pressure) < 140/90 10/20/2010     Priority: High     Hyperlipidemia LDL goal <130 06/11/2010     Priority: High     Dementia associated with other underlying disease with behavioral disturbance (H) 01/03/2022     Priority: Medium     Ulcer of left foot, unspecified ulcer stage (H) 01/03/2022     Priority: Medium     Unspecified psychosis not due to a substance or known physiological condition (H) 01/03/2022     Priority: Medium     Major depressive disorder, recurrent episode, mild with atypical features (H) 12/06/2021     Priority: Medium     TIA (obstructive sleep apnea) 07/19/2018     Priority: Medium     Home Sleep Apnea Testing - 7/18/18: 295 lbs 0 oz: AHI 45.3/hr. Supine AHI 61.9/hr. Oxygen [place] of 66%.  Baseline 93%.  Sp02 =< 88% for 47 minutes.       History of sinus bradycardia 06/07/2018     Priority: Medium     ED (erectile dysfunction) 06/07/2018     Priority: Medium     Coronary artery calcification 05/08/2018     Priority: Medium     Per CT CHEST WITHOUT CONTRAST April 23, 2018. CT calcium score planned.        Renal cyst 11/18/2015     Priority: Medium     Simple, non enhanced, 3.5 cm on right kidney, Bosniak 1 - x 2 stable findings       Diverticulosis of large intestine without hemorrhage 11/18/2015     Priority: Medium     Incidental finding on CT scan        Pulmonary nodule 11/18/2015     Priority: Medium     Incidental finding on CT scan, right posterior lung - considered benign / stable        CKD (chronic kidney disease) stage 2, GFR 60-89 ml/min 10/21/2010     Priority: Medium     60 to 80 - have discussed Lisinopril - will consider         Sciatica 02/27/2006     Priority: Medium     Morbid obesity (H) 02/27/2006     Priority: Medium       Assessment:    Car Torres reported some ongoing anxiety related to memory loss.  Sometimes he gets confused and his wife gets concerned.  He is being prescribed Aricept by another provider..  He will take buspirone 5 mg twice daily to decrease anxiety which hopefully will decrease his confusion he experiences at times.  There is financial worry related to awaiting outcome of disability hearing.  Fluoxetine and olanzapine will continue at their prescribed doses.    Medication side effects and alternatives were reviewed. Health promotion activities recommended and reviewed today. All questions addressed. Education and counseling completed regarding risks and benefits of medications and psychotherapy options.    Treatment Plan:        1.  Buspirone 5 mg twice daily    2.  Fluoxetine 40 mg daily    3.  Olanzapine 5 mg at bedtime    4.  Talk therapy when able to for processing and coping with life adjustments and stressors        Continue all other medications as reviewed per electronic medical record today.     Safety plan reviewed. To the Emergency Department as needed or call after hours crisis line at [phone] or [phone]. [place] Crisis Text Line. Text [place] to 069011 or Suicide LifeLine Chat: suicidepreventionlifeline.org/chat/    To schedule individual or family therapy, call [facility] at [phone]    Schedule an appointment with me in 6 weeks or sooner as needed. Call [facility] at [phone] to schedule.    Follow up with primary care provider as planned or for acute medical concerns.    Call the psychiatric nurse line with medication questions or concerns at [phone]    MyChart may be used to communicate with your provider, but this is not intended to be used for emergencies.    Crisis Resources:    National Suicide Prevention Lifeline: 438.989.3131  (TTY: 714.366.2104). Call anytime for help.  (www.suicidepreventionlifeline.org)  National [place] on Mental Illness (www.ana maría.org): 903.424.7761 or 573-361-8503.   Mental Health Association (www.mentalhealth.org): 348.205.4101 or 023-000-4574.  [place] Crisis Text Line: Text [place] to 426835  Suicide LifeLine Chat: suicideValon Lasersline.org/chat    Administrative Billing:   Time spent with patient includes counseling and coordination of care regarding above diagnoses and treatment plan.    Patient Status:  Patient will continue to be seen for ongoing consultation and stabilization.    Signed:   BELEN Singh-BC   Psychiatry

## 2022-04-26 NOTE — TELEPHONE ENCOUNTER
Writer left a vm for patient to call intake back to complete  VERBAL  General Consent or log into [employer] to complete Consent with appointment at pre-check in.

## 2022-04-26 NOTE — PROGRESS NOTES
[facility] Neurology Follow Up Visit    Car Torres MRN# [id number]   Age: 60 year old YOB: 1960     Brief history of symptoms: The patient was initially seen in neurologic consultation on 12/23/2020 for evaluation of cognitive changes. Please see the comprehensive neurologic consultation note from that date in the Epic records for details.     Interval history:   There has been slight worse in memory since last visit. They went on vacation and patient was unable to reminder previous days activities on several occasions. He is showering less and will forget that it has been a long time since showering. On one occasion he forgot the month. He reports his mood has been ok. He denies any hallucinations or paranoia and wife hasn't noticed significant things as well. He has a continued fixation on watches/clocks, which is new in the last few years. He watches a lot of TV.    His walking has slowed down a lot. He tries to walk on the treadmill slowly. Wife has to watch him to make sure he is safe with this.       Past Medical History:     Patient Active Problem List   Diagnosis     Sciatica     Morbid obesity (H)     Hyperlipidemia LDL goal <130     Hypertension goal BP (blood pressure) < 140/90     CKD (chronic kidney disease) stage 2, GFR 60-89 ml/min     Renal cyst     Diverticulosis of large intestine without hemorrhage     Pulmonary nodule     Coronary artery calcification     History of sinus bradycardia     ED (erectile dysfunction)     TIA (obstructive sleep apnea)     Major depressive disorder, recurrent episode, mild with atypical features (H)     Dementia associated with other underlying disease with behavioral disturbance (H)     Ulcer of left foot, unspecified ulcer stage (H)     Unspecified psychosis not due to a substance or known physiological condition (H)     Past Medical History:   Diagnosis Date     Dementia (H)      Depressive disorder      Heart disease 5/8/2018     Hypercholesterolemia       Hypertension goal BP (blood pressure) < 140/90      Nonspecific abnormal electrocardiogram (ECG) (EKG) 2007    Stress testing normal.      Sleep apnea     uses a c-pap, severe        Past Surgical History:     Past Surgical History:   Procedure Laterality Date     COLONOSCOPY N/A 2020    Procedure: COLONOSCOPY, WITH POLYPECTOMY, CLIP;  Surgeon: Mihir Lang MD;  Location: [facility] OR     COLONOSCOPY N/A 1/10/2022    Procedure: COLONOSCOPY, WITH POLYPECTOMY AND BIOPSY;  Surgeon: Mihir Lang MD;  Location:  GI     ESOPHAGOSCOPY, GASTROSCOPY, DUODENOSCOPY (EGD), COMBINED N/A 2021    Procedure: ESOPHAGOGASTRODUODENOSCOPY, WITH BIOPSY;  Surgeon: Mihir Lang MD;  [place] [facility] [place]     SURGICAL HISTORY OF -       surgery on left index finger        Social History:     Social History     Tobacco Use     Smoking status: Former Smoker     Packs/day: 1.00     Years: 25.00     Pack years: 25.00     Types: Cigarettes, Cigars     Start date: 1973     Quit date: 1998     Years since quittin.2     Smokeless tobacco: Never Used     Tobacco comment: N/A not a smoker   Vaping Use     Vaping Use: Never used   Substance Use Topics     Alcohol use: Yes     Alcohol/week: 10.0 standard drinks     Comment: less than 6 per week     Drug use: Not Currently     Types: Amphetamines     Comment: Viktoriya        Family History:     Family History   Problem Relation Age of Onset     Cancer Mother         uterine     Other Cancer Mother         endometrial     Cerebrovascular Disease Mother      Substance Abuse Mother         Alcohol     C.A.D. Father      Hypertension Father      Hyperlipidemia Father      Breast Cancer Maternal Grandmother      Other Cancer Maternal Grandmother         Lung/brain     Substance Abuse Paternal Grandfather      Hypertension Brother      Substance Abuse Brother         Alcohol     Breast Cancer Other      Breast Cancer Cousin      Other Cancer Other      Cerebrovascular Disease  Other         Maternal Aunt     Glaucoma No family hx of      Macular Degeneration No family hx of         Medications:     Current Outpatient Medications   Medication Sig     ASPIRIN 325 MG OR TBEC ONE DAILY     atorvastatin (LIPITOR) 40 MG tablet TAKE 1 TABLET BY MOUTH EVERY DAY     Cholecalciferol (VITAMIN D PO) Take 5,000 Units by mouth daily One tablet twice daily     donepezil (ARICEPT) 10 MG tablet Take 1 tablet (10 mg) by mouth daily     Ferrous Gluconate 324 (37.5 Fe) MG TABS 1 tablet daily     Ferrous Sulfate 324 (65 Fe) MG TBEC      FLUoxetine (PROZAC) 40 MG capsule Take 1 capsule (40 mg) by mouth daily     lisinopril-hydrochlorothiazide (ZESTORETIC) 20-12.5 MG tablet Take 1 tablet by mouth daily     MULTI VITAMIN MENS PO None Entered     OLANZapine (ZYPREXA) 5 MG tablet Take 1 tablet (5 mg) by mouth At Bedtime     omega 3 1000 MG CAPS Take by mouth daily     tadalafil (CIALIS) 20 MG tablet TAKE 1/2 TO 1 TABLET BY MOUTH 30 MINUTES TO 1 HOUR BEFORE SEX     TURMERIC PO Take by mouth daily     vitamin B complex with vitamin C (VITAMIN  B COMPLEX) TABS tablet Take 1 tablet by mouth daily     No current facility-administered medications for this visit.        Allergies:   No Known Allergies     Review of Systems:   As above     Physical Exam:   Vitals: /67 (BP Location: Right arm, Patient Position: Sitting, Cuff Size: Adult Large)   Pulse (!) 49   Wt (!) 154.1 kg (339 lb 11.2 oz)   BMI 47.38 kg/m     General: Seated comfortably in no acute distress.  Lungs: breathing comfortably  Neurologic:     Mental Status: Alert. Difficulties with serial 7s past initial calculation. Oriented to person and place. Knows it's April and the year, but not the specific date. Doesn't know what country [place]. Able to name a cactus and where wool comes from. 3/5 on delayed recall. Clock drawing and cube copy are intact.      Cranial Nerves:  No dysarthria.      Motor: No tremors or other abnormal movements  observed. 5/5 strength in all extremities. Normal muscle tone. Normal/symmetric rapid finger tapping.      Sensory: Intact/symmetric to light touch throughout upper and lower extremities. Negative Romberg.     Coordination: Finger-nose-finger and heel-shin intact without dysmetria.      Gait: Slowed, but otherwise steady casual gait. Able to walk on toes, heels and tandem with moderate difficulties.     Data reviewed on previous visits    Imaging:  MRI brain 10/16/2020  IMPRESSION:  1. Exam mildly limited by motion artifact.  2. Few minimal nonspecific white matter lesions.  3. No evidence for intracranial hemorrhage, acute infarct, or any  focal mass lesions.     Laboratory:  TSH, CMP wnl 8/2020  B12, CBC wnl 12/2020    PET brain 4/2021  IMPRESSION:  No significant metabolic deficits when compared to  control database. This may be due to the possible underlying dementia  being too early to characterize, versus the patient's symptomatology  being due to some other process than dementia.    Pertinent Investigations since last visit:   None         Assessment and Plan:   Assessment:  Patient is a 59 y/o male who presents for follow-up of dementia. Patient initially presented with symptoms of paranoia, hallucinations, delusions, and cognitive changes, which slowly progressed starting spring 2020. He had no prior history of psychiatric disease. Patient was unable to safely perform the duties of his job and retired in 2021. Patient had neuropsychological testing with Dr Adam in 3/2021 which was concerning for significant dysfunction in frontal and subcortical networks, and in the clinical context, concerning for a neurodegenerative process causing mild dementia. PET brain was completed without any revealing abnormalities. In terms of patient's specific diagnosis frontotemporal dementia is the leading consideration. Lewy body dementia can present with psychosis but patient does not have other clear features of the  disease. There is some prior history suggestive of possible REM sleep behavioral disorder, but this is not a current issue. An atypical presentation of Alzheimer's disease is also possible. .    Since last follow-up there has been mild worsening of memory. Psychiatric symptoms appear stable. Repeat neuropsychology testing with Dr Adam was ordered today to look for interval changes and help clarify diagnosis. PT referral was placed today due to imbalance.     Plan:  - Continue Donepezil 10 mg nightly   - Continue to follow-up with psychiatrist - currently on Zyprexa 7.5 mg nightly and Prosac 40 mg daily  - Repeat neuropsychology testing  - PT referral    Follow up in Neurology clinic after neuropsychology testing    Miguel Varela MD   of Neurology  [facility]

## 2022-04-27 ASSESSMENT — PATIENT HEALTH QUESTIONNAIRE - PHQ9: SUM OF ALL RESPONSES TO PHQ QUESTIONS 1-9: 5

## 2022-05-12 ENCOUNTER — VIRTUAL VISIT (OUTPATIENT)
Dept: BEHAVIORAL HEALTH | Facility: CLINIC | Age: 62
End: 2022-05-12
Payer: COMMERCIAL

## 2022-05-12 DIAGNOSIS — R69 DIAGNOSIS DEFERRED: Primary | ICD-10-CM

## 2022-05-12 NOTE — PROGRESS NOTES
Behavioral Health Home Services  [facility]: [place]        Social Work Care Navigator Note      Patient: Car Torres  Date: May 12, 2022  Preferred Name: Jerry    Previous PHQ-9:   PHQ-9 SCORE 12/2/2021 2/21/2022 4/26/2022   PHQ-9 Total Score MyChart - 7 (Mild depression) 5 (Mild depression)   PHQ-9 Total Score 6 7 5     Previous SHAHNAZ-7:   SHAHNAZ-7 SCORE 5/28/2021 8/30/2021 12/2/2021   Total Score 0 0 0     ALMA LEVEL:  ALMA Score (Last Two) 5/4/2012   ALMA Raw Score 37   Activation Score 49.9   ALMA Level 2       Preferred Contact:  Need for : No  Preferred Contact: Cell      Type of Contact Today: Phone call (patient / identified key support person reached)      Data: (subjective / Objective):  Recent ED/IP Admission or Discharge?   None    Patient Goals:  Goal Areas: Health; Mental Health; Employment / Volunteer; Financial and Social Service Benefits  Patient stated goals: Jerry would like assistance with his physical and mental health for creating a balanced and healthy lifestyle. Jerry would like assistance/support with SSDI, budgeting and social service assistance to maintain/increase his financial stability.    [facility] Core Service Provided:  Comprehensive Care Management: utilized the electronic medical record / patient registry to identify and support patient's health conditions / needs more effectively   Care Transitions: focused on the coordinated and seamless movement of patient between or within different levels of care or settings  Care Coordination: provided care management services/referrals necessary to ensure patient and their identified supports have access to medical, behavioral health, pharmacology and recovery support services.  Ensured that patient's care is integrated across all settings and services.   Individual and Family Support: aimed to help clients reduce barriers to achieving goals, increase health literacy and knowledge about chronic condition(s), increase self-efficacy skills,  and improve health outcomes    Current Stressors / Issues / Care Plan Objective Addressed Today:  [facility] and patient were able to meet today for Behavioral Health Home ([facility]) monthly check-in via telehealth visit. All required ROIs have been filed with HIM/patient chart.    1. Patient reports he's doing pretty good today and his mood has been fine in the last couple weeks. Patient's wife reported that patient does get anxious and agitated easily, but he doesn't tell anyone that he's feeling this way. They reported they will follow up with Taniya because he's currently only taking depression medication at this time. Patient's wife will send a message.     2. Patient reported that they just turned in the [employer] disability questionnaire yesterday. They will wait to hear about the next steps.     3. Patient's wife just spoke with [facility] today about social security and they want to set up an appointment with the neurologist and their provider to talk about the eval. She messaged the neurologist to see what the best contact is for them, and he normally is able to get back to them quick. CC offered to assist with anything needed, and they will let CC know if they need help coordinating.     4. Patient reports that its been getting closer to 75% for his nutrition compliance. CC talked with them about troubles with getting healthy meals and being able to afford the foods he's needing to eat. Patient's wife reported they do the best they can, and they think it could be best but also could be worse. They often are putting together meals and trying to use up what's in their freezer, as well as incorporating fruits/veggies as much as possible. [facility] asked about food shelves and patient's wife again reports she doesn't feel comfortable using those yet because she doesn't feel they are as in need as others. CC suggested calling a local shelf to see how much excess they have to determine whether it would go to waste if no one  picked it up. [facility] asked about SNAP and they reported they thought they qualified, but never received it. [facility] gave them the [facility] SNAP phone number, [phone].      [facility] also told patient about the foodrx program that [facility] is learning about through [place]. [facility], patient, and patient's wife decided to complete the survey for the program and [facility] thinks they will reach out to them for the program.     5. Patient will be starting physical therapy soon to work on balance issues. [facility] will follow up at future appointments.      Intervention:  Motivational Interviewing: Expressed Empathy/Understanding, Supported Autonomy, Collaboration, Evocation, Permission to raise concern or advise, Open-ended questions and Reflections: simple and complex   Target Behavior(s): Explored thoughts about taking an anti-depressant, Explored and resolved challenges related to taking anti-depressants as prescribed, Explored thoughts and readiness to participate in individual therapy, Explored and resolved challenges to attending appointments as scheduled, Explored current social supports and reinforced opportunities to increase engagement and Explored patient's current diet and knowledge of influence on mood    Assessment: (Progress on Goals / Homework):  Patient would benefit from continued coordination in reaching their goals set for the Behavioral Health Home ([facility]) program. [facility] reviewed Health Action Plan goals and will continue to monitor progress and work with patient and their care team.    Plan: (Homework, other):  Patient was encouraged to continue to seek condition-related information and education.      Scheduled a Phone follow up appointment with [facility] in 4 weeks     Patient has set self-identified goals and will monitor progress until the next appointment on: 6/9/22.      OLGA Montero  Behavioral Health Home ([facility])   [facility]  [phone]

## 2022-05-16 DIAGNOSIS — F03.91 DEMENTIA WITH BEHAVIORAL DISTURBANCE, UNSPECIFIED DEMENTIA TYPE: ICD-10-CM

## 2022-05-16 RX ORDER — DONEPEZIL HYDROCHLORIDE 10 MG/1
10 TABLET, FILM COATED ORAL DAILY
Qty: 90 TABLET | Refills: 2 | Status: SHIPPED | OUTPATIENT
Start: 2022-05-16 | End: 2022-12-05

## 2022-05-16 NOTE — TELEPHONE ENCOUNTER
Writer received a refill request from: [facility] in [place].     Pending Prescriptions:                       Disp   Refills    donepezil (ARICEPT) 10 MG tablet          90 tab*2            Sig: Take 1 tablet (10 mg) by mouth daily            Pt's last office visit: 4/26/22  Next scheduled office visit: 9/20/22      Per the RN/LPN medication refill protocol, writer is unable to refill this request.

## 2022-06-01 ENCOUNTER — HOSPITAL ENCOUNTER (OUTPATIENT)
Dept: NUTRITION | Facility: CLINIC | Age: 62
Discharge: HOME | End: 2022-06-01
Attending: INTERNAL MEDICINE | Admitting: INTERNAL MEDICINE
Payer: COMMERCIAL

## 2022-06-01 PROCEDURE — 97803 MED NUTRITION INDIV SUBSEQ: CPT | Mod: TEL,95

## 2022-06-01 NOTE — PROGRESS NOTES
OUTPATIENT CLINICAL NUTRITION SERVICES FOLLOW-UP     REASON FOR ASSESSMENT  Car Torres referred by Dr. Perez for MNT related to morbid obesity.      Patient accompanied by his wife        NEW FINDINGS:   Patient recently had Covid; the last couple of weeks; physical activity has been decreased and water intake has been decreased. Patient hope to not have  A night time snack he believes his craving to steam more out of boredom/emotion rather than physical hunger.    Previous Nutrition Goals:  1) Patient will drink more water  Evaluation: Not met    2) Patient will limit 2 tootsies roll/pops a day  Evaluation: Met    3) Patient will increasing physical activity up to 15 min  Evaluation: Not met; improving    Previous Nutrition Follow Up / Monitoring:  Progress towards goals will be monitored and evaluated per protocol and Practice Guidelines       Previous Nutrition Diagnosis:  Overweight/obesity related to inappropriate energy intake as evidenced by BMI of 46.4  Evaluation: No change      Newest Food Record  Breakfast:  Fruit smoothie with little OJ with 1 cup of mixed nuts with craisin's and M and M's  Lunch: Protein bar   Dinner: Hot dish with salad or chicken with salad and potato  Snack: 2 tootsie pops  Beverages: Water, light gaterode  Dining out: rarely            Physical Activity: walking on treadmill  Days per week: 3-4  Duration: 15 minutes  Activity type: walking  Limitations: Balance      ANTHROPOMETRICS   Height: 1.803m (5'11)  Weight: 154 kg (339 lb)  BMI (kg/m2): 47.3  Weight Status:  Obesity Grade III BMI >40  IBW: 172 lb (78.1 kg)  Weight History:   Wt Readings from Last 15 Encounters:   04/26/22 (!) 154.1 kg (339 lb 11.2 oz)   01/03/22 (!) 151 kg (333 lb)   10/20/21 145.2 kg (320 lb)   10/14/21 145.2 kg (320 lb 3.2 oz)   07/21/21 146.5 kg (323 lb)   05/19/21 140.6 kg (310 lb)   04/22/21 132 kg (291 lb)   04/13/21 130.5 kg (287 lb 11.2 oz)   01/28/21 131.5 kg (290 lb)   12/22/20 131.5 kg  (290 lb)   11/13/20 131.4 kg (289 lb 9.6 oz)   08/25/20 127.3 kg (280 lb 9.6 oz)   08/11/20 130.4 kg (287 lb 8 oz)   10/28/19 138.3 kg (305 lb)   07/09/19 138.8 kg (306 lb)         Dosing weight: 96.3 kg-adjusted BW used     ASSESSED NUTRITION NEEDS  Estimated Energy Needs: 1,444-1,926 kcals/day (15-20 Kcal/Kg-adjusted BW)  Justification:  (obese)  Estimated Protein Needs: 77-96 grams protein/day (.8-1 g pro/Kg-adjusted weight)  Justification:  (obesity and CKD)  Estimated Fluid Needs: 1,444-1,926 mL/day (1 mL/Kcal)    DIAGNOSIS   Nutrition Diagnosis:  Overweight/obesity related to inappropriate energy intake as evidenced by BMI of 47.3     INTERVENTIONS   Nutrition Prescription weight managment      IMPLEMENTATION   Assessed learning needs and learning preference:   Teaching Method(s) used: Literature  Explanation    Nutrition Education (Content):              a)  Discussed Mindful eating, water intakes, and physical activity              b)  Provided the following handouts: Hunger- Fullness Cues    Nutrition Education (Application):              a)  Discussed current eating plans / recommended alternative food choices              b)  Patient verbalizes understanding of diet by identifying general health diet guidelines    Anticipate good compliance   Stage of Change:  preparation  Additional:     GOALS  1) Patient will drink more water; patient will start the day off with water  2) Patient will limit intake of nighttime snacks; may try coloring instead/ work on mindful eating   3) Patient will aim for 15 minutes of physical activity     FOLLOW UP/MONITORING   Progress towards goals will be monitored and evaluated per protocol and Practice Guidelines    Time Spent with Patient  30 minutes    Zuleika Soriano RDN, LD  Clinical Dietitian  Office: [phone]  Weekend pager: [phone]

## 2022-06-06 DIAGNOSIS — F33.0 MAJOR DEPRESSIVE DISORDER, RECURRENT EPISODE, MILD WITH ATYPICAL FEATURES (H): ICD-10-CM

## 2022-06-06 RX ORDER — BUSPIRONE HYDROCHLORIDE 5 MG/1
5 TABLET ORAL 2 TIMES DAILY
Qty: 60 TABLET | Refills: 3 | Status: SHIPPED | OUTPATIENT
Start: 2022-06-06 | End: 2022-06-29

## 2022-06-06 NOTE — TELEPHONE ENCOUNTER
Date of Last Office Visit: 4/26/22  Date of Next Office Visit: 6/29/2022  No shows since last visit: 0  Cancellations since last visit: (1) 5/4/22 by clinician    Medication requested: busPIRone (BUSPAR) 5 MG tablet Date last ordered: 5/16/2022 Qty: 60 Refills: 0     Review of [place] ?: Not a delegate    Lapse in medication adherence greater than 5 days?: No  If yes, call patient and gather details: N/A  Medication refill request verified as identical to current order?: Yes    Result of Last DAM, VPA, Li+ Level, CBC, or Carbamazepine Level (at or since last visit): N/A    Last visit treatment plan:     Cannot find Buspar. Note incomplete    Current medications include: ASPIRIN 325 MG OR TBEC, ONE DAILY  atorvastatin (LIPITOR) 40 MG tablet, TAKE 1 TABLET BY MOUTH EVERY DAY  Cholecalciferol (VITAMIN D PO), Take 5,000 Units by mouth daily One tablet twice daily  donepezil (ARICEPT) 10 MG tablet, Take 1 tablet (10 mg) by mouth daily  Ferrous Gluconate 324 (37.5 Fe) MG TABS, 1 tablet daily  Ferrous Sulfate 324 (65 Fe) MG TBEC,   FLUoxetine (PROZAC) 40 MG capsule, Take 1 capsule (40 mg) by mouth daily  lisinopril-hydrochlorothiazide (ZESTORETIC) 20-12.5 MG tablet, Take 1 tablet by mouth daily  MULTI VITAMIN MENS PO, None Entered  OLANZapine (ZYPREXA) 5 MG tablet, Take 1 tablet (5 mg) by mouth At Bedtime  omega 3 1000 MG CAPS, Take by mouth daily  tadalafil (CIALIS) 20 MG tablet, TAKE 1/2 TO 1 TABLET BY MOUTH 30 MINUTES TO 1 HOUR BEFORE SEX  TURMERIC PO, Take by mouth daily  vitamin B complex with vitamin C (STRESS TAB) tablet, Take 1 tablet by mouth daily    Treatment Plan:          ---    Continue all other medications as reviewed per electronic medical record today.     Safety plan reviewed. To the Emergency Department as needed or call after hours crisis line at [phone] or [phone]. [place] Crisis Text Line. Text [place] to 698611 or Suicide LifeLine Chat: suicidepreventionlifeline.org/chat/    To schedule  individual or family therapy, call [facility] at [phone]    Schedule an appointment with me in --- or sooner as needed. Call       []Medication refilled per  Medication Refill in Ambulatory Care  policy.  [x]Medication unable to be refilled by RN due to criteria not met as indicated below:    []Eligibility - not seen in the last year   []Supervision - no future appointment   []Compliance - no shows, cancellations or lapse in therapy   []Verification - order discrepancy   []Controlled medication   [x]Medication not included in policy   []90-day supply request   []Other

## 2022-06-09 ENCOUNTER — VIRTUAL VISIT (OUTPATIENT)
Dept: BEHAVIORAL HEALTH | Facility: CLINIC | Age: 62
End: 2022-06-09
Payer: COMMERCIAL

## 2022-06-09 DIAGNOSIS — R69 DIAGNOSIS DEFERRED: Primary | ICD-10-CM

## 2022-06-09 ASSESSMENT — ANXIETY QUESTIONNAIRES
7. FEELING AFRAID AS IF SOMETHING AWFUL MIGHT HAPPEN: NOT AT ALL
6. BECOMING EASILY ANNOYED OR IRRITABLE: NOT AT ALL
GAD7 TOTAL SCORE: 0
3. WORRYING TOO MUCH ABOUT DIFFERENT THINGS: NOT AT ALL
5. BEING SO RESTLESS THAT IT IS HARD TO SIT STILL: NOT AT ALL
GAD7 TOTAL SCORE: 0
1. FEELING NERVOUS, ANXIOUS, OR ON EDGE: NOT AT ALL
2. NOT BEING ABLE TO STOP OR CONTROL WORRYING: NOT AT ALL
IF YOU CHECKED OFF ANY PROBLEMS ON THIS QUESTIONNAIRE, HOW DIFFICULT HAVE THESE PROBLEMS MADE IT FOR YOU TO DO YOUR WORK, TAKE CARE OF THINGS AT HOME, OR GET ALONG WITH OTHER PEOPLE: NOT DIFFICULT AT ALL
4. TROUBLE RELAXING: NOT AT ALL

## 2022-06-09 ASSESSMENT — PATIENT HEALTH QUESTIONNAIRE - PHQ9: SUM OF ALL RESPONSES TO PHQ QUESTIONS 1-9: 0

## 2022-06-09 NOTE — LETTER
[facility] [place] ([facility]): Health Action Plan  [facility]: [place]    [facility]      Name: Car Torres  Preferred Name: Jerry  : 1960  MRN: [id number]      My Goals  Goal Areas: Health; Mental Health; Financial and Social Service Benefits    Patient stated goals:   Health: Patient will continue to meet with his providers and his nutritionist. He also will continue to work on his daily water intake, as recommended by his nutritionist, and his balance by using the treadmill and stairs daily.       Mental Health: Patient will continue to work with his psychiatrist to manage his anxiety, as well as his care coordinator with the [facility]. He will continue to work on verbalizing any struggles he may be having with his anxiety.        Patient will also continue to find hobbies to stay busy, especially coloring and spending time outside.       Financial Benefits: Patient will continue to work with [employer] on the application process for getting approved for social security disability (SSDI), and will follow up with the appeal process if needed.    Strengths related to each goal: Jerry identifies his strengths are he is a good , mentor, good listener, patience, and forgiving. He also has a positive attitude.     Services and Supports Needed: The [facility] Team will provide monthly contact with patient in order to monitor progress towards goals.    Activities / Actions of Team to support goal(s): [facility] team will locate appropriate resources that align with patient's goals and promote health and wellness.    Activities / Actions of Patient / Parent / Guardian to support goal(s): It is a requirement that patient's primary care physician is through the [facility] system and that they are on Medical Assistance/Medicaid. If either of these were to change or if patient needs any type of assistance, they are to reach out to their [facility] [place] ([facility]) team.      Recommended  Referral  Tobacco cessation referrals made?: NA  Mental Health / Chemical Dependency Referrals: Yes  Substance Use Referrals: Not Applicable  Mental Health Referrals: MH Services: [facility], [facility], Community MH Provider; Psychiatry  Community Health Referrals: [facility]; [facility] ; Other (see comments)  Dental: NA        My Team Members and Their Contact Information  Patient Care Team       Relationship Specialty Notifications Start End    Mihir Perez MD PCP - General Internal Medicine - Pediatrics All results, Admissions 12/2/21     Phone: [phone] Fax: [phone] [address] [address]    Taniya Estrada NP Assigned Behavioral Health Provider   12/13/20     Phone: [phone] Fax: [phone]         [address] [place] [address]    Miguel Varela MD Assigned Neuroscience Provider   12/27/20     Phone: [phone] Fax: [phone] [address] [address]    Taniya Estrada NP Nurse Practitioner Psychiatry All results, Admissions 12/8/21     Psychiatric provider    Phone: [phone] Fax: [phone]         [address] [place] [address]    Miguel Varela MD MD Neurology All results, Admissions 12/8/21     Phone: [phone] Fax: [phone]         [address] [address]    Mihir Perez MD Assigned PCP   1/9/22     Phone: [phone] Fax: [phone]         [address] [address]    Clare Hendrix BSW  Clinic Admissions 1/31/22     [facility] - direct extension [phone]          My Wellness Plan  Safety Concerns: None Reported / Observed    Recommendations / Plan for safety concerns: A safety and risk management plan has not been developed at this time, however patient was encouraged to call [facility] / 911 should there be a change in any of these risk factors.    Crisis Plan (emergencies / when urgent  support needed): Jerry states he feels comfortable contacting his spouse, Destiny Torres and/or calling the National Suicide Prevention Lifeline at 577-097-1710 or 847 in a crisis or emergency situation.      Car Torres co-developed the Health Action Plan with the [facility] Team and received a copy of this document.  Date Health Action Plan Completed/Updated: 6/9/2022

## 2022-06-09 NOTE — PROGRESS NOTES
"[facility]  [facility]: [place]        Social Work Care Navigator Note      Patient: Car Torres  Date: June 9, 2022  Preferred Name: Jerry    Previous PHQ-9:   PHQ-9 SCORE 12/2/2021 2/21/2022 4/26/2022   PHQ-9 Total Score MyChart - 7 (Mild depression) 5 (Mild depression)   PHQ-9 Total Score 6 7 5     Previous SHAHNAZ-7:   SHAHNAZ-7 SCORE 5/28/2021 8/30/2021 12/2/2021   Total Score 0 0 0     ALMA LEVEL:  ALMA Score (Last Two) 5/4/2012   ALMA Raw Score 37   Activation Score 49.9   ALMA Level 2       Preferred Contact:  Need for : No  Preferred Contact: Cell      Type of Contact Today: Phone call (patient / identified key support person reached)      Data: (subjective / Objective):    Patient came in to complete the comprehensive wellness assessment for [facility].  See [facility] Flowsheets for details on the assessment.  See [facility], [facility] for a copy of the patient's care plan.     Patient completed the PHQ-9 and SHAHNAZ-7 and patient reported not at all for all of the questions. Patient's wife has previously reported that patient doesn't report his anxiety in the past, but his actions has shown otherwise. Destiny also shared that they have been good about adjusting and adapting to his physical limitations; for example, taking a few days for mowing the lawn because they aren't physically able to do it all in one time. [facility] reflected that his is a good example with mental health too, that it may take him a little longer to get tasks done, but he can adjust and adapt to his mental health needs and still be productive. They agreed with this statement. Destiny also reported that she has seen a change since patient started taking the anxiety medications, and she's happy to see that patient is reporting this way and he's also always been a \"glass half full\" person too.       Updated Goals:    Health: Patient will continue to meet with his providers and his nutritionist. He " also will continue to work on his daily water intake, as recommended by his nutritionist, and his balance by using the treadmill and stairs daily.   Mental Health: Patient will continue to work with his psychiatrist to manage his anxiety, as well as his care coordinator with the [facility]. He will continue to work on verbalizing any struggles he may be having with his anxiety.      Patient will also continue to find hobbies to stay busy, especially coloring and spending time outside.   Financial Benefits: Patient will continue to work with [employer] on the application process for getting approved for social security disability (SSDI), and will follow up with the appeal process if needed.       1. Patient reports that he's been doing good and he's been fine. He said that he's been better with his anxiety. Patient has been taking his anxiety medications prescribed from Taniya, his psychiatrist, for about a month and he said it's been helping. Patient's wife also reported that it seems like it's helping him as well. Patient reported that his anxiety was making him want to curl up under a blanket and lay around, and now he's been spending more time outside and other places besides in front of the TV all day.      Patient's wife reported they recently had a conversation about her thoughts regarding patient's mental health and not participating in the day. Patient has been getting up at 10am, instead of noon, and it's made a difference. Patient's wife also feels that he has been a little more perky and alert.     2. Patient reported they are working on finding a hobby for him to do that he would be interested in. Patient likes to color, so they got a couple coloring books that he likes. He has been spending a lot of time on the patio every day, coloring. He will also do some tasks outside while he's out there as well, that are productive around the house. They talked about being productive at least once  a day, even if it's 15 minutes a day.     3. They reported that his nutritionist is wanting him to drink more water daily. They will continue to have little bautista right by his medications to encourage his water intake. There isn't a certain amount that she suggested, but the bottles are 10 oz so it gives him at least something. Patient doesn't like to drink water, as he reports it gives him heartburn. His wife also tries to help with making sure he gets liquids when he is out on the patio as well. He sees her about once every couple months.     4. Patient will be starting physical therapy on Monday for his balance. There are three appointments set up with the neurotherapist. His neurologist is wanting him to work on balance. Patient's wife noticed that when he's on the treadmill, he can't let go of the handles. Patient is also working on the treadmill and taking the stairs as well, so he will continue this as well to help strengthen his balance.     5. Patient hasn't heard about the [facility] disability questionnaire or from social security. Destiny (wife) reported that she is going to follow up with [facility] because the last that they heard was they would be talking to his provider, but they haven't been able to get a hold of him. Destiny reports that the doctor told patient that he wasn't going to work anymore, and he was taken out of work of his own will. He was told that he can't work, so they are frustrated for this because he was taken out early because of the disability.      Destiny reports they don't have a house payment until November because they've worked hard to put money towards the mortgage. They are worried about November coming and will need to make some decisions because they wouldn't be able to afford the mortgage payment. They have done everything they have asked for in a timely manner, and she doesn't know where to go from there. [facility] encouraged her to reach out to them and get an update, and possibly  request a timeline for the process. They reported they have submitted many documents and it says they are 90% of the way done, but this hasn't changed in awhile. Destiny reported that [facility] empowered her to reach out and request more information.     6. Patient and his wife got COVID recently, right around Memorial Weekend. They are COVID free now, but they were out for a couple days. Patient has a chest cold for a couple days, and his wife got a more severe case. She was able to take medications and she recovered as well.     7. Patient's wife reported she wasn't able to follow up on SNAP since the last appointment, so she will look through her documents to find the letter. She reported that the amount was so little, that she isn't sure that it's worth it so she isn't sure they want to do this at this time.       [facility] will send patient the HAP letter once approved by the [facility] via email, as requested by patient.   Update: [facility] letter approved and sent via secured email.     Patient and CC will complete next check in over the phone on 7/11/22. Patient declined video visit at this time.     OLGA Montero  [facility] ([facility]   [facility]  [phone]

## 2022-06-13 ENCOUNTER — HOSPITAL ENCOUNTER (OUTPATIENT)
Dept: PHYSICAL THERAPY | Facility: CLINIC | Age: 62
Setting detail: THERAPIES SERIES
Discharge: HOME | End: 2022-06-13
Attending: INTERNAL MEDICINE
Payer: COMMERCIAL

## 2022-06-13 DIAGNOSIS — R26.89 IMBALANCE: ICD-10-CM

## 2022-06-13 DIAGNOSIS — F03.91 DEMENTIA WITH BEHAVIORAL DISTURBANCE, UNSPECIFIED DEMENTIA TYPE: ICD-10-CM

## 2022-06-13 PROCEDURE — 97110 THERAPEUTIC EXERCISES: CPT | Mod: GP | Performed by: PHYSICAL THERAPIST

## 2022-06-13 PROCEDURE — 97161 PT EVAL LOW COMPLEX 20 MIN: CPT | Mod: GP | Performed by: PHYSICAL THERAPIST

## 2022-06-13 NOTE — PROGRESS NOTES
Functional Gait Assessment (FGA): The FGA assesses postural stability during various walking tasks.   Gait assistive device used: None    Scores of <22 /30 have been correlated with predicting falls in community-dwelling older adults according to Angel & Mg 2010.   Scores of <18 /30 have been correlated with increased risk for falls in patients with Parkinsons Disease according to Sukhjinder Keane Zhou et al 2014.  Minimal Detectable Change for patients with acute/chronic stroke = 4.2 according to Thigretta & Ritschel 2009  Minimal Detectable Change for patients with vestibular disorder = 8 according to Angel Holliday 2010    Assessment (rationale for performing, application to patient s function & care plan): Performed the following testing to assess for balance. Patient scores a 24/30 on test scoring at low risk of falls. He will benefit form PT to further improve his balance and work on confidence with gait.   (Minutes billed as physical performance test): 10         06/13/22 1300   Signing Clinician's Name / Credentials   Signing clinician's name / credentials Huma Gordon, PT, DPT   Functional Gait Assessment (YAKOV Ortiz., Shea, GAiyana F., et al. (2004))   1. GAIT LEVEL SURFACE 2   2. CHANGE IN GAIT SPEED 3   3. GAIT WITH HORIZONTAL HEAD TURNS 2   4. GAIT WITH VERTICAL HEAD TURNS 3   5. GAIT AND PIVOT TURN 3   6. STEP OVER OBSTACLE 3   7. GAIT WITH NARROW BASE OF SUPPORT 1   8. GAIT WITH EYES CLOSED 2   9. AMBULATING BACKWARDS 3   10. STEPS 2   Total Functional Gait Assessment Score   TOTAL SCORE: (MAXIMUM SCORE 30) 24

## 2022-06-14 NOTE — PROGRESS NOTES
06/13/22 1300   Quick Adds   Quick Adds Certification   Type of Visit Initial OP PT Evaluation   General Information   Start of Care Date 06/13/22   Referring Physician Miguel Varela MD   Orders Evaluate and Treat as Indicated   Order Date 04/26/22   Medical Diagnosis dementia, imbalance   Onset of illness/injury or Date of Surgery 04/26/22   Surgical/Medical history reviewed Yes  (HTN, dementia,heart disease, obesity)   Pertinent history of current problem (include personal factors and/or comorbidities that impact the POC) Patient with hx of dementia and continues to work with neurology on identification of type he is experiencing. He stopped working a little over a year ago. He reports some imbalance when walking on TM (w/o holding handles feels he cannot keep balance well) and sometimes feels a little imbalance with daily activites. He has had a few falls over the years but nothing consistent. Avoids carrying items up the stairs d/t instability. He used to walk a lot when working in his job but has been more inactive for the last year with less walking.   Prior level of function comment walking on the TM (several times a week), 15 minutes, 1 mph is his typical speed   [facility]   Home/Community Accessibility Comments stairs at home, uses the rail and does 1 at a time, avoiding carrying things up the steps; arrives with spouse today   Assistive Devices Comments does wear brace sometimes on the L foot when pain is increased   Patient/Family Goals Statement improve his balance   Fall Risk Screen   Fall screen completed by PT   Have you fallen 2 or more times in the past year? Yes   Have you fallen and had an injury in the past year? No   Timed Up and Go score (seconds) not tested today   Is patient a fall risk? No   Fall screen comments 24/30 on FGA, no major fall hx   Pain   Pain comments neck pain on the R side, L foot pain, joints in hands will both him   Cognitive Status Examination    Orientation orientation to person, place and time   Level of Consciousness alert   Follows Commands and Answers Questions 100% of the time   Memory impaired   Cognitive Comment dementia   Posture   Posture Comments forward shoulder posture   Range of Motion (ROM)   ROM Comment grossly WFLs   Strength   Strength Comments 5/5 in LEs with major muscles groups, did not test hip ABD which anticiapte some weakness based on gait pattern   Bed Mobility   Bed Mobility Comments did not assess today   Transfer Skills   Transfer Comments sit to stand with and without UE suport   Gait   Gait Comments ambulates with overall steady gait, arm swing B, slightly slower speed for age   Gait Special Tests   Gait Special Tests 25 FOOT TIMED WALK;FUNCTIONAL GAIT ASSESSMENT   Gait Special Tests 25 Foot Timed Walk   Seconds 7.57   Steps 15 Steps   Gait Special Tests Functional Gait Assessment Score out of 30   Score out of 30 24   Comments see progress note   Balance Special Tests   Balance Special Tests Single leg stance right;Single leg stance left;Modified CTSIB Conditions   Balance Special Tests Single Leg Stance Right,   Right, seconds 5 Seconds   Balance Special Tests Single Leg Stance Left   Left, seconds 4 Seconds   Comments hx of foot pain on this side   Balance Special Tests Modified CTSIB Conditions   Condition 1, seconds 30 Seconds   Condition 2, seconds 30 Seconds   Condition 4, seconds 30 Seconds   Condition 5, seconds 30 Seconds   Sensory Examination   Sensory Perception Comments denies any n/t issues   Clinical Impression   Criteria for Skilled Therapeutic Interventions Met yes, treatment indicated   PT Diagnosis decreased activity tolerance and hx of imbalance   Influenced by the following impairments imbalance, decreased activity tolerance, hx of a couple falls, dementia   Functional limitations due to impairments decreased safety with iADLs   Clinical Presentation Stable/Uncomplicated   Clinical Presentation Rationale  stable medically, PT impairments, family support   Clinical Decision Making (Complexity) Low complexity   Therapy Frequency 1 time/week   Predicted Duration of Therapy Intervention (days/wks) up to 3-4 visits   Risk & Benefits of therapy have been explained Yes   Patient, Family & other staff in agreement with plan of care Yes   Clinical Impression Comments Jerry presents with mild imbalance-- he did well with testing today but anticipate fatigue plays a part in his balance. He is deconditioned and does more sitting than moving during the day. He will benefit from balance training and education on CV program at home   GOALS   PT Eval Goals 1;2;3   Goal 1   Goal Identifier TM   Goal Description Jerry will walk for 20 minutes 4-5 times per week at incresaed speed (closer to 1.5 to 2.0 mph) in order to improve his functional endurance as part of HEP.   Target Date 07/12/22   Goal 2   Goal Identifier sit to stand   Goal Description Jerry will perform sit to stand x 5 reps in <13 secs in order to show improved LE strength needed for transfers.   Target Date 07/12/22   Total Evaluation Time   PT Eval, Low Complexity Minutes (80667) 30   Therapy Certification   Certification date from 06/13/22   Certification date to 09/10/22   Medical Diagnosis dementia, imbalance   Certification I certify the need for these services furnished under this plan of treatment and while under my care.  (Physician co-signature of this document indicates review and certification of the therapy plan).

## 2022-06-14 NOTE — PROGRESS NOTES
[facility]                                                                                   OUTPATIENT PHYSICAL THERAPY FUNCTIONAL EVALUATION  PLAN OF TREATMENT FOR OUTPATIENT REHABILITATION  (COMPLETE FOR INITIAL CLAIMS ONLY)  Patient's Last Name, First Name, M.I.  YOB: 1960  Car Torres     Provider's Name   [facility]   Medical Record No.  [id number]     Start of Care Date:  06/13/22   Onset Date:  04/26/22   Type:     _X__PT   ____OT  ____SLP Medical Diagnosis:  dementia, imbalance     PT Diagnosis:  decreased activity tolerance and hx of imbalance Visits from SOC:  1                              __________________________________________________________________________________  Plan of Treatment/Functional Goals:              GOALS  TM  Jerry will walk for 20 minutes 4-5 times per week at incresaed speed (closer to 1.5 to 2.0 mph) in order to improve his functional endurance as part of HEP.  07/12/22    sit to stand  Jerry will perform sit to stand x 5 reps in <13 secs in order to show improved LE strength needed for transfers.  07/12/22                             Therapy Frequency:  1 time/week   Predicted Duration of Therapy Intervention:  up to 3-4 visits    Huma Gordon, PT                                    I CERTIFY THE NEED FOR THESE SERVICES FURNISHED UNDER        THIS PLAN OF TREATMENT AND WHILE UNDER MY CARE     (Physician co-signature of this document indicates review and certification of the therapy plan).                Certification Date From:  06/13/22   Certification Date To:  09/10/22    Referring Provider:  Miguel Varela MD    Initial Assessment  See Epic Evaluation- Start of Care Date: 06/13/22

## 2022-06-20 ENCOUNTER — HOSPITAL ENCOUNTER (OUTPATIENT)
Dept: PHYSICAL THERAPY | Facility: CLINIC | Age: 62
Setting detail: THERAPIES SERIES
Discharge: HOME | End: 2022-06-20
Attending: INTERNAL MEDICINE
Payer: COMMERCIAL

## 2022-06-20 PROCEDURE — 97110 THERAPEUTIC EXERCISES: CPT | Mod: GP | Performed by: PHYSICAL THERAPIST

## 2022-06-29 ENCOUNTER — VIRTUAL VISIT (OUTPATIENT)
Dept: BEHAVIORAL HEALTH | Facility: CLINIC | Age: 62
End: 2022-06-29
Payer: COMMERCIAL

## 2022-06-29 ENCOUNTER — VIRTUAL VISIT (OUTPATIENT)
Dept: PSYCHIATRY | Facility: CLINIC | Age: 62
End: 2022-06-29
Payer: COMMERCIAL

## 2022-06-29 DIAGNOSIS — F33.0 MAJOR DEPRESSIVE DISORDER, RECURRENT EPISODE, MILD WITH ATYPICAL FEATURES (H): Primary | ICD-10-CM

## 2022-06-29 PROCEDURE — 90832 PSYTX W PT 30 MINUTES: CPT | Mod: 95 | Performed by: COUNSELOR

## 2022-06-29 PROCEDURE — 99214 OFFICE O/P EST MOD 30 MIN: CPT | Mod: 95 | Performed by: NURSE PRACTITIONER

## 2022-06-29 RX ORDER — OLANZAPINE 5 MG/1
5 TABLET ORAL AT BEDTIME
Qty: 90 TABLET | Refills: 1 | Status: SHIPPED | OUTPATIENT
Start: 2022-06-29 | End: 2022-11-17

## 2022-06-29 RX ORDER — FLUOXETINE 40 MG/1
40 CAPSULE ORAL DAILY
Qty: 90 CAPSULE | Refills: 1 | Status: SHIPPED | OUTPATIENT
Start: 2022-06-29 | End: 2022-11-17

## 2022-06-29 RX ORDER — BUSPIRONE HYDROCHLORIDE 5 MG/1
5 TABLET ORAL 2 TIMES DAILY
Qty: 180 TABLET | Refills: 1 | Status: SHIPPED | OUTPATIENT
Start: 2022-06-29 | End: 2022-08-25

## 2022-06-29 NOTE — PATIENT INSTRUCTIONS
1.  Buspirone 5 mg twice daily  2.  Fluoxetine 40 mg daily  3.  Olanzapine 5 mg at bedtime  4.  Talk therapy when able to for processing and coping with life adjustments and stressors    Continue all other medications as reviewed per electronic medical record today.   Safety plan reviewed. To the Emergency Department as needed or call after hours crisis line at [phone] or [phone]. [place] Crisis Text Line. Text [place] to 719394 or Suicide LifeLine Chat: suicideAgileMD.org/chat/  To schedule individual or family therapy, call [facility] at [phone]  Schedule an appointment with me in 6 weeks or sooner as needed. Call [facility] at [phone] to schedule.  Follow up with primary care provider as planned or for acute medical concerns.  Call the psychiatric nurse line with medication questions or concerns at [phone]  MyChart may be used to communicate with your provider, but this is not intended to be used for emergencies.    Crisis Resources:    National Suicide Prevention Lifeline: 488.559.9347 (TTY: 140.849.6804). Call anytime for help.  (www.suicidepreventionlifeline.org)  National [place] on Mental Illness (www.ana maría.org): 651.124.5457 or 003-684-0507.   Mental Health Association (www.mentalhealth.org): 400.621.4144 or 116-660-4970.  [place] Crisis Text Line: Text [place] to 081014  Suicide LifeLine Chat: suicideAgileMD.org/chat

## 2022-06-29 NOTE — PROGRESS NOTES
"Jerry is a 62 year old who is being evaluated via a billable video visit.      How would you like to obtain your AVS? MyChart  If the video visit is dropped, the invitation should be resent by: Text to cell phone: [phone]  Will anyone else be joining your video visit? Yes: wife. How would they like to receive their invitation? Text to cell phone: darryl FLORES    Video-Visit Details    Video Start Time: 3:05 PM    Type of service:  Video Visit    Video End Time:3:30 PM    Originating Location (pt. Location): Home    Distant Location (provider location):  [facility] MENTAL [facility] & ADDICTION [facility]     Platform used for Video Visit: [facility]                Outpatient Psychiatric Progress Note    Name: Car Torres   : 1960                    Primary Care Provider: Mihir Perez MD   Therapist: None    PHQ-9 scores:  PHQ-9 SCORE 2022   PHQ-9 Total Score [facility] 7 (Mild depression) 5 (Mild depression) -   PHQ-9 Total Score 7 5 0       SHAHNAZ-7 scores:  SHAHNAZ-7 SCORE 2021   Total Score 0 0 0       Patient Identification:    Patient is a 62 year old year old,   White Not  or  male  who presents for return visit with me.  Patient is currently disabled. Patient attended the session with With his wife , who they agreed to have interview with. Patient prefers to be called: \" Jerry\".    Current medications include: ASPIRIN 325 MG OR TBEC, ONE DAILY  atorvastatin (LIPITOR) 40 MG tablet, TAKE 1 TABLET BY MOUTH EVERY DAY  busPIRone (BUSPAR) 5 MG tablet, Take 1 tablet (5 mg) by mouth 2 times daily  Cholecalciferol (VITAMIN D PO), Take 5,000 Units by mouth daily One tablet twice daily  donepezil (ARICEPT) 10 MG tablet, Take 1 tablet (10 mg) by mouth daily  Ferrous Gluconate 324 (37.5 Fe) MG TABS, 1 tablet daily  Ferrous Sulfate 324 (65 Fe) MG TBEC,   FLUoxetine (PROZAC) 40 MG capsule, Take 1 capsule (40 mg) by mouth " daily  lisinopril-hydrochlorothiazide (ZESTORETIC) 20-12.5 MG tablet, Take 1 tablet by mouth daily  MULTI VITAMIN MENS PO, None Entered  OLANZapine (ZYPREXA) 5 MG tablet, Take 1 tablet (5 mg) by mouth At Bedtime  omega 3 1000 MG CAPS, Take by mouth daily  tadalafil (CIALIS) 20 MG tablet, TAKE 1/2 TO 1 TABLET BY MOUTH 30 MINUTES TO 1 HOUR BEFORE SEX  TURMERIC PO, Take by mouth daily  vitamin B complex with vitamin C (STRESS TAB) tablet, Take 1 tablet by mouth daily    No current facility-administered medications on file prior to visit.       The [place] Prescription Monitoring Program has been reviewed and there are no concerns about diversionary activity for controlled substances at this time.      I was able to review most recent Primary Care Provider, specialty provider, and therapy visit notes that I have access to.     Today, patient reports that he feels like he is doing more during the day.  They just got approved for disability.  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  He denies anxiety.  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  Since buspirone he has had more slertness.  Jerry and his wife are hesitant to make any medication changes right now as they feel he is stable.     has a past medical history of Dementia (H), Depressive disorder, Heart disease (5/8/2018), Hypercholesterolemia, Hypertension goal BP (blood pressure) < 140/90, Nonspecific abnormal electrocardiogram (ECG) (EKG) (08/23/2007), and Sleep apnea.    He has no past medical history of Arthritis, Cancer (H), Cerebral infarction (H), Congestive heart failure (H), Congestive heart failure, unspecified, COPD (chronic obstructive pulmonary disease) (H), Diabetes (H), History of blood transfusion, Thyroid disease, or Uncomplicated asthma.    Social history updates:    Dog lives with his wife.  He soon will be receiving disability money to support his family.    Substance use updates:    No alcohol use reported  Tobacco use:  No    Vital Signs:   There were no vitals taken for this visit.    Labs:    Most recent laboratory results reviewed and no new labs.     Mental Status Examination:  Appearance: awake, alert and casual dress  Attitude: cooperative  Eye Contact:  adequate  Gait and Station: No assistive Devices used and No dizziness or falls  Psychomotor Behavior:  intact station, gait and muscle tone  Oriented to:  time, person, and place  Attention Span and Concentration:  Normal  Speech:   clear, coherent and Speaks: English  Mood:  better  Affect:  appropriate and in normal range  Associations:  no loose associations  Thought Process:  linear  Thought Content:  no evidence of suicidal ideation or homicidal ideation, no auditory hallucinations present and no visual hallucinations present  Recent and Remote Memory:  intact Not formally assessed. No amnesia.  Fund of Knowledge: appropriate  Insight:  good  Judgment:  intact  Impulse Control:  intact    Suicide Risk Assessment:  Today Car Torres reports no thoughts to harm themself or others. In addition, there are notable risk factors for self-harm, including age and anxiety. However, risk is mitigated by commitment to family, history of seeking help when needed, future oriented, denies suicidal intent or plan and denies homicidal ideation, intent, or plan. Therefore, based on all available evidence including the factors cited above, Car Torres does not appear to be at imminent risk for self-harm, does not meet criteria for a 72-hr hold, and therefore remains appropriate for ongoing outpatient level of care.  A thorough assessment of risk factors related to suicide and self-harm have been reviewed and are noted above. The patient convincingly denies suicidality on several occasions. Local community safety resources printed and reviewed for patient to use if needed. There was no deceit detected, and the patient presented in a manner that was believable.     DSM5  Diagnosis:  296.35 (F33.41)  Major Depressive Disorder, Recurrent Episode, In partial remission _ and With atypical features  300.02 (F41.1) Generalized Anxiety Disorder    Medical comorbidities include:   Patient Active Problem List    Diagnosis Date Noted     Hypertension goal BP (blood pressure) < 140/90 10/20/2010     Priority: High     Hyperlipidemia LDL goal <130 06/11/2010     Priority: High     Dementia associated with other underlying disease with behavioral disturbance (H) 01/03/2022     Priority: Medium     Ulcer of left foot, unspecified ulcer stage (H) 01/03/2022     Priority: Medium     Unspecified psychosis not due to a substance or known physiological condition (H) 01/03/2022     Priority: Medium     Major depressive disorder, recurrent episode, mild with atypical features (H) 12/06/2021     Priority: Medium     TIA (obstructive sleep apnea) 07/19/2018     Priority: Medium     Home Sleep Apnea Testing - 7/18/18: 295 lbs 0 oz: AHI 45.3/hr. Supine AHI 61.9/hr. Oxygen [place] of 66%.  Baseline 93%.  Sp02 =< 88% for 47 minutes.       History of sinus bradycardia 06/07/2018     Priority: Medium     ED (erectile dysfunction) 06/07/2018     Priority: Medium     Coronary artery calcification 05/08/2018     Priority: Medium     Per CT CHEST WITHOUT CONTRAST April 23, 2018. CT calcium score planned.        Renal cyst 11/18/2015     Priority: Medium     Simple, non enhanced, 3.5 cm on right kidney, Bosniak 1 - x 2 stable findings       Diverticulosis of large intestine without hemorrhage 11/18/2015     Priority: Medium     Incidental finding on CT scan        Pulmonary nodule 11/18/2015     Priority: Medium     Incidental finding on CT scan, right posterior lung - considered benign / stable        CKD (chronic kidney disease) stage 2, GFR 60-89 ml/min 10/21/2010     Priority: Medium     60 to 80 - have discussed Lisinopril - will consider        Sciatica 02/27/2006     Priority: Medium     Morbid obesity (H)  "02/27/2006     Priority: Medium       Assessment:    Car Torres is reported to be doing much better.  Him and his wife agree that he is more \"perky\" and has more energy and focus to do things around the house.  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  We talked about decreasing his olanzapine now that he has had significant time away from using kratom.  Jerry and his wife are hesitant to make any changes right now and we will visit again about this in the fall.  The goal is to eventually discontinue the olanzapine.  The buspirone has been helpful in decreasing his anxiety and improving cognition..    Medication side effects and alternatives were reviewed. Health promotion activities recommended and reviewed today. All questions addressed. Education and counseling completed regarding risks and benefits of medications and psychotherapy options.    Treatment Plan:        1.  Continue buspirone 5 mg twice daily    2.  Continue olanzapine 5 mg at bedtime    3.  Continue fluoxetine 40 mg daily        Continue all other medications as reviewed per electronic medical record today.     Safety plan reviewed. To the Emergency Department as needed or call after hours crisis line at [phone] or [phone]. [place] Crisis Text [place]. Text [place] to 828529 or Suicide LifeLine Chat: suicidepreventionlifeline.org/chat/    To schedule individual or family therapy, call [facility] at [phone]    Schedule an appointment with me in September or sooner as needed. Call [facility] at [phone] to schedule.    Follow up with primary care provider as planned or for acute medical concerns.    Call the psychiatric nurse line with medication questions or concerns at [phone]    MyChart may be used to communicate with your provider, but this is not intended to be used for emergencies.    Crisis Resources:    National Suicide Prevention " Lifeline: 637.174.2988 (TTY: 881.832.9248). Call anytime for help.  (www.suicidepreventionlifeline.org)  National [place] on Mental Illness (www.ana maría.org): 672.756.8642 or 322-305-0936.   Mental Health Association (www.mentalhealth.org): 646.787.9567 or 031-765-5939.  [place] Crisis [place]: Text [place] to 808728  Suicide LifeLine Chat: [email or web address].[email or web address]/chat    Administrative Billing:   Time spent with patient includes counseling and coordination of care regarding above diagnoses and treatment plan.    Patient Status:  Patient will continue to be seen for ongoing consultation and stabilization.    Signed:   BELEN Singh-BC   Psychiatry

## 2022-06-29 NOTE — PATIENT INSTRUCTIONS
1.  Continue buspirone 5 mg twice daily  2.  Continue olanzapine 5 mg at bedtime  3.  Continue fluoxetine 40 mg daily    Continue all other medications as reviewed per electronic medical record today.   Safety plan reviewed. To the Emergency Department as needed or call after hours crisis line at [phone] or [phone]. [place] Crisis [place]. Text [place] to 750031 or Suicide LifeLine Chat: suicideRock[place] Foresters Baseball [employer].org/chat/  To schedule individual or family therapy, call [facility] at [phone]  Schedule an appointment with me in September or sooner as needed. Call [facility] at [phone] to schedule.  Follow up with primary care provider as planned or for acute medical concerns.  Call the psychiatric nurse line with medication questions or concerns at [phone]  MyChart may be used to communicate with your provider, but this is not intended to be used for emergencies.    Crisis Resources:    National Suicide Prevention Lifeline: 515.829.9958 (TTY: 791.571.4505). Call anytime for help.  (www.suicidepreventionlifeline.org)  National [place] on Mental Illness (www.ana maría.org): 427.456.2353 or 919-552-8312.   Mental Health Association (www.mentalhealth.org): 852.182.4212 or 445-535-7795.  [place] Crisis [place]: Text [place] to 454163  Suicide LifeLine Chat: suicide[place] [employer]sters Baseball [employer].org/chat

## 2022-06-29 NOTE — PROGRESS NOTES
[facility] Collaborative Care Psychiatry Service     June 29, 2022      Behavioral Health Clinician Progress Note    Patient Name: Car Torres           Service Type:  Individual      Service Location:   MyChart / Email (patient reached)     Session Start Time: 229pm  Session End Time: 252pm      Session Length: 16 - 37      Attendees: Patient and Spouse / Significant Other     Service Modality:  Video Visit:      Provider verified identity through the following two step process.  Patient provided:  Patient photo and Patient is known previously to provider    Telemedicine Visit: The patient's condition can be safely assessed and treated via synchronous audio and visual telemedicine encounter.      Reason for Telemedicine Visit: Services only offered telehealth    Originating Site (Patient Location): Patient's home    Distant Site (Provider Location): Provider Remote Setting- Home Office    Consent:  The patient/guardian has verbally consented to: the potential risks and benefits of telemedicine (video visit) versus in person care; bill my insurance or make self-payment for services provided; and responsibility for payment of non-covered services.     Patient would like the video invitation sent by:  My Chart    Mode of Communication:  Video Conference via [employer]well    As the provider I attest to compliance with applicable laws and regulations related to telemedicine.    Visit Activities (Refresh list every visit): [facility] Only    Diagnostic Assessment Date: 02/22/2022  Treatment Plan Review Date: 07/26/2022  See Flowsheets for today's PHQ-9 and SHAHNAZ-7 results  Previous PHQ-9:   PHQ-9 SCORE 2/21/2022 4/26/2022 6/9/2022   PHQ-9 Total Score [facility] 7 (Mild depression) 5 (Mild depression) -   PHQ-9 Total Score 7 5 0     Previous SHAHNAZ-7:   SHAHNAZ-7 SCORE 8/30/2021 12/2/2021 6/9/2022   Total Score 0 0 0       ALMA LEVEL:  ALMA Score (Last Two) 5/4/2012   ALMA Raw Score 37   Activation Score 49.9   ALMA Level 2        DATA  Extended Session (60+ minutes): No  Interactive Complexity: No  Crisis: No  [facility] Patient: No    Treatment Objective(s) Addressed in This Session:  Target Behavior(s): exercise, being more social, exploring hobbies    Depressed Mood: Decrease frequency and intensity of feeling down, depressed, hopeless    Current Stressors / Issues:   update: Pt says things are going okay. Pt's wife says that he was finally approved for disability. Pt's wife states that Jerry is doing the best he's done since this journey began. The anxiety may have been playing a larger factor then they realized. Pt says he doesn't think he needs to increase the medication. Pt has been doing physical therapy. Pt is more motivated to do his exercises and treadmill. Pt and his wife have talked about Jerry participating more in life. Pt and their wife signed up to do a class and were unable to go due to COVID. Pt is doing more coloring. Pt [place] to his dad's place with his brothers and helped his dad take care of things. Family has noted that Jerry is participating more and not being off to his self like last time. Pt and wife are going to do more together.   Stressors: none  Side Effects: none    Appetite: unremarkable  Sleep: unremarkable, getting up earlier in the day     Suicidality: Pt denies   Self-harm: Pt denies  Substance Use: Pt denies  Caffeine: rarely  Interventions: encourage pt to continue to do different activities and do things together with wife  Medication Questions/Requests: sent her a message of updates      Progress on Treatment Objective(s) / Homework:  Stable - ACTION (Actively working towards change); Intervened by reinforcing change plan / affirming steps taken     CBT: Pt has been more social and is helping to take care of things. Pt has also started coloring and doing more activities with his wife and grand kids. Pt is also waking up earlier than they were previously.     Motivational Interviewing    MI  Intervention: Co-Developed Goal: reduce depression, Expressed Empathy/Understanding, Supported Autonomy, Collaboration, Evocation, Permission to raise concern or advise, Open-ended questions, Reflections: simple and complex, Change talk (evoked) and Reframe     Change Talk Expressed by the Patient: Reasons to change Need to change Taking steps    Provider Response to Change Talk: E - Evoked more info from patient about behavior change, A - Affirmed patient's thoughts, decisions, or attempts at behavior change, R - Reflected patient's change talk and S - Summarized patient's change talk statements    Also provided psychoeducation about behavioral health condition, symptoms, and treatment options    Care Plan review completed: Yes    Medication Review:  No changes to current psychiatric medication(s)    Medication Compliance:  Yes    Changes in Health Issues:   None reported    Chemical Use Review:   Substance Use: Chemical use reviewed, no active concerns identified      Tobacco Use: No current tobacco use.      Assessment: Current Emotional / Mental Status (status of significant symptoms):  Risk status (Self / Other harm or suicidal ideation)  Patient denies a history of suicidal ideation, suicide attempts, self-injurious behavior, homicidal ideation, homicidal behavior and and other safety concerns  Patient denies current fears or concerns for personal safety.  Patient denies current or recent suicidal ideation or behaviors.  Patient denies current or recent homicidal ideation or behaviors.  Patient denies current or recent self injurious behavior or ideation.  Patient denies other safety concerns.  A safety and risk management plan has not been developed at this time, however patient was encouraged to call [facility] should there be a change in any of these risk factors.    Appearance:   Appropriate   Eye Contact:   Good   Psychomotor Behavior: Normal   Attitude:   Cooperative    Orientation:   All  Speech   Rate / Production: Normal    Volume:  Normal   Mood:    Normal  Affect:    Appropriate   Thought Content:  Clear   Thought Form:  Coherent  Logical   Insight:    Good     Diagnoses:  1. Major depressive disorder, recurrent episode, mild with atypical features (H)        Collateral Reports Completed:  Communicated with:   Communicated with GIBRAN Singh [facility]    Plan: (Homework, other):  Patient was given information about behavioral services and encouraged to schedule a follow up appointment with the clinic [facility] at the same time as [facility] provider Taniya.  He was also given information about mental health symptoms and treatment options .  CD Recommendations: No indications of CD issues.     Shelby Arredondo, MSW, [facility] 06/29/2022  ______________________________________________________________________    Integrated Primary Care Behavioral Health Treatment Plan    Patient's Name: Car Torres  YOB: 1960    Date of Creation: 04/26/2022  Date Treatment Plan Last Reviewed/Revised: 04/26/2022    DSM5 Diagnoses:   1. Major depressive disorder, recurrent episode, mild with atypical features (H)    2. Panic disorder without agoraphobia      Psychosocial / Contextual Factors: panic attacks, grand kids, wife, use to be more social  PROMIS (reviewed every 90 days):   PROMIS 10 Global Mental Health Score Global Physical Health Score   4/26/2022 14 12     PROMIS 10 PROMIS TOTAL - SUBSCORES   4/26/2022 26       Referral / Collaboration:  Referral to another professional/service is not indicated at this time.    Anticipated number of session for this episode of care: 4-6  Anticipation frequency of session: as determined by Taniya Estrada  Anticipated Duration of each session: 16-37 minutes  Treatment plan will be reviewed in 90 days or when goals have been changed.       MeasurableTreatment Goal(s) related to diagnosis / functional impairment(s)  Goal 1:  Patient will reduce depressive symptoms.    I will know I've met my goal when I can try to look at it.      Objective #A (Patient Action)    Patient will find one activity to do every 3 months that peaks interest.    Status: New - Date: 04/27/2022     Intervention(s)  Therapist will provide support to explore activities pt might be interested in, through CBT, MI, and Acceptance and Commitment Therapy.      Patient has reviewed and agreed to the above plan.      FRANCESCA Hardy  April 26, 2022

## 2022-07-07 ENCOUNTER — HOSPITAL ENCOUNTER (OUTPATIENT)
Dept: PHYSICAL THERAPY | Facility: CLINIC | Age: 62
Setting detail: THERAPIES SERIES
Discharge: HOME | End: 2022-07-07
Attending: INTERNAL MEDICINE
Payer: COMMERCIAL

## 2022-07-07 PROCEDURE — 97110 THERAPEUTIC EXERCISES: CPT | Mod: GP | Performed by: PHYSICAL THERAPIST

## 2022-07-07 NOTE — PROGRESS NOTES
[facility] Service    Outpatient Physical Therapy Discharge Note  Patient: Car Torres  : 1960    Beginning/End Dates of Reporting Period:  22 to 22    Referring Provider: Miguel Varela MD     Therapy Diagnosis: Decreased activity tolerance and hx of imbalance     Client Self Report: He is walking at faster pace on the treadmill. He is walking 4 times per week.    Goals:  Goal Identifier TM   Goal Description Jerry will walk for 20 minutes 4-5 times per week at incresaed speed (closer to 1.5 to 2.0 mph) in order to improve his functional endurance as part of HEP.   Target Date 22   Date Met  22   Progress (detail required for progress note): he is doing about 15 minutes but has increased his pace and doing at least 4 days per week     Goal Identifier sit to stand   Goal Description Jerry will perform sit to stand x 5 reps in <13 secs in order to show improved LE strength needed for transfers.   Target Date 22   Date Met  22   Progress (detail required for progress note): 11.8 secs, MET     Patient seen for 3 PT sessions focused on strengthening and endurance build exercises with balance challenges. He is walking for 15 minutes 4 days per week on the treadmill at faster pace which was improvement from doing only 10 minutes at slow pace. He is doing other strengthening exercises a few days per week and more activity around the house. He is motivated to continue working on his balance, strength and endurance.       Plan:  Discharge from therapy.    Discharge:    Reason for Discharge: Patient has met all goals.    Equipment Issued: none    Discharge Plan: Patient to continue home program.

## 2022-07-25 ENCOUNTER — MYC MEDICAL ADVICE (OUTPATIENT)
Dept: FAMILY MEDICINE | Facility: CLINIC | Age: 62
End: 2022-07-25

## 2022-07-28 ENCOUNTER — VIRTUAL VISIT (OUTPATIENT)
Dept: BEHAVIORAL HEALTH | Facility: CLINIC | Age: 62
End: 2022-07-28
Payer: COMMERCIAL

## 2022-07-28 DIAGNOSIS — R69 DIAGNOSIS DEFERRED: Primary | ICD-10-CM

## 2022-07-28 NOTE — PROGRESS NOTES
Behavioral Health Home Services  [facility]: [place]        Social Work Care Navigator Note      Patient: Car Torres  Date: July 28, 2022  Preferred Name: Jerry    Previous PHQ-9:   PHQ-9 SCORE 2/21/2022 4/26/2022 6/9/2022   PHQ-9 Total Score MyChart 7 (Mild depression) 5 (Mild depression) -   PHQ-9 Total Score 7 5 0     Previous SHAHNAZ-7:   SHAHNAZ-7 SCORE 8/30/2021 12/2/2021 6/9/2022   Total Score 0 0 0     ALMA LEVEL:  ALMA Score (Last Two) 5/4/2012   ALMA Raw Score 37   Activation Score 49.9   ALMA Level 2       Preferred Contact:  Need for : No  Preferred Contact: Cell      Type of Contact Today: Phone call (patient / identified key support person reached)      Data: (subjective / Objective):  Recent ED/IP Admission or Discharge?   None    Patient Goals:  Goal Areas: Health; Mental Health; Financial and Social Service Benefits  Patient stated goals: Health: Patient will continue to meet with his providers and his nutritionist. He also will continue to work on his daily water intake, as recommended by his nutritionist, and his balance by using the treadmill and stairs daily.   Mental Health: Patient will continue to work with his psychiatrist to manage his anxiety, as well as his care coordinator with the [facility]. He will continue to work on verbalizing any struggles he may be having with his anxiety.   Patient will also continue to find hobbies to stay busy, especially coloring and spending time outside.   Financial Benefits: Patient will continue to work with [facility] on the application process for getting approved for social security disability (SSDI), and will follow up with the appeal process if needed.    [facility] Core Service Provided:  Comprehensive Care Management: utilized the electronic medical record / patient registry to identify and support patient's health conditions / needs more effectively   Care Transitions: focused on the coordinated and seamless movement of patient  between or within different levels of care or settings  Care Coordination: provided care management services/referrals necessary to ensure patient and their identified supports have access to medical, behavioral health, pharmacology and recovery support services.  Ensured that patient's care is integrated across all settings and services.   Individual and Family Support: aimed to help clients reduce barriers to achieving goals, increase health literacy and knowledge about chronic condition(s), increase self-efficacy skills, and improve health outcomes    Current Stressors / Issues / Care Plan Objective Addressed Today:  [facility] and patient were able to meet today for Behavioral Health Home ([facility]) monthly check-in via telehealth visit. All required ROIs have been filed with HIM/patient chart.    1. Patient reported he's started receiving social security, so they just submitted the letter to the [facility] to see if he'll receive medical assistance still and how it will affect his services. Patient reported that they did dismiss the CADI waiver due to the spend down, which will be about $200 a month, and they felt it's not worth the services he would need. They reported that they don't need the services enough yet to justify the cost.     2. Patient is done with physical therapy now and doesn't see Dr. Holder until August now. Patient is continuing to do his walking some of the times. Patient feels that his balance has increased, especially when he stays consistent with walking on the treadmill. The physical therapy has helped patient hold himself more accountable for this.     3. Patient hasn't been coloring as much as he was last month. They are still trying to make sure he does some tasks daily. He put up blinds today and went to  prescriptions at [employer]. They are working on trying to make sure he doesn't watch TV for hours on end, so they are trying to find things to keep him busy which can be hard at times.  Patient's spouse also talked about patient needing to help around the house more, and she tries to give him tasks which sometimes takes him a couple days to complete.     4. Patient hasn't met with the nutritionist recently, and they don't think that they have a visit scheduled. They are thinking it might not be for a couple months until he has another appointment. Patient has been doing well with the water intake, and he's now associated drinking water when he takes his medications so he's drinking at least 20oz a day for that.     Intervention:  Motivational Interviewing: Expressed Empathy/Understanding, Supported Autonomy, Collaboration, Evocation, Permission to raise concern or advise, Open-ended questions and Reflections: simple and complex   Target Behavior(s): Explored thoughts about taking an anti-depressant, Explored and resolved challenges related to taking anti-depressants as prescribed, Explored thoughts and readiness to participate in individual therapy, Explored and resolved challenges to attending appointments as scheduled and Explored current social supports and reinforced opportunities to increase engagement    Assessment: (Progress on Goals / Homework):  Patient would benefit from continued coordination in reaching their goals set for the Behavioral Health Home ([facility]) program. [facility] reviewed Health Action Plan goals and will continue to monitor progress and work with patient and their care team.    Plan: (Homework, other):  Patient was encouraged to continue to seek condition-related information and education.      Scheduled a Phone follow up appointment with MARJ YUEN in 4 weeks     Patient has set self-identified goals and will monitor progress until the next appointment on: 8/23/22.      OLGA Montero  Behavioral Health Home ([facility])   [facility]  [phone]        Next 5 appointments (look out 90 days)    Aug 01, 2022  2:45 PM  Nurse Only with  THERESA FRY/LPN  [facility] ([facility] ) [address] [facility] [id number] [phone]   Sep 20, 2022 11:30 AM  (Arrive by 11:15 AM)  Return Visit with Miguel Varela MD  [facility] ([facility]) [address] [place] [id number] [phone]

## 2022-08-01 ENCOUNTER — ALLIED HEALTH/NURSE VISIT (OUTPATIENT)
Dept: FAMILY MEDICINE | Facility: CLINIC | Age: 62
End: 2022-08-01
Payer: COMMERCIAL

## 2022-08-01 DIAGNOSIS — Z23 ENCOUNTER FOR IMMUNIZATION: Primary | ICD-10-CM

## 2022-08-01 PROCEDURE — 90750 HZV VACC RECOMBINANT IM: CPT

## 2022-08-01 PROCEDURE — 90471 IMMUNIZATION ADMIN: CPT

## 2022-08-16 ENCOUNTER — DOCUMENTATION ONLY (OUTPATIENT)
Dept: OTHER | Facility: CLINIC | Age: 62
End: 2022-08-16

## 2022-08-23 ENCOUNTER — VIRTUAL VISIT (OUTPATIENT)
Dept: BEHAVIORAL HEALTH | Facility: CLINIC | Age: 62
End: 2022-08-23
Payer: COMMERCIAL

## 2022-08-23 DIAGNOSIS — R69 DIAGNOSIS DEFERRED: Primary | ICD-10-CM

## 2022-08-23 NOTE — PROGRESS NOTES
Behavioral Health Home Services  [facility]: [place]        Social Work Care Navigator Note      Patient: Car Torres  Date: August 23, 2022  Preferred Name: Jerry    Previous PHQ-9:   PHQ-9 SCORE 2/21/2022 4/26/2022 6/9/2022   PHQ-9 Total Score MyChart 7 (Mild depression) 5 (Mild depression) -   PHQ-9 Total Score 7 5 0     Previous SHAHNAZ-7:   SHANHAZ-7 SCORE 8/30/2021 12/2/2021 6/9/2022   Total Score 0 0 0     ALMA LEVEL:  ALMA Score (Last Two) 5/4/2012   ALMA Raw Score 37   Activation Score 49.9   ALMA Level 2       Preferred Contact:  Need for : No  Preferred Contact: Cell      Type of Contact Today: Phone call (patient / identified key support person reached)      Data: (subjective / Objective):  Recent ED/IP Admission or Discharge?   None    Patient Goals:  Goal Areas: Health; Mental Health; Financial and Social Service Benefits  Patient stated goals: Health: Patient will continue to meet with his providers and his nutritionist. He also will continue to work on his daily water intake, as recommended by his nutritionist, and his balance by using the treadmill and stairs daily.   Mental Health: Patient will continue to work with his psychiatrist to manage his anxiety, as well as his care coordinator with the [facility]. He will continue to work on verbalizing any struggles he may be having with his anxiety.   Patient will also continue to find hobbies to stay busy, especially coloring and spending time outside.   Financial Benefits: Patient will continue to work with [facility] on the application process for getting approved for social security disability (SSDI), and will follow up with the appeal process if needed.    [facility] Core Service Provided:  Comprehensive Care Management: utilized the electronic medical record / patient registry to identify and support patient's health conditions / needs more effectively   Care Transitions: focused on the coordinated and seamless movement of patient  between or within different levels of care or settings  Care Coordination: provided care management services/referrals necessary to ensure patient and their identified supports have access to medical, behavioral health, pharmacology and recovery support services.  Ensured that patient's care is integrated across all settings and services.   Individual and Family Support: aimed to help clients reduce barriers to achieving goals, increase health literacy and knowledge about chronic condition(s), increase self-efficacy skills, and improve health outcomes    Current Stressors / Issues / Care Plan Objective Addressed Today:  [facility] and patient were able to meet today for Behavioral Health Home ([facility]) monthly check-in via telehealth visit. All required ROIs have been filed with HIM/patient chart.    1. Patient reported they are watching their grandkids for 7 days starting tomorrow. Patient reported he'll be happy to come home after the 7 days, but they are still happy to see them. The oldest is 13, so she'll be able to help out, and the youngest is 2.     2. Patient's wife is having knee replacement surgery on 9/8, so they've been working on getting her cleared for surgery. Their house is looking partly like a storage room and a transitional care unit. They are working on getting patient prepared as well, since his wife is the primary caregiver. She is also a little more tired every day, and has been working on [place] of  and advanced directives. Their oldest daughter has been very involved with them to make sure they are taken care of for this as well, and they are happy to have her assistance. Patient also has a neuro psych evaluation the day after the surgery, so one of the daughters is going to take him. She let the provider know she wouldn't be there and they can reach out if they have any further questions for her.     3. Patient reported that he's been fine in the last coupe of weeks and his mood has been  alright. He reported that it's been a little tense with moving furniture around and getting things ready.     4. Patient still hasn't been able to coloring, but he's been staying busy still. They reported they need to get back to walking more again. He has been doing well with the water intake and has a routine down with this. Patient has also been watching TV more, and has been needing to rest more as well. Patient's wife reported he seems to be getting fatigued more easily.     Intervention:  Motivational Interviewing: Expressed Empathy/Understanding, Supported Autonomy, Collaboration, Evocation, Permission to raise concern or advise, Open-ended questions and Reflections: simple and complex   Target Behavior(s): Explored thoughts about taking an anti-depressant, Explored and resolved challenges related to taking anti-depressants as prescribed, Explored thoughts and readiness to participate in individual therapy, Explored and resolved challenges to attending appointments as scheduled and Explored current social supports and reinforced opportunities to increase engagement    Assessment: (Progress on Goals / Homework):  Patient would benefit from continued coordination in reaching their goals set for the Behavioral Health Home ([facility]) program. [facility] reviewed Health Action Plan goals and will continue to monitor progress and work with patient and their care team.    Plan: (Homework, other):  Patient was encouraged to continue to seek condition-related information and education.      Scheduled a Phone follow up appointment with MARJ YUEN in 4 weeks ; Patient is unable to meet with [facility] in September, so they requested to meet the first week in October.     Patient has set self-identified goals and will monitor progress until the next appointment on: 10/4/22.      OLGA Montero  Behavioral Health Home ([facility])   [facility]  [phone]        Next 5 appointments (look out  90 days)    Sep 20, 2022 11:30 AM  (Arrive by 11:15 AM)  Return Visit with Miguel Varela MD  [facility] [place] ([facility] [place]) [address] [address] [place] [id number] [phone]

## 2022-08-25 ENCOUNTER — OFFICE VISIT (OUTPATIENT)
Dept: NEUROPSYCHOLOGY | Facility: CLINIC | Age: 62
End: 2022-08-25
Attending: INTERNAL MEDICINE
Payer: COMMERCIAL

## 2022-08-25 DIAGNOSIS — F03.91 DEMENTIA WITH BEHAVIORAL DISTURBANCE, UNSPECIFIED DEMENTIA TYPE: ICD-10-CM

## 2022-08-25 DIAGNOSIS — F41.9 ANXIETY: ICD-10-CM

## 2022-08-25 DIAGNOSIS — F33.0 MAJOR DEPRESSIVE DISORDER, RECURRENT EPISODE, MILD (H): ICD-10-CM

## 2022-08-25 DIAGNOSIS — R41.844 FRONTAL LOBE AND EXECUTIVE FUNCTION DEFICIT: Primary | ICD-10-CM

## 2022-08-25 PROCEDURE — 96138 PSYCL/NRPSYC TECH 1ST: CPT | Performed by: CLINICAL NEUROPSYCHOLOGIST

## 2022-08-25 PROCEDURE — 96132 NRPSYC TST EVAL PHYS/QHP 1ST: CPT | Performed by: CLINICAL NEUROPSYCHOLOGIST

## 2022-08-25 PROCEDURE — 96133 NRPSYC TST EVAL PHYS/QHP EA: CPT | Performed by: CLINICAL NEUROPSYCHOLOGIST

## 2022-08-25 PROCEDURE — 90791 PSYCH DIAGNOSTIC EVALUATION: CPT | Performed by: CLINICAL NEUROPSYCHOLOGIST

## 2022-08-25 PROCEDURE — 96139 PSYCL/NRPSYC TST TECH EA: CPT | Performed by: CLINICAL NEUROPSYCHOLOGIST

## 2022-08-25 NOTE — LETTER
8/25/2022       RE: Car Torres  [address]  [place] [address]     Dear Colleague,    Thank you for referring your patient, Car Torres, to the [facility] [place] at [facility]. Please see a copy of my visit note below.    Pt was seen for neuropsychological evaluation at the request of Miguel Varela MD for the purposes of diagnostic clarification and treatment planning. 194 minutes of test administration and scoring were provided by this writer. Please see Dr. Jorgito Adam's report for a full interpretation of the findings.    Soila Crouch  Psychometrist         Name: Car Torres  MR#: [id number]  YOB: 1960  Date of Exam: Aug 25, 2022    NEUROPSYCHOLOGICAL EVALUATION    IDENTIFYING INFORMATION  Car Torres is a 62 year old year old, right handed, disabled large , with 13 years of formal education. He was accompanied during the interview by his wife, Destiny.     The patient was in-clinic for the entirety of this evaluation. The interview for this evaluation was completed via a Zoom meeting. Testing was completed face-to-face.     BACKGROUND INFORMATION / INTERVIEW FINDINGS    Records indicate that Mr. Torres's medical history includes unspecified psychosis, depression, hypertension, hyperlipidemia, left foot ulcer, obstructive sleep apnea, history of sinus bradycardia, erectile dysfunction, coronary artery calcification, renal cyst, diverticulosis of large intestine, pulmonary nodule, chronic kidney disease stage II, sciatica, and obesity.  He also has a history of dyslexia.  An MRI of his brain on October 6, 2020 was limited by motion artifact, but did note a few white matter lesions that were felt to be nonspecific.  He developed paranoia, delusions, and hallucinations in July, 2020. He had no prior history of psychiatric disease.  I saw him for a neuropsychology exam on  March 26, 2021.   My evaluation documented deficits and weaknesses that were felt to be consistent with significant dysfunction of frontal and subcortical brain regions.  There was felt to be compromise of lateral and medial temporal brain regions as well.  I felt that it was reasonable to use the label mild dementia, with the leading etiologic consideration being a frontotemporal dementia syndrome.  I also noted likely longstanding contributions from dyslexia.  There were deficits in executive functioning, learning, recall, and memory.  Weaknesses were identified in visual scanning, semantic fluency, cognitive speed, and bilateral psychomotor speed.  His wife had also described changes in his personality.  An FDG PET study on April 21, 2021 noted no significant metabolic deficits.  More recent notes indicate that he has had better control of the hallucinations and delusions with close psychiatric management.  Nonetheless, ongoing concerns have been expressed about his cognition.  The current follow-up examination was requested by Dr. Miguel Varela, in this context.    On interview, Mr. Torres and his wife confirmed the above history.  The patient reported that his cognition has been fine in the last year and a half since he and I met.  He stated he does not notice difficulties or changes with his thinking.  He reported that his thinking has been stable.  He again denied cognitive changes, even when presented with a list of specific cognitive domains.  He did note that he is no longer hallucinating.  The patient's wife, Destiny, stated that his hallucinations are gone.  She noted that most of his paranoid thinking is gone as well.  She stated that his medications have been helped a lot.  She noted that they moved his bedroom next to hers in their home, and she is now with him just about all the time.  Regarding his cognitive functioning, she stated that she continues to notice memory loss.  She stated that there are  times when he has forgotten an epoch of time.  For example, they traveled to [place] over [place].  He reportedly had no recall of how they had traveled there.  His wife also noted that he forgot a couple of days after they came home, as he was confused about the date.  She noted that he forgets conversations.  She stated that he previously had excellent problem-solving skills but has now had more difficulties solving mechanical issues.  She stated that his thinking difficulties seem to fluctuate but have not been progressively worsening.    When asked about personality changes, Destiny stated that she sees more apathy in his behavior.  She stated that he seems to be less interested in physical contact with her.  She also noted that he is less engaged in personal hygiene.  She stated that she needs to cue him to shower and engage in personal grooming behaviors.  She stated that he still has a good sense humor.  She did note that he seems to be more sensitive to what she says, and his feelings get hurt more easily.    With respect to mental health, Mr. Torres reported that his mood is fine.  Destiny indicated that he has had feelings of worthlessness.  She also noted that he has been struggling with anxiety symptoms.  He recently had an increase in his dose of buspirone.  He is working with a psychiatric nurse practitioner.  She noted that they are possibly interested in getting him connected with a therapist once they figure out his insurance coverage.  She noted that there have been times when he spends time on the couch under a blanket and is not interested in getting out.  She stated that he has less interest in activities that he used to enjoy.  She did state that he has had less paranoia, delusions, and no hallucinations.  The patient denied suicidal ideation or interval suicide attempts.    With regard to other medical background, Mr. Torres denied interval head injury, stroke, or seizure.  He reported that  his sleep is good, and that he sleeps 10 hours per night.  He slept normally the night before the exam.  He uses a CPAP.  He rarely naps.  His wife denied symptoms consistent with REM behavior disorder.  They noted that he sometimes has back pain, but he denied pain at the time of the interview.  His wife reported that she has noticed that he has a tremor at times.  His wife reported that he has gait instability and has had some falls. She also noted urinary urge incontinence. Per records, his current medications include aspirin, atorvastatin, buspirone, cholecalciferol, donepezil, ferrous gluconate, ferrous sulfate, fluoxetine, lisinopril-hydrochlorothiazide, multivitamin, olanzapine, omega-3 fatty acids, tadalafil, turmeric, and vitamin B complex with vitamin C.  He stated that he will rarely consume an alcoholic drink, but otherwise denied substance use.  However, Destiny reported that as of 2 days ago, she found some kratom that he had purchased and hidden.  She stated that he may have taken money from her purse to make this purchase.  She stated that he may take his drug to help his mood.  She reported that he has taken her lorazepam before.  She now locks up her medications.  She stated that she has a surgery scheduled in September and worries about the possibility that he might try to take her pain medications.  She described being very discouraged and frustrated by these issues.    Mr. Torres denied changes in his sense of taste or smell or eating habits.  Destiny stated that there has been overall, no change in his diet.  She stated, however, that he is still driving, and may go out and buy sweets.  She noted that she often observes that he has sweets when he is out, as he spills on his shirt. She reported that he may have to change his shirt twice per day due to spills.  She reported that he has had significant weight gain.    Mr. Torres continues to live at home with his wife.  He manages his own basic  daily activities.  His wife provides oversight of his medications, but he takes them on his own.  His wife is responsible for their finances and meal preparation.  As noted above, he is still driving.    By way of background, Mr. Torres and his wife remain .  As with before, they have 3 children and 8 grandchildren, all of whom live locally.  His educational background is unchanged.  He graduated from high school.  He last worked in April, 2021. He recently began receiving disability benefits.  In the past, he worked as a large .    BEHAVIORAL OBSERVATIONS  Mr. Torres was pleasant and cooperative with the exam.  He had poor eye contact with examiner.  He engaged in limited spontaneous conversation with the examiner.  His speech was normal. His comprehension was normal. His thought processes were notable for mild apathy toward testing, and he became less engaged with testing as tasks got harder as he fatigued. His insight is impaired, as evidenced by his lack of appreciation of his cognitive deficits. His mood was neutral with flat affect. His effort was rated as adequate. The current results are felt to be a broadly accurate depiction of his cognitive functioning.      RESULTS OF EXAM  His performances on standardized measures of neuropsychological functioning were as follows.     He was moderately disoriented to time.  He misstated his age by 2 years.  He was otherwise oriented to various aspects of personal information.  He was oriented to place.  He was able to state the names of the current president, and two presidents who had served in office since 1988.  He obtained passing scores on stand-alone and embedded metrics of cognitive performance validity.  Auditory attention for digits was low average.  Mental calculations were borderline impaired.  Learning of words in a list format was low average.  Delayed recall of list words was average.  Percent retention of list words was high  average.  Delayed recognition of list words was average.  Learning and delayed recall of story information were both average.  Delayed recognition of story information was low average.  Learning of simple geometric shapes and their spatial locations was borderline impaired.  Delayed recall of the shapes and their locations was borderline impaired.  Percent retention of the shapes was normal.  Delayed recognition of the shapes was low average.  Visuospatial judgments for variably oriented lines were average.  Visual problem-solving with blocks was low average.  His drawing of a complicated geometric figure was normal, although notable for a piecemeal approach.  Comprehension of phrases and short stories was high average.  Verbal associative fluency was borderline impaired.  Animal fluency was low average.  Naming to confrontation was performed in the average to high average range.  Verbal abstract reasoning was average.  Speeded visual sequencing under focused attention was average.  A similar measure with a divided attention component was low average.  Novel problem solving and responding to instructive feedback was average.  Speeded matching and cancellation was borderline impaired.  Speeded visual motor coding was borderline impaired.  Speeded fine motor dexterity was borderline impaired, bilaterally.    He endorsed items consistent with mild symptoms of depression, and mild symptoms of anxiety on self-report measures.    IMPRESSIONS  Mr. Torres demonstrated weaknesses that remain consistent with relative dysfunction of frontal and subcortical circuitry.  As with the prior evaluation, I think that frontotemporal dementia is the most likely explanation for his cognitive and behavioral changes.  To the extent that comparisons are possible, his cognition is somewhat variable relative to the exam from March, 2021.  There are some scores that have improved, while others have slightly worsened.  I suspect that the  improvements are attributable to better management of his psychiatric status. Some of the improvement could also be attributable to practice effects with these tests.  Importantly, it is the case that some of the cognitive and behavioral symptoms that are most notable in patients with frontotemporal dementia are difficult to measure with standard neuropsychological testing.  He continues to have weaknesses in attention, working memory, cognitive speed, psychomotor speed, verbal fluency, and some aspects of executive functioning.  His insight is also impaired. His wife also described a number of behavioral changes including apathy, some change in his diet, and taking medications from her.  He has relative preservation in verbal reasoning, naming, verbal comprehension, and recognition memory.  He is reporting mild symptoms of depression and anxiety, although these psychological symptoms are less severe than they were at the time of his prior exam.    RECOMMENDATIONS  Preliminary results and recommendations were provided to the patient and his wife over the telephone on August 26, 2022, and all questions were answered.     1.  An updated MRI of his brain could be considered, if medically indicated.     2.  Mr. Torres will continue to benefit from and require support and supervision of his daily activities.    3.  He will be important to ensure that he is not taking his wife's medications.  Locking up her medications is recommended.    4.  If medically indicated, consideration might be given to modified treatment of his mental health.  He is still reporting symptoms of depression and anxiety. Continued psychiatric care is indicated.     5.  His wife described epochs of memory loss. Consideration might be given to an EEG study to rule out seizure activity, if medically indicated.     6.  He is encouraged to be exceptionally cautious when driving.    7. Follow-up neuropsychological evaluation is recommended in 1 year in  order to assess and update recommendations as appropriate.  The current results can be used as an updated baseline at that time.    Jorgito Adam, Ph.D., L.P., ABPP  Board Certified in Clinical Neuropsychology   / Licensed Psychologist [id number]    Time spent:  One unit (50 minutes) psychiatric diagnostic interview including interview and clinical assessment by licensed and board-certified neuropsychologist (CPT 31231). One unit (60 minutes) neuropsychological testing evaluation by licensed and board-certified neuropsychologist, including integration of patient data, interpretation of standardized test results and clinical data, clinical decision-making, treatment planning, and report, first hour (CPT 69978). One unit(s) (40 minutes) of neuropsychological testing evaluation by licensed and board-certified neuropsychologist, including integration of patient data, interpretation of standardized test results and clinical data, clinical decision-making, treatment planning, and report, subsequent hours (CPT 25005). One unit (30 minutes) of psychological and neuropsychological test administration and scoring by technician, first 30 minutes (CPT 10098). Five units (164 minutes) psychological or neuropsychological test administration and scoring by technician, subsequent 30 minutes (CPT 86666). Diagnoses: F03.90, R41.844, F33.0, F41.9.               Again, thank you for allowing me to participate in the care of your patient.      Sincerely,    Jorgito Adam, PhD LP

## 2022-08-25 NOTE — PROGRESS NOTES
Pt was seen for neuropsychological evaluation at the request of Miguel Varela MD for the purposes of diagnostic clarification and treatment planning. 194 minutes of test administration and scoring were provided by this writer. Please see Dr. Jorgito Adam's report for a full interpretation of the findings.    Soila Crouch  Psychometrist

## 2022-08-26 NOTE — PROGRESS NOTES
Name: aCr Torres  MR#: [id number]  YOB: 1960  Date of Exam: Aug 25, 2022    NEUROPSYCHOLOGICAL EVALUATION    IDENTIFYING INFORMATION  Car Torres is a 62 year old year old, right handed, disabled large , with 13 years of formal education. He was accompanied during the interview by his wife, Destiny.     The patient was in-clinic for the entirety of this evaluation. The interview for this evaluation was completed via a Zoom meeting. Testing was completed face-to-face.     BACKGROUND INFORMATION / INTERVIEW FINDINGS    Records indicate that Mr. Torres's medical history includes unspecified psychosis, depression, hypertension, hyperlipidemia, left foot ulcer, obstructive sleep apnea, history of sinus bradycardia, erectile dysfunction, coronary artery calcification, renal cyst, diverticulosis of large intestine, pulmonary nodule, chronic kidney disease stage II, sciatica, and obesity.  He also has a history of dyslexia.  An MRI of his brain on October 6, 2020 was limited by motion artifact, but did note a few white matter lesions that were felt to be nonspecific.  He developed paranoia, delusions, and hallucinations in July, 2020. He had no prior history of psychiatric disease.  I saw him for a neuropsychology exam on March 26, 2021.   My evaluation documented deficits and weaknesses that were felt to be consistent with significant dysfunction of frontal and subcortical brain regions.  There was felt to be compromise of lateral and medial temporal brain regions as well.  I felt that it was reasonable to use the label mild dementia, with the leading etiologic consideration being a frontotemporal dementia syndrome.  I also noted likely longstanding contributions from dyslexia.  There were deficits in executive functioning, learning, recall, and memory.  Weaknesses were identified in visual scanning, semantic fluency, cognitive speed, and bilateral psychomotor speed.  His wife  had also described changes in his personality.  An FDG PET study on April 21, 2021 noted no significant metabolic deficits.  More recent notes indicate that he has had better control of the hallucinations and delusions with close psychiatric management.  Nonetheless, ongoing concerns have been expressed about his cognition.  The current follow-up examination was requested by Dr. Miguel Varela, in this context.    On interview, Mr. Torres and his wife confirmed the above history.  The patient reported that his cognition has been fine in the last year and a half since he and I met.  He stated he does not notice difficulties or changes with his thinking.  He reported that his thinking has been stable.  He again denied cognitive changes, even when presented with a list of specific cognitive domains.  He did note that he is no longer hallucinating.  The patient's wife, Destiny, stated that his hallucinations are gone.  She noted that most of his paranoid thinking is gone as well.  She stated that his medications have been helped a lot.  She noted that they moved his bedroom next to hers in their home, and she is now with him just about all the time.  Regarding his cognitive functioning, she stated that she continues to notice memory loss.  She stated that there are times when he has forgotten an epoch of time.  For example, they traveled to [place] over [place].  He reportedly had no recall of how they had traveled there.  His wife also noted that he forgot a couple of days after they came home, as he was confused about the date.  She noted that he forgets conversations.  She stated that he previously had excellent problem-solving skills but has now had more difficulties solving mechanical issues.  She stated that his thinking difficulties seem to fluctuate but have not been progressively worsening.    When asked about personality changes, Destiny stated that she sees more apathy in his behavior.  She stated that he seems  to be less interested in physical contact with her.  She also noted that he is less engaged in personal hygiene.  She stated that she needs to cue him to shower and engage in personal grooming behaviors.  She stated that he still has a good sense humor.  She did note that he seems to be more sensitive to what she says, and his feelings get hurt more easily.    With respect to mental health, Mr. Torres reported that his mood is fine.  Destiny indicated that he has had feelings of worthlessness.  She also noted that he has been struggling with anxiety symptoms.  He recently had an increase in his dose of buspirone.  He is working with a psychiatric nurse practitioner.  She noted that they are possibly interested in getting him connected with a therapist once they figure out his insurance coverage.  She noted that there have been times when he spends time on the couch under a blanket and is not interested in getting out.  She stated that he has less interest in activities that he used to enjoy.  She did state that he has had less paranoia, delusions, and no hallucinations.  The patient denied suicidal ideation or interval suicide attempts.    With regard to other medical background, Mr. Torres denied interval head injury, stroke, or seizure.  He reported that his sleep is good, and that he sleeps 10 hours per night.  He slept normally the night before the exam.  He uses a CPAP.  He rarely naps.  His wife denied symptoms consistent with REM behavior disorder.  They noted that he sometimes has back pain, but he denied pain at the time of the interview.  His wife reported that she has noticed that he has a tremor at times.  His wife reported that he has gait instability and has had some falls. She also noted urinary urge incontinence. Per records, his current medications include aspirin, atorvastatin, buspirone, cholecalciferol, donepezil, ferrous gluconate, ferrous sulfate, fluoxetine, lisinopril-hydrochlorothiazide,  multivitamin, olanzapine, omega-3 fatty acids, tadalafil, turmeric, and vitamin B complex with vitamin C.  He stated that he will rarely consume an alcoholic drink, but otherwise denied substance use.  However, Destiny reported that as of 2 days ago, she found some kratom that he had purchased and hidden.  She stated that he may have taken money from her purse to make this purchase.  She stated that he may take his drug to help his mood.  She reported that he has taken her lorazepam before.  She now locks up her medications.  She stated that she has a surgery scheduled in September and worries about the possibility that he might try to take her pain medications.  She described being very discouraged and frustrated by these issues.    Mr. Torres denied changes in his sense of taste or smell or eating habits.  Destiny stated that there has been overall, no change in his diet.  She stated, however, that he is still driving, and may go out and buy sweets.  She noted that she often observes that he has sweets when he is out, as he spills on his shirt. She reported that he may have to change his shirt twice per day due to spills.  She reported that he has had significant weight gain.    Mr. Torres continues to live at home with his wife.  He manages his own basic daily activities.  His wife provides oversight of his medications, but he takes them on his own.  His wife is responsible for their finances and meal preparation.  As noted above, he is still driving.    By way of background, Mr. Torres and his wife remain .  As with before, they have 3 children and 8 grandchildren, all of whom live locally.  His educational background is unchanged.  He graduated from high school.  He last worked in April, 2021. He recently began receiving disability benefits.  In the past, he worked as a large .    BEHAVIORAL OBSERVATIONS  Mr. Torres was pleasant and cooperative with the exam.  He had poor eye contact  with examiner.  He engaged in limited spontaneous conversation with the examiner.  His speech was normal. His comprehension was normal. His thought processes were notable for mild apathy toward testing, and he became less engaged with testing as tasks got harder as he fatigued. His insight is impaired, as evidenced by his lack of appreciation of his cognitive deficits. His mood was neutral with flat affect. His effort was rated as adequate. The current results are felt to be a broadly accurate depiction of his cognitive functioning.      RESULTS OF EXAM  His performances on standardized measures of neuropsychological functioning were as follows.     He was moderately disoriented to time.  He misstated his age by 2 years.  He was otherwise oriented to various aspects of personal information.  He was oriented to place.  He was able to state the names of the current president, and two presidents who had served in office since 1988.  He obtained passing scores on stand-alone and embedded metrics of cognitive performance validity.  Auditory attention for digits was low average.  Mental calculations were borderline impaired.  Learning of words in a list format was low average.  Delayed recall of list words was average.  Percent retention of list words was high average.  Delayed recognition of list words was average.  Learning and delayed recall of story information were both average.  Delayed recognition of story information was low average.  Learning of simple geometric shapes and their spatial locations was borderline impaired.  Delayed recall of the shapes and their locations was borderline impaired.  Percent retention of the shapes was normal.  Delayed recognition of the shapes was low average.  Visuospatial judgments for variably oriented lines were average.  Visual problem-solving with blocks was low average.  His drawing of a complicated geometric figure was normal, although notable for a piecemeal approach.   Comprehension of phrases and short stories was high average.  Verbal associative fluency was borderline impaired.  Animal fluency was low average.  Naming to confrontation was performed in the average to high average range.  Verbal abstract reasoning was average.  Speeded visual sequencing under focused attention was average.  A similar measure with a divided attention component was low average.  Novel problem solving and responding to instructive feedback was average.  Speeded matching and cancellation was borderline impaired.  Speeded visual motor coding was borderline impaired.  Speeded fine motor dexterity was borderline impaired, bilaterally.    He endorsed items consistent with mild symptoms of depression, and mild symptoms of anxiety on self-report measures.    IMPRESSIONS  Mr. Torres demonstrated weaknesses that remain consistent with relative dysfunction of frontal and subcortical circuitry.  As with the prior evaluation, I think that frontotemporal dementia is the most likely explanation for his cognitive and behavioral changes.  To the extent that comparisons are possible, his cognition is somewhat variable relative to the exam from March, 2021.  There are some scores that have improved, while others have slightly worsened.  I suspect that the improvements are attributable to better management of his psychiatric status. Some of the improvement could also be attributable to practice effects with these tests.  Importantly, it is the case that some of the cognitive and behavioral symptoms that are most notable in patients with frontotemporal dementia are difficult to measure with standard neuropsychological testing.  He continues to have weaknesses in attention, working memory, cognitive speed, psychomotor speed, verbal fluency, and some aspects of executive functioning.  His insight is also impaired. His wife also described a number of behavioral changes including apathy, some change in his diet, and  taking medications from her.  He has relative preservation in verbal reasoning, naming, verbal comprehension, and recognition memory.  He is reporting mild symptoms of depression and anxiety, although these psychological symptoms are less severe than they were at the time of his prior exam.    RECOMMENDATIONS  Preliminary results and recommendations were provided to the patient and his wife over the telephone on August 26, 2022, and all questions were answered.     1.  An updated MRI of his brain could be considered, if medically indicated.     2.  Mr. Torres will continue to benefit from and require support and supervision of his daily activities.    3.  He will be important to ensure that he is not taking his wife's medications.  Locking up her medications is recommended.    4.  If medically indicated, consideration might be given to modified treatment of his mental health.  He is still reporting symptoms of depression and anxiety. Continued psychiatric care is indicated.     5.  His wife described epochs of memory loss. Consideration might be given to an EEG study to rule out seizure activity, if medically indicated.     6.  He is encouraged to be exceptionally cautious when driving.    7. Follow-up neuropsychological evaluation is recommended in 1 year in order to assess and update recommendations as appropriate.  The current results can be used as an updated baseline at that time.    Jorgito Adam, Ph.D., L.P., ABPP  Board Certified in Clinical Neuropsychology   / Licensed Psychologist [id number]    Time spent:  One unit (50 minutes) psychiatric diagnostic interview including interview and clinical assessment by licensed and board-certified neuropsychologist (CPT 96705). One unit (60 minutes) neuropsychological testing evaluation by licensed and board-certified neuropsychologist, including integration of patient data, interpretation of standardized test results and clinical data, clinical  decision-making, treatment planning, and report, first hour (CPT 19997). One unit(s) (40 minutes) of neuropsychological testing evaluation by licensed and board-certified neuropsychologist, including integration of patient data, interpretation of standardized test results and clinical data, clinical decision-making, treatment planning, and report, subsequent hours (CPT 47463). One unit (30 minutes) of psychological and neuropsychological test administration and scoring by technician, first 30 minutes (CPT 50530). Five units (164 minutes) psychological or neuropsychological test administration and scoring by technician, subsequent 30 minutes (CPT 96184). Diagnoses: F03.90, R41.844, F33.0, F41.9.

## 2022-08-26 NOTE — PROGRESS NOTES
NAME  Car Torres     MRN  [id number]      [birth year]     AGE  62     SEX  Male     HANDEDNESS Right     EDUCATION 13     MCKNIGHT  22     PROVIDER  [facility]     STATION  OP            ORIENTATION      Time  -5     Personal Info. 3 /4    Place      Presidents  3 /6           WAIS-IV       FSIQ: 0 WMI: 74    VCI: 0 PSI: 74    CHARITY: 0 RDS: 8             Raw SS    Similarities  25 10    Block Design 24 7    Digit Span  19 6    Arithmetic  8 5    Symbol Search 15 5    Coding  32 5           [place]-O COMPLEX FIGURE       Raw T %ile   Copy  32  >16   Time to Copy 253  >16                       WRAT    4     SS %ile GE   Reading  83 13 8.1          COWAT FAS       Raw 23      SS 6      T 35             ANIMAL FLUENCY      Raw 15      SS 8      T 42              [place]ON NAMING TEST     Raw 55 /60     SS 10      %ile 62-79             COMPLEX IDEATIONAL MATERIAL     Raw 12      SS 12      T 58             TRAIL MAKING TEST       Time Errors SSa %ile   A 42 0 7 28   B 126 0 6 17-21          WCST         Raw %ile T   # Categories 6 >16    Total Errors 13 61 0   Perseveative Err. 7 63 0   FTMS:  0             FABY   V   Raw 19      %ile 26-32             GROOVED PEGBOARD       Raw Drops SS T    1 4 34    0 4 33          BDI-II       Raw 14      Interp. Mild             JAIRO       Raw 12      Interp. Mild              WMS-IV LOGICAL MEMORY YA    Raw SSa/%ile     LM I 25 10     LM II 19 9     Recog. 22 17-25             HVLT   3     Raw T    Trial 1  5     Trial 2  7     Trial 3  9     Learning  4     Total Recall 21 37    Delayed Recall 9 47    Percent Retention 100% 57    True Positives 11     False Positives 0     Disc. Index  11 47            BVMT   2     Raw T/%ile    Trial 1  1     Trial 2  4     Trial 3  6     Learning  5     Total Recall 11 31    Delayed Recall 5 36    Percent Retention 83% >16    Recognition Hits 5     Recognition F.P 0     Disc. Index  5 11-16           TOMM       Trial 1 49              DCT       E-Score 14

## 2022-09-20 ENCOUNTER — OFFICE VISIT (OUTPATIENT)
Dept: NEUROLOGY | Facility: CLINIC | Age: 62
End: 2022-09-20
Payer: COMMERCIAL

## 2022-09-20 VITALS
HEIGHT: 71 IN | WEIGHT: 315 LBS | DIASTOLIC BLOOD PRESSURE: 73 MMHG | BODY MASS INDEX: 44.1 KG/M2 | HEART RATE: 45 BPM | SYSTOLIC BLOOD PRESSURE: 126 MMHG

## 2022-09-20 DIAGNOSIS — F03.91 DEMENTIA WITH BEHAVIORAL DISTURBANCE, UNSPECIFIED DEMENTIA TYPE: Primary | ICD-10-CM

## 2022-09-20 PROCEDURE — 99214 OFFICE O/P EST MOD 30 MIN: CPT | Performed by: INTERNAL MEDICINE

## 2022-09-20 ASSESSMENT — PAIN SCALES - GENERAL: PAINLEVEL: NO PAIN (0)

## 2022-09-20 NOTE — LETTER
9/20/2022         RE: Car Torres  [address]  [place] [address]        Dear Colleague,    Thank you for referring your patient, Car Torres, to the [facility] [place]. Please see a copy of my visit note below.    [facility] Neurology Follow Up Visit    Car Torres MRN# [id number]   Age: 62 year old YOB: 1960     Brief history of symptoms: The patient was initially seen in neurologic consultation on 12/23/2020 for evaluation of cognitive changes. Please see the comprehensive neurologic consultation note from that date in the Epic records for details.     Interval history:   Patient recently had neuropsychology testing with Dr Adam.     He denies any changes compared to last visit. He doesn't have a lot of hobbies and spends most of his time watching TV. He likes to do different errands. He thinks he sleeps well and has could energy levels. He denies any mood issues.     In August wife found that money was missing from her purse and learned that Jerry was using it to purchase Kratom. She confronted him about it and he opened up about feeling of depression and worthlessness (see [address] message from August). Buspar dosage was increased and this has been helpful. Jerry denies any additional Kratom usage since then.     He continues to have excess eating of sweets. Wife tries to monitor this.       Past Medical History:     Patient Active Problem List   Diagnosis     Sciatica     Morbid obesity (H)     Hyperlipidemia LDL goal <130     Hypertension goal BP (blood pressure) < 140/90     CKD (chronic kidney disease) stage 2, GFR 60-89 ml/min     Renal cyst     Diverticulosis of large intestine without hemorrhage     Pulmonary nodule     Coronary artery calcification     History of sinus bradycardia     ED (erectile dysfunction)     TIA (obstructive sleep apnea)     Major depressive disorder, recurrent episode, mild with atypical features (H)     Dementia  associated with other underlying disease with behavioral disturbance (H)     Ulcer of left foot, unspecified ulcer stage (H)     Unspecified psychosis not due to a substance or known physiological condition (H)     Past Medical History:   Diagnosis Date     Dementia (H)      Depressive disorder      Heart disease 2018     Hypercholesterolemia      Hypertension goal BP (blood pressure) < 140/90      Nonspecific abnormal electrocardiogram (ECG) (EKG) 2007    Stress testing normal.      Sleep apnea     uses a c-pap, severe        Past Surgical History:     Past Surgical History:   Procedure Laterality Date     COLONOSCOPY N/A 2020    Procedure: COLONOSCOPY, WITH POLYPECTOMY, CLIP;  Surgeon: Mihir Lang MD;  Location: [facility] OR     COLONOSCOPY N/A 1/10/2022    Procedure: COLONOSCOPY, WITH POLYPECTOMY AND BIOPSY;  Surgeon: Mihir Lang MD;  Location:  GI     ESOPHAGOSCOPY, GASTROSCOPY, DUODENOSCOPY (EGD), COMBINED N/A 2021    Procedure: ESOPHAGOGASTRODUODENOSCOPY, WITH BIOPSY;  Surgeon: Mihir Lang MD;  Location: [facility] OR     SURGICAL HISTORY OF -       surgery on left index finger        Social History:     Social History     Tobacco Use     Smoking status: Former Smoker     Packs/day: 1.00     Years: 25.00     Pack years: 25.00     Types: Cigarettes, Cigars     Start date: 1973     Quit date: 1998     Years since quittin.6     Smokeless tobacco: Never Used     Tobacco comment: N/A not a smoker   Vaping Use     Vaping Use: Never used   Substance Use Topics     Alcohol use: Yes     Alcohol/week: 10.0 standard drinks     Comment: less than 6 per week     Drug use: Not Currently     Types: Amphetamines     Comment: Viktoriya        Family History:     Family History   Problem Relation Age of Onset     Cancer Mother         uterine     Other Cancer Mother         endometrial     Cerebrovascular Disease Mother      Substance Abuse Mother         Alcohol     C.A.D. Father       "Hypertension Father      Hyperlipidemia Father      Breast Cancer Maternal Grandmother      Other Cancer Maternal Grandmother         Lung/brain     Substance Abuse Paternal Grandfather      Hypertension Brother      Substance Abuse Brother         Alcohol     Breast Cancer Other      Breast Cancer Cousin      Other Cancer Other      Cerebrovascular Disease Other         Maternal Aunt     Glaucoma No family hx of      Macular Degeneration No family hx of         Medications:     Current Outpatient Medications   Medication Sig     ASPIRIN 325 MG OR TBEC ONE DAILY     atorvastatin (LIPITOR) 40 MG tablet Take 1 tablet (40 mg) by mouth daily     busPIRone (BUSPAR) 10 MG tablet Take 1 tablet (10 mg) by mouth 2 times daily     Cholecalciferol (VITAMIN D PO) Take 5,000 Units by mouth daily One tablet twice daily     donepezil (ARICEPT) 10 MG tablet Take 1 tablet (10 mg) by mouth daily     Ferrous Gluconate 324 (37.5 Fe) MG TABS 1 tablet daily     Ferrous Sulfate 324 (65 Fe) MG TBEC      FLUoxetine (PROZAC) 40 MG capsule Take 1 capsule (40 mg) by mouth daily     lisinopril-hydrochlorothiazide (ZESTORETIC) 20-12.5 MG tablet Take 1 tablet by mouth daily     MULTI VITAMIN MENS PO None Entered     OLANZapine (ZYPREXA) 5 MG tablet Take 1 tablet (5 mg) by mouth At Bedtime     omega 3 1000 MG CAPS Take by mouth daily     tadalafil (CIALIS) 20 MG tablet TAKE 1/2 TO 1 TABLET BY MOUTH 30 MINUTES TO 1 HOUR BEFORE SEX     TURMERIC PO Take by mouth daily     vitamin B complex with vitamin C (STRESS TAB) tablet Take 1 tablet by mouth daily     No current facility-administered medications for this visit.        Allergies:   No Known Allergies     Review of Systems:   As above     Physical Exam:   Vitals: /73 (BP Location: Right arm, Patient Position: Sitting, Cuff Size: Adult Large)   Pulse (!) 45   Ht 1.803 m (5' 11\")   Wt (!) 153.8 kg (339 lb)   BMI 47.28 kg/m     General: Seated comfortably in no acute distress.  Lungs: " breathing comfortably  Neurologic:     Mental Status: Alert. Language intact. Follows commands and answers questions appropriately.      Cranial Nerves:  No dysarthria.      Motor: No tremors or other abnormal movements observed. 5/5 strength in all extremities. Normal muscle tone. Normal/symmetric rapid finger tapping.      Sensory: Intact/symmetric to light touch throughout upper and lower extremities. Negative Romberg.     Coordination: Finger-nose-finger and heel-shin intact without dysmetria.      Gait: Slowed, but otherwise steady casual gait. Able to walk on toes and tandem with moderate difficulties.     Data reviewed on previous visits    Imaging:  MRI brain 10/16/2020  IMPRESSION:  1. Exam mildly limited by motion artifact.  2. Few minimal nonspecific white matter lesions.  3. No evidence for intracranial hemorrhage, acute infarct, or any  focal mass lesions.     Laboratory:  TSH, CMP wnl 8/2020  B12, CBC wnl 12/2020    PET brain 4/2021  IMPRESSION:  No significant metabolic deficits when compared to  control database. This may be due to the possible underlying dementia  being too early to characterize, versus the patient's symptomatology  being due to some other process than dementia.    Pertinent Investigations since last visit:   None         Assessment and Plan:   Assessment:  Patient is a 63 y/o male who presents for follow-up of dementia. Cognitive changes of paranoia, hallucinations, delusions developed initially around spring 2020, with significant worsening by fall 2020. He had no prior history of psychiatric disease. Patient had neuropsychological testing with Dr Adam in 3/2021 which was concerning for significant dysfunction in frontal and subcortical networks, and in the clinical context, concerning for a neurodegenerative process causing mild dementia. PET brain and MRI brain were largely unrevealing. Neuropsychology testing was repeated last month, which showed variable results, but  continued frontal dysfunction. Leading diagnosis on the differential at this juncture is frontotemporal dementia. He has had some benefit with donepezil. Psychiatry is currently managing psychiatric medications.     Plan:  - Continue Donepezil 10 mg nightly   - Continue to follow-up with psychiatrist - currently on Zyprexa, Prosac, and Buspar  - Continue to seek out other hobbies such as exercise to distract from over-eating    Follow up in Neurology clinic in 6 months or sooner if new issues arise    Miguel Varela MD   of Neurology  [facility]      The total time of this encounter today amounted to 35 minutes. This time included time spent with the patient, prep work, ordering tests, and performing post visit documentation.          Again, thank you for allowing me to participate in the care of your patient.        Sincerely,        Miguel Varela MD

## 2022-09-20 NOTE — NURSING NOTE
"Car Torres's goals for this visit include:   Chief Complaint   Patient presents with     RECHECK     follow up after neuropsych       He requests these members of his care team be copied on today's visit information: yes    PCP: Mihir Perez    Referring Provider:  No referring provider defined for this encounter.    /73 (BP Location: Right arm, Patient Position: Sitting, Cuff Size: Adult Large)   Pulse (!) 45   Ht 1.803 m (5' 11\")   Wt (!) 153.8 kg (339 lb)   BMI 47.28 kg/m      Do you need any medication refills at today's visit? Yes  donepizil  CHRISTINE Harris, CMA (AAMA)      "

## 2022-09-20 NOTE — PROGRESS NOTES
[facility] Neurology Follow Up Visit    Car Torres MRN# [id number]   Age: 62 year old YOB: 1960     Brief history of symptoms: The patient was initially seen in neurologic consultation on 12/23/2020 for evaluation of cognitive changes. Please see the comprehensive neurologic consultation note from that date in the Epic records for details.     Interval history:   Patient recently had neuropsychology testing with Dr Adam.     He denies any changes compared to last visit. He doesn't have a lot of hobbies and spends most of his time watching TV. He likes to do different errands. He thinks he sleeps well and has could energy levels. He denies any mood issues.     In August wife found that money was missing from her purse and learned that Jerry was using it to purchase Kratom. She confronted him about it and he opened up about feeling of depression and worthlessness (see [employer] message from August). Buspar dosage was increased and this has been helpful. Jerry denies any additional Kratom usage since then.     He continues to have excess eating of sweets. Wife tries to monitor this.       Past Medical History:     Patient Active Problem List   Diagnosis     Sciatica     Morbid obesity (H)     Hyperlipidemia LDL goal <130     Hypertension goal BP (blood pressure) < 140/90     CKD (chronic kidney disease) stage 2, GFR 60-89 ml/min     Renal cyst     Diverticulosis of large intestine without hemorrhage     Pulmonary nodule     Coronary artery calcification     History of sinus bradycardia     ED (erectile dysfunction)     TIA (obstructive sleep apnea)     Major depressive disorder, recurrent episode, mild with atypical features (H)     Dementia associated with other underlying disease with behavioral disturbance (H)     Ulcer of left foot, unspecified ulcer stage (H)     Unspecified psychosis not due to a substance or known physiological condition (H)     Past Medical History:   Diagnosis Date     Dementia  (H)      Depressive disorder      Heart disease 2018     Hypercholesterolemia      Hypertension goal BP (blood pressure) < 140/90      Nonspecific abnormal electrocardiogram (ECG) (EKG) 2007    Stress testing normal.      Sleep apnea     uses a c-pap, severe        Past Surgical History:     Past Surgical History:   Procedure Laterality Date     COLONOSCOPY N/A 2020    Procedure: COLONOSCOPY, WITH POLYPECTOMY, CLIP;  Surgeon: Mihir Lnag MD;  Location: [facility] OR     COLONOSCOPY N/A 1/10/2022    Procedure: COLONOSCOPY, WITH POLYPECTOMY AND BIOPSY;  Surgeon: Mihir Lang MD;  Location:  GI     ESOPHAGOSCOPY, GASTROSCOPY, DUODENOSCOPY (EGD), COMBINED N/A 2021    Procedure: ESOPHAGOGASTRODUODENOSCOPY, WITH BIOPSY;  Surgeon: Mihir Lang MD;  Location: [facility] OR     SURGICAL HISTORY OF -       surgery on left index finger        Social History:     Social History     Tobacco Use     Smoking status: Former Smoker     Packs/day: 1.00     Years: 25.00     Pack years: 25.00     Types: Cigarettes, Cigars     Start date: 1973     Quit date: 1998     Years since quittin.6     Smokeless tobacco: Never Used     Tobacco comment: N/A not a smoker   Vaping Use     Vaping Use: Never used   Substance Use Topics     Alcohol use: Yes     Alcohol/week: 10.0 standard drinks     Comment: less than 6 per week     Drug use: Not Currently     Types: Amphetamines     Comment: [place]        Family History:     Family History   Problem Relation Age of Onset     Cancer Mother         uterine     Other Cancer Mother         endometrial     Cerebrovascular Disease Mother      Substance Abuse Mother         Alcohol     C.A.D. Father      Hypertension Father      Hyperlipidemia Father      Breast Cancer Maternal Grandmother      Other Cancer Maternal Grandmother         Lung/brain     Substance Abuse Paternal Grandfather      Hypertension Brother      Substance Abuse Brother         Alcohol     Breast Cancer  "Other      Breast Cancer Cousin      Other Cancer Other      Cerebrovascular Disease Other         Maternal Aunt     Glaucoma No family hx of      Macular Degeneration No family hx of         Medications:     Current Outpatient Medications   Medication Sig     ASPIRIN 325 MG OR TBEC ONE DAILY     atorvastatin (LIPITOR) 40 MG tablet Take 1 tablet (40 mg) by mouth daily     busPIRone (BUSPAR) 10 MG tablet Take 1 tablet (10 mg) by mouth 2 times daily     Cholecalciferol (VITAMIN D PO) Take 5,000 Units by mouth daily One tablet twice daily     donepezil (ARICEPT) 10 MG tablet Take 1 tablet (10 mg) by mouth daily     Ferrous Gluconate 324 (37.5 Fe) MG TABS 1 tablet daily     Ferrous Sulfate 324 (65 Fe) MG TBEC      FLUoxetine (PROZAC) 40 MG capsule Take 1 capsule (40 mg) by mouth daily     lisinopril-hydrochlorothiazide (ZESTORETIC) 20-12.5 MG tablet Take 1 tablet by mouth daily     MULTI VITAMIN MENS PO None Entered     OLANZapine (ZYPREXA) 5 MG tablet Take 1 tablet (5 mg) by mouth At Bedtime     omega 3 1000 MG CAPS Take by mouth daily     tadalafil (CIALIS) 20 MG tablet TAKE 1/2 TO 1 TABLET BY MOUTH 30 MINUTES TO 1 HOUR BEFORE SEX     TURMERIC PO Take by mouth daily     vitamin B complex with vitamin C (STRESS TAB) tablet Take 1 tablet by mouth daily     No current facility-administered medications for this visit.        Allergies:   No Known Allergies     Review of Systems:   As above     Physical Exam:   Vitals: /73 (BP Location: Right arm, Patient Position: Sitting, Cuff Size: Adult Large)   Pulse (!) 45   Ht 1.803 m (5' 11\")   Wt (!) 153.8 kg (339 lb)   BMI 47.28 kg/m     General: Seated comfortably in no acute distress.  Lungs: breathing comfortably  Neurologic:     Mental Status: Alert. Language intact. Follows commands and answers questions appropriately.      Cranial Nerves:  No dysarthria.      Motor: No tremors or other abnormal movements observed. 5/5 strength in all extremities. Normal muscle " tone. Normal/symmetric rapid finger tapping.      Sensory: Intact/symmetric to light touch throughout upper and lower extremities. Negative Romberg.     Coordination: Finger-nose-finger and heel-shin intact without dysmetria.      Gait: Slowed, but otherwise steady casual gait. Able to walk on toes and tandem with moderate difficulties.     Data reviewed on previous visits    Imaging:  MRI brain 10/16/2020  IMPRESSION:  1. Exam mildly limited by motion artifact.  2. Few minimal nonspecific white matter lesions.  3. No evidence for intracranial hemorrhage, acute infarct, or any  focal mass lesions.     Laboratory:  TSH, CMP wnl 8/2020  B12, CBC wnl 12/2020    PET brain 4/2021  IMPRESSION:  No significant metabolic deficits when compared to  control database. This may be due to the possible underlying dementia  being too early to characterize, versus the patient's symptomatology  being due to some other process than dementia.    Pertinent Investigations since last visit:   None         Assessment and Plan:   Assessment:  Patient is a 61 y/o male who presents for follow-up of dementia. Cognitive changes of paranoia, hallucinations, delusions developed initially around spring 2020, with significant worsening by fall 2020. He had no prior history of psychiatric disease. Patient had neuropsychological testing with Dr Adam in 3/2021 which was concerning for significant dysfunction in frontal and subcortical networks, and in the clinical context, concerning for a neurodegenerative process causing mild dementia. PET brain and MRI brain were largely unrevealing. Neuropsychology testing was repeated last month, which showed variable results, but continued frontal dysfunction. Leading diagnosis on the differential at this juncture is frontotemporal dementia. He has had some benefit with donepezil. Psychiatry is currently managing psychiatric medications.     Plan:  - Continue Donepezil 10 mg nightly   - Continue to follow-up  with psychiatrist - currently on Zyprexa, Prosac, and Buspar  - Continue to seek out other hobbies such as exercise to distract from over-eating    Follow up in Neurology clinic in 6 months or sooner if new issues arise    Miguel Varela MD   of Neurology  [facility]      The total time of this encounter today amounted to 35 minutes. This time included time spent with the patient, prep work, ordering tests, and performing post visit documentation.

## 2022-10-05 ENCOUNTER — VIRTUAL VISIT (OUTPATIENT)
Dept: BEHAVIORAL HEALTH | Facility: CLINIC | Age: 62
End: 2022-10-05
Payer: COMMERCIAL

## 2022-10-05 ENCOUNTER — VIRTUAL VISIT (OUTPATIENT)
Dept: PSYCHIATRY | Facility: CLINIC | Age: 62
End: 2022-10-05
Payer: COMMERCIAL

## 2022-10-05 DIAGNOSIS — F41.8 OTHER SPECIFIED ANXIETY DISORDERS: ICD-10-CM

## 2022-10-05 DIAGNOSIS — F41.1 GAD (GENERALIZED ANXIETY DISORDER): Primary | ICD-10-CM

## 2022-10-05 DIAGNOSIS — F02.818 DEMENTIA ASSOCIATED WITH OTHER UNDERLYING DISEASE WITH BEHAVIORAL DISTURBANCE (H): ICD-10-CM

## 2022-10-05 DIAGNOSIS — F33.1 MAJOR DEPRESSIVE DISORDER, RECURRENT EPISODE, MODERATE (H): ICD-10-CM

## 2022-10-05 DIAGNOSIS — F33.0 MAJOR DEPRESSIVE DISORDER, RECURRENT EPISODE, MILD (H): Primary | ICD-10-CM

## 2022-10-05 PROCEDURE — 99214 OFFICE O/P EST MOD 30 MIN: CPT | Mod: 95 | Performed by: NURSE PRACTITIONER

## 2022-10-05 PROCEDURE — 90832 PSYTX W PT 30 MINUTES: CPT | Mod: 95 | Performed by: COUNSELOR

## 2022-10-05 RX ORDER — BUSPIRONE HYDROCHLORIDE 15 MG/1
15 TABLET ORAL 2 TIMES DAILY
Qty: 60 TABLET | Refills: 1 | Status: SHIPPED | OUTPATIENT
Start: 2022-10-05 | End: 2022-11-17

## 2022-10-05 ASSESSMENT — PATIENT HEALTH QUESTIONNAIRE - PHQ9
10. IF YOU CHECKED OFF ANY PROBLEMS, HOW DIFFICULT HAVE THESE PROBLEMS MADE IT FOR YOU TO DO YOUR WORK, TAKE CARE OF THINGS AT HOME, OR GET ALONG WITH OTHER PEOPLE: SOMEWHAT DIFFICULT
SUM OF ALL RESPONSES TO PHQ QUESTIONS 1-9: 4
SUM OF ALL RESPONSES TO PHQ QUESTIONS 1-9: 4

## 2022-10-05 NOTE — PROGRESS NOTES
"Jerry is a 62 year old who is being evaluated via a billable video visit.      How would you like to obtain your AVS? MyChart  If the video visit is dropped, the invitation should be resent by: Text to cell phone: [phone]  Will anyone else be joining your video visit? Sudheer wife      Yudy Leyva VF    Video-Visit Details    Video Start Time: 2:47 PM    Type of service:  Video Visit    Video End Time:3:15 PM    Originating Location (pt. Location): Home    Distant Location (provider location):  [facility] MENTAL [facility] & ADDICTION [facility]     Platform used for Video Visit: [facility]              Outpatient Psychiatric Progress Note    Name: Car Torres   : 1960                    Primary Care Provider: Mihir Perez MD   Therapist: None    PHQ-9 scores:  PHQ-9 SCORE 2022 2022 10/5/2022   PHQ-9 Total Score MyChart 5 (Mild depression) - 4 (Minimal depression)   PHQ-9 Total Score 5 0 4       SHAHNAZ-7 scores:  SHAHNAZ-7 SCORE 2021   Total Score 0 0 0       Patient Identification:    Patient is a 62 year old year old,   White Not  or  male  who presents for return visit with me.  Patient is currently disabled. Patient attended the session with His wife , who they agreed to have interview with. Patient prefers to be called: \" Jerry\".    Current medications include: ASPIRIN 325 MG OR TBEC, ONE DAILY  atorvastatin (LIPITOR) 40 MG tablet, Take 1 tablet (40 mg) by mouth daily  busPIRone (BUSPAR) 10 MG tablet, Take 1 tablet (10 mg) by mouth 2 times daily  Cholecalciferol (VITAMIN D PO), Take 5,000 Units by mouth daily One tablet twice daily  donepezil (ARICEPT) 10 MG tablet, Take 1 tablet (10 mg) by mouth daily  Ferrous Gluconate 324 (37.5 Fe) MG TABS, 1 tablet daily  Ferrous Sulfate 324 (65 Fe) MG TBEC,   FLUoxetine (PROZAC) 40 MG capsule, Take 1 capsule (40 mg) by mouth daily  lisinopril-hydrochlorothiazide (ZESTORETIC) 20-12.5 MG tablet, Take 1 " tablet by mouth daily  MULTI VITAMIN MENS PO, None Entered  OLANZapine (ZYPREXA) 5 MG tablet, Take 1 tablet (5 mg) by mouth At Bedtime  omega 3 1000 MG CAPS, Take by mouth daily  tadalafil (CIALIS) 20 MG tablet, TAKE 1/2 TO 1 TABLET BY MOUTH 30 MINUTES TO 1 HOUR BEFORE SEX  TURMERIC PO, Take by mouth daily  vitamin B complex with vitamin C (STRESS TAB) tablet, Take 1 tablet by mouth daily    No current facility-administered medications on file prior to visit.       The [place] Prescription Monitoring Program has been reviewed and there are no concerns about diversionary activity for controlled substances at this time.      I was able to review most recent Primary Care Provider, specialty provider, and therapy visit notes that I have access to.     Today, patient reports that since his wife recently had knee surgery he has had higher than usual anxiety.  I noticed a brief moment of chewing movements which his wife told me he has had off-and-on for a long time.  Jerry denies depression.  Most days he runs errands.  Of note he recently relapsed on kratom.  He took money from his wife's purse and now she is hypervigilant about giving him any access to cash.  He is unable to tell me he would not do this again and reported having some cravings.  Jerry tells me he takes kratom to help himself relax.  He denies any paranoia.  No anger outbursts are reported.  Recent nerve psych testing reported no new findings and his wife tells me they believe he might have frontotemporal type dementia.  He continues to have flashback memories of the stress he endured at his previous place of employment.  He cited an incident where a friend of his  from heart complications due to the stress of that job.     has a past medical history of Dementia (H), Depressive disorder, Heart disease (2018), Hypercholesterolemia, Hypertension goal BP (blood pressure) < 140/90, Nonspecific abnormal electrocardiogram (ECG) (EKG) (2007), and  Sleep apnea.    He has no past medical history of Arthritis, Cancer (H), Cerebral infarction (H), Congestive heart failure (H), Congestive heart failure, unspecified, COPD (chronic obstructive pulmonary disease) (H), Diabetes (H), History of blood transfusion, Thyroid disease, or Uncomplicated asthma.    Social history updates:    Jerry is unemployed and receiving disability benefits.    Substance use updates:    No alcohol use reported  Tobacco use: No    Vital Signs:   There were no vitals taken for this visit.    Labs:    Most recent laboratory results reviewed and no new labs.     Mental Status Examination:  Appearance: awake, alert and casual dress  Attitude: evasive  Eye Contact:  adequate  Gait and Station: No assistive Devices used and No dizziness or falls  Psychomotor Behavior:  intact station, gait and muscle tone  Oriented to:  time, person, and place  Attention Span and Concentration:  Normal  Speech:   clear, coherent and Speaks: English  Mood:  anxious and depressed  Affect:  mood congruent  Associations:  no loose associations  Thought Process:  linear  Thought Content:  no evidence of suicidal ideation or homicidal ideation, no auditory hallucinations present and no visual hallucinations present  Recent and Remote Memory:  fair Not formally assessed. No amnesia.  Fund of Knowledge: appropriate  Insight:  fair  Judgment:  fair  Impulse Control:  fair    Suicide Risk Assessment:  Today Car Torres reports no thoughts to harm themself or others. In addition, there are notable risk factors for self-harm, including anxiety, substance abuse, withdrawing and mood change. However, risk is mitigated by commitment to family, history of seeking help when needed, future oriented, denies suicidal intent or plan and denies homicidal ideation, intent, or plan. Therefore, based on all available evidence including the factors cited above, Car Torres does not appear to be at imminent risk for self-harm,  does not meet criteria for a 72-hr hold, and therefore remains appropriate for ongoing outpatient level of care.  A thorough assessment of risk factors related to suicide and self-harm have been reviewed and are noted above. The patient convincingly denies suicidality on several occasions. Local community safety resources printed and reviewed for patient to use if needed. There was no deceit detected, and the patient presented in a manner that was believable.     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  300.02 (F41.1) Generalized Anxiety Disorder    Medical comorbidities include:   Patient Active Problem List    Diagnosis Date Noted     Hypertension goal BP (blood pressure) < 140/90 10/20/2010     Priority: High     Hyperlipidemia LDL goal <130 06/11/2010     Priority: High     Dementia associated with other underlying disease with behavioral disturbance 01/03/2022     Priority: Medium     Ulcer of left foot, unspecified ulcer stage (H) 01/03/2022     Priority: Medium     Unspecified psychosis not due to a substance or known physiological condition (H) 01/03/2022     Priority: Medium     Major depressive disorder, recurrent episode, mild with atypical features (H) 12/06/2021     Priority: Medium     TIA (obstructive sleep apnea) 07/19/2018     Priority: Medium     Home Sleep Apnea Testing - 7/18/18: 295 lbs 0 oz: AHI 45.3/hr. Supine AHI 61.9/hr. Oxygen [place] of 66%.  Baseline 93%.  Sp02 =< 88% for 47 minutes.       History of sinus bradycardia 06/07/2018     Priority: Medium     ED (erectile dysfunction) 06/07/2018     Priority: Medium     Coronary artery calcification 05/08/2018     Priority: Medium     Per CT CHEST WITHOUT CONTRAST April 23, 2018. CT calcium score planned.        Renal cyst 11/18/2015     Priority: Medium     Simple, non enhanced,  3.5 cm on right kidney, Bosniak 1 - x 2 stable findings       Diverticulosis of large intestine without hemorrhage 11/18/2015     Priority: Medium     Incidental finding on CT scan        Pulmonary nodule 11/18/2015     Priority: Medium     Incidental finding on CT scan, right posterior lung - considered benign / stable        CKD (chronic kidney disease) stage 2, GFR 60-89 ml/min 10/21/2010     Priority: Medium     60 to 80 - have discussed Lisinopril - will consider        Sciatica 02/27/2006     Priority: Medium     Morbid obesity (H) 02/27/2006     Priority: Medium       Assessment:    Car Torres and his wife reported to me today that Jerry had recently bought kratom and used it.  He tells me he takes it to help himself relax.  At this point he denies any psychosis or delusional thinking.  His wife is highly anxious that he will purchase this product again and is hypervigilant about what kind of access Jerry has to gao.  Jerry is unable to tell me he would not take kratom in the future.  At this point his depression symptoms are mild.  He continues to process his work stress history and the difficulties in performing the job prior to him being placed on disability.  His wife tells me neuropsych testing revealed him having frontotemporal dementia.  Jerry voiced no opinion 1 way or the other about this condition.  With the increase recently of buspirone, both Jerry and his wife feel like he has had less anxiety.  They agreed to another increase in this medication for now and he will continue his fluoxetine and olanzapine as scheduled.  He had a brief episode of chewing movements but his wife tells me he had these kind of mannerisms before starting the medications..    Medication side effects and alternatives were reviewed. Health promotion activities recommended and reviewed today. All questions addressed. Education and counseling completed regarding risks and benefits of medications and psychotherapy  options.    Treatment Plan:        1.  Increase buspirone to 15 mg twice daily    2.  Continue fluoxetine 40 mg daily    3.  Donepezil 10 mg daily    4.  Olanzapine 5 mg at bedtime    5.  Community support to process feelings associated with adjusting to [facility]        Continue all other medications as reviewed per electronic medical record today.     Safety plan reviewed. To the Emergency Department as needed or call after hours crisis line at [phone] or [phone]. [place] Crisis Text [place]. Text [place] to 785730 or Suicide LifeLine Chat: suicideDailyDeal.org/chat/    To schedule individual or family therapy, call [facility] at [phone]    Schedule an appointment with me in 6 weeks or sooner as needed. Call [facility] at [phone] to schedule.    Follow up with primary care provider as planned or for acute medical concerns.    Call the psychiatric nurse line with medication questions or concerns at [phone]    MyChart may be used to communicate with your provider, but this is not intended to be used for emergencies.    Crisis Resources:    National Suicide Prevention Lifeline: 232.487.2933 (TTY: 312.924.5734). Call anytime for help.  (www.suicidepreventionlifeline.org)  National [place] on Mental Illness (www.ana maría.org): 941.933.3944 or 492-852-8293.   Mental Health Association (www.mentalhealth.org): 648.343.3517 or 924-480-8610.  [place] Crisis [place]: Text [place] to 653338  Suicide LifeLine Chat: suicideDailyDeal.org/chat    Administrative Billing:   Time spent with patient includes counseling and coordination of care regarding above diagnoses and treatment plan.    Patient Status:  Patient will continue to be seen for ongoing consultation and stabilization.    Signed:   BELEN Singh-BC   Psychiatry

## 2022-10-05 NOTE — PATIENT INSTRUCTIONS
1.  Increase buspirone to 15 mg twice daily  2.  Continue fluoxetine 40 mg daily  3.  Donepezil 10 mg daily  4.  Olanzapine 5 mg at bedtime  5.  Community support to process feelings associated with adjusting to long-term    Continue all other medications as reviewed per electronic medical record today.   Safety plan reviewed. To the Emergency Department as needed or call after hours crisis line at [phone] or [phone]. [place] Crisis [place]. Text [place] to 177400 or Suicide LifeLine Chat: suicideLC Style.com.org/chat/  To schedule individual or family therapy, call [facility] at [phone]  Schedule an appointment with me in 6 weeks or sooner as needed. Call [facility] at [phone] to schedule.  Follow up with primary care provider as planned or for acute medical concerns.  Call the psychiatric nurse line with medication questions or concerns at [phone]  MyChart may be used to communicate with your provider, but this is not intended to be used for emergencies.    Crisis Resources:    National Suicide Prevention Lifeline: 196.343.7399 (TTY: 700.135.4829). Call anytime for help.  (www.suicidepreventionlifeline.org)  National [place] on Mental Illness (www.ana maría.org): 438.511.1193 or 664-951-9159.   Mental Health Association (www.mentalhealth.org): 883.648.5431 or 250-858-3754.  [place] Crisis [place]: Text [place] to 064267  Suicide LifeLine Chat: suicideLC Style.com.org/chat

## 2022-10-05 NOTE — PROGRESS NOTES
[facility]     October 5, 2022      Behavioral Health Clinician Progress Note    Patient Name: Car Torres           Service Type:  Individual      Service Location:   MyChart / Email (patient reached)     Session Start Time: 214pm  Session End Time: 239pm      Session Length: 16 - 37      Attendees: Patient and Spouse / Significant Other     Service Modality:  Video Visit:      Provider verified identity through the following two step process.  Patient provided:  Patient photo and Patient is known previously to provider    Telemedicine Visit: The patient's condition can be safely assessed and treated via synchronous audio and visual telemedicine encounter.      Reason for Telemedicine Visit: Services only offered telehealth    Originating Site (Patient Location): Patient's home    Distant Site (Provider Location): Provider Remote Setting- Home Office    Consent:  The patient/guardian has verbally consented to: the potential risks and benefits of telemedicine (video visit) versus in person care; bill my insurance or make self-payment for services provided; and responsibility for payment of non-covered services.     Patient would like the video invitation sent by:  My Chart    Mode of Communication:  Video Conference via [facility]    As the provider I attest to compliance with applicable laws and regulations related to telemedicine.    Visit Activities (Refresh list every visit): [facility] Only    Diagnostic Assessment Date: 02/22/2022  Treatment Plan Review Date: 12/5/2022  See Flowsheets for today's PHQ-9 and SHAHNAZ-7 results  Previous PHQ-9:   PHQ-9 SCORE 4/26/2022 6/9/2022 10/5/2022   PHQ-9 Total Score [facility] 5 (Mild depression) - 4 (Minimal depression)   PHQ-9 Total Score 5 0 4     Previous SHAHNAZ-7:   SHAHNAZ-7 SCORE 8/30/2021 12/2/2021 6/9/2022   Total Score 0 0 0       ALMA LEVEL:  ALMA Score (Last Two) 5/4/2012   ALMA Raw Score 37   Activation Score 49.9   ALMA Level  "2       DATA  Extended Session (60+ minutes): No  Interactive Complexity: No  Crisis: No  [facility] Patient: No    Treatment Objective(s) Addressed in This Session:  Target Behavior(s): exploring hobbies, helping out around the house, provide support to their wife    Depressed Mood: Decrease frequency and intensity of feeling down, depressed, hopeless  Anxiety: will experience a reduction in anxiety and will increase ability to function adaptively    Current Stressors / Issues:   update: Pt reports that things have been fine. Pt's wife states that things have been busy since she just had a knee replacement. Pt's wife says that they have done some modifications in their home. Jerry had another neuropsych eval and the results are the same. Pt says that they are drinking water and haven't been doing their tread mill as much. Pt has been helping do laundry and running the . Pt reports that they are cooking. Pt is painting his wind chimes he made before.   Stressors: pt's wife had surgery   Side Effects: none  Mood: \"fine\"  Appetite: unremarkable  Sleep: unremarkable     Suicidality: Pt denies   Self-harm: Pt denies   Substance Use: Pt denies  Caffeine: rare  Therapist: not at this time   Interventions: [facility] discusses a resource to attend groups by phone to reduce isolation and to look into activities with the library   Medication Questions/Requests: says anxiety is fine, pt's wife has noticed his anxiety has been higher since pt is in a lot of pain it will affect the pt since he can't do much to help manage it        Progress on Treatment Objective(s) / Homework:  Stable - ACTION (Actively working towards change); Intervened by reinforcing change plan / affirming steps taken     CBT: Pt is helping cook a few meals, clean and do laundry and take care of their wife after knee surgery. Pt is painting wind chimes they made.     Motivational Interviewing    MI Intervention: Co-Developed Goal: reduce depression, Expressed " Empathy/Understanding, Supported Autonomy, Collaboration, Evocation, Permission to raise concern or advise, Open-ended questions, Reflections: simple and complex, Change talk (evoked) and Reframe     Change Talk Expressed by the Patient: Reasons to change Need to change Taking steps    Provider Response to Change Talk: E - Evoked more info from patient about behavior change, A - Affirmed patient's thoughts, decisions, or attempts at behavior change, R - Reflected patient's change talk and S - Summarized patient's change talk statements    Also provided psychoeducation about behavioral health condition, symptoms, and treatment options    Care Plan review completed: Yes    Medication Review:  No changes to current psychiatric medication(s)    Medication Compliance:  Yes    Changes in Health Issues:   None reported    Chemical Use Review:   Substance Use: Chemical use reviewed, no active concerns identified      Tobacco Use: No current tobacco use.      Assessment: Current Emotional / Mental Status (status of significant symptoms):  Risk status (Self / Other harm or suicidal ideation)  Patient denies a history of suicidal ideation, suicide attempts, self-injurious behavior, homicidal ideation, homicidal behavior and and other safety concerns  Patient denies current fears or concerns for personal safety.  Patient denies current or recent suicidal ideation or behaviors.  Patient denies current or recent homicidal ideation or behaviors.  Patient denies current or recent self injurious behavior or ideation.  Patient denies other safety concerns.  A safety and risk management plan has not been developed at this time, however patient was encouraged to call [place] [phone] should there be a change in any of these risk factors.    Appearance:   Appropriate   Eye Contact:   Good   Psychomotor Behavior: Normal   Attitude:   Cooperative   Orientation:   All  Speech   Rate / Production: Normal    Volume:  Normal    Mood:    Anxious  Sad   Affect:    Worrisome   Thought Content:  Clear   Thought Form:  Coherent  Logical   Insight:    Good     Diagnoses:  1. Major depressive disorder, recurrent episode, mild (H)    2. Other specified anxiety disorders        Collateral Reports Completed:  Communicated with:   Communicated with GIBRAN Singh [facility]    Plan: (Homework, other):  Patient was given information about behavioral services and encouraged to schedule a follow up appointment with the clinic [facility] at the same time as [facility] provider Taniya.  He was also given information about mental health symptoms and treatment options .  [facility] will sent pt inforamtion about joining groups by phone or by video. CD Recommendations: No indications of CD issues.     EVA Hardy, [facility] 10/05/2022  ______________________________________________________________________    Integrated Primary Care Behavioral Health Treatment Plan    Patient's Name: Car Torres  YOB: 1960    Date of Creation: 04/26/2022  Date Treatment Plan Last Reviewed/Revised: 10/05//2022    DSM5 Diagnoses:   1. Major depressive disorder, recurrent episode, mild (H)    2. Other specified anxiety disorders          Psychosocial / Contextual Factors: grand kids, wife, being be more social, helping out with tasks and painting  PROMIS (reviewed every 90 days):   PROMIS 10 Global Mental Health Score Global Physical Health Score   4/26/2022 14 12   10/5/2022 14 14     PROMIS 10 PROMIS TOTAL - SUBSCORES   4/26/2022 26   10/5/2022 28       Referral / Collaboration:  Referral to another professional/service is not indicated at this time.    Anticipated number of session for this episode of care: 4-6  Anticipation frequency of session: as determined by Taniya Estrada  Anticipated Duration of each session: 16-37 minutes  Treatment plan will be reviewed in 90 days or when goals have been changed.       MeasurableTreatment Goal(s)  related to diagnosis / functional impairment(s)  Goal 1: Patient will reduce depressive symptoms.    I will know I've met my goal when I have been painting wind chimes .      Objective #A (Patient Action)    Patient will find one activity to do every 3 months that peaks interest.      Status: Continued - Date(s):10/05/2022     Intervention(s)  Therapist will provide support to explore activities pt might be interested in, through CBT, MI, and Acceptance and Commitment Therapy.      Patient has reviewed and agreed to the above plan.      FRANCESCA Hardy  October 5, 2022

## 2022-10-24 ENCOUNTER — TELEPHONE (OUTPATIENT)
Dept: BEHAVIORAL HEALTH | Facility: CLINIC | Age: 62
End: 2022-10-24

## 2022-10-24 NOTE — TELEPHONE ENCOUNTER
Behavioral Health Home Services  [facility]: [place]        Social Work Care Navigator Note      Patient: Car Torres  Date: October 24, 2022  Preferred Name: Jerry    Previous PHQ-9:   PHQ-9 SCORE 4/26/2022 6/9/2022 10/5/2022   PHQ-9 Total Score MyChart 5 (Mild depression) - 4 (Minimal depression)   PHQ-9 Total Score 5 0 4     Previous SHAHNAZ-7:   SHAHNAZ-7 SCORE 8/30/2021 12/2/2021 6/9/2022   Total Score 0 0 0     ALMA LEVEL:  ALMA Score (Last Two) 5/4/2012   ALMA Raw Score 37   Activation Score 49.9   ALMA Level 2       Preferred Contact:  Need for : No  Preferred Contact: Cell      Type of Contact Today: Phone call (not reached/unavailable)      Data: (subjective / Objective):  Attempted to reach patient for check in, but was unsuccessful. [facility] left a message requesting a call back.  Plan to attempt again.    OLGA Montero  Behavioral Health [place] ([facility])   [facility]  [phone]

## 2022-10-27 ENCOUNTER — VIRTUAL VISIT (OUTPATIENT)
Dept: BEHAVIORAL HEALTH | Facility: CLINIC | Age: 62
End: 2022-10-27
Payer: COMMERCIAL

## 2022-10-27 DIAGNOSIS — R69 DIAGNOSIS DEFERRED: Primary | ICD-10-CM

## 2022-10-27 NOTE — PROGRESS NOTES
Behavioral Health Home Services  [facility]: [place]        Social Work Care Navigator Note      Patient: Car Torres  Date: October 27, 2022  Preferred Name: Jerry    Previous PHQ-9:   PHQ-9 SCORE 4/26/2022 6/9/2022 10/5/2022   PHQ-9 Total Score MyChart 5 (Mild depression) - 4 (Minimal depression)   PHQ-9 Total Score 5 0 4     Previous SHAHNAZ-7:   SHAHNAZ-7 SCORE 8/30/2021 12/2/2021 6/9/2022   Total Score 0 0 0     ALMA LEVEL:  ALMA Score (Last Two) 5/4/2012   ALMA Raw Score 37   Activation Score 49.9   ALMA Level 2       Preferred Contact:  Need for : No  Preferred Contact: Cell      Type of Contact Today: Phone call (patient / identified key support person reached)      Data: (subjective / Objective):  Recent ED/IP Admission or Discharge?   None    Patient Goals:  Goal Areas: Health; Mental Health; Financial and Social Service Benefits  Patient stated goals: Health: Patient will continue to meet with his providers and his nutritionist. He also will continue to work on his daily water intake, as recommended by his nutritionist, and his balance by using the treadmill and stairs daily.   Mental Health: Patient will continue to work with his psychiatrist to manage his anxiety, as well as his care coordinator with the [facility]al [facility] Home Services. He will continue to work on verbalizing any struggles he may be having with his anxiety.   Patient will also continue to find hobbies to stay busy, especially coloring and spending time outside.   Financial Benefits: Patient will continue to work with [facility] on the application process for getting approved for social security disability (SSDI), and will follow up with the appeal process if needed.    [facility] Core Service Provided:  Comprehensive Care Management: utilized the electronic medical record / patient registry to identify and support patient's health conditions / needs more effectively   Care Transitions: focused on the coordinated and seamless movement of  patient between or within different levels of care or settings  Care Coordination: provided care management services/referrals necessary to ensure patient and their identified supports have access to medical, behavioral health, pharmacology and recovery support services.  Ensured that patient's care is integrated across all settings and services.   Individual and Family Support: aimed to help clients reduce barriers to achieving goals, increase health literacy and knowledge about chronic condition(s), increase self-efficacy skills, and improve health outcomes    Current Stressors / Issues / Care Plan Objective Addressed Today:  SWCC and patient were able to meet today for Behavioral Health Home ([facility]) monthly check-in via telehealth visit. All required ROIs have been filed with HIM/patient chart.    1. Patient reported that he's been doing fine and his mood has been fine.     2. Patient had an increase to his anxiety medications last month, and he feels like it's been helping. He hasn't had any issues with the medications since then. There was a brief adjustment period, but they aren't noticing any issues now.     3. Patient's wife explained that their oldest daughter was very helpful in preparing them for her surgery since she is the primary caregiver for patient as well. She has been able to work on transforming their house to make it accessible for patient's wife's recovery, and she stayed with them to make sure they were both okay. They're other daughters have helped them as needed as well, which was very helpful to get them through the initial struggles of it. They are now able to do their own thing again, but their only thing is not being able to drive yet. Patient's wife was able to direct patient with various tasks, so he was able to help out more physically around the house. He's also been good about taking on more responsibility around the house and care for his wife as well.     4. Patient reported that he  replaced the basement window, and he was able to get this done so they can see how it holds up in through the window. Their son in law is going to help replace the rest of the windows in the Spring depending on how the first window is. Patient also painted his wind chimes and stand that he built back a couple years.     5. Patient reported that he hasn't been coloring as much, but it's still available if he wants to take them out again. He is working on a steel puzzle now, which he really enjoys working on because he has to use a magnifying glass to figure it out. His wife is also still working on giving him tasks for the day, so he has plans for the day.     6. Patient reported that he is still drinking his water routinely, one 12 oz bottle in the morning and one at night.     7. Patient has a PCP appointment scheduled for December since it's been about a year now, so they can do a yearly exam. They would like discuss his back aches and occasional tremors, and issues with walking. Patient and his wife have been trying to go on walks, and he could barely make it home on one of their recent walks. He also had his second neuro-psych testing at the end of August, and the provider stated they're suspecting frontal temporal dementia and reviewed the results with them.     Intervention:  Motivational Interviewing: Expressed Empathy/Understanding, Supported Autonomy, Collaboration, Evocation, Permission to raise concern or advise, Open-ended questions and Reflections: simple and complex   Target Behavior(s): Explored thoughts about taking an anti-depressant, Explored and resolved challenges related to taking anti-depressants as prescribed, Explored thoughts and readiness to participate in individual therapy, Explored and resolved challenges to attending appointments as scheduled and Explored current social supports and reinforced opportunities to increase engagement    Assessment: (Progress on Goals / Homework):  Patient  would benefit from continued coordination in reaching their goals set for the Behavioral Health Home ([facility]) program. SWCC reviewed Health Action Plan goals and will continue to monitor progress and work with patient and their care team.    Plan: (Homework, other):  Patient was encouraged to continue to seek condition-related information and education.      Scheduled a Phone follow up appointment with MARJ  in 4 weeks     Patient has set self-identified goals and will monitor progress until the next appointment on: 11/22/22.       OLGA Montero  Behavioral Health Home ([facility])   [facility]  [phone]        Next 5 appointments (look out 90 days)    Dec 05, 2022  7:20 AM  (Arrive by 7:00 AM)  Provider Visit with Mihir Perez MD  [facility] ([facility] ) [address]  [place] [id number]  [phone]

## 2022-11-17 ENCOUNTER — VIRTUAL VISIT (OUTPATIENT)
Dept: PSYCHIATRY | Facility: CLINIC | Age: 62
End: 2022-11-17
Payer: COMMERCIAL

## 2022-11-17 DIAGNOSIS — F33.1 MAJOR DEPRESSIVE DISORDER, RECURRENT EPISODE, MODERATE (H): Primary | ICD-10-CM

## 2022-11-17 DIAGNOSIS — F41.1 GAD (GENERALIZED ANXIETY DISORDER): ICD-10-CM

## 2022-11-17 PROCEDURE — 99214 OFFICE O/P EST MOD 30 MIN: CPT | Mod: 95 | Performed by: NURSE PRACTITIONER

## 2022-11-17 RX ORDER — OLANZAPINE 5 MG/1
2.5-5 TABLET ORAL AT BEDTIME
Qty: 90 TABLET | Refills: 1 | Status: SHIPPED | OUTPATIENT
Start: 2022-11-17 | End: 2022-12-05

## 2022-11-17 RX ORDER — FLUOXETINE 40 MG/1
40 CAPSULE ORAL DAILY
Qty: 90 CAPSULE | Refills: 1 | Status: SHIPPED | OUTPATIENT
Start: 2022-11-17 | End: 2022-12-05

## 2022-11-17 RX ORDER — BUSPIRONE HYDROCHLORIDE 15 MG/1
15 TABLET ORAL 2 TIMES DAILY
Qty: 60 TABLET | Refills: 1 | Status: SHIPPED | OUTPATIENT
Start: 2022-11-17 | End: 2022-12-05

## 2022-11-17 ASSESSMENT — PATIENT HEALTH QUESTIONNAIRE - PHQ9
10. IF YOU CHECKED OFF ANY PROBLEMS, HOW DIFFICULT HAVE THESE PROBLEMS MADE IT FOR YOU TO DO YOUR WORK, TAKE CARE OF THINGS AT HOME, OR GET ALONG WITH OTHER PEOPLE: NOT DIFFICULT AT ALL
SUM OF ALL RESPONSES TO PHQ QUESTIONS 1-9: 0
SUM OF ALL RESPONSES TO PHQ QUESTIONS 1-9: 0

## 2022-11-17 NOTE — PROGRESS NOTES
"Jerry is a 62 year old who is being evaluated via a billable video visit.      How would you like to obtain your AVS? MyChart  If the video visit is dropped, the invitation should be resent by: Text to cell phone: [phone]  Will anyone else be joining your video visit? No        Video-Visit Details    Video Start Time: 11:26 AM    Type of service:  Video Visit    Video End Time:11:56 AM    Originating Location (pt. Location): Home        Distant Location (provider location):  Off-site    Platform used for Video Visit: [facility]         Outpatient Psychiatric Progress Note    Name: Car Torres   : 1960                    Primary Care Provider: Mihir Perez MD   Therapist: None    PHQ-9 scores:  PHQ-9 SCORE 2022 2022 10/5/2022   PHQ-9 Total Score MyChart 5 (Mild depression) - 4 (Minimal depression)   PHQ-9 Total Score 5 0 4       SHAHNAZ-7 scores:  SHAHNAZ-7 SCORE 2021   Total Score 0 0 0       Patient Identification:    Patient is a 62 year old year old,   White Not  or  male  who presents for return visit with me.  Patient is currently unemployed. Patient attended the session with His wife , who they agreed to have interview with. Patient prefers to be called: \" Jerry\".    Current medications include: ASPIRIN 325 MG OR TBEC, ONE DAILY  atorvastatin (LIPITOR) 40 MG tablet, Take 1 tablet (40 mg) by mouth daily  busPIRone (BUSPAR) 15 MG tablet, Take 1 tablet (15 mg) by mouth 2 times daily  Cholecalciferol (VITAMIN D PO), Take 5,000 Units by mouth daily One tablet twice daily  donepezil (ARICEPT) 10 MG tablet, Take 1 tablet (10 mg) by mouth daily  Ferrous Gluconate 324 (37.5 Fe) MG TABS, 1 tablet daily  Ferrous Sulfate 324 (65 Fe) MG TBEC,   FLUoxetine (PROZAC) 40 MG capsule, Take 1 capsule (40 mg) by mouth daily  lisinopril-hydrochlorothiazide (ZESTORETIC) 20-12.5 MG tablet, Take 1 tablet by mouth daily  MULTI VITAMIN MENS PO, None Entered  OLANZapine " (ZYPREXA) 5 MG tablet, Take 1 tablet (5 mg) by mouth At Bedtime  omega 3 1000 MG CAPS, Take by mouth daily  tadalafil (CIALIS) 20 MG tablet, TAKE 1/2 TO 1 TABLET BY MOUTH 30 MINUTES TO 1 HOUR BEFORE SEX  TURMERIC PO, Take by mouth daily  vitamin B complex with vitamin C (STRESS TAB) tablet, Take 1 tablet by mouth daily    No current facility-administered medications on file prior to visit.       The [place] Prescription Monitoring Program has been reviewed and there are no concerns about diversionary activity for controlled substances at this time.      I was able to review most recent Primary Care Provider, specialty provider, and therapy visit notes that I have access to.     Today, patient reports that his wife had knee replacement and then manipulation.  He has been caring for his wife in her rcovery.  His wife reached out to her Restorationist.  After hunting season he will meet with someone that will meet with him for peers support.  He is unsure about this.   He identifies little social support other than his wife.   Jerry denies any more use of kratom.  No sleep concerns reported.  He denies paranoia symptoms.     has a past medical history of Dementia (H), Depressive disorder, Heart disease (5/8/2018), Hypercholesterolemia, Hypertension goal BP (blood pressure) < 140/90, Nonspecific abnormal electrocardiogram (ECG) (EKG) (08/23/2007), and Sleep apnea.    He has no past medical history of Arthritis, Cancer (H), Cerebral infarction (H), Congestive heart failure (H), Congestive heart failure, unspecified, COPD (chronic obstructive pulmonary disease) (H), Diabetes (H), History of blood transfusion, Thyroid disease, or Uncomplicated asthma.    Social history updates:    Jerry continues to live with his wife.  He is unemployed.    Substance use updates:    No alcohol use reported  Tobacco use: No    Vital Signs:   There were no vitals taken for this visit.    Labs:    Most recent laboratory results reviewed and no new  labs.     Mental Status Examination:  Appearance: awake, alert and casual dress  Attitude: cooperative  Eye Contact:  adequate  Gait and Station: No assistive Devices used and No dizziness or falls  Psychomotor Behavior:  no evidence of tardive dyskinesia, dystonia, or tics and intact station, gait and muscle tone  Oriented to:  time, person, and place  Attention Span and Concentration:  Normal  Speech:   clear, coherent and Speaks: English  Mood:  anxious and depressed  Affect:  mood congruent  Associations:  no loose associations  Thought Process:  linear  Thought Content:  no evidence of suicidal ideation or homicidal ideation, no auditory hallucinations present and no visual hallucinations present  Recent and Remote Memory:  intact Not formally assessed. No amnesia.  Fund of Knowledge: appropriate  Insight:  good  Judgment:  intact  Impulse Control:  intact    Suicide Risk Assessment:  Today Car Torres reports no thoughts to harm themself or others. In addition, there are notable risk factors for self-harm, including anxiety and withdrawing. However, risk is mitigated by commitment to family, history of seeking help when needed, future oriented, denies suicidal intent or plan and denies homicidal ideation, intent, or plan. Therefore, based on all available evidence including the factors cited above, Car Torres does not appear to be at imminent risk for self-harm, does not meet criteria for a 72-hr hold, and therefore remains appropriate for ongoing outpatient level of care.  A thorough assessment of risk factors related to suicide and self-harm have been reviewed and are noted above. The patient convincingly denies suicidality on several occasions. Local community safety resources printed and reviewed for patient to use if needed. There was no deceit detected, and the patient presented in a manner that was believable.     DSM5 Diagnosis:  296.32 (F33.1) Major Depressive Disorder, Recurrent Episode,  Moderate _ and With mixed features  300.02 (F41.1) Generalized Anxiety Disorder    Medical comorbidities include:   Patient Active Problem List    Diagnosis Date Noted     Hypertension goal BP (blood pressure) < 140/90 10/20/2010     Priority: High     Hyperlipidemia LDL goal <130 06/11/2010     Priority: High     Dementia associated with other underlying disease with behavioral disturbance 01/03/2022     Priority: Medium     Ulcer of left foot, unspecified ulcer stage (H) 01/03/2022     Priority: Medium     Unspecified psychosis not due to a substance or known physiological condition (H) 01/03/2022     Priority: Medium     Major depressive disorder, recurrent episode, mild with atypical features (H) 12/06/2021     Priority: Medium     TIA (obstructive sleep apnea) 07/19/2018     Priority: Medium     Home Sleep Apnea Testing - 7/18/18: 295 lbs 0 oz: AHI 45.3/hr. Supine AHI 61.9/hr. Oxygen [place] of 66%.  Baseline 93%.  Sp02 =< 88% for 47 minutes.       History of sinus bradycardia 06/07/2018     Priority: Medium     ED (erectile dysfunction) 06/07/2018     Priority: Medium     Coronary artery calcification 05/08/2018     Priority: Medium     Per CT CHEST WITHOUT CONTRAST April 23, 2018. CT calcium score planned.        Renal cyst 11/18/2015     Priority: Medium     Simple, non enhanced, 3.5 cm on right kidney, Bosniak 1 - x 2 stable findings       Diverticulosis of large intestine without hemorrhage 11/18/2015     Priority: Medium     Incidental finding on CT scan        Pulmonary nodule 11/18/2015     Priority: Medium     Incidental finding on CT scan, right posterior lung - considered benign / stable        CKD (chronic kidney disease) stage 2, GFR 60-89 ml/min 10/21/2010     Priority: Medium     60 to 80 - have discussed Lisinopril - will consider        Sciatica 02/27/2006     Priority: Medium     Morbid obesity (H) 02/27/2006     Priority: Medium       Assessment:    Car Torres and his wife report  concerns over tremors that Jerry is experiencing.  I decreased his dose of olanzapine to see if this will help decrease that behavior.  If it does not decrease the tremors,  we can go back to 5 mg of the olanzapine at bedtime.  Buspirone and fluoxetine are continued to address his anxiety and depression symptoms that worsened once he retired.  Fluoxetine and buspirone will continue at that scheduled doses.  His wife got connected with a.  Support person through their [employer] who will start interacting with Jerry soon.  Donepezil is being continued by his neurologist.  The paranoid thoughts have diminished greatly..    Medication side effects and alternatives were reviewed. Health promotion activities recommended and reviewed today. All questions addressed. Education and counseling completed regarding risks and benefits of medications and psychotherapy options.    Treatment Plan:        1.  Take 1/2 tablet of olanzapine at bedtime for 2 weeks.  If this is tolerated and tremors decrease, please notify me.  Otherwise we will return to 5 mg at bedtime of the olanzapine    2.  Continue buspirone 15 mg twice daily    3.  Continue fluoxetine 40 mg daily    4.  Connect with ministry program at your [facility] for support    5.  Continue donepezil by neurology recommendations        Continue all other medications as reviewed per electronic medical record today.     Safety plan reviewed. To the Emergency Department as needed or call after hours crisis line at [phone] or [phone]. [place] Crisis Text [place]. Text [place] to 921721 or Suicide LifeLine Chat: suicidepreventionlifeline.org/chat/    To schedule individual or family therapy, call [facility] at [phone]    Schedule an appointment with me in 6 weeks or sooner as needed. Call [facility] at [phone] to schedule.    Follow up with primary care provider as planned or for acute medical concerns.    Call the psychiatric nurse line with  medication questions or concerns at [phone]    MyChart may be used to communicate with your provider, but this is not intended to be used for emergencies.    Crisis Resources:    National Suicide Prevention Lifeline: 238.360.3266 (TTY: 727.567.2917). Call anytime for help.  (www.suicidepreventionlifeline.org)  National [place] on Mental Illness (www.ana maría.org): 492.719.2850 or 036-313-4074.   Mental Health Association (www.mentalhealth.org): 506.498.8199 or 776-097-1452.  [place] Crisis Text [place]: Text [place] to 918101  Suicide LifeLine Chat: suicidepreStudyAppsline.org/chat    Administrative Billing:   Time spent with patient includes counseling and coordination of care regarding above diagnoses and treatment plan.    Patient Status:  Patient will continue to be seen for ongoing consultation and stabilization.    Signed:   BELEN Singh-BC   Psychiatry

## 2022-11-17 NOTE — PATIENT INSTRUCTIONS
1.  Take 1/2 tablet of olanzapine at bedtime for 2 weeks.  If this is tolerated and tremors decrease, please notify me.  Otherwise we will return to 5 mg at bedtime of the olanzapine  2.  Continue buspirone 15 mg twice daily  3.  Continue fluoxetine 40 mg daily  4.  Connect with ministry program at your [facility] for support  5.  Continue donepezil by neurology recommendations    Continue all other medications as reviewed per electronic medical record today.   Safety plan reviewed. To the Emergency Department as needed or call after hours crisis line at [phone] or [phone]. [place] Crisis Text [place]. Text [place] to 879696 or Suicide LifeLine Chat: CrowdTangle.org/chat/  To schedule individual or family therapy, call [facility] at [phone]  Schedule an appointment with me in 6 weeks or sooner as needed. Call [facility] at [phone] to schedule.  Follow up with primary care provider as planned or for acute medical concerns.  Call the psychiatric nurse line with medication questions or concerns at [phone]  [employer] may be used to communicate with your provider, but this is not intended to be used for emergencies.    Crisis Resources:    National Suicide Prevention Lifeline: 147.771.9453 (TTY: 699.446.1171). Call anytime for help.  (www.suicidepreventionlifeline.org)  National [place] on Mental Illness (www.ana mraía.org): 883.752.9252 or 374-459-5619.   Mental Health Association (www.mentalhealth.org): 207.591.8285 or 978-662-0733.  [place] Crisis Text [place]: Text [place] to 744522  Suicide LifeLine Chat: CrowdTangle.org/chat

## 2022-11-22 ENCOUNTER — VIRTUAL VISIT (OUTPATIENT)
Dept: BEHAVIORAL HEALTH | Facility: CLINIC | Age: 62
End: 2022-11-22
Payer: COMMERCIAL

## 2022-11-22 DIAGNOSIS — R69 DIAGNOSIS DEFERRED: Primary | ICD-10-CM

## 2022-11-22 NOTE — PROGRESS NOTES
Behavioral Health Home Services  [facility]: [place]        Social Work Care Navigator Note      Patient: Car Torres  Date: November 22, 2022  Preferred Name: Jerry    Previous PHQ-9:   PHQ-9 SCORE 6/9/2022 10/5/2022 11/17/2022   PHQ-9 Total Score MyChart - 4 (Minimal depression) 0   PHQ-9 Total Score 0 4 0     Previous SHAHNAZ-7:   SHAHNAZ-7 SCORE 8/30/2021 12/2/2021 6/9/2022   Total Score 0 0 0     ALMA LEVEL:  ALMA Score (Last Two) 5/4/2012   ALMA Raw Score 37   Activation Score 49.9   ALMA Level 2       Preferred Contact:  Need for : No  Preferred Contact: Cell      Type of Contact Today: Phone call (patient / identified key support person reached)      Data: (subjective / Objective):  Recent ED/IP Admission or Discharge?   None    Patient Goals:  Goal Areas: Health; Mental Health; Financial and Social Service Benefits  Patient stated goals: Health: Patient will continue to meet with his providers and his nutritionist. He also will continue to work on his daily water intake, as recommended by his nutritionist, and his balance by using the treadmill and stairs daily.   Mental Health: Patient will continue to work with his psychiatrist to manage his anxiety, as well as his care coordinator with the [facility]. He will continue to work on verbalizing any struggles he may be having with his anxiety.   Patient will also continue to find hobbies to stay busy, especially coloring and spending time outside.   Financial Benefits: Patient will continue to work with [employer] on the application process for getting approved for social security disability (SSDI), and will follow up with the appeal process if needed.    [facility] Core Service Provided:  Comprehensive Care Management: utilized the electronic medical record / patient registry to identify and support patient's health conditions / needs more effectively   Care Transitions: focused on the coordinated and seamless movement of patient between or  within different levels of care or settings  Care Coordination: provided care management services/referrals necessary to ensure patient and their identified supports have access to medical, behavioral health, pharmacology and recovery support services.  Ensured that patient's care is integrated across all settings and services.   Individual and Family Support: aimed to help clients reduce barriers to achieving goals, increase health literacy and knowledge about chronic condition(s), increase self-efficacy skills, and improve health outcomes    Current Stressors / Issues / Care Plan Objective Addressed Today:  SWCC and patient were able to meet today for Behavioral Health Home ([facility]) monthly check-in via telehealth visit. All required ROIs have been filed with HIM/patient chart.    1. Patient reported that he's been fine in the last couple weeks and they're doing good today.     2. Patient's wife had another surgery last week on her knee again, so they are working on helping each other out around home and patient is trying his best to help her at home as well.     3. Patient's wife reported that they are working on trying to clean up the living room now that they are more mobile around the whole house again. They are starting to work on their [employer] decorations as well.     4. Patient is still watching a lot of TV and his wife is still working on giving him tasks as well. They talked with his psychiatrist about hobbies, and they reflected that it's not the worst thing for him to watch TV and he watches educational shows as well. He also has some work books that he can use if he wants to.     5. Patient and his wife are going to make pies in the next couple days. They also are going to run errands today still with their son's father-in-law to get his haircut.     6. Patient's anxiety medication have been fine and they didn't change anything recently with his psychiatry visit. Patient does have noticeably worse  tremors, so they reduced some of his other medication to see if this helps. However, this medication helps with his paranoid thoughts, so they are closely monitoring these changes.    7. Patient reported that his intake is mostly the same, but he's missed some of his water in the normal routine.    8. Patient has been having back pain, so they are going to talk to his PCP about this at his next appointment in December. He reported that it's been good recently, but they notice it often and especially when they go on walks. Patient's wife is going to go with this time so she can meet his new PCP as well.     Intervention:  Motivational Interviewing: Expressed Empathy/Understanding, Supported Autonomy, Collaboration, Evocation, Permission to raise concern or advise, Open-ended questions and Reflections: simple and complex   Target Behavior(s): Explored thoughts about taking an anti-depressant, Explored and resolved challenges related to taking anti-depressants as prescribed, Explored thoughts and readiness to participate in individual therapy, Explored and resolved challenges to attending appointments as scheduled and Explored current social supports and reinforced opportunities to increase engagement    Assessment: (Progress on Goals / Homework):  Patient would benefit from continued coordination in reaching their goals set for the Behavioral Health Home ([facility]) program. [facility] reviewed Health Action Plan goals and will continue to monitor progress and work with patient and their care team.    Plan: (Homework, other):  Patient was encouraged to continue to seek condition-related information and education.      Scheduled a Phone follow up appointment with MARJ  in 4 weeks     Patient has set self-identified goals and will monitor progress until the next appointment on: 12/27/22.      OLGA Montero  [facility] [place] ([facility])   [facility]  [employer]  [phone]          Next 5 appointments (look out 90 days)    Dec 05, 2022  7:20 AM  (Arrive by 7:00 AM)  Provider Visit with Mihir Perez MD  [facility] [place] ([facility] ) [address]  [place] [address]  [phone]

## 2022-11-25 ENCOUNTER — MYC MEDICAL ADVICE (OUTPATIENT)
Dept: PSYCHIATRY | Facility: CLINIC | Age: 62
End: 2022-11-25

## 2022-11-25 NOTE — TELEPHONE ENCOUNTER
Returned call to Destiny, wife (P0A), she said that pharmacy sent her a message that pt needed appt and that patient had already scheduled an appt with provider.  Writer called pharmacy, they said Buspirone had been filled 11/2/22 and it was too early, she ran refill request and it went through. I checked with wife if patient was using the Buspar more than twice daily as ordered and she said that no. Wife appreciative of clearing up confusion with pharmacy.

## 2022-11-25 NOTE — TELEPHONE ENCOUNTER
"Refill request r'cd from [facility] via fax for busPIRone (BUSPAR) 15 MG tablet denied due to fills on file. Faxed \"not authorized\" back to pharmacy.  "

## 2022-12-05 ENCOUNTER — OFFICE VISIT (OUTPATIENT)
Dept: FAMILY MEDICINE | Facility: CLINIC | Age: 62
End: 2022-12-05
Payer: COMMERCIAL

## 2022-12-05 VITALS
SYSTOLIC BLOOD PRESSURE: 112 MMHG | DIASTOLIC BLOOD PRESSURE: 57 MMHG | BODY MASS INDEX: 48.12 KG/M2 | OXYGEN SATURATION: 96 % | TEMPERATURE: 97.8 F | WEIGHT: 315 LBS | HEART RATE: 43 BPM

## 2022-12-05 DIAGNOSIS — L97.529 ULCER OF LEFT FOOT, UNSPECIFIED ULCER STAGE (H): ICD-10-CM

## 2022-12-05 DIAGNOSIS — G89.29 CHRONIC BILATERAL LOW BACK PAIN WITHOUT SCIATICA: ICD-10-CM

## 2022-12-05 DIAGNOSIS — E78.5 HYPERLIPIDEMIA LDL GOAL <100: ICD-10-CM

## 2022-12-05 DIAGNOSIS — E55.9 VITAMIN D DEFICIENCY: ICD-10-CM

## 2022-12-05 DIAGNOSIS — F33.1 MAJOR DEPRESSIVE DISORDER, RECURRENT EPISODE, MODERATE (H): ICD-10-CM

## 2022-12-05 DIAGNOSIS — I25.10 CORONARY ARTERY CALCIFICATION: ICD-10-CM

## 2022-12-05 DIAGNOSIS — F41.1 GAD (GENERALIZED ANXIETY DISORDER): ICD-10-CM

## 2022-12-05 DIAGNOSIS — E66.01 MORBID OBESITY (H): ICD-10-CM

## 2022-12-05 DIAGNOSIS — F02.818 DEMENTIA ASSOCIATED WITH OTHER UNDERLYING DISEASE WITH BEHAVIORAL DISTURBANCE (H): Primary | ICD-10-CM

## 2022-12-05 DIAGNOSIS — M54.50 CHRONIC BILATERAL LOW BACK PAIN WITHOUT SCIATICA: ICD-10-CM

## 2022-12-05 DIAGNOSIS — N52.9 IMPOTENCE OF ORGANIC ORIGIN: ICD-10-CM

## 2022-12-05 DIAGNOSIS — Z13.220 SCREENING FOR HYPERLIPIDEMIA: ICD-10-CM

## 2022-12-05 DIAGNOSIS — I10 ESSENTIAL HYPERTENSION WITH GOAL BLOOD PRESSURE LESS THAN 140/90: ICD-10-CM

## 2022-12-05 DIAGNOSIS — F29 UNSPECIFIED PSYCHOSIS NOT DUE TO A SUBSTANCE OR KNOWN PHYSIOLOGICAL CONDITION (H): ICD-10-CM

## 2022-12-05 DIAGNOSIS — N18.2 CKD (CHRONIC KIDNEY DISEASE) STAGE 2, GFR 60-89 ML/MIN: ICD-10-CM

## 2022-12-05 DIAGNOSIS — E53.8 VITAMIN B12 DEFICIENCY (NON ANEMIC): ICD-10-CM

## 2022-12-05 LAB
ANION GAP SERPL CALCULATED.3IONS-SCNC: 4 MMOL/L (ref 3–14)
BASOPHILS # BLD AUTO: 0 10E3/UL (ref 0–0.2)
BASOPHILS NFR BLD AUTO: 0 %
BUN SERPL-MCNC: 24 MG/DL (ref 7–30)
CALCIUM SERPL-MCNC: 9.4 MG/DL (ref 8.5–10.1)
CHLORIDE BLD-SCNC: 105 MMOL/L (ref 94–109)
CHOLEST SERPL-MCNC: 146 MG/DL
CO2 SERPL-SCNC: 29 MMOL/L (ref 20–32)
CREAT SERPL-MCNC: 1.12 MG/DL (ref 0.66–1.25)
CREAT UR-MCNC: 230 MG/DL
DEPRECATED CALCIDIOL+CALCIFEROL SERPL-MC: 42 UG/L (ref 20–75)
EOSINOPHIL # BLD AUTO: 0.2 10E3/UL (ref 0–0.7)
EOSINOPHIL NFR BLD AUTO: 3 %
ERYTHROCYTE [DISTWIDTH] IN BLOOD BY AUTOMATED COUNT: 13.2 % (ref 10–15)
FASTING STATUS PATIENT QL REPORTED: YES
GFR SERPL CREATININE-BSD FRML MDRD: 73 ML/MIN/1.73M2
GLUCOSE BLD-MCNC: 107 MG/DL (ref 70–99)
HCT VFR BLD AUTO: 42.9 % (ref 40–53)
HDLC SERPL-MCNC: 54 MG/DL
HGB BLD-MCNC: 14.1 G/DL (ref 13.3–17.7)
LDLC SERPL CALC-MCNC: 60 MG/DL
LYMPHOCYTES # BLD AUTO: 2.5 10E3/UL (ref 0.8–5.3)
LYMPHOCYTES NFR BLD AUTO: 35 %
MCH RBC QN AUTO: 30.3 PG (ref 26.5–33)
MCHC RBC AUTO-ENTMCNC: 32.9 G/DL (ref 31.5–36.5)
MCV RBC AUTO: 92 FL (ref 78–100)
MICROALBUMIN UR-MCNC: 11 MG/L
MICROALBUMIN/CREAT UR: 4.78 MG/G CR (ref 0–17)
MONOCYTES # BLD AUTO: 0.6 10E3/UL (ref 0–1.3)
MONOCYTES NFR BLD AUTO: 9 %
NEUTROPHILS # BLD AUTO: 3.8 10E3/UL (ref 1.6–8.3)
NEUTROPHILS NFR BLD AUTO: 53 %
NONHDLC SERPL-MCNC: 92 MG/DL
PLATELET # BLD AUTO: 255 10E3/UL (ref 150–450)
POTASSIUM BLD-SCNC: 4.7 MMOL/L (ref 3.4–5.3)
RBC # BLD AUTO: 4.66 10E6/UL (ref 4.4–5.9)
SODIUM SERPL-SCNC: 138 MMOL/L (ref 133–144)
TRIGL SERPL-MCNC: 161 MG/DL
VIT B12 SERPL-MCNC: 916 PG/ML (ref 232–1245)
WBC # BLD AUTO: 7.2 10E3/UL (ref 4–11)

## 2022-12-05 PROCEDURE — 82043 UR ALBUMIN QUANTITATIVE: CPT | Performed by: INTERNAL MEDICINE

## 2022-12-05 PROCEDURE — 99214 OFFICE O/P EST MOD 30 MIN: CPT | Performed by: INTERNAL MEDICINE

## 2022-12-05 PROCEDURE — 36415 COLL VENOUS BLD VENIPUNCTURE: CPT | Performed by: INTERNAL MEDICINE

## 2022-12-05 PROCEDURE — 80061 LIPID PANEL: CPT | Performed by: INTERNAL MEDICINE

## 2022-12-05 PROCEDURE — 82607 VITAMIN B-12: CPT | Performed by: INTERNAL MEDICINE

## 2022-12-05 PROCEDURE — 85025 COMPLETE CBC W/AUTO DIFF WBC: CPT | Performed by: INTERNAL MEDICINE

## 2022-12-05 PROCEDURE — 82306 VITAMIN D 25 HYDROXY: CPT | Performed by: INTERNAL MEDICINE

## 2022-12-05 PROCEDURE — 80048 BASIC METABOLIC PNL TOTAL CA: CPT | Performed by: INTERNAL MEDICINE

## 2022-12-05 RX ORDER — DONEPEZIL HYDROCHLORIDE 10 MG/1
10 TABLET, FILM COATED ORAL DAILY
Qty: 90 TABLET | Refills: 2 | Status: SHIPPED | OUTPATIENT
Start: 2022-12-05 | End: 2023-06-05

## 2022-12-05 RX ORDER — FLUOXETINE 40 MG/1
40 CAPSULE ORAL DAILY
Qty: 90 CAPSULE | Refills: 1 | Status: SHIPPED | OUTPATIENT
Start: 2022-12-05 | End: 2023-06-05

## 2022-12-05 RX ORDER — ATORVASTATIN CALCIUM 40 MG/1
40 TABLET, FILM COATED ORAL DAILY
Qty: 90 TABLET | Refills: 1 | Status: SHIPPED | OUTPATIENT
Start: 2022-12-05 | End: 2023-06-05

## 2022-12-05 RX ORDER — LISINOPRIL AND HYDROCHLOROTHIAZIDE 12.5; 2 MG/1; MG/1
1 TABLET ORAL DAILY
Qty: 90 TABLET | Refills: 4 | Status: SHIPPED | OUTPATIENT
Start: 2022-12-05 | End: 2023-06-05

## 2022-12-05 RX ORDER — BUSPIRONE HYDROCHLORIDE 15 MG/1
15 TABLET ORAL 2 TIMES DAILY
Qty: 60 TABLET | Refills: 1 | Status: SHIPPED | OUTPATIENT
Start: 2022-12-05 | End: 2023-01-13

## 2022-12-05 RX ORDER — OLANZAPINE 5 MG/1
2.5-5 TABLET ORAL AT BEDTIME
Qty: 90 TABLET | Refills: 1 | Status: SHIPPED | OUTPATIENT
Start: 2022-12-05 | End: 2022-12-26 | Stop reason: DRUGHIGH

## 2022-12-05 RX ORDER — TADALAFIL 20 MG/1
TABLET ORAL
Qty: 6 TABLET | Refills: 5 | Status: SHIPPED | OUTPATIENT
Start: 2022-12-05 | End: 2024-03-22

## 2022-12-05 ASSESSMENT — ENCOUNTER SYMPTOMS: HYPERTENSION: 1

## 2022-12-05 NOTE — PROGRESS NOTES
Assessment & Plan   Problem List Items Addressed This Visit     CKD (chronic kidney disease) stage 2, GFR 60-89 ml/min (Chronic)    Relevant Orders    Albumin Random Urine Quantitative with Creat Ratio (Completed)    BASIC METABOLIC PANEL (Completed)    CBC with platelets and differential (Completed)    Coronary artery calcification (Chronic)    Relevant Orders    Lipid panel reflex to direct LDL Non-fasting (Completed)    Morbid obesity (H) (Chronic)    Dementia associated with other underlying disease with behavioral disturbance - Primary    Relevant Medications    busPIRone (BUSPAR) 15 MG tablet    donepezil (ARICEPT) 10 MG tablet    FLUoxetine (PROZAC) 40 MG capsule    OLANZapine (ZYPREXA) 5 MG tablet    Ulcer of left foot, unspecified ulcer stage (H)    Unspecified psychosis not due to a substance or known physiological condition (H)   Other Visit Diagnoses     Hyperlipidemia LDL goal <100        Relevant Medications    atorvastatin (LIPITOR) 40 MG tablet    SHAHNAZ (generalized anxiety disorder)        Relevant Medications    busPIRone (BUSPAR) 15 MG tablet    FLUoxetine (PROZAC) 40 MG capsule    Major depressive disorder, recurrent episode, moderate (H)        Relevant Medications    busPIRone (BUSPAR) 15 MG tablet    FLUoxetine (PROZAC) 40 MG capsule    OLANZapine (ZYPREXA) 5 MG tablet    Essential hypertension with goal blood pressure less than 140/90        Relevant Medications    lisinopril-hydrochlorothiazide (ZESTORETIC) 20-12.5 MG tablet    Impotence of organic origin        Relevant Medications    tadalafil (CIALIS) 20 MG tablet    Screening for hyperlipidemia        Relevant Orders    Lipid panel reflex to direct LDL Non-fasting (Completed)    Vitamin B12 deficiency (non anemic)        Relevant Orders    Vitamin B12 (Completed)    Vitamin D deficiency        Relevant Orders    Vitamin D Deficiency (Completed)    Chronic bilateral low back pain without sciatica        Relevant Orders    Physical Therapy  "Referral         Consider MRI after 6 weeks of therapy   Is likely combination of arthritis, obesity and decreased activity/ muscle atrophy   Explained that the MRI would be to find targets for intervention          BMI:   Estimated body mass index is 48.12 kg/m  as calculated from the following:    Height as of 9/20/22: 1.803 m (5' 11\").    Weight as of this encounter: 156.5 kg (345 lb).   Weight management plan: Discussed healthy diet and exercise guidelines    Work on weight loss  Regular exercise    Return in about 6 months (around 6/5/2023) for follow up of condition, medication management.    Mihir Perez MD  [facility] NORMA Edwards is a 62 year old accompanied by his spouse, presenting for the following health issues:  Hyperlipidemia, Depression, Hypertension, and Kidney Problem    Low back pain and wekaness in the legs   Frontal temporal dementia   Not working on disability .  Social work and counseling.   Had tremors with the olanzapine   Cut in half       History of Present Illness       Back Pain:  He presents for follow up of back pain. Patient's back pain is a chronic problem.  Location of back pain:  Right lower back and left lower back  Description of back pain: dull ache and other  Back pain spreads: nowhere    Since last visit, how has back pain changed: unchanged  Since patient first noticed back pain, pain is: always present, but gets better and worse  Does back pain interfere with his job:  Not applicable  Has the patient tried any new treatments:  No       CKD: He is not using over the counter pain medicine.     Mental Health Follow-up:  Patient presents to follow-up on Depression.Patient's depression since last visit has been:  Good  The patient is not having other symptoms associated with depression.      Any significant life events: No  Patient is not feeling anxious or having panic attacks.  Patient has no concerns about alcohol or drug " use.    Hyperlipidemia:  He presents for follow up of hyperlipidemia.  He is taking medication to lower cholesterol. He is not having myalgia or other side effects to statin medications.    Hypertension: He presents for follow up of hypertension.  He does not check blood pressure  regularly outside of the clinic.  He does not follow a low salt diet.     He eats 2-3 servings of fruits and vegetables daily.He consumes 4 sweetened beverage(s) daily.He exercises with enough effort to increase his heart rate 9 or less minutes per day.  He exercises with enough effort to increase his heart rate 3 or less days per week. He is missing 1 dose(s) of medications per week.  He is not taking prescribed medications regularly due to remembering to take.           Review of Systems   Constitutional, HEENT, cardiovascular, pulmonary, gi and gu systems are negative, except as otherwise noted.      Objective    /57 (BP Location: Right arm, Patient Position: Chair, Cuff Size: Adult Large)   Pulse (!) 43   Temp 97.8  F (36.6  C)   Wt (!) 156.5 kg (345 lb)   SpO2 96%   BMI 48.12 kg/m    Body mass index is 48.12 kg/m .  Physical Exam   GENERAL: healthy, alert and no distress  EYES: Eyes grossly normal to inspection, PERRL and conjunctivae and sclerae normal  HENT: ear canals and TM's normal, nose and mouth without ulcers or lesions  NECK: no adenopathy, no asymmetry, masses, or scars and thyroid normal to palpation  RESP: lungs clear to auscultation - no rales, rhonchi or wheezes  CV: regular rate and rhythm, normal S1 S2, no S3 or S4, no murmur, click or rub, no peripheral edema and peripheral pulses strong  ABDOMEN: soft, nontender, no hepatosplenomegaly, no masses and bowel sounds normal  MS: no gross musculoskeletal defects noted, no edema  SKIN: no suspicious lesions or rashes  NEURO: Normal strength and tone, mentation intact and speech normal  BACK: no CVA tenderness, no paralumbar tenderness  PSYCH: mentation appears  normal, affect normal/bright  LYMPH: no cervical, supraclavicular, axillary, or inguinal adenopathy    Results for orders placed or performed in visit on 12/05/22   Albumin Random Urine Quantitative with Creat Ratio     Status: None   Result Value Ref Range    Creatinine Urine mg/dL 230 mg/dL    Albumin Urine mg/L 11 mg/L    Albumin Urine mg/g Cr 4.78 0.00 - 17.00 mg/g Cr   Lipid panel reflex to direct LDL Non-fasting     Status: Abnormal   Result Value Ref Range    Cholesterol 146 <200 mg/dL    Triglycerides 161 (H) <150 mg/dL    Direct Measure HDL 54 >=40 mg/dL    LDL Cholesterol Calculated 60 <=100 mg/dL    Non HDL Cholesterol 92 <130 mg/dL    Patient Fasting > 8hrs? Yes     Narrative    Cholesterol  Desirable:  <200 mg/dL    Triglycerides  Normal:  Less than 150 mg/dL  Borderline High:  150-199 mg/dL  High:  200-499 mg/dL  Very High:  Greater than or equal to 500 mg/dL    Direct Measure HDL  Female:  Greater than or equal to 50 mg/dL   Male:  Greater than or equal to 40 mg/dL    LDL Cholesterol  Desirable:  <100mg/dL  Above Desirable:  100-129 mg/dL   Borderline High:  130-159 mg/dL   High:  160-189 mg/dL   Very High:  >= 190 mg/dL    Non HDL Cholesterol  Desirable:  130 mg/dL  Above Desirable:  130-159 mg/dL  Borderline High:  160-189 mg/dL  High:  190-219 mg/dL  Very High:  Greater than or equal to 220 mg/dL   BASIC METABOLIC PANEL     Status: Abnormal   Result Value Ref Range    Sodium 138 133 - 144 mmol/L    Potassium 4.7 3.4 - 5.3 mmol/L    Chloride 105 94 - 109 mmol/L    Carbon Dioxide (CO2) 29 20 - 32 mmol/L    Anion Gap 4 3 - 14 mmol/L    Urea Nitrogen 24 7 - 30 mg/dL    Creatinine 1.12 0.66 - 1.25 mg/dL    Calcium 9.4 8.5 - 10.1 mg/dL    Glucose 107 (H) 70 - 99 mg/dL    GFR Estimate 73 >60 mL/min/1.73m2   Vitamin B12     Status: Normal   Result Value Ref Range    Vitamin B12 916 232 - 1,245 pg/mL   Vitamin D Deficiency     Status: Normal   Result Value Ref Range    Vitamin D, Total (25-Hydroxy) 42 20 -  75 ug/L    Narrative    Season, race, dietary intake, and treatment affect the concentration of 25-hydroxy-Vitamin D. Values may decrease during winter months and increase during summer months. Values 20-29 ug/L may indicate Vitamin D insufficiency and values <20 ug/L may indicate Vitamin D deficiency.    Vitamin D determination is routinely performed by an immunoassay specific for 25 hydroxyvitamin D3.  If an individual is on vitamin D2(ergocalciferol) supplementation, please specify 25 OH vitamin D2 and D3 level determination by LCMSMS test VITD23.     CBC with platelets and differential     Status: None   Result Value Ref Range    WBC Count 7.2 4.0 - 11.0 10e3/uL    RBC Count 4.66 4.40 - 5.90 10e6/uL    Hemoglobin 14.1 13.3 - 17.7 g/dL    Hematocrit 42.9 40.0 - 53.0 %    MCV 92 78 - 100 fL    MCH 30.3 26.5 - 33.0 pg    MCHC 32.9 31.5 - 36.5 g/dL    RDW 13.2 10.0 - 15.0 %    Platelet Count 255 150 - 450 10e3/uL    % Neutrophils 53 %    % Lymphocytes 35 %    % Monocytes 9 %    % Eosinophils 3 %    % Basophils 0 %    Absolute Neutrophils 3.8 1.6 - 8.3 10e3/uL    Absolute Lymphocytes 2.5 0.8 - 5.3 10e3/uL    Absolute Monocytes 0.6 0.0 - 1.3 10e3/uL    Absolute Eosinophils 0.2 0.0 - 0.7 10e3/uL    Absolute Basophils 0.0 0.0 - 0.2 10e3/uL   CBC with platelets and differential     Status: None    Narrative    The following orders were created for panel order CBC with platelets and differential.  Procedure                               Abnormality         Status                     ---------                               -----------         ------                     CBC with platelets and d...[[id number]]                      Final result                 Please view results for these tests on the individual orders.

## 2022-12-20 ENCOUNTER — NURSE TRIAGE (OUTPATIENT)
Dept: NURSING | Facility: CLINIC | Age: 62
End: 2022-12-20

## 2022-12-21 ENCOUNTER — OFFICE VISIT (OUTPATIENT)
Dept: PODIATRY | Facility: CLINIC | Age: 62
End: 2022-12-21
Payer: COMMERCIAL

## 2022-12-21 ENCOUNTER — ANCILLARY PROCEDURE (OUTPATIENT)
Dept: GENERAL RADIOLOGY | Facility: CLINIC | Age: 62
End: 2022-12-21
Attending: ORTHOPAEDIC SURGERY
Payer: COMMERCIAL

## 2022-12-21 DIAGNOSIS — S93.325A LISFRANC DISLOCATION, LEFT, INITIAL ENCOUNTER: Primary | ICD-10-CM

## 2022-12-21 DIAGNOSIS — S99.922A FOOT INJURY, LEFT, INITIAL ENCOUNTER: ICD-10-CM

## 2022-12-21 PROCEDURE — 73630 X-RAY EXAM OF FOOT: CPT | Mod: TC | Performed by: RADIOLOGY

## 2022-12-21 PROCEDURE — 99214 OFFICE O/P EST MOD 30 MIN: CPT | Mod: 25 | Performed by: PODIATRIST

## 2022-12-21 PROCEDURE — 29515 APPLICATION SHORT LEG SPLINT: CPT | Performed by: PODIATRIST

## 2022-12-21 RX ORDER — HYDROCODONE BITARTRATE AND ACETAMINOPHEN 5; 325 MG/1; MG/1
1 TABLET ORAL EVERY 6 HOURS PRN
Qty: 20 TABLET | Refills: 0 | Status: SHIPPED | OUTPATIENT
Start: 2022-12-21 | End: 2022-12-26

## 2022-12-21 NOTE — LETTER
"    12/21/2022         RE: Car Torres  [address]  [place] [address]        Dear Colleague,    Thank you for referring your patient, Car Torres, to the [facility]. Please see a copy of my visit note below.    Assessment:      ICD-10-CM    1. Lisfranc dislocation, left, initial encounter  S93.325A Apply Lower Leg Splint (of any kind)     Short Leg Splint Supplies     Rolling Knee Walker/Scooter Order     CT Foot Left w/o Contrast     HYDROcodone-acetaminophen (NORCO) 5-325 MG tablet     Crutches Order     Crutches Order             Plan:  Orders Placed This Encounter   Procedures     Apply Lower Leg Splint (of any kind)     Short Leg Splint Supplies     CT Foot Left w/o Contrast     Rolling Knee Walker/Scooter Order     Crutches Order     Crutches Order         Discussed the etiology and treatment of the condition with the patient.  Imaging studies reviewed and discussed with the patient.  Discussed surgical and conservative options.      -CT ordered today  -Immobilization-  SLS applied today.  -WB- NWB in splint  -Activity - minimal in boot/splint/cast.  Elevate above heart level at rest, ice behind knee.  -Pain- Norco Rx, stop NSAIDs  -Knee scooter, crutches Rx      Return:  No follow-ups on file.     Bailey Ruiz DPM                Chief Complaint:     Patient presents with:  Left Foot - Injury     left foot fracture    HPI:  Car Torres is a 62 year old year old male who presents for evaluation of foot fracture.    Date of injury- 12/17/22  Mechanism of injury- fell down icy stairs outside.   Pain location- midfoot  Pt states he previously had ongoing pain in the L ankle /arch - wore an AFO for this  Retired now, did maintenance previously    Non-smoker, non-Dm  Some venous insufficiency    Estimated body mass index is 49.32 kg/m  as calculated from the following:    Height as of an earlier encounter on 12/21/22: 1.803 m (5' 11\").    Weight as of an " "earlier encounter on 12/21/22: 160.4 kg (353 lb 9.6 oz).  Estimated body mass index is 49.32 kg/m  as calculated from the following:    Height as of an earlier encounter on 12/21/22: 1.803 m (5' 11\").    Weight as of an earlier encounter on 12/21/22: 160.4 kg (353 lb 9.6 oz).          Past Medical & Surgical History:  Past Medical History:   Diagnosis Date     Dementia (H)      Depressive disorder      Heart disease 5/8/2018     Hypercholesterolemia      Hypertension goal BP (blood pressure) < 140/90      Nonspecific abnormal electrocardiogram (ECG) (EKG) 08/23/2007    Stress testing normal.      Sleep apnea     uses a c-pap, severe      Past Surgical History:   Procedure Laterality Date     COLONOSCOPY N/A 12/22/2020    Procedure: COLONOSCOPY, WITH POLYPECTOMY, CLIP;  Surgeon: Mihir Lang MD;  Location: [facility] OR     COLONOSCOPY N/A 1/10/2022    Procedure: COLONOSCOPY, WITH POLYPECTOMY AND BIOPSY;  Surgeon: Mihir Lang MD;  Location:  GI     ESOPHAGOSCOPY, GASTROSCOPY, DUODENOSCOPY (EGD), COMBINED N/A 1/28/2021    Procedure: ESOPHAGOGASTRODUODENOSCOPY, WITH BIOPSY;  Surgeon: Mihir Lang MD;  Location: [facility] OR     SURGICAL HISTORY OF -       surgery on left index finger      Family History   Problem Relation Age of Onset     Cancer Mother         uterine     Other Cancer Mother         endometrial     Cerebrovascular Disease Mother      Substance Abuse Mother         Alcohol     C.A.D. Father      Hypertension Father      Hyperlipidemia Father      Breast Cancer Maternal Grandmother      Other Cancer Maternal Grandmother         Lung/brain     Substance Abuse Paternal Grandfather      Hypertension Brother      Substance Abuse Brother         Alcohol     Breast Cancer Other      Breast Cancer Cousin      Other Cancer Other      Cerebrovascular Disease Other         Maternal Aunt     Glaucoma No family hx of      Macular Degeneration No family hx of         Social History:  ?  History   Smoking Status     " Former     Packs/day: 1.00     Years: 25.00     Types: Cigarettes, Cigars     Start date: 5/1/1973     Quit date: 2/1/1998   Smokeless Tobacco     Never     History   Drug Use Unknown     Comment: K[employer]     Social History    Substance and Sexual Activity      Alcohol use: Yes        Alcohol/week: 10.0 standard drinks        Comment: less than 6 per week      Allergies:  ?   No Known Allergies     Medications:    Current Outpatient Medications   Medication     HYDROcodone-acetaminophen (NORCO) 5-325 MG tablet     ASPIRIN 325 MG OR TBEC     atorvastatin (LIPITOR) 40 MG tablet     busPIRone (BUSPAR) 15 MG tablet     Cholecalciferol (VITAMIN D PO)     donepezil (ARICEPT) 10 MG tablet     Ferrous Gluconate 324 (37.5 Fe) MG TABS     Ferrous Sulfate 324 (65 Fe) MG TBEC     FLUoxetine (PROZAC) 40 MG capsule     lisinopril-hydrochlorothiazide (ZESTORETIC) 20-12.5 MG tablet     MULTI VITAMIN MENS PO     OLANZapine (ZYPREXA) 5 MG tablet     omega 3 1000 MG CAPS     tadalafil (CIALIS) 20 MG tablet     TURMERIC PO     vitamin B complex with vitamin C (STRESS TAB) tablet     No current facility-administered medications for this visit.       Physical Exam:  ?  Vitals:  There were no vitals taken for this visit.   General:  WD/WN, in NAD.  A&O x3.  Dermatologic:    Skin is intact, open lesions absent.   Bruising present diffusely to midfoot L.  Skin texture, turgor is abnormal, venous stasis to LE b/l.  Vascular:  Pulses palpable bilateral.  Digital capillary refill time normal left.  Skin temperature is warm left.  Generalized edema- 1+ bilateral.  Focal edema- moderate foot left.  Neurologic:    Gross sensation normal.  Gait and balance abnormal, antalgic, L.  Musculoskeletal:  Maximal pain to palpation of midfoot left.  no pain to palpation of ankle malleoli, left.  Active ROM present to digits bilateral.    Muscle strength 5/5  to uninjured extremity, guarding of injured extremity.     Patient is able to bear weight on the  injured extremity.  Patient is able to walk on the injured extremity.    Imaging:  x-ray independently reviewed and interpreted by myself today.  Non-Weight-bearing views left foot dated 12/21/22, reveal Lisfranc injury with 2nd met distal diaphyseal fracture, lateral subluxation at 3rd TMTJ and possible impaction 4th TMTJ on cuboid, L.  Medial column, lisfranc interval appears intact.      x-ray independently reviewed and interpreted by myself today.  Weight-bearing views left foot dated 2020, reveal intact Lisfranc interval                       Again, thank you for allowing me to participate in the care of your patient.        Sincerely,        Bailey Ruiz DPM

## 2022-12-21 NOTE — PATIENT INSTRUCTIONS
PATIENT INSTRUCTIONS - Podiatry / Foot & Ankle Surgery    NO Weight to L foot      Imaging  An advanced imaging study has been ordered for you today -CT     Please call radiology to schedule your study:      [facility] ([place] and [facility] and surgery centers): [phone]    [facility] (both [place] [facility])  [facility] [place] [facility] Clinics, including [facility]    North: [phone]  [facility] Clinics, including [place], [place], [place], [place], and [place]    South: [phone]  [facility]  [facility] Clinics, including [place], [facility], and [facility] ([facility]): [phone]  [facility] Clinics, including [place], [facility] and [place]        Pre-operative Instructions    -Norco prescrition today   -Stop all NSAIDs now, prior to surgery, use pain medication instead  -NON-weight bearing to the operative foot/ankle in the splint.    -Gait assistive devices:  available at [facility]   Crutches   Knee scooters   Walkers   Wheelchairs  -Bring your gait assistive device and boot/surgical shoe to surgery.  -Waterproof cast protectors, to keep your surgical dressing dry when bathing, are available on [employer] or at Medical supply stores.          Knee Scooter locations:  [facility] MEDICAL EQUIPMENT  [place]  [address]   [place] [address]  Ph: [phone]  Fax: [phone] [place] (Specialty Center)  [address]  [place] [address]  Ph: [phone]  Fax: [phone]   [place]  [address]   [place] [address]  Ph: [phone]  Fax: [phone] [place]  [address]  [place] [address]   Ph: [phone]    [place]  [address]  [place] [address]   Ph:  [phone]  [place]  [address]  [place] [address]   Ph: [phone]      [place]  [address]  [place] [address]  Ph: [phone]  [place]  [address]   [place] [address]  Ph: [phone]  Fax: [phone]       *OUTSIDE STORE OPTIONS*  [facility]   [address]  [place], [place] [id number] [phone] [facility]  [address] [place] [address]  [phone] [facility] [place]  [address] [place] [address]  [phone]     ** Alternative Knee Scooter Rental/Purchase: [employer] [place]  [phone]  [email or web address]

## 2022-12-21 NOTE — TELEPHONE ENCOUNTER
Triage Call:    Patient's wife calling to report patient has a foot injury.  Consent to communicate located in patient's chart.  Patient fell on stairs a couple of days ago and injured his left foot.  He reports 8/10 with ambulation and 5/10 pain at rest with ibuprofen, ice pack, and elevated.  The top and bottom of his foot is bruised and swollen with pain mostly from toes to arch.  He does not have any open areas on his foot.  He is unable to bear weight on left foot.    According to the protocol, patient should go to ED or  now.  Care advice given. Patient's wife verbalizes understanding and agrees with plan of care. They plan to discuss if he will go in tonight or tomorrow.    Radha De Jesus RN  12/20/22 6:48 PM  [facility] Nurse Advisor    Reason for Disposition    Can't stand (bear weight) or walk    Additional Information    Negative: Serious injury with multiple fractures    Negative: [1] Major bleeding (e.g., actively dripping or spurting) AND [2] can't be stopped    Negative: Amputation    Negative: Looks like a dislocated joint (very crooked or deformed)    Negative: Sounds like a life-threatening emergency to the triager    Negative: Wound looks infected    Negative: Caused by an animal bite    Negative: Caused by a human bite    Negative: Puncture wound of foot    Negative: Toe injury is main concern    Negative: Cast problems or questions    Negative: Bullet wound, stabbed by knife, or other serious penetrating wound    Negative: Skin is split open or gaping (or length > 1/2 inch or 12 mm)    Negative: [1] Bleeding AND [2] won't stop after 10 minutes of direct pressure (using correct technique)    Negative: [1] Dirt in the wound AND [2] not removed with 15 minutes of scrubbing    Protocols used: FOOT AND ANKLE INJURY-A-AH

## 2022-12-21 NOTE — PROGRESS NOTES
"Assessment:      ICD-10-CM    1. Lisfranc dislocation, left, initial encounter  S93.325A Apply Lower Leg Splint (of any kind)     Short Leg Splint Supplies     Rolling Knee Walker/Scooter Order     CT Foot Left w/o Contrast     HYDROcodone-acetaminophen (NORCO) 5-325 MG tablet     Crutches Order     Crutches Order             Plan:  Orders Placed This Encounter   Procedures     Apply Lower Leg Splint (of any kind)     Short Leg Splint Supplies     CT Foot Left w/o Contrast     Rolling Knee Walker/Scooter Order     Crutches Order     Crutches Order         Discussed the etiology and treatment of the condition with the patient.  Imaging studies reviewed and discussed with the patient.  Discussed surgical and conservative options.      -CT ordered today  -Immobilization-  SLS applied today.  -WB- NWB in splint  -Activity - minimal in boot/splint/cast.  Elevate above heart level at rest, ice behind knee.  -Pain- Norco Rx, stop NSAIDs  -Knee scooter, crutches Rx  Patient needs to be non-weightbearing for 6-8 weeks; cannot maintain NWB without wheelchair- poor balance.      Return:  No follow-ups on file.     Bailey Ruiz DPM                Chief Complaint:     Patient presents with:  Left Foot - Injury     left foot fracture    HPI:  Car Torres is a 62 year old year old male who presents for evaluation of foot fracture.    Date of injury- 12/17/22  Mechanism of injury- fell down icy stairs outside.   Pain location- midfoot  Pt states he previously had ongoing pain in the L ankle /arch - wore an AFO for this  Retired now, did maintenance previously    Non-smoker, non-Dm  Some venous insufficiency    Estimated body mass index is 49.32 kg/m  as calculated from the following:    Height as of an earlier encounter on 12/21/22: 1.803 m (5' 11\").    Weight as of an earlier encounter on 12/21/22: 160.4 kg (353 lb 9.6 oz).  Estimated body mass index is 49.32 kg/m  as calculated from the following:    Height as of an " "earlier encounter on 12/21/22: 1.803 m (5' 11\").    Weight as of an earlier encounter on 12/21/22: 160.4 kg (353 lb 9.6 oz).          Past Medical & Surgical History:  Past Medical History:   Diagnosis Date     Dementia (H)      Depressive disorder      Heart disease 5/8/2018     Hypercholesterolemia      Hypertension goal BP (blood pressure) < 140/90      Nonspecific abnormal electrocardiogram (ECG) (EKG) 08/23/2007    Stress testing normal.      Sleep apnea     uses a c-pap, severe      Past Surgical History:   Procedure Laterality Date     COLONOSCOPY N/A 12/22/2020    Procedure: COLONOSCOPY, WITH POLYPECTOMY, CLIP;  Surgeon: Mihir Lang MD;  Location: [facility] OR     COLONOSCOPY N/A 1/10/2022    Procedure: COLONOSCOPY, WITH POLYPECTOMY AND BIOPSY;  Surgeon: Mihir Lang MD;  Location:  GI     ESOPHAGOSCOPY, GASTROSCOPY, DUODENOSCOPY (EGD), COMBINED N/A 1/28/2021    Procedure: ESOPHAGOGASTRODUODENOSCOPY, WITH BIOPSY;  Surgeon: Mihir Lang MD;  [place] [facility] [place]     SURGICAL HISTORY OF -       surgery on left index finger      Family History   Problem Relation Age of Onset     Cancer Mother         uterine     Other Cancer Mother         endometrial     Cerebrovascular Disease Mother      Substance Abuse Mother         Alcohol     C.A.D. Father      Hypertension Father      Hyperlipidemia Father      Breast Cancer Maternal Grandmother      Other Cancer Maternal Grandmother         Lung/brain     Substance Abuse Paternal Grandfather      Hypertension Brother      Substance Abuse Brother         Alcohol     Breast Cancer Other      Breast Cancer Cousin      Other Cancer Other      Cerebrovascular Disease Other         Maternal Aunt     Glaucoma No family hx of      Macular Degeneration No family hx of         Social History:  ?  History   Smoking Status     Former     Packs/day: 1.00     Years: 25.00     Types: Cigarettes, Cigars     Start date: 5/1/1973     Quit date: 2/1/1998   Smokeless Tobacco     Never "     History   Drug Use Unknown     Comment: Kratum     Social History    Substance and Sexual Activity      Alcohol use: Yes        Alcohol/week: 10.0 standard drinks        Comment: less than 6 per week      Allergies:  ?   No Known Allergies     Medications:    Current Outpatient Medications   Medication     HYDROcodone-acetaminophen (NORCO) 5-325 MG tablet     ASPIRIN 325 MG OR TBEC     atorvastatin (LIPITOR) 40 MG tablet     busPIRone (BUSPAR) 15 MG tablet     Cholecalciferol (VITAMIN D PO)     donepezil (ARICEPT) 10 MG tablet     Ferrous Gluconate 324 (37.5 Fe) MG TABS     Ferrous Sulfate 324 (65 Fe) MG TBEC     FLUoxetine (PROZAC) 40 MG capsule     lisinopril-hydrochlorothiazide (ZESTORETIC) 20-12.5 MG tablet     MULTI VITAMIN MENS PO     OLANZapine (ZYPREXA) 5 MG tablet     omega 3 1000 MG CAPS     tadalafil (CIALIS) 20 MG tablet     TURMERIC PO     vitamin B complex with vitamin C (STRESS TAB) tablet     No current facility-administered medications for this visit.       Physical Exam:  ?  Vitals:  There were no vitals taken for this visit.   General:  WD/WN, in NAD.  A&O x3.  Dermatologic:    Skin is intact, open lesions absent.   Bruising present diffusely to midfoot L.  Skin texture, turgor is abnormal, venous stasis to LE b/l.  Vascular:  Pulses palpable bilateral.  Digital capillary refill time normal left.  Skin temperature is warm left.  Generalized edema- 1+ bilateral.  Focal edema- moderate foot left.  Neurologic:    Gross sensation normal.  Gait and balance abnormal, antalgic, L.  Musculoskeletal:  Maximal pain to palpation of midfoot left.  no pain to palpation of ankle malleoli, left.  Active ROM present to digits bilateral.    Muscle strength 5/5  to uninjured extremity, guarding of injured extremity.     Patient is able to bear weight on the injured extremity.  Patient is able to walk on the injured extremity.    Imaging:  x-ray independently reviewed and interpreted by myself  today.  Non-Weight-bearing views left foot dated 12/21/22, reveal Lisfranc injury with 2nd met distal diaphyseal fracture, lateral subluxation at 3rd TMTJ and possible impaction 4th TMTJ on cuboid, L.  Medial column, lisfranc interval appears intact.      x-ray independently reviewed and interpreted by myself today.  Weight-bearing views left foot dated 2020, reveal intact Lisfranc interval

## 2022-12-22 ENCOUNTER — MYC MEDICAL ADVICE (OUTPATIENT)
Dept: PODIATRY | Facility: CLINIC | Age: 62
End: 2022-12-22

## 2022-12-22 DIAGNOSIS — S93.325A LISFRANC DISLOCATION, LEFT, INITIAL ENCOUNTER: Primary | ICD-10-CM

## 2022-12-22 ASSESSMENT — ANXIETY QUESTIONNAIRES
8. IF YOU CHECKED OFF ANY PROBLEMS, HOW DIFFICULT HAVE THESE MADE IT FOR YOU TO DO YOUR WORK, TAKE CARE OF THINGS AT HOME, OR GET ALONG WITH OTHER PEOPLE?: NOT DIFFICULT AT ALL
4. TROUBLE RELAXING: NOT AT ALL
IF YOU CHECKED OFF ANY PROBLEMS ON THIS QUESTIONNAIRE, HOW DIFFICULT HAVE THESE PROBLEMS MADE IT FOR YOU TO DO YOUR WORK, TAKE CARE OF THINGS AT HOME, OR GET ALONG WITH OTHER PEOPLE: NOT DIFFICULT AT ALL
5. BEING SO RESTLESS THAT IT IS HARD TO SIT STILL: NOT AT ALL
GAD7 TOTAL SCORE: 2
7. FEELING AFRAID AS IF SOMETHING AWFUL MIGHT HAPPEN: NOT AT ALL
2. NOT BEING ABLE TO STOP OR CONTROL WORRYING: NOT AT ALL
3. WORRYING TOO MUCH ABOUT DIFFERENT THINGS: NOT AT ALL
6. BECOMING EASILY ANNOYED OR IRRITABLE: SEVERAL DAYS
GAD7 TOTAL SCORE: 2
7. FEELING AFRAID AS IF SOMETHING AWFUL MIGHT HAPPEN: NOT AT ALL
1. FEELING NERVOUS, ANXIOUS, OR ON EDGE: SEVERAL DAYS
GAD7 TOTAL SCORE: 2

## 2022-12-22 ASSESSMENT — PATIENT HEALTH QUESTIONNAIRE - PHQ9
10. IF YOU CHECKED OFF ANY PROBLEMS, HOW DIFFICULT HAVE THESE PROBLEMS MADE IT FOR YOU TO DO YOUR WORK, TAKE CARE OF THINGS AT HOME, OR GET ALONG WITH OTHER PEOPLE: NOT DIFFICULT AT ALL
SUM OF ALL RESPONSES TO PHQ QUESTIONS 1-9: 3
SUM OF ALL RESPONSES TO PHQ QUESTIONS 1-9: 3

## 2022-12-23 NOTE — TELEPHONE ENCOUNTER
Called [facility] medical at number patient provided- they are closed today and on Monday 12/26/22. Office recording stated fax #: [phone]. Wheelchair order faxed to this number.     Order printed and faxed to number above. Patient notified via [employer].     Please advise if order can be placed for bariatric bath bench and PT for crutch training.     Magalis Myers MSA, ATC  Certified Athletic Trainer

## 2022-12-26 ENCOUNTER — TELEPHONE (OUTPATIENT)
Dept: PODIATRY | Facility: CLINIC | Age: 62
End: 2022-12-26

## 2022-12-26 ENCOUNTER — PREP FOR PROCEDURE (OUTPATIENT)
Dept: PODIATRY | Facility: CLINIC | Age: 62
End: 2022-12-26

## 2022-12-26 ENCOUNTER — TELEPHONE (OUTPATIENT)
Dept: FAMILY MEDICINE | Facility: CLINIC | Age: 62
End: 2022-12-26

## 2022-12-26 ENCOUNTER — VIRTUAL VISIT (OUTPATIENT)
Dept: PSYCHIATRY | Facility: CLINIC | Age: 62
End: 2022-12-26
Payer: COMMERCIAL

## 2022-12-26 DIAGNOSIS — E66.01 MORBID OBESITY (H): ICD-10-CM

## 2022-12-26 DIAGNOSIS — S93.325A LISFRANC DISLOCATION, LEFT, INITIAL ENCOUNTER: Primary | ICD-10-CM

## 2022-12-26 DIAGNOSIS — F41.1 GAD (GENERALIZED ANXIETY DISORDER): ICD-10-CM

## 2022-12-26 DIAGNOSIS — F33.1 MAJOR DEPRESSIVE DISORDER, RECURRENT EPISODE, MODERATE (H): Primary | ICD-10-CM

## 2022-12-26 PROCEDURE — 99214 OFFICE O/P EST MOD 30 MIN: CPT | Mod: 95 | Performed by: NURSE PRACTITIONER

## 2022-12-26 RX ORDER — OLANZAPINE 2.5 MG/1
2.5 TABLET, FILM COATED ORAL AT BEDTIME
Qty: 90 TABLET | Refills: 0 | Status: SHIPPED | OUTPATIENT
Start: 2022-12-26 | End: 2023-03-27

## 2022-12-26 NOTE — PATIENT INSTRUCTIONS
1.  Olanzapine 2.5 mg at bedtime  2.  Fluoxetine 40 mg daily  3.  Buspirone 15 mg twice daily  4.  Counseling therapies, when able to, for learning ways to manage life stressors    Continue all other medications as reviewed per electronic medical record today.   Safety plan reviewed. To the Emergency Department as needed or call after hours crisis line at [phone] or [phone]. [place] Crisis Text Line. Text [place] to 112990 or Suicide LifeLine Chat: [facility].org/chat/  To schedule individual or family therapy, call [facility] at [phone]  Schedule an appointment with me in wary or sooner as needed. Call [facility] at [phone] to schedule.  Follow up with primary care provider as planned or for acute medical concerns.  Call the psychiatric nurse line with medication questions or concerns at [phone]  MyChart may be used to communicate with your provider, but this is not intended to be used for emergencies.    Crisis Resources:    National Suicide Prevention Lifeline: 279.784.5738 (TTY: 100.585.2517). Call anytime for help.  (www.suicidepreventionlifeline.org)  National [place] [employer] Mental Illness (www.ana maría.org): 905.766.5981 or 678-670-7975.   Mental Health Association (www.mentalhealth.org): 251.941.9184 or 069-433-5188.  [place] Crisis Text Line: Text [place] to 211951  Suicide LifeLine Chat: [facility].org/chat

## 2022-12-26 NOTE — PROGRESS NOTES
"Jerry is a 62 year old who is being evaluated via a billable video visit.      How would you like to obtain your AVS? MyChart  If the video visit is dropped, the invitation should be resent by: Text to cell phone: [phone]  Will anyone else be joining your video visit? Yes: wife Mary is helping with phone. How would they like to receive their invitation? Text to cell phone: [phone]    Tita FLORES    Video-Visit Details    Type of service:  Video Visit   Video Start Time: 1:48 PM  Video End Time:2:22 PM    Originating Location (pt. Location): Home    Distant Location (provider location):  Off-site  Platform used for Video Visit: [facility]               Outpatient Psychiatric Progress Note    Name: Car Torres   : 1960                    Primary Care Provider: Mihir Perez MD   Therapist: None    PHQ-9 scores:  PHQ-9 SCORE 10/5/2022 2022 2022   PHQ-9 Total Score MyChart 4 (Minimal depression) 0 3 (Minimal depression)   PHQ-9 Total Score 4 0 3       SHAHNAZ-7 scores:  SHAHNAZ-7 SCORE 2022   Total Score - - 2 (minimal anxiety)   Total Score 0 0 2       Patient Identification:    Patient is a 62 year old year old,   White Not  or  male  who presents for return visit with me.  Patient is currently disabled. Patient attended the session with His wife , who they agreed to have interview with. Patient prefers to be called: \" Jerry\".    Current medications include: ASPIRIN 325 MG OR TBEC, ONE DAILY  atorvastatin (LIPITOR) 40 MG tablet, Take 1 tablet (40 mg) by mouth daily  busPIRone (BUSPAR) 15 MG tablet, Take 1 tablet (15 mg) by mouth 2 times daily  Cholecalciferol (VITAMIN D PO), Take 5,000 Units by mouth daily One tablet twice daily  donepezil (ARICEPT) 10 MG tablet, Take 1 tablet (10 mg) by mouth daily  Ferrous Gluconate 324 (37.5 Fe) MG TABS, 1 tablet daily  Ferrous Sulfate 324 (65 Fe) MG TBEC,   FLUoxetine (PROZAC) 40 MG capsule, Take 1 " capsule (40 mg) by mouth daily  HYDROcodone-acetaminophen (NORCO) 5-325 MG tablet, Take 1 tablet by mouth every 6 hours as needed for severe pain (7-10)  lisinopril-hydrochlorothiazide (ZESTORETIC) 20-12.5 MG tablet, Take 1 tablet by mouth daily  MULTI VITAMIN MENS PO, None Entered  OLANZapine (ZYPREXA) 5 MG tablet, Take 0.5-1 tablets (2.5-5 mg) by mouth At Bedtime  omega 3 1000 MG CAPS, Take by mouth daily  tadalafil (CIALIS) 20 MG tablet, TAKE 1/2 TO 1 TABLET BY MOUTH 30 MINUTES TO 1 HOUR BEFORE SEX  TURMERIC PO, Take by mouth daily  vitamin B complex with vitamin C (STRESS TAB) tablet, Take 1 tablet by mouth daily    No current facility-administered medications on file prior to visit.       The [place] Prescription Monitoring Program has been reviewed and there are no concerns about diversionary activity for controlled substances at this time.      I was able to review most recent Primary Care Provider, specialty provider, and therapy visit notes that I have access to.     Today, patient reports that he fell doown the stairs and fractured his foot.  He is unable to bear weight.   He has to have surgery to put a plate in his foot.  He may need to go to rehab.  He is depressed and worried for his wife.  He is anxious about the surgery coming up.  His wife is telling me that she feels they will be able to manage things at home but does admit some difficulties in helping her  get the things he might need as he recovers from foot surgery.  They both talked about him going into a transition center after surgery.  No use of kratom reported.  Paranoia symptoms are nonexistent at this time.     has a past medical history of Dementia (H), Depressive disorder, Heart disease (5/8/2018), Hypercholesterolemia, Hypertension goal BP (blood pressure) < 140/90, Nonspecific abnormal electrocardiogram (ECG) (EKG) (08/23/2007), and Sleep apnea.    He has no past medical history of Arthritis, Cancer (H), Cerebral infarction  (H), Congestive heart failure (H), Congestive heart failure, unspecified, COPD (chronic obstructive pulmonary disease) (H), Diabetes (H), History of blood transfusion, Thyroid disease, or Uncomplicated asthma.    Social history updates:    Jerry lives with his wife in a ranch style home.  Laundry facilities are downstairs in the basement.  Jerry is no longer employed and is receiving disability benefits.    Substance use updates:    No alcohol use reported  Tobacco use: No    Vital Signs:   There were no vitals taken for this visit.    Labs:    Most recent laboratory results reviewed and no new labs.     Mental Status Examination:  Appearance: awake, alert, casual dress and mild distress  Attitude: cooperative  Eye Contact:  adequate  Gait and Station: Falls  Psychomotor Behavior:  Unable to assess-instructions are for him to be nonweightbearing on his leg secondary to recent foot fracture  Oriented to:  time, person, and place  Attention Span and Concentration:  Normal  Speech:   clear, coherent and Speaks: English  Mood:  anxious, sad  and depressed  Affect:  mood congruent  Associations:  no loose associations  Thought Process:  goal oriented  Thought Content:  no evidence of suicidal ideation or homicidal ideation, no auditory hallucinations present and no visual hallucinations present  Recent and Remote Memory:  fair Not formally assessed. No amnesia.  Fund of Knowledge: appropriate  Insight:  good  Judgment:  fair  Impulse Control:  intact    Suicide Risk Assessment:  Today Car Torres reports no thoughts to harm themself or others. In addition, there are notable risk factors for self-harm, including anxiety and mood change. However, risk is mitigated by commitment to family, history of seeking help when needed, future oriented, denies suicidal intent or plan and denies homicidal ideation, intent, or plan. Therefore, based on all available evidence including the factors cited above, Car Torres does  not appear to be at imminent risk for self-harm, does not meet criteria for a 72-hr hold, and therefore remains appropriate for ongoing outpatient level of care.  A thorough assessment of risk factors related to suicide and self-harm have been reviewed and are noted above. The patient convincingly denies suicidality on several occasions. Local community safety resources printed and reviewed for patient to use if needed. There was no deceit detected, and the patient presented in a manner that was believable.     DSM5 Diagnosis:  296.32 (F33.1) Major Depressive Disorder, Recurrent Episode, Moderate _ and With mixed features  300.02 (F41.1) Generalized Anxiety Disorder    Medical comorbidities include:   Patient Active Problem List    Diagnosis Date Noted     Hypertension goal BP (blood pressure) < 140/90 10/20/2010     Priority: High     Hyperlipidemia LDL goal <130 06/11/2010     Priority: High     Dementia associated with other underlying disease with behavioral disturbance 01/03/2022     Priority: Medium     Ulcer of left foot, unspecified ulcer stage (H) 01/03/2022     Priority: Medium     Unspecified psychosis not due to a substance or known physiological condition (H) 01/03/2022     Priority: Medium     Major depressive disorder, recurrent episode, mild with atypical features (H) 12/06/2021     Priority: Medium     TIA (obstructive sleep apnea) 07/19/2018     Priority: Medium     Home Sleep Apnea Testing - 7/18/18: 295 lbs 0 oz: AHI 45.3/hr. Supine AHI 61.9/hr. Oxygen [place] of 66%.  Baseline 93%.  Sp02 =< 88% for 47 minutes.       History of sinus bradycardia 06/07/2018     Priority: Medium     ED (erectile dysfunction) 06/07/2018     Priority: Medium     Coronary artery calcification 05/08/2018     Priority: Medium     Per CT CHEST WITHOUT CONTRAST April 23, 2018. CT calcium score planned.        Renal cyst 11/18/2015     Priority: Medium     Simple, non enhanced, 3.5 cm on right kidney, Bosniak 1 - x 2 stable  findings       Diverticulosis of large intestine without hemorrhage 11/18/2015     Priority: Medium     Incidental finding on CT scan        Pulmonary nodule 11/18/2015     Priority: Medium     Incidental finding on CT scan, right posterior lung - considered benign / stable        CKD (chronic kidney disease) stage 2, GFR 60-89 ml/min 10/21/2010     Priority: Medium     60 to 80 - have discussed Lisinopril - will consider        Sciatica 02/27/2006     Priority: Medium     Morbid obesity (H) 02/27/2006     Priority: Medium       Assessment:    Car Torres met with me today along with his wife.  I was informed that recently Jerry fell down stairs in their home and sustained a foot fracture that is more than likely going to require surgery soon.  Him and his wife are concerned about his recovery.  At home as he prepares to possibly go to a transition care center after the procedure.  His depression remains and generalized worry related to caring for his wife.  At this point he is not having any thoughts of suspicion or distressed of other people and his wife.  He has not been using kratom.  Olanzapine will be prescribed at 2.5 mg at bedtime with thoughts to discontinue it at his next visit.  Fluoxetine continues at 40 mg daily and for anxiety buspirone is prescribed at 15 mg twice daily.  Counseling therapies are urged, when Jerry is able to afford this, in order to learn ways to manage life stressors and upcoming adjustments..    Medication side effects and alternatives were reviewed. Health promotion activities recommended and reviewed today. All questions addressed. Education and counseling completed regarding risks and benefits of medications and psychotherapy options.    Treatment Plan:        1.  Olanzapine 2.5 mg at bedtime    2.  Fluoxetine 40 mg daily    3.  Buspirone 15 mg twice daily    4.  Counseling therapies, when able to, for learning ways to manage life stressors        Continue all other  medications as reviewed per electronic medical record today.     Safety plan reviewed. To the Emergency Department as needed or call after hours crisis line at [phone] or [phone]. [place] Crisis Text Line. Text [place] to 710776 or Suicide LifeLine Chat: suicideAtheroNova.org/chat/    To schedule individual or family therapy, call [facility] at [phone]    Schedule an appointment with me in wary or sooner as needed. Call [facility] at [phone] to schedule.    Follow up with primary care provider as planned or for acute medical concerns.    Call the psychiatric nurse line with medication questions or concerns at [phone]    MyChart may be used to communicate with your provider, but this is not intended to be used for emergencies.    Crisis Resources:    National Suicide Prevention Lifeline: 569.836.1486 (TTY: 744.877.4837). Call anytime for help.  (www.suicidepreventionlifeline.org)  National [place] on Mental Illness (www.ana maría.org): 449.535.6333 or 864-521-6553.   Mental Health Association (www.mentalhealth.org): 261.141.5129 or 913-358-2953.  [place] Crisis Text Line: Text [place] to 860758  Suicide LifeLine Chat: suicideAtheroNova.org/chat    Administrative Billing:   Time spent with patient includes counseling and coordination of care regarding above diagnoses and treatment plan.    Patient Status:  Patient will continue to be seen for ongoing consultation and stabilization.    Signed:   BELEN Singh-BC   Psychiatry         Answers for HPI/ROS submitted by the patient on 12/22/2022  If you checked off any problems, how difficult have these problems made it for you to do your work, take care of things at home, or get along with other people?: Not difficult at all  PHQ9 TOTAL SCORE: 3  SHAHNAZ 7 TOTAL SCORE: 2

## 2022-12-26 NOTE — TELEPHONE ENCOUNTER
Wife called patient needs a pre-op before Thursday they just called and told him.  Peri Fishman   [facility] Team

## 2022-12-26 NOTE — NURSING NOTE
Pt and wife stated that pt is taking Qunol 100mg.  They are having trouble adding it to med list via [facility] [employer].    Tita Nye VF

## 2022-12-26 NOTE — TELEPHONE ENCOUNTER
Talked to wife about potential surgery date options and encouraged patient to try and get in for a pre-op appt.       Brenda Feldman, Surgery Scheduler

## 2022-12-27 ENCOUNTER — MYC MEDICAL ADVICE (OUTPATIENT)
Dept: PODIATRY | Facility: CLINIC | Age: 62
End: 2022-12-27

## 2022-12-27 ENCOUNTER — ANCILLARY PROCEDURE (OUTPATIENT)
Dept: CT IMAGING | Facility: CLINIC | Age: 62
End: 2022-12-27
Attending: PODIATRIST
Payer: COMMERCIAL

## 2022-12-27 ENCOUNTER — VIRTUAL VISIT (OUTPATIENT)
Dept: BEHAVIORAL HEALTH | Facility: CLINIC | Age: 62
End: 2022-12-27
Payer: COMMERCIAL

## 2022-12-27 DIAGNOSIS — R69 DIAGNOSIS DEFERRED: Primary | ICD-10-CM

## 2022-12-27 DIAGNOSIS — S93.325A LISFRANC DISLOCATION, LEFT, INITIAL ENCOUNTER: ICD-10-CM

## 2022-12-27 PROCEDURE — 73700 CT LOWER EXTREMITY W/O DYE: CPT | Mod: LT | Performed by: RADIOLOGY

## 2022-12-27 NOTE — TELEPHONE ENCOUNTER
Patient is was evaluated by provider on 12/21/22.   Patient was placed in splint at time of visit, instructed on NWB status.   CT ordered for surgical planning.     Surgery not yet scheduled.     Patient's spouse inquires of WB status post operative, and requests home Physical Therapy due hardship of transportation of patient with WB limitations.     Jaci Mccain, ATC

## 2022-12-27 NOTE — PROGRESS NOTES
[facility]  [facility]: [place]        Social Work Care Navigator Note      Patient: Car Torres  Date: December 27, 2022  Preferred Name: Jerry    Previous PHQ-9:   PHQ-9 SCORE 10/5/2022 11/17/2022 12/22/2022   PHQ-9 Total Score MyChart 4 (Minimal depression) 0 3 (Minimal depression)   PHQ-9 Total Score 4 0 3     Previous SHAHNAZ-7:   SHAHNAZ-7 SCORE 12/2/2021 6/9/2022 12/22/2022   Total Score - - 2 (minimal anxiety)   Total Score 0 0 2     ALMA LEVEL:  ALMA Score (Last Two) 5/4/2012   ALMA Raw Score 37   Activation Score 49.9   ALMA Level 2       Preferred Contact:  Need for : No  Preferred Contact: Cell      Type of Contact Today: Phone call (patient / identified key support person reached)      Data: (subjective / Objective):    Patient came in to complete the comprehensive wellness assessment for [facility].  See [facility] Flowsheets for details on the assessment.  See Elvia, Behavior[facility] for a copy of the patient's care plan    1. Patient and his wife went to both of his daughters' houses for bot [place] and [employer] day.     2. Patient reported that he fell down the stairs and he broke his foot. He is not weight bearing, and he has to get surgery as soon as possible. They reported that their doctor hasn't been very receptive, so they are waiting on a call from a  to get the surgery scheduled. The surgery is a same day surgery, so he can't go to rehab after and they are trying to get in home therapy approved. His wife is already nervous about getting patient to appointments on his wheelchair with the snow and ice on side walks, so it would be better to be able to get in-home.     3. They reported that they have accommodated in their home to have him set up so he can get to the urinal, and he can use crutches as well. They are doing what they can to keep him off the foot. They are also going to try to get a wheelchair.     4. Patient will need  to complete a pre-op physical, so they are trying to get a hold of the PCP's team to see if he can get scheduled as soon as possible.     5. Patient is waiting for pain medication refills, so they asked for this just enough so he can take this a    Updated Goals:   Health: Jerry will continue to meet with his providers and his nutritionist. He also will continue to work on his daily water intake, as recommended by his nutritionist, and is drinking about 24oz daily.   -Jerry will work with providers to recover from his broken foot and surgery, and he will stay compliant with post-op surgery instructions.  Mental Health: Jerry will continue to work with his psychiatrist to manage his anxiety, as well as his care coordinator with the [facility]. He will continue to work on verbalizing any struggles he may be having with his anxiety, especially not being able to help his wife while he's injured.   Financial: Jerry and his wife would like to work on paying off credit card debt, so they are working on trying to figure out how to pay this off.      [facility] will send his HAP letter via [employer] once approved by the [facility].     Next check in is 1/24.       OLGA Montero  [facility] Home ([facility])   [facility]  [phone]    Next 5 appointments (look out 90 days)    Mar 21, 2023 11:30 AM  (Arrive by 11:15 AM)  Return Visit with Miguel Varela MD  [facility] [place] ([facility] [place]) [address] [place] [id number] [phone]

## 2022-12-27 NOTE — LETTER
Behavioral Health Home ([facility]): Health Action Plan  [facility]: [place]    [facility]      Name: Car Torres  Preferred Name: Jerry  : 1960  MRN: [id number]    My Goals  Goal Areas: Health; Mental Health; Financial and Social Service Benefits    Patient stated goals:   Health: Jerry will continue to meet with his providers and his nutritionist. He also will continue to work on his daily water intake, as recommended by his nutritionist, and is drinking about 24oz daily.     -Jerry will work with providers to recover from his broken foot and surgery, and he will stay compliant with post-op surgery instructions.      Mental Health: Jerry will continue to work with his psychiatrist to manage his anxiety, as well as his care coordinator with the [facility]. He will continue to work on verbalizing any struggles he may be having with his anxiety, especially not being able to help his wife while he's injured.       Financial: Jerry and his wife would like to work on paying off credit card debt, so they are working on trying to figure out how to pay this off.    Strengths related to each goal: Jerry identifies his strengths are he is a good , mentor, good listener, patience, and forgiving. He also has a positive attitude    Services and Supports Needed: The [facility] Team will provide monthly contact with patient in order to monitor progress towards goals.    Activities / Actions of Team to support goal(s): [facility] team will locate appropriate resources that align with patient's goals and promote health and wellness.    Activities / Actions of Patient / Parent / Guardian to support goal(s): It is a requirement that patient's primary care physician is through the [facility] system and that they are on Medical Assistance/Medicaid. If either of these were to change or if patient needs any type of assistance, they are to reach out to their [facility] [place] ([facility]) team.    Recommended  Preceptor Attestation:   Patient seen, evaluated and discussed with the resident. I have verified the content of the note, which accurately reflects my assessment of the patient and the plan of care.   Supervising Physician:  Roseann Fair MD      Referral  Tobacco cessation referrals made?: NA  Mental Health / Chemical Dependency Referrals: Yes  Substance Use Referrals: Not Applicable  Mental Health Referrals: MH Services: [facility], [facility], Community MH Provider; Psychiatry  Community Health Referrals: [facility]; [facility] ; Other (see comments)      My Team Members and Their Contact Information  Patient Care Team       Relationship Specialty Notifications Start End    Mihir Perez MD PCP - General Internal Medicine - Pediatrics All results, Admissions 12/2/21     Phone: [phone] Fax: [phone] [address] [address]    Taniya Estrada NP Assigned Behavioral Health Provider   12/13/20     Phone: [phone] Fax: [phone]         [address] [place] [address]    Miguel Varela MD Assigned Neuroscience Provider   12/27/20     Phone: [phone] Fax: [phone] [address] [place] [address]    Taniya Estrada NP Nurse Practitioner Psychiatry All results, Admissions 12/8/21     Psychiatric provider    Phone: [phone] Fax: [phone]         [address] [place] [address]    Miguel Varela MD MD Neurology All results, Admissions 12/8/21     Phone: [phone] Fax: [phone]         [address] [place] [address]    Mihir Perez MD Assigned PCP   1/9/22     Phone: [phone] Fax: [phone] [address] [address]    Clare Hendrix BSW  Clinic Admissions 1/31/22     [facility] - direct extension [phone]          My Wellness Plan  Safety Concerns: None Reported / Observed    Recommendations / Plan for safety concerns: A safety and risk management plan has not been developed at this time, however patient was encouraged to call [facility] / 911 should there be a change in any of these risk factors.    Crisis Plan (emergencies / when urgent support needed):  Jerry states he feels comfortable contacting his spouse, Destiny Torres and/or calling the National Suicide Prevention Lifeline at 988 or 911 in a crisis or emergency situation.    Car Torres co-developed the Health Action Plan with the [facility] Team and received a copy of this document.  Date Health Action Plan Completed/Updated: 12/27/22

## 2022-12-28 ENCOUNTER — THERAPY VISIT (OUTPATIENT)
Dept: PHYSICAL THERAPY | Facility: CLINIC | Age: 62
End: 2022-12-28
Attending: PODIATRIST
Payer: COMMERCIAL

## 2022-12-28 ENCOUNTER — MYC REFILL (OUTPATIENT)
Dept: PODIATRY | Facility: CLINIC | Age: 62
End: 2022-12-28

## 2022-12-28 DIAGNOSIS — S93.325D LISFRANC DISLOCATION, LEFT, SUBSEQUENT ENCOUNTER: ICD-10-CM

## 2022-12-28 DIAGNOSIS — S93.325A LISFRANC DISLOCATION, LEFT, INITIAL ENCOUNTER: ICD-10-CM

## 2022-12-28 DIAGNOSIS — S93.325A LISFRANC DISLOCATION, LEFT, INITIAL ENCOUNTER: Primary | ICD-10-CM

## 2022-12-28 PROCEDURE — 97530 THERAPEUTIC ACTIVITIES: CPT | Mod: GP | Performed by: PHYSICAL THERAPIST

## 2022-12-28 PROCEDURE — 97116 GAIT TRAINING THERAPY: CPT | Mod: GP | Performed by: PHYSICAL THERAPIST

## 2022-12-28 PROCEDURE — 97161 PT EVAL LOW COMPLEX 20 MIN: CPT | Mod: GP | Performed by: PHYSICAL THERAPIST

## 2022-12-28 RX ORDER — HYDROCODONE BITARTRATE AND ACETAMINOPHEN 5; 325 MG/1; MG/1
1 TABLET ORAL EVERY 6 HOURS PRN
Qty: 20 TABLET | Refills: 0 | Status: CANCELLED | OUTPATIENT
Start: 2022-12-28

## 2022-12-28 NOTE — PROGRESS NOTES
[facility]    OUTPATIENT Physical Therapy ORTHOPEDIC EVALUATION  PLAN OF TREATMENT FOR OUTPATIENT REHABILITATION  (COMPLETE FOR INITIAL CLAIMS ONLY)  Patient's Last Name, First Name, M.I.  YOB: 1960  Car Torres    Provider s Name:  [facility]   Medical Record No.  [id number]   Start of Care Date:  12/28/22   Onset Date:  01/05/2312/17/22   Treatment Diagnosis:  Lisfranc dislocation of left foot, crutch training Medical Diagnosis:     Lisfranc dislocation, left, initial encounter  Lisfranc dislocation, left, subsequent encounter       Goals:     12/28/22 0500   Body Part   Goals listed below are for Left foot/ankle crutch training   Goal #1   Goal #1 ambulation   Previous Functional Level No restrictions   Current Functional Level Feet patient can walk;with 2 crutches;PWB   Performance Level 25 feet   STG Target Performance Feet patient will be able to walk;with 2 crutches;NWB   Performance Level 10 feet   Rationale for safe household ambulation   Due Date 12/30/22    LTG Target Performance Feet patient will be able to walk;with 2 crutches;NWB   Performance Level >30 feet   Rationale for safe household ambulation   Due Date 01/04/23         Therapy Frequency:  1x/week  Predicted Duration of Therapy Intervention:  2 weeks    Jeff Saldivar, PT                 I CERTIFY THE NEED FOR THESE SERVICES FURNISHED UNDER        THIS PLAN OF TREATMENT AND WHILE UNDER MY CARE     (Physician attestation of this document indicates review and certification of the therapy plan).                     Certification Date From:  12/28/22   Certification Date To:  01/04/23    Referring Provider:  Bailey Ruiz    Initial Assessment        See Epic Evaluation SOC Date: 12/28/22

## 2022-12-28 NOTE — TELEPHONE ENCOUNTER
Talked with Pharmacist regarding prescription for Hydro. The medication was not denied. Their system is backed up, so there should be no issues with Jerry receiving his medication.

## 2022-12-28 NOTE — TELEPHONE ENCOUNTER
Refill request placed for hydrocodone.    Bariatric bath chair Rx created today in chart.    Call and determine if he would like faxed to [facility] or [facility] as well    Bailey Ruiz DPM

## 2022-12-28 NOTE — TELEPHONE ENCOUNTER
Called patient's wife and LM to call the clinic back. Pt is schedule for surgery on 1/30/23, tomorrow is too soon to do a preop if that is the true surgery date.    Clare Champagne MA

## 2022-12-28 NOTE — TELEPHONE ENCOUNTER
Wife called back but unable to reach her at that time.     Called her back and LM to call the clinic back.    Clare Champagne MA

## 2022-12-28 NOTE — PROGRESS NOTES
Physical Therapy Initial Evaluation  Subjective:    Patient Health History  Car Torres being seen for Crutch training.     Problem began: 12/17/2022.   Problem occurred: Fall down the stairs   Pain is reported as 5/10 on pain scale.  General health as reported by patient is fair.  Pertinent medical history includes: overweight, pain at night/rest, changes in skin color, sleep disorder/apnea and weakness.     Medical allergies: none.   Surgeries include:  None and other. Other surgery history details: Hand.    Current medications:  Anti-depressants, high blood pressure medication, pain medication and other. Other medications details: Meds for memory and meds for psychosis.       Primary job tasks include:  Other.   Other job/home tasks details: Retired.                Major areas of concern are being able to maintain NWB status using axillary crutches in home/community for flat terrain and stairs, as well as getting in/out of a bathtub for showers.  He is scheduled to undergo surgery on his left foot on 1/5/23.                    Objective:  System    Physical Exam        General Evaluation:                              Gait:    Significant findings:  Demonstrates short step-to shuffle with bilateral axillary crutches with TTWB.  Struggles to maintain NWB status to ambulate 20 feet using crutches.  Improved mechanics with light TTWB.                                     ROS    Assessment/Plan:    Patient is a 62 year old male with left side foot complaints.    Patient has the following significant findings with corresponding treatment plan.                Diagnosis 1:  Lisfranc dislocation of left foot    Impaired gait - gait training, assistive devices and home program  Decreased function - therapeutic activities    Cumulative Therapy Evaluation is: Low complexity.    Previous and current functional limitations:  (See Goal Flow Sheet for this information)    Short term and Long term goals: (See Goal Flow Sheet  for this information)     Communication ability:  Patient appears to be able to clearly communicate and understand verbal and written communication and follow directions correctly.  Treatment Explanation - The following has been discussed with the patient:   RX ordered/plan of care  Anticipated outcomes  Possible risks and side effects  This patient would benefit from PT intervention to resume normal activities.   Rehab potential is good.    Frequency:  1 X week, once daily  Duration:  for 2 weeks leading up to surgery  Discharge Plan:  Achieve all LTG.  Independent in home treatment program.  Reach maximal therapeutic benefit.    Please refer to the daily flowsheet for treatment today, total treatment time and time spent performing 1:1 timed codes.

## 2022-12-28 NOTE — TELEPHONE ENCOUNTER
Bath chair order was already faxed to [facility].   Closing encounter.     ALBIN Ritter RN

## 2022-12-28 NOTE — TELEPHONE ENCOUNTER
LM for Luzma (wife) to return call to let us know if tomorrow at 3:20 would work.  Juanita Mosqueda,

## 2022-12-28 NOTE — TELEPHONE ENCOUNTER
"Please see wife's Mychart request below, advise and place orders if appropriate.     \"For planning purposes on my part  how long do we estimate that Jerry will be non weight bearing after surgery and is it possible for him to have in-home PT following surgery?  It will be a hardship for me to get him there and push him through any parking lot/sidewalk snow or ice.    Thank you so much for hearing my concerns.   Destiny\"    ALBIN Ritter RN    "

## 2022-12-29 ENCOUNTER — OFFICE VISIT (OUTPATIENT)
Dept: FAMILY MEDICINE | Facility: CLINIC | Age: 62
End: 2022-12-29
Payer: COMMERCIAL

## 2022-12-29 VITALS
BODY MASS INDEX: 44.1 KG/M2 | HEART RATE: 66 BPM | HEIGHT: 71 IN | TEMPERATURE: 97.2 F | OXYGEN SATURATION: 95 % | DIASTOLIC BLOOD PRESSURE: 70 MMHG | WEIGHT: 315 LBS | SYSTOLIC BLOOD PRESSURE: 131 MMHG

## 2022-12-29 DIAGNOSIS — Z01.818 PREOP GENERAL PHYSICAL EXAM: ICD-10-CM

## 2022-12-29 DIAGNOSIS — G62.89 OTHER POLYNEUROPATHY: Primary | ICD-10-CM

## 2022-12-29 DIAGNOSIS — S92.309G: ICD-10-CM

## 2022-12-29 DIAGNOSIS — F29 UNSPECIFIED PSYCHOSIS NOT DUE TO A SUBSTANCE OR KNOWN PHYSIOLOGICAL CONDITION (H): ICD-10-CM

## 2022-12-29 DIAGNOSIS — L97.529 ULCER OF LEFT FOOT, UNSPECIFIED ULCER STAGE (H): ICD-10-CM

## 2022-12-29 LAB
BASOPHILS # BLD AUTO: 0 10E3/UL (ref 0–0.2)
BASOPHILS NFR BLD AUTO: 0 %
EOSINOPHIL # BLD AUTO: 0.1 10E3/UL (ref 0–0.7)
EOSINOPHIL NFR BLD AUTO: 2 %
ERYTHROCYTE [DISTWIDTH] IN BLOOD BY AUTOMATED COUNT: 13.1 % (ref 10–15)
HCT VFR BLD AUTO: 40.9 % (ref 40–53)
HGB BLD-MCNC: 13.6 G/DL (ref 13.3–17.7)
LYMPHOCYTES # BLD AUTO: 2.1 10E3/UL (ref 0.8–5.3)
LYMPHOCYTES NFR BLD AUTO: 28 %
MCH RBC QN AUTO: 30.1 PG (ref 26.5–33)
MCHC RBC AUTO-ENTMCNC: 33.3 G/DL (ref 31.5–36.5)
MCV RBC AUTO: 91 FL (ref 78–100)
MONOCYTES # BLD AUTO: 0.6 10E3/UL (ref 0–1.3)
MONOCYTES NFR BLD AUTO: 9 %
NEUTROPHILS # BLD AUTO: 4.5 10E3/UL (ref 1.6–8.3)
NEUTROPHILS NFR BLD AUTO: 61 %
PLATELET # BLD AUTO: 232 10E3/UL (ref 150–450)
RBC # BLD AUTO: 4.52 10E6/UL (ref 4.4–5.9)
WBC # BLD AUTO: 7.3 10E3/UL (ref 4–11)

## 2022-12-29 PROCEDURE — 85025 COMPLETE CBC W/AUTO DIFF WBC: CPT | Performed by: INTERNAL MEDICINE

## 2022-12-29 PROCEDURE — 36415 COLL VENOUS BLD VENIPUNCTURE: CPT | Performed by: INTERNAL MEDICINE

## 2022-12-29 PROCEDURE — 80053 COMPREHEN METABOLIC PANEL: CPT | Performed by: INTERNAL MEDICINE

## 2022-12-29 PROCEDURE — 99214 OFFICE O/P EST MOD 30 MIN: CPT | Performed by: INTERNAL MEDICINE

## 2022-12-29 RX ORDER — GABAPENTIN 300 MG/1
300 CAPSULE ORAL AT BEDTIME
Qty: 31 CAPSULE | Refills: 4 | Status: SHIPPED | OUTPATIENT
Start: 2022-12-29 | End: 2023-05-17

## 2022-12-29 NOTE — PROGRESS NOTES
[facility]  [address] [place] [address]  [place] [address]  Phone: [phone]  Primary Provider: Iram Kinney  Pre-op Performing Provider: IRAM KINNEY    -  PREOPERATIVE EVALUATION:  Today's date: 12/29/2022    Car Torres is a 62 year old male who presents for a preoperative evaluation.    Surgical Information:  Surgery/Procedure: Left Foot Internal Fixation  Surgery Location: [facility]   Surgeon: Dr. Ruiz   Surgery Date: 1/5/23  Time of Surgery:   Where patient plans to recover: At home with family  Fax number for surgical facility: Note does not need to be faxed, will be available electronically in Epic.    Type of Anesthesia Anticipated: General    Assessment & Plan     The proposed surgical procedure is considered INTERMEDIATE risk.    Problem List Items Addressed This Visit     Ulcer of left foot, unspecified ulcer stage (H)    Unspecified psychosis not due to a substance or known physiological condition (H)   Other Visit Diagnoses     Other polyneuropathy    -  Primary    Relevant Medications    gabapentin (NEURONTIN) 300 MG capsule    Preop general physical exam        Relevant Orders    CBC with platelets and differential (Completed)    Comprehensive metabolic panel (BMP + Alb, Alk Phos, ALT, AST, Total. Bili, TP) (Completed)    Closed displaced fracture of metatarsal bone with delayed healing, unspecified laterality, unspecified metatarsal, subsequent encounter                         Medication Instructions:  Patient is to take all scheduled medications on the day of surgery EXCEPT for modifications listed below:    RECOMMENDATION:  APPROVAL GIVEN to proceed with proposed procedure, without further diagnostic evaluation.    1. Stop Ibuprofen or alleve 7 days before the procedure  2. Stop aspirin   3. Hold the lisinopril:hctz on the morning of the procedure      Subjective     HPI related to upcoming procedure: Left foot,  Fell down the stairs and the left  foot fracture in 3-4 places and unstable.         Preop Questions 12/28/2022   1. Have you ever had a heart attack or stroke? No   2. Have you ever had surgery on your heart or blood vessels, such as a stent placement, a coronary artery bypass, or surgery on an artery in your head, neck, heart, or legs? No   3. Do you have chest pain with activity? No   4. Do you have a history of  heart failure? No   5. Do you currently have a cold, bronchitis or symptoms of other infection? No   6. Do you have a cough, shortness of breath, or wheezing? No   7. Do you or anyone in your family have previous history of blood clots? No   8. Do you or does anyone in your family have a serious bleeding problem such as prolonged bleeding following surgeries or cuts? No   9. Have you ever had problems with anemia or been told to take iron pills? YES -    10. Have you had any abnormal blood loss such as black, tarry or bloody stools? No   11. Have you ever had a blood transfusion? No   12. Are you willing to have a blood transfusion if it is medically needed before, during, or after your surgery? Yes   13. Have you or any of your relatives ever had problems with anesthesia? No   14. Do you have sleep apnea, excessive snoring or daytime drowsiness? YES -    14a. Do you have a CPAP machine? Yes   15. Do you have any artifical heart valves or other implanted medical devices like a pacemaker, defibrillator, or continuous glucose monitor? No   16. Do you have artificial joints? No   17. Are you allergic to latex? No       Health Care Directive:  Patient has a Health Care Directive on file      Preoperative Review of :   reviewed - controlled substances reflected in medication list.      Status of Chronic Conditions:  SLEEP PROBLEM - Patient has a longstanding history of memory impairment and CPAP.. Patient has tried OTC medications with limited success.       Review of Systems  CONSTITUTIONAL: NEGATIVE for fever, chills, change in  weight  ENT/MOUTH: NEGATIVE for ear, mouth and throat problems  RESP: NEGATIVE for significant cough or SOB  CV: NEGATIVE for chest pain, palpitations or peripheral edema    Patient Active Problem List    Diagnosis Date Noted     Hypertension goal BP (blood pressure) < 140/90 10/20/2010     Priority: High     Hyperlipidemia LDL goal <130 06/11/2010     Priority: High     Lisfranc dislocation, left, initial encounter 12/28/2022     Priority: Medium     Lisfranc dislocation, left, subsequent encounter 12/28/2022     Priority: Medium     Dementia associated with other underlying disease with behavioral disturbance 01/03/2022     Priority: Medium     Ulcer of left foot, unspecified ulcer stage (H) 01/03/2022     Priority: Medium     Unspecified psychosis not due to a substance or known physiological condition (H) 01/03/2022     Priority: Medium     Major depressive disorder, recurrent episode, mild with atypical features (H) 12/06/2021     Priority: Medium     TIA (obstructive sleep apnea) 07/19/2018     Priority: Medium     Home Sleep Apnea Testing - 7/18/18: 295 lbs 0 oz: AHI 45.3/hr. Supine AHI 61.9/hr. Oxygen [place] of 66%.  Baseline 93%.  Sp02 =< 88% for 47 minutes.       History of sinus bradycardia 06/07/2018     Priority: Medium     ED (erectile dysfunction) 06/07/2018     Priority: Medium     Coronary artery calcification 05/08/2018     Priority: Medium     Per CT CHEST WITHOUT CONTRAST April 23, 2018. CT calcium score planned.        Renal cyst 11/18/2015     Priority: Medium     Simple, non enhanced, 3.5 cm on right kidney, Bosniak 1 - x 2 stable findings       Diverticulosis of large intestine without hemorrhage 11/18/2015     Priority: Medium     Incidental finding on CT scan        Pulmonary nodule 11/18/2015     Priority: Medium     Incidental finding on CT scan, right posterior lung - considered benign / stable        CKD (chronic kidney disease) stage 2, GFR 60-89 ml/min 10/21/2010     Priority: Medium      60 to 80 - have discussed Lisinopril - will consider        Sciatica 02/27/2006     Priority: Medium     Morbid obesity (H) 02/27/2006     Priority: Medium      Past Medical History:   Diagnosis Date     Dementia (H)      Depressive disorder      Heart disease 5/8/2018     Hypercholesterolemia      Hypertension goal BP (blood pressure) < 140/90      Nonspecific abnormal electrocardiogram (ECG) (EKG) 08/23/2007    Stress testing normal.      Sleep apnea     uses a c-pap, severe     Past Surgical History:   Procedure Laterality Date     COLONOSCOPY N/A 12/22/2020    Procedure: COLONOSCOPY, WITH POLYPECTOMY, CLIP;  Surgeon: Mihir Lang MD;  Location: [facility] OR     COLONOSCOPY N/A 1/10/2022    Procedure: COLONOSCOPY, WITH POLYPECTOMY AND BIOPSY;  Surgeon: Mihir Lang MD;  Location:  GI     ESOPHAGOSCOPY, GASTROSCOPY, DUODENOSCOPY (EGD), COMBINED N/A 1/28/2021    Procedure: ESOPHAGOGASTRODUODENOSCOPY, WITH BIOPSY;  Surgeon: Mihir Lang MD;  Location: [facility] OR     SURGICAL HISTORY OF -       surgery on left index finger     Current Outpatient Medications   Medication Sig Dispense Refill     ASPIRIN 325 MG OR TBEC ONE DAILY 100 0     atorvastatin (LIPITOR) 40 MG tablet Take 1 tablet (40 mg) by mouth daily 90 tablet 1     busPIRone (BUSPAR) 15 MG tablet Take 1 tablet (15 mg) by mouth 2 times daily 60 tablet 1     Cholecalciferol (VITAMIN D PO) Take 5,000 Units by mouth daily One tablet twice daily       donepezil (ARICEPT) 10 MG tablet Take 1 tablet (10 mg) by mouth daily 90 tablet 2     Ferrous Gluconate 324 (37.5 Fe) MG TABS 1 tablet daily       Ferrous Sulfate 324 (65 Fe) MG TBEC        FLUoxetine (PROZAC) 40 MG capsule Take 1 capsule (40 mg) by mouth daily 90 capsule 1     gabapentin (NEURONTIN) 300 MG capsule Take 1 capsule (300 mg) by mouth At Bedtime 31 capsule 4     HYDROcodone-acetaminophen (NORCO) 5-325 MG tablet Take 1 tablet by mouth every 8 hours as needed for severe pain (7-10) 24 tablet 0      "lisinopril-hydrochlorothiazide (ZESTORETIC) 20-12.5 MG tablet Take 1 tablet by mouth daily 90 tablet 4     MULTI VITAMIN MENS PO None Entered       OLANZapine (ZYPREXA) 2.5 MG tablet Take 1 tablet (2.5 mg) by mouth At Bedtime 90 tablet 0     omega 3 1000 MG CAPS Take by mouth daily       tadalafil (CIALIS) 20 MG tablet TAKE 1/2 TO 1 TABLET BY MOUTH 30 MINUTES TO 1 HOUR BEFORE SEX 6 tablet 5     TURMERIC PO Take by mouth daily       vitamin B complex with vitamin C (STRESS TAB) tablet Take 1 tablet by mouth daily         No Known Allergies     Social History     Tobacco Use     Smoking status: Former     Packs/day: 1.00     Years: 25.00     Pack years: 25.00     Types: Cigarettes, Cigars     Start date: 1973     Quit date: 1998     Years since quittin.9     Passive exposure: Past     Smokeless tobacco: Never     Tobacco comments:     N/A not a smoker   Substance Use Topics     Alcohol use: Yes     Alcohol/week: 10.0 standard drinks     Comment: less than 6 per week       History   Drug Use Unknown     Comment: Viktoriya         Objective     /70 (BP Location: Left arm, Patient Position: Chair, Cuff Size: Adult Large)   Pulse 66   Temp 97.2  F (36.2  C)   Ht 1.803 m (5' 11\")   Wt (!) 160.1 kg (353 lb)   SpO2 95%   BMI 49.23 kg/m      Physical Exam  GENERAL APPEARANCE: healthy, alert and no distress  HENT: ear canals and TM's normal and nose and mouth without ulcers or lesions  RESP: lungs clear to auscultation - no rales, rhonchi or wheezes  CV: regular rate and rhythm, normal S1 S2, no S3 or S4 and no murmur, click or rub   ABDOMEN: soft, nontender, no HSM or masses and bowel sounds normal  NEURO: Normal strength and tone, sensory exam grossly normal, mentation intact and speech normal    Recent Labs   Lab Test 22  0801 22  1530   HGB 14.1  --      --     139   POTASSIUM 4.7 4.7   CR 1.12 1.35*        Diagnostics:  Labs pending at this time.  Results will be reviewed " when available.   EKG required for known coronary heart disease and not completed in the last 90 days.   Stress test normal in 2018     Revised Cardiac Risk Index (RCRI):  The patient has the following serious cardiovascular risks for perioperative complications:   - Coronary Artery Disease (MI, positive stress test, angina, Qs on EKG) = 1 point     RCRI Interpretation: 1 point: Class II (low risk - 0.9% complication rate)           Signed Electronically by: Mihir Perez MD  Copy of this evaluation report is provided to requesting physician.    --------------------------

## 2022-12-29 NOTE — PATIENT INSTRUCTIONS
1. Stop Ibuprofen or alleve 7 days before the procedure  2. Stop aspirin   3. Hold the lisinopril:hctz on the morning of the procedure  For informational purposes only. Not to replace the advice of your health care provider. Copyright   ,  [facility]. All rights reserved. Clinically reviewed by Sierra Marinelli MD. [employer] [id number] - REV .  Preparing for Your Surgery  Getting started  A nurse will call you to review your health history and instructions. They will give you an arrival time based on your scheduled surgery time. Please be ready to share:    Your doctor's clinic name and phone number    Your medical, surgical, and anesthesia history    A list of allergies and sensitivities    A list of medicines, including herbal treatments and over-the-counter drugs    Whether the patient has a legal guardian (ask how to send us the papers in advance)  Please tell us if you're pregnant--or if there's any chance you might be pregnant. Some surgeries may injure a fetus (unborn baby), so they require a pregnancy test. Surgeries that are safe for a fetus don't always need a test, and you can choose whether to have one.   If you have a child who's having surgery, please ask for a copy of Preparing for Your Child's Surgery.    Preparing for surgery    Within 10 to 30 days of surgery: Have a pre-op exam (sometimes called an H&P, or History and Physical). This can be done at a clinic or pre-operative center.  ? If you're having a , you may not need this exam. Talk to your care team.    At your pre-op exam, talk to your care team about all medicines you take. If you need to stop any medicines before surgery, ask when to start taking them again.  ? We do this for your safety. Many medicines can make you bleed too much during surgery. Some change how well surgery (anesthesia) drugs work.    Call your insurance company to let them know you're having surgery. (If you don't have insurance, call  [phone].)    Call your clinic if there's any change in your health. This includes signs of a cold or flu (sore throat, runny nose, cough, rash, fever). It also includes a scrape or scratch near the surgery site.    If you have questions on the day of surgery, call your hospital or surgery center.  Eating and drinking guidelines  For your safety: Unless your surgeon tells you otherwise, follow the guidelines below.    Eat and drink as usual until 8 hours before you arrive for surgery. After that, no food or milk.    Drink clear liquids until 2 hours before you arrive. These are liquids you can see through, like water, Gatorade, and Propel Water. They also include plain black coffee and tea (no cream or milk), candy, and breath mints. You can spit out gum when you arrive.    If you drink alcohol: Stop drinking it the night before surgery.    If your care team tells you to take medicine on the morning of surgery, it's okay to take it with a sip of water.  Preventing infection    Shower or bathe the night before and morning of your surgery. Follow the instructions your clinic gave you. (If no instructions, use regular soap.)    Don't shave or clip hair near your surgery site. We'll remove the hair if needed.    Don't smoke or vape the morning of surgery. You may chew nicotine gum up to 2 hours before surgery. A nicotine patch is okay.  ? Note: Some surgeries require you to completely quit smoking and nicotine. Check with your surgeon.    Your care team will make every effort to keep you safe from infection. We will:  ? Clean our hands often with soap and water (or an alcohol-based hand rub).  ? Clean the skin at your surgery site with a special soap that kills germs.  ? Give you a special gown to keep you warm. (Cold raises the risk of infection.)  ? Wear special hair covers, masks, gowns and gloves during surgery.  ? Give antibiotic medicine, if prescribed. Not all surgeries need antibiotics.  What to bring on the  day of surgery    Photo ID and insurance card    Copy of your health care directive, if you have one    Glasses and hearing aids (bring cases)  ? You can't wear contacts during surgery    Inhaler and eye drops, if you use them (tell us about these when you arrive)    CPAP machine or breathing device, if you use them    A few personal items, if spending the night    If you have . . .  ? A pacemaker, ICD (cardiac defibrillator) or other implant: Bring the ID card.  ? An implanted stimulator: Bring the remote control.  ? A legal guardian: Bring a copy of the certified (court-stamped) guardianship papers.  Please remove any jewelry, including body piercings. Leave jewelry and other valuables at home.  If you're going home the day of surgery    You must have a responsible adult drive you home. They should stay with you overnight as well.    If you don't have someone to stay with you, and you aren't safe to go home alone, we may keep you overnight. Insurance often won't pay for this.  After surgery  If it's hard to control your pain or you need more pain medicine, please call your surgeon's office.  Questions?   If you have any questions for your care team, list them here: _________________________________________________________________________________________________________________________________________________________________________ ____________________________________ ____________________________________ ____________________________________

## 2022-12-29 NOTE — H&P (VIEW-ONLY)
[facility]  [address] [place] [address]  [place] [address]  Phone: [phone]  Primary Provider: Iram Kinney  Pre-op Performing Provider: IRAM KINNEY    -  PREOPERATIVE EVALUATION:  Today's date: 12/29/2022    Car Torres is a 62 year old male who presents for a preoperative evaluation.    Surgical Information:  Surgery/Procedure: Left Foot Internal Fixation  Surgery Location: [facility]   Surgeon: Dr. Ruiz   Surgery Date: 1/5/23  Time of Surgery:   Where patient plans to recover: At home with family  Fax number for surgical facility: Note does not need to be faxed, will be available electronically in Epic.    Type of Anesthesia Anticipated: General    Assessment & Plan     The proposed surgical procedure is considered INTERMEDIATE risk.    Problem List Items Addressed This Visit     Ulcer of left foot, unspecified ulcer stage (H)    Unspecified psychosis not due to a substance or known physiological condition (H)   Other Visit Diagnoses     Other polyneuropathy    -  Primary    Relevant Medications    gabapentin (NEURONTIN) 300 MG capsule    Preop general physical exam        Relevant Orders    CBC with platelets and differential (Completed)    Comprehensive metabolic panel (BMP + Alb, Alk Phos, ALT, AST, Total. Bili, TP) (Completed)    Closed displaced fracture of metatarsal bone with delayed healing, unspecified laterality, unspecified metatarsal, subsequent encounter                         Medication Instructions:  Patient is to take all scheduled medications on the day of surgery EXCEPT for modifications listed below:    RECOMMENDATION:  APPROVAL GIVEN to proceed with proposed procedure, without further diagnostic evaluation.    1. Stop Ibuprofen or alleve 7 days before the procedure  2. Stop aspirin   3. Hold the lisinopril:hctz on the morning of the procedure      Subjective     HPI related to upcoming procedure: Left foot,  Fell down the stairs and the left  foot fracture in 3-4 places and unstable.         Preop Questions 12/28/2022   1. Have you ever had a heart attack or stroke? No   2. Have you ever had surgery on your heart or blood vessels, such as a stent placement, a coronary artery bypass, or surgery on an artery in your head, neck, heart, or legs? No   3. Do you have chest pain with activity? No   4. Do you have a history of  heart failure? No   5. Do you currently have a cold, bronchitis or symptoms of other infection? No   6. Do you have a cough, shortness of breath, or wheezing? No   7. Do you or anyone in your family have previous history of blood clots? No   8. Do you or does anyone in your family have a serious bleeding problem such as prolonged bleeding following surgeries or cuts? No   9. Have you ever had problems with anemia or been told to take iron pills? YES -    10. Have you had any abnormal blood loss such as black, tarry or bloody stools? No   11. Have you ever had a blood transfusion? No   12. Are you willing to have a blood transfusion if it is medically needed before, during, or after your surgery? Yes   13. Have you or any of your relatives ever had problems with anesthesia? No   14. Do you have sleep apnea, excessive snoring or daytime drowsiness? YES -    14a. Do you have a CPAP machine? Yes   15. Do you have any artifical heart valves or other implanted medical devices like a pacemaker, defibrillator, or continuous glucose monitor? No   16. Do you have artificial joints? No   17. Are you allergic to latex? No       Health Care Directive:  Patient has a Health Care Directive on file      Preoperative Review of :   reviewed - controlled substances reflected in medication list.      Status of Chronic Conditions:  SLEEP PROBLEM - Patient has a longstanding history of memory impairment and CPAP.. Patient has tried OTC medications with limited success.       Review of Systems  CONSTITUTIONAL: NEGATIVE for fever, chills, change in  weight  ENT/MOUTH: NEGATIVE for ear, mouth and throat problems  RESP: NEGATIVE for significant cough or SOB  CV: NEGATIVE for chest pain, palpitations or peripheral edema    Patient Active Problem List    Diagnosis Date Noted     Hypertension goal BP (blood pressure) < 140/90 10/20/2010     Priority: High     Hyperlipidemia LDL goal <130 06/11/2010     Priority: High     Lisfranc dislocation, left, initial encounter 12/28/2022     Priority: Medium     Lisfranc dislocation, left, subsequent encounter 12/28/2022     Priority: Medium     Dementia associated with other underlying disease with behavioral disturbance 01/03/2022     Priority: Medium     Ulcer of left foot, unspecified ulcer stage (H) 01/03/2022     Priority: Medium     Unspecified psychosis not due to a substance or known physiological condition (H) 01/03/2022     Priority: Medium     Major depressive disorder, recurrent episode, mild with atypical features (H) 12/06/2021     Priority: Medium     TIA (obstructive sleep apnea) 07/19/2018     Priority: Medium     Home Sleep Apnea Testing - 7/18/18: 295 lbs 0 oz: AHI 45.3/hr. Supine AHI 61.9/hr. Oxygen [place] of 66%.  Baseline 93%.  Sp02 =< 88% for 47 minutes.       History of sinus bradycardia 06/07/2018     Priority: Medium     ED (erectile dysfunction) 06/07/2018     Priority: Medium     Coronary artery calcification 05/08/2018     Priority: Medium     Per CT CHEST WITHOUT CONTRAST April 23, 2018. CT calcium score planned.        Renal cyst 11/18/2015     Priority: Medium     Simple, non enhanced, 3.5 cm on right kidney, Bosniak 1 - x 2 stable findings       Diverticulosis of large intestine without hemorrhage 11/18/2015     Priority: Medium     Incidental finding on CT scan        Pulmonary nodule 11/18/2015     Priority: Medium     Incidental finding on CT scan, right posterior lung - considered benign / stable        CKD (chronic kidney disease) stage 2, GFR 60-89 ml/min 10/21/2010     Priority: Medium      60 to 80 - have discussed Lisinopril - will consider        Sciatica 02/27/2006     Priority: Medium     Morbid obesity (H) 02/27/2006     Priority: Medium      Past Medical History:   Diagnosis Date     Dementia (H)      Depressive disorder      Heart disease 5/8/2018     Hypercholesterolemia      Hypertension goal BP (blood pressure) < 140/90      Nonspecific abnormal electrocardiogram (ECG) (EKG) 08/23/2007    Stress testing normal.      Sleep apnea     uses a c-pap, severe     Past Surgical History:   Procedure Laterality Date     COLONOSCOPY N/A 12/22/2020    Procedure: COLONOSCOPY, WITH POLYPECTOMY, CLIP;  Surgeon: Mihir Lang MD;  Location: [facility] OR     COLONOSCOPY N/A 1/10/2022    Procedure: COLONOSCOPY, WITH POLYPECTOMY AND BIOPSY;  Surgeon: Mihir Lang MD;  Location:  GI     ESOPHAGOSCOPY, GASTROSCOPY, DUODENOSCOPY (EGD), COMBINED N/A 1/28/2021    Procedure: ESOPHAGOGASTRODUODENOSCOPY, WITH BIOPSY;  Surgeon: Mihir Lang MD;  [facility]     SURGICAL HISTORY OF -       surgery on left index finger     Current Outpatient Medications   Medication Sig Dispense Refill     ASPIRIN 325 MG OR TBEC ONE DAILY 100 0     atorvastatin (LIPITOR) 40 MG tablet Take 1 tablet (40 mg) by mouth daily 90 tablet 1     busPIRone (BUSPAR) 15 MG tablet Take 1 tablet (15 mg) by mouth 2 times daily 60 tablet 1     Cholecalciferol (VITAMIN D PO) Take 5,000 Units by mouth daily One tablet twice daily       donepezil (ARICEPT) 10 MG tablet Take 1 tablet (10 mg) by mouth daily 90 tablet 2     Ferrous Gluconate 324 (37.5 Fe) MG TABS 1 tablet daily       Ferrous Sulfate 324 (65 Fe) MG TBEC        FLUoxetine (PROZAC) 40 MG capsule Take 1 capsule (40 mg) by mouth daily 90 capsule 1     gabapentin (NEURONTIN) 300 MG capsule Take 1 capsule (300 mg) by mouth At Bedtime 31 capsule 4     HYDROcodone-acetaminophen (NORCO) 5-325 MG tablet Take 1 tablet by mouth every 8 hours as needed for severe pain (7-10) 24 tablet 0      "lisinopril-hydrochlorothiazide (ZESTORETIC) 20-12.5 MG tablet Take 1 tablet by mouth daily 90 tablet 4     MULTI VITAMIN MENS PO None Entered       OLANZapine (ZYPREXA) 2.5 MG tablet Take 1 tablet (2.5 mg) by mouth At Bedtime 90 tablet 0     omega 3 1000 MG CAPS Take by mouth daily       tadalafil (CIALIS) 20 MG tablet TAKE 1/2 TO 1 TABLET BY MOUTH 30 MINUTES TO 1 HOUR BEFORE SEX 6 tablet 5     TURMERIC PO Take by mouth daily       vitamin B complex with vitamin C (STRESS TAB) tablet Take 1 tablet by mouth daily         No Known Allergies     Social History     Tobacco Use     Smoking status: Former     Packs/day: 1.00     Years: 25.00     Pack years: 25.00     Types: Cigarettes, Cigars     Start date: 1973     Quit date: 1998     Years since quittin.9     Passive exposure: Past     Smokeless tobacco: Never     Tobacco comments:     N/A not a smoker   Substance Use Topics     Alcohol use: Yes     Alcohol/week: 10.0 standard drinks     Comment: less than 6 per week       History   Drug Use Unknown     Comment: Viktoriya         Objective     /70 (BP Location: Left arm, Patient Position: Chair, Cuff Size: Adult Large)   Pulse 66   Temp 97.2  F (36.2  C)   Ht 1.803 m (5' 11\")   Wt (!) 160.1 kg (353 lb)   SpO2 95%   BMI 49.23 kg/m      Physical Exam  GENERAL APPEARANCE: healthy, alert and no distress  HENT: ear canals and TM's normal and nose and mouth without ulcers or lesions  RESP: lungs clear to auscultation - no rales, rhonchi or wheezes  CV: regular rate and rhythm, normal S1 S2, no S3 or S4 and no murmur, click or rub   ABDOMEN: soft, nontender, no HSM or masses and bowel sounds normal  NEURO: Normal strength and tone, sensory exam grossly normal, mentation intact and speech normal    Recent Labs   Lab Test 22  0801 22  1530   HGB 14.1  --      --     139   POTASSIUM 4.7 4.7   CR 1.12 1.35*        Diagnostics:  Labs pending at this time.  Results will be reviewed " when available.   EKG required for known coronary heart disease and not completed in the last 90 days.   Stress test normal in 2018     Revised Cardiac Risk Index (RCRI):  The patient has the following serious cardiovascular risks for perioperative complications:   - Coronary Artery Disease (MI, positive stress test, angina, Qs on EKG) = 1 point     RCRI Interpretation: 1 point: Class II (low risk - 0.9% complication rate)           Signed Electronically by: Mihir Perez MD  Copy of this evaluation report is provided to requesting physician.    --------------------------

## 2022-12-29 NOTE — TELEPHONE ENCOUNTER
Wife called back pt's surgery is getting moved to 1/5/23, pre op is needed sooner.  Clare Champagne MA

## 2022-12-30 LAB
ALBUMIN SERPL-MCNC: 3.7 G/DL (ref 3.4–5)
ALP SERPL-CCNC: 72 U/L (ref 40–150)
ALT SERPL W P-5'-P-CCNC: 30 U/L (ref 0–70)
ANION GAP SERPL CALCULATED.3IONS-SCNC: 6 MMOL/L (ref 3–14)
AST SERPL W P-5'-P-CCNC: 14 U/L (ref 0–45)
BILIRUB SERPL-MCNC: 0.5 MG/DL (ref 0.2–1.3)
BUN SERPL-MCNC: 17 MG/DL (ref 7–30)
CALCIUM SERPL-MCNC: 9.6 MG/DL (ref 8.5–10.1)
CHLORIDE BLD-SCNC: 100 MMOL/L (ref 94–109)
CO2 SERPL-SCNC: 30 MMOL/L (ref 20–32)
CREAT SERPL-MCNC: 0.95 MG/DL (ref 0.66–1.25)
GFR SERPL CREATININE-BSD FRML MDRD: 89 ML/MIN/1.73M2
GLUCOSE BLD-MCNC: 103 MG/DL (ref 70–99)
POTASSIUM BLD-SCNC: 4.5 MMOL/L (ref 3.4–5.3)
PROT SERPL-MCNC: 7 G/DL (ref 6.8–8.8)
SODIUM SERPL-SCNC: 136 MMOL/L (ref 133–144)

## 2023-01-02 ENCOUNTER — MEDICAL CORRESPONDENCE (OUTPATIENT)
Dept: HEALTH INFORMATION MANAGEMENT | Facility: CLINIC | Age: 63
End: 2023-01-02

## 2023-01-02 NOTE — TELEPHONE ENCOUNTER
Scheduled surgery    Type of surgery: left lisfranc fracture  Location of surgery: [facility]  Date and time of surgery: 1/5/23  Surgeon: Sara  Pre-Op Appt Date: already completed   Post-Op Appt Date: 1/11/23   Packet sent out: Yes via [employer]  Pre-cert/Authorization completed:  No  Date: 1/2/23      Brenda Feldman, Surgery Scheduler

## 2023-01-03 ENCOUNTER — MYC MEDICAL ADVICE (OUTPATIENT)
Dept: PODIATRY | Facility: CLINIC | Age: 63
End: 2023-01-03

## 2023-01-03 DIAGNOSIS — I25.10 CORONARY ARTERY CALCIFICATION: Chronic | ICD-10-CM

## 2023-01-03 DIAGNOSIS — S93.325A LISFRANC DISLOCATION, LEFT, INITIAL ENCOUNTER: Primary | ICD-10-CM

## 2023-01-03 DIAGNOSIS — N18.2 CKD (CHRONIC KIDNEY DISEASE) STAGE 2, GFR 60-89 ML/MIN: Chronic | ICD-10-CM

## 2023-01-03 DIAGNOSIS — E66.01 MORBID OBESITY (H): Chronic | ICD-10-CM

## 2023-01-03 DIAGNOSIS — F02.818 DEMENTIA ASSOCIATED WITH OTHER UNDERLYING DISEASE WITH BEHAVIORAL DISTURBANCE (H): ICD-10-CM

## 2023-01-03 NOTE — TELEPHONE ENCOUNTER
Received faxed request from [facility] for wheelchair at [place].   Office visit note will need to include requested information in addition to mobility issues of patient, being unsteady on his feet due to body habitus, using crutches for short distances, but need for wheelchair for long distances and for transportation purposes.  Discussed with Brandon Champagne MA.     Forms faxed to [facility] for Dr. Ruiz to complete at that site today.     ALBIN Ritter RN

## 2023-01-03 NOTE — TELEPHONE ENCOUNTER
Progress note updated with necessity for wheelchair  Please re-fax progress note to [facility]    Bailey Ruiz DPM

## 2023-01-03 NOTE — TELEPHONE ENCOUNTER
[facility] after [facility] and advise if therapy will be recommended right away after surgery or later once he is able to bear weight.     ALBIN Ritter RN

## 2023-01-03 NOTE — TELEPHONE ENCOUNTER
Please see Mychart request from spouse for additional documentation needed in order for patient to obtain a wheelchair prior to surgery on 1/5/23 by Dr. Ruiz.     There is a consent to communicate on file to speak with wife, Destiny. Phone call to Destiny. She states she is trying to get everything set up so patient is as safe as possible at home. Patient has a lot of anxiety about being successful at home after surgery and putting too much stress on his wife. She states it is crucial to have the wheelchair in order get the patient to and from the car and up the front house ramp. She can get the wheelchair to the living room but their house is too small for patient to use the chair in the bedrooms or bathroom. He will have to use crutches for those areas and is currently doing that. They have crutches, commode, urinal, lift and recliner chair. They do have a regular chair with wheels they plan on using if needed inside the home. Patient is planning on sponge bathing vs showering.     Will call [facility]: [phone] and get back with patient after discussing with provider. She verbalized understanding.     Phone call to [facility]. She states they faxed the request for needed information for the wheelchair to Dr. Ruiz at: [phone] on 12/29/22. It has all the needed information listed. Dr. Ruiz will need to amend her office visit note to include this information for insurance approval.   Asked that she fax the request to [facility] and we will expedite to provider since she works out of several locations. She is faxing it now.     If we need to contact [facility], please call Delilah [facility] at: [phone], Ext#[phone].     ALBIN Ritter RN

## 2023-01-04 NOTE — TELEPHONE ENCOUNTER
Please see [employer] message dated 1/4/23 and assist with checking on orders with [facility].     ALBIN Ritter RN

## 2023-01-04 NOTE — TELEPHONE ENCOUNTER
Called and spoke with [facility]. The paperwork is still in review for the order for the wheelchair. They will give me a call back sometime today with an update.

## 2023-01-05 ENCOUNTER — ANESTHESIA EVENT (OUTPATIENT)
Dept: SURGERY | Facility: CLINIC | Age: 63
End: 2023-01-05
Payer: COMMERCIAL

## 2023-01-05 ENCOUNTER — APPOINTMENT (OUTPATIENT)
Dept: GENERAL RADIOLOGY | Facility: CLINIC | Age: 63
End: 2023-01-05
Attending: PODIATRIST
Payer: COMMERCIAL

## 2023-01-05 ENCOUNTER — HOSPITAL ENCOUNTER (OUTPATIENT)
Facility: CLINIC | Age: 63
Discharge: HOME | End: 2023-01-05
Attending: PODIATRIST | Admitting: PODIATRIST
Payer: COMMERCIAL

## 2023-01-05 ENCOUNTER — ANESTHESIA (OUTPATIENT)
Dept: SURGERY | Facility: CLINIC | Age: 63
End: 2023-01-05
Payer: COMMERCIAL

## 2023-01-05 VITALS
HEART RATE: 65 BPM | RESPIRATION RATE: 14 BRPM | BODY MASS INDEX: 44.1 KG/M2 | SYSTOLIC BLOOD PRESSURE: 145 MMHG | HEIGHT: 71 IN | TEMPERATURE: 98.4 F | DIASTOLIC BLOOD PRESSURE: 75 MMHG | OXYGEN SATURATION: 93 % | WEIGHT: 315 LBS

## 2023-01-05 DIAGNOSIS — S93.325D LISFRANC DISLOCATION, LEFT, SUBSEQUENT ENCOUNTER: Primary | ICD-10-CM

## 2023-01-05 LAB — POTASSIUM BLD-SCNC: 4.1 MMOL/L (ref 3.4–5.3)

## 2023-01-05 PROCEDURE — 250N000011 HC RX IP 250 OP 636: Performed by: ANESTHESIOLOGY

## 2023-01-05 PROCEDURE — C1713 ANCHOR/SCREW BN/BN,TIS/BN: HCPCS | Performed by: PODIATRIST

## 2023-01-05 PROCEDURE — 250N000011 HC RX IP 250 OP 636: Performed by: REGISTERED NURSE

## 2023-01-05 PROCEDURE — 999N000141 HC STATISTIC PRE-PROCEDURE NURSING ASSESSMENT: Performed by: PODIATRIST

## 2023-01-05 PROCEDURE — 258N000003 HC RX IP 258 OP 636: Performed by: ANESTHESIOLOGY

## 2023-01-05 PROCEDURE — 28615 REPAIR FOOT DISLOCATION: CPT | Mod: LT | Performed by: PODIATRIST

## 2023-01-05 PROCEDURE — 250N000009 HC RX 250: Performed by: NURSE ANESTHETIST, CERTIFIED REGISTERED

## 2023-01-05 PROCEDURE — 272N000001 HC OR GENERAL SUPPLY STERILE: Performed by: PODIATRIST

## 2023-01-05 PROCEDURE — 258N000003 HC RX IP 258 OP 636: Performed by: REGISTERED NURSE

## 2023-01-05 PROCEDURE — 360N000084 HC SURGERY LEVEL 4 W/ FLUORO, PER MIN: Performed by: PODIATRIST

## 2023-01-05 PROCEDURE — 36415 COLL VENOUS BLD VENIPUNCTURE: CPT | Performed by: ANESTHESIOLOGY

## 2023-01-05 PROCEDURE — 84132 ASSAY OF SERUM POTASSIUM: CPT | Performed by: ANESTHESIOLOGY

## 2023-01-05 PROCEDURE — 250N000025 HC SEVOFLURANE, PER MIN: Performed by: PODIATRIST

## 2023-01-05 PROCEDURE — 250N000009 HC RX 250: Performed by: REGISTERED NURSE

## 2023-01-05 PROCEDURE — 710N000009 HC RECOVERY PHASE 1, LEVEL 1, PER MIN: Performed by: PODIATRIST

## 2023-01-05 PROCEDURE — 250N000011 HC RX IP 250 OP 636: Performed by: PODIATRIST

## 2023-01-05 PROCEDURE — 370N000017 HC ANESTHESIA TECHNICAL FEE, PER MIN: Performed by: PODIATRIST

## 2023-01-05 PROCEDURE — 250N000009 HC RX 250: Performed by: PODIATRIST

## 2023-01-05 PROCEDURE — 710N000012 HC RECOVERY PHASE 2, PER MINUTE: Performed by: PODIATRIST

## 2023-01-05 PROCEDURE — 250N000009 HC RX 250: Performed by: ANESTHESIOLOGY

## 2023-01-05 PROCEDURE — 250N000011 HC RX IP 250 OP 636: Performed by: NURSE ANESTHETIST, CERTIFIED REGISTERED

## 2023-01-05 PROCEDURE — 999N000179 XR SURGERY CARM FLUORO LESS THAN 5 MIN W STILLS

## 2023-01-05 DEVICE — IMPLANTABLE DEVICE: Type: IMPLANTABLE DEVICE | Site: FOOT | Status: FUNCTIONAL

## 2023-01-05 DEVICE — IMP WIRE KIRSCHNER 0.062X9" 78.2530: Type: IMPLANTABLE DEVICE | Site: FOOT | Status: FUNCTIONAL

## 2023-01-05 RX ORDER — CEFAZOLIN SODIUM/WATER 3 G/30 ML
3 SYRINGE (ML) INTRAVENOUS
Status: COMPLETED | OUTPATIENT
Start: 2023-01-05 | End: 2023-01-05

## 2023-01-05 RX ORDER — HYDROMORPHONE HCL IN WATER/PF 6 MG/30 ML
0.4 PATIENT CONTROLLED ANALGESIA SYRINGE INTRAVENOUS EVERY 5 MIN PRN
Status: DISCONTINUED | OUTPATIENT
Start: 2023-01-05 | End: 2023-01-05 | Stop reason: HOSPADM

## 2023-01-05 RX ORDER — GLYCOPYRROLATE 0.2 MG/ML
INJECTION, SOLUTION INTRAMUSCULAR; INTRAVENOUS PRN
Status: DISCONTINUED | OUTPATIENT
Start: 2023-01-05 | End: 2023-01-05

## 2023-01-05 RX ORDER — ONDANSETRON 2 MG/ML
INJECTION INTRAMUSCULAR; INTRAVENOUS PRN
Status: DISCONTINUED | OUTPATIENT
Start: 2023-01-05 | End: 2023-01-05

## 2023-01-05 RX ORDER — HYDROMORPHONE HYDROCHLORIDE 1 MG/ML
INJECTION, SOLUTION INTRAMUSCULAR; INTRAVENOUS; SUBCUTANEOUS PRN
Status: DISCONTINUED | OUTPATIENT
Start: 2023-01-05 | End: 2023-01-05

## 2023-01-05 RX ORDER — CEFAZOLIN SODIUM/WATER 3 G/30 ML
3 SYRINGE (ML) INTRAVENOUS SEE ADMIN INSTRUCTIONS
Status: DISCONTINUED | OUTPATIENT
Start: 2023-01-05 | End: 2023-01-05 | Stop reason: HOSPADM

## 2023-01-05 RX ORDER — SODIUM CHLORIDE, SODIUM LACTATE, POTASSIUM CHLORIDE, CALCIUM CHLORIDE 600; 310; 30; 20 MG/100ML; MG/100ML; MG/100ML; MG/100ML
INJECTION, SOLUTION INTRAVENOUS CONTINUOUS
Status: DISCONTINUED | OUTPATIENT
Start: 2023-01-05 | End: 2023-01-05 | Stop reason: HOSPADM

## 2023-01-05 RX ORDER — ONDANSETRON 2 MG/ML
4 INJECTION INTRAMUSCULAR; INTRAVENOUS EVERY 30 MIN PRN
Status: DISCONTINUED | OUTPATIENT
Start: 2023-01-05 | End: 2023-01-05 | Stop reason: HOSPADM

## 2023-01-05 RX ORDER — FENTANYL CITRATE 0.05 MG/ML
25 INJECTION, SOLUTION INTRAMUSCULAR; INTRAVENOUS EVERY 5 MIN PRN
Status: DISCONTINUED | OUTPATIENT
Start: 2023-01-05 | End: 2023-01-05 | Stop reason: HOSPADM

## 2023-01-05 RX ORDER — FENTANYL CITRATE 0.05 MG/ML
50 INJECTION, SOLUTION INTRAMUSCULAR; INTRAVENOUS EVERY 5 MIN PRN
Status: DISCONTINUED | OUTPATIENT
Start: 2023-01-05 | End: 2023-01-05 | Stop reason: HOSPADM

## 2023-01-05 RX ORDER — OXYCODONE HYDROCHLORIDE 5 MG/1
10 TABLET ORAL
Status: DISCONTINUED | OUTPATIENT
Start: 2023-01-05 | End: 2023-01-05 | Stop reason: HOSPADM

## 2023-01-05 RX ORDER — MAGNESIUM HYDROXIDE 1200 MG/15ML
LIQUID ORAL PRN
Status: DISCONTINUED | OUTPATIENT
Start: 2023-01-05 | End: 2023-01-05 | Stop reason: HOSPADM

## 2023-01-05 RX ORDER — ONDANSETRON 4 MG/1
4 TABLET, ORALLY DISINTEGRATING ORAL EVERY 30 MIN PRN
Status: DISCONTINUED | OUTPATIENT
Start: 2023-01-05 | End: 2023-01-05 | Stop reason: HOSPADM

## 2023-01-05 RX ORDER — LIDOCAINE HYDROCHLORIDE 20 MG/ML
INJECTION, SOLUTION INFILTRATION; PERINEURAL PRN
Status: DISCONTINUED | OUTPATIENT
Start: 2023-01-05 | End: 2023-01-05

## 2023-01-05 RX ORDER — ACETAMINOPHEN 325 MG/1
650 TABLET ORAL
Status: DISCONTINUED | OUTPATIENT
Start: 2023-01-05 | End: 2023-01-05 | Stop reason: HOSPADM

## 2023-01-05 RX ORDER — DEXAMETHASONE SODIUM PHOSPHATE 4 MG/ML
INJECTION, SOLUTION INTRA-ARTICULAR; INTRALESIONAL; INTRAMUSCULAR; INTRAVENOUS; SOFT TISSUE PRN
Status: DISCONTINUED | OUTPATIENT
Start: 2023-01-05 | End: 2023-01-05

## 2023-01-05 RX ORDER — LIDOCAINE 40 MG/G
CREAM TOPICAL
Status: DISCONTINUED | OUTPATIENT
Start: 2023-01-05 | End: 2023-01-05 | Stop reason: HOSPADM

## 2023-01-05 RX ORDER — FENTANYL CITRATE 0.05 MG/ML
25 INJECTION, SOLUTION INTRAMUSCULAR; INTRAVENOUS
Status: CANCELLED | OUTPATIENT
Start: 2023-01-05

## 2023-01-05 RX ORDER — HYDROMORPHONE HCL IN WATER/PF 6 MG/30 ML
0.2 PATIENT CONTROLLED ANALGESIA SYRINGE INTRAVENOUS EVERY 5 MIN PRN
Status: DISCONTINUED | OUTPATIENT
Start: 2023-01-05 | End: 2023-01-05 | Stop reason: HOSPADM

## 2023-01-05 RX ORDER — PROPOFOL 10 MG/ML
INJECTION, EMULSION INTRAVENOUS PRN
Status: DISCONTINUED | OUTPATIENT
Start: 2023-01-05 | End: 2023-01-05

## 2023-01-05 RX ORDER — OXYCODONE HYDROCHLORIDE 5 MG/1
5 TABLET ORAL EVERY 6 HOURS PRN
Qty: 28 TABLET | Refills: 0 | Status: SHIPPED | OUTPATIENT
Start: 2023-01-05 | End: 2023-01-12

## 2023-01-05 RX ORDER — FENTANYL CITRATE 0.05 MG/ML
INJECTION, SOLUTION INTRAMUSCULAR; INTRAVENOUS PRN
Status: DISCONTINUED | OUTPATIENT
Start: 2023-01-05 | End: 2023-01-05

## 2023-01-05 RX ORDER — MEPERIDINE HYDROCHLORIDE 25 MG/ML
12.5 INJECTION INTRAMUSCULAR; INTRAVENOUS; SUBCUTANEOUS
Status: DISCONTINUED | OUTPATIENT
Start: 2023-01-05 | End: 2023-01-05 | Stop reason: HOSPADM

## 2023-01-05 RX ORDER — LABETALOL HYDROCHLORIDE 5 MG/ML
10 INJECTION, SOLUTION INTRAVENOUS
Status: DISCONTINUED | OUTPATIENT
Start: 2023-01-05 | End: 2023-01-05 | Stop reason: HOSPADM

## 2023-01-05 RX ADMIN — GLYCOPYRROLATE 0.3 MG: 0.2 INJECTION, SOLUTION INTRAMUSCULAR; INTRAVENOUS at 15:32

## 2023-01-05 RX ADMIN — MIDAZOLAM 2 MG: 1 INJECTION INTRAMUSCULAR; INTRAVENOUS at 15:04

## 2023-01-05 RX ADMIN — SODIUM CHLORIDE, POTASSIUM CHLORIDE, SODIUM LACTATE AND CALCIUM CHLORIDE: 600; 310; 30; 20 INJECTION, SOLUTION INTRAVENOUS at 13:33

## 2023-01-05 RX ADMIN — MIDAZOLAM 2 MG: 1 INJECTION INTRAMUSCULAR; INTRAVENOUS at 13:32

## 2023-01-05 RX ADMIN — FENTANYL CITRATE 25 MCG: 50 INJECTION, SOLUTION INTRAMUSCULAR; INTRAVENOUS at 19:25

## 2023-01-05 RX ADMIN — PHENYLEPHRINE HYDROCHLORIDE 100 MCG: 10 INJECTION INTRAVENOUS at 15:22

## 2023-01-05 RX ADMIN — SODIUM CHLORIDE, POTASSIUM CHLORIDE, SODIUM LACTATE AND CALCIUM CHLORIDE: 600; 310; 30; 20 INJECTION, SOLUTION INTRAVENOUS at 15:04

## 2023-01-05 RX ADMIN — DEXAMETHASONE SODIUM PHOSPHATE 4 MG: 4 INJECTION, SOLUTION INTRA-ARTICULAR; INTRALESIONAL; INTRAMUSCULAR; INTRAVENOUS; SOFT TISSUE at 15:20

## 2023-01-05 RX ADMIN — GLYCOPYRROLATE 0.2 MG: 0.2 INJECTION, SOLUTION INTRAMUSCULAR; INTRAVENOUS at 15:26

## 2023-01-05 RX ADMIN — FENTANYL CITRATE 50 MCG: 50 INJECTION INTRAVENOUS at 15:14

## 2023-01-05 RX ADMIN — ONDANSETRON 4 MG: 2 INJECTION INTRAMUSCULAR; INTRAVENOUS at 15:21

## 2023-01-05 RX ADMIN — FENTANYL CITRATE 25 MCG: 50 INJECTION INTRAVENOUS at 17:00

## 2023-01-05 RX ADMIN — ROPIVACAINE HYDROCHLORIDE 15 ML: 5 INJECTION, SOLUTION EPIDURAL; INFILTRATION; PERINEURAL at 13:38

## 2023-01-05 RX ADMIN — FENTANYL CITRATE 25 MCG: 50 INJECTION INTRAVENOUS at 16:18

## 2023-01-05 RX ADMIN — HYDROMORPHONE HYDROCHLORIDE 0.5 MG: 1 INJECTION, SOLUTION INTRAMUSCULAR; INTRAVENOUS; SUBCUTANEOUS at 18:46

## 2023-01-05 RX ADMIN — PROPOFOL 200 MG: 10 INJECTION, EMULSION INTRAVENOUS at 15:14

## 2023-01-05 RX ADMIN — LIDOCAINE HYDROCHLORIDE 50 MG: 20 INJECTION, SOLUTION INFILTRATION; PERINEURAL at 15:14

## 2023-01-05 RX ADMIN — Medication 3 G: at 15:18

## 2023-01-05 ASSESSMENT — LIFESTYLE VARIABLES: TOBACCO_USE: 1

## 2023-01-05 ASSESSMENT — ACTIVITIES OF DAILY LIVING (ADL)
ADLS_ACUITY_SCORE: 37
ADLS_ACUITY_SCORE: 35
ADLS_ACUITY_SCORE: 37
ADLS_ACUITY_SCORE: 37

## 2023-01-05 NOTE — ANESTHESIA PROCEDURE NOTES
Saphenous Procedure Note    Pre-Procedure   Staff -        Anesthesiologist:  Ravindra Allison MD       Performed By: Anesthesiologist       Location: pre-op       Pre-Anesthestic Checklist: patient identified, IV checked, site marked, risks and benefits discussed, informed consent, monitors and equipment checked, at physician/surgeon's request and post-op pain management  Timeout:       Correct Patient: Yes        Correct Procedure: Yes        Correct Site: Yes        Correct Position: Yes        Correct Laterality: Yes        Site Marked: Yes  Procedure Documentation  Procedure: Saphenous       Laterality: left       Patient Position: supine       Patient Prep/Sterile Barriers: sterile gloves, mask       Skin prep: Chloraprep       Local skin infiltrated with mL of 1% lidocaine.        Needle Type: insulated and short bevel (Arrow)       Needle Gauge: 21.        Needle Length (millimeters): 90        Ultrasound guided       1. Ultrasound was used to identify targeted nerve, plexus, vascular marker, or fascial plane and place a needle adjacent to it in real-time.       2. Ultrasound was used to visualize the spread of anesthetic in close proximity to the above referenced structure.       3. A permanent image is entered into the patient's record.       4. The visualized anatomic structures appeared normal.       5. There were no apparent abnormal pathologic findings.    Assessment/Narrative         The placement was negative for: blood aspirated, painful injection and site bleeding       Paresthesias: No.       Bolus given via needle..        Secured via.        Insertion/Infusion Method: Single Shot       Complications: none    Medication(s) Administered   Ropivacaine 0.5% w/ 1:400K Epi (Injection) - Injection   15 mL - 1/5/2023 1:38:00 PM   Comments:  Peripheral nerve block performed at request of the primary medical team.      Postoperative pain block requested by surgeon for severe postoperative pain.  Patient  "brought to Preop for procedure.      Pt tolerated well.    No complications.      The surgeon has given a verbal order transferring care of this patient to me for the performance of a regional analgesia block for post-op pain control. It is requested of me because I am uniquely trained and qualified to perform this block and the surgeon is neither trained nor qualified to perform this procedure.    TEREZA ADLER MD   January 5, 2023 2:02 PM         FOR [facility] ([facility] ONLY:   Pain Team Contact information: please page the Pain Team Via [employer]. Search \"Pain\". During daytime hours, please page the attending first. At night please page the resident first.    "

## 2023-01-05 NOTE — TELEPHONE ENCOUNTER
Called and spoke with service rep from [facility]. Wanted to make sure the patient is able to receive the wheelchair today following surgery. Everything is good to go and the wheelchair is set to be delivered. I am unsure of what time this will take place.

## 2023-01-05 NOTE — ANESTHESIA PROCEDURE NOTES
Sciatic Procedure Note    Pre-Procedure   Staff -        Anesthesiologist:  Ravindra Allison MD       Performed By: Anesthesiologist       Location: pre-op       Pre-Anesthestic Checklist: patient identified, IV checked, site marked, risks and benefits discussed, informed consent, monitors and equipment checked, at physician/surgeon's request and post-op pain management  Timeout:       Correct Patient: Yes        Correct Procedure: Yes        Correct Site: Yes        Correct Position: Yes        Correct Laterality: Yes        Site Marked: Yes  Procedure Documentation  Procedure: Sciatic       Laterality: left       Patient Position: supine       Patient Prep/Sterile Barriers: sterile gloves, mask       Skin prep: Chloraprep       Local skin infiltrated with mL of 1% lidocaine.  (popliteal approach).       Needle Type: insulated and short bevel (Arrow)       Needle Gauge: 21.        Needle Length (millimeters): 90        Ultrasound guided       1. Ultrasound was used to identify targeted nerve, plexus, vascular marker, or fascial plane and place a needle adjacent to it in real-time.       2. Ultrasound was used to visualize the spread of anesthetic in close proximity to the above referenced structure.       3. A permanent image is entered into the patient's record.       4. The visualized anatomic structures appeared normal.       5. There were no apparent abnormal pathologic findings.    Assessment/Narrative         The placement was negative for: blood aspirated, painful injection and site bleeding       Paresthesias: No.       Bolus given via needle..        Secured via.        Insertion/Infusion Method: Single Shot       Complications: none    Medication(s) Administered   Ropivacaine 0.5% w/ 1:400K Epi (Injection) - Injection   25 mL - 1/5/2023 1:31:00 PM   Comments:  Peripheral nerve block performed at request of the primary medical team.      Postoperative pain block requested by surgeon for severe  "postoperative pain.  Patient brought to Preop for procedure.      Pt tolerated well.    No complications.      The surgeon has given a verbal order transferring care of this patient to me for the performance of a regional analgesia block for post-op pain control. It is requested of me because I am uniquely trained and qualified to perform this block and the surgeon is neither trained nor qualified to perform this procedure.    TEREZA ADLER MD   January 5, 2023 2:03 PM         FOR [facility] ([facility] ONLY:   Pain Team Contact information: please page the Pain Team Via [employer]. Search \"Pain\". During daytime hours, please page the attending first. At night please page the resident first.    "

## 2023-01-05 NOTE — INTERVAL H&P NOTE
"I have reviewed the surgical (or preoperative) H&P that is linked to this encounter, and examined the patient. There are no significant changes    Clinical Conditions Present on Arrival:  Clinically Significant Risk Factors Present on Admission                    # Severe Obesity: Estimated body mass index is 48.12 kg/m  as calculated from the following:    Height as of this encounter: 1.803 m (5' 11\").    Weight as of this encounter: 156.5 kg (345 lb).       "

## 2023-01-05 NOTE — ANESTHESIA PREPROCEDURE EVALUATION
Anesthesia Pre-Procedure Evaluation    Patient: Car Torres   MRN: [id number] : 1960        Procedure : Procedure(s):  OPEN REDUCTION INTERNAL FIXATION, LEFT FOOT          Past Medical History:   Diagnosis Date     Dementia (H)      Depressive disorder      Heart disease 2018     Hypercholesterolemia      Hypertension goal BP (blood pressure) < 140/90      Nonspecific abnormal electrocardiogram (ECG) (EKG) 2007    Stress testing normal.      Sleep apnea     uses a c-pap, severe      Past Surgical History:   Procedure Laterality Date     COLONOSCOPY N/A 2020    Procedure: COLONOSCOPY, WITH POLYPECTOMY, CLIP;  Surgeon: Mihir Lang MD;  Location: [facility] OR     COLONOSCOPY N/A 1/10/2022    Procedure: COLONOSCOPY, WITH POLYPECTOMY AND BIOPSY;  Surgeon: Mihir Lang MD;  Location:  GI     ESOPHAGOSCOPY, GASTROSCOPY, DUODENOSCOPY (EGD), COMBINED N/A 2021    Procedure: ESOPHAGOGASTRODUODENOSCOPY, WITH BIOPSY;  Surgeon: Mihir Lang MD;  Location: [facility] [place]     SURGICAL HISTORY OF -       surgery on left index finger      No Known Allergies   Social History     Tobacco Use     Smoking status: Former     Packs/day: 1.00     Years: 25.00     Pack years: 25.00     Types: Cigarettes, Cigars     Start date: 1973     Quit date: 1998     Years since quittin.9     Passive exposure: Past     Smokeless tobacco: Never     Tobacco comments:     N/A not a smoker   Substance Use Topics     Alcohol use: Yes     Alcohol/week: 10.0 standard drinks     Comment: less than 6 per week      Wt Readings from Last 1 Encounters:   23 (!) 156.5 kg (345 lb)        Anesthesia Evaluation            ROS/MED HX  ENT/Pulmonary:     (+) sleep apnea, uses CPAP, tobacco use, Past use,     Neurologic:     (+) dementia, peripheral neuropathy, - patient denies;.     Cardiovascular:     (+) Dyslipidemia hypertension--CAD ---    METS/Exercise Tolerance:     Hematologic:       Musculoskeletal:        GI/Hepatic: Comment: Diverticulosis;     (-) GERD   Renal/Genitourinary:     (+) renal disease, type: CRI,     Endo:     (+) Obesity,     Psychiatric/Substance Use:     (+) psychiatric history depression     Infectious Disease:       Malignancy:       Other:            Physical Exam    Airway        Mallampati: II   TM distance: > 3 FB   Neck ROM: full   Mouth opening: > 3 cm    Respiratory Devices and Support         Dental  no notable dental history         Cardiovascular   cardiovascular exam normal          Pulmonary   pulmonary exam normal                OUTSIDE LABS:  CBC:   Lab Results   Component Value Date    WBC 7.3 12/29/2022    WBC 7.2 12/05/2022    HGB 13.6 12/29/2022    HGB 14.1 12/05/2022    HCT 40.9 12/29/2022    HCT 42.9 12/05/2022     12/29/2022     12/05/2022     BMP:   Lab Results   Component Value Date     12/29/2022     12/05/2022    POTASSIUM 4.5 12/29/2022    POTASSIUM 4.7 12/05/2022    CHLORIDE 100 12/29/2022    CHLORIDE 105 12/05/2022    CO2 30 12/29/2022    CO2 29 12/05/2022    BUN 17 12/29/2022    BUN 24 12/05/2022    CR 0.95 12/29/2022    CR 1.12 12/05/2022     (H) 12/29/2022     (H) 12/05/2022     COAGS:   Lab Results   Component Value Date    PTT 27 08/18/2007    INR 0.96 08/18/2007     POC: No results found for: BGM, HCG, HCGS  HEPATIC:   Lab Results   Component Value Date    ALBUMIN 3.7 12/29/2022    PROTTOTAL 7.0 12/29/2022    ALT 30 12/29/2022    AST 14 12/29/2022    ALKPHOS 72 12/29/2022    BILITOTAL 0.5 12/29/2022     OTHER:   Lab Results   Component Value Date    SHAHID 9.6 12/29/2022    MAG 2.4 (H) 07/21/2014    LIPASE 75 08/18/2007    AMYLASE 57 08/18/2007    TSH 1.42 08/02/2020       Anesthesia Plan    ASA Status:  3   NPO Status:  NPO Appropriate    Anesthesia Type: General.     - Airway: LMA   Induction: Intravenous.   Maintenance: Balanced.        Consents    Anesthesia Plan(s) and associated risks, benefits, and realistic  alternatives discussed. Questions answered and patient/representative(s) expressed understanding.    - Discussed:     - Discussed with:  Patient         Postoperative Care    Pain management: IV analgesics, Peripheral nerve block (Single Shot).   PONV prophylaxis: Ondansetron (or other 5HT-3), Dexamethasone or Solumedrol, Background Propofol Infusion     Comments:                TEREZA ADLER MD

## 2023-01-05 NOTE — DISCHARGE INSTRUCTIONS
PATIENT INSTRUCTIONS - Podiatry / Foot & Ankle Surgery Post-operative Instructions    -Dressing:    Keep your dressing clean, dry, and intact.    DO NOT get the surgical dressing dirty or wet.    DO NOT remove the surgical dressing.    Call the clinic immediately if the dressing becomes dirty, wet, or comes off.  Waterproof cast protectors (for showering) are available at [employer] or on [employer].    -Weight-bearing:    NO weight-bearing to the operative foot/ankle in the splint.  Use knee scooter, crutches, or other gait assistive device.    -Activity:    Minimal activity, around home, until your 1st post-operative follow-up.  Elevate the operative limb at or above heart level when at rest.   Do not hang the operative limb below heart level for >10 minutes per hour.  Ice (behind the knee) for 15-20 minutes, several times daily.  Do not place ice over the surgical dressing.    -Pain medication:  Take on a scheduled basis for the first 2-3 days, then as needed.  This will better control your pain and require less pain medication use overall.    A prescription for oxycodone  has been sent to your pharmacy.  Also begin scheduled Tylenol (acetaminophen) every 6 hours.  Do not exceed 4,000mg of acetaminophen daily, from all sources; this includes acetaminophen contained in pain pills (Norco) & any additional Tylenol (acetaminophen).    Postoperative Pain Medication Instructions  -SCHEDULE 975mg of acetaminophen (Tylenol) every 6 hours.  975mg is the TOTAL amount, including the acetaminophen contained in Norco (Hydrocodone-acetaminophen) & any additional acetaminophen you take.  -Hydrocodone was prescribed to you:  -Each Hydrocodone tab has 325mg of acetaminophen.  -Options:    -1 Hydrocodone + 2 regular acetaminophen = 975mg acetaminophen    -2 Hydrocodone  + 1 regular acetaminophen = 975mg acetaminophen  -Oxycodone was prescribed to you:  -Oxycodone has no acetaminophen in it   -Take 3 regular acetaminophen  every 6h in addition to any oxycodone  -Do not exceed 4,000mg of acetaminophen in 24 hours (or 1,000mg of acetaminophen every 6 hours).      -Constipation:  Miralax (polyethylene glycol) or Senekot (docusate/sennosides) are available over the counter, or by prescription if requested.  Take as directed.  For use ONLY if needed.      -Blood clot prevention:  Perform knee and hip bends and deep breathing each hour while awake.  Take aspirin, 325mg twice daily, starting 24 hours post-op.    -Call the clinic with any questions or concerns, 24 hours a day.  A triage service is available after hours for urgent matters also:   [phone]    -Call if you develop:  Redness advancing up the leg.  Increasing pain, uncontrolled by elevation, rest, and medication.  Soaked (blood or other) dressing.    -If you do not have a post-operative appointment (usually 7-10 days after surgery) call the office to schedule.    Bailey Ruiz DPM          Same Day Surgery Discharge Instructions for  Sedation and General Anesthesia     It's not unusual to feel dizzy, light-headed or faint for up to 24 hours after surgery or while taking pain medication.  If you have these symptoms: sit for a few minutes before standing and have someone assist you when you get up to walk or use the bathroom.    You should rest and relax for the next 24 hours. We recommend you make arrangements to have an adult stay with you for at least 24 hours after your discharge.  Avoid hazardous and strenuous activity.    DO NOT DRIVE any vehicle or operate mechanical equipment for 24 hours following the end of your surgery.  Even though you may feel normal, your reactions may be affected by the medication you have received.    Do not drink alcoholic beverages for 24 hours following surgery.     Slowly progress to your regular diet as you feel able. It's not unusual to feel nauseated and/or vomit after receiving anesthesia.  If you develop these symptoms, drink  "clear liquids (apple juice, ginger ale, broth, 7-up, etc. ) until you feel better.  If your nausea and vomiting persists for 24 hours, please notify your surgeon.      All narcotic pain medications, along with inactivity and anesthesia, can cause constipation. Drinking plenty of liquids and increasing fiber intake will help.    For any questions of a medical nature, call your surgeon.    Do not make important decisions for 24 hours.    If you had general anesthesia, you may have a sore throat for a couple of days related to the breathing tube used during surgery.  You may use Cepacol lozenges to help with this discomfort.  If it worsens or if you develop a fever, contact your surgeon.     If you feel your pain is not well managed with the pain medications prescribed by your surgeon, please contact your surgeon's office to let them know so they can address your concerns.             DISCHARGE INSTRUCTIONS FOLLOWING REGIONAL BLOCK ANESTHESIA    Numbness or lack of feeling in the arm/leg that was operated may last up to 24 hours.  The average time is usually 10-15 hours.  You may not be able to lift or move the arm or leg where the operation was by itself during that time.  Long-acting local anesthetic medicines were used to give you long-lasting pain relief.  Wear a sling until your arm is completely \"awake\"  Avoid bumping your arm, leg or foot while it is numb  Avoid extremes of hot or cold while it is numb  Remain quiet and restful the day of surgery.  Resume normal activities gradually over the next day or so as advise by your surgeon.  Do not drive or operate  Any machinery until your extremity is full  \"awake\"    You will have a tingling and prickly sensation in your arm/leg as the feeling begins to return; you can also expect some discomfort. The amount of discomfort is unpredictable, but if you have more pain that can be controlled with the pain medication you received, you should contact your surgeon.  Start " to take your pain pills as soon as you start to feel any discomfort or pain.          ** Because you had anesthesia today and your history of sleep apnea, it is extremely important that you use your CPAP machine for the next 24 hours while napping or sleeping.**                        ** If you have questions or concerns about your procedure,   call Dr. Ruiz [phone] **

## 2023-01-05 NOTE — ANESTHESIA PROCEDURE NOTES
Airway         Procedure Start/Stop Times: 1/5/2023 3:18 PM  Staff -        Anesthesiologist:  Ravindra Allison MD       CRNA: Fabian Best APRN CRNA       Performed By: CRNA  Consent for Airway        Urgency: elective  Indications and Patient Condition       Indications for airway management: queenie-procedural       Induction type:intravenous       Mask difficulty assessment: 0 - not attempted    Final Airway Details       Final airway type: supraglottic airway    Supraglottic Airway Details        Type: LMA       Brand: Ambu AuraGain       LMA size: 5       Cuff Pressure (cm H2O): 25    Post intubation assessment        Placement verified by: capnometry, equal breath sounds and chest rise        Number of attempts at approach: 1       Number of other approaches attempted: 0       Ease of procedure: easy       Dentition: Intact and Unchanged    Medication(s) Administered   Medication Administration Time: 1/5/2023 3:18 PM

## 2023-01-06 NOTE — BRIEF OP NOTE
[facility]    Brief Operative Note    Pre-operative diagnosis: Lisfranc dislocation, left, initial encounter [S93.325A]  Morbid obesity (H) [E66.01]  Post-operative diagnosis Same as pre-operative diagnosis    Procedure: Procedure(s):  LISFRANC OPEN REDUCTION INTERNAL FIXATION, LEFT FOOT  Surgeon: Surgeon(s) and Role:     * Bailey Ruiz, DPM - Primary  Anesthesia: General   Estimated Blood Loss: Less than 10 ml    Drains: None  Specimens: * No specimens in log *  Findings:   dislocated tarsometatarsal joints 3-5 and 2nd metatarsal comminuted fracture.  Medial collumn stable to intra-operative stress.  Complications: None.  Implants:   Implant Name Type Inv. Item Serial No.  Lot No. LRB No. Used Action   0.062 K Wires      Left 2 Implanted   T-Plate Reinforced 2.4mm 6 HOLES [id number]    ARTHREX 41 03 [id number] Left 1 Implanted   IMP SCREW BONE KREULOCK 2.4X18MM [id number] - [id number] Metallic Hard[employer]/[place] IMP SCREW BONE KREULOCK 2.4X18MM [id number]  ARTHREX 41 05 [id number] Left 2 Implanted   IMP SCREW BONE KREULOCK 2.4X24MM [id number] - [id number] Metallic Hardware/[place] IMP SCREW BONE KREULOCK 2.4X24MM [id number]  ARTHREX 41 05 [id number] Left 2 Implanted   IMP SCREW BONE KREULOCK 2.4X16MM [id number] - [id number] Metallic Hardware/[place] IMP SCREW BONE KREULOCK 2.4X16MM [id number]  ARTHREX 41 05 [id number] Left 2 Implanted   2.4mm MARYA SCREW 24mm [id number]    ARTHREX 41 03 [id number] Left 1 Implanted   2.4 mm MARYA SCREW 16mm [id number]    ARTHREX 41 03 [id number] Left 2 Implanted   IMP SCREW BONE KREULOCK 2.4X20MM [id number] - [id number] Metallic Hardware/[place] IMP SCREW BONE KREULOCK 2.4X20MM [id number]  ARTHREX 41 05 [id number] Left 1 Implanted   T-PLATE 2.4mm 8 holes [id number]    ARTHREX 41 03 [id number] Left 1 Implanted   IMP SCREW BONE REESE 2.4X14MM [id number] - [id number] Metallic Hardware/[place] IMP SCREW BONE REESE  "2.4X14MM [id number]  ARTHREX 41 05 [id number] Left 3 Implanted   2.4mm MARYA SCREW 14mm [id number]    ARTHREX 41 03 [id number] Left 1 Implanted   IMP WIRE RENETTA 0.062X9\" 78.2530 - [id number]  IMP WIRE RENETTA 0.062X9\" 78.2530  [facility] [id number] Left 2 Implanted       Bailey Ruiz DPM      "

## 2023-01-06 NOTE — ANESTHESIA CARE TRANSFER NOTE
Patient: Car Torres    Procedure: Procedure(s):  LISFRANC OPEN REDUCTION INTERNAL FIXATION, LEFT FOOT       Diagnosis: Lisfranc dislocation, left, initial encounter [S93.325A]  Morbid obesity (H) [E66.01]  Diagnosis Additional Information: No value filed.    Anesthesia Type:   General     Note:    Oropharynx: oropharynx clear of all foreign objects  Level of Consciousness: awake  Oxygen Supplementation: face mask    Independent Airway: airway patency satisfactory and stable  Dentition: dentition unchanged  Vital Signs Stable: post-procedure vital signs reviewed and stable  Report to RN Given: handoff report given  Patient transferred to: PACU    Handoff Report: Identifed the Patient, Identified the Reponsible Provider, Reviewed the pertinent medical history, Discussed the surgical course, Reviewed Intra-OP anesthesia mangement and issues during anesthesia, Set expectations for post-procedure period and Allowed opportunity for questions and acknowledgement of understanding      Vitals:  Vitals Value Taken Time   BP     Temp     Pulse 66 01/05/23 1908   Resp 17 01/05/23 1908   SpO2     Vitals shown include unvalidated device data.    Electronically Signed By: KLAUS Cuenca CRNA  January 5, 2023  7:09 PM

## 2023-01-06 NOTE — ANESTHESIA POSTPROCEDURE EVALUATION
Patient: Car Torres    Procedure: Procedure(s):  LISFRANC OPEN REDUCTION INTERNAL FIXATION, LEFT FOOT       Anesthesia Type:  General    Note:  Disposition: Outpatient   Postop Pain Control: Uneventful            Sign Out: Well controlled pain   PONV: No   Neuro/Psych: Uneventful            Sign Out: Acceptable/Baseline neuro status   Airway/Respiratory: Uneventful            Sign Out: Acceptable/Baseline resp. status   CV/Hemodynamics: Uneventful            Sign Out: Acceptable CV status; No obvious hypovolemia; No obvious fluid overload   Other NRE: NONE   DID A NON-ROUTINE EVENT OCCUR? No           Last vitals:  Vitals Value Taken Time   /75 01/05/23 1945   Temp 36.9  C (98.4  F) 01/05/23 1930   Pulse 63 01/05/23 1951   Resp 12 01/05/23 1951   SpO2 95 % 01/05/23 1951   Vitals shown include unvalidated device data.    Electronically Signed By: Annika Watters MD  January 5, 2023  9:59 PM

## 2023-01-06 NOTE — TELEPHONE ENCOUNTER
[facility] and everything was good to go for the wheelchair. Surgery was yesterday afternoon and wheelchair was delivered to their home. Closing encounter. Resolved.

## 2023-01-06 NOTE — OP NOTE
Procedure Date: 01/05/2023    SURGEON:  Bailey Ruiz DPM    PREOPERATIVE DIAGNOSIS:  Lisfranc dislocation, left foot.    POSTOPERATIVE DIAGNOSIS:  Lisfranc dislocation, left foot.    PROCEDURE PERFORMED:  ORIF Lisfranc dislocation.    ANESTHESIA:  General with popliteal block.    HEMOSTASIS:  Pneumatic thigh tourniquet at 300 mmHg.    ESTIMATED BLOOD LOSS:  Approximately 40 mL    MATERIALS:  Arthrex CFS plate and K-wires.    INJECTABLES:  Only per popliteal block, none additional.    COMPLICATIONS:  None.    INDICATIONS FOR PROCEDURE:  This is a 62-year-old male with a ground level fall sustaining a Lisfranc dislocation of the left foot.  X-rays and CT scan revealed intact medial column and Lisfranc interval, however, fracture of the second metatarsal and dorsal lateral dislocation of metatarsals 3, 4, and 5.  The patient was counseled on the need for ORIF, progression to arthritis, as well as the possibility of external pins while the foot healed and the patient agreed to proceed.    PROCEDURE IN DETAIL:  The patient underwent a popliteal block by Anesthesia in the preoperative holding area.  The patient was then brought to the operating room and a brief timeout was held to identify the correct patient, procedure, side and site, and all were in agreement.  The patient was placed supine on the operating room table.  The operative limb was elevated with contralateral using bone foam.  A well-padded pneumatic thigh tourniquet was applied.  Limb was then prepped and draped in the usual sterile fashion.    C-arm was brought in and was used to localize the base of the third metatarsal and this was marked on the skin as well as the cuboid where the fourth and fifth metatarsals would be.  The medial incision was made between the third and second metatarsals, the more lateral incision between the fourth and fifth metatarsals.  The limb was exsanguinated and the tourniquet inflated.    Attention was directed to the  more medial incision, which was started just proximal to the base of the third metatarsal.  It was carried out full thickness through skin and blunt dissection was used to get through extensor tendons.  Any crossing veins were coagulated and care was taken to retract neurovasculature.  A third tarsometatarsal joint was identified.  The dorsal capsule was incised.  It was noted to be completely dislocated dorsally and subluxed laterally.  Soft tissues freed around it to help with the reduction; however, this is not able to be completed without first attending to the lateral rays.  A rongeur was used to remove interposed tissue medially.     Attention was then directed to the lateral aspect of the foot where a dorsal incision was made overlying metatarsals 4 and 5.  These were dislocated dorsolaterally.  The joints were incised and soft tissue was debrided.  There was noted to be extensive hematoma formation and a Pedraza elevator was used to relocate the joints.  Subsequently, the Pedraza was used to relocate the fourth tarsometatarsal joint and was provisionally pinned.  This was done the same with the fifth tarsometatarsal joint and excellent position and stability was noted.      Attention was directed back to the medial foot where the third tarsometatarsal joint was reduced with manual pressure as well as the use of a Rodas clamp between the medial column and the third tarsometatarsal joint.  Excellent reduction was noted and provisional pinning was carried out.  This was followed by placement of a bridging plate spanning the third tarsometatarsal joint.  Dorsal impaction to the cuneiform was noted at the third tarsometatarsal joint.  This was disimpacted as much as possible.  Excellent stabilization of the joint reduction was noted.     Attention was then directed to the second metatarsal.  The medial incision was carried out distally and second metatarsal was approached from dorsally.  There was abundant callus that  had started to form and this was cleared very carefully until the metatarsal was approached.  It was noted to be displaced by approximately 4-5 mm and subluxed laterally.  It was also noted to have a dorsolateral butterfly fragment that was completely free; therefore, after freeing up the metatarsal, a T-plate was used and provisionally fixated to the head distally with locking screws.  A clamp was then placed on the metatarsal head and it was brought out to length while the Verbrugge clamp was used to create reduction of the central fragment in the plate; the plate was then fixated with an olive wire after reduction was obtained and excellent reduction was carried out.  Locking screws were placed.  Copious saline irrigation was then carried out.     Attention was directed back laterally where the final 1.6 K-wires were placed and checked with fluoroscopy.  These were then bent and cut and caps were placed.  Final x-rays were obtained.  Copious saline irrigation was carried out.  Deep structures were closed with 3-0 Monocryl, subcutaneous 3-0 Monocryl, skin with 3-0 nylon.  A well-padded sterile splint was applied.  The patient tolerated the procedure well and was transported to the PACU with vital signs stable and vascular status intact to the operative extremity.    POSTOPERATIVE PLAN:  He will be nonweightbearing in the operative extremity.  He has postoperative analgesics and postoperative clinic followup.      Bailey Ruiz DPM        D: 2023   T: 2023   MT: [id number]    Name:     YUKI RYAN  MRN:      [id number]        Account:        [id number]   :      1960           Procedure Date: 2023     Document: [id number]

## 2023-01-11 ENCOUNTER — MYC REFILL (OUTPATIENT)
Dept: FAMILY MEDICINE | Facility: CLINIC | Age: 63
End: 2023-01-11

## 2023-01-11 ENCOUNTER — ANCILLARY PROCEDURE (OUTPATIENT)
Dept: GENERAL RADIOLOGY | Facility: CLINIC | Age: 63
End: 2023-01-11
Attending: PODIATRIST
Payer: COMMERCIAL

## 2023-01-11 ENCOUNTER — OFFICE VISIT (OUTPATIENT)
Dept: PODIATRY | Facility: CLINIC | Age: 63
End: 2023-01-11
Payer: COMMERCIAL

## 2023-01-11 VITALS — HEART RATE: 61 BPM | BODY MASS INDEX: 48.12 KG/M2 | WEIGHT: 315 LBS | OXYGEN SATURATION: 97 %

## 2023-01-11 DIAGNOSIS — Z98.890 POSTOPERATIVE STATE: Primary | ICD-10-CM

## 2023-01-11 DIAGNOSIS — Z98.890 POSTOPERATIVE STATE: ICD-10-CM

## 2023-01-11 DIAGNOSIS — S93.325D LISFRANC DISLOCATION, LEFT, SUBSEQUENT ENCOUNTER: ICD-10-CM

## 2023-01-11 DIAGNOSIS — S93.325A LISFRANC DISLOCATION, LEFT, INITIAL ENCOUNTER: ICD-10-CM

## 2023-01-11 PROCEDURE — 99024 POSTOP FOLLOW-UP VISIT: CPT | Performed by: PODIATRIST

## 2023-01-11 PROCEDURE — 73630 X-RAY EXAM OF FOOT: CPT | Mod: TC | Performed by: RADIOLOGY

## 2023-01-11 PROCEDURE — 29515 APPLICATION SHORT LEG SPLINT: CPT | Mod: 58 | Performed by: PODIATRIST

## 2023-01-11 RX ORDER — OXYCODONE HYDROCHLORIDE 5 MG/1
5 TABLET ORAL EVERY 12 HOURS PRN
Qty: 14 TABLET | Refills: 0 | Status: SHIPPED | OUTPATIENT
Start: 2023-01-11 | End: 2023-01-18

## 2023-01-11 NOTE — PATIENT INSTRUCTIONS
PATIENT INSTRUCTIONS - Podiatry / Foot & Ankle Surgery    Post-operative Instructions    -Dressing:    Keep your dressing clean, dry, and intact.    DO NOT get the surgical dressing dirty or wet.    DO NOT remove the surgical dressing.    Call the clinic immediately if the dressing becomes dirty, wet, or comes off.  Waterproof cast protectors (for showering) are available at [employer] or on [employer].    -Weight-bearing:    NON-weight-bearing to the operative foot/ankle in the splint.  Use knee scooter, crutches, or other gait assistive device.    -Activity:    Continue minimal activity, around home, until instructed by your surgeon.  Elevate the operative limb at or above heart level when at rest.   Ice (behind the knee) for 15-20 minutes, several times daily.  Do not place ice over the surgical dressing.    -Pain medication:  Continue  oxycodone.  Try to decrease use of this over the next 1 week.  Continue Tylenol (acetaminophen) every 6h.    Do not exceed 4,000mg of acetaminophen daily, from all sources; this includes acetaminophen contained in pain pills (Norco) & any additional Tylenol (acetaminophen).    -Constipation:  Miralax (polyethylene glycol) or Senekot (docusate/sennosides) are available over the counter, or by prescription if requested.  Take as directed.   For use ONLY if needed.      -Blood clot prevention:  Perform knee and hip bends and deep breathing each hour while awake.  Continue aspirin, 325mg twice daily, until instructed to stop by your surgeon.      -Call the clinic with any questions or concerns, 24 hours a day.  A triage service is available after hours for urgent matters also:   [phone]    -Call if you develop:  Redness advancing up the leg.  Increasing pain, uncontrolled by elevation, rest, and medication.  Soaked (blood or other) dressing.

## 2023-01-11 NOTE — LETTER
1/11/2023         RE: Car Torres  [address]  [place] [address]        Dear Colleague,    Thank you for referring your patient, Car Torres, to the [facility]. Please see a copy of my visit note below.    Assessment:      ICD-10-CM    1. Postoperative state  Z98.890 XR Foot Left G/E 3 Views     oxyCODONE (ROXICODONE) 5 MG tablet      2. Lisfranc dislocation, left, initial encounter  S93.325A oxyCODONE (ROXICODONE) 5 MG tablet           6 days s/p ORIF Lisfranc dislocation    Plan:  Orders Placed This Encounter   Procedures     Cast/splint application     XR Foot Left G/E 3 Views       Discussed the post-operative recovery plan with the patient.      -Sutures- left intact  Sterile dressing re-applied.  New SLS applied today  -WB- NWB to operative limb in splint  -Activity- Elevate above heart level at rest.  Limit periods of dependency to <5mins per hour.  Ice behind knee of operative limb.  -Pain- scheduled Tylenol, opioids- Rx oxy #14 pills  -DVT prophylaxis- aROM hip, knee, upper extremity.  ASA 325mg BID.  -Abx- none    -Knee scooter documentation:    NWB 6-8 weeks post-op  Surgery Lis franc dislocation of left foot   Can't maintain NWB w/o wheelchair, poor balance due to post surgery status and BMI: 49.32    Tried and failed knee scooter and crutches due to poor balance and BMI 49.32  Patient has spouse as care giver at home that is able to assist the patient with maneuvering the wheelchair in the home   This will benefit the patients healing time being NWB for 6-8 weeks with the use of the wheelchair on a daily basis.   Extra heavy BMI: 49.32 morbidly obese          Return:  Return in about 2 weeks (around 1/25/2023) for Post-op.     Bailey Ruiz DPM                Chief Complaint:     Patient presents with:  Left Foot - Surgical Followup     Post-op Exam    HPI:  Car Torres is a 62 year old year old male who presents for post-op exam.    Surgery  Date- 1/5/2023  Post-operative day #6  Procedure- ORIF Lisfranc dislocation  Pain- moderate  Pain medication use- oxycodone  Abx- none  WB status- NWB  DVT Px- ASA  Dressing clean/dry/intact?- yes        Past Medical & Surgical History:  Past Medical History:   Diagnosis Date     Dementia (H)      Depressive disorder      Heart disease 5/8/2018     Hypercholesterolemia      Hypertension goal BP (blood pressure) < 140/90      Nonspecific abnormal electrocardiogram (ECG) (EKG) 08/23/2007    Stress testing normal.      Sleep apnea     uses a c-pap, severe      Past Surgical History:   Procedure Laterality Date     COLONOSCOPY N/A 12/22/2020    Procedure: COLONOSCOPY, WITH POLYPECTOMY, CLIP;  Surgeon: Mihir Lang MD;  [place] [facility] [place]     COLONOSCOPY N/A 1/10/2022    Procedure: COLONOSCOPY, WITH POLYPECTOMY AND BIOPSY;  Surgeon: Mihir Lang MD;  Location:  GI     ESOPHAGOSCOPY, GASTROSCOPY, DUODENOSCOPY (EGD), COMBINED N/A 1/28/2021    Procedure: ESOPHAGOGASTRODUODENOSCOPY, WITH BIOPSY;  Surgeon: Mihir Lang MD;  Location: [facility] OR     OPEN REDUCTION INTERNAL FIXATION FOOT Left 1/5/2023    Procedure: LISFRANC OPEN REDUCTION INTERNAL FIXATION, LEFT FOOT;  Surgeon: Baiely Ruiz DPM;  Location:  OR     SURGICAL HISTORY OF -       surgery on left index finger      Family History   Problem Relation Age of Onset     Cancer Mother         uterine     Other Cancer Mother         endometrial     Cerebrovascular Disease Mother      Substance Abuse Mother         Alcohol     C.A.D. Father      Hypertension Father      Hyperlipidemia Father      Breast Cancer Maternal Grandmother      Other Cancer Maternal Grandmother         Lung/brain     Substance Abuse Paternal Grandfather      Hypertension Brother      Substance Abuse Brother         Alcohol     Hyperlipidemia Brother      Breast Cancer Other      Breast Cancer Cousin      Other Cancer Other      Cerebrovascular Disease Other         Maternal Aunt      Glaucoma No family hx of      Macular Degeneration No family hx of         Social History:  ?  History   Smoking Status     Former     Packs/day: 1.00     Years: 25.00     Types: Cigarettes, Cigars     Start date: 5/1/1973     Quit date: 2/1/1998   Smokeless Tobacco     Never     History   Drug Use Unknown     Comment: Kratum     Social History    Substance and Sexual Activity      Alcohol use: Yes        Alcohol/week: 10.0 standard drinks        Comment: less than 6 per week      Allergies:  ?   No Known Allergies     Medications:    Current Outpatient Medications   Medication     oxyCODONE (ROXICODONE) 5 MG tablet     ASPIRIN 325 MG OR TBEC     atorvastatin (LIPITOR) 40 MG tablet     busPIRone (BUSPAR) 15 MG tablet     Cholecalciferol (VITAMIN D PO)     donepezil (ARICEPT) 10 MG tablet     Ferrous Gluconate 324 (37.5 Fe) MG TABS     Ferrous Sulfate 324 (65 Fe) MG TBEC     FLUoxetine (PROZAC) 40 MG capsule     gabapentin (NEURONTIN) 300 MG capsule     lisinopril-hydrochlorothiazide (ZESTORETIC) 20-12.5 MG tablet     MULTI VITAMIN MENS PO     OLANZapine (ZYPREXA) 2.5 MG tablet     omega 3 1000 MG CAPS     tadalafil (CIALIS) 20 MG tablet     TURMERIC PO     vitamin B complex with vitamin C (STRESS TAB) tablet     No current facility-administered medications for this visit.       Physical Exam:  ?  Vitals:  Pulse 61   Wt (!) 156.5 kg (345 lb)   SpO2 97%   BMI 48.12 kg/m     General:  WD/WN, in NAD.  A&O x3.    Dermatologic:    Incision  is well coapted, dehiscence absent.   Beatriz-incisional erythema absent, ecchymosis absent.  Vascular:  Digital capillary refill time normal left.  Skin temperature is warm  to operative site.  Focal edema- moderate to operative site.   Calf of operative leg supple, without induration, generalized edema, or venous hue.  Neurologic:    Gross sensation normal to operative limb.  Gait and balance abnormal, NWB  to operative limb.  Musculoskeletal:  moderate pain to palpation of foot  left.  Ankle, MTPJ ROM  intact to operative limb.  Muscle strength intact to contralateral limb.    Imaging:   x-ray independently reviewed and interpreted by myself today.  Non-Weight-bearing views left foot dated 01/11/23, reveal interval Lisfranc ORIF, excellen reduction. NO hardware failure or migraiton.    Cast/splint application    Date/Time: 1/13/2023 8:43 AM  Performed by: Bailey Ruiz DPM  Authorized by: Bailey Ruiz DPM     Consent:     Consent obtained:  Verbal    Consent given by:  Patient    Risks discussed:  Discoloration, numbness, pain and swelling  Pre-procedure details:     Sensation:  Normal  Procedure details:     Laterality:  Left    Location:  Foot    Foot:  L foot    Strapping: no      Cast type:  Short leg    Splint type:  Short arm (static)    Supplies:  Fiberglass  Post-procedure details:     Pain:  Improved    Sensation:  Normal    Patient tolerance of procedure:  Tolerated well, no immediate complications    Patient provided with cast or splint care instructions: Yes                  Again, thank you for allowing me to participate in the care of your patient.        Sincerely,        Bailey Ruiz DPM

## 2023-01-12 RX ORDER — OXYCODONE HYDROCHLORIDE 5 MG/1
5 TABLET ORAL EVERY 6 HOURS PRN
Qty: 28 TABLET | Refills: 0 | OUTPATIENT
Start: 2023-01-12

## 2023-01-17 ENCOUNTER — MYC MEDICAL ADVICE (OUTPATIENT)
Dept: PODIATRY | Facility: CLINIC | Age: 63
End: 2023-01-17
Payer: COMMERCIAL

## 2023-01-17 ENCOUNTER — MYC REFILL (OUTPATIENT)
Dept: PODIATRY | Facility: CLINIC | Age: 63
End: 2023-01-17
Payer: COMMERCIAL

## 2023-01-17 DIAGNOSIS — S93.325A LISFRANC DISLOCATION, LEFT, INITIAL ENCOUNTER: ICD-10-CM

## 2023-01-17 DIAGNOSIS — Z98.890 POSTOPERATIVE STATE: ICD-10-CM

## 2023-01-17 RX ORDER — OXYCODONE HYDROCHLORIDE 5 MG/1
5 TABLET ORAL EVERY 12 HOURS PRN
Qty: 14 TABLET | Refills: 0 | OUTPATIENT
Start: 2023-01-17

## 2023-01-17 RX ORDER — HYDROCODONE BITARTRATE AND ACETAMINOPHEN 5; 325 MG/1; MG/1
1 TABLET ORAL EVERY 12 HOURS PRN
Qty: 28 TABLET | Refills: 0 | Status: SHIPPED | OUTPATIENT
Start: 2023-01-17 | End: 2023-01-31

## 2023-01-17 NOTE — TELEPHONE ENCOUNTER
Phone call to wife, Destiny, and she was informed of recommendations below. Will send instructions via Pets are family too as well. Discussed trying to keep patient at 1 Norco tab twice a day if possible with the scheduled tylenol.  She verbalized understanding.     ALBIN Ritter RN

## 2023-01-17 NOTE — TELEPHONE ENCOUNTER
Prescription switched to Hydrocodone instead of oxycodone.  Rx sent to [facility].  This Rx should last until his next post-op  He should continue to take tylenol with the hydrocodone to reduce the amount of hydrocodone he needs.        Postoperative Pain Medication Instructions  -SCHEDULE 975mg of acetaminophen (Tylenol) every 6 hours.  975mg is the TOTAL amount, including the acetaminophen contained in Norco (Hydrocodone-acetaminophen) & any additional acetaminophen you take.  -Hydrocodone was prescribed to you:  -Each Hydrocodone tab has 325mg of acetaminophen.  -Options:    -1 Hydrocodone + 2 regular acetaminophen = 975mg acetaminophen    -2 Hydrocodone  + 1 regular acetaminophen = 975mg acetaminophen  -Do not exceed 4,000mg of acetaminophen in 24 hours (or 1,000mg of acetaminophen every 6 hours).        Bailey Ruiz DPM

## 2023-01-17 NOTE — TELEPHONE ENCOUNTER
Received [facility] request for refill with the following:    Patient Comment: Jerry has sharp pains in his foot that worsen as the day goes on and at night      Medication Request for: Oxycodone 5mg          Patient has 3 tabs left.     Patient is taking 1 tab around 2pm and then another tab around midnight (bedtime).    He also takes Tylenol 1000mg with each Oxycodone dose. He may take another 1000mg sometime in the night.       Prescription last written on 1/18/23 by Dr. Ruiz    Sig: take 1 tab every 12 hrs prn severe pain   Last Fill Quantity: 14 with # refills: 0     Last Office Visit by provider: 1/11/23  Date of Surgery (if applicable): 1/5/23  Procedure Performed (if applicable): ORIF Lisfranc dislocation  Future Office Visit:   1/5/23 Dr. Ruiz   Patient desires to have faxed or E-prescribe to pharmacy if available  Pharmacy selected in [facility].    Phone number patient can be reached at: Home number on file [phone] (home)    Can we leave a detailed message on this number? YES    Left voicemail for wife, Destiny, to return call to discuss refill request. Provided  number for call back.     Wife immediately called back. Patient has dementia and she sates she is watching him very closely so that he elevates his foot most of the time. She states he spends most of the day in his lift chair with foot elevated and she ices behind the knee every couple of hours. Pain worsens as the day goes on.   Patient is sleeping well at night and she has to wake him up in the morning around 10 or 11 am.   She also reports leaving him 2 tabs of Tylenol to take in the night if he is painful but does not know when he takes them. (They sleep in separate bedrooms.)    Patient does not complain a lot but she can visibly see his whole body jerk in pain.   She states that at his last visit, Dr. Ruiz mentioned changing his medication at the next refill time but she didn't know the reason.   Patient was instructed to  take Tylenol with his Oxycodone mainly.     Discussed that it may be better that he take the Tylenol scheduled three times daily so that is he getting something in the morning to help with pain and stays within the recommended time between doses.   Will discuss refill with provider and get back with them.     Please advise if you want patient to continue Oxycodone or if a different pain medication is recommended.     ALBIN Ritter RN

## 2023-01-24 ENCOUNTER — VIRTUAL VISIT (OUTPATIENT)
Dept: BEHAVIORAL HEALTH | Facility: CLINIC | Age: 63
End: 2023-01-24
Payer: COMMERCIAL

## 2023-01-24 DIAGNOSIS — R69 DIAGNOSIS DEFERRED: Primary | ICD-10-CM

## 2023-01-24 NOTE — PROGRESS NOTES
[facility]  [facility]: [place]        Social Work Care Navigator Note      Patient: Car Torres  Date: January 24, 2023  Preferred Name: Jerry    Previous PHQ-9:   PHQ-9 SCORE 10/5/2022 11/17/2022 12/22/2022   PHQ-9 Total Score MyChart 4 (Minimal depression) 0 3 (Minimal depression)   PHQ-9 Total Score 4 0 3     Previous SHAHNAZ-7:   SHAHNAZ-7 SCORE 12/2/2021 6/9/2022 12/22/2022   Total Score - - 2 (minimal anxiety)   Total Score 0 0 2     ALMA LEVEL:  ALMA Score (Last Two) 5/4/2012   ALMA Raw Score 37   Activation Score 49.9   ALMA Level 2       Preferred Contact:  Need for : No  Preferred Contact: Cell      Type of Contact Today: Phone call (patient / identified key support person reached)      Data: (subjective / Objective):  Recent ED/IP Admission or Discharge?   None    Patient Goals:  Goal Areas: Health; Mental Health; Financial and Social Service Benefits  Patient stated goals: Health: Jerry will continue to meet with his providers and his nutritionist. He also will continue to work on his daily water intake, as recommended by his nutritionist, and is drinking about 24oz daily.   -Jerry will work with providers to recover from his broken foot and surgery, and he will stay compliant with post-op surgery instructions.  Mental Health: Jerry will continue to work with his psychiatrist to manage his anxiety, as well as his care coordinator with the [facility]. He will continue to work on verbalizing any struggles he may be having with his anxiety, especially not being able to help his wife while he's injured.   Financial: Jerry and his wife would like to work on paying off credit card debt, so they are working on trying to figure out how to pay this off.    [facility] Core Service Provided:  Comprehensive Care Management: utilized the electronic medical record / patient registry to identify and support patient's health conditions / needs more effectively   Care Transitions:  focused on the coordinated and seamless movement of patient between or within different levels of care or settings  Care Coordination: provided care management services/referrals necessary to ensure patient and their identified supports have access to medical, behavioral health, pharmacology and recovery support services.  Ensured that patient's care is integrated across all settings and services.   Individual and Family Support: aimed to help clients reduce barriers to achieving goals, increase health literacy and knowledge about chronic condition(s), increase self-efficacy skills, and improve health outcomes    Current Stressors / Issues / Care Plan Objective Addressed Today:  SWCC and patient were able to meet today for Behavioral Health Home ([facility]) monthly check-in via telehealth visit. All required ROIs have been filed with HIM/patient chart.    1. Patient reported that he's been having pains here and there, but his foot has been getting better. He is getting a hard cast tomorrow, so then he'll be able to bear weight on the foot. He hasn't been mobile at all because he's not weight bearing, and he wants to get up and move around. He also reported that he'll have this one for about 3-5 weeks. Patient's wife has been helpful to him since he can't walk around, but they report it will be nice for him to walk around with the hard cast.      Patient reported he did have the surgery, and he had two plates put into his foot as well. They are going to pull the two pins and stitches out tomorrow before putting the hard cast on.     2. Patient reported that his mood has been good, even though he hasn't been able to do much.     3. Patient reports he's been doing some dot to dot puzzles when he can't find anything on TV.     4. Patient reports he's still taking his medications as prescribed.     5. Patient reports that he's drinking more water than 24 oz, and his wife is helping him with staying on track with this.      Intervention:  Motivational Interviewing: Expressed Empathy/Understanding, Supported Autonomy, Collaboration, Evocation, Permission to raise concern or advise, Open-ended questions and Reflections: simple and complex   Target Behavior(s): Explored thoughts about taking an anti-depressant, Explored and resolved challenges related to taking anti-depressants as prescribed, Explored thoughts and readiness to participate in individual therapy, Explored and resolved challenges to attending appointments as scheduled and Explored current social supports and reinforced opportunities to increase engagement    Assessment: (Progress on Goals / Homework):  Patient would benefit from continued coordination in reaching their goals set for the Behavioral Health Home ([facility]) program. [facility] reviewed Health Action Plan goals and will continue to monitor progress and work with patient and their care team.    Plan: (Homework, other):  Patient was encouraged to continue to seek condition-related information and education.      Scheduled a Phone follow up appointment with MARJ YUEN in 4 weeks     Patient has set self-identified goals and will monitor progress until the next appointment on: 2/21/23.       OLGA Montero  Behavioral Health Home ([facility])   [facility]  [phone]      Next 5 appointments (look out 90 days)    Mar 21, 2023 11:30 AM  (Arrive by 11:15 AM)  Return Visit with Miguel Varela MD  [facility] ([facility] [address] [place] [id number] [phone]

## 2023-01-25 ENCOUNTER — OFFICE VISIT (OUTPATIENT)
Dept: PODIATRY | Facility: CLINIC | Age: 63
End: 2023-01-25
Payer: COMMERCIAL

## 2023-01-25 DIAGNOSIS — Z98.890 POSTOPERATIVE STATE: Primary | ICD-10-CM

## 2023-01-25 DIAGNOSIS — E66.01 MORBID OBESITY (H): ICD-10-CM

## 2023-01-25 DIAGNOSIS — S93.325A LISFRANC DISLOCATION, LEFT, INITIAL ENCOUNTER: ICD-10-CM

## 2023-01-25 PROCEDURE — 99207 CAST/SPLINT APPLICATION: CPT | Performed by: PODIATRIST

## 2023-01-25 PROCEDURE — 29405 APPL SHORT LEG CAST: CPT | Mod: 58 | Performed by: PODIATRIST

## 2023-01-25 PROCEDURE — 99024 POSTOP FOLLOW-UP VISIT: CPT | Performed by: PODIATRIST

## 2023-01-25 RX ORDER — HYDROCODONE BITARTRATE AND ACETAMINOPHEN 5; 325 MG/1; MG/1
1 TABLET ORAL EVERY 24 HOURS
Qty: 20 TABLET | Refills: 0 | Status: SHIPPED | OUTPATIENT
Start: 2023-01-25 | End: 2023-02-14

## 2023-01-25 NOTE — LETTER
1/25/2023         RE: Car Torres  [address]  [place] [address]        Dear Colleague,    Thank you for referring your patient, Car Torres, to the [facility]. Please see a copy of my visit note below.    Assessment:      ICD-10-CM    1. Postoperative state  Z98.890 HYDROcodone-acetaminophen (NORCO) 5-325 MG tablet      2. Lisfranc dislocation, left, initial encounter  S93.325A HYDROcodone-acetaminophen (NORCO) 5-325 MG tablet      3. Morbid obesity (H)  E66.01 HYDROcodone-acetaminophen (NORCO) 5-325 MG tablet          20 days s/p ORIF Lisfranc dislocation    Plan:  No orders of the defined types were placed in this encounter.      Discussed the post-operative recovery plan with the patient.      -Sutures- removed  SLC applied  -WB- NWB to operative limb in cast  -Activity- Elevate above heart level at rest.   -Pain- scheduled Tylenol, opioids- Rx hydrocodone #20 pills  -DVT prophylaxis- aROM hip, knee, upper extremity.  ASA 325mg BID.  -Abx- none        Return:  Return in about 3 weeks (around 2/15/2023) for cast change.         Bailey Ruiz DPM                Chief Complaint:     No chief complaint on file.     Post-op Exam    HPI:  Car Torres is a 62 year old year old male who presents for post-op exam.    Surgery Date- 1/5/2023  Post-operative day #20  Procedure- ORIF Lisfranc dislocation  Pain- moderate  Pain medication use- oxycodone  Abx- none  WB status- NWB  DVT Px- ASA  Dressing clean/dry/intact?- yes          Past Medical & Surgical History:  Past Medical History:   Diagnosis Date     Dementia (H)      Depressive disorder      Heart disease 5/8/2018     Hypercholesterolemia      Hypertension goal BP (blood pressure) < 140/90      Nonspecific abnormal electrocardiogram (ECG) (EKG) 08/23/2007    Stress testing normal.      Sleep apnea     uses a c-pap, severe      Past Surgical History:   Procedure Laterality Date     COLONOSCOPY N/A 12/22/2020     Procedure: COLONOSCOPY, WITH POLYPECTOMY, CLIP;  Surgeon: Mihir Lang MD;  Location: [facility] OR     COLONOSCOPY N/A 1/10/2022    Procedure: COLONOSCOPY, WITH POLYPECTOMY AND BIOPSY;  Surgeon: Mihir Lang MD;  Location:  GI     ESOPHAGOSCOPY, GASTROSCOPY, DUODENOSCOPY (EGD), COMBINED N/A 1/28/2021    Procedure: ESOPHAGOGASTRODUODENOSCOPY, WITH BIOPSY;  Surgeon: Mihir Lang MD;  Location: [facility] OR     OPEN REDUCTION INTERNAL FIXATION FOOT Left 1/5/2023    Procedure: LISFRANC OPEN REDUCTION INTERNAL FIXATION, LEFT FOOT;  Surgeon: Bailey Ruiz DPM;  Location:  OR     SURGICAL HISTORY OF -       surgery on left index finger      Family History   Problem Relation Age of Onset     Cancer Mother         uterine     Other Cancer Mother         endometrial     Cerebrovascular Disease Mother      Substance Abuse Mother         Alcohol     C.A.D. Father      Hypertension Father      Hyperlipidemia Father      Breast Cancer Maternal Grandmother      Other Cancer Maternal Grandmother         Lung/brain     Substance Abuse Paternal Grandfather      Hypertension Brother      Substance Abuse Brother         Alcohol     Hyperlipidemia Brother      Breast Cancer Other      Breast Cancer Cousin      Other Cancer Other      Cerebrovascular Disease Other         Maternal Aunt     Glaucoma No family hx of      Macular Degeneration No family hx of         Social History:  ?  History   Smoking Status     Former     Packs/day: 1.00     Years: 25.00     Types: Cigarettes, Cigars     Start date: 5/1/1973     Quit date: 2/1/1998   Smokeless Tobacco     Never     History   Drug Use Unknown     Comment: Kratum     Social History    Substance and Sexual Activity      Alcohol use: Yes        Alcohol/week: 10.0 standard drinks        Comment: less than 6 per week      Allergies:  ?   No Known Allergies     Medications:    Current Outpatient Medications   Medication     HYDROcodone-acetaminophen (NORCO) 5-325 MG tablet     ASPIRIN  325 MG OR TBEC     atorvastatin (LIPITOR) 40 MG tablet     busPIRone (BUSPAR) 15 MG tablet     Cholecalciferol (VITAMIN D PO)     donepezil (ARICEPT) 10 MG tablet     Ferrous Gluconate 324 (37.5 Fe) MG TABS     Ferrous Sulfate 324 (65 Fe) MG TBEC     FLUoxetine (PROZAC) 40 MG capsule     gabapentin (NEURONTIN) 300 MG capsule     HYDROcodone-acetaminophen (NORCO) 5-325 MG tablet     lisinopril-hydrochlorothiazide (ZESTORETIC) 20-12.5 MG tablet     MULTI VITAMIN MENS PO     OLANZapine (ZYPREXA) 2.5 MG tablet     omega 3 1000 MG CAPS     tadalafil (CIALIS) 20 MG tablet     TURMERIC PO     vitamin B complex with vitamin C (STRESS TAB) tablet     No current facility-administered medications for this visit.       Physical Exam:  ?  Vitals:  There were no vitals taken for this visit.   General:  WD/WN, in NAD.  A&O x3.    Dermatologic:    Incision  is well coapted, dehiscence absent.   Beatriz-incisional erythema absent, ecchymosis absent.  Vascular:  Digital capillary refill time normal left.  Skin temperature is warm  to operative site.  Focal edema- moderate to operative site.   Calf of operative leg supple, without induration, generalized edema, or venous hue.  Neurologic:    Gross sensation normal to operative limb.  Gait and balance abnormal, NWB  to operative limb.  Musculoskeletal:  moderate pain to palpation of foot left.  Ankle, MTPJ ROM  intact to operative limb.  Muscle strength intact to contralateral limb.    Imaging:   x-ray independently reviewed and interpreted by myself today.  Non-Weight-bearing views left foot dated 01/11/23, reveal interval Lisfranc ORIF, excellen reduction. NO hardware failure or migraiton.              Again, thank you for allowing me to participate in the care of your patient.        Sincerely,        Bailey Ruiz DPM

## 2023-01-25 NOTE — PROGRESS NOTES
Assessment:      ICD-10-CM    1. Postoperative state  Z98.890 HYDROcodone-acetaminophen (NORCO) 5-325 MG tablet      2. Lisfranc dislocation, left, initial encounter  S93.325A HYDROcodone-acetaminophen (NORCO) 5-325 MG tablet      3. Morbid obesity (H)  E66.01 HYDROcodone-acetaminophen (NORCO) 5-325 MG tablet          20 days s/p ORIF Lisfranc dislocation    Plan:  No orders of the defined types were placed in this encounter.      Discussed the post-operative recovery plan with the patient.      -Sutures- removed  SLC applied  -WB- NWB to operative limb in cast  -Activity- Elevate above heart level at rest.   -Pain- scheduled Tylenol, opioids- Rx hydrocodone #20 pills  -DVT prophylaxis- aROM hip, knee, upper extremity.  ASA 325mg BID.  -Abx- none        Return:  Return in about 3 weeks (around 2/15/2023) for cast change.         Bailey Ruiz DPM                Chief Complaint:     No chief complaint on file.     Post-op Exam    HPI:  Car Torres is a 62 year old year old male who presents for post-op exam.    Surgery Date- 1/5/2023  Post-operative day #20  Procedure- ORIF Lisfranc dislocation  Pain- moderate  Pain medication use- oxycodone  Abx- none  WB status- NWB  DVT Px- ASA  Dressing clean/dry/intact?- yes          Past Medical & Surgical History:  Past Medical History:   Diagnosis Date     Dementia (H)      Depressive disorder      Heart disease 5/8/2018     Hypercholesterolemia      Hypertension goal BP (blood pressure) < 140/90      Nonspecific abnormal electrocardiogram (ECG) (EKG) 08/23/2007    Stress testing normal.      Sleep apnea     uses a c-pap, severe      Past Surgical History:   Procedure Laterality Date     COLONOSCOPY N/A 12/22/2020    Procedure: COLONOSCOPY, WITH POLYPECTOMY, CLIP;  Surgeon: Mihri Lang MD;  [place] [facility] [place]     COLONOSCOPY N/A 1/10/2022    Procedure: COLONOSCOPY, WITH POLYPECTOMY AND BIOPSY;  Surgeon: Mihir Lang MD;  Location:  GI     ESOPHAGOSCOPY,  GASTROSCOPY, DUODENOSCOPY (EGD), COMBINED N/A 1/28/2021    Procedure: ESOPHAGOGASTRODUODENOSCOPY, WITH BIOPSY;  Surgeon: Mihir Lang MD;  Location: [facility] OR     OPEN REDUCTION INTERNAL FIXATION FOOT Left 1/5/2023    Procedure: LISFRANC OPEN REDUCTION INTERNAL FIXATION, LEFT FOOT;  Surgeon: Bailey Ruiz DPM;  Location: [facility] OR     SURGICAL HISTORY OF -       surgery on left index finger      Family History   Problem Relation Age of Onset     Cancer Mother         uterine     Other Cancer Mother         endometrial     Cerebrovascular Disease Mother      Substance Abuse Mother         Alcohol     C.A.D. Father      Hypertension Father      Hyperlipidemia Father      Breast Cancer Maternal Grandmother      Other Cancer Maternal Grandmother         Lung/brain     Substance Abuse Paternal Grandfather      Hypertension Brother      Substance Abuse Brother         Alcohol     Hyperlipidemia Brother      Breast Cancer Other      Breast Cancer Cousin      Other Cancer Other      Cerebrovascular Disease Other         Maternal Aunt     Glaucoma No family hx of      Macular Degeneration No family hx of         Social History:  ?  History   Smoking Status     Former     Packs/day: 1.00     Years: 25.00     Types: Cigarettes, Cigars     Start date: 5/1/1973     Quit date: 2/1/1998   Smokeless Tobacco     Never     History   Drug Use Unknown     Comment: Kratum     Social History    Substance and Sexual Activity      Alcohol use: Yes        Alcohol/week: 10.0 standard drinks        Comment: less than 6 per week      Allergies:  ?   No Known Allergies     Medications:    Current Outpatient Medications   Medication     HYDROcodone-acetaminophen (NORCO) 5-325 MG tablet     ASPIRIN 325 MG OR TBEC     atorvastatin (LIPITOR) 40 MG tablet     busPIRone (BUSPAR) 15 MG tablet     Cholecalciferol (VITAMIN D PO)     donepezil (ARICEPT) 10 MG tablet     Ferrous Gluconate 324 (37.5 Fe) MG TABS     Ferrous Sulfate 324 (65 Fe) MG TBEC      FLUoxetine (PROZAC) 40 MG capsule     gabapentin (NEURONTIN) 300 MG capsule     HYDROcodone-acetaminophen (NORCO) 5-325 MG tablet     lisinopril-hydrochlorothiazide (ZESTORETIC) 20-12.5 MG tablet     MULTI VITAMIN MENS PO     OLANZapine (ZYPREXA) 2.5 MG tablet     omega 3 1000 MG CAPS     tadalafil (CIALIS) 20 MG tablet     TURMERIC PO     vitamin B complex with vitamin C (STRESS TAB) tablet     No current facility-administered medications for this visit.       Physical Exam:  ?  Vitals:  There were no vitals taken for this visit.   General:  WD/WN, in NAD.  A&O x3.    Dermatologic:    Incision  is well coapted, dehiscence absent.   Beatriz-incisional erythema absent, ecchymosis absent.  Vascular:  Digital capillary refill time normal left.  Skin temperature is warm  to operative site.  Focal edema- moderate to operative site.   Calf of operative leg supple, without induration, generalized edema, or venous hue.  Neurologic:    Gross sensation normal to operative limb.  Gait and balance abnormal, NWB  to operative limb.  Musculoskeletal:  moderate pain to palpation of foot left.  Ankle, MTPJ ROM  intact to operative limb.  Muscle strength intact to contralateral limb.    Imaging:   x-ray independently reviewed and interpreted by myself today.  Non-Weight-bearing views left foot dated 01/11/23, reveal interval Lisfranc ORIF, excellen reduction. NO hardware failure or migraiton.    Cast/splint application    Date/Time: 1/25/2023 3:19 PM  Performed by: Carolyn Porter ATC  Authorized by: Bailey Ruiz DPM     Consent:     Consent obtained:  Verbal    Consent given by:  Patient    Risks discussed:  Discoloration, numbness, pain and swelling  Pre-procedure details:     Sensation:  Normal  Procedure details:     Laterality:  Left    Location:  Foot    Foot:  L foot    Strapping: no      Cast type:  Short leg    Supplies:  Fiberglass  Post-procedure details:     Pain:  Improved    Pain level:  0/10    Sensation:   Normal    Patient tolerance of procedure:  Tolerated well, no immediate complications

## 2023-01-25 NOTE — PATIENT INSTRUCTIONS
PATIENT INSTRUCTIONS - Podiatry / Foot & Ankle Surgery    Cast Instructions  -Weight bearing- NO weight to the casted foot/ankle.    Use crutches, walker, wheelchair, or other gait assistive device.  -Waterproof cast protectors are available on [employer] or at [employer].  -Call the clinic immediately if :   -The cast becomes loose.  The cast should fit snugly.  As swelling decreases, you may be able to move the foot/ankle around significantly in the cast.  -The case becomes dirty, wet, or cracked.  Keep the cast clean, dry, and intact.     Follow-up in 2.5 weeks for cast change, 5 weeks for x-rays

## 2023-02-03 ASSESSMENT — PATIENT HEALTH QUESTIONNAIRE - PHQ9
SUM OF ALL RESPONSES TO PHQ QUESTIONS 1-9: 2
SUM OF ALL RESPONSES TO PHQ QUESTIONS 1-9: 2
10. IF YOU CHECKED OFF ANY PROBLEMS, HOW DIFFICULT HAVE THESE PROBLEMS MADE IT FOR YOU TO DO YOUR WORK, TAKE CARE OF THINGS AT HOME, OR GET ALONG WITH OTHER PEOPLE: NOT DIFFICULT AT ALL

## 2023-02-09 ENCOUNTER — VIRTUAL VISIT (OUTPATIENT)
Dept: PSYCHIATRY | Facility: CLINIC | Age: 63
End: 2023-02-09
Payer: COMMERCIAL

## 2023-02-09 DIAGNOSIS — F33.1 MAJOR DEPRESSIVE DISORDER, RECURRENT EPISODE, MODERATE (H): Primary | ICD-10-CM

## 2023-02-09 DIAGNOSIS — F41.1 GAD (GENERALIZED ANXIETY DISORDER): ICD-10-CM

## 2023-02-09 PROCEDURE — 99213 OFFICE O/P EST LOW 20 MIN: CPT | Mod: 24 | Performed by: NURSE PRACTITIONER

## 2023-02-09 RX ORDER — BUSPIRONE HYDROCHLORIDE 15 MG/1
TABLET ORAL
Qty: 60 TABLET | Refills: 1 | Status: SHIPPED | OUTPATIENT
Start: 2023-02-09 | End: 2023-02-09

## 2023-02-09 RX ORDER — BUSPIRONE HYDROCHLORIDE 15 MG/1
15 TABLET ORAL 2 TIMES DAILY
Qty: 180 TABLET | Refills: 1 | Status: SHIPPED | OUTPATIENT
Start: 2023-02-09 | End: 2023-06-05

## 2023-02-09 ASSESSMENT — PAIN SCALES - GENERAL: PAINLEVEL: NO PAIN (0)

## 2023-02-09 NOTE — NURSING NOTE
Is the patient currently in the state of [place]? YES    Visit mode:VIDEO    If the visit is dropped, the patient can be reconnected by: VIDEO VISIT: Text to cell phone: [phone]    Will anyone else be joining the visit? YES: How would they like to receive their invitation? Other e-mail: NA      How would you like to obtain your AVS? MyChart    Are changes needed to the allergy or medication list? NO    Comments or concerns regarding today's visit: N/A    Viviana Mazariegos

## 2023-02-09 NOTE — PROGRESS NOTES
"         Outpatient Psychiatric Progress Note    Name: Car Torres   : 1960                    Primary Care Provider: Mihir Perez MD   Therapist: none     PHQ-9 scores:  PHQ-9 SCORE 2022 2022 2/3/2023   PHQ-9 Total Score MyChart 0 3 (Minimal depression) 2 (Minimal depression)   PHQ-9 Total Score 0 3 2       SHAHNAZ-7 scores:  SHAHNAZ-7 SCORE 2022   Total Score - - 2 (minimal anxiety)   Total Score 0 0 2       Patient Identification:    Patient is a 62 year old year old,   White Not  or  male  who presents for return visit with me.  Patient is currently disabled. Patient attended the session with Destiny, his wife , who they agreed to have interview with. Patient prefers to be called: \" Jerry\".    Current medications include: ASPIRIN 325 MG OR TBEC, ONE DAILY  atorvastatin (LIPITOR) 40 MG tablet, Take 1 tablet (40 mg) by mouth daily  busPIRone (BUSPAR) 15 MG tablet, TAKE 1 TABLET BY MOUTH TWICE A DAY  Cholecalciferol (VITAMIN D PO), Take 5,000 Units by mouth daily One tablet twice daily  donepezil (ARICEPT) 10 MG tablet, Take 1 tablet (10 mg) by mouth daily  Ferrous Gluconate 324 (37.5 Fe) MG TABS, 1 tablet daily  Ferrous Sulfate 324 (65 Fe) MG TBEC,   FLUoxetine (PROZAC) 40 MG capsule, Take 1 capsule (40 mg) by mouth daily  gabapentin (NEURONTIN) 300 MG capsule, Take 1 capsule (300 mg) by mouth At Bedtime  HYDROcodone-acetaminophen (NORCO) 5-325 MG tablet, Take 1 tablet by mouth every 24 hours for 20 days  lisinopril-hydrochlorothiazide (ZESTORETIC) 20-12.5 MG tablet, Take 1 tablet by mouth daily  MULTI VITAMIN MENS PO, None Entered  OLANZapine (ZYPREXA) 2.5 MG tablet, Take 1 tablet (2.5 mg) by mouth At Bedtime  omega 3 1000 MG CAPS, Take by mouth daily  tadalafil (CIALIS) 20 MG tablet, TAKE 1/2 TO 1 TABLET BY MOUTH 30 MINUTES TO 1 HOUR BEFORE SEX  TURMERIC PO, Take by mouth daily  vitamin B complex with vitamin C (STRESS TAB) tablet, Take 1 tablet " by mouth daily    No current facility-administered medications on file prior to visit.       The [place] Prescription Monitoring Program has been reviewed and there are no concerns about diversionary activity for controlled substances at this time.      I was able to review most recent Primary Care Provider, specialty provider, and therapy visit notes that I have access to.     Today, patient reports he has a cast on until March 1. He is isolated to the house.  He uses a rolling chair to move around.  His daughter helps him get  To his appointment.    He has pain at night - takes pain medication then.  Oxycodone.  No paranoia observed by wife.  There is a sight increase in anxiety - worries about wife coming right back in after taking out garbage.  Grooming activities improving .       has a past medical history of Dementia (H), Depressive disorder, Heart disease (5/8/2018), Hypercholesterolemia, Hypertension goal BP (blood pressure) < 140/90, Nonspecific abnormal electrocardiogram (ECG) (EKG) (08/23/2007), and Sleep apnea.    He has no past medical history of Arthritis, Cancer (H), Cerebral infarction (H), Congestive heart failure (H), Congestive heart failure, unspecified, COPD (chronic obstructive pulmonary disease) (H), Diabetes (H), History of blood transfusion, Thyroid disease, or Uncomplicated asthma.    Social history updates:    Jerry is recuperating at home from his foot fracture with his wife there.      Substance use updates:    No alcohol use reported  Tobacco use: No    Vital Signs:   There were no vitals taken for this visit.    Labs:    Most recent laboratory results reviewed and no new labs.     Mental Status Examination:  Appearance: awake, alert and casual dress  Attitude: cooperative  Eye Contact:  adequate  Gait and Station: No dizziness or falls  Psychomotor Behavior:  unable to bear weight on foot  Oriented to:  time, person, and place  Attention Span and Concentration:  Normal  Speech:    clear, coherent and Speaks: English  Mood:  anxious and depressed  Affect:  mood congruent  Associations:  no loose associations  Thought Process:  goal oriented  Thought Content:  no evidence of suicidal ideation or homicidal ideation, no auditory hallucinations present and no visual hallucinations present  Recent and Remote Memory:  intact Not formally assessed. No amnesia.  Fund of Knowledge: appropriate  Insight:  good  Judgment:  intact  Impulse Control:  intact    Suicide Risk Assessment:  Today Car Torres reports no thoughts to harm themself or others. In addition, there are notable risk factors for self-harm, including anxiety and mood change. However, risk is mitigated by commitment to family, history of seeking help when needed, future oriented, denies suicidal intent or plan and denies homicidal ideation, intent, or plan. Therefore, based on all available evidence including the factors cited above, Car Torers does not appear to be at imminent risk for self-harm, does not meet criteria for a 72-hr hold, and therefore remains appropriate for ongoing outpatient level of care.  A thorough assessment of risk factors related to suicide and self-harm have been reviewed and are noted above. The patient convincingly denies suicidality on several occasions. Local community safety resources printed and reviewed for patient to use if needed. There was no deceit detected, and the patient presented in a manner that was believable.     DSM5 Diagnosis:  296.32 (F33.1) Major Depressive Disorder, Recurrent Episode, Moderate _ and With atypical features  300.02 (F41.1) Generalized Anxiety Disorder    Medical comorbidities include:   Patient Active Problem List    Diagnosis Date Noted     Hypertension goal BP (blood pressure) < 140/90 10/20/2010     Priority: High     Hyperlipidemia LDL goal <130 06/11/2010     Priority: High     Lisfranc dislocation, left, initial encounter 12/28/2022     Priority: Medium      Lisfranc dislocation, left, subsequent encounter 12/28/2022     Priority: Medium     Dementia associated with other underlying disease with behavioral disturbance 01/03/2022     Priority: Medium     Ulcer of left foot, unspecified ulcer stage (H) 01/03/2022     Priority: Medium     Unspecified psychosis not due to a substance or known physiological condition (H) 01/03/2022     Priority: Medium     Major depressive disorder, recurrent episode, mild with atypical features (H) 12/06/2021     Priority: Medium     TIA (obstructive sleep apnea) 07/19/2018     Priority: Medium     Home Sleep Apnea Testing - 7/18/18: 295 lbs 0 oz: AHI 45.3/hr. Supine AHI 61.9/hr. Oxygen [place] of 66%.  Baseline 93%.  Sp02 =< 88% for 47 minutes.       History of sinus bradycardia 06/07/2018     Priority: Medium     ED (erectile dysfunction) 06/07/2018     Priority: Medium     Coronary artery calcification 05/08/2018     Priority: Medium     Per CT CHEST WITHOUT CONTRAST April 23, 2018. CT calcium score planned.        Renal cyst 11/18/2015     Priority: Medium     Simple, non enhanced, 3.5 cm on right kidney, Bosniak 1 - x 2 stable findings       Diverticulosis of large intestine without hemorrhage 11/18/2015     Priority: Medium     Incidental finding on CT scan        Pulmonary nodule 11/18/2015     Priority: Medium     Incidental finding on CT scan, right posterior lung - considered benign / stable        CKD (chronic kidney disease) stage 2, GFR 60-89 ml/min 10/21/2010     Priority: Medium     60 to 80 - have discussed Lisinopril - will consider        Sciatica 02/27/2006     Priority: Medium     Morbid obesity (H) 02/27/2006     Priority: Medium       Assessment:    Car Torres has a cast on his leg until March 1.  His daughter is helping him transport to appointments.  His wife is at home with him for support.  While there are no reports of paranoia, she is concerned that he worries excessively when he does not know where she is  at every minute of the day.  His medications will remain the same at this point.  Counseling therapies would be helpful to him in the future, once he is more stable and recovering from his fractured foot incident.  There have been no concerns voiced by Jerry.  He tells me he has not been using kratom.  He denies any suicide thinking.    Medication side effects and alternatives were reviewed. Health promotion activities recommended and reviewed today. All questions addressed. Education and counseling completed regarding risks and benefits of medications and psychotherapy options.    Treatment Plan:        1.  Continue olanzapine 2.5 mg at bedtime    2.  Continue fluoxetine 40 mg daily    3.  Continue buspirone 15 mg twice daily    4.  Counseling therapies, when able to, for learning skills to manage life stressors and adjustments        Continue all other medications as reviewed per electronic medical record today.     Safety plan reviewed. To the Emergency Department as needed or call after hours crisis line at [phone] or [phone]. [place] Crisis Text Line. Text [place] to 055654 or Suicide LifeLine Chat: suicidepreventionlifeline.org/chat/    To schedule individual or family therapy, call [facility] at [phone]    Schedule an appointment with me in months or sooner as needed. Call [facility] at [phone] to schedule.    Follow up with primary care provider as planned or for acute medical concerns.    Call the psychiatric nurse line with medication questions or concerns at [phone]    MyChart may be used to communicate with your provider, but this is not intended to be used for emergencies.    Crisis Resources:    National Suicide Prevention Lifeline: 728.429.7589 (TTY: 755.832.2776). Call anytime for help.  (www.suicidepreventionlifeline.org)  National [place] on Mental Illness (www.ana [place].org): 561.954.7921 or 041-040-4916.   Mental Health Association  (www.mentalhealth.org): 932.329.6302 or 483-900-1100.  [place] Crisis Text Line: Text [place] to 429637  Suicide LifeLine Chat: suicidepreventionlifeline.org/chat    Administrative Billing:   Time spent with patient includes counseling and coordination of care regarding above diagnoses and treatment plan.    Patient Status:  Patient will continue to be seen for ongoing consultation and stabilization.    Signed:   BELEN Singh-BC   Psychiatry         Answers for HPI/ROS submitted by the patient on 2/3/2023  If you checked off any problems, how difficult have these problems made it for you to do your work, take care of things at home, or get along with other people?: Not difficult at all  PHQ9 TOTAL SCORE: 2      Video-Visit Details    Type of service:  Video Visit    Video Start Time (time video started): 11;10 am    Video End Time (time video stopped): 11:45 AM    Originating Location (pt. Location): Home        Distant Location (provider location):  Off-site    Mode of Communication:  Video Conference via iS[employer]

## 2023-02-15 ENCOUNTER — OFFICE VISIT (OUTPATIENT)
Dept: PODIATRY | Facility: CLINIC | Age: 63
End: 2023-02-15
Payer: COMMERCIAL

## 2023-02-15 VITALS — OXYGEN SATURATION: 99 % | BODY MASS INDEX: 48.12 KG/M2 | WEIGHT: 315 LBS | HEART RATE: 59 BPM

## 2023-02-15 DIAGNOSIS — S93.325A LISFRANC DISLOCATION, LEFT, INITIAL ENCOUNTER: ICD-10-CM

## 2023-02-15 DIAGNOSIS — Z98.890 POSTOPERATIVE STATE: Primary | ICD-10-CM

## 2023-02-15 PROCEDURE — 99024 POSTOP FOLLOW-UP VISIT: CPT | Performed by: PODIATRIST

## 2023-02-15 PROCEDURE — 29405 APPL SHORT LEG CAST: CPT | Mod: 58 | Performed by: PODIATRIST

## 2023-02-15 NOTE — LETTER
2/15/2023         RE: Car Torres  [address]  [place] [address]        Dear Colleague,    Thank you for referring your patient, Car Torres, to the [facility]. Please see a copy of my visit note below.    Assessment:      ICD-10-CM    1. Postoperative state  Z98.890 Physical Therapy Referral      2. Lisfranc dislocation, left, initial encounter  S93.325A Physical Therapy Referral          6 weeks s/p ORIF Lisfranc dislocation    Plan:  Orders Placed This Encounter   Procedures     Cast/splint application     Physical Therapy Referral       Discussed the post-operative recovery plan with the patient.      -Sutures- removed  SLC applied  -WB- NWB to operative limb in cast  -Activity- Elevate above heart level at rest.   -Pain- scheduled Tylenol, opioids- Rx hydrocodone #20 pills  -DVT prophylaxis- aROM hip, knee, upper extremity.  ASA 325mg BID.  -Abx- none    Cast/splint application    Date/Time: 2/15/2023 12:58 PM  Performed by: Carolyn Porter ATC  Authorized by: Bailey Ruiz DPM     Consent:     Consent obtained:  Verbal    Consent given by:  Patient    Risks discussed:  Discoloration, numbness, pain and swelling  Pre-procedure details:     Sensation:  Normal  Procedure details:     Laterality:  Left    Location:  Foot    Foot:  L foot    Cast type:  Short leg    Supplies:  Fiberglass  Post-procedure details:     Pain:  Improved    Pain level:  0/10    Sensation:  Normal    Patient tolerance of procedure:  Tolerated well, no immediate complications    Patient provided with cast or splint care instructions: Yes            Return:  Return in about 2 weeks (around 3/1/2023) for Post-op, Non-WB x-rays.         Bailey Ruiz DPM                Chief Complaint:     Patient presents with:  Left Foot - Surgical Followup     Post-op Exam    HPI:  aCr Torres is a 62 year old year old male who presents for post-op exam.    Surgery Date-  1/5/2023  Post-operative week #6  Procedure- ORIF Lisfranc dislocation  Pain- moderate  Pain medication use- oxycodone  Abx- none  WB status- NWB  DVT Px- ASA  Dressing clean/dry/intact?- yes          Past Medical & Surgical History:  Past Medical History:   Diagnosis Date     Dementia (H)      Depressive disorder      Heart disease 5/8/2018     Hypercholesterolemia      Hypertension goal BP (blood pressure) < 140/90      Nonspecific abnormal electrocardiogram (ECG) (EKG) 08/23/2007    Stress testing normal.      Sleep apnea     uses a c-pap, severe      Past Surgical History:   Procedure Laterality Date     COLONOSCOPY N/A 12/22/2020    Procedure: COLONOSCOPY, WITH POLYPECTOMY, CLIP;  Surgeon: Mihir Lang MD;  Location: [facility] [place]     COLONOSCOPY N/A 1/10/2022    Procedure: COLONOSCOPY, WITH POLYPECTOMY AND BIOPSY;  Surgeon: Mihir Lang MD;  Location:  GI     ESOPHAGOSCOPY, GASTROSCOPY, DUODENOSCOPY (EGD), COMBINED N/A 1/28/2021    Procedure: ESOPHAGOGASTRODUODENOSCOPY, WITH BIOPSY;  Surgeon: Mihir Lang MD;  Location: [facility] [place] [facility] OR     OPEN REDUCTION INTERNAL FIXATION FOOT Left 1/5/2023    Procedure: LISFRANC OPEN REDUCTION INTERNAL FIXATION, LEFT FOOT;  Surgeon: Bailey Ruiz DPM;  Location:  OR     SURGICAL HISTORY OF -       surgery on left index finger      Family History   Problem Relation Age of Onset     Cancer Mother         uterine     Other Cancer Mother         endometrial     Cerebrovascular Disease Mother      Substance Abuse Mother         Alcohol     C.A.D. Father      Hypertension Father      Hyperlipidemia Father      Breast Cancer Maternal Grandmother      Other Cancer Maternal Grandmother         Lung/brain     Substance Abuse Paternal Grandfather      Hypertension Brother      Substance Abuse Brother         Alcohol     Hyperlipidemia Brother      Breast Cancer Other      Breast Cancer Cousin      Other Cancer Other      Cerebrovascular Disease Other         Maternal Aunt      Glaucoma No family hx of      Macular Degeneration No family hx of         Social History:  ?  History   Smoking Status     Former     Packs/day: 1.00     Years: 25.00     Types: Cigarettes, Cigars     Start date: 5/1/1973     Quit date: 2/1/1998   Smokeless Tobacco     Never     History   Drug Use Unknown     Comment: Kratum     Social History    Substance and Sexual Activity      Alcohol use: Yes        Alcohol/week: 10.0 standard drinks        Comment: less than 6 per week      Allergies:  ?   No Known Allergies     Medications:    Current Outpatient Medications   Medication     ASPIRIN 325 MG OR TBEC     atorvastatin (LIPITOR) 40 MG tablet     busPIRone (BUSPAR) 15 MG tablet     Cholecalciferol (VITAMIN D PO)     donepezil (ARICEPT) 10 MG tablet     Ferrous Gluconate 324 (37.5 Fe) MG TABS     Ferrous Sulfate 324 (65 Fe) MG TBEC     FLUoxetine (PROZAC) 40 MG capsule     gabapentin (NEURONTIN) 300 MG capsule     lisinopril-hydrochlorothiazide (ZESTORETIC) 20-12.5 MG tablet     MULTI VITAMIN MENS PO     OLANZapine (ZYPREXA) 2.5 MG tablet     omega 3 1000 MG CAPS     tadalafil (CIALIS) 20 MG tablet     TURMERIC PO     vitamin B complex with vitamin C (STRESS TAB) tablet     No current facility-administered medications for this visit.       Physical Exam:  ?  Vitals:  Pulse 59   Wt (!) 156.5 kg (345 lb)   SpO2 99%   BMI 48.12 kg/m     General:  WD/WN, in NAD.  A&O x3.    Dermatologic:    Incision  is healed, scabbing present.  Beatriz-incisional erythema absent, ecchymosis absent.  Vascular:  Digital capillary refill time normal left.  Skin temperature is normal to operative site.  Focal edema- moderate to operative site.   Neurologic:    Gross sensation normal to operative limb.  Gait and balance abnormal, NWB  to operative limb.  Musculoskeletal:  Mild pain to palpation of foot left.  Ankle, MTPJ ROM  intact to operative limb.  Muscle strength intact to contralateral limb.    Imaging:   x-ray independently reviewed  and interpreted by myself today.  Non-Weight-bearing views left foot dated 01/11/23, reveal interval Lisfranc ORIF, excellen reduction. NO hardware failure or migraiton.                Again, thank you for allowing me to participate in the care of your patient.        Sincerely,        Bailey Ruiz DPM

## 2023-02-15 NOTE — PROGRESS NOTES
Assessment:      ICD-10-CM    1. Postoperative state  Z98.890 Physical Therapy Referral      2. Lisfranc dislocation, left, initial encounter  S93.325A Physical Therapy Referral          6 weeks s/p ORIF Lisfranc dislocation    Plan:  Orders Placed This Encounter   Procedures     Cast/splint application     Physical Therapy Referral       Discussed the post-operative recovery plan with the patient.      -Sutures- removed  SLC applied  -WB- NWB to operative limb in cast  -Activity- Elevate above heart level at rest.   -Pain- scheduled Tylenol, opioids- Rx hydrocodone #20 pills  -DVT prophylaxis- aROM hip, knee, upper extremity.  ASA 325mg BID.  -Abx- none    Cast/splint application    Date/Time: 2/15/2023 12:58 PM  Performed by: Carolyn Porter ATC  Authorized by: Bailey Ruiz DPM     Consent:     Consent obtained:  Verbal    Consent given by:  Patient    Risks discussed:  Discoloration, numbness, pain and swelling  Pre-procedure details:     Sensation:  Normal  Procedure details:     Laterality:  Left    Location:  Foot    Foot:  L foot    Cast type:  Short leg    Supplies:  Fiberglass  Post-procedure details:     Pain:  Improved    Pain level:  0/10    Sensation:  Normal    Patient tolerance of procedure:  Tolerated well, no immediate complications    Patient provided with cast or splint care instructions: Yes            Return:  Return in about 2 weeks (around 3/1/2023) for Post-op, Non-WB x-rays.         Bailey Ruiz DPM                Chief Complaint:     Patient presents with:  Left Foot - Surgical Followup     Post-op Exam    HPI:  Car Torres is a 62 year old year old male who presents for post-op exam.    Surgery Date- 1/5/2023  Post-operative week #6  Procedure- ORIF Lisfranc dislocation  Pain- moderate  Pain medication use- oxycodone  Abx- none  WB status- NWB  DVT Px- ASA  Dressing clean/dry/intact?- yes          Past Medical & Surgical History:  Past Medical History:   Diagnosis  Date     Dementia (H)      Depressive disorder      Heart disease 5/8/2018     Hypercholesterolemia      Hypertension goal BP (blood pressure) < 140/90      Nonspecific abnormal electrocardiogram (ECG) (EKG) 08/23/2007    Stress testing normal.      Sleep apnea     uses a c-pap, severe      Past Surgical History:   Procedure Laterality Date     COLONOSCOPY N/A 12/22/2020    Procedure: COLONOSCOPY, WITH POLYPECTOMY, CLIP;  Surgeon: Mihir Lang MD;  Location: [facility] OR     COLONOSCOPY N/A 1/10/2022    Procedure: COLONOSCOPY, WITH POLYPECTOMY AND BIOPSY;  Surgeon: Mihir Lang MD;  Location:  GI     ESOPHAGOSCOPY, GASTROSCOPY, DUODENOSCOPY (EGD), COMBINED N/A 1/28/2021    Procedure: ESOPHAGOGASTRODUODENOSCOPY, WITH BIOPSY;  Surgeon: Mihir Lang MD;  Location: [facility] OR     OPEN REDUCTION INTERNAL FIXATION FOOT Left 1/5/2023    Procedure: LISFRANC OPEN REDUCTION INTERNAL FIXATION, LEFT FOOT;  Surgeon: Bailey Ruiz DPM;  Location:  OR     SURGICAL HISTORY OF -       surgery on left index finger      Family History   Problem Relation Age of Onset     Cancer Mother         uterine     Other Cancer Mother         endometrial     Cerebrovascular Disease Mother      Substance Abuse Mother         Alcohol     C.A.D. Father      Hypertension Father      Hyperlipidemia Father      Breast Cancer Maternal Grandmother      Other Cancer Maternal Grandmother         Lung/brain     Substance Abuse Paternal Grandfather      Hypertension Brother      Substance Abuse Brother         Alcohol     Hyperlipidemia Brother      Breast Cancer Other      Breast Cancer Cousin      Other Cancer Other      Cerebrovascular Disease Other         Maternal Aunt     Glaucoma No family hx of      Macular Degeneration No family hx of         Social History:  ?  History   Smoking Status     Former     Packs/day: 1.00     Years: 25.00     Types: Cigarettes, Cigars     Start date: 5/1/1973     Quit date: 2/1/1998   Smokeless Tobacco     Never      History   Drug Use Unknown     Comment: Kratum     Social History    Substance and Sexual Activity      Alcohol use: Yes        Alcohol/week: 10.0 standard drinks        Comment: less than 6 per week      Allergies:  ?   No Known Allergies     Medications:    Current Outpatient Medications   Medication     ASPIRIN 325 MG OR TBEC     atorvastatin (LIPITOR) 40 MG tablet     busPIRone (BUSPAR) 15 MG tablet     Cholecalciferol (VITAMIN D PO)     donepezil (ARICEPT) 10 MG tablet     Ferrous Gluconate 324 (37.5 Fe) MG TABS     Ferrous Sulfate 324 (65 Fe) MG TBEC     FLUoxetine (PROZAC) 40 MG capsule     gabapentin (NEURONTIN) 300 MG capsule     lisinopril-hydrochlorothiazide (ZESTORETIC) 20-12.5 MG tablet     MULTI VITAMIN MENS PO     OLANZapine (ZYPREXA) 2.5 MG tablet     omega 3 1000 MG CAPS     tadalafil (CIALIS) 20 MG tablet     TURMERIC PO     vitamin B complex with vitamin C (STRESS TAB) tablet     No current facility-administered medications for this visit.       Physical Exam:  ?  Vitals:  Pulse 59   Wt (!) 156.5 kg (345 lb)   SpO2 99%   BMI 48.12 kg/m     General:  WD/WN, in NAD.  A&O x3.    Dermatologic:    Incision  is healed, scabbing present.  Beatriz-incisional erythema absent, ecchymosis absent.  Vascular:  Digital capillary refill time normal left.  Skin temperature is normal to operative site.  Focal edema- moderate to operative site.   Neurologic:    Gross sensation normal to operative limb.  Gait and balance abnormal, NWB  to operative limb.  Musculoskeletal:  Mild pain to palpation of foot left.  Ankle, MTPJ ROM  intact to operative limb.  Muscle strength intact to contralateral limb.    Imaging:   x-ray independently reviewed and interpreted by myself today.  Non-Weight-bearing views left foot dated 01/11/23, reveal interval Lisfranc ORIF, excellen reduction. NO hardware failure or migraiton.

## 2023-02-15 NOTE — PATIENT INSTRUCTIONS
PATIENT INSTRUCTIONS - Podiatry / Foot & Ankle Surgery    Cast Instructions  -Weight bearing- NO weight to the casted foot/ankle.    Use crutches, walker, wheelchair, or other gait assistive device.  -Waterproof cast protectors are available on [employer] or at [employer].  -Call the clinic immediately if :   -The cast becomes loose.  The cast should fit snugly.  As swelling decreases, you may be able to move the foot/ankle around significantly in the cast.  -The case becomes dirty, wet, or cracked.  Keep the cast clean, dry, and intact.         Physical Therapy  You have been referred to [facility] Physical Therapy.  Locations can be found online.  Please call to schedule an appointment:  [phone]

## 2023-02-22 ENCOUNTER — VIRTUAL VISIT (OUTPATIENT)
Dept: BEHAVIORAL HEALTH | Facility: CLINIC | Age: 63
End: 2023-02-22
Payer: COMMERCIAL

## 2023-02-22 DIAGNOSIS — R69 DIAGNOSIS DEFERRED: Primary | ICD-10-CM

## 2023-02-22 NOTE — PROGRESS NOTES
[facility]al [facility]  [facility]: [place]        Social Work Care Navigator Note      Patient: Car Torres  Date: February 22, 2023  Preferred Name: Jerry    Previous PHQ-9:   PHQ-9 SCORE 11/17/2022 12/22/2022 2/3/2023   PHQ-9 Total Score MyChart 0 3 (Minimal depression) 2 (Minimal depression)   PHQ-9 Total Score 0 3 2     Previous SHAHNAZ-7:   SHAHNAZ-7 SCORE 12/2/2021 6/9/2022 12/22/2022   Total Score - - 2 (minimal anxiety)   Total Score 0 0 2     ALMA LEVEL:  ALMA Score (Last Two) 5/4/2012   ALMA Raw Score 37   Activation Score 49.9   ALMA Level 2       Preferred Contact:  Need for : No  Preferred Contact: Cell      Type of Contact Today: Phone call (patient / identified key support person reached)      Data: (subjective / Objective):  Recent ED/IP Admission or Discharge?   None    Patient Goals:  Goal Areas: Health; Mental Health; Financial and Social Service Benefits  Patient stated goals: Health: Jerry will continue to meet with his providers and his nutritionist. He also will continue to work on his daily water intake, as recommended by his nutritionist, and is drinking about 24oz daily.   -Jerry will work with providers to recover from his broken foot and surgery, and he will stay compliant with post-op surgery instructions.  Mental Health: Jerry will continue to work with his psychiatrist to manage his anxiety, as well as his care coordinator with the [facility]. He will continue to work on verbalizing any struggles he may be having with his anxiety, especially not being able to help his wife while he's injured.   Financial: Jerry and his wife would like to work on paying off credit card debt, so they are working on trying to figure out how to pay this off.    [facility] Core Service Provided:  Comprehensive Care Management: utilized the electronic medical record / patient registry to identify and support patient's health conditions / needs more effectively   Care Transitions:  focused on the coordinated and seamless movement of patient between or within different levels of care or settings  Care Coordination: provided care management services/referrals necessary to ensure patient and their identified supports have access to medical, behavioral health, pharmacology and recovery support services.  Ensured that patient's care is integrated across all settings and services.   Individual and Family Support: aimed to help clients reduce barriers to achieving goals, increase health literacy and knowledge about chronic condition(s), increase self-efficacy skills, and improve health outcomes    Current Stressors / Issues / Care Plan Objective Addressed Today:  [facility] and patient were able to meet today for Behavioral Health Home ([facility]) monthly check-in via telehealth visit. All required ROIs have been filed with HIM/patient chart.    1. Patient reported they are ready for the snowstorm, and they have it set up for someone to come shovel for them.     2. Patient reported that next Wednesday he had his appointment to get his cast off, and he'll be able to get the walking boot. He reported that it'll be nice to be more mobile because he's struggled with the crutches and has been wheeling himself around a lot.    3. Patient reported that he's not on pain medication anymore and his pain is good. He reports that his foot is swollen by the end of the day, but he reported that the swelling goes down by the morning.     4. Patient reported that his mood has been fine recently. He also reports that he's been able to go to his mother-in-law's to shower once a week because she has a more accessible shower. His daughter helps him with this as well. Patient reported that both of his daughters have been helpful for them in the past couple of months.     5. Patient reported that he increased his goal of drinking about 32 oz of water per day, and he drinks about 1 Gatorade a day.     6. Patient will be starting  physical therapy next month as well, and he's not sure how long he'll need this for.     Intervention:  Motivational Interviewing: Expressed Empathy/Understanding, Supported Autonomy, Collaboration, Evocation, Permission to raise concern or advise, Open-ended questions and Reflections: simple and complex   Target Behavior(s): Explored thoughts about taking an anti-depressant, Explored and resolved challenges related to taking anti-depressants as prescribed, Explored thoughts and readiness to participate in individual therapy, Explored and resolved challenges to attending appointments as scheduled and Explored current social supports and reinforced opportunities to increase engagement    Assessment: (Progress on Goals / Homework):  Patient would benefit from continued coordination in reaching their goals set for the Behavioral Health Home ([facility]) program. [facility] reviewed Health Action Plan goals and will continue to monitor progress and work with patient and their care team.    Plan: (Homework, other):  Patient was encouraged to continue to seek condition-related information and education.      Scheduled a Phone follow up appointment with MARJ  in 4 weeks     Patient has set self-identified goals and will monitor progress until the next appointment on: 3/28/23.       OLGA Montero  Behavioral [facility] ([facility])   [facility]  [phone]        Next 5 appointments (look out 90 days)    Mar 21, 2023 11:30 AM  (Arrive by 11:15 AM)  Return Visit with Miguel Varela MD  [facility] [place] ([facility] [place] [facility] [address] [address] [place] [id number] [phone]

## 2023-03-01 ENCOUNTER — ANCILLARY PROCEDURE (OUTPATIENT)
Dept: GENERAL RADIOLOGY | Facility: CLINIC | Age: 63
End: 2023-03-01
Attending: PODIATRIST
Payer: COMMERCIAL

## 2023-03-01 ENCOUNTER — ALLIED HEALTH/NURSE VISIT (OUTPATIENT)
Dept: ORTHOPEDICS | Facility: CLINIC | Age: 63
End: 2023-03-01
Payer: COMMERCIAL

## 2023-03-01 ENCOUNTER — OFFICE VISIT (OUTPATIENT)
Dept: PODIATRY | Facility: CLINIC | Age: 63
End: 2023-03-01
Payer: COMMERCIAL

## 2023-03-01 VITALS — OXYGEN SATURATION: 97 % | WEIGHT: 315 LBS | HEART RATE: 55 BPM | BODY MASS INDEX: 48.12 KG/M2

## 2023-03-01 DIAGNOSIS — Z98.890 POSTOPERATIVE STATE: Primary | ICD-10-CM

## 2023-03-01 DIAGNOSIS — Z98.890 S/P FOOT SURGERY: Primary | ICD-10-CM

## 2023-03-01 DIAGNOSIS — S93.325A LISFRANC DISLOCATION, LEFT, INITIAL ENCOUNTER: ICD-10-CM

## 2023-03-01 DIAGNOSIS — E66.01 MORBID OBESITY (H): ICD-10-CM

## 2023-03-01 DIAGNOSIS — Z98.890 POSTOPERATIVE STATE: ICD-10-CM

## 2023-03-01 PROCEDURE — 99024 POSTOP FOLLOW-UP VISIT: CPT | Performed by: PODIATRIST

## 2023-03-01 PROCEDURE — 73630 X-RAY EXAM OF FOOT: CPT | Mod: TC | Performed by: RADIOLOGY

## 2023-03-01 PROCEDURE — 99207 PR NO CHARGE NURSE ONLY: CPT

## 2023-03-01 NOTE — LETTER
3/1/2023         RE: Car Torres  [address]  [place] [address]        Dear Colleague,    Thank you for referring your patient, Car Torres, to the [facility]. Please see a copy of my visit note below.    Assessment:      ICD-10-CM    1. Postoperative state  Z98.890 XR Foot Left G/E 3 Views     Ankle/Foot Bracing Supplies DME Walking Boot; Left; Pneumatic; Tall      2. Lisfranc dislocation, left, initial encounter  S93.325A Ankle/Foot Bracing Supplies DME Walking Boot; Left; Pneumatic; Tall      3. Morbid obesity (H)  E66.01 Ankle/Foot Bracing Supplies DME Walking Boot; Left; Pneumatic; Tall          8 weeks s/p ORIF Lisfranc dislocation    Plan:  Orders Placed This Encounter   Procedures     XR Foot Left G/E 3 Views     Ankle/Foot Bracing Supplies DME Walking Boot; Left; Pneumatic; Tall       Discussed the post-operative recovery plan with the patient.        SLC removed  -WB- Progress WB to operative limb in boot.  Boot x1 month, then firm shoe  -Activity- low impact in boot  -Pain- scheduled Tylenol, opioids- Rx hydrocodone #20 pills  -DVT prophylaxis- WB, can discharge ASA  -Abx- none      Boot Rx today - size XL        Return:  No follow-ups on file.         Bailey Ruiz DPM                Chief Complaint:     Patient presents with:  Left Foot - RECHECK     Post-op Exam    HPI:  Car Torres is a 62 year old year old male who presents for post-op exam.    Surgery Date- 1/5/2023  Post-operative week #8  Procedure- ORIF Lisfranc dislocation  Pain- moderate  Pain medication use- oxycodone  Abx- none  WB status- NWB  DVT Px- ASA  Dressing clean/dry/intact?- yes          Past Medical & Surgical History:  Past Medical History:   Diagnosis Date     Dementia (H)      Depressive disorder      Heart disease 5/8/2018     Hypercholesterolemia      Hypertension goal BP (blood pressure) < 140/90      Nonspecific abnormal electrocardiogram (ECG) (EKG) 08/23/2007     Stress testing normal.      Sleep apnea     uses a c-pap, severe      Past Surgical History:   Procedure Laterality Date     COLONOSCOPY N/A 12/22/2020    Procedure: COLONOSCOPY, WITH POLYPECTOMY, CLIP;  Surgeon: Mihir Lang MD;  [place] [facility]     COLONOSCOPY N/A 1/10/2022    Procedure: COLONOSCOPY, WITH POLYPECTOMY AND BIOPSY;  Surgeon: Mihir Lang MD;  Location:  GI     ESOPHAGOSCOPY, GASTROSCOPY, DUODENOSCOPY (EGD), COMBINED N/A 1/28/2021    Procedure: ESOPHAGOGASTRODUODENOSCOPY, WITH BIOPSY;  Surgeon: Mihir Lang MD;  [place] [facility] [place]     OPEN REDUCTION INTERNAL FIXATION FOOT Left 1/5/2023    Procedure: LISFRANC OPEN REDUCTION INTERNAL FIXATION, LEFT FOOT;  Surgeon: Bailey Ruiz DPM;  Location:  OR     SURGICAL HISTORY OF -       surgery on left index finger      Family History   Problem Relation Age of Onset     Cancer Mother         uterine     Other Cancer Mother         endometrial     Cerebrovascular Disease Mother      Substance Abuse Mother         Alcohol     C.A.D. Father      Hypertension Father      Hyperlipidemia Father      Breast Cancer Maternal Grandmother      Other Cancer Maternal Grandmother         Lung/brain     Substance Abuse Paternal Grandfather      Hypertension Brother      Substance Abuse Brother         Alcohol     Hyperlipidemia Brother      Breast Cancer Other      Breast Cancer Cousin      Other Cancer Other      Cerebrovascular Disease Other         Maternal Aunt     Glaucoma No family hx of      Macular Degeneration No family hx of         Social History:  ?  History   Smoking Status     Former     Packs/day: 1.00     Years: 25.00     Types: Cigarettes, Cigars     Start date: 5/1/1973     Quit date: 2/1/1998   Smokeless Tobacco     Never     History   Drug Use Unknown     Comment: Kratum     Social History    Substance and Sexual Activity      Alcohol use: Yes        Alcohol/week: 10.0 standard drinks        Comment: less than 6 per week      Allergies:   ?   No Known Allergies     Medications:    Current Outpatient Medications   Medication     ASPIRIN 325 MG OR TBEC     atorvastatin (LIPITOR) 40 MG tablet     busPIRone (BUSPAR) 15 MG tablet     Cholecalciferol (VITAMIN D PO)     donepezil (ARICEPT) 10 MG tablet     Ferrous Gluconate 324 (37.5 Fe) MG TABS     Ferrous Sulfate 324 (65 Fe) MG TBEC     FLUoxetine (PROZAC) 40 MG capsule     gabapentin (NEURONTIN) 300 MG capsule     lisinopril-hydrochlorothiazide (ZESTORETIC) 20-12.5 MG tablet     MULTI VITAMIN MENS PO     OLANZapine (ZYPREXA) 2.5 MG tablet     omega 3 1000 MG CAPS     tadalafil (CIALIS) 20 MG tablet     TURMERIC PO     vitamin B complex with vitamin C (STRESS TAB) tablet     No current facility-administered medications for this visit.       Physical Exam:  ?  Vitals:  Pulse 55   Wt (!) 156.5 kg (345 lb)   SpO2 97%   BMI 48.12 kg/m     General:  WD/WN, in NAD.  A&O x3.    Dermatologic:    Incision  is healed, scabbing present.  Beatriz-incisional erythema absent, ecchymosis absent.  Vascular:  Digital capillary refill time normal left.  Skin temperature is normal to operative site.  Focal edema- moderate to operative site.   Neurologic:    Gross sensation normal to operative limb.  Gait and balance abnormal, NWB  to operative limb.  Musculoskeletal:  Mild pain to palpation of foot left.  Ankle, MTPJ ROM  intact to operative limb.  Muscle strength intact to contralateral limb.    Imaging:   x-ray independently reviewed and interpreted by myself today.  Weight-bearing views left foot dated 03/01/23, reveal interval pin removal, maintained reduction, no hardware failure or migration.      x-ray independently reviewed and interpreted by myself today.  Non-Weight-bearing views left foot dated 01/11/23, reveal interval Lisfranc ORIF, excellen reduction. NO hardware failure or migraiton.                Again, thank you for allowing me to participate in the care of your patient.        Sincerely,        Bailey HOLLAND  SAHIL Ruiz

## 2023-03-01 NOTE — PATIENT INSTRUCTIONS
PATIENT INSTRUCTIONS - Podiatry / Foot & Ankle Surgery    You may shower in 48 hours  Do NOT soak the foot until all the scabs have fallen off    Boot Immobilization:  Size XL boot  Wear the boot at all times when walking or standing.  Wear the boot for 4 weeks.  You may remove the boot when at rest for ankle and toe motion & for bathing/showering.  Do not drive in the boot; wear the boot to & from the car, then switch to a stiff soled shoe for driving.  You do not need to sleep in the boot.  Wear compression (ACE bandage or Tubigrip) under the boot to decrease swelling.  Low impact activity in the boot until follow-up  Follow-up in 2 months      Physical Therapy  You have been referred to [facility].  Locations can be found online.  Please call to schedule an appointment:  [phone]      Stop Aspirin      Boot order placed    [facility]  [place]  [address]   [place] [address]  Ph: [phone]  Fax: [phone] [place] [facility]  [address]  [place] [address]  Ph: [phone]  Fax: [phone]   [place]  [address]   [place] [address]  Ph: [phone]  Fax: [phone] [place]  [address]  [place] [address]   Ph: [phone]    [place]  [address]  [place] [address]   Ph: [phone]  [place]  [address] [place] [address]   Ph: [phone]      [place]  [id number]  [place] [address]  Ph: [phone]  [place]  [address]   [place] [address]  Ph: [phone]  Fax: [phone]

## 2023-03-01 NOTE — PROGRESS NOTES
Assessment:      ICD-10-CM    1. Postoperative state  Z98.890 XR Foot Left G/E 3 Views     Ankle/Foot Bracing Supplies DME Walking Boot; Left; Pneumatic; Tall      2. Lisfranc dislocation, left, initial encounter  S93.325A Ankle/Foot Bracing Supplies DME Walking Boot; Left; Pneumatic; Tall      3. Morbid obesity (H)  E66.01 Ankle/Foot Bracing Supplies DME Walking Boot; Left; Pneumatic; Tall          8 weeks s/p ORIF Lisfranc dislocation    Plan:  Orders Placed This Encounter   Procedures     XR Foot Left G/E 3 Views     Ankle/Foot Bracing Supplies DME Walking Boot; Left; Pneumatic; Tall       Discussed the post-operative recovery plan with the patient.        SLC removed  -WB- Progress WB to operative limb in boot.  Boot x1 month, then firm shoe  -Activity- low impact in boot  -Pain- scheduled Tylenol, opioids- Rx hydrocodone #20 pills  -DVT prophylaxis- WB, can discharge ASA  -Abx- none      Boot Rx today - size XL        Return:  No follow-ups on file.         Bailey Ruiz DPM                Chief Complaint:     Patient presents with:  Left Foot - RECHECK     Post-op Exam    HPI:  Car Torres is a 62 year old year old male who presents for post-op exam.    Surgery Date- 1/5/2023  Post-operative week #8  Procedure- ORIF Lisfranc dislocation  Pain- moderate  Pain medication use- oxycodone  Abx- none  WB status- NWB  DVT Px- ASA  Dressing clean/dry/intact?- yes          Past Medical & Surgical History:  Past Medical History:   Diagnosis Date     Dementia (H)      Depressive disorder      Heart disease 5/8/2018     Hypercholesterolemia      Hypertension goal BP (blood pressure) < 140/90      Nonspecific abnormal electrocardiogram (ECG) (EKG) 08/23/2007    Stress testing normal.      Sleep apnea     uses a c-pap, severe      Past Surgical History:   Procedure Laterality Date     COLONOSCOPY N/A 12/22/2020    Procedure: COLONOSCOPY, WITH POLYPECTOMY, CLIP;  Surgeon: Mihir Lang MD;  Location: [facility] OR      COLONOSCOPY N/A 1/10/2022    Procedure: COLONOSCOPY, WITH POLYPECTOMY AND BIOPSY;  Surgeon: Mihir Lang MD;  Location:  GI     ESOPHAGOSCOPY, GASTROSCOPY, DUODENOSCOPY (EGD), COMBINED N/A 1/28/2021    Procedure: ESOPHAGOGASTRODUODENOSCOPY, WITH BIOPSY;  Surgeon: Mihir Lang MD;  Location: [facility] OR     OPEN REDUCTION INTERNAL FIXATION FOOT Left 1/5/2023    Procedure: LISFRANC OPEN REDUCTION INTERNAL FIXATION, LEFT FOOT;  Surgeon: Bailey Ruiz DPM;  Location:  OR     SURGICAL HISTORY OF -       surgery on left index finger      Family History   Problem Relation Age of Onset     Cancer Mother         uterine     Other Cancer Mother         endometrial     Cerebrovascular Disease Mother      Substance Abuse Mother         Alcohol     C.A.D. Father      Hypertension Father      Hyperlipidemia Father      Breast Cancer Maternal Grandmother      Other Cancer Maternal Grandmother         Lung/brain     Substance Abuse Paternal Grandfather      Hypertension Brother      Substance Abuse Brother         Alcohol     Hyperlipidemia Brother      Breast Cancer Other      Breast Cancer Cousin      Other Cancer Other      Cerebrovascular Disease Other         Maternal Aunt     Glaucoma No family hx of      Macular Degeneration No family hx of         Social History:  ?  History   Smoking Status     Former     Packs/day: 1.00     Years: 25.00     Types: Cigarettes, Cigars     Start date: 5/1/1973     Quit date: 2/1/1998   Smokeless Tobacco     Never     History   Drug Use Unknown     Comment: Kratum     Social History    Substance and Sexual Activity      Alcohol use: Yes        Alcohol/week: 10.0 standard drinks        Comment: less than 6 per week      Allergies:  ?   No Known Allergies     Medications:    Current Outpatient Medications   Medication     ASPIRIN 325 MG OR TBEC     atorvastatin (LIPITOR) 40 MG tablet     busPIRone (BUSPAR) 15 MG tablet     Cholecalciferol (VITAMIN D PO)     donepezil (ARICEPT) 10  MG tablet     Ferrous Gluconate 324 (37.5 Fe) MG TABS     Ferrous Sulfate 324 (65 Fe) MG TBEC     FLUoxetine (PROZAC) 40 MG capsule     gabapentin (NEURONTIN) 300 MG capsule     lisinopril-hydrochlorothiazide (ZESTORETIC) 20-12.5 MG tablet     MULTI VITAMIN MENS PO     OLANZapine (ZYPREXA) 2.5 MG tablet     omega 3 1000 MG CAPS     tadalafil (CIALIS) 20 MG tablet     TURMERIC PO     vitamin B complex with vitamin C (STRESS TAB) tablet     No current facility-administered medications for this visit.       Physical Exam:  ?  Vitals:  Pulse 55   Wt (!) 156.5 kg (345 lb)   SpO2 97%   BMI 48.12 kg/m     General:  WD/WN, in NAD.  A&O x3.    Dermatologic:    Incision  is healed, scabbing present.  Beatriz-incisional erythema absent, ecchymosis absent.  Vascular:  Digital capillary refill time normal left.  Skin temperature is normal to operative site.  Focal edema- moderate to operative site.   Neurologic:    Gross sensation normal to operative limb.  Gait and balance abnormal, NWB  to operative limb.  Musculoskeletal:  Mild pain to palpation of foot left.  Ankle, MTPJ ROM  intact to operative limb.  Muscle strength intact to contralateral limb.    Imaging:   x-ray independently reviewed and interpreted by myself today.  Weight-bearing views left foot dated 03/01/23, reveal interval pin removal, maintained reduction, no hardware failure or migration.      x-ray independently reviewed and interpreted by myself today.  Non-Weight-bearing views left foot dated 01/11/23, reveal interval Lisfranc ORIF, excellen reduction. NO hardware failure or migraiton.

## 2023-03-03 ENCOUNTER — THERAPY VISIT (OUTPATIENT)
Dept: PHYSICAL THERAPY | Facility: CLINIC | Age: 63
End: 2023-03-03
Attending: PODIATRIST
Payer: COMMERCIAL

## 2023-03-03 DIAGNOSIS — M25.572 PAIN IN JOINT INVOLVING ANKLE AND FOOT, LEFT: ICD-10-CM

## 2023-03-03 DIAGNOSIS — S93.325A LISFRANC DISLOCATION, LEFT, INITIAL ENCOUNTER: ICD-10-CM

## 2023-03-03 DIAGNOSIS — Z98.890 POSTOPERATIVE STATE: ICD-10-CM

## 2023-03-03 DIAGNOSIS — R29.898 ANKLE WEAKNESS: ICD-10-CM

## 2023-03-03 PROCEDURE — 97110 THERAPEUTIC EXERCISES: CPT | Mod: GP

## 2023-03-03 PROCEDURE — 97161 PT EVAL LOW COMPLEX 20 MIN: CPT | Mod: GP

## 2023-03-03 NOTE — PROGRESS NOTES
Patient was fitted for XL CAM boot.  He will wear at all times with exception of hygiene and bedtime as needed.    Herbert Michelle, ATC

## 2023-03-03 NOTE — PROGRESS NOTES
Physical Therapy Initial Evaluation  Subjective:  The history is provided by the patient.   Patient Health History  Car Torres being seen for Post surg left foot.     Problem began: 12/18/2022.   Problem occurred: Fell down the stairs   Pain is reported as 3/10 on pain scale.  General health as reported by patient is fair.       Medical allergies: none.   Surgeries include:  Orthopedic surgery.    Current medications:  Anti-depressants and high blood pressure medication.       Primary job tasks include:  Prolonged sitting.                  Therapist Generated HPI Evaluation  Problem details: L ankle Lisfranc ORIF surgery on 1/5/23.  CAM boot for 1 month starting as of 3/1/23.  Wife reports he has been using office chair.  Pt reports he would like to be able to walk and be able to perform short community ambulation again as a goal..  Pt has treadmill at home.   Pt's and patient's spouse  significant deconditioning following event as patient has been extremely sedentary since onset of injury in December.    Patient's wife was present during history interview as well as home program instruction (wife did not clearly state, however Dementia diagnosis noteable in patient's chart history)..         Type of problem:  Left ankle.    This is a new condition.  Condition occurred with:  A fall/slip.  Where condition occurred: at home.  Site of Pain: sides of feet infrequently.  Denies pain in calf and numbness/tingling  of L.      Since onset symptoms are rapidly improving.       There was none improvement following previous treatment.  Home/work barriers: Pt reports narrow depth steps at home.                         Objective:    Gait:  Pt demonstrates altered gait with decreased step length on R due to CAM boot on L with no assistive device.  Patient also reports 5/10 RPE fatigue (and audible shortness of breath) with 30' ambulation from waiting room to treatment room.    Assistive Devices:  CAM            Ankle/Foot  Evaluation  ROM:    AROM:    Dorsiflexion:  Left:   5  Right:   10  Plantarflexion:  Left:  35    Right:  55  Inversion:  Left:  5     Right:  20  Eversion:  25     Right:  15        Strength:    Dorsiflexion:  Right: 5/5   Pain:  Plantarflexion: Right: 5/5  Pain:  Inversion:Right: 5/5  Pain:  Eversion:Right: 5/5  Pain:                        EDEMA: Edema ankle: Figure 8 - 64.5 cm on L, 63 cm.                                                              General     ROS    Assessment/Plan:    Patient is a 62 year old male with left side ankle complaints.     Jerry presents to PT following ORIF for L Lisfranc fracture.  He demonstrates significant deconditioning, with limitations in L ankle strength, range of motion, and overall loss of function and independence.  Due to patient presentation, as well as pertinent medical history including Dementia diagnosis, patient will benefit from increased frequency of visits initially to ensure patient progresses appropriately and safely so he can improve his function and regain independence.        Patient has the following significant findings with corresponding treatment plan.                Diagnosis 1:  L ankle Lisfranc ORIF  Pain -  hot/cold therapy, manual therapy, splint/taping/bracing/orthotics, self management, education and home program  Decreased ROM/flexibility - manual therapy, therapeutic exercise and home program  Decreased joint mobility - manual therapy, therapeutic exercise and home program  Decreased strength - therapeutic exercise, therapeutic activities and home program  Impaired balance - neuro re-education, therapeutic activities and home program  Decreased proprioception - neuro re-education, therapeutic activities and home program  Impaired gait - gait training, assistive devices and home program  Impaired muscle performance - neuro re-education and home program  Decreased function - therapeutic activities and home program    Therapy Evaluation Codes:      Cumulative Therapy Evaluation is: Low complexity.    Previous and current functional limitations:  (See Goal Flow Sheet for this information)    Short term and Long term goals: (See Goal Flow Sheet for this information)     Communication ability:  Patient appears to be able to clearly communicate and understand verbal and written communication and follow directions correctly.  Treatment Explanation - The following has been discussed with the patient:   RX ordered/plan of care  Anticipated outcomes  Possible risks and side effects  This patient would benefit from PT intervention to resume normal activities.   Rehab potential is good.    Frequency:  1 X week, once daily  Duration:  for 10 weeks, tapering to 2x/month for 3 months  Discharge Plan:  Achieve all LTG.  Independent in home treatment program.  Reach maximal therapeutic benefit.    Please refer to the daily flowsheet for treatment today, total treatment time and time spent performing 1:1 timed codes.

## 2023-03-05 PROBLEM — R29.898 ANKLE WEAKNESS: Status: ACTIVE | Noted: 2023-03-05

## 2023-03-05 PROBLEM — M25.572 PAIN IN JOINT INVOLVING ANKLE AND FOOT, LEFT: Status: ACTIVE | Noted: 2023-03-05

## 2023-03-05 NOTE — PROGRESS NOTES
[facility]    OUTPATIENT Physical Therapy ORTHOPEDIC EVALUATION  PLAN OF TREATMENT FOR OUTPATIENT REHABILITATION  (COMPLETE FOR INITIAL CLAIMS ONLY)  Patient's Last Name, First Name, M.I.  YOB: 1960  Car Torres    Provider s Name:  [facility]   Medical Record No.  [id number]   Start of Care Date:  03/03/23   Onset Date:  01/05/2312/17/22   Treatment Diagnosis:  L ankle Lisfranc ORIF Medical Diagnosis:     Postoperative state  Lisfranc dislocation, left, initial encounter  Pain in joint involving ankle and foot, left  Ankle weakness       Goals:     03/05/23 0500   Body Part   Goals listed below are for Left foot/ankle crutch training   Goal #1   Goal #1 ambulation   Previous Functional Level No restrictions   Current Functional Level Feet patient can walk;Ambulates with gait deviation of    Performance Level 25 feet with L CAM boot and 5/10 RPE fatigue   STG Target Performance Feet patient will be able to walk   Performance Level 500 feet on treadmill at 2 mph pace continuosly   Rationale for safe household ambulation;for safe outdoor household ambulation   Due Date 04/16/23    LTG Target Performance Feet patient will be able to walk   Performance Level 2,000 feet at 2 mph pace continuosly   Rationale for safe household ambulation;for safe outdoor household ambulation;for safe community ambulation;to promote a healthy and active lifestyle   Due Date 07/23/23         Therapy Frequency:  1x/wk for 10 weeks, 2x/month for 3 months  Predicted Duration of Therapy Intervention:  1x/wk for 10 weeks, 2x/month for 3 months    Fabian Goodrich, PT                 I CERTIFY THE NEED FOR THESE SERVICES FURNISHED UNDER        THIS PLAN OF TREATMENT AND WHILE UNDER MY CARE     (Physician attestation of this document indicates review and certification of the therapy plan).                      Certification Date From:  03/03/23   Certification Date To:  06/02/23    Referring Provider:  Bailey Ruiz    Initial Assessment        See Epic Evaluation SOC Date: 03/03/23

## 2023-03-10 ENCOUNTER — THERAPY VISIT (OUTPATIENT)
Dept: PHYSICAL THERAPY | Facility: CLINIC | Age: 63
End: 2023-03-10
Attending: PODIATRIST
Payer: COMMERCIAL

## 2023-03-10 DIAGNOSIS — M25.572 PAIN IN JOINT INVOLVING ANKLE AND FOOT, LEFT: Primary | ICD-10-CM

## 2023-03-10 DIAGNOSIS — R29.898 ANKLE WEAKNESS: ICD-10-CM

## 2023-03-10 PROCEDURE — 97110 THERAPEUTIC EXERCISES: CPT | Mod: GP

## 2023-03-17 ENCOUNTER — THERAPY VISIT (OUTPATIENT)
Dept: PHYSICAL THERAPY | Facility: CLINIC | Age: 63
End: 2023-03-17
Attending: PODIATRIST
Payer: COMMERCIAL

## 2023-03-17 DIAGNOSIS — M25.572 PAIN IN JOINT INVOLVING ANKLE AND FOOT, LEFT: Primary | ICD-10-CM

## 2023-03-17 DIAGNOSIS — R29.898 ANKLE WEAKNESS: ICD-10-CM

## 2023-03-17 PROCEDURE — 97110 THERAPEUTIC EXERCISES: CPT | Mod: GP

## 2023-03-17 PROCEDURE — 97530 THERAPEUTIC ACTIVITIES: CPT | Mod: GP

## 2023-03-20 NOTE — PROGRESS NOTES
"[facility] Neurology Follow Up Visit    Car Torres MRN# [id number]   Age: 62 year old YOB: 1960     Brief history of symptoms: The patient was initially seen in neurologic consultation on 12/23/2020 for evaluation of cognitive changes. Please see the comprehensive neurologic consultation note from that date in the Epic records for details.     Interval history:   Patient fractured his left foot walking down the stairs, which required surgery. He is still in a boot, which will come off next month.     He hasn't been driving due to the boot. He is due for repeat driving evaluation at [facility].     Patient denies any memory issues. He believes he had a mental breakdown given all the stresses at his work. He reports generally good mood. He spends most of his time now doing \"dot-dot\" diagrams and watching TV.    Wife reports some memory in cognition.       Past Medical History:     Patient Active Problem List   Diagnosis     Sciatica     Morbid obesity (H)     Hyperlipidemia LDL goal <130     Hypertension goal BP (blood pressure) < 140/90     CKD (chronic kidney disease) stage 2, GFR 60-89 ml/min     Renal cyst     Diverticulosis of large intestine without hemorrhage     Pulmonary nodule     Coronary artery calcification     History of sinus bradycardia     ED (erectile dysfunction)     TIA (obstructive sleep apnea)     Major depressive disorder, recurrent episode, mild with atypical features (H)     Dementia associated with other underlying disease with behavioral disturbance     Ulcer of left foot, unspecified ulcer stage (H)     Unspecified psychosis not due to a substance or known physiological condition (H)     Lisfranc dislocation, left, initial encounter     Lisfranc dislocation, left, subsequent encounter     Pain in joint involving ankle and foot, left     Ankle weakness     Past Medical History:   Diagnosis Date     Dementia (H)      Depressive disorder      Heart disease 5/8/2018     " Hypercholesterolemia      Hypertension goal BP (blood pressure) < 140/90      Nonspecific abnormal electrocardiogram (ECG) (EKG) 2007    Stress testing normal.      Sleep apnea     uses a c-pap, severe        Past Surgical History:     Past Surgical History:   Procedure Laterality Date     COLONOSCOPY N/A 2020    Procedure: COLONOSCOPY, WITH POLYPECTOMY, CLIP;  Surgeon: Mihir Lang MD;  Location: [facility] OR     COLONOSCOPY N/A 1/10/2022    Procedure: COLONOSCOPY, WITH POLYPECTOMY AND BIOPSY;  Surgeon: Mihir Lang MD;  Location:  GI     ESOPHAGOSCOPY, GASTROSCOPY, DUODENOSCOPY (EGD), COMBINED N/A 2021    Procedure: ESOPHAGOGASTRODUODENOSCOPY, WITH BIOPSY;  Surgeon: Mihir Lang MD;  Location: [facility] OR     OPEN REDUCTION INTERNAL FIXATION FOOT Left 2023    Procedure: LISFRANC OPEN REDUCTION INTERNAL FIXATION, LEFT FOOT;  Surgeon: Bailey Ruiz DPM;  Location:  OR     SURGICAL HISTORY OF -       surgery on left index finger        Social History:     Social History     Tobacco Use     Smoking status: Former     Packs/day: 1.00     Years: 25.00     Pack years: 25.00     Types: Cigarettes, Cigars     Start date: 1973     Quit date: 1998     Years since quittin.1     Passive exposure: Past     Smokeless tobacco: Never     Tobacco comments:     N/A not a smoker   Vaping Use     Vaping Use: Never used   Substance Use Topics     Alcohol use: Yes     Alcohol/week: 10.0 standard drinks     Comment: less than 6 per week     Drug use: Not Currently     Types: Amphetamines     Comment: Kratum        Family History:     Family History   Problem Relation Age of Onset     Cancer Mother         uterine     Other Cancer Mother         endometrial     Cerebrovascular Disease Mother      Substance Abuse Mother         Alcohol     C.A.D. Father      Hypertension Father      Hyperlipidemia Father      Breast Cancer Maternal Grandmother      Other Cancer Maternal Grandmother          Lung/brain     Substance Abuse Paternal Grandfather      Hypertension Brother      Substance Abuse Brother         Alcohol     Hyperlipidemia Brother      Breast Cancer Other      Breast Cancer Cousin      Other Cancer Other      Cerebrovascular Disease Other         Maternal Aunt     Glaucoma No family hx of      Macular Degeneration No family hx of         Medications:     Current Outpatient Medications   Medication Sig     ASPIRIN 325 MG OR TBEC ONE DAILY     atorvastatin (LIPITOR) 40 MG tablet Take 1 tablet (40 mg) by mouth daily     busPIRone (BUSPAR) 15 MG tablet Take 1 tablet (15 mg) by mouth 2 times daily     Cholecalciferol (VITAMIN D PO) Take 5,000 Units by mouth daily One tablet twice daily     donepezil (ARICEPT) 10 MG tablet Take 1 tablet (10 mg) by mouth daily     Ferrous Gluconate 324 (37.5 Fe) MG TABS 1 tablet daily     Ferrous Sulfate 324 (65 Fe) MG TBEC      FLUoxetine (PROZAC) 40 MG capsule Take 1 capsule (40 mg) by mouth daily     gabapentin (NEURONTIN) 300 MG capsule Take 1 capsule (300 mg) by mouth At Bedtime     lisinopril-hydrochlorothiazide (ZESTORETIC) 20-12.5 MG tablet Take 1 tablet by mouth daily     MULTI VITAMIN MENS PO None Entered     OLANZapine (ZYPREXA) 2.5 MG tablet Take 1 tablet (2.5 mg) by mouth At Bedtime     omega 3 1000 MG CAPS Take by mouth daily     tadalafil (CIALIS) 20 MG tablet TAKE 1/2 TO 1 TABLET BY MOUTH 30 MINUTES TO 1 HOUR BEFORE SEX     TURMERIC PO Take by mouth daily     vitamin B complex with vitamin C (STRESS TAB) tablet Take 1 tablet by mouth daily     No current facility-administered medications for this visit.        Allergies:   No Known Allergies     Review of Systems:   As above     Physical Exam:   Vitals: BP (!) 148/74   Pulse 61   Wt (!) 163.7 kg (361 lb)   BMI 50.35 kg/m     General: Seated comfortably in no acute distress.  Lungs: breathing comfortably  Neurologic:     Mental Status: MoCA 28/30 (-2 serial 7s)     Cranial Nerves:  No dysarthria.       Motor: No tremors or other abnormal movements observed.      Data reviewed on previous visits    Imaging:  MRI brain 10/16/2020  IMPRESSION:  1. Exam mildly limited by motion artifact.  2. Few minimal nonspecific white matter lesions.  3. No evidence for intracranial hemorrhage, acute infarct, or any  focal mass lesions.     Laboratory:  TSH, CMP wnl 8/2020  B12, CBC wnl 12/2020    PET brain 4/2021  IMPRESSION:  No significant metabolic deficits when compared to  control database. This may be due to the possible underlying dementia  being too early to characterize, versus the patient's symptomatology  being due to some other process than dementia.    Pertinent Investigations since last visit:   None         Assessment and Plan:   Assessment:  Patient is a 63 y/o male who presents for follow-up of dementia. Cognitive changes of paranoia, hallucinations, delusions developed initially around spring 2020, with significant worsening by fall 2020. He had no prior history of psychiatric disease. Patient had neuropsychological testing with Dr Adam in 3/2021 which was concerning for significant dysfunction in frontal and subcortical networks, and in the clinical context, concerning for a neurodegenerative process causing mild dementia. PET brain and MRI brain were largely unrevealing. Neuropsychology testing was repeated in 8/2022, which showed variable results, but continued frontal dysfunction. Leading diagnosis on the differential at this juncture is frontotemporal dementia. He has had some benefit with donepezil and psychiatric medications per psych.    MoCA was performed today is 28/30, which is improved compared to prior visit. Given atypical presentation referral to Dr Mark in memory clinic was placed for opinion on presentation. Patient needs repeat driving evaluation at [facility] to ensure safety of driving. He last had this performed in spring 2021.     Plan:  - Continue Donepezil 10 mg nightly   -  Continue to follow-up with psychiatrist - currently on Zyprexa, Prosac, and Buspar  - Memory clinic referral  - [facility] driving evaluation at [facility]    Follow up in Neurology clinic in 6 months or sooner if new issues arise    Miguel Varela MD   of Neurology  [facility]      The total time of this encounter today amounted to 40 minutes. This time included time spent with the patient, prep work, ordering tests, and performing post visit documentation.

## 2023-03-21 ENCOUNTER — OFFICE VISIT (OUTPATIENT)
Dept: NEUROLOGY | Facility: CLINIC | Age: 63
End: 2023-03-21
Payer: COMMERCIAL

## 2023-03-21 VITALS
WEIGHT: 315 LBS | BODY MASS INDEX: 50.35 KG/M2 | DIASTOLIC BLOOD PRESSURE: 74 MMHG | SYSTOLIC BLOOD PRESSURE: 148 MMHG | HEART RATE: 61 BPM

## 2023-03-21 DIAGNOSIS — G31.09 FRONTOTEMPORAL DEMENTIA (H): ICD-10-CM

## 2023-03-21 DIAGNOSIS — R41.89 COGNITIVE CHANGE: Primary | ICD-10-CM

## 2023-03-21 DIAGNOSIS — F02.80 FRONTOTEMPORAL DEMENTIA (H): ICD-10-CM

## 2023-03-21 PROCEDURE — 99215 OFFICE O/P EST HI 40 MIN: CPT | Performed by: INTERNAL MEDICINE

## 2023-03-21 RX ORDER — BACLOFEN 20 MG/1
300 TABLET ORAL 3 TIMES DAILY
COMMUNITY
End: 2023-03-21

## 2023-03-21 NOTE — NURSING NOTE
Car Torres's goals for this visit include:   Chief Complaint   Patient presents with     Follow Up     6 mo follow up - doing fine      He requests these members of his care team be copied on today's visit information:     PCP: Mihir Perez    Referring Provider:  No referring provider defined for this encounter.    BP (!) 148/74   Pulse 61   Wt (!) 163.7 kg (361 lb)   BMI 50.35 kg/m      Do you need any medication refills at today's visit? MAYCO GRAYSON EMT

## 2023-03-21 NOTE — LETTER
"    3/21/2023         RE: Car Torres  [address]  [place] [address]        Dear Colleague,    Thank you for referring your patient, Car Torres, to the [facility] [place]. Please see a copy of my visit note below.    [facility] Neurology Follow Up Visit    Car Torres MRN# [id number]   Age: 62 year old YOB: 1960     Brief history of symptoms: The patient was initially seen in neurologic consultation on 12/23/2020 for evaluation of cognitive changes. Please see the comprehensive neurologic consultation note from that date in the Epic records for details.     Interval history:   Patient fractured his left foot walking down the stairs, which required surgery. He is still in a boot, which will come off next month.     He hasn't been driving due to the boot. He is due for repeat driving evaluation at [facility].     Patient denies any memory issues. He believes he had a mental breakdown given all the stresses at his work. He reports generally good mood. He spends most of his time now doing \"dot-dot\" diagrams and watching TV.    Wife reports some memory in cognition.       Past Medical History:     Patient Active Problem List   Diagnosis     Sciatica     Morbid obesity (H)     Hyperlipidemia LDL goal <130     Hypertension goal BP (blood pressure) < 140/90     CKD (chronic kidney disease) stage 2, GFR 60-89 ml/min     Renal cyst     Diverticulosis of large intestine without hemorrhage     Pulmonary nodule     Coronary artery calcification     History of sinus bradycardia     ED (erectile dysfunction)     TIA (obstructive sleep apnea)     Major depressive disorder, recurrent episode, mild with atypical features (H)     Dementia associated with other underlying disease with behavioral disturbance     Ulcer of left foot, unspecified ulcer stage (H)     Unspecified psychosis not due to a substance or known physiological condition (H)     Lisfranc dislocation, " left, initial encounter     Lisfranc dislocation, left, subsequent encounter     Pain in joint involving ankle and foot, left     Ankle weakness     Past Medical History:   Diagnosis Date     Dementia (H)      Depressive disorder      Heart disease 2018     Hypercholesterolemia      Hypertension goal BP (blood pressure) < 140/90      Nonspecific abnormal electrocardiogram (ECG) (EKG) 2007    Stress testing normal.      Sleep apnea     uses a c-pap, severe        Past Surgical History:     Past Surgical History:   Procedure Laterality Date     COLONOSCOPY N/A 2020    Procedure: COLONOSCOPY, WITH POLYPECTOMY, CLIP;  Surgeon: Mihir Lang MD;  Location: [facility] OR     COLONOSCOPY N/A 1/10/2022    Procedure: COLONOSCOPY, WITH POLYPECTOMY AND BIOPSY;  Surgeon: Mihir Lang MD;  Location:  GI     ESOPHAGOSCOPY, GASTROSCOPY, DUODENOSCOPY (EGD), COMBINED N/A 2021    Procedure: ESOPHAGOGASTRODUODENOSCOPY, WITH BIOPSY;  Surgeon: Mihir Lang MD;  Location: [facility] OR     OPEN REDUCTION INTERNAL FIXATION FOOT Left 2023    Procedure: LISFRANC OPEN REDUCTION INTERNAL FIXATION, LEFT FOOT;  Surgeon: Bailey Ruiz DPM;  Location:  OR     SURGICAL HISTORY OF -       surgery on left index finger        Social History:     Social History     Tobacco Use     Smoking status: Former     Packs/day: 1.00     Years: 25.00     Pack years: 25.00     Types: Cigarettes, Cigars     Start date: 1973     Quit date: 1998     Years since quittin.1     Passive exposure: Past     Smokeless tobacco: Never     Tobacco comments:     N/A not a smoker   Vaping Use     Vaping Use: Never used   Substance Use Topics     Alcohol use: Yes     Alcohol/week: 10.0 standard drinks     Comment: less than 6 per week     Drug use: Not Currently     Types: Amphetamines     Comment: Viktoriya        Family History:     Family History   Problem Relation Age of Onset     Cancer Mother         uterine     Other Cancer Mother          endometrial     Cerebrovascular Disease Mother      Substance Abuse Mother         Alcohol     C.A.D. Father      Hypertension Father      Hyperlipidemia Father      Breast Cancer Maternal Grandmother      Other Cancer Maternal Grandmother         Lung/brain     Substance Abuse Paternal Grandfather      Hypertension Brother      Substance Abuse Brother         Alcohol     Hyperlipidemia Brother      Breast Cancer Other      Breast Cancer Cousin      Other Cancer Other      Cerebrovascular Disease Other         Maternal Aunt     Glaucoma No family hx of      Macular Degeneration No family hx of         Medications:     Current Outpatient Medications   Medication Sig     ASPIRIN 325 MG OR TBEC ONE DAILY     atorvastatin (LIPITOR) 40 MG tablet Take 1 tablet (40 mg) by mouth daily     busPIRone (BUSPAR) 15 MG tablet Take 1 tablet (15 mg) by mouth 2 times daily     Cholecalciferol (VITAMIN D PO) Take 5,000 Units by mouth daily One tablet twice daily     donepezil (ARICEPT) 10 MG tablet Take 1 tablet (10 mg) by mouth daily     Ferrous Gluconate 324 (37.5 Fe) MG TABS 1 tablet daily     Ferrous Sulfate 324 (65 Fe) MG TBEC      FLUoxetine (PROZAC) 40 MG capsule Take 1 capsule (40 mg) by mouth daily     gabapentin (NEURONTIN) 300 MG capsule Take 1 capsule (300 mg) by mouth At Bedtime     lisinopril-hydrochlorothiazide (ZESTORETIC) 20-12.5 MG tablet Take 1 tablet by mouth daily     MULTI VITAMIN MENS PO None Entered     OLANZapine (ZYPREXA) 2.5 MG tablet Take 1 tablet (2.5 mg) by mouth At Bedtime     omega 3 1000 MG CAPS Take by mouth daily     tadalafil (CIALIS) 20 MG tablet TAKE 1/2 TO 1 TABLET BY MOUTH 30 MINUTES TO 1 HOUR BEFORE SEX     TURMERIC PO Take by mouth daily     vitamin B complex with vitamin C (STRESS TAB) tablet Take 1 tablet by mouth daily     No current facility-administered medications for this visit.        Allergies:   No Known Allergies     Review of Systems:   As above     Physical Exam:   Vitals:  BP (!) 148/74   Pulse 61   Wt (!) 163.7 kg (361 lb)   BMI 50.35 kg/m     General: Seated comfortably in no acute distress.  Lungs: breathing comfortably  Neurologic:     Mental Status: MoCA 28/30 (-2 serial 7s)     Cranial Nerves:  No dysarthria.      Motor: No tremors or other abnormal movements observed.      Data reviewed on previous visits    Imaging:  MRI brain 10/16/2020  IMPRESSION:  1. Exam mildly limited by motion artifact.  2. Few minimal nonspecific white matter lesions.  3. No evidence for intracranial hemorrhage, acute infarct, or any  focal mass lesions.     Laboratory:  TSH, CMP wnl 8/2020  B12, CBC wnl 12/2020    PET brain 4/2021  IMPRESSION:  No significant metabolic deficits when compared to  control database. This may be due to the possible underlying dementia  being too early to characterize, versus the patient's symptomatology  being due to some other process than dementia.    Pertinent Investigations since last visit:   None         Assessment and Plan:   Assessment:  Patient is a 63 y/o male who presents for follow-up of dementia. Cognitive changes of paranoia, hallucinations, delusions developed initially around spring 2020, with significant worsening by fall 2020. He had no prior history of psychiatric disease. Patient had neuropsychological testing with Dr Adam in 3/2021 which was concerning for significant dysfunction in frontal and subcortical networks, and in the clinical context, concerning for a neurodegenerative process causing mild dementia. PET brain and MRI brain were largely unrevealing. Neuropsychology testing was repeated in 8/2022, which showed variable results, but continued frontal dysfunction. Leading diagnosis on the differential at this juncture is frontotemporal dementia. He has had some benefit with donepezil and psychiatric medications per psych.    MoCA was performed today is 28/30, which is improved compared to prior visit. Given atypical presentation referral  to Dr Mark in memory clinic was placed for opinion on presentation. Patient needs repeat driving evaluation at [facility] to ensure safety of driving. He last had this performed in spring 2021.     Plan:  - Continue Donepezil 10 mg nightly   - Continue to follow-up with psychiatrist - currently on Zyprexa, Prosac, and Buspar  - Memory clinic referral  - Repeat driving evaluation at [facility]    Follow up in Neurology clinic in 6 months or sooner if new issues arise    Miguel Varela MD   of Neurology  [facility]      The total time of this encounter today amounted to 40 minutes. This time included time spent with the patient, prep work, ordering tests, and performing post visit documentation.          Again, thank you for allowing me to participate in the care of your patient.        Sincerely,        Miguel Varela MD

## 2023-03-28 ENCOUNTER — VIRTUAL VISIT (OUTPATIENT)
Dept: BEHAVIORAL HEALTH | Facility: CLINIC | Age: 63
End: 2023-03-28
Payer: COMMERCIAL

## 2023-03-28 DIAGNOSIS — R69 DIAGNOSIS DEFERRED: Primary | ICD-10-CM

## 2023-03-28 NOTE — LETTER
"Behavioral Health Home ([facility]): Discharge Letter  [facility]: [place]    [facility]    April 1, 2023    [facility]   Behavioral Health Home Services      Dear Car,    This is a notice that you are to be discharged from Behavioral Health Home ([facility]) services effective 4/1/23 due to no longer needing care coordination at this time. This does not change your ability to continue receiving care from your PCP/Behavioral Health Clinician here at our Primary Care Clinic.    If you are interested in Behavioral Health Home ([facility]) services in the future, please ask your Primary Care Physician or call me to discuss scheduling an appointment to re-enroll into Behavioral Health Home ([facility]) services. If you are to need assistance from a  or RN in the future, we have the Clinic Care Coordination Program who can assist you with any questions you may have about services or programs that may be beneficial for you. Please reach out to the clinic if you would like to learn more about Clinic Care Coordination and what they can offer you.    If you have any questions regarding discharge from Behavioral Health Home ([facility]) services, please feel free to contact me by phone or by email. My contact information is listed below. It was a pleasure working with you and I wish you all the best moving forward!    Thank you,    Cande Hendrix [facility] [place]  Behavioral Health Home ([facility])   [facility]  [phone]      Link to additional Certified Behavioral Health Home ([facility]) providers: https://[place].gov/dhs/partners-and-providers/pffb-qrxixeguwdq-okplfdm-workgroups/[place]-health-care-programs/behavioral-health-home-services/certified-providers.jsp    If you are in immediate danger, call 911.  Suicide Prevention Lifeline: call 988  Crisis Text Line Service: Text \"[place]\" to 367609.    [facility] Emergency Department - Behavioral Emergency " [place]  [address] [place] [address]  [phone] [facility] Crisis Response Services (Adults & Children)  [phone]   [facility] - Acute Psychiatric Services (APS)  Assessment & Referral: [phone]  Suicide Hotline: 510.391.5146 [facility] Crisis Team  [phone]   [facility] Adult Mental Health Services   [phone]  Referrals: [phone]  Crisis: [phone] [facility] [place] - Emergency Department  [address] [place] [address]  [phone]   [place] [facility]  8-972-OkhjOkx (1-913.328.8420) [facility] Mental Health and Resource Crisis Line  [phone]   [facility] - Community Outreach for Psychiatric Emergencies  [phone] [facility] Crisis Services - [facility] Adult Mental Health Services - Crisis Services (24/7)  Information & Referrals: [phone]  Crisis Line: 522.117.6525   [facility] [place] [facility] (24/7)  [phone] TDD Crisis Help Line Serving [facility]  [phone]  or [facility]  to 153964    [facility]  Mental Health  [address] [place], [place] [address] [phone]  [address] [place] [address]  [phone]  web: co.[facility].[place].  Mental-Health    [facility]  Mental Health  [address] [place] [address]  [phone]  web: [facility] Family Services    [facility]  Family Services  [address], [place] [address]  [phone]  web: [facility] Family Services    [facility]   Family Services  [address] [place] [address]  [phone]  web: co.[facility].[place].us/adult mental health    [facility]  [address] [facility] [address], [place] [address]  [phone] [address] [facility] [address] [place] [address]  [phone]  Toll Free:  [phone]  web: [place]. health and human services

## 2023-03-28 NOTE — PROGRESS NOTES
"Behavioral Health Home Services  [facility]: [place]        Social Work Care Navigator Note      Patient: Car Torres  Date: March 28, 2023  Preferred Name: Jerry    Previous PHQ-9:   PHQ-9 SCORE 11/17/2022 12/22/2022 2/3/2023   PHQ-9 Total Score MyChart 0 3 (Minimal depression) 2 (Minimal depression)   PHQ-9 Total Score 0 3 2     Previous SHAHNAZ-7:   SHAHNAZ-7 SCORE 12/2/2021 6/9/2022 12/22/2022   Total Score - - 2 (minimal anxiety)   Total Score 0 0 2     ALMA LEVEL:  ALMA Score (Last Two) 5/4/2012   ALMA Raw Score 37   Activation Score 49.9   ALMA Level 2       Preferred Contact:  Need for : No  Preferred Contact: Cell      Type of Contact Today: Phone call (patient / identified key support person reached)    Service Modality: Phone Visit:      Provider verified identity through the following two step process.  Patient provided:  Patient is known previously to provider    Telephone Visit: The patient's condition can be safely assessed and treated via synchronous audio telemedicine encounter.      Reason for Audio Telemedicine Visit: Services only offered telehealth    Originating Site (Patient Location): Patient's home    Distant Site (Provider Location): Provider Remote Setting- Home Office    Consent:  The patient/guardian has verbally consented to:     1. The potential risks and benefits of telemedicine (telephone visit) versus in person care;    The patient has been notified of the following:      \"We have found that certain health care needs can be provided without the need for a face to face visit.  This service lets us provide the care you need with a phone conversation.       I will have full access to your [facility] medical record during this entire phone call.   I will be taking notes for your medical record.      Since this is like an office visit, we will bill your insurance company for this service.       There are potential benefits and risks of telephone visits (e.g. limits to patient " "confidentiality) that differ from in-person visits.?Confidentiality still applies for telephone services, and nobody will record the visit.  It is important to be in a quiet, private space that is free of distractions (including cell phone or other devices) during the visit.??      If during the course of the call I believe a telephone visit is not appropriate, you will not be charged for this service\"     Consent has been obtained for this service by care team member: Yes       Data: (subjective / Objective):  Recent ED/IP Admission or Discharge?   None    Patient Goals:  Goal Areas: Health; Mental Health; Financial and Social Service Benefits  Patient stated goals: Health: Jerry will continue to meet with his providers and his nutritionist. He also will continue to work on his daily water intake, as recommended by his nutritionist, and is drinking about 24oz daily.   -Jerry will work with providers to recover from his broken foot and surgery, and he will stay compliant with post-op surgery instructions.  Mental Health: Jerry will continue to work with his psychiatrist to manage his anxiety, as well as his care coordinator with the [facility]. He will continue to work on verbalizing any struggles he may be having with his anxiety, especially not being able to help his wife while he's injured.   Financial: Jerry and his wife would like to work on paying off credit card debt, so they are working on trying to figure out how to pay this off.    [facility] Core Service Provided:  Comprehensive Care Management: utilized the electronic medical record / patient registry to identify and support patient's health conditions / needs more effectively   Care Transitions: focused on the coordinated and seamless movement of patient between or within different levels of care or settings  Care Coordination: provided care management services/referrals necessary to ensure patient and their identified supports have access to " medical, behavioral health, pharmacology and recovery support services.  Ensured that patient's care is integrated across all settings and services.   Individual and Family Support: aimed to help clients reduce barriers to achieving goals, increase health literacy and knowledge about chronic condition(s), increase self-efficacy skills, and improve health outcomes    Current Stressors / Issues / Care Plan Objective Addressed Today:  SWCC and patient were able to meet today for Behavioral Health Home ([facility]) monthly check-in via telehealth visit. All required ROIs have been filed with HIM/patient chart.    1. Patient reported that his mood has been alright over the last couple weeks     2. Patient reports that he's currently in a boot and has been walking for almost a month now. He reported that he'll be able to wear his show for a month starting on Saturday. Then he'll have a check up in May. He also still doing physical therapy, which he'll do for awhile still. He reported they have the treadmill and bicycle at home, so he can do a lot of exercises at home. His wife was concerned about the instructions because it reports that he can only do low impact activity with the boot and he needs to wear the boot for two months, so she's going to check in with the doctor to confirm what he needs to do.     3. Patient reported that he hasn't really had any pain, so it's been good. He also has one scab left which is painful sometimes, but not bad.     4. Patient reports that he's about 32oz a day of water and 1 Gatorade as well, so his water intake has been good.     SWCC, patient, and patient's wife discuss graduating from [facility] because patient has had good check ins in the last couple months and hasn't needed much. Patient and his wife agreed that they don't need care coordination anymore. CC reported he can re-enroll in the future if they feel the need for additional support again and also reported primary care coordination as  an option as well. [facility] will discharge patient on 4/1/23.     Intervention:  Motivational Interviewing: Expressed Empathy/Understanding, Supported Autonomy, Collaboration, Evocation, Permission to raise concern or advise, Open-ended questions and Reflections: simple and complex   Target Behavior(s): Explored thoughts about taking an anti-depressant, Explored and resolved challenges related to taking anti-depressants as prescribed, Explored thoughts and readiness to participate in individual therapy, Explored and resolved challenges to attending appointments as scheduled and Explored current social supports and reinforced opportunities to increase engagement    Assessment: (Progress on Goals / Homework):  Patient would benefit from continued coordination in reaching their goals set for the Behavioral Health Home ([facility]) program. [facility] reviewed Health Action Plan goals and will continue to monitor progress and work with patient and their care team.    Plan: (Homework, other):  Patient was encouraged to continue to seek condition-related information and education.      Patient will be discharged from [facility] on 4/1/23 due to no longer needing services at this time.     OLGA Montero  Behavioral Health Home ([facility])   [facility]  [phone]      Next 5 appointments (look out 90 days)    Jun 05, 2023  1:40 PM  (Arrive by 1:20 PM)  Provider Visit with Mihir Perez MD  [facility] [place] ([facility] [place] ) [address]  [place] [id number]  [phone]

## 2023-04-03 ENCOUNTER — VIRTUAL VISIT (OUTPATIENT)
Dept: PSYCHIATRY | Facility: CLINIC | Age: 63
End: 2023-04-03
Payer: COMMERCIAL

## 2023-04-03 DIAGNOSIS — F02.818 DEMENTIA ASSOCIATED WITH OTHER UNDERLYING DISEASE WITH BEHAVIORAL DISTURBANCE (H): ICD-10-CM

## 2023-04-03 DIAGNOSIS — F33.1 MAJOR DEPRESSIVE DISORDER, RECURRENT EPISODE, MODERATE (H): Primary | ICD-10-CM

## 2023-04-03 DIAGNOSIS — F41.1 GAD (GENERALIZED ANXIETY DISORDER): ICD-10-CM

## 2023-04-03 PROCEDURE — 99214 OFFICE O/P EST MOD 30 MIN: CPT | Mod: 24 | Performed by: NURSE PRACTITIONER

## 2023-04-03 NOTE — PROGRESS NOTES
"         Outpatient Psychiatric Progress Note    Name: Car Torres   : 1960                    Primary Care Provider: Mihir Perez MD   Therapist: No    PHQ-9 scores:      2022    10:45 AM 2022     1:01 PM 2/3/2023     4:48 PM   PHQ-9 SCORE   PHQ-9 Total Score MyChart 0 3 (Minimal depression) 2 (Minimal depression)   PHQ-9 Total Score 0 3 2       SHAHNAZ-7 scores:      2021     2:00 PM 2022     1:00 PM 2022     1:02 PM   SHAHNAZ-7 SCORE   Total Score   2 (minimal anxiety)   Total Score 0 0 2       Patient Identification:    Patient is a 62 year old year old,   White Not  or  male  who presents for return visit with me.  Patient is currently disabled. Patient attended the session with His wife , who they agreed to have interview with. Patient prefers to be called: \" Jerry\".    Current medications include: ASPIRIN 325 MG OR TBEC, ONE DAILY  atorvastatin (LIPITOR) 40 MG tablet, Take 1 tablet (40 mg) by mouth daily  busPIRone (BUSPAR) 15 MG tablet, Take 1 tablet (15 mg) by mouth 2 times daily  Cholecalciferol (VITAMIN D PO), Take 5,000 Units by mouth daily One tablet twice daily  donepezil (ARICEPT) 10 MG tablet, Take 1 tablet (10 mg) by mouth daily  Ferrous Gluconate 324 (37.5 Fe) MG TABS, 1 tablet daily  Ferrous Sulfate 324 (65 Fe) MG TBEC,   FLUoxetine (PROZAC) 40 MG capsule, Take 1 capsule (40 mg) by mouth daily  gabapentin (NEURONTIN) 300 MG capsule, Take 1 capsule (300 mg) by mouth At Bedtime  lisinopril-hydrochlorothiazide (ZESTORETIC) 20-12.5 MG tablet, Take 1 tablet by mouth daily  MULTI VITAMIN MENS PO, None Entered  OLANZapine (ZYPREXA) 2.5 MG tablet, Take 1 tablet (2.5 mg) by mouth At Bedtime  omega 3 1000 MG CAPS, Take by mouth daily  tadalafil (CIALIS) 20 MG tablet, TAKE 1/2 TO 1 TABLET BY MOUTH 30 MINUTES TO 1 HOUR BEFORE SEX  TURMERIC PO, Take by mouth daily  vitamin B complex with vitamin C (STRESS TAB) tablet, Take 1 tablet by mouth daily    No " "current facility-administered medications on file prior to visit.       The [place] Prescription Monitoring Program has been reviewed and there are no concerns about diversionary activity for controlled substances at this time.      I was able to review most recent Primary Care Provider, specialty provider, and therapy visit notes that I have access to.     Today, patient reports he is now wearing a shoe.  Denies pain.  For the past two weeks his wife reports that they saw dr chavarria - was alert and with it at that exam.  After that tired, lethargic, sleeping in chair during the day in a \"crash\" cycle.  Talked to  about driving - being sent to dementia speicialist.  Doctor wonders if he should be driving - el [place] exam pending - He does not think he has dementia -  neuropsych - evals - done  .  Non weight bearing  - since December - no kratom.    He is unable to fix watches - would like to be able to run errands.   Mild irritability    [facility] did not follow through with therapy outreach.  Giving up permit to carry.   Guns in the home - not loaded - ammunition kept separate.        has a past medical history of Dementia (H), Depressive disorder, Heart disease (5/8/2018), Hypercholesterolemia, Hypertension goal BP (blood pressure) < 140/90, Nonspecific abnormal electrocardiogram (ECG) (EKG) (08/23/2007), and Sleep apnea.    He has no past medical history of Arthritis, Cancer (H), Cerebral infarction (H), Congestive heart failure (H), Congestive heart failure, unspecified, COPD (chronic obstructive pulmonary disease) (H), Diabetes (H), History of blood transfusion, Thyroid disease, or Uncomplicated asthma.    Social history updates:    Continues to live with his wife.  He is receiving disability benefits to financially support them.    Substance use updates:    No alcohol use reported  Tobacco use: No    Vital Signs:   There were no vitals taken for this visit.    Labs:    Most recent laboratory results " reviewed and no new labs.     Mental Status Examination:  Appearance: awake, alert and casual dress  Attitude: cooperative  Eye Contact:  adequate  Gait and Station: No dizziness or falls  Psychomotor Behavior:  no evidence of tardive dyskinesia, dystonia, or tics  Oriented to:  time, person, and place  Attention Span and Concentration:  Normal  Speech:   vtspeech: clear, coherent and Speaks: English  Mood:  : anxious and depressed  Affect:  : mood congruent  Associations:  no loose associations  Thought Process:  goal oriented  Thought Content:  no evidence of suicidal ideation or homicidal ideation, no auditory hallucinations present and no visual hallucinations present  Recent and Remote Memory:  intact Not formally assessed. No amnesia.  Fund of Knowledge: appropriate  Insight:  good  Judgment: good  Impulse Control:  good    Suicide Risk Assessment:  Today Car Torres reports no thoughts to harm themself or others. In addition, there are notable risk factors for self-harm, including anxiety, withdrawing and mood change. However, risk is mitigated by commitment to family, history of seeking help when needed, future oriented, denies suicidal intent or plan and denies homicidal ideation, intent, or plan. Therefore, based on all available evidence including the factors cited above, Car Torres does not appear to be at imminent risk for self-harm, does not meet criteria for a 72-hr hold, and therefore remains appropriate for ongoing outpatient level of care.  A thorough assessment of risk factors related to suicide and self-harm have been reviewed and are noted above. The patient convincingly denies suicidality on several occasions. Local community safety resources printed and reviewed for patient to use if needed. There was no deceit detected, and the patient presented in a manner that was believable.     DSM5 Diagnosis:  296.32 (F33.1) Major Depressive Disorder, Recurrent Episode, Moderate _ and With  mixed features  300.02 (F41.1) Generalized Anxiety Disorder    Medical comorbidities include:   Patient Active Problem List    Diagnosis Date Noted     Hypertension goal BP (blood pressure) < 140/90 10/20/2010     Priority: High     Hyperlipidemia LDL goal <130 06/11/2010     Priority: High     Pain in joint involving ankle and foot, left 03/05/2023     Priority: Medium     Ankle weakness 03/05/2023     Priority: Medium     Lisfranc dislocation, left, initial encounter 12/28/2022     Priority: Medium     Lisfranc dislocation, left, subsequent encounter 12/28/2022     Priority: Medium     Dementia associated with other underlying disease with behavioral disturbance (H) 01/03/2022     Priority: Medium     Ulcer of left foot, unspecified ulcer stage (H) 01/03/2022     Priority: Medium     Unspecified psychosis not due to a substance or known physiological condition (H) 01/03/2022     Priority: Medium     Major depressive disorder, recurrent episode, mild with atypical features (H) 12/06/2021     Priority: Medium     TIA (obstructive sleep apnea) 07/19/2018     Priority: Medium     Home Sleep Apnea Testing - 7/18/18: 295 lbs 0 oz: AHI 45.3/hr. Supine AHI 61.9/hr. Oxygen [place] of 66%.  Baseline 93%.  Sp02 =< 88% for 47 minutes.       History of sinus bradycardia 06/07/2018     Priority: Medium     ED (erectile dysfunction) 06/07/2018     Priority: Medium     Coronary artery calcification 05/08/2018     Priority: Medium     Per CT CHEST WITHOUT CONTRAST April 23, 2018. CT calcium score planned.        Renal cyst 11/18/2015     Priority: Medium     Simple, non enhanced, 3.5 cm on right kidney, Bosniak 1 - x 2 stable findings       Diverticulosis of large intestine without hemorrhage 11/18/2015     Priority: Medium     Incidental finding on CT scan        Pulmonary nodule 11/18/2015     Priority: Medium     Incidental finding on CT scan, right posterior lung - considered benign / stable        CKD (chronic kidney disease)  stage 2, GFR 60-89 ml/min 10/21/2010     Priority: Medium     60 to 80 - have discussed Lisinopril - will consider        Sciatica 02/27/2006     Priority: Medium     Morbid obesity (H) 02/27/2006     Priority: Medium       Assessment:    Car Torres met with me along with his wife.  He tells me he does not feel like he has dementia but his other providers are questioning whether he is able to safely drive or not.  A driving assessment is pending.  There are days where he experiences some mood swings.  Overall, he is able to spend time with his family.  He denies any use of kratom.  He denies any suicide thinking.  His wife is supportive.  At this point his medications will continue as prescribed.  He is tolerating the lower dose of olanzapine well and no concerns for paranoid or delusional thinking were voiced.  He is recovering nicely from foot surgery and has had no recent falls.  We did talk about gun safety and his wife assured me that the weapons are Separate from the ammunition in their house.  They talked about not renewing Jerry's permit to carry at this time..    Medication side effects and alternatives were reviewed. Health promotion activities recommended and reviewed today. All questions addressed. Education and counseling completed regarding risks and benefits of medications and psychotherapy options.    Treatment Plan:        1.  Continue fluoxetine 40 mg daily    2.  Continue buspirone 15 mg twice daily    3.  Continue olanzapine 2.5 mg at bedtime        Continue all other medications as reviewed per electronic medical record today.     Safety plan reviewed. To the Emergency Department as needed or call after hours crisis line at [phone] or [phone]. [place] Crisis Text Line. Text [place] to 196617 or Suicide LifeLine Chat: suicidepreventionlifeline.org/chat/    To schedule individual or family therapy, call [facility] at [phone]    Schedule an appointment with me in  June or sooner as needed. Call [facility] at [phone] to schedule.    Follow up with primary care provider as planned or for acute medical concerns.    Call the psychiatric nurse line with medication questions or concerns at [phone]    MyChart may be used to communicate with your provider, but this is not intended to be used for emergencies.    Crisis Resources:    National Suicide Prevention Lifeline: 639.951.1236 (TTY: 875.750.1821). Call anytime for help.  (www.suicidepreventionlifeline.org)  National [place] on Mental Illness (www.ana maría.org): 819.832.9790 or 197-342-6551.   Mental Health Association (www.mentalhealth.org): 736.732.4471 or 464-274-1024.  [place] Crisis Text Line: Text [place] to 664882  Suicide LifeLine Chat: suicideOneRoofline.org/chat    Administrative Billing:   Time spent with patient includes counseling and coordination of care regarding above diagnoses and treatment plan.    Patient Status:  This is a continuous care patient and medications will be prescribed by the psychiatric provider until further indicated.    Signed:   BELEN Singh-BC   Psychiatry       Answers for HPI/ROS submitted by the patient on 4/3/2023  If you checked off any problems, how difficult have these problems made it for you to do your work, take care of things at home, or get along with other people?: Not difficult at all  PHQ9 TOTAL SCORE: 0

## 2023-04-03 NOTE — NURSING NOTE
Is the patient currently in the state of [place]? YES    Visit mode:TELEPHONE    If the visit is dropped, the patient can be reconnected by: VIDEO VISIT: Text to cell phone: [phone]    Will anyone else be joining the visit? NO      How would you like to obtain your AVS? MyChart    Are changes needed to the allergy or medication list? NO    Reason for visit: Virtual    Care team has reviewed attendance agreement with patient. Patient advised that two failed appointments within 6 months may lead to termination of current episode of care.

## 2023-04-03 NOTE — PROGRESS NOTES
Virtual Visit Details    Type of service:  Video Visit   Video Start Time: 2:15 PM  Video End Time:2:45 PM    Originating Location (pt. Location): Home    Distant Location (provider location):  On-site  Platform used for Video Visit: [employer]          Answers for HPI/ROS submitted by the patient on 4/3/2023  If you checked off any problems, how difficult have these problems made it for you to do your work, take care of things at home, or get along with other people?: Not difficult at all  PHQ9 TOTAL SCORE: 0

## 2023-04-03 NOTE — PATIENT INSTRUCTIONS
1.  Continue fluoxetine 40 mg daily  2.  Continue buspirone 15 mg twice daily  3.  Continue olanzapine 2.5 mg at bedtime    Continue all other medications as reviewed per electronic medical record today.   Safety plan reviewed. To the Emergency Department as needed or call after hours crisis line at [phone] or [phone]. [place] Crisis Text Line. Text [place] to 500627 or Suicide LifeLine Chat: suicideVariation Biotechnologies.org/chat/  To schedule individual or family therapy, call [facility] at [phone]  Schedule an appointment with me in June or sooner as needed. Call [facility] at [phone] to schedule.  Follow up with primary care provider as planned or for acute medical concerns.  Call the psychiatric nurse line with medication questions or concerns at [phone]  MyChart may be used to communicate with your provider, but this is not intended to be used for emergencies.    Crisis Resources:    National Suicide Prevention Lifeline: 314.536.7484 (TTY: 909.935.3633). Call anytime for help.  (www.suicidepreventionlifeline.org)  National [place] on Mental Illness (www.ana maría.org): 340.195.4713 or 839-801-0002.   Mental Health Association (www.mentalhealth.org): 111.244.6849 or 741-591-8046.  [place] Crisis Text Line: Text [place] to 895016  Suicide LifeLine Chat: suicideVariation Biotechnologies.org/chat    Patient Education   The Panel Psychiatry Program  What to Expect  Here's what to expect in the Panel Psychiatry Program.   About the program  You'll be meeting with a psychiatric doctor to check your mental health. A psychiatric doctor helps you deal with troubling thoughts and feelings by giving you medicine. They'll make sure you know the plan for your care. You may see them for a long time. When you're feeling better, they may refer you back to seeing your family doctor.   If you have any questions, we'll be glad to talk to you.  About visits  Be open  At your visits, please  "talk openly about your problems. It may feel hard, but it's the best way for us to help you.  Cancelling visits  If you can't come to your visit, please call us right away at [phone]. If you don't cancel at least 24 hours (1 full day) before your visit, that's \"late cancellation.\"  Not showing up for your visits  Being very late is the same as not showing up. You'll be a \"no show\" if:  You're more than 15 minutes late for a 30-minute (half hour) visit.  You're more than 30 minutes late for a 60-minute (full hour) visit.  If you cancel late or don't show up 2 times within 6 months, we may end your care.  Getting help between visits  If you need help between visits, you can call us Monday to Friday from 8 a.m. to 4:30 p.m. at [phone].  Emergency care  Call 911 or go to the nearest emergency department if your life or someone else's life is in danger.  Call 988 anytime to reach the national Suicide and Crisis hotline.  Medicine refills  To refill your medicine, call your pharmacy. You can also call [facility] at [phone], Monday to Friday, 8 a.m. to 4:30 p.m. It can take 1 to 3 business days to get a refill.   Forms, letters, and tests  You may have papers to fill out, like FMLA, short-term disability, and workability. We can help you with these forms at your visits, but you must have an appointment. You may need more than 1 visit for this, to be in an intensive therapy program, or both.  Before we can give you medicine for ADHD, we may refer you to get tested for it or confirm it another way.  We may not be able to give you an emotional support animal letter.  We don't do mental health checks ordered by the court.   We don't do mental health testing, but we can refer you to get tested.   Thank you for choosing us for your care.  For informational purposes only. Not to replace the advice of your health care provider. Copyright   2022 [facility]. All rights " reserved. [employer] [id number] - 12/22.

## 2023-04-05 ENCOUNTER — THERAPY VISIT (OUTPATIENT)
Dept: PHYSICAL THERAPY | Facility: CLINIC | Age: 63
End: 2023-04-05
Payer: COMMERCIAL

## 2023-04-05 DIAGNOSIS — M25.572 PAIN IN JOINT INVOLVING ANKLE AND FOOT, LEFT: Primary | ICD-10-CM

## 2023-04-05 DIAGNOSIS — R29.898 ANKLE WEAKNESS: ICD-10-CM

## 2023-04-05 PROCEDURE — 97110 THERAPEUTIC EXERCISES: CPT | Mod: GP

## 2023-04-05 PROCEDURE — 97530 THERAPEUTIC ACTIVITIES: CPT | Mod: GP

## 2023-04-05 PROCEDURE — 97112 NEUROMUSCULAR REEDUCATION: CPT | Mod: GP

## 2023-04-06 ENCOUNTER — TELEPHONE (OUTPATIENT)
Dept: FAMILY MEDICINE | Facility: CLINIC | Age: 63
End: 2023-04-06
Payer: COMMERCIAL

## 2023-04-06 NOTE — TELEPHONE ENCOUNTER
Patient Quality Outreach    Patient is due for the following:   Depression  -  DAP      Topic Date Due     Pneumococcal Vaccine (1 - PCV) Never done     COVID-19 Vaccine (4 - Booster for Pfizer series) 12/23/2021       Next Steps:   Schedule a office visit for an ancillary visit for immunizations    Type of outreach:    Sent 8020select message.      Questions for provider review:    None     Clare Champagne CMA  Chart routed to Care Team.

## 2023-04-12 ENCOUNTER — THERAPY VISIT (OUTPATIENT)
Dept: PHYSICAL THERAPY | Facility: CLINIC | Age: 63
End: 2023-04-12
Payer: COMMERCIAL

## 2023-04-12 DIAGNOSIS — M25.572 PAIN IN JOINT INVOLVING ANKLE AND FOOT, LEFT: Primary | ICD-10-CM

## 2023-04-12 DIAGNOSIS — R29.898 ANKLE WEAKNESS: ICD-10-CM

## 2023-04-12 PROCEDURE — 97530 THERAPEUTIC ACTIVITIES: CPT | Mod: GP

## 2023-04-12 PROCEDURE — 97110 THERAPEUTIC EXERCISES: CPT | Mod: GP

## 2023-04-12 PROCEDURE — 97112 NEUROMUSCULAR REEDUCATION: CPT | Mod: GP

## 2023-04-19 ENCOUNTER — THERAPY VISIT (OUTPATIENT)
Dept: PHYSICAL THERAPY | Facility: CLINIC | Age: 63
End: 2023-04-19
Payer: COMMERCIAL

## 2023-04-19 DIAGNOSIS — R29.898 ANKLE WEAKNESS: ICD-10-CM

## 2023-04-19 DIAGNOSIS — M25.572 PAIN IN JOINT INVOLVING ANKLE AND FOOT, LEFT: Primary | ICD-10-CM

## 2023-04-19 PROCEDURE — 97112 NEUROMUSCULAR REEDUCATION: CPT | Mod: GP

## 2023-04-19 PROCEDURE — 97530 THERAPEUTIC ACTIVITIES: CPT | Mod: GP

## 2023-04-19 PROCEDURE — 97110 THERAPEUTIC EXERCISES: CPT | Mod: GP

## 2023-04-21 NOTE — TELEPHONE ENCOUNTER
Patient Quality Outreach    Patient is due for the following:   Hypertension -  Hypertension follow-up visit    Next Steps:   Schedule a office visit for HTN    Type of outreach:    Pt is schedule for 6/5/23      Questions for provider review:    None     Clare Champagne CMA  Chart routed to Care Team.

## 2023-05-11 ENCOUNTER — THERAPY VISIT (OUTPATIENT)
Dept: PHYSICAL THERAPY | Facility: CLINIC | Age: 63
End: 2023-05-11
Payer: COMMERCIAL

## 2023-05-11 DIAGNOSIS — M25.572 PAIN IN JOINT INVOLVING ANKLE AND FOOT, LEFT: Primary | ICD-10-CM

## 2023-05-11 DIAGNOSIS — R29.898 ANKLE WEAKNESS: ICD-10-CM

## 2023-05-11 PROCEDURE — 97112 NEUROMUSCULAR REEDUCATION: CPT | Mod: GP

## 2023-05-11 PROCEDURE — 97110 THERAPEUTIC EXERCISES: CPT | Mod: GP

## 2023-05-11 NOTE — PROGRESS NOTES
"Subjective:  HPI  Physical Exam                    Objective:  System    Physical Exam    General     ROS    Assessment/Plan:    PROGRESS  REPORT    Progress reporting period is from 3/5/23 to 5/11/23.       SUBJECTIVE  Subjective changes noted by patient.  Subjective: Pt reports 1-2/10 pain on lateral L foot throughout past two weeks - thinks its due to increased activity but planning to will call surgeon to follow up and maybe get x-ray checked as he believes they may have forgotten to schedule f/u.     Current Pain level: 1/10.     Previous pain level was  3/10.   Changes in function:  Yes (See Goal flowsheet attached for changes in current functional level)  Adverse reaction to treatment or activity: None    OBJECTIVE  Changes noted in objective findings:  Yes  Objective: SL reverse stepdown 6\" box x15 reps each side with railing, DL calf raise standing x15 reps painfree.  Incision on lateral L foot of pea-sized diameter with yellow tint but appears clean and improving.  No pain with palpation to L metatarsals.     ASSESSMENT/PLAN  Updated problem list and treatment plan: Diagnosis 1:  L Lisfranc ORIF  Pain -  hot/cold therapy, manual therapy, self management, education and home program  Decreased ROM/flexibility - manual therapy, therapeutic exercise and home program  Decreased joint mobility - manual therapy, therapeutic exercise and home program  Decreased strength - therapeutic exercise, therapeutic activities and home program  Impaired balance - neuro re-education, therapeutic activities and home program  Decreased proprioception - neuro re-education, therapeutic activities and home program  Impaired muscle performance - neuro re-education and home program  Decreased function - therapeutic activities and home program    STG/LTGs have been met or progress has been made towards goals:  Yes (See Goal flow sheet completed today.)  Assessment of Progress: The patient's condition is improving.  The patient's " condition has potential to improve.  Patient is meeting short term goals and is progressing towards long term goals.  Self Management Plans:  Patient has been instructed in a home treatment program.  Patient  has been instructed in self management of symptoms.  I have re-evaluated this patient and find that the nature, scope, duration and intensity of the therapy is appropriate for the medical condition of the patient.  Car continues to require the following intervention to meet STG and LTG's:  PT    Recommendations:  This patient would benefit from continued therapy.     Frequency:  1 X week, once daily  Duration:  for 4 weeks, tapering to 2x/month for 2 months    Please refer to the daily flowsheet for treatment today, total treatment time and time spent performing 1:1 timed codes.

## 2023-05-15 ENCOUNTER — OFFICE VISIT (OUTPATIENT)
Dept: PODIATRY | Facility: CLINIC | Age: 63
End: 2023-05-15
Payer: COMMERCIAL

## 2023-05-15 ENCOUNTER — ANCILLARY PROCEDURE (OUTPATIENT)
Dept: GENERAL RADIOLOGY | Facility: CLINIC | Age: 63
End: 2023-05-15
Attending: PODIATRIST
Payer: COMMERCIAL

## 2023-05-15 VITALS
DIASTOLIC BLOOD PRESSURE: 74 MMHG | BODY MASS INDEX: 50.35 KG/M2 | HEART RATE: 57 BPM | SYSTOLIC BLOOD PRESSURE: 178 MMHG | WEIGHT: 315 LBS

## 2023-05-15 DIAGNOSIS — Z98.890 POSTOPERATIVE STATE: ICD-10-CM

## 2023-05-15 DIAGNOSIS — E66.01 MORBID OBESITY (H): ICD-10-CM

## 2023-05-15 DIAGNOSIS — Z98.890 POSTOPERATIVE STATE: Primary | ICD-10-CM

## 2023-05-15 DIAGNOSIS — S93.325A LISFRANC DISLOCATION, LEFT, INITIAL ENCOUNTER: ICD-10-CM

## 2023-05-15 PROCEDURE — 99213 OFFICE O/P EST LOW 20 MIN: CPT | Performed by: PODIATRIST

## 2023-05-15 PROCEDURE — 73630 X-RAY EXAM OF FOOT: CPT | Mod: TC | Performed by: RADIOLOGY

## 2023-05-15 NOTE — LETTER
5/15/2023         RE: Car Torres  [address]  [place] [address]        Dear Colleague,    Thank you for referring your patient, Car Torres, to the [facility]. Please see a copy of my visit note below.    Assessment:      ICD-10-CM    1. Postoperative state  Z98.890 XR Foot Left G/E 3 Views     [facility] DME Orthotic; Foot Orthotics      2. Lisfranc dislocation, left, initial encounter  S93.325A Orthotics and Prosthetics DME Orthotic; Foot Orthotics      3. Morbid obesity (H)  E66.01 Orthotics and Prosthetics DME Orthotic; Foot Orthotics          4 months s/p ORIF Lisfranc dislocation    Plan:  Orders Placed This Encounter   Procedures     XR Foot Left G/E 3 Views     Orthotics and Prosthetics DME Orthotic; Foot Orthotics       Discussed the post-operative recovery plan with the patient.  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         Bailey Ruiz DPM                Chief Complaint:     Patient presents with:  Left Foot - RECHECK     Post-op Exam    HPI:  Car Torres is a 62 year old year old male who presents for post-op exam.    Surgery Date- 1/5/2023  Post-operative month #4  Procedure- ORIF Lisfranc dislocation      Has done some PT but has been carefull because outside foot joints feel like they might shift per pt      Past Medical & Surgical History:  Past Medical History:   Diagnosis Date     Dementia (H)      Depressive disorder      Heart disease 5/8/2018     Hypercholesterolemia      Hypertension goal BP (blood pressure) < 140/90      Nonspecific abnormal electrocardiogram (ECG) (EKG) 08/23/2007    Stress testing normal.      Sleep apnea     uses a c-pap, severe      Past Surgical  History:   Procedure Laterality Date     COLONOSCOPY N/A 12/22/2020    Procedure: COLONOSCOPY, WITH POLYPECTOMY, CLIP;  Surgeon: Mihir Lang MD;  Location: [facility] OR     COLONOSCOPY N/A 1/10/2022    Procedure: COLONOSCOPY, WITH POLYPECTOMY AND BIOPSY;  Surgeon: Mihir Lang MD;  Location:  GI     ESOPHAGOSCOPY, GASTROSCOPY, DUODENOSCOPY (EGD), COMBINED N/A 1/28/2021    Procedure: ESOPHAGOGASTRODUODENOSCOPY, WITH BIOPSY;  Surgeon: Mihir Lang MD;  Location: [facility] OR     OPEN REDUCTION INTERNAL FIXATION FOOT Left 1/5/2023    Procedure: LISFRANC OPEN REDUCTION INTERNAL FIXATION, LEFT FOOT;  Surgeon: Bailey Ruiz DPM;  Location:  OR     SURGICAL HISTORY OF -       surgery on left index finger      Family History   Problem Relation Age of Onset     Cancer Mother         uterine     Other Cancer Mother         endometrial     Cerebrovascular Disease Mother      Substance Abuse Mother         Alcohol     C.A.D. Father      Hypertension Father      Hyperlipidemia Father      Breast Cancer Maternal Grandmother      Other Cancer Maternal Grandmother         Lung/brain     Substance Abuse Paternal Grandfather      Hypertension Brother      Substance Abuse Brother         Alcohol     Hyperlipidemia Brother      Breast Cancer Other      Breast Cancer Cousin      Other Cancer Other      Cerebrovascular Disease Other         Maternal Aunt     Glaucoma No family hx of      Macular Degeneration No family hx of         Social History:  ?  History   Smoking Status     Former     Packs/day: 1.00     Years: 25.00     Types: Cigarettes, Cigars     Start date: 5/1/1973     Quit date: 2/1/1998   Smokeless Tobacco     Never     History   Drug Use Unknown     Comment: Kratum     Social History    Substance and Sexual Activity      Alcohol use: Yes        Alcohol/week: 10.0 standard drinks of alcohol        Comment: less than 6 per week      Allergies:  ?   No Known Allergies     Medications:    Current Outpatient Medications    Medication     ASPIRIN 325 MG OR TBEC     atorvastatin (LIPITOR) 40 MG tablet     busPIRone (BUSPAR) 15 MG tablet     Cholecalciferol (VITAMIN D PO)     donepezil (ARICEPT) 10 MG tablet     Ferrous Gluconate 324 (37.5 Fe) MG TABS     Ferrous Sulfate 324 (65 Fe) MG TBEC     FLUoxetine (PROZAC) 40 MG capsule     gabapentin (NEURONTIN) 300 MG capsule     lisinopril-hydrochlorothiazide (ZESTORETIC) 20-12.5 MG tablet     MULTI VITAMIN MENS PO     OLANZapine (ZYPREXA) 2.5 MG tablet     omega 3 1000 MG CAPS     tadalafil (CIALIS) 20 MG tablet     TURMERIC PO     vitamin B complex with vitamin C (STRESS TAB) tablet     No current facility-administered medications for this visit.       Physical Exam:  ?  Vitals:  BP (!) 178/74   Pulse 57   Wt (!) 163.7 kg (361 lb)   BMI 50.35 kg/m     General:  WD/WN, in NAD.  A&O x3.    Dermatologic:    Incision  is healed with exception of very prxoimal aspect lateral incision.  No cellulitis.  Beatriz-incisional erythema absent, ecchymosis absent.  Vascular:  Digital capillary refill time normal left.  Skin temperature is normal to operative site.  Focal edema- moderate to operative site.   Neurologic:    Gross sensation normal to operative limb.  Gait and balance abnormal, NWB  to operative limb.  Musculoskeletal:  Mild pain to palpation of foot left.  Ankle, MTPJ ROM  intact to operative limb.  Muscle strength intact to contralateral limb.    Imaging:   x-ray independently reviewed and interpreted by myself today.  Weight-bearing views left foot dated .05/15/23, reveal full healing, no dispalcement, hardware intact without migration or failure.      x-ray independently reviewed and interpreted by myself today.  Weight-bearing views left foot dated 03/01/23, reveal interval pin removal, maintained reduction, no hardware failure or migration.      x-ray independently reviewed and interpreted by myself today.  Non-Weight-bearing views left foot dated 01/11/23, reveal interval Lisfranc  ORIF, excellen reduction. NO hardware failure or migraiton.              Again, thank you for allowing me to participate in the care of your patient.        Sincerely,        Bailey Ruiz DPM

## 2023-05-15 NOTE — PATIENT INSTRUCTIONS
PATIENT INSTRUCTIONS - Podiatry / Foot & Ankle Surgery    Post-operative Instructions - 3 Months    -Resume all physical activity, including impact.  -Finish physical therpay, have them help you return to regular activity.  -Continue compression to the affected foot/ankle.  -Continue use of orthotic to support affected foot/ankle for several months for more significant physical / athletic activity.          Compression - thicker crew sock or sport compression sock with arch compression          [facility]  [facility]  [address]  [place] [address]  Phone: [phone]  Fax: [phone] [facility]  [address]  [place] [address]  Phone: [phone]   Fax: [phone]   [facility]  [address]  [place] [address]  Phone: [phone]  Fax: [phone] [facility]  [address]  [place] [address]  Phone: [phone]   Fax: [phone]   [facility]   [address]  [place] [address]  Phone: [phone]  Fax: [phone] * Please call any location listed to make an appointment for a casting/fitting. Your referral was sent to their central office and they will all have the order on file.

## 2023-05-15 NOTE — PROGRESS NOTES
Assessment:      ICD-10-CM    1. Postoperative state  Z98.890 XR Foot Left G/E 3 Views     Orthotics and Prosthetics DME Orthotic; Foot Orthotics      2. Lisfranc dislocation, left, initial encounter  S93.325A Orthotics and Prosthetics DME Orthotic; Foot Orthotics      3. Morbid obesity (H)  E66.01 Orthotics and Prosthetics DME Orthotic; Foot Orthotics          4 months s/p ORIF Lisfranc dislocation    Plan:  Orders Placed This Encounter   Procedures     XR Foot Left G/E 3 Views     Orthotics and Prosthetics DME Orthotic; Foot Orthotics       Discussed the post-operative recovery plan with the patient.  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         Bailey Ruiz DPM                Chief Complaint:     Patient presents with:  Left Foot - RECHECK     Post-op Exam    HPI:  Car Torres is a 62 year old year old male who presents for post-op exam.    Surgery Date- 1/5/2023  Post-operative month #4  Procedure- ORIF Lisfranc dislocation      Has done some PT but has been carefull because outside foot joints feel like they might shift per pt      Past Medical & Surgical History:  Past Medical History:   Diagnosis Date     Dementia (H)      Depressive disorder      Heart disease 5/8/2018     Hypercholesterolemia      Hypertension goal BP (blood pressure) < 140/90      Nonspecific abnormal electrocardiogram (ECG) (EKG) 08/23/2007    Stress testing normal.      Sleep apnea     uses a c-pap, severe      Past Surgical History:   Procedure Laterality Date     COLONOSCOPY N/A 12/22/2020    Procedure: COLONOSCOPY, WITH POLYPECTOMY, CLIP;  Surgeon: Mihir Lang MD;  Location: [facility] OR     COLONOSCOPY N/A 1/10/2022    Procedure: COLONOSCOPY, WITH POLYPECTOMY AND BIOPSY;   Surgeon: Mihir Lang MD;  Location:  GI     ESOPHAGOSCOPY, GASTROSCOPY, DUODENOSCOPY (EGD), COMBINED N/A 1/28/2021    Procedure: ESOPHAGOGASTRODUODENOSCOPY, WITH BIOPSY;  Surgeon: Mihir Lang MD;  Location: [facility] OR     OPEN REDUCTION INTERNAL FIXATION FOOT Left 1/5/2023    Procedure: LISFRANC OPEN REDUCTION INTERNAL FIXATION, LEFT FOOT;  Surgeon: Bailey Ruiz DPM;  Location:  OR     SURGICAL HISTORY OF -       surgery on left index finger      Family History   Problem Relation Age of Onset     Cancer Mother         uterine     Other Cancer Mother         endometrial     Cerebrovascular Disease Mother      Substance Abuse Mother         Alcohol     C.A.D. Father      Hypertension Father      Hyperlipidemia Father      Breast Cancer Maternal Grandmother      Other Cancer Maternal Grandmother         Lung/brain     Substance Abuse Paternal Grandfather      Hypertension Brother      Substance Abuse Brother         Alcohol     Hyperlipidemia Brother      Breast Cancer Other      Breast Cancer Cousin      Other Cancer Other      Cerebrovascular Disease Other         Maternal Aunt     Glaucoma No family hx of      Macular Degeneration No family hx of         Social History:  ?  History   Smoking Status     Former     Packs/day: 1.00     Years: 25.00     Types: Cigarettes, Cigars     Start date: 5/1/1973     Quit date: 2/1/1998   Smokeless Tobacco     Never     History   Drug Use Unknown     Comment: Kratum     Social History    Substance and Sexual Activity      Alcohol use: Yes        Alcohol/week: 10.0 standard drinks of alcohol        Comment: less than 6 per week      Allergies:  ?   No Known Allergies     Medications:    Current Outpatient Medications   Medication     ASPIRIN 325 MG OR TBEC     atorvastatin (LIPITOR) 40 MG tablet     busPIRone (BUSPAR) 15 MG tablet     Cholecalciferol (VITAMIN D PO)     donepezil (ARICEPT) 10 MG tablet     Ferrous Gluconate 324 (37.5 Fe) MG TABS     Ferrous Sulfate  324 (65 Fe) MG TBEC     FLUoxetine (PROZAC) 40 MG capsule     gabapentin (NEURONTIN) 300 MG capsule     lisinopril-hydrochlorothiazide (ZESTORETIC) 20-12.5 MG tablet     MULTI VITAMIN MENS PO     OLANZapine (ZYPREXA) 2.5 MG tablet     omega 3 1000 MG CAPS     tadalafil (CIALIS) 20 MG tablet     TURMERIC PO     vitamin B complex with vitamin C (STRESS TAB) tablet     No current facility-administered medications for this visit.       Physical Exam:  ?  Vitals:  BP (!) 178/74   Pulse 57   Wt (!) 163.7 kg (361 lb)   BMI 50.35 kg/m     General:  WD/WN, in NAD.  A&O x3.    Dermatologic:    Incision  is healed with exception of very prxoimal aspect lateral incision.  No cellulitis.  Beatriz-incisional erythema absent, ecchymosis absent.  Vascular:  Digital capillary refill time normal left.  Skin temperature is normal to operative site.  Focal edema- moderate to operative site.   Neurologic:    Gross sensation normal to operative limb.  Gait and balance abnormal, NWB  to operative limb.  Musculoskeletal:  Mild pain to palpation of foot left.  Ankle, MTPJ ROM  intact to operative limb.  Muscle strength intact to contralateral limb.    Imaging:   x-ray independently reviewed and interpreted by myself today.  Weight-bearing views left foot dated .05/15/23, reveal full healing, no dispalcement, hardware intact without migration or failure.      x-ray independently reviewed and interpreted by myself today.  Weight-bearing views left foot dated 03/01/23, reveal interval pin removal, maintained reduction, no hardware failure or migration.      x-ray independently reviewed and interpreted by myself today.  Non-Weight-bearing views left foot dated 01/11/23, reveal interval Lisfranc ORIF, excellen reduction. NO hardware failure or migraiton.

## 2023-05-16 DIAGNOSIS — G62.89 OTHER POLYNEUROPATHY: ICD-10-CM

## 2023-05-17 ENCOUNTER — THERAPY VISIT (OUTPATIENT)
Dept: PHYSICAL THERAPY | Facility: CLINIC | Age: 63
End: 2023-05-17
Payer: COMMERCIAL

## 2023-05-17 DIAGNOSIS — M25.572 PAIN IN JOINT INVOLVING ANKLE AND FOOT, LEFT: Primary | ICD-10-CM

## 2023-05-17 DIAGNOSIS — R29.898 ANKLE WEAKNESS: ICD-10-CM

## 2023-05-17 PROCEDURE — 97110 THERAPEUTIC EXERCISES: CPT | Mod: GP

## 2023-05-17 PROCEDURE — 97112 NEUROMUSCULAR REEDUCATION: CPT | Mod: GP

## 2023-05-17 RX ORDER — GABAPENTIN 300 MG/1
CAPSULE ORAL
Qty: 30 CAPSULE | Refills: 5 | Status: SHIPPED | OUTPATIENT
Start: 2023-05-17 | End: 2023-08-04

## 2023-05-24 ENCOUNTER — THERAPY VISIT (OUTPATIENT)
Dept: PHYSICAL THERAPY | Facility: CLINIC | Age: 63
End: 2023-05-24
Payer: COMMERCIAL

## 2023-05-24 DIAGNOSIS — R29.898 ANKLE WEAKNESS: ICD-10-CM

## 2023-05-24 DIAGNOSIS — M25.572 PAIN IN JOINT INVOLVING ANKLE AND FOOT, LEFT: Primary | ICD-10-CM

## 2023-05-24 PROCEDURE — 97112 NEUROMUSCULAR REEDUCATION: CPT | Mod: GP

## 2023-05-24 PROCEDURE — 97110 THERAPEUTIC EXERCISES: CPT | Mod: GP

## 2023-05-24 NOTE — PROGRESS NOTES
[facility]    OUTPATIENT Physical Therapy ORTHOPEDIC EVALUATION  PLAN OF TREATMENT FOR OUTPATIENT REHABILITATION  (COMPLETE FOR INITIAL CLAIMS ONLY)  Patient's Last Name, First Name, M.I.  YOB: 1960  Car Torres    Provider s Name:  [facility]   Medical Record No.  [id number]   Start of Care Date:      Onset Date:       Treatment Diagnosis:    Medical Diagnosis:     Pain in joint involving ankle and foot, left  Ankle weakness       Goals:     05/24/23 0500   Appointment Info   Signing clinician's name / credentials Fabian Goodrich DPT   Total/Authorized Visits 16 (E&T)   Visits Used 9   Medical Diagnosis L Lisfranc ORIF   PT Tx Diagnosis L ankle strength and mobility deficits   Quick Adds Certification   Progress Note/Certification   Start of Care Date 03/03/23   Onset of illness/injury or Date of Surgery 12/17/22   Therapy Frequency 1x/wk for 10 weeks, 2x/month for 3 months   Predicted Duration 1x/wk for 10 weeks, 2x/month for 3 months   Certification date from 05/24/23   Certification date to 08/22/23   Progress Note Due Date 07/09/23   Progress Note Completed Date 05/11/23   PT Goal 1   Goal Description 2,000 feet at 2 mph pace continuosly   Rationale to maximize safety and independence with performance of ADLs and functional tasks;to maximize safety and independence within the home;to maximize safety and independence within the community;to maximize safety and independence with transportation;to maximize safety and independence with self cares   Goal Progress 1.5 mph for 7 minutes (~1000 ft)   Target Date 07/23/23   Subjective Report   Subjective Report Jerry reports pain is improving in foot now, improved walking to 7' on treadmill as well.   Objective Measures   Objective Measures Objective Measure 1   Objective Measure 1   Objective Measure unable to  "perform SL calf raise on ground on L   Treatment Interventions (PT)   Interventions Therapeutic Procedure/Exercise;Neuromuscular Re-education   Therapeutic Procedure/Exercise   Therapeutic Procedures: strength, endurance, ROM, flexibillity minutes (54261) 25   Ther Proc 1 treadmill 1.5 mph x5'   PTRx Ther Proc 1 step up 8\" x12 each with railing support   PTRx Ther Proc 3 6\" step up x20 reps each side with railing   PTRx Ther Proc 4 calf raise DL on step x10 reps, 2 up 1 down on L eccentrics x8 reps   Ther Proc 1 - Details Toe scrunch 2x60\" on slideboard with towel   Neuromuscular Re-education   Neuromuscular re-ed of mvmt, balance, coord, kinesthetic sense, posture, proprioception minutes (15365) 15   Neuro Re-ed 1 anterior reach x12 each side with counter support.   PTRx Neuro Re-ed 1 Heel-toe walking on line (wide base = 6 inch foot separation) x30', marching forward x30' with heel to toe rock emphasis and slow control   PTRx Neuro Re-ed 2 Balance on airex pads (one foot on each pad normal Brock) 2x60\"   PTRx Neuro Re-ed 2 - Details Anterior stepdown 3\" box with UE support x12 each side with heel tap   Plan   Home program increase to 10' treadmill at 1.5 mph   Updates to plan of care compound exercise and conditioning, calf strength.  Removed ankle TB exercises, increased neuromuscular demands and emphasis on stair descent control   Total Session Time   Timed Code Treatment Minutes 40   Total Treatment Time (sum of timed and untimed services) 40         Therapy Frequency:  2x/month for 3 months  Predicted Duration of Therapy Intervention:   3 months    Fabian Goodrich, PT                 I CERTIFY THE NEED FOR THESE SERVICES FURNISHED UNDER        THIS PLAN OF TREATMENT AND WHILE UNDER MY CARE     (Physician attestation of this document indicates review and certification of the therapy plan).                     Certification Date From:    5/24/23  Certification Date To:   8/22/23    Referring Provider:  Bailey HOUGH" Sara    Initial Assessment        See Epic Evaluation

## 2023-06-05 ENCOUNTER — OFFICE VISIT (OUTPATIENT)
Dept: FAMILY MEDICINE | Facility: CLINIC | Age: 63
End: 2023-06-05
Payer: COMMERCIAL

## 2023-06-05 VITALS
BODY MASS INDEX: 50.35 KG/M2 | DIASTOLIC BLOOD PRESSURE: 72 MMHG | TEMPERATURE: 97.6 F | HEART RATE: 53 BPM | SYSTOLIC BLOOD PRESSURE: 138 MMHG | WEIGHT: 315 LBS | OXYGEN SATURATION: 97 %

## 2023-06-05 DIAGNOSIS — F33.1 MAJOR DEPRESSIVE DISORDER, RECURRENT EPISODE, MODERATE (H): ICD-10-CM

## 2023-06-05 DIAGNOSIS — J32.0 CHRONIC MAXILLARY SINUSITIS: ICD-10-CM

## 2023-06-05 DIAGNOSIS — F41.1 GAD (GENERALIZED ANXIETY DISORDER): ICD-10-CM

## 2023-06-05 DIAGNOSIS — J31.0 RHINITIS MEDICAMENTOSA: Primary | ICD-10-CM

## 2023-06-05 DIAGNOSIS — F29 UNSPECIFIED PSYCHOSIS NOT DUE TO A SUBSTANCE OR KNOWN PHYSIOLOGICAL CONDITION (H): ICD-10-CM

## 2023-06-05 DIAGNOSIS — E78.5 HYPERLIPIDEMIA LDL GOAL <100: ICD-10-CM

## 2023-06-05 DIAGNOSIS — I10 ESSENTIAL HYPERTENSION WITH GOAL BLOOD PRESSURE LESS THAN 140/90: ICD-10-CM

## 2023-06-05 DIAGNOSIS — F02.818 DEMENTIA ASSOCIATED WITH OTHER UNDERLYING DISEASE WITH BEHAVIORAL DISTURBANCE (H): ICD-10-CM

## 2023-06-05 DIAGNOSIS — T48.5X5A RHINITIS MEDICAMENTOSA: Primary | ICD-10-CM

## 2023-06-05 PROCEDURE — 99214 OFFICE O/P EST MOD 30 MIN: CPT | Performed by: INTERNAL MEDICINE

## 2023-06-05 RX ORDER — BUSPIRONE HYDROCHLORIDE 15 MG/1
15 TABLET ORAL 2 TIMES DAILY
Qty: 180 TABLET | Refills: 4 | Status: SHIPPED | OUTPATIENT
Start: 2023-06-05 | End: 2023-08-04

## 2023-06-05 RX ORDER — SULFAMETHOXAZOLE/TRIMETHOPRIM 800-160 MG
1 TABLET ORAL 2 TIMES DAILY
Qty: 20 TABLET | Refills: 0 | Status: SHIPPED | OUTPATIENT
Start: 2023-06-05 | End: 2023-06-15

## 2023-06-05 RX ORDER — LISINOPRIL AND HYDROCHLOROTHIAZIDE 12.5; 2 MG/1; MG/1
1 TABLET ORAL DAILY
Qty: 90 TABLET | Refills: 4 | Status: SHIPPED | OUTPATIENT
Start: 2023-06-05 | End: 2024-03-18

## 2023-06-05 RX ORDER — FLUOXETINE 40 MG/1
40 CAPSULE ORAL DAILY
Qty: 90 CAPSULE | Refills: 4 | Status: SHIPPED | OUTPATIENT
Start: 2023-06-05 | End: 2024-06-06

## 2023-06-05 RX ORDER — DONEPEZIL HYDROCHLORIDE 10 MG/1
10 TABLET, FILM COATED ORAL DAILY
Qty: 90 TABLET | Refills: 4 | Status: SHIPPED | OUTPATIENT
Start: 2023-06-05 | End: 2023-08-04

## 2023-06-05 RX ORDER — ATORVASTATIN CALCIUM 40 MG/1
40 TABLET, FILM COATED ORAL DAILY
Qty: 90 TABLET | Refills: 4 | Status: SHIPPED | OUTPATIENT
Start: 2023-06-05 | End: 2024-06-06

## 2023-06-05 RX ORDER — UBIDECARENONE 100 MG
100 CAPSULE ORAL DAILY
COMMUNITY

## 2023-06-05 RX ORDER — OLANZAPINE 2.5 MG/1
2.5 TABLET, FILM COATED ORAL AT BEDTIME
Qty: 90 TABLET | Refills: 4 | Status: SHIPPED | OUTPATIENT
Start: 2023-06-05 | End: 2023-08-04

## 2023-06-05 RX ORDER — AZELASTINE 1 MG/ML
1 SPRAY, METERED NASAL 2 TIMES DAILY
Qty: 30 ML | Refills: 4 | Status: SHIPPED | OUTPATIENT
Start: 2023-06-05 | End: 2023-12-06

## 2023-06-05 ASSESSMENT — PAIN SCALES - GENERAL: PAINLEVEL: NO PAIN (0)

## 2023-06-05 NOTE — PROGRESS NOTES
"  Assessment & Plan   Problem List Items Addressed This Visit     Dementia associated with other underlying disease with behavioral disturbance (H)    Relevant Medications    busPIRone (BUSPAR) 15 MG tablet    donepezil (ARICEPT) 10 MG tablet    FLUoxetine (PROZAC) 40 MG capsule    OLANZapine (ZYPREXA) 2.5 MG tablet    Unspecified psychosis not due to a substance or known physiological condition (H)   Other Visit Diagnoses     Rhinitis medicamentosa    -  Primary    Relevant Medications    azelastine (ASTELIN) 0.1 % nasal spray    Chronic maxillary sinusitis        Relevant Medications    azelastine (ASTELIN) 0.1 % nasal spray    sulfamethoxazole-trimethoprim (BACTRIM DS) 800-160 MG tablet    Hyperlipidemia LDL goal <100        Relevant Medications    atorvastatin (LIPITOR) 40 MG tablet    SHAHNAZ (generalized anxiety disorder)        Relevant Medications    busPIRone (BUSPAR) 15 MG tablet    FLUoxetine (PROZAC) 40 MG capsule    Major depressive disorder, recurrent episode, moderate (H)        Relevant Medications    busPIRone (BUSPAR) 15 MG tablet    FLUoxetine (PROZAC) 40 MG capsule    OLANZapine (ZYPREXA) 2.5 MG tablet    Essential hypertension with goal blood pressure less than 140/90        Relevant Medications    lisinopril-hydrochlorothiazide (ZESTORETIC) 20-12.5 MG tablet                  BMI:   Estimated body mass index is 50.35 kg/m  as calculated from the following:    Height as of 1/5/23: 1.803 m (5' 11\").    Weight as of this encounter: 163.7 kg (361 lb).   Weight management plan: Discussed healthy diet and exercise guidelines    Work on weight loss  Regular exercise    Mihir Perez MD  [facility] NORMA Edwards is a 63 year old, presenting for the following health issues:  Recheck Medication        6/5/2023     1:43 PM   Additional Questions   Roomed by Sonya   Accompanied by spouse     History of Present Illness       Hypertension: He presents for follow up of hypertension. "  He does not check blood pressure  regularly outside of the clinic. Outside blood pressures have been over 140/90. He follows a low salt diet.     Vascular Disease:  He presents for follow up of vascular disease.  He never takes nitroglycerin. He takes daily aspirin.    Reason for visit:  General health follow up    He eats 2-3 servings of fruits and vegetables daily.He consumes 2 sweetened beverage(s) daily.He exercises with enough effort to increase his heart rate 9 or less minutes per day.  He exercises with enough effort to increase his heart rate 3 or less days per week.   He is taking medications regularly.   non weight bearing for 12 weeks   Congested for a month   Pseudophed.  Sinus infection?    With a therapist and on own as well   Every other week.    Treadmill at home .    Driving at [place] passed, no driving at night   Going to see a memory specialist in July     Twice a day .  Sick when started and then lingered since the winter   Keeps going   Uncomfortably congested   CPAP   Afrin...  Still so significant.               Review of Systems   Constitutional, HEENT, cardiovascular, pulmonary, gi and gu systems are negative, except as otherwise noted.      Objective    /72   Pulse 53   Temp 97.6  F (36.4  C) (Temporal)   Wt (!) 163.7 kg (361 lb)   SpO2 97%   BMI 50.35 kg/m    Body mass index is 50.35 kg/m .  Physical Exam   GENERAL: healthy, alert and no distress  EYES: Eyes grossly normal to inspection, PERRL and conjunctivae and sclerae normal  HENT: ear canals and TM's normal, nose and mouth without ulcers or lesions  NECK: no adenopathy, no asymmetry, masses, or scars and thyroid normal to palpation  RESP: lungs clear to auscultation - no rales, rhonchi or wheezes  CV: regular rate and rhythm, normal S1 S2, no S3 or S4, no murmur, click or rub, no peripheral edema and peripheral pulses strong  ABDOMEN: soft, nontender, no hepatosplenomegaly, no masses and bowel sounds normal  MS: no  gross musculoskeletal defects noted, no edema  SKIN: no suspicious lesions or rashes  NEURO: Normal strength and tone, mentation intact and speech normal  BACK: no CVA tenderness, no paralumbar tenderness  PSYCH: mentation appears normal, affect normal/bright  LYMPH: no cervical, supraclavicular, axillary, or inguinal adenopathy    No results found for any visits on 06/05/23.

## 2023-06-09 ENCOUNTER — THERAPY VISIT (OUTPATIENT)
Dept: PHYSICAL THERAPY | Facility: CLINIC | Age: 63
End: 2023-06-09
Payer: COMMERCIAL

## 2023-06-09 DIAGNOSIS — M25.572 PAIN IN JOINT INVOLVING ANKLE AND FOOT, LEFT: Primary | ICD-10-CM

## 2023-06-09 DIAGNOSIS — R29.898 ANKLE WEAKNESS: ICD-10-CM

## 2023-06-09 PROCEDURE — 97112 NEUROMUSCULAR REEDUCATION: CPT | Mod: GP

## 2023-06-09 PROCEDURE — 97110 THERAPEUTIC EXERCISES: CPT | Mod: GP

## 2023-06-12 ENCOUNTER — PATIENT OUTREACH (OUTPATIENT)
Dept: GERIATRIC MEDICINE | Facility: CLINIC | Age: 63
End: 2023-06-12
Payer: COMMERCIAL

## 2023-06-12 NOTE — LETTER
June 12, 2023    CAR RYAN  [address]  [place] [address]    Dear  Car,    Welcome to [facility] s MSC+ health program. My name is Gisselle Conte RN. I am your MSC+ care coordinator. You are eligible for Care Coordination through [facility] MSC+ plan.    As your care coordinator, we ll:  Meet to go over your care coordination benefits  Talk about your physical and mental health care needs   Review your preventative care needs  Create a plan that meets your needs with the services you choose    What happens next?  I ll call you soon to introduce myself and tell you more about my role. We ll then plan time to go over your health and safety needs. Our goal is to keep you as healthy and independent as possible.    Soon, you will receive a new MSC+ member identification (ID) card from [facility]. When you receive it, please use this card along with your [place] Health Care Programs card and Prescription Drug Coverage Program card. When you receive, it please use this card where you get your health services. If you have Medicare, you will need to show your Medicare card when you get health services.    The [facility] care coordination program is voluntary and offered to you at no cost. If you wish to stop being in the care coordination program or have questions, call me at [phone]. If you reach my voicemail, leave a message and your phone number. TTY users, call the [place] Relay at 746 or 1-989.659.1935 (lmfalh-dl-szqhfo relay service).    Sincerely,         Gisselle Conte RN  [phone]  [email or web address]    [id number] accepted   [id number]       [id number] (07/2022)

## 2023-06-12 NOTE — PROGRESS NOTES
[facility] Care Coordination Contact    Member became effective with  [facility] on 6/1/2023 with [facility] MSC+.  Previous Health Plan: [facility]  Previous Care System: [facility]  Previous care coordinators name and number: Catalina Hooper Type: N/A  Last MMIS Entry: Date 03/22/22 and Type 02 COMM N/PGM  MMIS visit date (and type) if different from above: N/A  Services Listed in MMIS: N/A  UTF received: No UTF to request  Address/Phone discrepancy: N/A  [facility] mailed    Keke Arechiga  Case Management Specialist   [facility]  [phone]

## 2023-06-23 NOTE — TELEPHONE ENCOUNTER
Forwarding MyChart to provider for review.  I can see several messages exchanged last week recommending ED, and patient/family not complying. They finally responded today and have not gone in.  I reiterated recommendation and advised they could possible see a provider at  tomorrow, but they may send to ED anyway.  Any further recommendations?  MyChart sent.     Eli Posada RN     DISPLAY PLAN FREE TEXT NONE

## 2023-06-27 ENCOUNTER — PATIENT OUTREACH (OUTPATIENT)
Dept: GERIATRIC MEDICINE | Facility: CLINIC | Age: 63
End: 2023-06-27
Payer: COMMERCIAL

## 2023-06-27 NOTE — PROGRESS NOTES
[facility] Care Coordination Contact    Called spouse Destiny to schedule annual HRA home visit. HRA has been scheduled for 7.11.23 at 12pm at member's home.     Gisselle Conte RN  [facility]  [phone]

## 2023-07-11 ENCOUNTER — PATIENT OUTREACH (OUTPATIENT)
Dept: GERIATRIC MEDICINE | Facility: CLINIC | Age: 63
End: 2023-07-11
Payer: COMMERCIAL

## 2023-07-11 ENCOUNTER — MYC MEDICAL ADVICE (OUTPATIENT)
Dept: FAMILY MEDICINE | Facility: CLINIC | Age: 63
End: 2023-07-11
Payer: COMMERCIAL

## 2023-07-11 DIAGNOSIS — D50.0 IRON DEFICIENCY ANEMIA DUE TO CHRONIC BLOOD LOSS: Primary | ICD-10-CM

## 2023-07-11 ASSESSMENT — ACTIVITIES OF DAILY LIVING (ADL): DEPENDENT_IADLS:: CLEANING;COOKING;LAUNDRY;SHOPPING;MEAL PREPARATION;MEDICATION MANAGEMENT;MONEY MANAGEMENT

## 2023-07-11 NOTE — LETTER
July 11, 2023    CAR RYAN  [address]  [place] [address]      Dear Car,    Welcome to [facility] health program. My name is Gisselle Conte RN. I am your [facility] care coordinator. You're eligible for care coordination through your [facility] Product.    As your care coordinator, we ll:  Meet to go over your care coordination benefits  Talk about your physical and mental health care needs   Review your preventative care needs  Create a plan that meets your needs with the services you choose    What happens next?  I ll call you soon to introduce myself and tell you more about my role. We ll then plan time to go over your health and safety needs. Our goal is to keep you as healthy and independent as possible.    [facility] combines the benefits you may already have receive from Medical Assistance, Medicare and the Prescription Drug Coverage Program. Soon you'll receive a new member identification (ID) card from [facility]. Use this card whenever you get health services.    The [facility] care coordination program is voluntary and offered to you at no cost. If you wish to stop being in the care coordination program or have questions, call me at [phone]. If you reach my voicemail, leave a message and your phone number. TTY users, call the [place] [employer] at 033 or 1-406.988.4636 (glbixu-ef-qjvlcy relay service).    Sincerely,       Gisselle Conte RN  [phone]  [email or web address] ([facility]) is a health plan that contracts with both Medicare and the [place] Medical Assistance (Medicaid) program to provide benefits of both programs to enrollees. Enrollment in [facility] depends on contract renewal.    [id number] accepted   [id number]         [id number] (07/2022)

## 2023-07-11 NOTE — Clinical Note
I am [facility] care coordinator for Car. I saw him on 7.11.23 for a home health risk assessment. No concerns at this time. I will be opening him to elderly waiver services for the [facility] program so he can have assistance in his home and assistance with mowing lawn and snow removal. Thank you for your time. Gisselle Conte RN.

## 2023-07-11 NOTE — PROGRESS NOTES
[facility] Care Coordination Contact    CC faxed [phone], signed [phone], members AVS, mortgage statement, and line of equity statements to [facility] asking for removal of U Code to open member to elderly waiver.    Gisselle Conte RN  [facility]  [phone]

## 2023-07-11 NOTE — PROGRESS NOTES
[facility] Care Coordination Contact    Member changed health plan products from [facility] MSC+ to [facility] MSHO effective 7/1/2023. CC to complete items on Product Change/ Health Plan Change check list including MMIS entry. [place] and other required tasks.  [facility] sent.    Shira Salinas  Care Management Specialist   [facility]   [phone]

## 2023-07-12 ENCOUNTER — PATIENT OUTREACH (OUTPATIENT)
Dept: GERIATRIC MEDICINE | Facility: CLINIC | Age: 63
End: 2023-07-12

## 2023-07-12 NOTE — PROGRESS NOTES
[facility] Care Coordination Contact      [facility] Health Plan or Product Change    CC received notification that member's health plan or health plan product changed from [employer] MSC+ to [employer] MSHO effective 7/1/23.  CC received new member July 2023. CC spoke to wife Destiny who reported member is on [facility] ([facility]) and has [employer]. CC educated about health plans and that member does not need the [facility] plan as his [employer] covers all his needs and that member would benefit being on MSHO. Destiny called CC later and stated she cancelled the [facility] and spoke to the county and they said member is active with MSHO 7.1.23. CC complete a home assessment on 7.11.23 and is opening member to EW to receive CDCS.     Writer reviewed the following with member:  ER visits: No  Hospitalizations: No  TCU stays: No  Significant health status changes: NA  Falls/Injuries: Yes: 1 fall. Slid off the couch  ADL/IADL changes: No  Changes in services: No    Follow-Up Plan: Member informed of future contact, plan to f/u with member with at next regularly scheduled contact.  Contact information shared with member and family, encouraged member to call with any questions or concerns.  Gisselle Conte RN  [facility]  [phone]

## 2023-07-14 SDOH — ECONOMIC STABILITY: FOOD INSECURITY: WITHIN THE PAST 12 MONTHS, YOU WORRIED THAT YOUR FOOD WOULD RUN OUT BEFORE YOU GOT MONEY TO BUY MORE.: NEVER TRUE

## 2023-07-14 SDOH — ECONOMIC STABILITY: INCOME INSECURITY: IN THE LAST 12 MONTHS, WAS THERE A TIME WHEN YOU WERE NOT ABLE TO PAY THE MORTGAGE OR RENT ON TIME?: NO

## 2023-07-14 SDOH — ECONOMIC STABILITY: TRANSPORTATION INSECURITY
IN THE PAST 12 MONTHS, HAS THE LACK OF TRANSPORTATION KEPT YOU FROM MEDICAL APPOINTMENTS OR FROM GETTING MEDICATIONS?: NO

## 2023-07-14 SDOH — HEALTH STABILITY: PHYSICAL HEALTH: ON AVERAGE, HOW MANY DAYS PER WEEK DO YOU ENGAGE IN MODERATE TO STRENUOUS EXERCISE (LIKE A BRISK WALK)?: 0 DAYS

## 2023-07-14 SDOH — HEALTH STABILITY: PHYSICAL HEALTH: ON AVERAGE, HOW MANY MINUTES DO YOU ENGAGE IN EXERCISE AT THIS LEVEL?: 0 MIN

## 2023-07-14 SDOH — ECONOMIC STABILITY: FOOD INSECURITY: WITHIN THE PAST 12 MONTHS, THE FOOD YOU BOUGHT JUST DIDN'T LAST AND YOU DIDN'T HAVE MONEY TO GET MORE.: NEVER TRUE

## 2023-07-14 SDOH — ECONOMIC STABILITY: TRANSPORTATION INSECURITY
IN THE PAST 12 MONTHS, HAS LACK OF TRANSPORTATION KEPT YOU FROM MEETINGS, WORK, OR FROM GETTING THINGS NEEDED FOR DAILY LIVING?: NO

## 2023-07-14 ASSESSMENT — LIFESTYLE VARIABLES
HOW OFTEN DO YOU HAVE SIX OR MORE DRINKS ON ONE OCCASION: NEVER
HOW OFTEN DO YOU HAVE A DRINK CONTAINING ALCOHOL: MONTHLY OR LESS
SKIP TO QUESTIONS 9-10: 1
AUDIT-C TOTAL SCORE: 1
HOW MANY STANDARD DRINKS CONTAINING ALCOHOL DO YOU HAVE ON A TYPICAL DAY: 1 OR 2

## 2023-07-14 ASSESSMENT — SOCIAL DETERMINANTS OF HEALTH (SDOH)
HOW OFTEN DO YOU ATTEND CHURCH OR RELIGIOUS SERVICES?: NEVER
DO YOU BELONG TO ANY CLUBS OR ORGANIZATIONS SUCH AS CHURCH GROUPS UNIONS, FRATERNAL OR ATHLETIC GROUPS, OR SCHOOL GROUPS?: NO
WITHIN THE LAST YEAR, HAVE TO BEEN RAPED OR FORCED TO HAVE ANY KIND OF SEXUAL ACTIVITY BY YOUR PARTNER OR EX-PARTNER?: NO
HOW HARD IS IT FOR YOU TO PAY FOR THE VERY BASICS LIKE FOOD, HOUSING, MEDICAL CARE, AND HEATING?: NOT VERY HARD
HOW OFTEN DO YOU GET TOGETHER WITH FRIENDS OR RELATIVES?: THREE TIMES A WEEK
HOW OFTEN DO YOU ATTENT MEETINGS OF THE CLUB OR ORGANIZATION YOU BELONG TO?: NEVER
WITHIN THE LAST YEAR, HAVE YOU BEEN KICKED, HIT, SLAPPED, OR OTHERWISE PHYSICALLY HURT BY YOUR PARTNER OR EX-PARTNER?: NO
IN A TYPICAL WEEK, HOW MANY TIMES DO YOU TALK ON THE PHONE WITH FAMILY, FRIENDS, OR NEIGHBORS?: THREE TIMES A WEEK
WITHIN THE LAST YEAR, HAVE YOU BEEN HUMILIATED OR EMOTIONALLY ABUSED IN OTHER WAYS BY YOUR PARTNER OR EX-PARTNER?: NO
WITHIN THE LAST YEAR, HAVE YOU BEEN AFRAID OF YOUR PARTNER OR EX-PARTNER?: NO

## 2023-07-14 ASSESSMENT — PATIENT HEALTH QUESTIONNAIRE - PHQ9: SUM OF ALL RESPONSES TO PHQ QUESTIONS 1-9: 0
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
## 2023-07-21 ENCOUNTER — MYC MEDICAL ADVICE (OUTPATIENT)
Dept: FAMILY MEDICINE | Facility: CLINIC | Age: 63
End: 2023-07-21
Payer: COMMERCIAL

## 2023-07-21 ENCOUNTER — OFFICE VISIT (OUTPATIENT)
Dept: NEUROLOGY | Facility: CLINIC | Age: 63
End: 2023-07-21
Payer: MEDICARE

## 2023-07-21 VITALS
WEIGHT: 315 LBS | BODY MASS INDEX: 44.1 KG/M2 | TEMPERATURE: 97.3 F | HEIGHT: 71 IN | SYSTOLIC BLOOD PRESSURE: 110 MMHG | DIASTOLIC BLOOD PRESSURE: 69 MMHG | HEART RATE: 73 BPM

## 2023-07-21 DIAGNOSIS — F29 PSYCHOSIS, UNSPECIFIED PSYCHOSIS TYPE (H): Primary | ICD-10-CM

## 2023-07-21 DIAGNOSIS — R41.89 COGNITIVE CHANGE: ICD-10-CM
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
## 2023-07-21 NOTE — PATIENT INSTRUCTIONS
We will do further testing including:  Lumbar puncture to look for Alzheimer's disease, brain autoimmune or inflammatory condition  We will also do a blood test to look for damage to neurons in your brain  We will see you back afterwards, but since our return visits are completely filled, I will notify you when the results are ready to be discussed via [employer] and we can setup an after-hours phone call.

## 2023-07-25 PROBLEM — M25.572 PAIN IN JOINT INVOLVING ANKLE AND FOOT, LEFT: Status: RESOLVED | Noted: 2023-03-05 | Resolved: 2023-07-25

## 2023-07-25 PROBLEM — R29.898 ANKLE WEAKNESS: Status: RESOLVED | Noted: 2023-03-05 | Resolved: 2023-07-25

## 2023-07-26 ENCOUNTER — PATIENT OUTREACH (OUTPATIENT)
Dept: GERIATRIC MEDICINE | Facility: CLINIC | Age: 63
End: 2023-07-26
Payer: COMMERCIAL

## 2023-07-26 NOTE — LETTER
July 26, 2023      CAR RYAN  [address]  [place] [address]      Dear Car:    At [facility], we re dedicated to improving your health and wellness. Enclosed is the Care Plan developed with you on 7/11/2023. Please review the Care Plan carefully.    As a reminder, during your visit we talked about:  Ways to manage your physical and mental health  Using health care to maintain and improve your health   Your preventive care needs     Remember to contact your care coordinator if you:  Are hospitalized, or plan to be hospitalized   Have a fall    Have a change in your physical or mental health  Need help finding support or services    If you have questions, or don t agree with your Care Plan, call me at [phone]. You can also call me if your needs change. TTY users, call the [place] [employer] at (020) or 1-917.106.9786 (cvpzqz-bi-bvrbja relay service).    Sincerely,       Gisselle Conte RN  [phone]  [email or web address] ([facility]) is a health plan that contracts with both Medicare and the [place] Medical Assistance (Medicaid) program to provide benefits of both programs to enrollees. Enrollment in [facility] depends on contract renewal.    [id number] accepted    [id number] (07/2022)

## 2023-07-26 NOTE — PROGRESS NOTES
[facility] Care Coordination Contact    Received after visit chart from care coordinator.  Completed following tasks: Mailed copy of care plan to client, Mailed Safe Medication Disposal , and Mailed [employer] Leave Behind Letter    Shira Salinas  Care Management Specialist   [facility]   [phone]

## 2023-07-27 NOTE — PROGRESS NOTES
[facility] Care Coordination Contact    Late entry: CC received member on enrollment on 6.19.23.    Gisselle Conte RN  [facility]  [phone]

## 2023-08-04 ENCOUNTER — VIRTUAL VISIT (OUTPATIENT)
Dept: PSYCHIATRY | Facility: CLINIC | Age: 63
End: 2023-08-04
Payer: COMMERCIAL

## 2023-08-04 DIAGNOSIS — F41.1 GAD (GENERALIZED ANXIETY DISORDER): ICD-10-CM

## 2023-08-04 DIAGNOSIS — F33.1 MAJOR DEPRESSIVE DISORDER, RECURRENT EPISODE, MODERATE (H): Primary | ICD-10-CM

## 2023-08-04 PROCEDURE — 99214 OFFICE O/P EST MOD 30 MIN: CPT | Mod: VID | Performed by: NURSE PRACTITIONER

## 2023-08-04 RX ORDER — BUSPIRONE HYDROCHLORIDE 10 MG/1
20 TABLET ORAL 2 TIMES DAILY
Qty: 180 TABLET | Refills: 0 | Status: SHIPPED | OUTPATIENT
Start: 2023-08-04 | End: 2023-09-28

## 2023-08-04 NOTE — NURSING NOTE
Is the patient currently in the state of [place]? YES    Visit mode:VIDEO    If the visit is dropped, the patient can be reconnected by: VIDEO VISIT: Text to cell phone:   Telephone Information:   Mobile [phone]   Mobile Not on file.       Will anyone else be joining the visit? No  (If patient encounters technical issues they should call [phone])    How would you like to obtain your AVS? MyChart    Are changes needed to the allergy or medication list? Yes, 2 medications currently not taking per wife   Rooming Documentation: Assigned questionnaire(s) completed .    Reason for visit: LISSET Sandoval

## 2023-08-04 NOTE — PROGRESS NOTES
"Virtual Visit Details    Type of service:  Video Visit   Video Start Time: 11:17 AM  Video End Time: 11:47 AM    Originating Location (pt. Location): Home    Distant Location (provider location):  Off-site  Platform used for Video Visit: [facility] Psychiatric Progress Note    Name: Car Torres   : 1960                    Primary Care Provider: Mihir Perez MD   Therapist: None    PHQ-9 scores:      2/3/2023     4:48 PM 4/3/2023     2:08 PM 2023     8:14 AM   PHQ-9 SCORE   PHQ-9 Total Score MyChart 2 (Minimal depression) 0    PHQ-9 Total Score 2 0 0       SHAHNAZ-7 scores:      2021     2:00 PM 2022     1:00 PM 2022     1:02 PM   SHAHNAZ-7 SCORE   Total Score   2 (minimal anxiety)   Total Score 0 0 2       Patient Identification:    Patient is a 63 year old year old,   White Not  or  male  who presents for return visit with me.  Patient is currently retired. Patient attended the session with his wife , who they agreed to have interview with. Patient prefers to be called: \" Jerry\".    Current medications include: ASPIRIN 325 MG OR TBEC, ONE DAILY  atorvastatin (LIPITOR) 40 MG tablet, Take 1 tablet (40 mg) by mouth daily  azelastine (ASTELIN) 0.1 % nasal spray, Spray 1 spray into both nostrils 2 times daily  busPIRone (BUSPAR) 15 MG tablet, Take 1 tablet (15 mg) by mouth 2 times daily  Cholecalciferol (VITAMIN D PO), Take 5,000 Units by mouth daily One tablet twice daily  co-enzyme Q-10 100 MG CAPS capsule, Take 100 mg by mouth daily  donepezil (ARICEPT) 10 MG tablet, Take 1 tablet (10 mg) by mouth daily  Ferrous Gluconate 324 (37.5 Fe) MG TABS, 1 tablet daily (Patient not taking: Reported on 2023)  FLUoxetine (PROZAC) 40 MG capsule, Take 1 capsule (40 mg) by mouth daily  gabapentin (NEURONTIN) 300 MG capsule, TAKE 1 CAPSULE BY MOUTH AT BEDTIME (Patient not taking: Reported on 2023)  lisinopril-hydrochlorothiazide (ZESTORETIC) 20-12.5 MG " tablet, Take 1 tablet by mouth daily  MULTI VITAMIN MENS PO, None Entered  OLANZapine (ZYPREXA) 2.5 MG tablet, Take 1 tablet (2.5 mg) by mouth At Bedtime  tadalafil (CIALIS) 20 MG tablet, TAKE 1/2 TO 1 TABLET BY MOUTH 30 MINUTES TO 1 HOUR BEFORE SEX  vitamin B complex with vitamin C (STRESS TAB) tablet, Take 1 tablet by mouth daily    No current facility-administered medications on file prior to visit.       The [place] Prescription Monitoring Program has been reviewed and there are no concerns about diversionary activity for controlled substances at this time.      I was able to review most recent Primary Care Provider, specialty provider, and therapy visit notes that I have access to.     Today, patient reports his dad has been ill.   Has vascular dementia and cardiac issues as well as a blood infection.  Car has felt depressed and anxious about seeing his father that way.  Car was seen by a dementia specialist who saw no proof of clinical dementia.  A workup is pending.  Since weaning off of the medications - Olanzapine and donepezil,  he admits to experiencing some increase in anxiety.  He gets overwhelmed.  He has low motivation - little exercise.  He plays word games on his phone to occupy his time.       has a past medical history of Dementia (H), Depressive disorder, Heart disease (5/8/2018), Hypercholesterolemia, Hypertension goal BP (blood pressure) < 140/90, Nonspecific abnormal electrocardiogram (ECG) (EKG) (08/23/2007), and Sleep apnea.    He has no past medical history of Arthritis, Cancer (H), Cerebral infarction (H), Congestive heart failure (H), Congestive heart failure, unspecified, COPD (chronic obstructive pulmonary disease) (H), Diabetes (H), History of blood transfusion, Thyroid disease, or Uncomplicated asthma.    Social history updates:    Car lives with his wife.  He is receiving disability money for financial support.    Substance use updates:    No alcohol use  reported  Tobacco use: No    Vital Signs:   There were no vitals taken for this visit.    Labs:    Most recent laboratory results reviewed and no new labs.     Mental Status Examination:  Appearance: awake, alert and casual dress  Attitude: cooperative  Eye Contact:  adequate  Gait and Station: No dizziness or falls  Psychomotor Behavior:  intact station, gait and muscle tone  Oriented to:  time, person, and place  Attention Span and Concentration:  Normal  Speech:   vtspeech: clear, coherent and Speaks: English  Mood:  anxious, depressed, and better  Affect:  mood congruent  Associations:  no loose associations  Thought Process:  goal oriented  Thought Content:  no evidence of suicidal ideation or homicidal ideation, no auditory hallucinations present, and no visual hallucinations present  Recent and Remote Memory:   Fair  Not formally assessed. No amnesia.  Fund of Knowledge: appropriate  Insight:  good  Judgment: good  Impulse Control:  good    Suicide Risk Assessment:  Today Car Torres reports no thoughts to harm themself or others. In addition, there are notable risk factors for self-harm, including age, anxiety, and mood change. However, risk is mitigated by commitment to family, history of seeking help when needed, future oriented, denies suicidal intent or plan, and denies homicidal ideation, intent, or plan. Therefore, based on all available evidence including the factors cited above, Car Torres does not appear to be at imminent risk for self-harm, does not meet criteria for a 72-hr hold, and therefore remains appropriate for ongoing outpatient level of care.  A thorough assessment of risk factors related to suicide and self-harm have been reviewed and are noted above. The patient convincingly denies suicidality on several occasions. Local community safety resources printed and reviewed for patient to use if needed. There was no deceit detected, and the patient presented in a manner that was  believable.     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  300.02 (F41.1) Generalized Anxiety Disorder    Medical comorbidities include:   Patient Active Problem List    Diagnosis Date Noted    Hypertension goal BP (blood pressure) < 140/90 10/20/2010     Priority: High    Hyperlipidemia LDL goal <130 06/11/2010     Priority: High    Lisfranc dislocation, left, initial encounter 12/28/2022     Priority: Medium    Lisfranc dislocation, left, subsequent encounter 12/28/2022     Priority: Medium    Dementia associated with other underlying disease with behavioral disturbance (H) 01/03/2022     Priority: Medium    Ulcer of left foot, unspecified ulcer stage (H) 01/03/2022     Priority: Medium    Unspecified psychosis not due to a substance or known physiological condition (H) 01/03/2022     Priority: Medium    Major depressive disorder, recurrent episode, mild with atypical features (H) 12/06/2021     Priority: Medium    TIA (obstructive sleep apnea) 07/19/2018     Priority: Medium     Home Sleep Apnea Testing - 7/18/18: 295 lbs 0 oz: AHI 45.3/hr. Supine AHI 61.9/hr. Oxygen [place] of 66%.  Baseline 93%.  Sp02 =< 88% for 47 minutes.        History of sinus bradycardia 06/07/2018     Priority: Medium    ED (erectile dysfunction) 06/07/2018     Priority: Medium    Coronary artery calcification 05/08/2018     Priority: Medium     Per CT CHEST WITHOUT CONTRAST April 23, 2018. CT calcium score planned.         Renal cyst 11/18/2015     Priority: Medium     Simple, non enhanced, 3.5 cm on right kidney, Bosniak 1 - x 2 stable findings        Diverticulosis of large intestine without hemorrhage 11/18/2015     Priority: Medium     Incidental finding on CT scan         Pulmonary nodule 11/18/2015     Priority: Medium     Incidental finding on CT scan, right posterior lung - considered benign / stable         CKD (chronic kidney disease) stage 2, GFR 60-89  ml/min 10/21/2010     Priority: Medium     60 to 80 - have discussed Lisinopril - will consider         Sciatica 02/27/2006     Priority: Medium    Morbid obesity (H) 02/27/2006     Priority: Medium       Assessment:    Car Torres has been reassessed and found to be negative for dementia.  Donepezil has been discontinued.  He also has gone on for quite some time without any paranoid or delusional thoughts so the olanzapine will be discontinued.  Depression and anxiety continue as he works towards finding ways to occupy his free time.  Fluoxetine will continue as well as buspirone to help diminish those symptoms..    Medication side effects and alternatives were reviewed. Health promotion activities recommended and reviewed today. All questions addressed. Education and counseling completed regarding risks and benefits of medications and psychotherapy options.    Treatment Plan:      1.  Buspirone 20 mg 2 times daily  2.  Donepezil has been discontinued  3.  Fluoxetine 40 mg daily  4.  Gabapentin discontinued  5.  Olanzapine discontinued    Continue all other medications as reviewed per electronic medical record today.   Safety plan reviewed. To the Emergency Department as needed or call after hours crisis line at [phone] or [phone]. [place] Crisis Text Line. Text [place] to 115954 or Suicide LifeLine Chat: suicidepreventionlifeline.org/chat/  To schedule individual or family therapy, call [facility] at [phone]  Schedule an appointment with me in October or sooner as needed. Call [facility] at [phone] to schedule.  Follow up with primary care provider as planned or for acute medical concerns.  Call the psychiatric nurse line with medication questions or concerns at [phone]  MyChart may be used to communicate with your provider, but this is not intended to be used for emergencies.    Crisis Resources:    National Suicide Prevention Lifeline: 872.296.7579  (TTY: 613.743.9506). Call anytime for help.  (www.suicidepreventionlifeline.org)  National [place] on Mental Illness (www.ana maría.org): 841.330.4056 or 571-103-1344.   Mental Health Association (www.mentalhealth.org): 952.201.3516 or 563-377-4331.  [place] Crisis Text [place]: Text [place] to 225660  Suicide LifeLine Chat: suicideSnipi.org/chat    Administrative Billing:   Time spent with patient includes counseling and coordination of care regarding above diagnoses and treatment plan.    Patient Status:  This is a continuous care patient and medications will be prescribed by the psychiatric provider until further indicated.    Signed:   BELEN Singh-BC   Psychiatry

## 2023-08-13 NOTE — PATIENT INSTRUCTIONS
"Patient Education   The [facility] Program  What to Expect  Here's what to expect in the Panel Psychiatry Program.   About the program  You'll be meeting with a psychiatric doctor to check your mental health. A psychiatric doctor helps you deal with troubling thoughts and feelings by giving you medicine. They'll make sure you know the plan for your care. You may see them for a long time. When you're feeling better, they may refer you back to seeing your family doctor.   If you have any questions, we'll be glad to talk to you.  About visits  Be open  At your visits, please talk openly about your problems. It may feel hard, but it's the best way for us to help you.  Cancelling visits  If you can't come to your visit, please call us right away at [phone]. If you don't cancel at least 24 hours (1 full day) before your visit, that's \"late cancellation.\"  Not showing up for your visits  Being very late is the same as not showing up. You'll be a \"no show\" if:  You're more than 15 minutes late for a 30-minute (half hour) visit.  You're more than 30 minutes late for a 60-minute (full hour) visit.  If you cancel late or don't show up 2 times within 6 months, we may end your care.  Getting help between visits  If you need help between visits, you can call us Monday to Friday from 8 a.m. to 4:30 p.m. at [phone].  Emergency care  Call 911 or go to the nearest emergency department if your life or someone else's life is in danger.  Call 988 anytime to reach the national Suicide and Crisis hotline.  Medicine refills  To refill your medicine, call your pharmacy. You can also call [facility]'s [facility] at [phone], Monday to Friday, 8 a.m. to 4:30 p.m. It can take 1 to 3 business days to get a refill.   Forms, letters, and tests  You may have papers to fill out, like FMLA, short-term disability, and workability. We can help you with these forms at your visits, but you must have an " appointment. You may need more than 1 visit for this, to be in an intensive therapy program, or both.  Before we can give you medicine for ADHD, we may refer you to get tested for it or confirm it another way.  We may not be able to give you an emotional support animal letter.  We don't do mental health checks ordered by the court.   We don't do mental health testing, but we can refer you to get tested.   Thank you for choosing us for your care.  For informational purposes only. Not to replace the advice of your health care provider. Copyright   2022 [employer]. All rights reserved. [employer] [id number] - 12/22.      Treatment Plan:      1.  Buspirone 20 mg 2 times daily  2.  Donepezil has been discontinued  3.  Fluoxetine 40 mg daily  4.  Gabapentin discontinued  5.  Olanzapine discontinued    Continue all other medical directions per primary care provider.   Continue all other medications as reviewed per electronic medical record today.   Safety plan reviewed. To the Emergency Department as needed or call after hours crisis line at [phone] or [phone]. [place] Crisis Text [place]: Text [place] to 116402  or  Suicide LifeLine Chat: suicidepreventionOmek Interactiveline.org/chat/  To schedule individual or family therapy, call [facility] at [phone].   Schedule an appointment with me in 8 weeks or sooner as needed.  Call [facility] at [phone] to schedule.  Follow up with primary care provider as planned or for acute medical concerns.  Call the psychiatric nurse line with medication questions or concerns at [phone].  ChartITrighthart may be used to communicate with your provider, but this is not intended to be used for emergencies.    Crisis Resources:    National Suicide Prevention Lifeline: 469.694.6304 (TTY: 605.237.6368). Call anytime for help.  (www.suicidepreventionlifeline.org)  National [place] on Mental Illness (www.ana maría.org): 424.295.3212 or 089-260-4757.   Mental  Health Association (www.mentalhealth.org): 523-802-2428 or 215-373-3424.  [place] Crisis [place]: Text [place] to 758816  Suicide LifeLine Chat: suicidepreventionlifeline.org/chat

## 2023-09-13 ENCOUNTER — LAB (OUTPATIENT)
Dept: LAB | Facility: CLINIC | Age: 63
End: 2023-09-13
Payer: COMMERCIAL

## 2023-09-13 ENCOUNTER — ANCILLARY PROCEDURE (OUTPATIENT)
Dept: INTERVENTIONAL RADIOLOGY/VASCULAR | Facility: CLINIC | Age: 63
End: 2023-09-13
Attending: PSYCHIATRY & NEUROLOGY
Payer: COMMERCIAL

## 2023-09-13 VITALS
SYSTOLIC BLOOD PRESSURE: 137 MMHG | OXYGEN SATURATION: 96 % | HEART RATE: 53 BPM | DIASTOLIC BLOOD PRESSURE: 76 MMHG | TEMPERATURE: 98.7 F

## 2023-09-13 DIAGNOSIS — D50.0 IRON DEFICIENCY ANEMIA DUE TO CHRONIC BLOOD LOSS: ICD-10-CM

## 2023-09-13 DIAGNOSIS — R41.89 COGNITIVE CHANGE: ICD-10-CM

## 2023-09-13 LAB
APPEARANCE CSF: CLEAR
BASOPHILS # BLD AUTO: 0 10E3/UL (ref 0–0.2)
BASOPHILS NFR BLD AUTO: 1 %
COLOR CSF: COLORLESS
EOSINOPHIL # BLD AUTO: 0.3 10E3/UL (ref 0–0.7)
EOSINOPHIL NFR BLD AUTO: 6 %
ERYTHROCYTE [DISTWIDTH] IN BLOOD BY AUTOMATED COUNT: 12.7 % (ref 10–15)
GLUCOSE CSF-MCNC: 69 MG/DL (ref 40–70)
HCT VFR BLD AUTO: 43.5 % (ref 40–53)
HGB BLD-MCNC: 15 G/DL (ref 13.3–17.7)
HOLD SPECIMEN: NORMAL
HOLD SPECIMEN: NORMAL
IMM GRANULOCYTES # BLD: 0 10E3/UL
IMM GRANULOCYTES NFR BLD: 0 %
IRON BINDING CAPACITY (ROCHE): NORMAL
IRON SATN MFR SERPL: NORMAL %
IRON SERPL-MCNC: 71 UG/DL (ref 61–157)
LYMPHOCYTES # BLD AUTO: 1.4 10E3/UL (ref 0.8–5.3)
LYMPHOCYTES NFR BLD AUTO: 27 %
Lab: NORMAL
Lab: NORMAL
MCH RBC QN AUTO: 30.5 PG (ref 26.5–33)
MCHC RBC AUTO-ENTMCNC: 34.5 G/DL (ref 31.5–36.5)
MCV RBC AUTO: 89 FL (ref 78–100)
MONOCYTES # BLD AUTO: 0.4 10E3/UL (ref 0–1.3)
MONOCYTES NFR BLD AUTO: 8 %
NEUTROPHILS # BLD AUTO: 3.1 10E3/UL (ref 1.6–8.3)
NEUTROPHILS NFR BLD AUTO: 58 %
NRBC # BLD AUTO: 0 10E3/UL
NRBC BLD AUTO-RTO: 0 /100
PERFORMING LABORATORY: NORMAL
PERFORMING LABORATORY: NORMAL
PLATELET # BLD AUTO: 202 10E3/UL (ref 150–450)
PROT CSF-MCNC: 34.4 MG/DL (ref 15–45)
RBC # BLD AUTO: 4.91 10E6/UL (ref 4.4–5.9)
RBC # CSF MANUAL: 548 /UL (ref 0–2)
SPECIMEN STATUS: NORMAL
SPECIMEN STATUS: NORMAL
TEST NAME: NORMAL
TEST NAME: NORMAL
TUBE # CSF: 3
WBC # BLD AUTO: 5.3 10E3/UL (ref 4–11)
WBC # CSF MANUAL: 0 /UL (ref 0–5)

## 2023-09-13 PROCEDURE — 86255 FLUORESCENT ANTIBODY SCREEN: CPT | Mod: 90 | Performed by: PATHOLOGY

## 2023-09-13 PROCEDURE — 99000 SPECIMEN HANDLING OFFICE-LAB: CPT | Performed by: PATHOLOGY

## 2023-09-13 PROCEDURE — 85025 COMPLETE CBC W/AUTO DIFF WBC: CPT | Performed by: PATHOLOGY

## 2023-09-13 PROCEDURE — 86341 ISLET CELL ANTIBODY: CPT | Mod: 90 | Performed by: PATHOLOGY

## 2023-09-13 PROCEDURE — 89050 BODY FLUID CELL COUNT: CPT | Performed by: PSYCHIATRY & NEUROLOGY

## 2023-09-13 PROCEDURE — 82945 GLUCOSE OTHER FLUID: CPT | Performed by: PSYCHIATRY & NEUROLOGY

## 2023-09-13 PROCEDURE — 36415 COLL VENOUS BLD VENIPUNCTURE: CPT | Performed by: PATHOLOGY

## 2023-09-13 PROCEDURE — 83550 IRON BINDING TEST: CPT | Performed by: PATHOLOGY

## 2023-09-13 PROCEDURE — 83540 ASSAY OF IRON: CPT | Performed by: PATHOLOGY

## 2023-09-13 PROCEDURE — 62328 DX LMBR SPI PNXR W/FLUOR/CT: CPT

## 2023-09-13 PROCEDURE — 84157 ASSAY OF PROTEIN OTHER: CPT | Performed by: PSYCHIATRY & NEUROLOGY

## 2023-09-13 PROCEDURE — 83520 IMMUNOASSAY QUANT NOS NONAB: CPT | Performed by: PATHOLOGY

## 2023-09-13 RX ORDER — LIDOCAINE HYDROCHLORIDE 10 MG/ML
5 INJECTION, SOLUTION EPIDURAL; INFILTRATION; INTRACAUDAL; PERINEURAL ONCE
Status: COMPLETED | OUTPATIENT
Start: 2023-09-13 | End: 2023-09-13

## 2023-09-13 RX ADMIN — LIDOCAINE HYDROCHLORIDE 15 ML: 10 INJECTION, SOLUTION EPIDURAL; INFILTRATION; INTRACAUDAL; PERINEURAL at 12:52

## 2023-09-13 NOTE — DISCHARGE INSTRUCTIONS
Discharge instructions for Lumbar Puncture           AFTER YOU GO HOME      Relax and take it easy for 24 hours  You may resume normal activity after the 24 hour period  You may develop a headache that will normally go away on its own in 1 to 2 days. Lying down should help relieve this pain.  If you develop a headache you can take over the counter medicines and lay down.  You can try drinking caffeine-coffee, soda.   Drink at least 4 glasses of extra fluid today if not on a fluid restriction  Continue to take your medications as ordered by your provider  You may remove the bandage and resume bathing the next day  DO NOT drive or operate machinery at home or at work for at least 24 hours               CALL YOU PRIMARY PHYSICIAN IF:    Severe head or neck pain that doesn't go away or that gets worse  You feel less alert or have trouble waking up  Repeated upset stomach (nausea) or vomiting  Swelling, pain, bruising, or redness at the puncture site  Fever of 100.4 F (38 C) or higher, or as advised by your provider  If you start to leak a large amount of fluid from the puncture site (also, lie down flat on your back)      Date: 9/13/23  Provider: ALEC Boss, Interventional Radiology, [facility]    [facility]  [address] [place] [address]

## 2023-09-13 NOTE — PROGRESS NOTES
Interventional Radiology Brief Post Procedure Note    Procedure: Lumbar puncture    Proceduralist: Karyn Dunlap PA-C    Time Out: Prior to the start of the procedure and with procedural staff participation, I verbally confirmed the patient s identity using two indicators, relevant allergies, that the procedure was appropriate and matched the consent or emergent situation, and that the correct equipment/implants were available. Immediately prior to starting the procedure I conducted the Time Out with the procedural staff and re-confirmed the patient s name, procedure, and site/side. (The Joint Commission universal protocol was followed.)  Yes    Medications   Medication Event Details Admin User Admin Time       Sedation: None. Local Anesthestic used    Findings: Successful fluoroscopic-guided L3/4 lumbar puncture with return of clear CSF; 15 ml of CSF collected and sent to lab.     Estimated Blood Loss: None    Fluoroscopy Time: 1.2 minute(s)    SPECIMENS: Fluid and/or tissue for laboratory analysis    Complications: 1. None     Condition: Stable    Plan: Follow-up per primary team. Supine bedrest x 1 hr.     Comments: See dictated procedure note for full details.    Barbara Dunlap PA-C

## 2023-09-13 NOTE — PROGRESS NOTES
Patient tolerated procedure well. No obvious complications. VSS. AVS given and pt escorted to the waiting room.

## 2023-09-14 LAB — MAYO MISC RESULT: NORMAL

## 2023-09-15 LAB — MAYO MISC RESULT: NORMAL

## 2023-09-18 NOTE — PROGRESS NOTES
"[facility] Neurology Follow Up Visit    Car Torres MRN# [id number]   Age: 63 year old YOB: 1960     Brief history of symptoms: The patient was initially seen in neurologic consultation on 12/23/2020 for evaluation of cognitive changes. Please see the comprehensive neurologic consultation note from that date in the Epic records for details.     Interval history:       Patient fractured his left foot walking down the stairs, which required surgery. He is still in a boot, which will come off next month.     He hasn't been driving due to the boot. He is due for repeat driving evaluation at [facility].     Patient denies any memory issues. He believes he had a mental breakdown given all the stresses at his work. He reports generally good mood. He spends most of his time now doing \"dot-dot\" diagrams and watching TV.    Wife reports some memory in cognition.       Past Medical History:     Patient Active Problem List   Diagnosis    Sciatica    Morbid obesity (H)    Hyperlipidemia LDL goal <130    Hypertension goal BP (blood pressure) < 140/90    CKD (chronic kidney disease) stage 2, GFR 60-89 ml/min    Renal cyst    Diverticulosis of large intestine without hemorrhage    Pulmonary nodule    Coronary artery calcification    History of sinus bradycardia    ED (erectile dysfunction)    TIA (obstructive sleep apnea)    Major depressive disorder, recurrent episode, mild with atypical features (H)    Dementia associated with other underlying disease with behavioral disturbance (H)    Ulcer of left foot, unspecified ulcer stage (H)    Unspecified psychosis not due to a substance or known physiological condition (H)    Lisfranc dislocation, left, initial encounter    Lisfranc dislocation, left, subsequent encounter     Past Medical History:   Diagnosis Date    Dementia (H)     Depressive disorder     Heart disease 5/8/2018    Hypercholesterolemia     Hypertension goal BP (blood pressure) < 140/90     Nonspecific " abnormal electrocardiogram (ECG) (EKG) 2007    Stress testing normal.     Sleep apnea     uses a c-pap, severe        Past Surgical History:     Past Surgical History:   Procedure Laterality Date    COLONOSCOPY N/A 2020    Procedure: COLONOSCOPY, WITH POLYPECTOMY, CLIP;  Surgeon: Mihir Lang MD;  [place] [facility] [place]    COLONOSCOPY N/A 1/10/2022    Procedure: COLONOSCOPY, WITH POLYPECTOMY AND BIOPSY;  Surgeon: Mihir Lang MD;  Location:  GI    ESOPHAGOSCOPY, GASTROSCOPY, DUODENOSCOPY (EGD), COMBINED N/A 2021    Procedure: ESOPHAGOGASTRODUODENOSCOPY, WITH BIOPSY;  Surgeon: Mihir Lang MD;  [place] [facility] [place]    OPEN REDUCTION INTERNAL FIXATION FOOT Left 2023    Procedure: LISFRANC OPEN REDUCTION INTERNAL FIXATION, LEFT FOOT;  Surgeon: Bailey Ruiz DPM;  Location:  OR    SURGICAL HISTORY OF -       surgery on left index finger        Social History:     Social History     Tobacco Use    Smoking status: Former     Packs/day: 1.00     Years: 25.00     Pack years: 25.00     Types: Cigarettes, Cigars     Start date: 1973     Quit date: 1998     Years since quittin.6     Passive exposure: Past    Smokeless tobacco: Never    Tobacco comments:     N/A not a smoker   Vaping Use    Vaping Use: Never used   Substance Use Topics    Alcohol use: Yes     Alcohol/week: 10.0 standard drinks of alcohol     Comment: less than 6 per week    Drug use: Not Currently     Types: Amphetamines     Comment: Kratum        Family History:     Family History   Problem Relation Age of Onset    Cancer Mother         uterine    Other Cancer Mother         endometrial    Cerebrovascular Disease Mother     Substance Abuse Mother         Alcohol    C.A.D. Father     Hypertension Father     Hyperlipidemia Father     Breast Cancer Maternal Grandmother     Other Cancer Maternal Grandmother         Lung/brain    Substance Abuse Paternal Grandfather     Hypertension Brother     Substance Abuse Brother          Alcohol    Hyperlipidemia Brother     Breast Cancer Other     Breast Cancer Cousin     Other Cancer Other     Cerebrovascular Disease Other         Maternal Aunt    Glaucoma No family hx of     Macular Degeneration No family hx of         Medications:     Current Outpatient Medications   Medication Sig    ASPIRIN 325 MG OR TBEC ONE DAILY    atorvastatin (LIPITOR) 40 MG tablet Take 1 tablet (40 mg) by mouth daily    azelastine (ASTELIN) 0.1 % nasal spray Spray 1 spray into both nostrils 2 times daily    busPIRone (BUSPAR) 10 MG tablet Take 2 tablets (20 mg) by mouth 2 times daily    Cholecalciferol (VITAMIN D PO) Take 5,000 Units by mouth daily One tablet twice daily    co-enzyme Q-10 100 MG CAPS capsule Take 100 mg by mouth daily    FLUoxetine (PROZAC) 40 MG capsule Take 1 capsule (40 mg) by mouth daily    lisinopril-hydrochlorothiazide (ZESTORETIC) 20-12.5 MG tablet Take 1 tablet by mouth daily    MULTI VITAMIN MENS PO None Entered    tadalafil (CIALIS) 20 MG tablet TAKE 1/2 TO 1 TABLET BY MOUTH 30 MINUTES TO 1 HOUR BEFORE SEX    vitamin B complex with vitamin C (STRESS TAB) tablet Take 1 tablet by mouth daily     No current facility-administered medications for this visit.        Allergies:   No Known Allergies     Review of Systems:   As above     Physical Exam:   Vitals: There were no vitals taken for this visit.   General: Seated comfortably in no acute distress.  Lungs: breathing comfortably  Neurologic:     Mental Status: MoCA 28/30 (-2 serial 7s)     Cranial Nerves:  No dysarthria.      Motor: No tremors or other abnormal movements observed.      Data reviewed on previous visits    Imaging:  MRI brain 10/16/2020  IMPRESSION:  1. Exam mildly limited by motion artifact.  2. Few minimal nonspecific white matter lesions.  3. No evidence for intracranial hemorrhage, acute infarct, or any  focal mass lesions.     Laboratory:  TSH, CMP wnl 8/2020  B12, CBC wnl 12/2020    PET brain 4/2021  IMPRESSION:  No  significant metabolic deficits when compared to  control database. This may be due to the possible underlying dementia  being too early to characterize, versus the patient's symptomatology  being due to some other process than dementia.    Pertinent Investigations since last visit:   Labs 9/2023 - [place] [facility] Alzheimer's disease evaluation normal, Autoimmune encephalopathy panel pending, WBC 0, normal protein/glucose, Neurofilament light chain normal,          Assessment and Plan:   Assessment:  Patient is a 62 y/o male who presents for follow-up of dementia. Cognitive changes of paranoia, hallucinations, delusions developed initially around spring 2020, with significant worsening by fall 2020. He had no prior history of psychiatric disease. Patient had neuropsychological testing with Dr Adam in 3/2021 which was concerning for significant dysfunction in frontal and subcortical networks, and in the clinical context, concerning for a neurodegenerative process causing mild dementia. PET brain and MRI brain were largely unrevealing. Neuropsychology testing was repeated in 8/2022, which showed variable results, but continued frontal dysfunction. Leading diagnosis on the differential at this juncture is frontotemporal dementia. He has had some benefit with donepezil and psychiatric medications per psych.    MoCA was performed today is 28/30, which is improved compared to prior visit. Given atypical presentation referral to Dr Mark in memory clinic was placed for opinion on presentation. Patient needs repeat driving evaluation at [facility] to ensure safety of driving. He last had this performed in spring 2021.     Plan:  - Continue Donepezil 10 mg nightly   - Continue to follow-up with psychiatrist - currently on Zyprexa, Prosac, and Buspar  - Memory clinic referral  - Repeat driving evaluation at [facility]    Follow up in Neurology clinic in 6 months or sooner if new issues arise    Miguel Varela MD  Assistant  Professor of Neurology  [facility]      The total time of this encounter today amounted to 40 minutes. This time included time spent with the patient, prep work, ordering tests, and performing post visit documentation.

## 2023-09-19 ENCOUNTER — OFFICE VISIT (OUTPATIENT)
Dept: NEUROLOGY | Facility: CLINIC | Age: 63
End: 2023-09-19
Payer: COMMERCIAL

## 2023-09-19 VITALS
HEART RATE: 54 BPM | WEIGHT: 315 LBS | BODY MASS INDEX: 47.6 KG/M2 | SYSTOLIC BLOOD PRESSURE: 147 MMHG | DIASTOLIC BLOOD PRESSURE: 79 MMHG

## 2023-09-19 DIAGNOSIS — R41.89 COGNITIVE CHANGE: Primary | ICD-10-CM

## 2023-09-19 DIAGNOSIS — M25.511 RIGHT SHOULDER PAIN, UNSPECIFIED CHRONICITY: ICD-10-CM

## 2023-09-19 PROCEDURE — 99215 OFFICE O/P EST HI 40 MIN: CPT | Performed by: INTERNAL MEDICINE

## 2023-09-19 NOTE — PROGRESS NOTES
[facility] Neurology Follow Up Visit    Car Torres MRN# [id number]   Age: 63 year old YOB: 1960     Brief history of symptoms: The patient was initially seen in neurologic consultation on 12/23/2020 for evaluation of cognitive changes. Please see the comprehensive neurologic consultation note from that date in the Epic records for details.     Interval history:   In the last several months patient has weaned off of Zyprexa and donepezil. Memory symptoms have not worsened since weaning off of these medications. He has felt a bit more anxious since weaning off of them. He denies any issues with hallucinations or paranoid thoughts.     He has lost some weight and his appetite is not as big as it used to be.       Past Medical History:     Patient Active Problem List   Diagnosis    Sciatica    Morbid obesity (H)    Hyperlipidemia LDL goal <130    Hypertension goal BP (blood pressure) < 140/90    CKD (chronic kidney disease) stage 2, GFR 60-89 ml/min    Renal cyst    Diverticulosis of large intestine without hemorrhage    Pulmonary nodule    Coronary artery calcification    History of sinus bradycardia    ED (erectile dysfunction)    TIA (obstructive sleep apnea)    Major depressive disorder, recurrent episode, mild with atypical features (H)    Dementia associated with other underlying disease with behavioral disturbance (H)    Ulcer of left foot, unspecified ulcer stage (H)    Unspecified psychosis not due to a substance or known physiological condition (H)    Lisfranc dislocation, left, initial encounter    Lisfranc dislocation, left, subsequent encounter     Past Medical History:   Diagnosis Date    Dementia (H)     Depressive disorder     Heart disease 5/8/2018    Hypercholesterolemia     Hypertension goal BP (blood pressure) < 140/90     Nonspecific abnormal electrocardiogram (ECG) (EKG) 08/23/2007    Stress testing normal.     Sleep apnea     uses a c-pap, severe        Past Surgical History:      Past Surgical History:   Procedure Laterality Date    COLONOSCOPY N/A 2020    Procedure: COLONOSCOPY, WITH POLYPECTOMY, CLIP;  Surgeon: Mihir Lang MD;  [place] [facility] [place]    COLONOSCOPY N/A 1/10/2022    Procedure: COLONOSCOPY, WITH POLYPECTOMY AND BIOPSY;  Surgeon: Mihir Lang MD;  Location:  GI    ESOPHAGOSCOPY, GASTROSCOPY, DUODENOSCOPY (EGD), COMBINED N/A 2021    Procedure: ESOPHAGOGASTRODUODENOSCOPY, WITH BIOPSY;  Surgeon: Mihir Lang MD;  [place] [facility] [place]    OPEN REDUCTION INTERNAL FIXATION FOOT Left 2023    Procedure: LISFRANC OPEN REDUCTION INTERNAL FIXATION, LEFT FOOT;  Surgeon: Bailey Ruiz DPM;  Location:  OR    SURGICAL HISTORY OF -       surgery on left index finger        Social History:     Social History     Tobacco Use    Smoking status: Former     Packs/day: 1.00     Years: 25.00     Pack years: 25.00     Types: Cigarettes, Cigars     Start date: 1973     Quit date: 1998     Years since quittin.6     Passive exposure: Past    Smokeless tobacco: Never    Tobacco comments:     N/A not a smoker   Vaping Use    Vaping Use: Never used   Substance Use Topics    Alcohol use: Yes     Alcohol/week: 10.0 standard drinks of alcohol     Comment: less than 6 per week    Drug use: Not Currently     Types: Amphetamines     Comment: Kratum        Family History:     Family History   Problem Relation Age of Onset    Cancer Mother         uterine    Other Cancer Mother         endometrial    Cerebrovascular Disease Mother     Substance Abuse Mother         Alcohol    C.A.D. Father     Hypertension Father     Hyperlipidemia Father     Breast Cancer Maternal Grandmother     Other Cancer Maternal Grandmother         Lung/brain    Substance Abuse Paternal Grandfather     Hypertension Brother     Substance Abuse Brother         Alcohol    Hyperlipidemia Brother     Breast Cancer Other     Breast Cancer Cousin     Other Cancer Other     Cerebrovascular Disease Other          Maternal Aunt    Glaucoma No family hx of     Macular Degeneration No family hx of         Medications:     Current Outpatient Medications   Medication Sig    ASPIRIN 325 MG OR TBEC ONE DAILY    atorvastatin (LIPITOR) 40 MG tablet Take 1 tablet (40 mg) by mouth daily    azelastine (ASTELIN) 0.1 % nasal spray Spray 1 spray into both nostrils 2 times daily    busPIRone (BUSPAR) 10 MG tablet Take 2 tablets (20 mg) by mouth 2 times daily    Cholecalciferol (VITAMIN D PO) Take 5,000 Units by mouth daily One tablet twice daily    co-enzyme Q-10 100 MG CAPS capsule Take 100 mg by mouth daily    FLUoxetine (PROZAC) 40 MG capsule Take 1 capsule (40 mg) by mouth daily    lisinopril-hydrochlorothiazide (ZESTORETIC) 20-12.5 MG tablet Take 1 tablet by mouth daily    MULTI VITAMIN MENS PO None Entered    tadalafil (CIALIS) 20 MG tablet TAKE 1/2 TO 1 TABLET BY MOUTH 30 MINUTES TO 1 HOUR BEFORE SEX    vitamin B complex with vitamin C (STRESS TAB) tablet Take 1 tablet by mouth daily     No current facility-administered medications for this visit.        Allergies:   No Known Allergies     Review of Systems:   As above     Physical Exam:   Vitals: BP (!) 147/79 (BP Location: Right arm, Patient Position: Sitting, Cuff Size: Adult Large)   Pulse 54   Wt (!) 154.8 kg (341 lb 4.8 oz)   BMI 47.60 kg/m     Repeat blood pressure normal  General: Seated comfortably in no acute distress.  Lungs: breathing comfortably  Neurologic:     Mental Status: Alert. Oriented to date, place, name. Initially said the city was [place]. 4/5 on delayed recall.      Cranial Nerves:  No dysarthria.      Motor: No tremors or other abnormal movements observed. Strength 5/5 in all extremities. Normal tone.   Sensation: Light touch sensation is intact in all extremities. Romberg is negative.   Gait: Slow and cautious casual gait, but steady. Moderate difficulties with heel, toe, and tandem gait, at least partly related to pain.   Reflexes: 1+ and  symmetric. No clonus.      Data reviewed on previous visits    Imaging:  MRI brain 10/16/2020  IMPRESSION:  1. Exam mildly limited by motion artifact.  2. Few minimal nonspecific white matter lesions.  3. No evidence for intracranial hemorrhage, acute infarct, or any  focal mass lesions.     Laboratory:  TSH, CMP wnl 8/2020  B12, CBC wnl 12/2020    PET brain 4/2021  IMPRESSION:  No significant metabolic deficits when compared to  control database. This may be due to the possible underlying dementia  being too early to characterize, versus the patient's symptomatology  being due to some other process than dementia.    Pertinent Investigations since last visit:   Labs 9/2023 - [place] [facility] Alzheimer's disease evaluation normal, Autoimmune encephalopathy panel pending, WBC 0, normal protein/glucose, Neurofilament light chain normal,          Assessment and Plan:   Assessment:  Patient is a 62 y/o male who presents for follow-up of suspected dementia. Cognitive changes of paranoia, hallucinations, delusions developed initially around spring 2020, with significant worsening by fall 2020. He had no prior history of psychiatric disease. Patient had neuropsychological testing with Dr Adam in 3/2021 which was concerning for significant dysfunction in frontal and subcortical networks, and in the clinical context, concerning for a neurodegenerative process causing mild dementia. PET brain and MRI brain were largely unrevealing. Neuropsychology testing was repeated in 8/2022, which showed variable results, but continued frontal dysfunction. Patient recently had consultation with Dr Gonzalez. CSF alzheimer's disease testing was normal. He recently stopped donepezil and Zyprexa. Cognitive symptoms are about the same, but anxiety has been more bothersome. He is going to discuss this with psychiatrist. We discussed that it would be helpful to repeat neuropsychology testing again to look for interval changes since 8/2022 evaluation.      Patient endorses generalized weakness, which I suspect is related to deconditioning. He will let us know if interested in physical therapy referral.     Patient reports with right shoulder pain, which has become more bothersome in recent weeks. Orthopedics referral was placed today.     Plan:  - Continue to follow-up with psychiatrist   - Follow-up in memory clinic as scheduled  - Repeat neuropsychology testing  - Ortho shoulder consult    Follow up in Neurology clinic after neuropsychology testing    Miguel Varela MD   of Neurology  [facility]      The total time of this encounter today amounted to 49 minutes. This time included time spent with the patient, prep work, ordering tests, and performing post visit documentation.

## 2023-09-19 NOTE — LETTER
9/19/2023         RE: Car Torres  [address]  [place] [address]        Dear Colleague,    Thank you for referring your patient, Car Torres, to the [facility] [place]. Please see a copy of my visit note below.    [facility] Neurology Follow Up Visit    Car Torres MRN# [id number]   Age: 63 year old YOB: 1960     Brief history of symptoms: The patient was initially seen in neurologic consultation on 12/23/2020 for evaluation of cognitive changes. Please see the comprehensive neurologic consultation note from that date in the Epic records for details.     Interval history:   In the last several months patient has weaned off of Zyprexa and donepezil. Memory symptoms have not worsened since weaning off of these medications. He has felt a bit more anxious since weaning off of them. He denies any issues with hallucinations or paranoid thoughts.     He has lost some weight and his appetite is not as big as it used to be.       Past Medical History:     Patient Active Problem List   Diagnosis     Sciatica     Morbid obesity (H)     Hyperlipidemia LDL goal <130     Hypertension goal BP (blood pressure) < 140/90     CKD (chronic kidney disease) stage 2, GFR 60-89 ml/min     Renal cyst     Diverticulosis of large intestine without hemorrhage     Pulmonary nodule     Coronary artery calcification     History of sinus bradycardia     ED (erectile dysfunction)     TIA (obstructive sleep apnea)     Major depressive disorder, recurrent episode, mild with atypical features (H)     Dementia associated with other underlying disease with behavioral disturbance (H)     Ulcer of left foot, unspecified ulcer stage (H)     Unspecified psychosis not due to a substance or known physiological condition (H)     Lisfranc dislocation, left, initial encounter     Lisfranc dislocation, left, subsequent encounter     Past Medical History:   Diagnosis Date     Dementia (H)       Depressive disorder      Heart disease 2018     Hypercholesterolemia      Hypertension goal BP (blood pressure) < 140/90      Nonspecific abnormal electrocardiogram (ECG) (EKG) 2007    Stress testing normal.      Sleep apnea     uses a c-pap, severe        Past Surgical History:     Past Surgical History:   Procedure Laterality Date     COLONOSCOPY N/A 2020    Procedure: COLONOSCOPY, WITH POLYPECTOMY, CLIP;  Surgeon: Mihir Lang MD;  Location: [facility] OR     COLONOSCOPY N/A 1/10/2022    Procedure: COLONOSCOPY, WITH POLYPECTOMY AND BIOPSY;  Surgeon: Mihir Lang MD;  Location:  GI     ESOPHAGOSCOPY, GASTROSCOPY, DUODENOSCOPY (EGD), COMBINED N/A 2021    Procedure: ESOPHAGOGASTRODUODENOSCOPY, WITH BIOPSY;  Surgeon: Mihir Lang MD;  Location: [facility] OR     OPEN REDUCTION INTERNAL FIXATION FOOT Left 2023    Procedure: LISFRANC OPEN REDUCTION INTERNAL FIXATION, LEFT FOOT;  Surgeon: Bailey Ruiz DPM;  Location:  OR     SURGICAL HISTORY OF -       surgery on left index finger        Social History:     Social History     Tobacco Use     Smoking status: Former     Packs/day: 1.00     Years: 25.00     Pack years: 25.00     Types: Cigarettes, Cigars     Start date: 1973     Quit date: 1998     Years since quittin.6     Passive exposure: Past     Smokeless tobacco: Never     Tobacco comments:     N/A not a smoker   Vaping Use     Vaping Use: Never used   Substance Use Topics     Alcohol use: Yes     Alcohol/week: 10.0 standard drinks of alcohol     Comment: less than 6 per week     Drug use: Not Currently     Types: Amphetamines     Comment: Viktoriya        Family History:     Family History   Problem Relation Age of Onset     Cancer Mother         uterine     Other Cancer Mother         endometrial     Cerebrovascular Disease Mother      Substance Abuse Mother         Alcohol     C.A.D. Father      Hypertension Father      Hyperlipidemia Father      Breast Cancer Maternal  Grandmother      Other Cancer Maternal Grandmother         Lung/brain     Substance Abuse Paternal Grandfather      Hypertension Brother      Substance Abuse Brother         Alcohol     Hyperlipidemia Brother      Breast Cancer Other      Breast Cancer Cousin      Other Cancer Other      Cerebrovascular Disease Other         Maternal Aunt     Glaucoma No family hx of      Macular Degeneration No family hx of         Medications:     Current Outpatient Medications   Medication Sig     ASPIRIN 325 MG OR TBEC ONE DAILY     atorvastatin (LIPITOR) 40 MG tablet Take 1 tablet (40 mg) by mouth daily     azelastine (ASTELIN) 0.1 % nasal spray Spray 1 spray into both nostrils 2 times daily     busPIRone (BUSPAR) 10 MG tablet Take 2 tablets (20 mg) by mouth 2 times daily     Cholecalciferol (VITAMIN D PO) Take 5,000 Units by mouth daily One tablet twice daily     co-enzyme Q-10 100 MG CAPS capsule Take 100 mg by mouth daily     FLUoxetine (PROZAC) 40 MG capsule Take 1 capsule (40 mg) by mouth daily     lisinopril-hydrochlorothiazide (ZESTORETIC) 20-12.5 MG tablet Take 1 tablet by mouth daily     MULTI VITAMIN MENS PO None Entered     tadalafil (CIALIS) 20 MG tablet TAKE 1/2 TO 1 TABLET BY MOUTH 30 MINUTES TO 1 HOUR BEFORE SEX     vitamin B complex with vitamin C (STRESS TAB) tablet Take 1 tablet by mouth daily     No current facility-administered medications for this visit.        Allergies:   No Known Allergies     Review of Systems:   As above     Physical Exam:   Vitals: BP (!) 147/79 (BP Location: Right arm, Patient Position: Sitting, Cuff Size: Adult Large)   Pulse 54   Wt (!) 154.8 kg (341 lb 4.8 oz)   BMI 47.60 kg/m     Repeat blood pressure normal  General: Seated comfortably in no acute distress.  Lungs: breathing comfortably  Neurologic:     Mental Status: Alert. Oriented to date, place, name. Initially said the city was [place]. 4/5 on delayed recall.      Cranial Nerves:  No dysarthria.      Motor: No tremors  or other abnormal movements observed. Strength 5/5 in all extremities. Normal tone.   Sensation: Light touch sensation is intact in all extremities. Romberg is negative.   Gait: Slow and cautious casual gait, but steady. Moderate difficulties with heel, toe, and tandem gait, at least partly related to pain.   Reflexes: 1+ and symmetric. No clonus.      Data reviewed on previous visits    Imaging:  MRI brain 10/16/2020  IMPRESSION:  1. Exam mildly limited by motion artifact.  2. Few minimal nonspecific white matter lesions.  3. No evidence for intracranial hemorrhage, acute infarct, or any  focal mass lesions.     Laboratory:  TSH, CMP wnl 8/2020  B12, CBC wnl 12/2020    PET brain 4/2021  IMPRESSION:  No significant metabolic deficits when compared to  control database. This may be due to the possible underlying dementia  being too early to characterize, versus the patient's symptomatology  being due to some other process than dementia.    Pertinent Investigations since last visit:   Labs 9/2023 - [facility] Alzheimer's disease evaluation normal, Autoimmune encephalopathy panel pending, WBC 0, normal protein/glucose, Neurofilament light chain normal,          Assessment and Plan:   Assessment:  Patient is a 64 y/o male who presents for follow-up of suspected dementia. Cognitive changes of paranoia, hallucinations, delusions developed initially around spring 2020, with significant worsening by fall 2020. He had no prior history of psychiatric disease. Patient had neuropsychological testing with Dr Adam in 3/2021 which was concerning for significant dysfunction in frontal and subcortical networks, and in the clinical context, concerning for a neurodegenerative process causing mild dementia. PET brain and MRI brain were largely unrevealing. Neuropsychology testing was repeated in 8/2022, which showed variable results, but continued frontal dysfunction. Patient recently had consultation with Dr Gonzalez. CSF alzheimer's  disease testing was normal. He recently stopped donepezil and Zyprexa. Cognitive symptoms are about the same, but anxiety has been more bothersome. He is going to discuss this with psychiatrist. We discussed that it would be helpful to repeat neuropsychology testing again to look for interval changes since 8/2022 evaluation.     Patient endorses generalized weakness, which I suspect is related to deconditioning. He will let us know if interested in physical therapy referral.     Patient reports with right shoulder pain, which has become more bothersome in recent weeks. Orthopedics referral was placed today.     Plan:  - Continue to follow-up with psychiatrist   - Follow-up in memory clinic as scheduled  - Repeat neuropsychology testing  - Ortho shoulder consult    Follow up in Neurology clinic after neuropsychology testing    Miguel Varela MD   of Neurology  [facility]      The total time of this encounter today amounted to 49 minutes. This time included time spent with the patient, prep work, ordering tests, and performing post visit documentation.      Again, thank you for allowing me to participate in the care of your patient.        Sincerely,        Miguel Varela MD

## 2023-09-20 LAB
AMPA R AB CBA CSF: NEGATIVE
AMPHIPHYSIN AB TITR CSF IF: NEGATIVE {TITER}
ANNOTATION COMMENT IMP: NORMAL
CASPR2-IGG CBA, CSF: NEGATIVE
CV2 IGG TITR CSF: NEGATIVE {TITER}
DPPX IGG CSF QL IF: NEGATIVE
GABABR IGG CSF QL CBA IFA: NEGATIVE
GAD65 AB CSF-SCNC: 0 NMOL/L
GFAP IFA, CSF: NEGATIVE
GLIAL NUC TYPE 1 AB TITR CSF: NEGATIVE {TITER}
HU1 AB TITR CSF IF: NEGATIVE {TITER}
HU2 AB TITR CSF IF: NEGATIVE {TITER}
HU3 AB TITR CSF IF: NEGATIVE {TITER}
IGLON5 IGG CSF QL IF: NEGATIVE
IMMUNOLOGIST REVIEW: NEGATIVE
IMMUNOLOGIST REVIEW: NORMAL
LGI1-IGG CBA, CSF: NEGATIVE
MGLUR1 AB IFA, CSF: NEGATIVE
NEUROCHONDRIN IFA, CSF: NEGATIVE
NMDAR1 IGG CSF QL CBA IFA: NEGATIVE
PCA-1 AB TITR CSF: NEGATIVE {TITER}
PCA-2 AB TITR CSF: NEGATIVE {TITER}
PCA-TR AB TITR CSF IF: NEGATIVE {TITER}
SEPTIN-7 IFA, CSF: NEGATIVE

## 2023-09-27 DIAGNOSIS — F41.1 GAD (GENERALIZED ANXIETY DISORDER): ICD-10-CM

## 2023-09-28 RX ORDER — BUSPIRONE HYDROCHLORIDE 10 MG/1
20 TABLET ORAL 2 TIMES DAILY
Qty: 120 TABLET | Refills: 1 | Status: SHIPPED | OUTPATIENT
Start: 2023-09-28 | End: 2023-10-03

## 2023-09-28 NOTE — TELEPHONE ENCOUNTER
Date of Last Office Visit: 8/4/23  Date of Next Office Visit: 10/3/23  No shows since last visit: 0    Cancellations since last visit: 0  Medication requested:  busPIRone (BUSPAR) 10 MG tablet Date last ordered: 8/4/23 Qty: 180 Refills: 0       Review of [place] ?: NA    Lapse in medication adherence greater than 5 days?: Possibly 10 days.  If yes, call patient and gather details: RN reached patient and wife via phone. Teresa has been trying to refill for weeks. Has been breaking 15 mg of extra Buspar prescription bottle to take 20 mg each time. Patient doing well and reports that he has enough of this extras pills to get him to 10/3/23 appointment with Juany Mathur CNP. Patient and wife would like [employer] message with response or update if this medication is not refilled by juany and if there is a decision to wait till 10/3/23 appointment to review medication and refill.  Medication refill request verified as identical to current order?: yes  Result of Last DAM, VPA, Li+ Level, CBC, or Carbamazepine Level (at or since last visit): N/A    Last visit treatment plan:  8/4/23  Treatment Plan:      1.  Buspirone 20 mg 2 times daily  2.  Donepezil has been discontinued  3.  Fluoxetine 40 mg daily  4.  Gabapentin discontinued  5.  Olanzapine discontinued    Continue all other medications as reviewed per electronic medical record today.   Safety plan reviewed. To the Emergency Department as needed or call after hours crisis line at [phone] or [phone]. [place] Crisis Text Line. Text [place] to 037104 or Suicide LifeLine Chat: suicidepreventionlifeline.org/chat/  To schedule individual or family therapy, call [facility] at [phone]  Schedule an appointment with me in October or sooner as needed. Call [facility] at [phone] to schedule.  Follow up with primary care provider as planned or for acute medical concerns.  Call the psychiatric nurse line with medication questions or  concerns at [phone]  Sonihart may be used to communicate with your provider, but this is not intended to be used for emergencies.    Crisis Resources:    National Suicide Prevention Lifeline: 180.415.7972 (TTY: 570.900.5559). Call anytime for help.  (www.suicidepreventionlifeline.org)  National [place] on Mental Illness (www.ana maría.org): 774.532.1601 or 242-978-5469.   Mental Health Association (www.mentalhealth.org): 117.224.2062 or 456-681-3330.  [place] Crisis Text Line: Text [place] to 474938  Suicide LifeLine Chat: suicideyuback.org/chat    Administrative Billing:   Time spent with patient includes counseling and coordination of care regarding above diagnoses and treatment plan.    Patient Status:  This is a continuous care patient and medications will be prescribed by the psychiatric provider until further indicated.    Signed:   BELEN Singh-BC   Psychiatry    []Medication refilled per  Medication Refill in Ambulatory Care  policy.  [x]Medication unable to be refilled by RN due to criteria not met as indicated below:    []Eligibility - not seen in the last year   []Supervision - no future appointment   []Compliance - no shows, cancellations or lapse in therapy   []Verification - order discrepancy   []Controlled medication   []Medication not included in policy   []90-day supply request   [x]Other - Patient reports taking buspar 15 mg from old prescription. Provider to review refill request.

## 2023-09-29 ENCOUNTER — OFFICE VISIT (OUTPATIENT)
Dept: ORTHOPEDICS | Facility: CLINIC | Age: 63
End: 2023-09-29
Attending: INTERNAL MEDICINE
Payer: COMMERCIAL

## 2023-09-29 ENCOUNTER — ANCILLARY PROCEDURE (OUTPATIENT)
Dept: GENERAL RADIOLOGY | Facility: CLINIC | Age: 63
End: 2023-09-29
Attending: PEDIATRICS
Payer: COMMERCIAL

## 2023-09-29 VITALS — BODY MASS INDEX: 44.1 KG/M2 | HEIGHT: 71 IN | WEIGHT: 315 LBS

## 2023-09-29 DIAGNOSIS — M75.101 ROTATOR CUFF SYNDROME OF RIGHT SHOULDER: Primary | ICD-10-CM

## 2023-09-29 DIAGNOSIS — M25.511 RIGHT SHOULDER PAIN, UNSPECIFIED CHRONICITY: ICD-10-CM

## 2023-09-29 PROCEDURE — 99204 OFFICE O/P NEW MOD 45 MIN: CPT | Mod: 25 | Performed by: PEDIATRICS

## 2023-09-29 PROCEDURE — 20610 DRAIN/INJ JOINT/BURSA W/O US: CPT | Mod: RT | Performed by: PEDIATRICS

## 2023-09-29 PROCEDURE — 73030 X-RAY EXAM OF SHOULDER: CPT | Mod: TC | Performed by: RADIOLOGY

## 2023-09-29 RX ADMIN — LIDOCAINE HYDROCHLORIDE 2 ML: 10 INJECTION, SOLUTION INFILTRATION; PERINEURAL at 13:56

## 2023-09-29 RX ADMIN — TRIAMCINOLONE ACETONIDE 40 MG: 40 INJECTION, SUSPENSION INTRA-ARTICULAR; INTRAMUSCULAR at 13:56

## 2023-09-29 NOTE — PATIENT INSTRUCTIONS
Reviewed nature of the right shoulder pain.  X-rays today show minimal underlying degenerative change, but this is more consistent with soft tissue source, rotator cuff.  Tearing not favored given overall exam findings, including function.  For next steps, we discussed considerations around additional imaging with MRI, rehab approach, and subacromial steroid injection.  Following discussion, home exercises reviewed today.  Would also offer referral to physical therapy, even if just for one-time visit.  If interested in PT referral, contact clinic.  Otherwise, right shoulder subacromial steroid injection done today.  Plan to monitor course 3 to 4 weeks with combination of injection and therapy exercises to begin.    If you have any further questions for your physician or physician s care team you can contact them thru Riidr or by calling [phone].

## 2023-09-29 NOTE — PROGRESS NOTES
ASSESSMENT & PLAN    Car was seen today for pain.    Diagnoses and all orders for this visit:    Rotator cuff syndrome of right shoulder  -     Large Joint Injection/Arthocentesis: R subacromial bursa    Right shoulder pain, unspecified chronicity  -     XR Shoulder Right G/E 3 Views; Future  -     Orthopedic  Referral  -     Large Joint Injection/Arthocentesis: R subacromial bursa      Injection done today, more from his wife's prompting as it was from patient desire to do injection, though pt did desire it and consent.  Also discussed PT. His wife noted financial strain, so home exercises reviewed. May contact clinic for PT referral.  Questions answered. Discussed signs and symptoms that may indicate more serious issues; the patient was instructed to seek appropriate care if noted. Jerry indicates understanding of these issues and agrees with the plan.    See Patient Instructions  Patient Instructions   Reviewed nature of the right shoulder pain.  X-rays today show minimal underlying degenerative change, but this is more consistent with soft tissue source, rotator cuff.  Tearing not favored given overall exam findings, including function.  For next steps, we discussed considerations around additional imaging with MRI, rehab approach, and subacromial steroid injection.  Following discussion, home exercises reviewed today.  Would also offer referral to physical therapy, even if just for one-time visit.  If interested in PT referral, contact clinic.  Otherwise, right shoulder subacromial steroid injection done today.  Plan to monitor course 3 to 4 weeks with combination of injection and therapy exercises to begin.    If you have any further questions for your physician or physician s care team you can contact them thru Tactilehart or by calling [phone].      Beka Santillan [facility]      CC: Miguel Varela      -----  Chief Complaint   Patient presents with  "   Right Shoulder - Pain       SUBJECTIVE  Car Torres is a/an 63 year old male who is seen in consultation at the request of  Miguel Varela M.D. for evaluation of Right shoulder.     The patient is seen with their wife.  The patient is Right handed    Onset: 2-3 month(s) ago. Reports insidious onset without acute precipitating event. Does note that he had a fall back in December where he fractured his left foot  Location of Pain: right shoulder, pain located around the shoulder blade, lateral shoulder and bicep  Worsened by: Pressure with getting up from a chair, driving, pain with rest as well  Better with: ibuprofen  Treatments tried: ibuprofen  Associated symptoms: numbness, intermittently with ring and little finger.    Orthopedic/Surgical history: NO  Social History/Occupation: Retired      **  Above information per rooming staff.  Additional history:  Pain in right anterolateral shoulder, superolateral shoulder. Also some pinching posteriorly, periscapular. Also some cramping sensation in right biceps area.  Symptoms constant. Notes with driving, some aching.  Question if from getting up out of lower chair he used to have, otherwise no injury.        REVIEW OF SYSTEMS:  Review of Systems    OBJECTIVE:  Ht 1.803 m (5' 11\")   Wt (!) 154.7 kg (341 lb)   BMI 47.56 kg/m           Right Shoulder exam    ROM:      forward flexion grossly full, ~170-180, mild pain (\"ache\")        abduction ~160-170, more challenging than flexion, again some pain       internal rotation thumb upper lumbar, stiffness, some pain       external rotation with pain end range    Tender: none focal    Strength:      abduction 5/5       internal rotation 5/5       external rotation 5-/5  Some pain with ER    Impingement testing:      positive (+) Odom       Min pain empty can    Skin:      no visible deformities       well perfused       capillary refill brisk          RADIOLOGY:  Final results and radiologist's interpretation, " available in the [facility] health record.  Images were reviewed with the patient in the office today.  My personal interpretation of the performed imaging: mild GH arthrosis with inferior humeral head spur, small. Also mild irregularity greater tuberosity on AP view, appears chronic.      Recent Results (from the past 24 hour(s))   XR Shoulder Right G/E 3 Views    Narrative    XR SHOULDER RIGHT G/E 3 VIEWS   9/29/2023 1:11 PM     HISTORY: Right shoulder pain, unspecified chronicity  COMPARISON: None.       Impression    IMPRESSION: No acute, displaced fracture or dislocation. There is mild  glenohumeral degenerative arthrosis.    CHUCK GANDHI MD         SYSTEM ID:  [id number]         Large Joint Injection/Arthocentesis: R subacromial bursa    Date/Time: 9/29/2023 1:56 PM    Performed by: Beka Santillan DO  Authorized by: Beka Santillan DO    Indications:  Pain  Needle Size:  25 G  Guidance: landmark guided    Approach:  Posterolateral  Location:  Shoulder      Site:  R subacromial bursa  Medications:  40 mg triamcinolone 40 MG/ML; 2 mL lidocaine 1 %  Outcome:  Tolerated well, no immediate complications  Procedure discussed: discussed risks, benefits, and alternatives    Consent Given by:  Patient  Timeout: timeout called immediately prior to procedure    Prep: patient was prepped and draped in usual sterile fashion              Review of prior external note(s) from - neurology

## 2023-09-29 NOTE — LETTER
9/29/2023         RE: Car Torres  [address]  [place] [address]        Dear Colleague,    Thank you for referring your patient, Car Torres, to the [facility] [place]. Please see a copy of my visit note below.    ASSESSMENT & PLAN    Car was seen today for pain.    Diagnoses and all orders for this visit:    Rotator cuff syndrome of right shoulder  -     Large Joint Injection/Arthocentesis: R subacromial bursa    Right shoulder pain, unspecified chronicity  -     XR Shoulder Right G/E 3 Views; Future  -     Orthopedic  Referral  -     Large Joint Injection/Arthocentesis: R subacromial bursa      Injection done today, more from his wife's prompting as it was from patient desire to do injection, though pt did desire it and consent.  Also discussed PT. His wife noted financial strain, so home exercises reviewed. May contact clinic for PT referral.  Questions answered. Discussed signs and symptoms that may indicate more serious issues; the patient was instructed to seek appropriate care if noted. Jerry indicates understanding of these issues and agrees with the plan.    See Patient Instructions  Patient Instructions   Reviewed nature of the right shoulder pain.  X-rays today show minimal underlying degenerative change, but this is more consistent with soft tissue source, rotator cuff.  Tearing not favored given overall exam findings, including function.  For next steps, we discussed considerations around additional imaging with MRI, rehab approach, and subacromial steroid injection.  Following discussion, home exercises reviewed today.  Would also offer referral to physical therapy, even if just for one-time visit.  If interested in PT referral, contact clinic.  Otherwise, right shoulder subacromial steroid injection done today.  Plan to monitor course 3 to 4 weeks with combination of injection and therapy exercises to begin.    If you have any further  "questions for your physician or physician s care team you can contact them thru [facility] or by calling [phone].      Beka Santillan DO  [facility] [place]      CC: Miguel Varela      -----  Chief Complaint   Patient presents with     Right Shoulder - Pain       SUBJECTIVE  Car Torres is a/an 63 year old male who is seen in consultation at the request of  Miguel Varela M.D. for evaluation of Right shoulder.     The patient is seen with their wife.  The patient is Right handed    Onset: 2-3 month(s) ago. Reports insidious onset without acute precipitating event. Does note that he had a fall back in December where he fractured his left foot  Location of Pain: right shoulder, pain located around the shoulder blade, lateral shoulder and bicep  Worsened by: Pressure with getting up from a chair, driving, pain with rest as well  Better with: ibuprofen  Treatments tried: ibuprofen  Associated symptoms: numbness, intermittently with ring and little finger.    Orthopedic/Surgical history: NO  Social History/Occupation: Retired      **  Above information per rooming staff.  Additional history:  Pain in right anterolateral shoulder, superolateral shoulder. Also some pinching posteriorly, periscapular. Also some cramping sensation in right biceps area.  Symptoms constant. Notes with driving, some aching.  Question if from getting up out of lower chair he used to have, otherwise no injury.        REVIEW OF SYSTEMS:  Review of Systems    OBJECTIVE:  Ht 1.803 m (5' 11\")   Wt (!) 154.7 kg (341 lb)   BMI 47.56 kg/m           Right Shoulder exam    ROM:      forward flexion grossly full, ~170-180, mild pain (\"ache\")        abduction ~160-170, more challenging than flexion, again some pain       internal rotation thumb upper lumbar, stiffness, some pain       external rotation with pain end range    Tender: none focal    Strength:      abduction 5/5       internal rotation 5/5       " external rotation 5-/5  Some pain with ER    Impingement testing:      positive (+) Odom       Min pain empty can    Skin:      no visible deformities       well perfused       capillary refill brisk          RADIOLOGY:  Final results and radiologist's interpretation, available in the [facility] health record.  Images were reviewed with the patient in the office today.  My personal interpretation of the performed imaging: mild GH arthrosis with inferior humeral head spur, small. Also mild irregularity greater tuberosity on AP view, appears chronic.      Recent Results (from the past 24 hour(s))   XR Shoulder Right G/E 3 Views    Narrative    XR SHOULDER RIGHT G/E 3 VIEWS   9/29/2023 1:11 PM     HISTORY: Right shoulder pain, unspecified chronicity  COMPARISON: None.       Impression    IMPRESSION: No acute, displaced fracture or dislocation. There is mild  glenohumeral degenerative arthrosis.    CHUCK GANDHI MD         SYSTEM ID:  [id number]         Large Joint Injection/Arthocentesis: R subacromial bursa    Date/Time: 9/29/2023 1:56 PM    Performed by: Beka Santillan DO  Authorized by: Beka Santillan DO    Indications:  Pain  Needle Size:  25 G  Guidance: landmark guided    Approach:  Posterolateral  Location:  Shoulder      Site:  R subacromial bursa  Medications:  40 mg triamcinolone 40 MG/ML; 2 mL lidocaine 1 %  Outcome:  Tolerated well, no immediate complications  Procedure discussed: discussed risks, benefits, and alternatives    Consent Given by:  Patient  Timeout: timeout called immediately prior to procedure    Prep: patient was prepped and draped in usual sterile fashion              Review of prior external note(s) from - neurology             Again, thank you for allowing me to participate in the care of your patient.        Sincerely,        Beka Santillan DO

## 2023-09-30 RX ORDER — LIDOCAINE HYDROCHLORIDE 10 MG/ML
2 INJECTION, SOLUTION INFILTRATION; PERINEURAL
Status: DISCONTINUED | OUTPATIENT
Start: 2023-09-29 | End: 2024-03-17

## 2023-09-30 RX ORDER — TRIAMCINOLONE ACETONIDE 40 MG/ML
40 INJECTION, SUSPENSION INTRA-ARTICULAR; INTRAMUSCULAR
Status: DISCONTINUED | OUTPATIENT
Start: 2023-09-29 | End: 2024-03-17

## 2023-10-03 ENCOUNTER — VIRTUAL VISIT (OUTPATIENT)
Dept: PSYCHIATRY | Facility: CLINIC | Age: 63
End: 2023-10-03
Payer: MEDICARE

## 2023-10-03 DIAGNOSIS — F41.1 GAD (GENERALIZED ANXIETY DISORDER): ICD-10-CM

## 2023-10-03 DIAGNOSIS — F33.1 MAJOR DEPRESSIVE DISORDER, RECURRENT EPISODE, MODERATE (H): Primary | ICD-10-CM

## 2023-10-03 PROCEDURE — 99213 OFFICE O/P EST LOW 20 MIN: CPT | Mod: VID | Performed by: NURSE PRACTITIONER

## 2023-10-03 RX ORDER — BUSPIRONE HYDROCHLORIDE 10 MG/1
20 TABLET ORAL 2 TIMES DAILY
Qty: 120 TABLET | Refills: 1 | Status: SHIPPED | OUTPATIENT
Start: 2023-10-03 | End: 2023-12-30

## 2023-10-03 RX ORDER — HYDROXYZINE HYDROCHLORIDE 10 MG/1
10 TABLET, FILM COATED ORAL 2 TIMES DAILY PRN
Qty: 30 TABLET | Refills: 3 | Status: SHIPPED | OUTPATIENT
Start: 2023-10-03 | End: 2023-12-27

## 2023-10-03 ASSESSMENT — PATIENT HEALTH QUESTIONNAIRE - PHQ9
SUM OF ALL RESPONSES TO PHQ QUESTIONS 1-9: 1
SUM OF ALL RESPONSES TO PHQ QUESTIONS 1-9: 1
10. IF YOU CHECKED OFF ANY PROBLEMS, HOW DIFFICULT HAVE THESE PROBLEMS MADE IT FOR YOU TO DO YOUR WORK, TAKE CARE OF THINGS AT HOME, OR GET ALONG WITH OTHER PEOPLE: NOT DIFFICULT AT ALL

## 2023-10-03 ASSESSMENT — PAIN SCALES - GENERAL: PAINLEVEL: NO PAIN (0)

## 2023-10-03 NOTE — PATIENT INSTRUCTIONS
"Patient Education   The Panel Psychiatry Program  What to Expect  Here's what to expect in the Panel Psychiatry Program.   About the program  You'll be meeting with a psychiatric doctor to check your mental health. A psychiatric doctor helps you deal with troubling thoughts and feelings by giving you medicine. They'll make sure you know the plan for your care. You may see them for a long time. When you're feeling better, they may refer you back to seeing your family doctor.   If you have any questions, we'll be glad to talk to you.  About visits  Be open  At your visits, please talk openly about your problems. It may feel hard, but it's the best way for us to help you.  Cancelling visits  If you can't come to your visit, please call us right away at [phone]. If you don't cancel at least 24 hours (1 full day) before your visit, that's \"late cancellation.\"  Not showing up for your visits  Being very late is the same as not showing up. You'll be a \"no show\" if:  You're more than 15 minutes late for a 30-minute (half hour) visit.  You're more than 30 minutes late for a 60-minute (full hour) visit.  If you cancel late or don't show up 2 times within 6 months, we may end your care.  Getting help between visits  If you need help between visits, you can call us Monday to Friday from 8 a.m. to 4:30 p.m. at [phone].  Emergency care  Call 911 or go to the nearest emergency department if your life or someone else's life is in danger.  Call 988 anytime to reach the national Suicide and Crisis hotline.  Medicine refills  To refill your medicine, call your pharmacy. You can also call [facility]'s [facility]al [facility] at [phone], Monday to Friday, 8 a.m. to 4:30 p.m. It can take 1 to 3 business days to get a refill.   Forms, letters, and tests  You may have papers to fill out, like FMLA, short-term disability, and workability. We can help you with these forms at your visits, but you must have an " appointment. You may need more than 1 visit for this, to be in an intensive therapy program, or both.  Before we can give you medicine for ADHD, we may refer you to get tested for it or confirm it another way.  We may not be able to give you an emotional support animal letter.  We don't do mental health checks ordered by the court.   We don't do mental health testing, but we can refer you to get tested.   Thank you for choosing us for your care.  For informational purposes only. Not to replace the advice of your health care provider. Copyright   2022 [facility]. All rights reserved. [employer] 416953 - 12/22.         Treatment Plan:      1.  Continue fluoxetine 40 mg daily  2.  Continue hydroxyzine 10 mg at bedtime and 5 mg during the day only as needed  3.  Continue buspirone 20 mg 2 times daily    Continue all other medications as reviewed per electronic medical record today.   Safety plan reviewed. To the Emergency Department as needed or call after hours crisis line at [phone] or [phone]. [place] Crisis Text Line. Text [place] to 684382 or Suicide LifeLine Chat: suicidepreventionlifeline.org/chat/  To schedule individual or family therapy, call [facility] at [phone]  Schedule an appointment with me in 2 months or sooner as needed. Call [facility] at [phone] to schedule.  Follow up with primary care provider as planned or for acute medical concerns.  Call the psychiatric nurse line with medication questions or concerns at [phone]  MyChart may be used to communicate with your provider, but this is not intended to be used for emergencies.    Crisis Resources:    National Suicide Prevention Lifeline: 175.353.5998 (TTY: 102.123.8132). Call anytime for help.  (www.suicidepreventionlifeline.org)  National [place] on Mental Illness (www.ana maría.org): 512.243.6958 or 041-402-2796.   Mental Health Association (www.mentalhealth.org): 881.493.9045 or  703-928-2410.  [place] Crisis Text Line: Text [place] to 681225  Suicide LifeLine Chat: suicidepreventionlifeline.org/chat

## 2023-10-03 NOTE — PROGRESS NOTES
"Virtual Visit Details    Type of service:  Video Visit   Video Start Time: 11:19 AM  Video End Time: 11:50 AM    Originating Location (pt. Location): Home    Distant Location (provider location):  Off-site  Platform used for Video Visit: [facility] Psychiatric Progress Note    Name: Car Torres   : 1960                    Primary Care Provider: Mihir Perez MD   Therapist: None    PHQ-9 scores:      4/3/2023     2:08 PM 2023     8:14 AM 2023    10:53 AM   PHQ-9 SCORE   PHQ-9 Total Score MyChart 0  0   PHQ-9 Total Score 0 0 0       SHAHNAZ-7 scores:      2021     2:00 PM 2022     1:00 PM 2022     1:02 PM   SHAHNAZ-7 SCORE   Total Score   2 (minimal anxiety)   Total Score 0 0 2       Patient Identification:    Patient is a 63 year old year old,   White Not  or  male  who presents for return visit with me.  Patient is currently retired. Patient attended the session with his wife Destiny , who they agreed to have interview with. Patient prefers to be called: \" Jerry\".    Current medications include: ASPIRIN 325 MG OR TBEC, ONE DAILY  atorvastatin (LIPITOR) 40 MG tablet, Take 1 tablet (40 mg) by mouth daily  azelastine (ASTELIN) 0.1 % nasal spray, Spray 1 spray into both nostrils 2 times daily  busPIRone (BUSPAR) 10 MG tablet, Take 2 tablets (20 mg) by mouth 2 times daily  Cholecalciferol (VITAMIN D PO), Take 5,000 Units by mouth daily One tablet twice daily  co-enzyme Q-10 100 MG CAPS capsule, Take 100 mg by mouth daily  FLUoxetine (PROZAC) 40 MG capsule, Take 1 capsule (40 mg) by mouth daily  hydrOXYzine (ATARAX) 10 MG tablet, Take 1 tablet (10 mg) by mouth 2 times daily as needed for anxiety  lisinopril-hydrochlorothiazide (ZESTORETIC) 20-12.5 MG tablet, Take 1 tablet by mouth daily  MULTI VITAMIN MENS PO, None Entered  tadalafil (CIALIS) 20 MG tablet, TAKE 1/2 TO 1 TABLET BY MOUTH 30 MINUTES TO 1 HOUR BEFORE SEX  vitamin B complex with vitamin C " (STRESS TAB) tablet, Take 1 tablet by mouth daily    lidocaine 1 % injection 2 mL  triamcinolone (KENALOG-40) injection 40 mg         The [place] Prescription Monitoring Program has been reviewed and there are no concerns about diversionary activity for controlled substances at this time.      I was able to review most recent Primary Care Provider, specialty provider, and therapy visit notes that I have access to.     Today, patient reports that finances are tight.  His wife has not heard him again for the past 2 days since he has been taking the hydroxyzine.  She reduced his dose of hydroxyzine in the morning to 1/2 tablet to avoid excessive sedation.  Jerry feels stable.  Depression and anxiety symptoms are very mild.  He engages in mohan activities on his telephone to exercise his mind.  He has stopped taking the donepezil with no ill effects.  He denies any recent use of kratom.     has a past medical history of Dementia (H), Depressive disorder, Heart disease (5/8/2018), Hypercholesterolemia, Hypertension goal BP (blood pressure) < 140/90, Nonspecific abnormal electrocardiogram (ECG) (EKG) (08/23/2007), and Sleep apnea.    He has no past medical history of Arthritis, Cancer (H), Cerebral infarction (H), Congestive heart failure (H), Congestive heart failure, unspecified, COPD (chronic obstructive pulmonary disease) (H), Diabetes (H), History of blood transfusion, Thyroid disease, or Uncomplicated asthma.    Social history updates:    Jerry lives with his wife.  They spend time with her grandchildren on a regular basis.  He is unemployed.    Substance use updates:    No alcohol use reported  Tobacco use: No    Vital Signs:   There were no vitals taken for this visit.    Labs:    Most recent laboratory results reviewed and no new labs.     Mental Status Examination:  Appearance: awake, alert and casual dress  Attitude: cooperative  Eye Contact:  adequate  Gait and Station: No dizziness or falls  Psychomotor  Behavior:  intact station, gait and muscle tone  Oriented to:  time, person, and place  Attention Span and Concentration:  Normal  Speech:   vtspeech: clear, coherent and Speaks: English  Mood:  anxious and depressed  Affect:  mood congruent  Associations:  no loose associations  Thought Process:  goal oriented  Thought Content:  no evidence of suicidal ideation or homicidal ideation, no auditory hallucinations present, and no visual hallucinations present  Recent and Remote Memory:   Fair  Not formally assessed. No amnesia.  Fund of Knowledge: appropriate  Insight:  good  Judgment: good  Impulse Control:  good    Suicide Risk Assessment:  Today Car Torres reports no thoughts to harm themself or others. In addition, there are notable risk factors for self-harm, including age, anxiety, and withdrawing. However, risk is mitigated by commitment to family, history of seeking help when needed, future oriented, denies suicidal intent or plan, and denies homicidal ideation, intent, or plan. Therefore, based on all available evidence including the factors cited above, Car Torres does not appear to be at imminent risk for self-harm, does not meet criteria for a 72-hr hold, and therefore remains appropriate for ongoing outpatient level of care.  A thorough assessment of risk factors related to suicide and self-harm have been reviewed and are noted above. The patient convincingly denies suicidality on several occasions. Local community safety resources printed and reviewed for patient to use if needed. There was no deceit detected, and the patient presented in a manner that was believable.     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  300.02 (F41.1) Generalized Anxiety Disorder    Medical comorbidities include:   Patient Active Problem List    Diagnosis Date Noted    Hypertension goal BP (blood pressure) < 140/90 10/20/2010      Priority: High    Hyperlipidemia LDL goal <130 06/11/2010     Priority: High    Lisfranc dislocation, left, initial encounter 12/28/2022     Priority: Medium    Lisfranc dislocation, left, subsequent encounter 12/28/2022     Priority: Medium    Dementia associated with other underlying disease with behavioral disturbance (H) 01/03/2022     Priority: Medium    Ulcer of left foot, unspecified ulcer stage (H) 01/03/2022     Priority: Medium    Unspecified psychosis not due to a substance or known physiological condition (H) 01/03/2022     Priority: Medium    Major depressive disorder, recurrent episode, mild with atypical features (H) 12/06/2021     Priority: Medium    TIA (obstructive sleep apnea) 07/19/2018     Priority: Medium     Home Sleep Apnea Testing - 7/18/18: 295 lbs 0 oz: AHI 45.3/hr. Supine AHI 61.9/hr. Oxygen [place] of 66%.  Baseline 93%.  Sp02 =< 88% for 47 minutes.        History of sinus bradycardia 06/07/2018     Priority: Medium    ED (erectile dysfunction) 06/07/2018     Priority: Medium    Coronary artery calcification 05/08/2018     Priority: Medium     Per CT CHEST WITHOUT CONTRAST April 23, 2018. CT calcium score planned.         Renal cyst 11/18/2015     Priority: Medium     Simple, non enhanced, 3.5 cm on right kidney, Bosniak 1 - x 2 stable findings        Diverticulosis of large intestine without hemorrhage 11/18/2015     Priority: Medium     Incidental finding on CT scan         Pulmonary nodule 11/18/2015     Priority: Medium     Incidental finding on CT scan, right posterior lung - considered benign / stable         CKD (chronic kidney disease) stage 2, GFR 60-89 ml/min 10/21/2010     Priority: Medium     60 to 80 - have discussed Lisinopril - will consider         Sciatica 02/27/2006     Priority: Medium    Morbid obesity (H) 02/27/2006     Priority: Medium       Assessment:    Car Torres presented with his wife.  Since starting the hydroxyzine for excessive anxiety, he has been  gagging less often.  The dose was discussed and he is now taking 10 mg at bedtime and 5 mg during the day only as needed.  He will continue buspirone twice daily for anxiety.  Depression is mild and no changes in his dose of fluoxetine will be made today.  Since stopping the donepezil there are no noticeable differences or changes in his memory function..    Medication side effects and alternatives were reviewed. Health promotion activities recommended and reviewed today. All questions addressed. Education and counseling completed regarding risks and benefits of medications and psychotherapy options.    Treatment Plan:      1.  Continue fluoxetine 40 mg daily  2.  Continue hydroxyzine 10 mg at bedtime and 5 mg during the day only as needed  3.  Continue buspirone 20 mg 2 times daily    Continue all other medications as reviewed per electronic medical record today.   Safety plan reviewed. To the Emergency Department as needed or call after hours crisis line at [phone] or [phone]. [place] Crisis Text Line. Text [place] to 138418 or Suicide LifeLine Chat: suicidepreventionCourseNetworkingline.org/chat/  To schedule individual or family therapy, call [facility] at [phone]  Schedule an appointment with me in 2 months or sooner as needed. Call [facility] at [phone] to schedule.  Follow up with primary care provider as planned or for acute medical concerns.  Call the psychiatric nurse line with medication questions or concerns at [phone]  MyChart may be used to communicate with your provider, but this is not intended to be used for emergencies.    Crisis Resources:    National Suicide Prevention Lifeline: 757.370.4210 (TTY: 214.639.2122). Call anytime for help.  (www.suicidepreventionlifeline.org)  National [place] on Mental Illness (www.ana maría.org): 650.927.7749 or 287-348-3335.   Mental Health Association (www.mentalhealth.org): 631.281.4021 or 996-870-9733.  [facility]  Text Line: Text [place] to 583262  Suicide LifeLine Chat: suicidepreventionlifeline.org/chat    Administrative Billing:   Time spent with patient includes counseling and coordination of care regarding above diagnoses and treatment plan.    Patient Status:  This is a continuous care patient and medications will be prescribed by the psychiatric provider until further indicated.    Signed:   BECCA SinghP-BC   Psychiatry

## 2023-10-05 ENCOUNTER — MYC MEDICAL ADVICE (OUTPATIENT)
Dept: FAMILY MEDICINE | Facility: CLINIC | Age: 63
End: 2023-10-05
Payer: COMMERCIAL

## 2023-10-05 NOTE — TELEPHONE ENCOUNTER
Patient Quality Outreach    Patient is due for the following:   Hypertension -  Hypertension follow-up visit  Depression  -  DAP      Topic Date Due    Pneumococcal Vaccine (1 - PCV) Never done    Flu Vaccine (1) 09/01/2023    COVID-19 Vaccine (4 - 2023-24 season) 09/01/2023       Next Steps:   Patient has upcoming appointment, these items will be addressed at that time.    Type of outreach:    Chart review performed, no outreach needed.      Questions for provider review:    None           Clare Champagne CMA  Chart routed to Care Team.

## 2023-10-31 ENCOUNTER — IMMUNIZATION (OUTPATIENT)
Dept: NURSING | Facility: CLINIC | Age: 63
End: 2023-10-31
Payer: MEDICARE

## 2023-10-31 ENCOUNTER — PATIENT OUTREACH (OUTPATIENT)
Dept: GERIATRIC MEDICINE | Facility: CLINIC | Age: 63
End: 2023-10-31

## 2023-10-31 PROCEDURE — 90480 ADMN SARSCOV2 VAC 1/ONLY CMP: CPT

## 2023-10-31 PROCEDURE — 91320 SARSCV2 VAC 30MCG TRS-SUC IM: CPT

## 2023-10-31 PROCEDURE — 90662 IIV NO PRSV INCREASED AG IM: CPT

## 2023-10-31 PROCEDURE — G0008 ADMIN INFLUENZA VIRUS VAC: HCPCS

## 2023-10-31 NOTE — LETTER
October 31, 2023    CAR RYAN  [address]  [place] [address]    Dear  Car,    Welcome to [facility] s MSC+ health program. My name is Gisselle Conte RN. I am your MSC+ care coordinator. You are eligible for Care Coordination through [facility] MSC+ plan.    As your care coordinator, we ll:  Meet to go over your care coordination benefits  Talk about your physical and mental health care needs   Review your preventative care needs  Create a plan that meets your needs with the services you choose    What happens next?  I ll call you soon to introduce myself and tell you more about my role. We ll then plan time to go over your health and safety needs. Our goal is to keep you as healthy and independent as possible.    Soon, you will receive a new MSC+ member identification (ID) card from [facility]. When you receive it, please use this card along with your [place] Health Care Programs card and Prescription Drug Coverage Program card. When you receive, it please use this card where you get your health services. If you have Medicare, you will need to show your Medicare card when you get health services.    The [facility] care coordination program is voluntary and offered to you at no cost. If you wish to stop being in the care coordination program or have questions, call me at [phone]. If you reach my voicemail, leave a message and your phone number. TTY users, call the [place] Relay at 636 or [phone] (najchu-xy-ljasfv relay service).    Sincerely,         Gisselle Conte RN  [phone]  [email or web address]    [id number] accepted   [id number]       [id number] (07/2022)

## 2023-10-31 NOTE — PROGRESS NOTES
[facility] Care Coordination Contact    [facility] Care System Change (Transfer)    Member changed from [facility] to [facility]+    No home visit required because this care coordinator (CC) has received all required documentation from the previous CC.    Writer reviewed the following with member:    ER visits: No  Hospitalizations: No  TCU stays: No  Significant health status changes: NA  Falls/Injuries: No  ADL/IADL changes: No  Changes in services: No    CC spoke to member's wife Destiny and she reported member started receiving a pension and she sent in the change in finances to the [facility] which triggered the change in product. She thought she texted CC however she may have texted CC's office phone which does not have texting available.     Follow-Up Plan: Member informed of future contact, plan to f/u with member with at next regularly scheduled contact.  Contact information shared with member and family, encouraged member to call with any questions or concerns.    Gisselle Conte RN  [facility]  [phone]

## 2023-10-31 NOTE — PROGRESS NOTES
[facility] Care Coordination Contact    Member changed health plan products from [facility] MSHO to [facility] MSC+ effective 10/1/2023. CC to complete items on Product Change/ Health Plan Change check list including MMIS entry. [place] and other required tasks.   & wade leave behind mailed.     Shira Salinas  Care Management Specialist   [facility]   [phone]

## 2023-11-06 ENCOUNTER — PATIENT OUTREACH (OUTPATIENT)
Dept: CARE COORDINATION | Facility: CLINIC | Age: 63
End: 2023-11-06
Payer: COMMERCIAL

## 2023-11-20 ENCOUNTER — PATIENT OUTREACH (OUTPATIENT)
Dept: CARE COORDINATION | Facility: CLINIC | Age: 63
End: 2023-11-20
Payer: COMMERCIAL

## 2023-12-04 NOTE — COMMUNITY RESOURCES LIST (ENGLISH)
12/04/2023   [facility]  N/A  For questions about this resource list or additional care needs, please contact your primary care clinic or care manager.  Phone: [phone]   Email: N/A   Address: [address] [place] [address]   Hours: N/A        Food and Nutrition       Food pantry  1  [facility] Distance: 1.15 miles      Pickup   [address] [facility] [address] [place] [address]  Language: English  Hours: Mon - Fri 8:30 AM - 12:00 PM , Mon - Fri 1:00 PM - 4:00 PM  Fees: Free, Self Pay   Phone: (407) 130-9605 Email: devin@[facility] Website: https://www.[facility].org/usn/     2  [facility] Distance: 1.17 miles      [facility]   [address] [facility] [address] [facility] [facility] [address] [place] [address]  Language: English, Bruneian  Hours: Mon - Thu 8:30 AM - 4:00 PM  Fees: Free, Self Pay   Phone: (999) 312-6745 Email: devin@[facility] Website: https://[facility].org/[facility]/social-services-[facility]/     SNAP application assistance  3  [facility] Economic Assistance Department Distance: 1.17 miles      Phone/Virtual   [address] [address]  Language: English  Hours: Mon - Fri 8:15 AM - 4:00 PM  Fees: Free   Phone: (494) 136-6590 Email: paperwork@co.[place]. Website: http://www.[facility]./On license of [facility]/Economic-Assistance     4  [facility]   Family Wellness (AIFW) Distance: 4.48 miles      In-Person, Phone/Virtual   [address] [place] [address]  Language: Setswana, Upper sorbian, English, Gujarati, [place], Occitan, Slovenian, Japanese, Slovenian, Hebrew  Hours: Mon - Wed 9:00 AM - 5:00 PM , Thu 12:00 PM - 6:00 PM , Fri 9:00 AM - 5:00 PM , Sun 10:30 AM - 2:00 PM Appt. Only  Fees: Free   Phone: (341) 797-8302 Email: info@[employer].org Website: https://www.[employer].org/     Soup kitchen or free meals  5  [facility]  Hinduism - Family Table Meal Distance: 3.38 miles      Pickup   [address] [place] [address]  Language: English  Hours: Sat 11:30 AM - 12:30 PM  Fees: Free   Phone: (377) 743-1894 Email: guanakito@[facility] SystemMount Wachusett Community CollegeGulf Coast Veterans Health Care SystemStigni.bg Website: http://www.[facility].[employer]/     6  [employer] - Family Table Meal Distance: 3.69 miles      In-Person, Pickup   [address] [place] [address]  Language: English  Hours: Thu 5:30 PM - 6:30 PM  Fees: Free   Phone: (523) 854-7517 Email: office@[employer].org Website: http://www.[employer].org          Important Numbers & Websites       Emergency Services   911  [facility] Services   311  Poison Control   (492) 851-7515  Suicide Prevention Lifeline   (842) 990-3465 (TALK)  Child Abuse Hotline   (569) 206-4907 (4-A-Child)  Sexual Assault Hotline   (645) 315-6924 (HOPE)  National Runaway Safeline   (491) 582-1433 (RUNAWAY)  All-Options Talkline   (282) 804-2346  Substance Abuse Referral   (516) 911-4332 (HELP)

## 2023-12-06 ENCOUNTER — OFFICE VISIT (OUTPATIENT)
Dept: FAMILY MEDICINE | Facility: CLINIC | Age: 63
End: 2023-12-06
Payer: MEDICARE

## 2023-12-06 VITALS
RESPIRATION RATE: 18 BRPM | HEART RATE: 50 BPM | BODY MASS INDEX: 44.1 KG/M2 | DIASTOLIC BLOOD PRESSURE: 69 MMHG | OXYGEN SATURATION: 98 % | HEIGHT: 71 IN | TEMPERATURE: 97.8 F | WEIGHT: 315 LBS | SYSTOLIC BLOOD PRESSURE: 139 MMHG

## 2023-12-06 DIAGNOSIS — E78.5 HYPERLIPIDEMIA LDL GOAL <130: Chronic | ICD-10-CM

## 2023-12-06 DIAGNOSIS — Z86.79 HISTORY OF SINUS BRADYCARDIA: ICD-10-CM

## 2023-12-06 DIAGNOSIS — I10 HYPERTENSION GOAL BP (BLOOD PRESSURE) < 140/90: Chronic | ICD-10-CM

## 2023-12-06 DIAGNOSIS — I20.89 ANGINAL EQUIVALENT (H): ICD-10-CM

## 2023-12-06 DIAGNOSIS — G47.33 OSA (OBSTRUCTIVE SLEEP APNEA): Chronic | ICD-10-CM

## 2023-12-06 DIAGNOSIS — I25.10 CORONARY ARTERY CALCIFICATION: Primary | ICD-10-CM

## 2023-12-06 DIAGNOSIS — N18.2 CKD (CHRONIC KIDNEY DISEASE) STAGE 2, GFR 60-89 ML/MIN: ICD-10-CM

## 2023-12-06 LAB
ALBUMIN SERPL BCG-MCNC: 4.3 G/DL (ref 3.5–5.2)
ALP SERPL-CCNC: 70 U/L (ref 40–150)
ALT SERPL W P-5'-P-CCNC: 21 U/L (ref 0–70)
ANION GAP SERPL CALCULATED.3IONS-SCNC: 9 MMOL/L (ref 7–15)
AST SERPL W P-5'-P-CCNC: 21 U/L (ref 0–45)
BASOPHILS # BLD AUTO: 0 10E3/UL (ref 0–0.2)
BASOPHILS NFR BLD AUTO: 1 %
BILIRUB SERPL-MCNC: 0.5 MG/DL
BUN SERPL-MCNC: 24.9 MG/DL (ref 8–23)
CALCIUM SERPL-MCNC: 9.4 MG/DL (ref 8.8–10.2)
CHLORIDE SERPL-SCNC: 104 MMOL/L (ref 98–107)
CHOLEST SERPL-MCNC: 155 MG/DL
CREAT SERPL-MCNC: 1.02 MG/DL (ref 0.67–1.17)
CREAT UR-MCNC: 171 MG/DL
DEPRECATED HCO3 PLAS-SCNC: 27 MMOL/L (ref 22–29)
EGFRCR SERPLBLD CKD-EPI 2021: 82 ML/MIN/1.73M2
EOSINOPHIL # BLD AUTO: 0.3 10E3/UL (ref 0–0.7)
EOSINOPHIL NFR BLD AUTO: 5 %
ERYTHROCYTE [DISTWIDTH] IN BLOOD BY AUTOMATED COUNT: 12.8 % (ref 10–15)
FASTING STATUS PATIENT QL REPORTED: YES
GLUCOSE SERPL-MCNC: 104 MG/DL (ref 70–99)
HCT VFR BLD AUTO: 40.5 % (ref 40–53)
HDLC SERPL-MCNC: 54 MG/DL
HGB BLD-MCNC: 13.6 G/DL (ref 13.3–17.7)
IMM GRANULOCYTES # BLD: 0 10E3/UL
IMM GRANULOCYTES NFR BLD: 0 %
LDLC SERPL CALC-MCNC: 78 MG/DL
LYMPHOCYTES # BLD AUTO: 1.9 10E3/UL (ref 0.8–5.3)
LYMPHOCYTES NFR BLD AUTO: 34 %
MCH RBC QN AUTO: 30.8 PG (ref 26.5–33)
MCHC RBC AUTO-ENTMCNC: 33.6 G/DL (ref 31.5–36.5)
MCV RBC AUTO: 92 FL (ref 78–100)
MICROALBUMIN UR-MCNC: <12 MG/L
MICROALBUMIN/CREAT UR: NORMAL MG/G{CREAT}
MONOCYTES # BLD AUTO: 0.4 10E3/UL (ref 0–1.3)
MONOCYTES NFR BLD AUTO: 8 %
NEUTROPHILS # BLD AUTO: 2.9 10E3/UL (ref 1.6–8.3)
NEUTROPHILS NFR BLD AUTO: 53 %
NONHDLC SERPL-MCNC: 101 MG/DL
PLATELET # BLD AUTO: 194 10E3/UL (ref 150–450)
POTASSIUM SERPL-SCNC: 4.6 MMOL/L (ref 3.4–5.3)
PROT SERPL-MCNC: 6.7 G/DL (ref 6.4–8.3)
RBC # BLD AUTO: 4.42 10E6/UL (ref 4.4–5.9)
SODIUM SERPL-SCNC: 140 MMOL/L (ref 135–145)
TRIGL SERPL-MCNC: 117 MG/DL
TSH SERPL DL<=0.005 MIU/L-ACNC: 3.07 UIU/ML (ref 0.3–4.2)
WBC # BLD AUTO: 5.5 10E3/UL (ref 4–11)

## 2023-12-06 PROCEDURE — 82043 UR ALBUMIN QUANTITATIVE: CPT | Performed by: INTERNAL MEDICINE

## 2023-12-06 PROCEDURE — 85025 COMPLETE CBC W/AUTO DIFF WBC: CPT | Performed by: INTERNAL MEDICINE

## 2023-12-06 PROCEDURE — 82570 ASSAY OF URINE CREATININE: CPT | Performed by: INTERNAL MEDICINE

## 2023-12-06 PROCEDURE — 36415 COLL VENOUS BLD VENIPUNCTURE: CPT | Performed by: INTERNAL MEDICINE

## 2023-12-06 PROCEDURE — 99214 OFFICE O/P EST MOD 30 MIN: CPT | Mod: 25 | Performed by: INTERNAL MEDICINE

## 2023-12-06 PROCEDURE — 80061 LIPID PANEL: CPT | Performed by: INTERNAL MEDICINE

## 2023-12-06 PROCEDURE — 84443 ASSAY THYROID STIM HORMONE: CPT | Performed by: INTERNAL MEDICINE

## 2023-12-06 PROCEDURE — G0009 ADMIN PNEUMOCOCCAL VACCINE: HCPCS | Performed by: INTERNAL MEDICINE

## 2023-12-06 PROCEDURE — 80053 COMPREHEN METABOLIC PANEL: CPT | Performed by: INTERNAL MEDICINE

## 2023-12-06 PROCEDURE — 90677 PCV20 VACCINE IM: CPT | Performed by: INTERNAL MEDICINE

## 2023-12-06 NOTE — COMMUNITY RESOURCES LIST (ENGLISH)
12/06/2023   [facility]  N/A  For additional resource needs, please contact your health insurance member services or your primary care team.  Phone: [phone]   Email: N/A   Address: [address] [place] [address]   Hours: N/A        Food and Nutrition       Food pantry  1  [facility] Distance: 1.15 miles      Pickup   [address] [facility] [address] [place] [address]  Language: English  Hours: Mon - Fri 8:30 AM - 12:00 PM , Mon - Fri 1:00 PM - 4:00 PM  Fees: Free, Self Pay   Phone: (985) 107-3973 Email: devin@[facility] Website: https://www.[facility].org/usn/     2  [facility] Distance: 1.17 miles      Pick   [address] [facility] [address] [place] [address]  Language: English, Gabonese  Hours: Mon - Thu 8:30 AM - 4:00 PM  Fees: Free, Self Pay   Phone: (218) 698-3433 Email: devin@[facility].[employer] Website: https://[facility].org/[facility]/Weekend-a-gogo-services-office-[facility]/     SNAP application assistance  3  [employer] [place] Distance: 14.43 miles      Phone/Virtual   [address] [facility] [address] [place] [address]  Language: English, Hmong, Gabonese, Finnish, [place]er  Hours: Mon - Fri 8:30 AM - 4:30 PM  Fees: Free   Phone: (930) 792-8478 Email: helpline@[employer].org Website: https://www.[employer]ersolutions.org/programs/[place]-food-helpline/     4  [facility] Economic Assistance Department Distance: 1.17 miles      Phone/Virtual   [address] [facility] [address] [place] [address]  Language: English  Hours: Mon - Fri 8:15 AM - 4:00 PM  Fees: Free   Phone: (491) 427-4397 Email: paperwork@co.[place].mn. Website: http://www.[facility]./193/Economic-Assistance     Soup kitchen or free meals  5  [facility] - Family Table Meal Distance: 3.38 miles      [address] [place] [address]  Language:  English  Hours: Sat 11:30 AM - 12:30 PM  Fees: Free   Phone: (561) 510-4891 Email: guanakito@[facility].[employer] Website: http://www.[facility] CenterArclight Media Technology/     6  [facility] - Family Table Meal Distance: 3.69 miles      In-Person, Pickup   [address] [place] [address]  Language: English  Hours: Thu 5:30 PM - 6:30 PM  Fees: Free   Phone: (638) 781-7541 Email: office@[employer] Website: http://www.[employer].org          Important Numbers & Websites       [facility]   211 211itedway.org  Poison Control   (936) 742-8245 [place].org  Suicide and Crisis Lifeline   [address] [place], [place] [address]line.org  Childhelp [place] Child Abuse Hotline   150.109.3425 Childhelphotline.org  [place] Sexual Assault Hotline   (863) 442-4478 (HOPE) [facility].org  [place] Runaway Safeline   (311) 561-9030 (RUNAWAY) [facility].org  Pregnancy & Postpartum Support [place]   Call/text 817-026-3237 Ppsupportmn.org  Substance Abuse National Helpline ([facility]   334-710-HELP (5651) Findtreatment.gov  Emergency Services   911

## 2023-12-06 NOTE — PROGRESS NOTES
"  Assessment & Plan   Problem List Items Addressed This Visit       Hyperlipidemia LDL goal <130 (Chronic)    Relevant Orders    TSH with free T4 reflex (Completed)    Hypertension goal BP (blood pressure) < 140/90 (Chronic)    Relevant Orders    TSH with free T4 reflex (Completed)    CKD (chronic kidney disease) stage 2, GFR 60-89 ml/min (Chronic)    Relevant Orders    Albumin Random Urine Quantitative with Creat Ratio (Completed)    CBC with platelets and differential (Completed)    Comprehensive metabolic panel (BMP + Alb, Alk Phos, ALT, AST, Total. Bili, TP) (Completed)    Coronary artery calcification - Primary (Chronic)    Relevant Orders    Lipid panel reflex to direct LDL Non-fasting (Completed)    TIA (obstructive sleep apnea) (Chronic)    History of sinus bradycardia    Relevant Orders    REVIEW OF HEALTH MAINTENANCE PROTOCOL ORDERS (Completed)    Adult Cardiology Eval  Referral     Other Visit Diagnoses       Anginal equivalent        Relevant Orders    CBC with platelets and differential (Completed)    Adult Cardiology Eval  Referral           Patient with increasing fatigue and shortness of breath on exertion   Body habitus makes stress testing challenging   Reviewed previous evaluations   There is certainly a restrictive process at work but I wonder if he would benefit from angiogram   Patient working on diet and exercise changes   Cognitive status is stable              BMI:   Estimated body mass index is 47.84 kg/m  as calculated from the following:    Height as of this encounter: 1.803 m (5' 11\").    Weight as of this encounter: 155.6 kg (343 lb).   Weight management plan: Discussed healthy diet and exercise guidelines    Work on weight loss  Regular exercise    Mihir Perez MD  [facility] NORMA Edwards is a 63 year old, presenting for the following health issues:  Hypertension        12/6/2023     9:33 AM   Additional Questions   Roomed by Cande" "      History of Present Illness       Hypertension: He presents for follow up of hypertension.  He does not check blood pressure  regularly outside of the clinic. Outpatient blood pressures have not been over 140/90. He follows a low salt diet.     Vascular Disease:  He presents for follow up of vascular disease.     He never takes nitroglycerin. He takes daily aspirin.    Reason for visit:  Address potential cardiac issues and follow up on general healthmatters    He eats 2-3 servings of fruits and vegetables daily.He consumes 1 sweetened beverage(s) daily.He exercises with enough effort to increase his heart rate 9 or less minutes per day.  He exercises with enough effort to increase his heart rate 3 or less days per week.   He is taking medications regularly.     Increased shortness of breath on exertion  Winded and lower in stamina               Review of Systems   Constitutional, HEENT, cardiovascular, pulmonary, gi and gu systems are negative, except as otherwise noted.      Objective    /69 (BP Location: Left arm, Patient Position: Chair, Cuff Size: Adult Large)   Pulse 50   Temp 97.8  F (36.6  C)   Resp 18   Ht 1.803 m (5' 11\")   Wt (!) 155.6 kg (343 lb)   SpO2 98%   BMI 47.84 kg/m    Body mass index is 47.84 kg/m .  Physical Exam   GENERAL: healthy, alert and no distress  EYES: Eyes grossly normal to inspection, PERRL and conjunctivae and sclerae normal  HENT: ear canals and TM's normal, nose and mouth without ulcers or lesions  NECK: no adenopathy, no asymmetry, masses, or scars and thyroid normal to palpation  RESP: lungs clear to auscultation - no rales, rhonchi or wheezes  CV: regular rate and rhythm, normal S1 S2, no S3 or S4, no murmur, click or rub, no peripheral edema and peripheral pulses strong  ABDOMEN: soft, nontender, no hepatosplenomegaly, no masses and bowel sounds normal  MS: no gross musculoskeletal defects noted, no edema  SKIN: no suspicious lesions or rashes  NEURO: Normal " strength and tone, mentation intact and speech normal  BACK: no CVA tenderness, no paralumbar tenderness  PSYCH: mentation appears normal, affect normal/bright  LYMPH: no cervical, supraclavicular, axillary, or inguinal adenopathy    Results for orders placed or performed in visit on 12/06/23   Lipid panel reflex to direct LDL Non-fasting     Status: None   Result Value Ref Range    Cholesterol 155 <200 mg/dL    Triglycerides 117 <150 mg/dL    Direct Measure HDL 54 >=40 mg/dL    LDL Cholesterol Calculated 78 <=100 mg/dL    Non HDL Cholesterol 101 <130 mg/dL    Patient Fasting > 8hrs? Yes     Narrative    Cholesterol  Desirable:  <200 mg/dL    Triglycerides  Normal:  Less than 150 mg/dL  Borderline High:  150-199 mg/dL  High:  200-499 mg/dL  Very High:  Greater than or equal to 500 mg/dL    Direct Measure HDL  Female:  Greater than or equal to 50 mg/dL   Male:  Greater than or equal to 40 mg/dL    LDL Cholesterol  Desirable:  <100mg/dL  Above Desirable:  100-129 mg/dL   Borderline High:  130-159 mg/dL   High:  160-189 mg/dL   Very High:  >= 190 mg/dL    Non HDL Cholesterol  Desirable:  130 mg/dL  Above Desirable:  130-159 mg/dL  Borderline High:  160-189 mg/dL  High:  190-219 mg/dL  Very High:  Greater than or equal to 220 mg/dL   TSH with free T4 reflex     Status: Normal   Result Value Ref Range    TSH 3.07 0.30 - 4.20 uIU/mL   Comprehensive metabolic panel (BMP + Alb, Alk Phos, ALT, AST, Total. Bili, TP)     Status: Abnormal   Result Value Ref Range    Sodium 140 135 - 145 mmol/L    Potassium 4.6 3.4 - 5.3 mmol/L    Carbon Dioxide (CO2) 27 22 - 29 mmol/L    Anion Gap 9 7 - 15 mmol/L    Urea Nitrogen 24.9 (H) 8.0 - 23.0 mg/dL    Creatinine 1.02 0.67 - 1.17 mg/dL    GFR Estimate 82 >60 mL/min/1.73m2    Calcium 9.4 8.8 - 10.2 mg/dL    Chloride 104 98 - 107 mmol/L    Glucose 104 (H) 70 - 99 mg/dL    Alkaline Phosphatase 70 40 - 150 U/L    AST 21 0 - 45 U/L    ALT 21 0 - 70 U/L    Protein Total 6.7 6.4 - 8.3 g/dL     Albumin 4.3 3.5 - 5.2 g/dL    Bilirubin Total 0.5 <=1.2 mg/dL   CBC with platelets and differential     Status: None   Result Value Ref Range    WBC Count 5.5 4.0 - 11.0 10e3/uL    RBC Count 4.42 4.40 - 5.90 10e6/uL    Hemoglobin 13.6 13.3 - 17.7 g/dL    Hematocrit 40.5 40.0 - 53.0 %    MCV 92 78 - 100 fL    MCH 30.8 26.5 - 33.0 pg    MCHC 33.6 31.5 - 36.5 g/dL    RDW 12.8 10.0 - 15.0 %    Platelet Count 194 150 - 450 10e3/uL    % Neutrophils 53 %    % Lymphocytes 34 %    % Monocytes 8 %    % Eosinophils 5 %    % Basophils 1 %    % Immature Granulocytes 0 %    Absolute Neutrophils 2.9 1.6 - 8.3 10e3/uL    Absolute Lymphocytes 1.9 0.8 - 5.3 10e3/uL    Absolute Monocytes 0.4 0.0 - 1.3 10e3/uL    Absolute Eosinophils 0.3 0.0 - 0.7 10e3/uL    Absolute Basophils 0.0 0.0 - 0.2 10e3/uL    Absolute Immature Granulocytes 0.0 <=0.4 10e3/uL   Albumin Random Urine Quantitative with Creat Ratio     Status: None   Result Value Ref Range    Creatinine Urine mg/dL 171.0 mg/dL    Albumin Urine mg/L <12.0 mg/L    Albumin Urine mg/g Cr     CBC with platelets and differential     Status: None    Narrative    The following orders were created for panel order CBC with platelets and differential.  Procedure                               Abnormality         Status                     ---------                               -----------         ------                     CBC with platelets and d...[[id number]]                      Final result                 Please view results for these tests on the individual orders.

## 2023-12-08 ENCOUNTER — OFFICE VISIT (OUTPATIENT)
Dept: NEUROLOGY | Facility: CLINIC | Age: 63
End: 2023-12-08
Payer: MEDICARE

## 2023-12-08 VITALS
BODY MASS INDEX: 47.55 KG/M2 | SYSTOLIC BLOOD PRESSURE: 132 MMHG | TEMPERATURE: 97 F | DIASTOLIC BLOOD PRESSURE: 77 MMHG | HEART RATE: 72 BPM | WEIGHT: 315 LBS

## 2023-12-08 DIAGNOSIS — F29 PSYCHOSIS, UNSPECIFIED PSYCHOSIS TYPE (H): ICD-10-CM

## 2023-12-08 DIAGNOSIS — R41.89 COGNITIVE CHANGE: Primary | ICD-10-CM

## 2023-12-08 NOTE — LETTER
12/8/2023       RE: Car Torres  [address]  [place] [address]     Dear Colleague,    Thank you for referring your patient, Car Torres, to the  [facility] at [facility]. Please see a copy of my visit note below.    CC: Follow Up    Prior History:  Car Torres is a 63 year old male who presents today for a follow-up visit. Please see my initial encounter dated 7/1/23 for a full review of the patient's history. In summary, Mr. Torres has a pertinent past medical history of hypertension, hypercholesterolemia, sleep apnea using CPAP, depression, methamphematine use (now in remission) and CKD stage 2.     Timeline of symptoms is not entirely clear. He had no prior history of psychiatric issues but developed symptoms of paranoia, hallucinations, and delusions around Summer 2019. He saw formed people outside of his home and heard cell phone sounds that were not actually happening. His wife noted that he was moving more slowly and having more difficulty getting in and out of the car or a chair. He was also sleeping much more often. He was concerned that someone hacked into a home camera system in their house and was watching and recording things. He also claimed that this person was sending pornographic photos to his phone along with videos with hidden messages; he would try to show this to his wife, who could not see what he was talking about. He became concerned that his wife was cheating on him and had a boyfriend. He often expected to catch her at home with her boyfriend when he got home from work. One evening he looked out the window and described a party going on with people dancing as well as seeing stripes. He was so convinced there was a party that he began driving around the neighborhood looking for it. His wife took him to the ED and a drug screen was positive for amphetamines; Mr. Torres also had been taking the  "herbal supplement kratom for pain in his feet/body. He was told that visual hallucinations can be a side-effect of kratom use. When he stopped the supplement, the visual hallucinations ceased. But he complained of hearing a voice outside of his room at night that was mumbling and he could not make out the words it was saying. He was started on Zyprexa and this improved as well. His wife notes that he became hypersexual, which was a huge change from his prior demeanor. She also described Mr. Torres as previously being a laid back individual, but experiencing much more anxiety than he used to. She also observed a mild hand tremors along with incidents where his arm would jerk or \"flop\" around.  TSH, CMP, CBC, and B12 were all normal (2020). MRI (10/6/2020) was unremarkable. He completed neuropsychological testing with Jorgito Adam, PhD, LP (3/26/21) which demonstrated a pattern of weaknesses and deficits that is consistent with significant dysfunction of frontal and subcortical brain regions. In April 2021 he completed an FDG-PET which showed no significant metabolic deficits. In May 2021 Mr. Torres's short-term memory seemed to worsen, he began forgetting conversations that he had had with his wife. He was started on Aricept. He completed a driving assessment and passed without restrictions. Mr. Torres began a fixation with watches/clocks, and would spend much of the day researching them (he had no prior interest). Repeat neuropsychological testing was completed with Jorgtio Adam, PhD, LP (8/25/22) and findings demonstrated weaknesses that remain consistent with relative dysfunction of frontal and subcortical circuitry.      Mr. Torres has a history of methamphetamine use and heavy alcohol use (24 pack of beer weekly. He has stopped using these substances. He no longer takes Kratom.     He was started on donepezil, Zyprexa, Prozac and Wellbutrin, and thankfully his psychotic symptoms have subsided. Jerry reports " "feeling back to normal, but Yessy says that it takes much longer for him to do things. He used to work as a  but now it takes 3 days to figure out how to fix a lawnmower, whereas this would be much easier in the past. He has trouble assembling a children's toy. He continues to drive as was evaluated through [facility]. He passed with restrictions on night driving. He does not get lost in familiar places. No accidents or near accidents.     Mr. Torres is no longer working. He lives at home with his wife.     During our last visit, we ordered a lumbar puncture with Alzheimer's disease testing to further investigate the cause of his memory symptoms. He is here today to discuss these results.     Updates From This Visit:   Today, the patient presents with his wife Mary who is an independent historian and provides collateral information included below.     Jerry thinks that he is doing well. Mary \"Destiny\" thinks that there have been minor improvement in \"clarity\" since coming off several psychotropic medications (Zyprexa and Aricept). He is now only on fluoxetine, buspirone and hydroxyzine. He appears to show a bit more interest in life. He is more aware if his wife requires help. He now can participate in chores, like loading the , which he has not done for several years.    Mary reports that apathy is the prevailing symptom. No obsessions/ritualistic behaviors. No dietary changes. No loss of social decorum. No overt loss of empathy.    No dream enactment. No hallucination. No delusions or paranoia.    His forgetfulness seems to have improved. But he appears surprised that plans have been made.    His average day is watching TV 12 hours a day and not really doing much in terms of chores.  Destiny performs: appointments, business, cooking, most cleaning. Jerry runs errands and can get prescriptions at the drug store, can go to the post-office for shopping (although a list needs to " accompany him). They have a grandson on the spectrum that Destiny muñozsits, and she brings Jerry along.    Review of Psychotropic and High-Risk Medications:  Buspar  Fluoxetine   Hydroxyzine QHS    Medication Changes Since Last Visit:   Discontinued donepezil  Discontinued Zyprexa  Current Outpatient Medications   Medication    ASPIRIN 325 MG OR TBEC    atorvastatin (LIPITOR) 40 MG tablet    busPIRone (BUSPAR) 10 MG tablet    Cholecalciferol (VITAMIN D PO)    co-enzyme Q-10 100 MG CAPS capsule    FLUoxetine (PROZAC) 40 MG capsule    hydrOXYzine (ATARAX) 10 MG tablet    lisinopril-hydrochlorothiazide (ZESTORETIC) 20-12.5 MG tablet    MULTI VITAMIN MENS PO    tadalafil (CIALIS) 20 MG tablet    vitamin B complex with vitamin C (STRESS TAB) tablet     Current Facility-Administered Medications   Medication    lidocaine 1 % injection 2 mL    triamcinolone (KENALOG-40) injection 40 mg     Exam:  MMSE: -5 WORLD backwards (reports lifelong dyslexia): 25/30  Neuro: Alert, awake, reserved, only speaks when spoken to, answers in short but fluent and on-topic phrases. Flat affect. No dysarthria. Normal gait including tandem walking. Rhomberg negative. Normal peripheral visual fields without visual simultagnosia. Normal saccadic initiation, velocity and amplitude. Normal appendicular muscle tone. Normal appendicular deep tendon reflexes. Normal vibratory sense in the distal limbs. Normal finger-to-nose and heel-to-shin. No tremor.    I reviewed new external records since their last visit, which show that Jerry has weaned off of Zyprexa and donepezil.     I reviewed new lab results since their last visit, which include:    Latest Reference Range & Units 09/13/23 13:33   RBC CSF 0 - 2 /uL 548 (H)   Total Nucleated Cells 0 - 5 /uL 0   Appearance CSF Clear  Clear   Color CSF Colorless  Colorless   Tube Number  3   Glucose CSF 40 - 70 mg/dL 69   Encephalopathy, autoimmune eval CSF  Negative     (H): Data is abnormally high    Alzheimer's  Disease Evaluation, CSF Latest Reference Range & Units 9/13/23   p-Tau/Abeta42  ratio  <=0.028  0.010   Abeta42       pg/mL  >834   1729   Total-Tau      pg/mL  <=238  202   Phospho-Tau(181P)  pg/mL  <=21.6   17.7     Test                                  Result     Flag  Unit   RefValue   ----------------------------------------------------------------------   Neurofilament Light Chain, P          8.5              pg/mL  <=28.0            I reviewed new imaging since their last visit, including: No pertinent new imaging.     Assessment and Care Plan:   #Late life psychotic episode  #Prior methamphetamine and kraton use  #Cognitve decline, persistent despite cessation of methamphetamine and resolution of psychotic symptoms    There is no convincing evidence for a neurodegenerative disease, but I explained that we do not have the medical technology to rule it out. The timecourse (improvement in symptoms), lack of FDG-PET abnormalities, lack of brain atrophy, non-specific findings on neuropsychological testing, normal blood levels of neurofilament light chain (normally NfL is elevated in FTD-related disorders) all argue against a neurodegenerative disease.    The overarching syndrome I am seeing today is one of predominating apathy with slowed thinking, superimposed on lifelong dyslexia, which started during a psychotic episode in the context of methamphetamine and kratom use.    Nonetheless, I think it would be worthwhile to repeat the MRI and neuropsychological testing and see him back in clinic afterwards to add further confidence to the lack of evidence for a neurodegenerative condition. He does not currently have a psychiatrist but it may be worthwhile to line him up with one at our future visit to consider any other avenues for treating his apathy.    Today, I spent 55 minutes reviewing the chart, personally assessing objective testing, direct patient care, completing documentation and billing.    Again, thank  you for allowing me to participate in the care of your patient.      Sincerely,    Pito Gonzalez MD

## 2023-12-08 NOTE — PROGRESS NOTES
CC: Follow Up    Prior History:  Car Torres is a 63 year old male who presents today for a follow-up visit. Please see my initial encounter dated 7/1/23 for a full review of the patient's history. In summary, Mr. Torres has a pertinent past medical history of hypertension, hypercholesterolemia, sleep apnea using CPAP, depression, methamphematine use (now in remission) and CKD stage 2.     Timeline of symptoms is not entirely clear. He had no prior history of psychiatric issues but developed symptoms of paranoia, hallucinations, and delusions around Summer 2019. He saw formed people outside of his home and heard cell phone sounds that were not actually happening. His wife noted that he was moving more slowly and having more difficulty getting in and out of the car or a chair. He was also sleeping much more often. He was concerned that someone hacked into a home camera system in their house and was watching and recording things. He also claimed that this person was sending pornographic photos to his phone along with videos with hidden messages; he would try to show this to his wife, who could not see what he was talking about. He became concerned that his wife was cheating on him and had a boyfriend. He often expected to catch her at home with her boyfriend when he got home from work. One evening he looked out the window and described a party going on with people dancing as well as seeing stripes. He was so convinced there was a party that he began driving around the neighborhood looking for it. His wife took him to the ED and a drug screen was positive for amphetamines; Mr. Torres also had been taking the herbal supplement kratom for pain in his feet/body. He was told that visual hallucinations can be a side-effect of kratom use. When he stopped the supplement, the visual hallucinations ceased. But he complained of hearing a voice outside of his room at night that was mumbling and he could not make out the  "words it was saying. He was started on Zyprexa and this improved as well. His wife notes that he became hypersexual, which was a huge change from his prior demeanor. She also described Mr. Torres as previously being a laid back individual, but experiencing much more anxiety than he used to. She also observed a mild hand tremors along with incidents where his arm would jerk or \"flop\" around.  TSH, CMP, CBC, and B12 were all normal (2020). MRI (10/6/2020) was unremarkable. He completed neuropsychological testing with Jorgito Adam, PhD, LP (3/26/21) which demonstrated a pattern of weaknesses and deficits that is consistent with significant dysfunction of frontal and subcortical brain regions. In April 2021 he completed an FDG-PET which showed no significant metabolic deficits. In May 2021 Mr. Torres's short-term memory seemed to worsen, he began forgetting conversations that he had had with his wife. He was started on Aricept. He completed a driving assessment and passed without restrictions. Mr. Torres began a fixation with watches/clocks, and would spend much of the day researching them (he had no prior interest). Repeat neuropsychological testing was completed with Jorgito Adam, PhD, LP (8/25/22) and findings demonstrated weaknesses that remain consistent with relative dysfunction of frontal and subcortical circuitry.      Mr. Torres has a history of methamphetamine use and heavy alcohol use (24 pack of beer weekly. He has stopped using these substances. He no longer takes Kratom.     He was started on donepezil, Zyprexa, Prozac and Wellbutrin, and thankfully his psychotic symptoms have subsided. Jerry reports feeling back to normal, but Yessy says that it takes much longer for him to do things. He used to work as a  but now it takes 3 days to figure out how to fix a lawnmower, whereas this would be much easier in the past. He has trouble assembling a children's toy. He continues to drive as was " "evaluated through [facility]. He passed with restrictions on night driving. He does not get lost in familiar places. No accidents or near accidents.     Mr. Torres is no longer working. He lives at home with his wife.     During our last visit, we ordered a lumbar puncture with Alzheimer's disease testing to further investigate the cause of his memory symptoms. He is here today to discuss these results.     Updates From This Visit:   Today, the patient presents with his wife Mary who is an independent historian and provides collateral information included below.     Jerry thinks that he is doing well. Mary \"Destiny\" thinks that there have been minor improvement in \"clarity\" since coming off several psychotropic medications (Zyprexa and Aricept). He is now only on fluoxetine, buspirone and hydroxyzine. He appears to show a bit more interest in life. He is more aware if his wife requires help. He now can participate in chores, like loading the , which he has not done for several years.    Mary reports that apathy is the prevailing symptom. No obsessions/ritualistic behaviors. No dietary changes. No loss of social decorum. No overt loss of empathy.    No dream enactment. No hallucination. No delusions or paranoia.    His forgetfulness seems to have improved. But he appears surprised that plans have been made.    His average day is watching TV 12 hours a day and not really doing much in terms of chores.  Destiny performs: appointments, business, cooking, most cleaning. Jerry runs errands and can get prescriptions at the drug store, can go to the post-office for shopping (although a list needs to accompany him). They have a grandson on the spectrum that Destiny babysits, and she brings Jerry along.    Review of Psychotropic and High-Risk Medications:  Buspar  Fluoxetine   Hydroxyzine QHS    Medication Changes Since Last Visit:   Discontinued donepezil  Discontinued Zyprexa    Current Outpatient " Medications   Medication     ASPIRIN 325 MG OR TBEC     atorvastatin (LIPITOR) 40 MG tablet     busPIRone (BUSPAR) 10 MG tablet     Cholecalciferol (VITAMIN D PO)     co-enzyme Q-10 100 MG CAPS capsule     FLUoxetine (PROZAC) 40 MG capsule     hydrOXYzine (ATARAX) 10 MG tablet     lisinopril-hydrochlorothiazide (ZESTORETIC) 20-12.5 MG tablet     MULTI VITAMIN MENS PO     tadalafil (CIALIS) 20 MG tablet     vitamin B complex with vitamin C (STRESS TAB) tablet     Current Facility-Administered Medications   Medication     lidocaine 1 % injection 2 mL     triamcinolone (KENALOG-40) injection 40 mg     Exam:  MMSE: -5 WORLD backwards (reports lifelong dyslexia): 25/30  Neuro: Alert, awake, reserved, only speaks when spoken to, answers in short but fluent and on-topic phrases. Flat affect. No dysarthria. Normal gait including tandem walking. Rhomberg negative. Normal peripheral visual fields without visual simultagnosia. Normal saccadic initiation, velocity and amplitude. Normal appendicular muscle tone. Normal appendicular deep tendon reflexes. Normal vibratory sense in the distal limbs. Normal finger-to-nose and heel-to-shin. No tremor.    I reviewed new external records since their last visit, which show that Jerry has weaned off of Zyprexa and donepezil.     I reviewed new lab results since their last visit, which include:    Latest Reference Range & Units 09/13/23 13:33   RBC CSF 0 - 2 /uL 548 (H)   Total Nucleated Cells 0 - 5 /uL 0   Appearance CSF Clear  Clear   Color CSF Colorless  Colorless   Tube Number  3   Glucose CSF 40 - 70 mg/dL 69   Encephalopathy, autoimmune eval CSF  Negative     (H): Data is abnormally high    Alzheimer's Disease Evaluation, CSF Latest Reference Range & Units 9/13/23   p-Tau/Abeta42  ratio  <=0.028  0.010   Abeta42       pg/mL  >834   1729   Total-Tau      pg/mL  <=238  202   Phospho-Tau(181P)  pg/mL  <=21.6   17.7     Test                                  Result     Flag  Unit    RefValue   ----------------------------------------------------------------------   Neurofilament Light Chain, P          8.5              pg/mL  <=28.0              I reviewed new imaging since their last visit, including: No pertinent new imaging.     Assessment and Care Plan:   #Late life psychotic episode  #Prior methamphetamine and kraton use  #Cognitve decline, persistent despite cessation of methamphetamine and resolution of psychotic symptoms    There is no convincing evidence for a neurodegenerative disease, but I explained that we do not have the medical technology to rule it out. The timecourse (improvement in symptoms), lack of FDG-PET abnormalities, lack of brain atrophy, non-specific findings on neuropsychological testing, normal blood levels of neurofilament light chain (normally NfL is elevated in FTD-related disorders) all argue against a neurodegenerative disease.    The overarching syndrome I am seeing today is one of predominating apathy with slowed thinking, superimposed on lifelong dyslexia, which started during a psychotic episode in the context of methamphetamine and kratom use.    Nonetheless, I think it would be worthwhile to repeat the MRI and neuropsychological testing and see him back in clinic afterwards to add further confidence to the lack of evidence for a neurodegenerative condition. He does not currently have a psychiatrist but it may be worthwhile to line him up with one at our future visit to consider any other avenues for treating his apathy.    Today, I spent 55 minutes reviewing the chart, personally assessing objective testing, direct patient care, completing documentation and billing.

## 2023-12-22 ENCOUNTER — VIRTUAL VISIT (OUTPATIENT)
Dept: PSYCHIATRY | Facility: CLINIC | Age: 63
End: 2023-12-22
Payer: MEDICARE

## 2023-12-22 DIAGNOSIS — F33.1 MAJOR DEPRESSIVE DISORDER, RECURRENT EPISODE, MODERATE (H): Primary | ICD-10-CM

## 2023-12-22 DIAGNOSIS — F41.1 GAD (GENERALIZED ANXIETY DISORDER): ICD-10-CM

## 2023-12-22 PROCEDURE — 99214 OFFICE O/P EST MOD 30 MIN: CPT | Mod: VID | Performed by: NURSE PRACTITIONER

## 2023-12-22 ASSESSMENT — PAIN SCALES - GENERAL: PAINLEVEL: NO PAIN (0)

## 2023-12-22 NOTE — PROGRESS NOTES
Virtual Visit Details    Type of service:  Video Visit   Video Start Time: 12:44 PM  Video End Time: 1:15 PM    Originating Location (pt. Location): Home    Distant Location (provider location):  Off-site  Platform used for Video Visit: Vilma

## 2023-12-22 NOTE — PROGRESS NOTES
"         Outpatient Psychiatric Progress Note    Name: Car Torres   : 1960                    Primary Care Provider: Mihir Perez MD   Therapist: None    PHQ-9 scores:      2023    10:53 AM 10/3/2023    10:58 AM 2023     9:26 PM   PHQ-9 SCORE   PHQ-9 Total Score MyChart 0 1 (Minimal depression) 0   PHQ-9 Total Score 0 1 0       SHAHNAZ-7 scores:      2021     2:00 PM 2022     1:00 PM 2022     1:02 PM   SHAHNAZ-7 SCORE   Total Score   2 (minimal anxiety)   Total Score 0 0 2       Patient Identification:    Patient is a 63 year old year old,   White Not  or  male  who presents for return visit with me.  Patient is currently unemployed. Patient attended the session alone. Patient prefers to be called: \" Jerry\".    Current medications include: ASPIRIN 325 MG OR TBEC, ONE DAILY  atorvastatin (LIPITOR) 40 MG tablet, Take 1 tablet (40 mg) by mouth daily  busPIRone (BUSPAR) 10 MG tablet, Take 2 tablets (20 mg) by mouth 2 times daily  Cholecalciferol (VITAMIN D PO), Take 5,000 Units by mouth daily One tablet twice daily  co-enzyme Q-10 100 MG CAPS capsule, Take 100 mg by mouth daily  FLUoxetine (PROZAC) 40 MG capsule, Take 1 capsule (40 mg) by mouth daily  hydrOXYzine (ATARAX) 10 MG tablet, Take 1 tablet (10 mg) by mouth 2 times daily as needed for anxiety  lisinopril-hydrochlorothiazide (ZESTORETIC) 20-12.5 MG tablet, Take 1 tablet by mouth daily  MULTI VITAMIN MENS PO, None Entered  tadalafil (CIALIS) 20 MG tablet, TAKE 1/2 TO 1 TABLET BY MOUTH 30 MINUTES TO 1 HOUR BEFORE SEX  vitamin B complex with vitamin C (STRESS TAB) tablet, Take 1 tablet by mouth daily    lidocaine 1 % injection 2 mL  triamcinolone (KENALOG-40) injection 40 mg         The [place] Prescription Monitoring Program has been reviewed and there are no concerns about diversionary activity for controlled substances at this time.      I was able to review most recent Primary Care Provider, specialty " provider, and therapy visit notes that I have access to.     Today, patient reports he is planning on spending the holidays with some of their children and grandchildren.  He would like to stay on his medications for now.  He has an MRI scheduled in January and more neuropsych testing in March.  He is gong to see a cardiologist - stamina is low.       has a past medical history of Dementia (H), Depressive disorder, Heart disease (5/8/2018), Hypercholesterolemia, Hypertension goal BP (blood pressure) < 140/90, Nonspecific abnormal electrocardiogram (ECG) (EKG) (08/23/2007), and Sleep apnea.    He has no past medical history of Arthritis, Cancer (H), Cerebral infarction (H), Congestive heart failure (H), Congestive heart failure, unspecified, COPD (chronic obstructive pulmonary disease) (H), Diabetes (H), History of blood transfusion, Thyroid disease, or Uncomplicated asthma.    Social history updates:    No changes in his social history to report    Substance use updates:    No alcohol use to report  Tobacco use: No    Vital Signs:   There were no vitals taken for this visit.    Labs:    Most recent laboratory results reviewed and no new labs.     Mental Status Examination:  Appearance: awake, alert and casual dress  Attitude: cooperative  Eye Contact:  adequate  Gait and Station: No dizziness or falls  Psychomotor Behavior:  intact station, gait and muscle tone  Oriented to:  time, person, and place  Attention Span and Concentration:  Normal  Speech:   vtspeech: paucity of speech and Speaks: English  Mood:  anxious and depressed  Affect:  mood congruent  Associations:  no loose associations  Thought Process:  goal oriented  Thought Content:  no evidence of suicidal ideation or homicidal ideation, no auditory hallucinations present, and no visual hallucinations present  Recent and Remote Memory:  intact Not formally assessed. No amnesia.  Fund of Knowledge: appropriate  Insight:  good  Judgment: good  Impulse  Control:  good    Suicide Risk Assessment:  Today Car Torres reports no thoughts to harm themself or others. In addition, there are notable risk factors for self-harm, including age and mood change. However, risk is mitigated by commitment to family, history of seeking help when needed, future oriented, denies suicidal intent or plan, and denies homicidal ideation, intent, or plan. Therefore, based on all available evidence including the factors cited above, Car Torres does not appear to be at imminent risk for self-harm, does not meet criteria for a 72-hr hold, and therefore remains appropriate for ongoing outpatient level of care.  A thorough assessment of risk factors related to suicide and self-harm have been reviewed and are noted above. The patient convincingly denies suicidality on several occasions. Local community safety resources printed and reviewed for patient to use if needed. There was no deceit detected, and the patient presented in a manner that was believable.     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  300.02 (F41.1) Generalized Anxiety Disorder    Medical comorbidities include:   Patient Active Problem List    Diagnosis Date Noted    Hypertension goal BP (blood pressure) < 140/90 10/20/2010     Priority: High    Hyperlipidemia LDL goal <130 06/11/2010     Priority: High    Lisfranc dislocation, left, initial encounter 12/28/2022     Priority: Medium    Lisfranc dislocation, left, subsequent encounter 12/28/2022     Priority: Medium    Dementia associated with other underlying disease with behavioral disturbance (H) 01/03/2022     Priority: Medium    Ulcer of left foot, unspecified ulcer stage (H) 01/03/2022     Priority: Medium    Unspecified psychosis not due to a substance or known physiological condition (H) 01/03/2022     Priority: Medium    Major depressive disorder, recurrent episode, mild with  atypical features (H) 12/06/2021     Priority: Medium    TIA (obstructive sleep apnea) 07/19/2018     Priority: Medium     Home Sleep Apnea Testing - 7/18/18: 295 lbs 0 oz: AHI 45.3/hr. Supine AHI 61.9/hr. Oxygen [place] of 66%.  Baseline 93%.  Sp02 =< 88% for 47 minutes.      History of sinus bradycardia 06/07/2018     Priority: Medium    ED (erectile dysfunction) 06/07/2018     Priority: Medium    Coronary artery calcification 05/08/2018     Priority: Medium     Per CT CHEST WITHOUT CONTRAST April 23, 2018. CT calcium score planned.       Renal cyst 11/18/2015     Priority: Medium     Simple, non enhanced, 3.5 cm on right kidney, Bosniak 1 - x 2 stable findings      Diverticulosis of large intestine without hemorrhage 11/18/2015     Priority: Medium     Incidental finding on CT scan       Pulmonary nodule 11/18/2015     Priority: Medium     Incidental finding on CT scan, right posterior lung - considered benign / stable       CKD (chronic kidney disease) stage 2, GFR 60-89 ml/min 10/21/2010     Priority: Medium     60 to 80 - have discussed Lisinopril - will consider       Sciatica 02/27/2006     Priority: Medium    Morbid obesity (H) 02/27/2006     Priority: Medium       Assessment:    Car Torres would like to remain on his medications that he is taking for depression and anxiety for now.  Specialty CARE visits are pending to reevaluate his cognitive status as well as his cardiac status as there is concern for lowered stamina.  No reports of suicide thinking.  There is concern for diagnosis clarification regarding his mental health and a referral was made for psychological testing to clarify his diagnosis.  He has not been experiencing any psychosis..    Medication side effects and alternatives were reviewed. Health promotion activities recommended and reviewed today. All questions addressed. Education and counseling completed regarding risks and benefits of medications and psychotherapy  options.    Treatment Plan:      1.  Continue buspirone 20 mg 2 times daily  2.  Continue fluoxetine 40 mg daily  3.  Continue hydroxyzine 10 mg up to twice daily as needed for anxiety  4.  Referral made for psychological testing to clarify diagnosis    Continue all other medications as reviewed per electronic medical record today.   Safety plan reviewed. To the Emergency Department as needed or call after hours crisis line at [phone] or [phone]. [place] Crisis Text [place]. Text [place] to 235455 or Suicide LifeLine Chat: suicideWorkWith.me.org/chat/  To schedule individual or family therapy, call [facility] at [phone]  Schedule an appointment with me in April or sooner as needed. Call [facility] at [phone] to schedule.  Follow up with primary care provider as planned or for acute medical concerns.  Call the psychiatric nurse line with medication questions or concerns at [phone]  MyChart may be used to communicate with your provider, but this is not intended to be used for emergencies.    Crisis Resources:    National Suicide Prevention Lifeline: 470.908.3426 (TTY: 640.815.7437). Call anytime for help.  (www.suicidepreventionlifeline.org)  National [place] on Mental Illness (www.ana maría.org): 601.550.3484 or 693-896-9143.   Mental Health Association (www.mentalhealth.org): 524.461.8495 or 398-218-5860.  [place] Crisis Text [place]: Text [place] to 438900  Suicide LifeLine Chat: suicideWorkWith.me.org/chat    Administrative Billing:   Time spent with patient includes counseling and coordination of care regarding above diagnoses and treatment plan.    Patient Status:  This is a continuous care patient and medications will be prescribed by the psychiatric provider until further indicated.    Signed:   [facility]   Psychiatry       Answers submitted by the patient for this visit:  Patient Health Questionnaire (Submitted on 12/21/2023)  If you  checked off any problems, how difficult have these problems made it for you to do your work, take care of things at home, or get along with other people?: Not difficult at all  PHQ9 TOTAL SCORE: 0

## 2023-12-22 NOTE — PATIENT INSTRUCTIONS
"Patient Education   The Panel Psychiatry Program  What to Expect  Here's what to expect in the Panel Psychiatry Program.   About the program  You'll be meeting with a psychiatric doctor to check your mental health. A psychiatric doctor helps you deal with troubling thoughts and feelings by giving you medicine. They'll make sure you know the plan for your care. You may see them for a long time. When you're feeling better, they may refer you back to seeing your family doctor.   If you have any questions, we'll be glad to talk to you.  About visits  Be open  At your visits, please talk openly about your problems. It may feel hard, but it's the best way for us to help you.  Cancelling visits  If you can't come to your visit, please call us right away at [phone]. If you don't cancel at least 24 hours (1 full day) before your visit, that's \"late cancellation.\"  Not showing up for your visits  Being very late is the same as not showing up. You'll be a \"no show\" if:  You're more than 15 minutes late for a 30-minute (half hour) visit.  You're more than 30 minutes late for a 60-minute (full hour) visit.  If you cancel late or don't show up 2 times within 6 months, we may end your care.  Getting help between visits  If you need help between visits, you can call us Monday to Friday from 8 a.m. to 4:30 p.m. at [phone].  Emergency care  Call 911 or go to the nearest emergency department if your life or someone else's life is in danger.  Call 988 anytime to reach the national Suicide and Crisis hotline.  Medicine refills  To refill your medicine, call your pharmacy. You can also call [facility]'s [facility] at [phone], Monday to Friday, 8 a.m. to 4:30 p.m. It can take 1 to 3 business days to get a refill.   Forms, letters, and tests  You may have papers to fill out, like FMLA, short-term disability, and workability. We can help you with these forms at your visits, but you must have an " appointment. You may need more than 1 visit for this, to be in an intensive therapy program, or both.  Before we can give you medicine for ADHD, we may refer you to get tested for it or confirm it another way.  We may not be able to give you an emotional support animal letter.  We don't do mental health checks ordered by the court.   We don't do mental health testing, but we can refer you to get tested.   Thank you for choosing us for your care.  For informational purposes only. Not to replace the advice of your health care provider. Copyright   2022 [employer]. All rights reserved. [employer] 510526 - 12/22.    Treatment Plan:      1.  Continue buspirone 20 mg 2 times daily  2.  Continue fluoxetine 40 mg daily  3.  Continue hydroxyzine 10 mg up to twice daily as needed for anxiety  4.  Referral made for psychological testing to clarify diagnosis    Continue all other medications as reviewed per electronic medical record today.   Safety plan reviewed. To the Emergency Department as needed or call after hours crisis line at [phone] or [phone]. [place] Crisis Text [place]. Text [place] to 636439 or Suicide LifeLine Chat: suicidepreventionTeleFlipline.org/chat/  To schedule individual or family therapy, call [facility] at [phone]  Schedule an appointment with me in April or sooner as needed. Call [facility] at [phone] to schedule.  Follow up with primary care provider as planned or for acute medical concerns.  Call the psychiatric nurse line with medication questions or concerns at [phone]  MyChart may be used to communicate with your provider, but this is not intended to be used for emergencies.    Crisis Resources:    National Suicide Prevention Lifeline: 170.449.3964 (TTY: 752.478.9155). Call anytime for help.  (www.suicidepreventionlifeline.org)  National [place] on Mental Illness (www.ana maría.org): 161.166.2669 or 083-528-5063.   Mental Health  Association (www.mentalhealth.org): 041-963-7948 or 634-431-1142.  [place] Crisis Text [place]: Text [place] to 333174  Suicide LifeLine Chat: suicidepreventionlifeline.org/chat

## 2023-12-22 NOTE — NURSING NOTE
Is the patient currently in the state of [place]? YES    Visit mode:VIDEO    If the visit is dropped, the patient can be reconnected by: VIDEO VISIT: Text to cell phone:   Telephone Information:   Mobile [phone]   Mobile Not on file.       Will anyone else be joining the visit? NO  (If patient encounters technical issues they should call [phone] :[phone])    How would you like to obtain your AVS? MyChart    Are changes needed to the allergy or medication list? No    Reason for visit: RECHECK    Sancho DARLING

## 2023-12-26 ENCOUNTER — PATIENT OUTREACH (OUTPATIENT)
Dept: GERIATRIC MEDICINE | Facility: CLINIC | Age: 63
End: 2023-12-26
Payer: COMMERCIAL

## 2023-12-26 NOTE — PROGRESS NOTES
[facility] Care Coordination Contact      [facility] Six-Month Telephone Assessment    6 month telephone assessment completed on 12/26/23.    ER visits: No  Hospitalizations: No  TCU stays: No  Significant health status changes: none- spouse Destiny states Jerry is doing well.   Falls/Injuries: No  ADL/IADL changes: No  Changes in services: N/A- was not opened to elderly waiver d/t high waiver obligation.     Caregiver Assessment follow up:  N/A    Goals: See POC in chart for goal progress documentation.      Will see member in 6 months for an annual health risk assessment.   Encouraged member to call CC with any questions or concerns in the meantime.     Kiley Ahuja RN- covering 6 month call for lead CC Gisselle Conte   [facility]  Office:[phone]  Cell Phone: [phone]

## 2023-12-27 DIAGNOSIS — F41.1 GAD (GENERALIZED ANXIETY DISORDER): ICD-10-CM

## 2023-12-27 RX ORDER — HYDROXYZINE HYDROCHLORIDE 10 MG/1
10 TABLET, FILM COATED ORAL 2 TIMES DAILY PRN
Qty: 30 TABLET | Refills: 3 | Status: SHIPPED | OUTPATIENT
Start: 2023-12-27 | End: 2024-01-04

## 2023-12-27 NOTE — TELEPHONE ENCOUNTER
Date of Last Office Visit: 12/22/23  Date of Next Office Visit: 4/11/24  No shows since last visit: 0  Cancellations since last visit: 0    Medication requested: hydrOXYzine (ATARAX) 10 MG tablet  Date last ordered: 10/3/23 Qty: 30 Refills: 3     Review of [place] ?: NA    Lapse in medication adherence greater than 5 days?: no  If yes, call patient and gather details: NA  Medication refill request verified as identical to current order?: yes  Result of Last DAM, VPA, Li+ Level, CBC, or Carbamazepine Level (at or since last visit): N/A    Last visit treatment plan:   Treatment Plan:        1.  Continue buspirone 20 mg 2 times daily  2.  Continue fluoxetine 40 mg daily  3.  Continue hydroxyzine 10 mg up to twice daily as needed for anxiety  4.  Referral made for psychological testing to clarify diagnosis     Continue all other medications as reviewed per electronic medical record today.   Safety plan reviewed. To the Emergency Department as needed or call after hours crisis line at [phone] or [phone]. [place] Crisis Text [place]. Text [place] to 752129 or Suicide LifeLine Chat: suicidepreventionlifeline.org/chat/  To schedule individual or family therapy, call [facility] at [phone]  Schedule an appointment with me in April or sooner as needed. Call [facility] at [phone] to schedule.  Follow up with primary care provider as planned or for acute medical concerns.  Call the psychiatric nurse line with medication questions or concerns at [phone]  MyChart may be used to communicate with your provider, but this is not intended to be used for emergencies.    []Medication refilled per  Medication Refill in Ambulatory Care  policy.  [x]Medication unable to be refilled by RN due to criteria not met as indicated below:    []Eligibility - not seen in the last year   []Supervision - no future appointment   []Compliance - no shows, cancellations or lapse in therapy   []Verification  - order discrepancy   []Controlled medication   []Medication not included in policy   [x]90-day supply request   []Other

## 2023-12-28 ENCOUNTER — OFFICE VISIT (OUTPATIENT)
Dept: CARDIOLOGY | Facility: CLINIC | Age: 63
End: 2023-12-28
Payer: MEDICARE

## 2023-12-28 VITALS
WEIGHT: 315 LBS | SYSTOLIC BLOOD PRESSURE: 123 MMHG | BODY MASS INDEX: 47.84 KG/M2 | OXYGEN SATURATION: 97 % | DIASTOLIC BLOOD PRESSURE: 57 MMHG | HEART RATE: 53 BPM

## 2023-12-28 DIAGNOSIS — Z86.79 HISTORY OF SINUS BRADYCARDIA: ICD-10-CM

## 2023-12-28 DIAGNOSIS — R06.09 DOE (DYSPNEA ON EXERTION): ICD-10-CM

## 2023-12-28 DIAGNOSIS — I20.89 ANGINAL EQUIVALENT (H): ICD-10-CM

## 2023-12-28 DIAGNOSIS — I20.0 UNSTABLE ANGINA (H): Primary | ICD-10-CM

## 2023-12-28 PROCEDURE — 99204 OFFICE O/P NEW MOD 45 MIN: CPT | Performed by: INTERNAL MEDICINE

## 2023-12-28 RX ORDER — SODIUM CHLORIDE 9 MG/ML
INJECTION, SOLUTION INTRAVENOUS CONTINUOUS
Status: CANCELLED | OUTPATIENT
Start: 2023-12-28

## 2023-12-28 RX ORDER — ASPIRIN 81 MG/1
243 TABLET, CHEWABLE ORAL ONCE
Status: CANCELLED | OUTPATIENT
Start: 2023-12-28

## 2023-12-28 RX ORDER — LIDOCAINE 40 MG/G
CREAM TOPICAL
Status: CANCELLED | OUTPATIENT
Start: 2023-12-28

## 2023-12-28 RX ORDER — ASPIRIN 325 MG
325 TABLET ORAL ONCE
Status: CANCELLED | OUTPATIENT
Start: 2023-12-28 | End: 2023-12-28

## 2023-12-28 NOTE — NURSING NOTE
"Chief Complaint   Patient presents with    RECHECK     Return general cardiology. Anginal equivalent. History of sinus bradycardia           Initial /57 (BP Location: Right arm, Patient Position: Chair, Cuff Size: Adult Large)   Pulse 53   Wt (!) 155.6 kg (343 lb)   SpO2 97%   BMI 47.84 kg/m   Estimated body mass index is 47.84 kg/m  as calculated from the following:    Height as of 12/6/23: 1.803 m (5' 11\").    Weight as of this encounter: 155.6 kg (343 lb)..  BP completed using cuff size: CARLTON Nuñez    "

## 2023-12-28 NOTE — PATIENT INSTRUCTIONS
Thank you for coming to the [facility] @ [place]; please note the following instructions:    1. Coronary Angiogram & Right Heart Cath    Pre-procedure instructions - Coronary Angiogram  Patient Education    Your arrival time is TBD.  We will call you with the date and time.  Location is [facility] - [address] [place] [address] [id number] [place] [address] Waiting Room  Please plan on being at the hospital all day.  At any time, emergencies and/or urgent cases may come up which could delay the start of your procedure.    Pre-procedure instructions - Coronary Angiogram  Shower in the evening before or the morning of the procedure  No solid food for 8 hours prior and nothing to drink 2 hours prior to arrival time  You can take your morning medications (except for diabetic and blood thinners) with sips of water.  Take 325 mg of Asprin 24 hours prior to the procedure and the morning of procedure.   You will need to arrange a ride to drop you off and pick you up, as you will be unable to drive home.  Prior to discharge you may be required to lay flat for approximately 2-4 hours in the recovery unit to ensure proper clotting of the artery. You will need a responsible adult to stay with you for 24 hours post-procedure.              Diabetic Medication Instructions  Hold oral diabetic medication in morning of your procedure and for 48 hours after IV contrast is given  Typical instructions for insulin diabetic medication holding are below. However, please reach out to your Primary Care Provider or Endocrinologist for specific instructions  DO NOT take any oral diabetic medication, short-acting diabetes medications/insulin, humalog or regular insulin the morning of your test  Take   dose of long-acting insulin (Lantus, Levemir) the day of your test  Remember to bring your glucometer and insulin with you to take after your test if needed  DO NOT take injectable GLP-1  agonists semaglutide (Ozempic, Wegovy), dulaglutide (Trulicity), exenatide ER (Bydureon), tirzepatide (Mounjaro), or oral semaglutide (Rybelsus) for 7 days prior your procedure  Hold once daily injectable GLP-1 agonists exenatide (Byetta), liraglutide (Saxenda, Victoza), lixisenatide (Soligua) the day before and day of your procedure                  Anticoagulation Medication Instructions   NA    You will need to follow up with one of our cardiology APPs 1-2 weeks after your procedure. If you need help scheduling or rescheduling your appointment, please call [phone]     Pre-procedure instructions - Right heart catheterization  Patient Education    Your arrival time is TBD.  Location is [address] Waiting Room  Please plan on being at the hospital all day.  At any time, emergencies and/or urgent cases may come up which could delay the start of your procedure.    Pre-procedure instructions - Right heart catheterization  No solid food for 8 hours prior  Nothing to drink 2 hours prior to arrival time  You can take your morning medications (except diabetic and blood thinners) with sips of water  We recommend you arrange for a ride to drop you off and pick you up, in the instance, you are unable to drive home, however you should be able to function as you normally would after the procedure    Diabetic Medication Instructions  Typical instructions for insulin diabetic medication holding are below. However, please reach out to your Primary Care Provider or Endocrinologist for specific instructions  DO NOT take any oral diabetic medication, short-acting diabetes medications/insulin, humalog or regular insulin the morning of your test  Take   dose of long-acting insulin (Lantus, Levemir) the day of your test  Remember to  bring your glucometer and insulin with you to take after your test if needed  DO NOT take injectable GLP-1  agonists semaglutide (Ozempic, Wegovy), dulaglutide (Trulicity), exenatide ER (Bydureon), tirzepatide (Mounjaro), or oral semaglutide (Rybelsus) for 7 days prior your procedure  Hold once daily injectable GLP-1 agonists exenatide (Byetta), liraglutide (Saxenda, Victoza), lixisenatide (Soliqua) the day before and day of your procedure                Anticoagulation Medication Instructions   NA    You will need to follow up with one of our cardiology APPs 1-2 weeks after your procedure. If you need help scheduling or rescheduling your appointment, please call [phone]         If you have any questions regarding your visit please contact your care team:     Cardiology  Telephone Number   India ABURTO., RN  Su VILLALOBOS, RN  Trinh LEY, RN  Yuliet KELLEY, RMEL HOLLAND, RMEL GRAYSON, Visit Facilitator  [place] [facility] [phone] (option 1)   For scheduling appts:     [phone] (select option 1)       For the Device Clinic (Pacemakers and ICD's)  RN's :  Tricia Jolley   During business hours: [phone]    *After business hours:  [phone] (select option 4)      Normal test result notifications will be released via Mobile [employer] or mailed within 7 business days.  All other test results, will be communicated via telephone once reviewed by your cardiologist.    If you need a medication refill please contact your pharmacy.  Please allow 3 business days for your refill to be completed.    As always, thank you for trusting us with your health care needs!

## 2023-12-28 NOTE — NURSING NOTE
Left Heart Catheterization: Patient given Coronary Angiogram and Angioplasty: A Patient's Guide booklet. Patient was instructed regarding left heart catheterization, including discussion of the indication, procedure, preparation, intra-procedural steps, and recovery at home. Patient demonstrated understanding of this information and agreed to call with further questions or concerns. Reviewed below instructions with patient. Informed patient that we would reach out to schedule procedure and call with date and time.    Return Appointment: Patient given instructions regarding scheduling next clinic visit. Patient demonstrated understanding of this information and agreed to call with further questions or concerns. Patient to follow up after angiogram and right heart cath. To seb scheduled after procedures are scheduled.     Right Heart Catheterization: Patient was instructed regarding right heart catheterization, including discussion of the procedure, preparation, intra-procedural steps, and recovery at home. Patient demonstrated understanding of this information and agreed to call with further questions or concerns. Reviewed below instructions with patient. Informed patient that procedure would be scheduled and we would update patient on date and time.    Patient stated he understood all health information given and agreed to call with further questions or concerns.      Pre-procedure instructions - Coronary Angiogram  Patient Education    Your arrival time is TBD.  Location is [address] Waiting Room  Please plan on being at the hospital all day.  At any time, emergencies and/or urgent cases may come up which could delay the start of your procedure.    Pre-procedure instructions - Coronary Angiogram  Shower in the evening before or the morning of the procedure  No solid food for 8 hours prior and nothing to drink 2 hours prior  to arrival time  You can take your morning medications (except for diabetic and blood thinners) with sips of water.  Take 325 mg of Asprin 24 hours prior to the procedure and the morning of procedure.   You will need to arrange a ride to drop you off and pick you up, as you will be unable to drive home.  Prior to discharge you may be required to lay flat for approximately 2-4 hours in the recovery unit to ensure proper clotting of the artery. You will need a responsible adult to stay with you for 24 hours post-procedure.              Diabetic Medication Instructions  Hold oral diabetic medication in morning of your procedure and for 48 hours after IV contrast is given  Typical instructions for insulin diabetic medication holding are below. However, please reach out to your Primary Care Provider or Endocrinologist for specific instructions  DO NOT take any oral diabetic medication, short-acting diabetes medications/insulin, humalog or regular insulin the morning of your test  Take   dose of long-acting insulin (Lantus, Levemir) the day of your test  Remember to bring your glucometer and insulin with you to take after your test if needed  DO NOT take injectable GLP-1 agonists semaglutide (Ozempic, Wegovy), dulaglutide (Trulicity), exenatide ER (Bydureon), tirzepatide (Mounjaro), or oral semaglutide (Rybelsus) for 7 days prior your procedure  Hold once daily injectable GLP-1 agonists exenatide (Byetta), liraglutide (Saxenda, Victoza), lixisenatide (Soligua) the day before and day of your procedure                  Anticoagulation Medication Instructions   NA    You will need to follow up with one of our cardiology APPs 1-2 weeks after your procedure. If you need help scheduling or rescheduling your appointment, please call [phone]     Pre-procedure instructions - Right heart catheterization  Patient Education    Your arrival time is TBD.  Location is [address] [place] [address] [facility] Waiting Room  Please plan on being at the hospital all day.  At any time, emergencies and/or urgent cases may come up which could delay the start of your procedure.    Pre-procedure instructions - Right heart catheterization  No solid food for 8 hours prior  Nothing to drink 2 hours prior to arrival time  You can take your morning medications (except diabetic and blood thinners) with sips of water  We recommend you arrange for a ride to drop you off and pick you up, in the instance, you are unable to drive home, however you should be able to function as you normally would after the procedure    Diabetic Medication Instructions  Typical instructions for insulin diabetic medication holding are below. However, please reach out to your Primary Care Provider or Endocrinologist for specific instructions  DO NOT take any oral diabetic medication, short-acting diabetes medications/insulin, humalog or regular insulin the morning of your test  Take   dose of long-acting insulin (Lantus, Levemir) the day of your test  Remember to  bring your glucometer and insulin with you to take after your test if needed  DO NOT take injectable GLP-1 agonists semaglutide (Ozempic, Wegovy), dulaglutide (Trulicity), exenatide ER (Bydureon), tirzepatide (Mounjaro), or oral semaglutide (Rybelsus) for 7 days prior your procedure  Hold once daily injectable GLP-1 agonists exenatide (Byetta), liraglutide (Saxenda, Victoza), lixisenatide (Soliqua) the day before and day of your procedure                Anticoagulation Medication Instructions   NA    You will need to follow up with one of our cardiology APPs 1-2 weeks after your procedure. If you need help scheduling or rescheduling your appointment, please call [phone]      Su Iverson, RN, BSN  Cardiology RN Care Coordinator   [place]   Phone: [phone]  Fax: [phone] ([place]) [phone] ([place])

## 2023-12-28 NOTE — LETTER
12/28/2023      RE: Car Torres  [address]  [place] [address]       Dear Colleague,    Thank you for the opportunity to participate in the care of your patient, Car Torres, at the [facility]. Please see a copy of my visit note below.       SUBJECTIVE:  Car Torres is a 63 year old male who presents for cardiac evaluation due to progressive shortness of breath and chest discomfort on exertion for past 5 months.PMH significant for HTN, HLD, and morbid obesity.   Patient's current BMI is 48.  He was evaluated in 2018 for coronary artery calcification.  Calcium score was as follows.  Total calcium score: 1908  Left main coronary artery: 17  Left anterior descending coronary artery: 566  Circumflex coronary artery: 333  Right coronary artery: 992    Total calcium score of 1908 placed him on the 99th percentile.  Subsequently an exercise stress echo was attempted and reached only 60% of maximum predicted heart rate.  Subsequently patient had a dobutamine stress echo which was normal.  Patient remained almost asymptomatic.  Now he is having significant shortness of breath and exertional chest heaviness with minimal activity.  Even going up the steps he has to stop multiple places.  He is more or less unable to do any significant activity.  Current cardiac medications are atorvastatin 40 mg daily, lisinopril hydrochlorothiazide 2012.5 daily.  Patient Active Problem List    Diagnosis Date Noted    Hypertension goal BP (blood pressure) < 140/90 10/20/2010     Priority: High    Hyperlipidemia LDL goal <130 06/11/2010     Priority: High    Lisfranc dislocation, left, initial encounter 12/28/2022     Priority: Medium    Lisfranc dislocation, left, subsequent encounter 12/28/2022     Priority: Medium    Dementia associated with other underlying disease with behavioral disturbance (H) 01/03/2022     Priority: Medium     Ulcer of left foot, unspecified ulcer stage (H) 01/03/2022     Priority: Medium    Unspecified psychosis not due to a substance or known physiological condition (H) 01/03/2022     Priority: Medium    Major depressive disorder, recurrent episode, mild with atypical features (H) 12/06/2021     Priority: Medium    TIA (obstructive sleep apnea) 07/19/2018     Priority: Medium     Home Sleep Apnea Testing - 7/18/18: 295 lbs 0 oz: AHI 45.3/hr. Supine AHI 61.9/hr. Oxygen [place] of 66%.  Baseline 93%.  Sp02 =< 88% for 47 minutes.      History of sinus bradycardia 06/07/2018     Priority: Medium    ED (erectile dysfunction) 06/07/2018     Priority: Medium    Coronary artery calcification 05/08/2018     Priority: Medium     Per CT CHEST WITHOUT CONTRAST April 23, 2018. CT calcium score planned.       Renal cyst 11/18/2015     Priority: Medium     Simple, non enhanced, 3.5 cm on right kidney, Bosniak 1 - x 2 stable findings      Diverticulosis of large intestine without hemorrhage 11/18/2015     Priority: Medium     Incidental finding on CT scan       Pulmonary nodule 11/18/2015     Priority: Medium     Incidental finding on CT scan, right posterior lung - considered benign / stable       CKD (chronic kidney disease) stage 2, GFR 60-89 ml/min 10/21/2010     Priority: Medium     60 to 80 - have discussed Lisinopril - will consider       Sciatica 02/27/2006     Priority: Medium    Morbid obesity (H) 02/27/2006     Priority: Medium    .  Current Outpatient Medications   Medication Sig    ASPIRIN 325 MG OR TBEC ONE DAILY    atorvastatin (LIPITOR) 40 MG tablet Take 1 tablet (40 mg) by mouth daily    busPIRone (BUSPAR) 10 MG tablet Take 2 tablets (20 mg) by mouth 2 times daily    Cholecalciferol (VITAMIN D PO) Take 5,000 Units by mouth daily One tablet twice daily    co-enzyme Q-10 100 MG CAPS capsule Take 100 mg by mouth daily    FLUoxetine (PROZAC) 40 MG capsule Take 1 capsule (40 mg) by mouth daily    hydrOXYzine HCl (ATARAX)  10 MG tablet Take 1 tablet (10 mg) by mouth 2 times daily as needed for anxiety    lisinopril-hydrochlorothiazide (ZESTORETIC) 20-12.5 MG tablet Take 1 tablet by mouth daily    MULTI VITAMIN MENS PO None Entered    tadalafil (CIALIS) 20 MG tablet TAKE 1/2 TO 1 TABLET BY MOUTH 30 MINUTES TO 1 HOUR BEFORE SEX    vitamin B complex with vitamin C (STRESS TAB) tablet Take 1 tablet by mouth daily     Current Facility-Administered Medications   Medication    lidocaine 1 % injection 2 mL    triamcinolone (KENALOG-40) injection 40 mg     Past Medical History:   Diagnosis Date    Dementia (H)     Depressive disorder     Heart disease 5/8/2018    Hypercholesterolemia     Hypertension goal BP (blood pressure) < 140/90     Nonspecific abnormal electrocardiogram (ECG) (EKG) 08/23/2007    Stress testing normal.     Sleep apnea     uses a c-pap, severe     Past Surgical History:   Procedure Laterality Date    COLONOSCOPY N/A 12/22/2020    Procedure: COLONOSCOPY, WITH POLYPECTOMY, CLIP;  Surgeon: Mihir Lang MD;  Location: [facility] OR    COLONOSCOPY N/A 1/10/2022    Procedure: COLONOSCOPY, WITH POLYPECTOMY AND BIOPSY;  Surgeon: Mihir Lang MD;  Location:  GI    ESOPHAGOSCOPY, GASTROSCOPY, DUODENOSCOPY (EGD), COMBINED N/A 1/28/2021    Procedure: ESOPHAGOGASTRODUODENOSCOPY, WITH BIOPSY;  Surgeon: Mihir Lang MD;  Location: [facility] OR    OPEN REDUCTION INTERNAL FIXATION FOOT Left 1/5/2023    Procedure: LISFRANC OPEN REDUCTION INTERNAL FIXATION, LEFT FOOT;  Surgeon: Bailey Ruiz DPM;  Location:  OR    SURGICAL HISTORY OF -       surgery on left index finger     No Known Allergies  Social History     Socioeconomic History    Marital status:      Spouse name: Luzma    Number of children: 3    Years of education: 14    Highest education level: Not on file   Occupational History    Occupation: [employer]     Employer: OTHER     Comment:    Tobacco Use    Smoking status: Former     Packs/day: 1.00      Years: 25.00     Additional pack years: 0.00     Total pack years: 25.00     Types: Cigarettes, Cigars     Start date: 1973     Quit date: 1998     Years since quittin.9     Passive exposure: Past    Smokeless tobacco: Never    Tobacco comments:     N/A not a smoker   Vaping Use    Vaping Use: Never used   Substance and Sexual Activity    Alcohol use: Yes     Alcohol/week: 10.0 standard drinks of alcohol     Comment: less than 6 per week    Drug use: Not Currently     Types: Amphetamines     Comment: Kratum    Sexual activity: Not Currently     Partners: Female     Birth control/protection: Female Surgical, None     Comment:    Other Topics Concern    Parent/sibling w/ CABG, MI or angioplasty before 65F 55M? Yes     Comment: Father age 50 bypass. Grandfather death in age 40 s heart   Social History Narrative    Not on file     Social Determinants of Health     Financial Resource Strain: Low Risk  (2023)    Financial Resource Strain     Within the past 12 months, have you or your family members you live with been unable to get utilities (heat, electricity) when it was really needed?: No   Food Insecurity: High Risk (2023)    Food Insecurity     Within the past 12 months, did you worry that your food would run out before you got money to buy more?: No     Within the past 12 months, did the food you bought just not last and you didn t have money to get more?: Yes   Transportation Needs: Low Risk  (2023)    Transportation Needs     Within the past 12 months, has lack of transportation kept you from medical appointments, getting your medicines, non-medical meetings or appointments, work, or from getting things that you need?: No   Physical Activity: Inactive (2023)    Exercise Vital Sign     Days of Exercise per Week: 0 days     Minutes of Exercise per Session: 0 min   Stress: No Stress Concern Present (2023)    [place] [place] [employer] - Occupational Stress  Questionnaire     Feeling of Stress : Only a little   Social Connections: Moderately Isolated (7/14/2023)    Social Connection and Isolation Panel [NHANES]     Frequency of Communication with Friends and Family: Three times a week     Frequency of Social Gatherings with Friends and Family: Three times a week     Attends Temple Services: Never     Active Member of Clubs or Organizations: No     Attends Club or Organization Meetings: Never     Marital Status:    Interpersonal Safety: Low Risk  (12/6/2023)    Interpersonal Safety     Do you feel physically and emotionally safe where you currently live?: Yes     Within the past 12 months, have you been hit, slapped, kicked or otherwise physically hurt by someone?: No     Within the past 12 months, have you been humiliated or emotionally abused in other ways by your partner or ex-partner?: No   Housing Stability: Low Risk  (12/4/2023)    Housing Stability     Do you have housing? : Yes     Are you worried about losing your housing?: No     Family History   Problem Relation Age of Onset    Cancer Mother         uterine    Other Cancer Mother         endometrial    Cerebrovascular Disease Mother     Substance Abuse Mother         Alcohol    C.A.D. Father     Hypertension Father     Hyperlipidemia Father     Breast Cancer Maternal Grandmother     Other Cancer Maternal Grandmother         Lung/brain    Substance Abuse Paternal Grandfather     Hypertension Brother     Substance Abuse Brother         Alcohol    Hyperlipidemia Brother     Breast Cancer Other     Breast Cancer Cousin     Other Cancer Other     Cerebrovascular Disease Other         Maternal Aunt    Glaucoma No family hx of     Macular Degeneration No family hx of           REVIEW OF SYSTEMS:  General: negative, fever, chills, night sweats  Skin: negative, acne, rash, and scaling  Eyes: negative, double vision, eye pain, and photophobia  Ears/Nose/Throat: negative, nasal congestion, and purulent  rhinorrhea  Respiratory: No cough, No hemoptysis, and negative  Cardiovascular: negative, as above, palpitations, tachycardia, irregular heart beat, paroxysmal nocturnal dyspnea, and orthopnea       OBJECTIVE:  Blood pressure 123/57, pulse 53, weight (!) 155.6 kg (343 lb), SpO2 97%.  General Appearance: alert and no distress  Head: Normocephalic. No masses, lesions, tenderness or abnormalities  Eyes: conjuctiva clear, PERRL, EOM intact  Ears: External ears normal. Canals clear. TM's normal.  Nose: Nares normal  Mouth: normal  Neck: Supple, no cervical adenopathy, no thyromegaly  Lungs: clear to auscultation  Cardiac: regular rate and rhythm, normal S1 and S2,        ASSESSMENT/PLAN:  Patient here for evaluation of progressive shortness of breath and dyspnea on exertion as well as chest heaviness with activity.  Darted 5 months ago and is progressive.  Even with minimal activity patient is feeling short of breath and having some chest heaviness.  Patient known to have severe coronary artery calcification.  In 2018 patient had a CT calcium score which was 1908.  This placed him on 99th percentile.  Subsequently he had an exercise stress echo attempted but reached only 60% of maximum.  To heart rate.  Subsequently patient had a dobutamine stress echo which was normal.  Now patient is presenting like unstable anginal symptoms.  All his symptoms could be related to his obesity but need to rule out ischemia especially in presence of severe coronary artery calcification known to be rare since 2018.  As his symptoms are of new onset and there is no ideal stress test with his body habitus.  Patient will require a right and left heart catheterization.  Risk-benefit explained.  If the study is normal patient is advised to undergo weight loss program.  Angiogram will be arranged as soon as possible.  Patient's prior studies including CTs calcium score, stress echo, dobutamine stress echo EKG reviewed  Total visit duration 45  minutes.  This included face-to-face interview, physical exam, chart review, review of prior evaluations and documentation.      Please do not hesitate to contact me if you have any questions/concerns.     Sincerely,     ZANA Fair MD

## 2023-12-29 ENCOUNTER — TELEPHONE (OUTPATIENT)
Dept: CARDIOLOGY | Facility: CLINIC | Age: 63
End: 2023-12-29

## 2023-12-29 NOTE — TELEPHONE ENCOUNTER
Below instructions already reviewed with patient at office visit yesterday 12/28/23. Postponing encounter to closer to appointment.    Pre-procedure instructions - Coronary Angiogram  Patient Education    Your arrival time is 1/26/24.  Location is [id number] [place] [address] Waiting Room  Please plan on being at the hospital all day.  At any time, emergencies and/or urgent cases may come up which could delay the start of your procedure.    Pre-procedure instructions - Coronary Angiogram  Shower in the evening before or the morning of the procedure  No solid food for 8 hours prior and nothing to drink 2 hours prior to arrival time  You can take your morning medications (except for diabetic and blood thinners) with sips of water.  Take 325 mg of Asprin 24 hours prior to the procedure and the morning of procedure.   You will need to arrange a ride to drop you off and pick you up, as you will be unable to drive home.  Prior to discharge you may be required to lay flat for approximately 2-4 hours in the recovery unit to ensure proper clotting of the artery. You will need a responsible adult to stay with you for 24 hours post-procedure.              Diabetic Medication Instructions  Hold oral diabetic medication in morning of your procedure and for 48 hours after IV contrast is given  Typical instructions for insulin diabetic medication holding are below. However, please reach out to your Primary Care Provider or Endocrinologist for specific instructions  DO NOT take any oral diabetic medication, short-acting diabetes medications/insulin, humalog or regular insulin the morning of your test  Take   dose of long-acting insulin (Lantus, Levemir) the day of your test  Remember to bring your glucometer and insulin with you to take after your test if needed  DO NOT take injectable GLP-1 agonists semaglutide (Ozempic, Wegovy), dulaglutide (Trulicity),  exenatide ER (Bydureon), tirzepatide (Mounjaro), or oral semaglutide (Rybelsus) for 7 days prior your procedure  Hold once daily injectable GLP-1 agonists exenatide (Byetta), liraglutide (Saxenda, Victoza), lixisenatide (Soligua) the day before and day of your procedure                  Anticoagulation Medication Instructions   NA    You will need to follow up with one of our cardiology APPs 1-2 weeks after your procedure. If you need help scheduling or rescheduling your appointment, please call [phone]     Pre-procedure instructions - Right heart catheterization  Patient Education    Your arrival time is 1/26/24.  Location is [address] Waiting Room  Please plan on being at the hospital all day.  At any time, emergencies and/or urgent cases may come up which could delay the start of your procedure.    Pre-procedure instructions - Right heart catheterization  No solid food for 8 hours prior  Nothing to drink 2 hours prior to arrival time  You can take your morning medications (except diabetic and blood thinners) with sips of water  We recommend you arrange for a ride to drop you off and pick you up, in the instance, you are unable to drive home, however you should be able to function as you normally would after the procedure    Diabetic Medication Instructions  Typical instructions for insulin diabetic medication holding are below. However, please reach out to your Primary Care Provider or Endocrinologist for specific instructions  DO NOT take any oral diabetic medication, short-acting diabetes medications/insulin, humalog or regular insulin the morning of your test  Take   dose of long-acting insulin (Lantus, Levemir) the day of your test  Remember to  bring your glucometer and insulin with you to take after your test if needed  DO NOT take injectable GLP-1 agonists semaglutide (Ozempic, Wegovy), dulaglutide (Trulicity),  exenatide ER (Bydureon), tirzepatide (Mounjaro), or oral semaglutide (Rybelsus) for 7 days prior your procedure  Hold once daily injectable GLP-1 agonists exenatide (Byetta), liraglutide (Saxenda, Victoza), lixisenatide (Soliqua) the day before and day of your procedure                Anticoagulation Medication Instructions   NA    You will need to follow up with one of our cardiology APPs 1-2 weeks after your procedure. If you need help scheduling or rescheduling your appointment, please call [phone]    Su Iverson RN, BSN  Cardiology RN Care Coordinator   [place]   Phone: [phone]  Fax: [phone] ([place]) [phone] ([place])

## 2023-12-29 NOTE — PROGRESS NOTES
SUBJECTIVE:  Car Torres is a 63 year old male who presents for cardiac evaluation due to progressive shortness of breath and chest discomfort on exertion for past 5 months.PMH significant for HTN, HLD, and morbid obesity.   Patient's current BMI is 48.  He was evaluated in 2018 for coronary artery calcification.  Calcium score was as follows.  Total calcium score: 1908  Left main coronary artery: 17  Left anterior descending coronary artery: 566  Circumflex coronary artery: 333  Right coronary artery: 992    Total calcium score of 1908 placed him on the 99th percentile.  Subsequently an exercise stress echo was attempted and reached only 60% of maximum predicted heart rate.  Subsequently patient had a dobutamine stress echo which was normal.  Patient remained almost asymptomatic.  Now he is having significant shortness of breath and exertional chest heaviness with minimal activity.  Even going up the steps he has to stop multiple places.  He is more or less unable to do any significant activity.  Current cardiac medications are atorvastatin 40 mg daily, lisinopril hydrochlorothiazide 2012.5 daily.  Patient Active Problem List    Diagnosis Date Noted    Hypertension goal BP (blood pressure) < 140/90 10/20/2010     Priority: High    Hyperlipidemia LDL goal <130 06/11/2010     Priority: High    Lisfranc dislocation, left, initial encounter 12/28/2022     Priority: Medium    Lisfranc dislocation, left, subsequent encounter 12/28/2022     Priority: Medium    Dementia associated with other underlying disease with behavioral disturbance (H) 01/03/2022     Priority: Medium    Ulcer of left foot, unspecified ulcer stage (H) 01/03/2022     Priority: Medium    Unspecified psychosis not due to a substance or known physiological condition (H) 01/03/2022     Priority: Medium    Major depressive disorder, recurrent episode, mild with atypical features (H) 12/06/2021     Priority: Medium    TIA (obstructive sleep apnea)  07/19/2018     Priority: Medium     Home Sleep Apnea Testing - 7/18/18: 295 lbs 0 oz: AHI 45.3/hr. Supine AHI 61.9/hr. Oxygen [place] of 66%.  Baseline 93%.  Sp02 =< 88% for 47 minutes.      History of sinus bradycardia 06/07/2018     Priority: Medium    ED (erectile dysfunction) 06/07/2018     Priority: Medium    Coronary artery calcification 05/08/2018     Priority: Medium     Per CT CHEST WITHOUT CONTRAST April 23, 2018. CT calcium score planned.       Renal cyst 11/18/2015     Priority: Medium     Simple, non enhanced, 3.5 cm on right kidney, Bosniak 1 - x 2 stable findings      Diverticulosis of large intestine without hemorrhage 11/18/2015     Priority: Medium     Incidental finding on CT scan       Pulmonary nodule 11/18/2015     Priority: Medium     Incidental finding on CT scan, right posterior lung - considered benign / stable       CKD (chronic kidney disease) stage 2, GFR 60-89 ml/min 10/21/2010     Priority: Medium     60 to 80 - have discussed Lisinopril - will consider       Sciatica 02/27/2006     Priority: Medium    Morbid obesity (H) 02/27/2006     Priority: Medium    .  Current Outpatient Medications   Medication Sig    ASPIRIN 325 MG OR TBEC ONE DAILY    atorvastatin (LIPITOR) 40 MG tablet Take 1 tablet (40 mg) by mouth daily    busPIRone (BUSPAR) 10 MG tablet Take 2 tablets (20 mg) by mouth 2 times daily    Cholecalciferol (VITAMIN D PO) Take 5,000 Units by mouth daily One tablet twice daily    co-enzyme Q-10 100 MG CAPS capsule Take 100 mg by mouth daily    FLUoxetine (PROZAC) 40 MG capsule Take 1 capsule (40 mg) by mouth daily    hydrOXYzine HCl (ATARAX) 10 MG tablet Take 1 tablet (10 mg) by mouth 2 times daily as needed for anxiety    lisinopril-hydrochlorothiazide (ZESTORETIC) 20-12.5 MG tablet Take 1 tablet by mouth daily    MULTI VITAMIN MENS PO None Entered    tadalafil (CIALIS) 20 MG tablet TAKE 1/2 TO 1 TABLET BY MOUTH 30 MINUTES TO 1 HOUR BEFORE SEX    vitamin B complex with vitamin C  (STRESS TAB) tablet Take 1 tablet by mouth daily     Current Facility-Administered Medications   Medication    lidocaine 1 % injection 2 mL    triamcinolone (KENALOG-40) injection 40 mg     Past Medical History:   Diagnosis Date    Dementia (H)     Depressive disorder     Heart disease 2018    Hypercholesterolemia     Hypertension goal BP (blood pressure) < 140/90     Nonspecific abnormal electrocardiogram (ECG) (EKG) 2007    Stress testing normal.     Sleep apnea     uses a c-pap, severe     Past Surgical History:   Procedure Laterality Date    COLONOSCOPY N/A 2020    Procedure: COLONOSCOPY, WITH POLYPECTOMY, CLIP;  Surgeon: Mihir Lang MD;  Location: [facility] OR    COLONOSCOPY N/A 1/10/2022    Procedure: COLONOSCOPY, WITH POLYPECTOMY AND BIOPSY;  Surgeon: Mihir Lang MD;  Location:  GI    ESOPHAGOSCOPY, GASTROSCOPY, DUODENOSCOPY (EGD), COMBINED N/A 2021    Procedure: ESOPHAGOGASTRODUODENOSCOPY, WITH BIOPSY;  Surgeon: Mihir Lang MD;  Location: [facility] OR    OPEN REDUCTION INTERNAL FIXATION FOOT Left 2023    Procedure: LISFRANC OPEN REDUCTION INTERNAL FIXATION, LEFT FOOT;  Surgeon: Bailey Ruiz DPM;  Location:  OR    SURGICAL HISTORY OF -       surgery on left index finger     No Known Allergies  Social History     Socioeconomic History    Marital status:      Spouse name: Luzma    Number of children: 3    Years of education: 14    Highest education level: Not on file   Occupational History    Occupation: [employer]     Employer: OTHER     Comment:    Tobacco Use    Smoking status: Former     Packs/day: 1.00     Years: 25.00     Additional pack years: 0.00     Total pack years: 25.00     Types: Cigarettes, Cigars     Start date: 1973     Quit date: 1998     Years since quittin.9     Passive exposure: Past    Smokeless tobacco: Never    Tobacco comments:     N/A not a smoker   Vaping Use    Vaping Use: Never used   Substance and Sexual  Activity    Alcohol use: Yes     Alcohol/week: 10.0 standard drinks of alcohol     Comment: less than 6 per week    Drug use: Not Currently     Types: Amphetamines     Comment: Kratum    Sexual activity: Not Currently     Partners: Female     Birth control/protection: Female Surgical, None     Comment:    Other Topics Concern    Parent/sibling w/ CABG, MI or angioplasty before 65F 55M? Yes     Comment: Father age 50 bypass. Grandfather death in age 40 s heart   Social History Narrative    Not on file     Social Determinants of Health     Financial Resource Strain: Low Risk  (12/4/2023)    Financial Resource Strain     Within the past 12 months, have you or your family members you live with been unable to get utilities (heat, electricity) when it was really needed?: No   Food Insecurity: High Risk (12/4/2023)    Food Insecurity     Within the past 12 months, did you worry that your food would run out before you got money to buy more?: No     Within the past 12 months, did the food you bought just not last and you didn t have money to get more?: Yes   Transportation Needs: Low Risk  (12/4/2023)    Transportation Needs     Within the past 12 months, has lack of transportation kept you from medical appointments, getting your medicines, non-medical meetings or appointments, work, or from getting things that you need?: No   Physical Activity: Inactive (7/14/2023)    Exercise Vital Sign     Days of Exercise per Week: 0 days     Minutes of Exercise per Session: 0 min   Stress: No Stress Concern Present (7/14/2023)    [employer] - Occupational Stress Questionnaire     Feeling of Stress : Only a little   Social Connections: Moderately Isolated (7/14/2023)    Social Connection and Isolation Panel [NHANES]     Frequency of Communication with Friends and Family: Three times a week     Frequency of Social Gatherings with Friends and Family: Three times a week     Attends [employer] Services:  Never     Active Member of Clubs or Organizations: No     Attends Club or Organization Meetings: Never     Marital Status:    Interpersonal Safety: Low Risk  (12/6/2023)    Interpersonal Safety     Do you feel physically and emotionally safe where you currently live?: Yes     Within the past 12 months, have you been hit, slapped, kicked or otherwise physically hurt by someone?: No     Within the past 12 months, have you been humiliated or emotionally abused in other ways by your partner or ex-partner?: No   Housing Stability: Low Risk  (12/4/2023)    Housing Stability     Do you have housing? : Yes     Are you worried about losing your housing?: No     Family History   Problem Relation Age of Onset    Cancer Mother         uterine    Other Cancer Mother         endometrial    Cerebrovascular Disease Mother     Substance Abuse Mother         Alcohol    C.A.D. Father     Hypertension Father     Hyperlipidemia Father     Breast Cancer Maternal Grandmother     Other Cancer Maternal Grandmother         Lung/brain    Substance Abuse Paternal Grandfather     Hypertension Brother     Substance Abuse Brother         Alcohol    Hyperlipidemia Brother     Breast Cancer Other     Breast Cancer Cousin     Other Cancer Other     Cerebrovascular Disease Other         Maternal Aunt    Glaucoma No family hx of     Macular Degeneration No family hx of           REVIEW OF SYSTEMS:  General: negative, fever, chills, night sweats  Skin: negative, acne, rash, and scaling  Eyes: negative, double vision, eye pain, and photophobia  Ears/Nose/Throat: negative, nasal congestion, and purulent rhinorrhea  Respiratory: No cough, No hemoptysis, and negative  Cardiovascular: negative, as above, palpitations, tachycardia, irregular heart beat, paroxysmal nocturnal dyspnea, and orthopnea       OBJECTIVE:  Blood pressure 123/57, pulse 53, weight (!) 155.6 kg (343 lb), SpO2 97%.  General Appearance: alert and no distress  Head: Normocephalic.  No masses, lesions, tenderness or abnormalities  Eyes: conjuctiva clear, PERRL, EOM intact  Ears: External ears normal. Canals clear. TM's normal.  Nose: Nares normal  Mouth: normal  Neck: Supple, no cervical adenopathy, no thyromegaly  Lungs: clear to auscultation  Cardiac: regular rate and rhythm, normal S1 and S2,        ASSESSMENT/PLAN:  Patient here for evaluation of progressive shortness of breath and dyspnea on exertion as well as chest heaviness with activity.  Darted 5 months ago and is progressive.  Even with minimal activity patient is feeling short of breath and having some chest heaviness.  Patient known to have severe coronary artery calcification.  In 2018 patient had a CT calcium score which was 1908.  This placed him on 99th percentile.  Subsequently he had an exercise stress echo attempted but reached only 60% of maximum.  To heart rate.  Subsequently patient had a dobutamine stress echo which was normal.  Now patient is presenting like unstable anginal symptoms.  All his symptoms could be related to his obesity but need to rule out ischemia especially in presence of severe coronary artery calcification known to be rare since 2018.  As his symptoms are of new onset and there is no ideal stress test with his body habitus.  Patient will require a right and left heart catheterization.  Risk-benefit explained.  If the study is normal patient is advised to undergo weight loss program.  Angiogram will be arranged as soon as possible.  Patient's prior studies including CTs calcium score, stress echo, dobutamine stress echo EKG reviewed  Total visit duration 45 minutes.  This included face-to-face interview, physical exam, chart review, review of prior evaluations and documentation.

## 2023-12-29 NOTE — TELEPHONE ENCOUNTER
Cath Lab Case Request/Order    Location: [address] [address] [facility] Waiting Room    Procedure: Coronary Angiogram w/ RHC    Procedure Date: 1/26/24    Patient Arrival Time: 12:00    Procedure Time: 4th case to follow    Ordering Provider: Dr. AMBER Fair    Performing Cardiologist: Dr. Elton Wright    Inpatient Bed Needed: No    Post-  Procedure ELIJAH appointment scheduled (1 - 2 weeks):     Date: 2/15/24     Provider: Dr. Tiwari    Communicated Patient Instructions:     NPO, nothing to eat 8 hours and drink 2 hours before arrival time: Yes     , need to arrange a ride home - unable to drive post- procedure: Yes     Adult at home, need a responsible adult to stay with patient 24 hours post- procedure: YES    Appointment was scheduled: Over the phone    Patient expressed understanding of above instructions and denied further questions at this time.    Su Iverson RN

## 2023-12-30 DIAGNOSIS — F41.1 GAD (GENERALIZED ANXIETY DISORDER): ICD-10-CM

## 2024-01-02 RX ORDER — BUSPIRONE HYDROCHLORIDE 10 MG/1
20 TABLET ORAL 2 TIMES DAILY
Qty: 120 TABLET | Refills: 1 | Status: SHIPPED | OUTPATIENT
Start: 2024-01-02 | End: 2024-03-03

## 2024-01-03 DIAGNOSIS — F41.1 GAD (GENERALIZED ANXIETY DISORDER): ICD-10-CM

## 2024-01-04 RX ORDER — HYDROXYZINE HYDROCHLORIDE 10 MG/1
10 TABLET, FILM COATED ORAL 2 TIMES DAILY PRN
Qty: 180 TABLET | Refills: 0 | Status: SHIPPED | OUTPATIENT
Start: 2024-01-04 | End: 2024-04-03

## 2024-01-04 NOTE — TELEPHONE ENCOUNTER
90 Day Request for hydrOXYzine HCl (ATARAX) 10 MG tablet . Quantity of pending prescription is different than original order.     Hydroxyzine sent to [facility] in [place] on 12/27/23 for 30 tablets and 3 refills.     12/22/23 treatment plan  Treatment Plan:        1.  Continue buspirone 20 mg 2 times daily  2.  Continue fluoxetine 40 mg daily  3.  Continue hydroxyzine 10 mg up to twice daily as needed for anxiety  4.  Referral made for psychological testing to clarify diagnosis     Continue all other medications as reviewed per electronic medical record today.   Safety plan reviewed. To the Emergency Department as needed or call after hours crisis line at [phone] or [phone]. [place] Crisis Text Line. Text [place] to 269360 or Suicide LifeLine Chat: suicidepreventionlifeline.org/chat/  To schedule individual or family therapy, call [facility] at [phone]  Schedule an appointment with me in April or sooner as needed. Call [facility] at [phone] to schedule.

## 2024-01-05 ENCOUNTER — ANCILLARY PROCEDURE (OUTPATIENT)
Dept: MRI IMAGING | Facility: CLINIC | Age: 64
End: 2024-01-05
Attending: PSYCHIATRY & NEUROLOGY
Payer: MEDICARE

## 2024-01-05 DIAGNOSIS — R41.89 COGNITIVE CHANGE: ICD-10-CM

## 2024-01-05 DIAGNOSIS — F29 PSYCHOSIS, UNSPECIFIED PSYCHOSIS TYPE (H): ICD-10-CM

## 2024-01-05 PROCEDURE — G1010 CDSM STANSON: HCPCS | Performed by: RADIOLOGY

## 2024-01-05 PROCEDURE — 70553 MRI BRAIN STEM W/O & W/DYE: CPT | Mod: MG | Performed by: RADIOLOGY

## 2024-01-05 PROCEDURE — A9585 GADOBUTROL INJECTION: HCPCS | Performed by: RADIOLOGY

## 2024-01-05 RX ORDER — GADOBUTROL 604.72 MG/ML
15 INJECTION INTRAVENOUS ONCE
Status: COMPLETED | OUTPATIENT
Start: 2024-01-05 | End: 2024-01-05

## 2024-01-05 RX ADMIN — GADOBUTROL 15 ML: 604.72 INJECTION INTRAVENOUS at 18:27

## 2024-01-23 NOTE — TELEPHONE ENCOUNTER
Called and spoke with patient and patient's wife. Reviewed instructions. No questions at this time. Postponing encounter for post procedure call.    Su Iverson RN, BSN  Cardiology RN Care Coordinator   [place]   Phone: [phone]  Fax: [phone] ([place]) [phone] ([place])

## 2024-01-26 ENCOUNTER — HOSPITAL ENCOUNTER (OUTPATIENT)
Facility: CLINIC | Age: 64
Discharge: HOME | End: 2024-01-26
Attending: INTERNAL MEDICINE | Admitting: INTERNAL MEDICINE
Payer: MEDICARE

## 2024-01-26 ENCOUNTER — APPOINTMENT (OUTPATIENT)
Dept: MEDSURG UNIT | Facility: CLINIC | Age: 64
End: 2024-01-26
Attending: INTERNAL MEDICINE
Payer: MEDICARE

## 2024-01-26 ENCOUNTER — APPOINTMENT (OUTPATIENT)
Dept: LAB | Facility: CLINIC | Age: 64
End: 2024-01-26
Attending: INTERNAL MEDICINE
Payer: MEDICARE

## 2024-01-26 VITALS
HEIGHT: 70 IN | RESPIRATION RATE: 16 BRPM | DIASTOLIC BLOOD PRESSURE: 61 MMHG | HEART RATE: 47 BPM | TEMPERATURE: 97.6 F | BODY MASS INDEX: 45.1 KG/M2 | SYSTOLIC BLOOD PRESSURE: 115 MMHG | OXYGEN SATURATION: 96 % | WEIGHT: 315 LBS

## 2024-01-26 DIAGNOSIS — I20.0 UNSTABLE ANGINA (H): ICD-10-CM

## 2024-01-26 DIAGNOSIS — I20.89 ANGINAL EQUIVALENT (H): ICD-10-CM

## 2024-01-26 DIAGNOSIS — R06.09 DOE (DYSPNEA ON EXERTION): ICD-10-CM

## 2024-01-26 DIAGNOSIS — Z86.79 HISTORY OF SINUS BRADYCARDIA: ICD-10-CM

## 2024-01-26 DIAGNOSIS — I25.10 CORONARY ARTERY DISEASE: ICD-10-CM

## 2024-01-26 DIAGNOSIS — I25.10 CORONARY ARTERY CALCIFICATION: Primary | Chronic | ICD-10-CM

## 2024-01-26 PROBLEM — Z98.890 STATUS POST CORONARY ANGIOGRAM: Status: ACTIVE | Noted: 2024-01-26

## 2024-01-26 LAB
ABO/RH(D): NORMAL
ACT BLD: 147 SECONDS (ref 74–150)
ACT BLD: 356 SECONDS (ref 74–150)
ANION GAP SERPL CALCULATED.3IONS-SCNC: 10 MMOL/L (ref 7–15)
ANTIBODY SCREEN: NEGATIVE
BUN SERPL-MCNC: 17.1 MG/DL (ref 8–23)
CALCIUM SERPL-MCNC: 10.1 MG/DL (ref 8.8–10.2)
CHLORIDE SERPL-SCNC: 104 MMOL/L (ref 98–107)
CREAT SERPL-MCNC: 1.01 MG/DL (ref 0.67–1.17)
DEPRECATED HCO3 PLAS-SCNC: 24 MMOL/L (ref 22–29)
EGFRCR SERPLBLD CKD-EPI 2021: 83 ML/MIN/1.73M2
ERYTHROCYTE [DISTWIDTH] IN BLOOD BY AUTOMATED COUNT: 12.6 % (ref 10–15)
GLUCOSE SERPL-MCNC: 111 MG/DL (ref 70–99)
HCT VFR BLD AUTO: 42.7 % (ref 40–53)
HGB BLD-MCNC: 14.4 G/DL (ref 13.3–17.7)
HGB BLD-MCNC: 14.4 G/DL (ref 13.3–17.7)
INR PPP: 0.97 (ref 0.85–1.15)
MCH RBC QN AUTO: 30.6 PG (ref 26.5–33)
MCHC RBC AUTO-ENTMCNC: 33.7 G/DL (ref 31.5–36.5)
MCV RBC AUTO: 91 FL (ref 78–100)
PLATELET # BLD AUTO: 212 10E3/UL (ref 150–450)
POTASSIUM SERPL-SCNC: 4.3 MMOL/L (ref 3.4–5.3)
RBC # BLD AUTO: 4.71 10E6/UL (ref 4.4–5.9)
SODIUM SERPL-SCNC: 138 MMOL/L (ref 135–145)
SPECIMEN EXPIRATION DATE: NORMAL
WBC # BLD AUTO: 5.3 10E3/UL (ref 4–11)

## 2024-01-26 PROCEDURE — 93799 UNLISTED CV SVC/PROCEDURE: CPT | Performed by: INTERNAL MEDICINE

## 2024-01-26 PROCEDURE — 999N000142 HC STATISTIC PROCEDURE PREP ONLY

## 2024-01-26 PROCEDURE — 999N000134 HC STATISTIC PP CARE STAGE 3

## 2024-01-26 PROCEDURE — 250N000011 HC RX IP 250 OP 636: Performed by: INTERNAL MEDICINE

## 2024-01-26 PROCEDURE — 85347 COAGULATION TIME ACTIVATED: CPT

## 2024-01-26 PROCEDURE — C1751 CATH, INF, PER/CENT/MIDLINE: HCPCS | Performed by: INTERNAL MEDICINE

## 2024-01-26 PROCEDURE — 85610 PROTHROMBIN TIME: CPT | Performed by: INTERNAL MEDICINE

## 2024-01-26 PROCEDURE — 36415 COLL VENOUS BLD VENIPUNCTURE: CPT | Performed by: INTERNAL MEDICINE

## 2024-01-26 PROCEDURE — 80048 BASIC METABOLIC PNL TOTAL CA: CPT | Performed by: INTERNAL MEDICINE

## 2024-01-26 PROCEDURE — C1894 INTRO/SHEATH, NON-LASER: HCPCS | Performed by: INTERNAL MEDICINE

## 2024-01-26 PROCEDURE — 86900 BLOOD TYPING SEROLOGIC ABO: CPT | Performed by: INTERNAL MEDICINE

## 2024-01-26 PROCEDURE — 93460 R&L HRT ART/VENTRICLE ANGIO: CPT | Performed by: INTERNAL MEDICINE

## 2024-01-26 PROCEDURE — 85041 AUTOMATED RBC COUNT: CPT | Performed by: INTERNAL MEDICINE

## 2024-01-26 PROCEDURE — 93571 IV DOP VEL&/PRESS C FLO 1ST: CPT | Mod: 26 | Performed by: INTERNAL MEDICINE

## 2024-01-26 PROCEDURE — 258N000003 HC RX IP 258 OP 636: Performed by: INTERNAL MEDICINE

## 2024-01-26 PROCEDURE — 85018 HEMOGLOBIN: CPT | Mod: 91

## 2024-01-26 PROCEDURE — 99152 MOD SED SAME PHYS/QHP 5/>YRS: CPT | Performed by: INTERNAL MEDICINE

## 2024-01-26 PROCEDURE — 93460 R&L HRT ART/VENTRICLE ANGIO: CPT | Mod: 26 | Performed by: INTERNAL MEDICINE

## 2024-01-26 PROCEDURE — 250N000009 HC RX 250: Performed by: INTERNAL MEDICINE

## 2024-01-26 PROCEDURE — 999N000054 HC STATISTIC EKG NON-CHARGEABLE

## 2024-01-26 PROCEDURE — 93005 ELECTROCARDIOGRAM TRACING: CPT

## 2024-01-26 PROCEDURE — C1769 GUIDE WIRE: HCPCS | Performed by: INTERNAL MEDICINE

## 2024-01-26 PROCEDURE — C1887 CATHETER, GUIDING: HCPCS | Performed by: INTERNAL MEDICINE

## 2024-01-26 PROCEDURE — 99153 MOD SED SAME PHYS/QHP EA: CPT | Performed by: INTERNAL MEDICINE

## 2024-01-26 PROCEDURE — 272N000001 HC OR GENERAL SUPPLY STERILE: Performed by: INTERNAL MEDICINE

## 2024-01-26 RX ORDER — NALOXONE HYDROCHLORIDE 0.4 MG/ML
0.4 INJECTION, SOLUTION INTRAMUSCULAR; INTRAVENOUS; SUBCUTANEOUS
Status: DISCONTINUED | OUTPATIENT
Start: 2024-01-26 | End: 2024-01-26 | Stop reason: HOSPADM

## 2024-01-26 RX ORDER — ASPIRIN 325 MG
325 TABLET ORAL ONCE
Status: COMPLETED | OUTPATIENT
Start: 2024-01-26 | End: 2024-01-26

## 2024-01-26 RX ORDER — ATROPINE SULFATE 0.1 MG/ML
0.5 INJECTION INTRAVENOUS
Status: DISCONTINUED | OUTPATIENT
Start: 2024-01-26 | End: 2024-01-26 | Stop reason: HOSPADM

## 2024-01-26 RX ORDER — NALOXONE HYDROCHLORIDE 0.4 MG/ML
0.2 INJECTION, SOLUTION INTRAMUSCULAR; INTRAVENOUS; SUBCUTANEOUS
Status: DISCONTINUED | OUTPATIENT
Start: 2024-01-26 | End: 2024-01-26 | Stop reason: HOSPADM

## 2024-01-26 RX ORDER — PROTAMINE SULFATE 10 MG/ML
INJECTION, SOLUTION INTRAVENOUS
Status: DISCONTINUED | OUTPATIENT
Start: 2024-01-26 | End: 2024-01-26 | Stop reason: HOSPADM

## 2024-01-26 RX ORDER — LIDOCAINE 40 MG/G
CREAM TOPICAL
Status: DISCONTINUED | OUTPATIENT
Start: 2024-01-26 | End: 2024-01-26 | Stop reason: HOSPADM

## 2024-01-26 RX ORDER — ASPIRIN 81 MG/1
243 TABLET, CHEWABLE ORAL ONCE
Status: COMPLETED | OUTPATIENT
Start: 2024-01-26 | End: 2024-01-26

## 2024-01-26 RX ORDER — DOPAMINE HYDROCHLORIDE 160 MG/100ML
2-20 INJECTION, SOLUTION INTRAVENOUS CONTINUOUS PRN
Status: CANCELLED | OUTPATIENT
Start: 2024-01-26

## 2024-01-26 RX ORDER — HEPARIN SODIUM 10000 [USP'U]/100ML
100-1000 INJECTION, SOLUTION INTRAVENOUS CONTINUOUS PRN
Status: CANCELLED | OUTPATIENT
Start: 2024-01-26

## 2024-01-26 RX ORDER — ARGATROBAN 1 MG/ML
150 INJECTION, SOLUTION INTRAVENOUS
Status: CANCELLED | OUTPATIENT
Start: 2024-01-26

## 2024-01-26 RX ORDER — ASPIRIN 81 MG/1
81 TABLET ORAL DAILY
Qty: 90 TABLET | Refills: 3 | Status: SHIPPED | OUTPATIENT
Start: 2024-01-27 | End: 2024-04-11

## 2024-01-26 RX ORDER — DOBUTAMINE HYDROCHLORIDE 200 MG/100ML
2-20 INJECTION INTRAVENOUS CONTINUOUS PRN
Status: CANCELLED | OUTPATIENT
Start: 2024-01-26

## 2024-01-26 RX ORDER — OXYCODONE HYDROCHLORIDE 5 MG/1
5 TABLET ORAL EVERY 4 HOURS PRN
Status: DISCONTINUED | OUTPATIENT
Start: 2024-01-26 | End: 2024-01-26 | Stop reason: HOSPADM

## 2024-01-26 RX ORDER — TIROFIBAN HYDROCHLORIDE 50 UG/ML
0.15 INJECTION INTRAVENOUS CONTINUOUS PRN
Status: CANCELLED | OUTPATIENT
Start: 2024-01-26

## 2024-01-26 RX ORDER — OXYCODONE HYDROCHLORIDE 5 MG/1
10 TABLET ORAL EVERY 4 HOURS PRN
Status: DISCONTINUED | OUTPATIENT
Start: 2024-01-26 | End: 2024-01-26 | Stop reason: HOSPADM

## 2024-01-26 RX ORDER — NICARDIPINE HYDROCHLORIDE 2.5 MG/ML
INJECTION INTRAVENOUS
Status: DISCONTINUED | OUTPATIENT
Start: 2024-01-26 | End: 2024-01-26 | Stop reason: HOSPADM

## 2024-01-26 RX ORDER — FENTANYL CITRATE 50 UG/ML
25 INJECTION, SOLUTION INTRAMUSCULAR; INTRAVENOUS
Status: DISCONTINUED | OUTPATIENT
Start: 2024-01-26 | End: 2024-01-26 | Stop reason: HOSPADM

## 2024-01-26 RX ORDER — SODIUM CHLORIDE 9 MG/ML
INJECTION, SOLUTION INTRAVENOUS CONTINUOUS
Status: DISCONTINUED | OUTPATIENT
Start: 2024-01-26 | End: 2024-01-26 | Stop reason: HOSPADM

## 2024-01-26 RX ORDER — NITROGLYCERIN 20 MG/100ML
10-200 INJECTION INTRAVENOUS CONTINUOUS PRN
Status: CANCELLED | OUTPATIENT
Start: 2024-01-26

## 2024-01-26 RX ORDER — NITROGLYCERIN 5 MG/ML
VIAL (ML) INTRAVENOUS
Status: DISCONTINUED | OUTPATIENT
Start: 2024-01-26 | End: 2024-01-26 | Stop reason: HOSPADM

## 2024-01-26 RX ORDER — IOPAMIDOL 755 MG/ML
INJECTION, SOLUTION INTRAVASCULAR
Status: DISCONTINUED | OUTPATIENT
Start: 2024-01-26 | End: 2024-01-26 | Stop reason: HOSPADM

## 2024-01-26 RX ORDER — EPTIFIBATIDE 2 MG/ML
180 INJECTION, SOLUTION INTRAVENOUS EVERY 10 MIN PRN
Status: CANCELLED | OUTPATIENT
Start: 2024-01-26

## 2024-01-26 RX ORDER — FENTANYL CITRATE 50 UG/ML
INJECTION, SOLUTION INTRAMUSCULAR; INTRAVENOUS
Status: DISCONTINUED | OUTPATIENT
Start: 2024-01-26 | End: 2024-01-26 | Stop reason: HOSPADM

## 2024-01-26 RX ORDER — HEPARIN SODIUM 1000 [USP'U]/ML
INJECTION, SOLUTION INTRAVENOUS; SUBCUTANEOUS
Status: DISCONTINUED | OUTPATIENT
Start: 2024-01-26 | End: 2024-01-26 | Stop reason: HOSPADM

## 2024-01-26 RX ORDER — EPTIFIBATIDE 2 MG/ML
15 INJECTION, SOLUTION INTRAVENOUS CONTINUOUS PRN
Status: CANCELLED | OUTPATIENT
Start: 2024-01-26

## 2024-01-26 RX ORDER — ARGATROBAN 1 MG/ML
350 INJECTION, SOLUTION INTRAVENOUS
Status: CANCELLED | OUTPATIENT
Start: 2024-01-26

## 2024-01-26 RX ORDER — FLUMAZENIL 0.1 MG/ML
0.2 INJECTION, SOLUTION INTRAVENOUS
Status: DISCONTINUED | OUTPATIENT
Start: 2024-01-26 | End: 2024-01-26 | Stop reason: HOSPADM

## 2024-01-26 RX ADMIN — LIDOCAINE: 40 CREAM TOPICAL at 13:42

## 2024-01-26 RX ADMIN — SODIUM CHLORIDE: 9 INJECTION, SOLUTION INTRAVENOUS at 13:17

## 2024-01-26 ASSESSMENT — ACTIVITIES OF DAILY LIVING (ADL)
ADLS_ACUITY_SCORE: 35

## 2024-01-26 NOTE — DISCHARGE INSTRUCTIONS
Going Home after a Coronary Angiogram and Right Heart Cath  ______________________________________________    After you go home:  Have an adult stay with you for 24 hours.  Drink plenty of fluids.  You may eat your normal diet, unless your doctor tells you otherwise.  For 24 hours:  Relax and take it easy.  Do NOT smoke.  Do NOT make any important or legal decisions.  Do NOT drive or operate machines at home or at work.  Do NOT drink alcohol.  Remove the Band-Aid after 24 hours. If there is minor oozing, apply another Band-aid and remove it after 12 hours.  For 2 days, do NOT have sex or do any heavy exercise.  Do NOT take a bath, or use a hot tub or pool for at least 3 days. You may shower.    Care of wrist or arm site  It is normal to have soreness at the puncture site and mild tingling in your hand for up to 3 days.  For 2 days, do not use your hand or arm to support your weight (such as rising from a chair) or bend your wrist (such as lifting a garage door).  For 2 days, do not lift more than 5 pounds or exercise your arm (tennis, golf or bowling).    If you start bleeding from the site in your arm:  Sit down and press firmly on the site with your fingers for 10 minutes. Call your doctor as soon as you can.  If the bleeding stops, sit still and keep your wrist straight for 2 hours.    Care of neck site  Monitor neck site for bleeding, swelling, or voice changes. If you notice bleeding or swelling immediately apply pressure to the site and call number below to speak with Cardiology Fellow.  If you experience any changes in your breathing you should call your doctor immediately or come to the closest Emergency Department.  Do not drive yourself.    Medicines  If you have started taking Plavix or Effient, do not stop taking it until you talk to your heart doctor (cardiologist).  If you are on metformin (Glucophage), do not restart it until you have blood tests (within 2 to 3 days after discharge). When your  doctor tells you it is safe, you may restart the metformin.  If you have stopped any other medicines, check with your nurse or provider about when to restart them.    Call 911 right away if you have bleeding that is heavy or does not stop.    Call your doctor if:  You have a large or growing hard lump around the site.  The site is red, swollen, hot or tender.  Blood or fluid is draining from the site.  You have chills or a fever greater than 101 F (38 C).  Your leg or arm feels numb or cool.  You have hives, a rash or unusual itching.    [facility] [place]:  [phone] (7 days a week)

## 2024-01-26 NOTE — PROGRESS NOTES
RIJ sheath removed at 17:34.  Pressure held for 8 minutes with hemostasis achieved.  RIJ site soft, clean dry and dressed with a Prim[place].

## 2024-01-26 NOTE — PROGRESS NOTES
Pt arrived to 2A from HOME for coronary angiogram and RHC. VSS. Denies pain*. Consent obtained. Lab resulted ex BMP pending. H&P current. Allergies reviewed with pt. Appropriately NPO. Prep completed. 325mg Aspirin taken this AM prior to admission groin prep complete, pedal pulses marked. PIV infusing NS at 150mL/hr.  Destiny wife at bedside; will be transporting patient home post procedure.

## 2024-01-26 NOTE — Clinical Note
Refills provided of the gum.   TIERRA Dickens CNP     Sheath inserted in the right internal jugular vein.

## 2024-01-26 NOTE — PRE-PROCEDURE
GENERAL PRE-PROCEDURE:   Procedure:  Coronary angiogram, percutaneous coronary intervention, right heart catheterization  Date/Time:  1/26/2024 1:52 PM    Written consent obtained?: Yes    Risks and benefits: Risks, benefits and alternatives were discussed    Consent given by:  Patient  Patient states understanding of procedure being performed: Yes    Patient's understanding of procedure matches consent: Yes    Procedure consent matches procedure scheduled: Yes    Expected level of sedation:  Minimal  Appropriately NPO:  Yes  ASA Class:  3  Lungs:  Lungs clear with good breath sounds bilaterally  Heart:  Normal heart sounds and rate  History & Physical reviewed:  History and physical reviewed and updates made (see comment)  H&P Comments:  Clinically Significant Risk Factors Present on Admission    Cardiovascular : Not present on admission    Fluid & Electrolyte Disorders : Not present on admission    Gastroenterology : Not present on admission    Hematology/Oncology : Not present on admission    Nephrology : Not present on admission    Neurology : Not present on admission    Pulmonology : Not present on admission    Systemic : Not present on admission    [unfilled]    Statement of review:  I have reviewed the lab findings, diagnostic data, medications, and the plan for sedation

## 2024-01-27 NOTE — PROGRESS NOTES
D/I/A: Pt roomed on 2A room 3.  Arrived via litter and accompanied by cath lab RN on monitor.  VSSA.  Rhythm upon arrival SB on monitor.  Denies pain or sob.  Reviewed activity restrictions and when to notify RN, ie-changes to breathing or increased chest pressure or chest pain.  CCL access:  TR band on R radial site with 12 ml of air, deflation to start at 1800.  7 Fr sheath remains in RIJ, to be removed asap.   at 1714  P: Continue to monitor status.  Discharge to home once meeting criteria.

## 2024-01-27 NOTE — PROGRESS NOTES
D/I/A:  Patient is tolerating liquids and foods, ambulating, urinating, puncture sites are stable (no bleeding and no hematoma) and patient has a  - wife and daughter.  A+O x4 and making needs known.  CCL access sites C/D/I; no bleeding or hematoma; CMS intact.  VSSA.  SB on monitor.  IV access removed.  Education completed and outlined in AVS or handout: medications reviewed with patient.  Questions answered prior to discharge.  Belongings returned to patient at discharge.    P: Discharged to self care.  Patient to follow up with appts as per discharge instruction.

## 2024-01-29 NOTE — TELEPHONE ENCOUNTER
Spoke to patient and wife on speaker.  Patient is doing well, no signs of infection and incision site has healed.  No concerns at this time.  Scheduled follow-up is 2/15 Dr. Tiwari.    India Stern RN  Cardiology Care Coordinator  [facility]  [phone] option 1

## 2024-01-30 LAB
ATRIAL RATE - MUSE: 50 BPM
DIASTOLIC BLOOD PRESSURE - MUSE: NORMAL MMHG
INTERPRETATION ECG - MUSE: NORMAL
P AXIS - MUSE: 68 DEGREES
PR INTERVAL - MUSE: 158 MS
QRS DURATION - MUSE: 110 MS
QT - MUSE: 464 MS
QTC - MUSE: 423 MS
R AXIS - MUSE: 4 DEGREES
SYSTOLIC BLOOD PRESSURE - MUSE: NORMAL MMHG
T AXIS - MUSE: 36 DEGREES
VENTRICULAR RATE- MUSE: 50 BPM

## 2024-01-31 ENCOUNTER — PATIENT OUTREACH (OUTPATIENT)
Dept: GERIATRIC MEDICINE | Facility: CLINIC | Age: 64
End: 2024-01-31
Payer: COMMERCIAL

## 2024-01-31 ENCOUNTER — TELEPHONE (OUTPATIENT)
Dept: CARDIOLOGY | Facility: CLINIC | Age: 64
End: 2024-01-31
Payer: COMMERCIAL

## 2024-01-31 NOTE — CONFIDENTIAL NOTE
Attempted to call patient. Left message for patient. Patient was called on 1/29/24 post procedure with no concerns. See telephone encounter for further follow up.     Su Iverson RN, BSN  Cardiology RN Care Coordinator   [place]   Phone: [phone]  Fax: [phone] ([place]) [phone] ([place])

## 2024-01-31 NOTE — TELEPHONE ENCOUNTER
----- Message from Mariama Villalobos sent at 1/29/2024  8:33 AM CST -----  Regarding: FW: coronary angiogram and RHC 1/26    ----- Message -----  From: Clare Castorena RN  Sent: 1/29/2024   7:33 AM CST  To: [facility]  Subject: coronary angiogram and RHC 1/26                  Hello,    Report not finished. No interventions, FFR of RCA 0.94.  RHC pressures WNL    RIJ and Right radial artery used for access.   No new meds at discharge.  Aubree

## 2024-01-31 NOTE — PROGRESS NOTES
[facility] Care Coordination Contact    CC spoke to member's wife Destiny and she stated they know their MA ended 12/31/23 and they have their Medicare supplement in place. CC explained that the health plan gives a 90 day vanessa period and member will remain on my caseload until 3/31/24. Detsiny thanked CC for calling and informing them.    Gisselle Conte RN, [facility]  [phone]

## 2024-02-02 ENCOUNTER — TELEPHONE (OUTPATIENT)
Dept: SLEEP MEDICINE | Facility: CLINIC | Age: 64
End: 2024-02-02
Payer: COMMERCIAL

## 2024-02-02 DIAGNOSIS — G47.33 OSA (OBSTRUCTIVE SLEEP APNEA): Primary | ICD-10-CM

## 2024-02-02 NOTE — CONFIDENTIAL NOTE
Reason for call:  Other   Patient called regarding (reason for call): prescription  Additional comments:   CPAP supplies, needs new mask, has appt in May.  [facility] gets supplies through.  Call with updates and any questions.    Phone number to reach patient:  Cell number on file:    Telephone Information:   Mobile [phone]           Best Time:  any    Can we leave a detailed message on this number?  YES    Travel screening: Not Applicable

## 2024-02-02 NOTE — TELEPHONE ENCOUNTER
Last ov 2/5/21  Next ov 5/2424    Patient requesting updated order for CPAP supplies prior to appointment. Order pended and routed to provider for consideration.    Patient uses [facility]    Zora TREADWELL RN  [facility]

## 2024-02-03 ENCOUNTER — HEALTH MAINTENANCE LETTER (OUTPATIENT)
Age: 64
End: 2024-02-03

## 2024-02-07 DIAGNOSIS — I25.10 CAD (CORONARY ARTERY DISEASE): Primary | ICD-10-CM

## 2024-02-07 RX ORDER — ASPIRIN 325 MG
325 TABLET ORAL ONCE
Status: CANCELLED | OUTPATIENT
Start: 2024-02-07 | End: 2024-02-07

## 2024-02-07 RX ORDER — ASPIRIN 81 MG/1
243 TABLET, CHEWABLE ORAL ONCE
Status: CANCELLED | OUTPATIENT
Start: 2024-02-07

## 2024-02-07 RX ORDER — SODIUM CHLORIDE 9 MG/ML
INJECTION, SOLUTION INTRAVENOUS CONTINUOUS
Status: CANCELLED | OUTPATIENT
Start: 2024-02-07

## 2024-02-07 RX ORDER — POTASSIUM CHLORIDE 1500 MG/1
40 TABLET, EXTENDED RELEASE ORAL
Status: CANCELLED | OUTPATIENT
Start: 2024-02-07

## 2024-02-07 RX ORDER — LIDOCAINE 40 MG/G
CREAM TOPICAL
Status: CANCELLED | OUTPATIENT
Start: 2024-02-07

## 2024-02-07 RX ORDER — POTASSIUM CHLORIDE 1500 MG/1
20 TABLET, EXTENDED RELEASE ORAL
Status: CANCELLED | OUTPATIENT
Start: 2024-02-07

## 2024-02-13 ENCOUNTER — MYC MEDICAL ADVICE (OUTPATIENT)
Dept: BEHAVIORAL HEALTH | Facility: CLINIC | Age: 64
End: 2024-02-13
Payer: MEDICARE

## 2024-02-13 ENCOUNTER — VIRTUAL VISIT (OUTPATIENT)
Dept: BEHAVIORAL HEALTH | Facility: CLINIC | Age: 64
End: 2024-02-13
Payer: MEDICARE

## 2024-02-13 DIAGNOSIS — F33.0 MAJOR DEPRESSIVE DISORDER, RECURRENT EPISODE, MILD (H): Primary | ICD-10-CM

## 2024-02-13 ASSESSMENT — COLUMBIA-SUICIDE SEVERITY RATING SCALE - C-SSRS
2. HAVE YOU ACTUALLY HAD ANY THOUGHTS OF KILLING YOURSELF?: NO
1. IN THE PAST MONTH, HAVE YOU WISHED YOU WERE DEAD OR WISHED YOU COULD GO TO SLEEP AND NOT WAKE UP?: NO
1. HAVE YOU WISHED YOU WERE DEAD OR WISHED YOU COULD GO TO SLEEP AND NOT WAKE UP?: YES

## 2024-02-13 ASSESSMENT — PATIENT HEALTH QUESTIONNAIRE - PHQ9
SUM OF ALL RESPONSES TO PHQ QUESTIONS 1-9: 0
10. IF YOU CHECKED OFF ANY PROBLEMS, HOW DIFFICULT HAVE THESE PROBLEMS MADE IT FOR YOU TO DO YOUR WORK, TAKE CARE OF THINGS AT HOME, OR GET ALONG WITH OTHER PEOPLE: NOT DIFFICULT AT ALL
SUM OF ALL RESPONSES TO PHQ QUESTIONS 1-9: 0

## 2024-02-15 ENCOUNTER — OFFICE VISIT (OUTPATIENT)
Dept: CARDIOLOGY | Facility: CLINIC | Age: 64
End: 2024-02-15
Payer: MEDICARE

## 2024-02-15 VITALS
SYSTOLIC BLOOD PRESSURE: 121 MMHG | BODY MASS INDEX: 49.93 KG/M2 | DIASTOLIC BLOOD PRESSURE: 69 MMHG | WEIGHT: 315 LBS | OXYGEN SATURATION: 95 % | HEART RATE: 58 BPM

## 2024-02-15 DIAGNOSIS — I20.0 UNSTABLE ANGINA (H): Primary | ICD-10-CM

## 2024-02-15 DIAGNOSIS — R93.1 ABNORMAL CORONARY ANGIOGRAM: ICD-10-CM

## 2024-02-15 PROCEDURE — 99214 OFFICE O/P EST MOD 30 MIN: CPT | Performed by: INTERNAL MEDICINE

## 2024-02-15 NOTE — NURSING NOTE
Left Heart Catheterization: Patient given Coronary Angiogram and Angioplasty: A Patient's Guide booklet. Patient was instructed regarding left heart catheterization, including discussion of the indication, procedure, preparation, intra-procedural steps, and recovery at home. Patient demonstrated understanding of this information and agreed to call with further questions or concerns.  Return Appointment: Patient given instructions regarding scheduling next clinic visit. Patient demonstrated understanding of this information and agreed to call with further questions or concerns.  Patient stated he understood all health information given and agreed to call with further questions or concerns.  India Stern RN

## 2024-02-15 NOTE — PATIENT INSTRUCTIONS
Thank you for coming to the [facility]; please note the following instructions:    1. Pre-procedure instructions - Coronary Angiogram  Patient Education    Your arrival time is 3/8/24 12:45 pm arrival.  Location is [id number] [place] [address] Waiting Room  Please plan on being at the hospital all day.  At any time, emergencies and/or urgent cases may come up which could delay the start of your procedure.    Pre-procedure instructions - Coronary Angiogram  Shower in the evening before or the morning of the procedure  No solid food for 8 hours prior and nothing to drink 2 hours prior to arrival time  You can take your morning medications (except for diabetic and blood thinners) with sips of water.  Take 325 mg of Asprin 24 hours prior to the procedure and the morning of procedure.   You will need to arrange a ride to drop you off and pick you up, as you will be unable to drive home.  Prior to discharge you may be required to lay flat for approximately 2-4 hours in the recovery unit to ensure proper clotting of the artery. You will need a responsible adult to stay with you for 24 hours post-procedure.              Diabetic Medication Instructions  Hold oral diabetic medication in morning of your procedure and for 48 hours after IV contrast is given  Typical instructions for insulin diabetic medication holding are below. However, please reach out to your Primary Care Provider or Endocrinologist for specific instructions  DO NOT take any oral diabetic medication, short-acting diabetes medications/insulin, humalog or regular insulin the morning of your test  Take   dose of long-acting insulin (Lantus, Levemir) the day of your test  Remember to bring your glucometer and insulin with you to take after your test if needed  DO NOT take injectable GLP-1 agonists semaglutide (Ozempic, Wegovy), dulaglutide (Trulicity),  exenatide ER (Bydureon), tirzepatide (Mounjaro), or oral semaglutide (Rybelsus) for 7 days prior your procedure  Hold once daily injectable GLP-1 agonists exenatide (Byetta), liraglutide (Saxenda, Victoza), lixisenatide (Soligua) the day before and day of your procedure                  Anticoagulation Medication Instructions   NA    You will need to follow up with one of our cardiology APPs 1-2 weeks after your procedure. If you need help scheduling or rescheduling your appointment, please call [phone]          If you have any questions regarding your visit please contact your care team:     Cardiology  Telephone Number   India LAMONTE., RN  Su VILLALOBOS, RN  Trinh LEY, RN  Yuliet KELLEY, RMA  Amelie HOLLAND, CARLTON GRAYSON, Visit Facilitator  [place] LOCO [phone] (option 1)   For scheduling appts:     [phone] (select option 1)       For the Device Clinic (Pacemakers and ICD's)  RN's :  Tricia Jolley   During business hours: [phone]    *After business hours:  [phone] (select option 4)      Normal test result notifications will be released via [employer] or mailed within 7 business days.  All other test results, will be communicated via telephone once reviewed by your cardiologist.    If you need a medication refill please contact your pharmacy.  Please allow 3 business days for your refill to be completed.    As always, thank you for trusting us with your health care needs!

## 2024-02-15 NOTE — NURSING NOTE
"Chief Complaint   Patient presents with    RECHECK     Return general cardiology for follow-up s/p testing       Initial /69 (BP Location: Right arm, Patient Position: Chair, Cuff Size: Adult Large)   Pulse 58   Wt (!) 157.9 kg (348 lb)   SpO2 95%   BMI 49.93 kg/m   Estimated body mass index is 49.93 kg/m  as calculated from the following:    Height as of 1/26/24: 1.778 m (5' 10\").    Weight as of this encounter: 157.9 kg (348 lb)..  BP completed using cuff size: CARLTON Nuñez    "

## 2024-02-15 NOTE — PROGRESS NOTES
SUBJECTIVE:  Car Torres is a 63 year old male who presents for post coronary angiogram follow-up.  Recently presented with exertional shortness of breath and chest pain.  His symptoms were so typical.  Patient also had a coronary calcium score which was very high or 1900.  Because of his high coronary artery calcium score and also high BMI of 47.8 patient was advised to have a coronary angiogram as any of the stress test will be suboptimal.  Patient had coronary angiogram which is shown below.              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  Also has aneurysm involving the circumflex coronary artery.  There is no history of Kawasaki disease as a child.  No intervention was done as their decision has to be made whether to use a covered stent or a drug-eluting stent.  Finally after the conference the decision was made to use the drug-eluting stent which is scheduled for eighth of next month.  The angiogram exercise looks good.  No complications.  Patient Active Problem List    Diagnosis Date Noted    Hypertension goal BP (blood pressure) < 140/90 10/20/2010     Priority: High    Hyperlipidemia LDL goal <130 06/11/2010     Priority: High    Unstable angina (H) 01/26/2024     Priority: Medium    Coronary artery disease 01/26/2024     Priority: Medium    MCKNIGHT (dyspnea on exertion) 01/26/2024     Priority: Medium    Status post coronary angiogram 01/26/2024     Priority: Medium    Anginal equivalent 01/26/2024     Priority: Medium    Lisfranc dislocation, left, initial encounter 12/28/2022     Priority: Medium    Lisfranc dislocation, left, subsequent encounter 12/28/2022     Priority: Medium    Dementia associated with other underlying disease with behavioral disturbance (H) 01/03/2022     Priority: Medium    Ulcer of left foot, unspecified ulcer stage (H) 01/03/2022     Priority: Medium    Unspecified psychosis not due to a substance or known physiological condition (H)  01/03/2022     Priority: Medium    Major depressive disorder, recurrent episode, mild with atypical features (H) 12/06/2021     Priority: Medium    TIA (obstructive sleep apnea) 07/19/2018     Priority: Medium     Home Sleep Apnea Testing - 7/18/18: 295 lbs 0 oz: AHI 45.3/hr. Supine AHI 61.9/hr. Oxygen [place] of 66%.  Baseline 93%.  Sp02 =< 88% for 47 minutes.      History of sinus bradycardia 06/07/2018     Priority: Medium    ED (erectile dysfunction) 06/07/2018     Priority: Medium    Coronary artery calcification 05/08/2018     Priority: Medium     Per CT CHEST WITHOUT CONTRAST April 23, 2018. CT calcium score planned.       Renal cyst 11/18/2015     Priority: Medium     Simple, non enhanced, 3.5 cm on right kidney, Bosniak 1 - x 2 stable findings      Diverticulosis of large intestine without hemorrhage 11/18/2015     Priority: Medium     Incidental finding on CT scan       Pulmonary nodule 11/18/2015     Priority: Medium     Incidental finding on CT scan, right posterior lung - considered benign / stable       CKD (chronic kidney disease) stage 2, GFR 60-89 ml/min 10/21/2010     Priority: Medium     60 to 80 - have discussed Lisinopril - will consider       Sciatica 02/27/2006     Priority: Medium    Morbid obesity (H) 02/27/2006     Priority: Medium    .  Current Outpatient Medications   Medication Sig    ASPIRIN 325 MG OR TBEC ONE DAILY    aspirin 81 MG EC tablet Take 1 tablet (81 mg) by mouth daily Start tomorrow morning.    atorvastatin (LIPITOR) 40 MG tablet Take 1 tablet (40 mg) by mouth daily    busPIRone (BUSPAR) 10 MG tablet Take 2 tablets (20 mg) by mouth 2 times daily    Cholecalciferol (VITAMIN D PO) Take 5,000 Units by mouth daily One tablet twice daily    co-enzyme Q-10 100 MG CAPS capsule Take 100 mg by mouth daily    FLUoxetine (PROZAC) 40 MG capsule Take 1 capsule (40 mg) by mouth daily    hydrOXYzine HCl (ATARAX) 10 MG tablet TAKE 1 TABLET (10 MG) BY MOUTH 2 TIMES DAILY AS NEEDED FOR ANXIETY     lisinopril-hydrochlorothiazide (ZESTORETIC) 20-12.5 MG tablet Take 1 tablet by mouth daily    MULTI VITAMIN MENS PO None Entered    tadalafil (CIALIS) 20 MG tablet TAKE 1/2 TO 1 TABLET BY MOUTH 30 MINUTES TO 1 HOUR BEFORE SEX    vitamin B complex with vitamin C (STRESS TAB) tablet Take 1 tablet by mouth daily     Current Facility-Administered Medications   Medication    lidocaine 1 % injection 2 mL    triamcinolone (KENALOG-40) injection 40 mg     Past Medical History:   Diagnosis Date    Dementia (H)     Depressive disorder     Heart disease 5/8/2018    Hypercholesterolemia     Hypertension goal BP (blood pressure) < 140/90     Nonspecific abnormal electrocardiogram (ECG) (EKG) 08/23/2007    Stress testing normal.     Sleep apnea     uses a c-pap, severe    Status post coronary angiogram 1/26/2024     Past Surgical History:   Procedure Laterality Date    COLONOSCOPY N/A 12/22/2020    Procedure: COLONOSCOPY, WITH POLYPECTOMY, CLIP;  Surgeon: Mihir Lang MD;  [place] [facility] [place]    COLONOSCOPY N/A 1/10/2022    Procedure: COLONOSCOPY, WITH POLYPECTOMY AND BIOPSY;  Surgeon: Mihir Lang MD;  Location:  GI    CV CORONARY ANGIOGRAM N/A 1/26/2024    Procedure: Coronary Angiogram;  Surgeon: Elton Wright MD;  Location:  HEART CARDIAC CATH LAB    CV INSTANTANEOUS WAVE-FREE RATIO N/A 1/26/2024    Procedure: Instantaneous Wave-Free Ratio;  Surgeon: Elton Wright MD;  Location:  HEART CARDIAC CATH LAB    CV RIGHT HEART CATH MEASUREMENTS RECORDED N/A 1/26/2024    Procedure: Right Heart Catheterization;  Surgeon: Elton Wright MD;  Location:  HEART CARDIAC CATH LAB    ESOPHAGOSCOPY, GASTROSCOPY, DUODENOSCOPY (EGD), COMBINED N/A 1/28/2021    Procedure: ESOPHAGOGASTRODUODENOSCOPY, WITH BIOPSY;  Surgeon: Mihir Lang MD;  Location: [facility] OR    OPEN REDUCTION INTERNAL FIXATION FOOT Left 1/5/2023    Procedure: LISFRANC OPEN REDUCTION INTERNAL FIXATION, LEFT FOOT;  Surgeon: Bailey Ruiz DPM;   Location:  OR    SURGICAL HISTORY OF -       surgery on left index finger     No Known Allergies  Social History     Socioeconomic History    Marital status:      Spouse name: Luzma    Number of children: 3    Years of education: 14    Highest education level: Not on file   Occupational History    Occupation: [employer]     Employer: OTHER     Comment:    Tobacco Use    Smoking status: Former     Packs/day: 1.00     Years: 25.00     Additional pack years: 0.00     Total pack years: 25.00     Types: Cigarettes, Cigars     Start date: 1973     Quit date: 1998     Years since quittin.0     Passive exposure: Past    Smokeless tobacco: Never    Tobacco comments:     N/A not a smoker   Vaping Use    Vaping Use: Never used   Substance and Sexual Activity    Alcohol use: Yes     Alcohol/week: 10.0 standard drinks of alcohol     Comment: less than 6 per week    Drug use: Not Currently     Types: Amphetamines     Comment: Kratum    Sexual activity: Not Currently     Partners: Female     Birth control/protection: Female Surgical, None     Comment:    Other Topics Concern    Parent/sibling w/ CABG, MI or angioplasty before 65F 55M? Yes     Comment: Father age 50 bypass. Grandfather death in age 40 s heart   Social History Narrative    Not on file     Social Determinants of Health     Financial Resource Strain: Low Risk  (2023)    Financial Resource Strain     Within the past 12 months, have you or your family members you live with been unable to get utilities (heat, electricity) when it was really needed?: No   Food Insecurity: High Risk (2023)    Food Insecurity     Within the past 12 months, did you worry that your food would run out before you got money to buy more?: No     Within the past 12 months, did the food you bought just not last and you didn t have money to get more?: Yes   Transportation Needs: Low Risk  (2023)    Transportation Needs     Within the past 12  months, has lack of transportation kept you from medical appointments, getting your medicines, non-medical meetings or appointments, work, or from getting things that you need?: No   Physical Activity: Inactive (7/14/2023)    Exercise Vital Sign     Days of Exercise per Week: 0 days     Minutes of Exercise per Session: 0 min   Stress: No Stress Concern Present (7/14/2023)    [place] [employer] - Occupational Stress Questionnaire     Feeling of Stress : Only a little   Social Connections: Moderately Isolated (7/14/2023)    Social Connection and Isolation Panel [NHANES]     Frequency of Communication with Friends and Family: Three times a week     Frequency of Social Gatherings with Friends and Family: Three times a week     Attends [employer] Services: Never     Active Member of Clubs or Organizations: No     Attends Club or Organization Meetings: Never     Marital Status:    Interpersonal Safety: Low Risk  (12/6/2023)    Interpersonal Safety     Do you feel physically and emotionally safe where you currently live?: Yes     Within the past 12 months, have you been hit, slapped, kicked or otherwise physically hurt by someone?: No     Within the past 12 months, have you been humiliated or emotionally abused in other ways by your partner or ex-partner?: No   Housing Stability: Low Risk  (12/4/2023)    Housing Stability     Do you have housing? : Yes     Are you worried about losing your housing?: No     Family History   Problem Relation Age of Onset    Cancer Mother         uterine    Other Cancer Mother         endometrial    Cerebrovascular Disease Mother     Substance Abuse Mother         Alcohol    C.A.D. Father     Hypertension Father     Hyperlipidemia Father     Breast Cancer Maternal Grandmother     Other Cancer Maternal Grandmother         Lung/brain    Substance Abuse Paternal Grandfather     Hypertension Brother     Substance Abuse Brother         Alcohol    Hyperlipidemia Brother      Breast Cancer Other     Breast Cancer Cousin     Other Cancer Other     Cerebrovascular Disease Other         Maternal Aunt    Glaucoma No family hx of     Macular Degeneration No family hx of           REVIEW OF SYSTEMS:  General: negative, fever, chills, night sweats  Skin: negative, acne, rash, and scaling  Eyes: negative, double vision, eye pain, and photophobia  Ears/Nose/Throat: negative, nasal congestion, and purulent rhinorrhea  Respiratory: No cough, No hemoptysis, and negative  Cardiovascular: negative, palpitations, tachycardia, irregular heart beat, paroxysmal nocturnal dyspnea, and orthopnea     OBJECTIVE:  Blood pressure 121/69, pulse 58, weight (!) 157.9 kg (348 lb), SpO2 95%.  General Appearance: alert and no distress  Head: Normocephalic. No masses, lesions, tenderness or abnormalities  Eyes: conjuctiva clear, PERRL, EOM intact  Ears: External ears normal. Canals clear. TM's normal.  Nose: Nares normal  Mouth: normal  Neck: Supple, no cervical adenopathy, no thyromegaly  Lungs: clear to auscultation  Cardiac: regular rate and rhythm, normal S1 and S2, no murmur       ASSESSMENT/PLAN:  Patient here for post angiogram follow-up.  Recently presented with exertional shortness of breath or chest pain of recent 1 set.  In 2018 patient was evaluated for similar symptoms.  Had a CT calcium score which is shown below.  Total calcium score: 1908  Left main coronary artery: 17  Left anterior descending coronary artery: 566  Circumflex coronary artery: 333   Right coronary artery: 992  Subsequently patient had an attempted stress test where he did not reach target heart rate.  Subsequently a dobutamine stress echo was done which was normal.  Then patient presented recently in December 2023 with exertional symptoms.  As his symptoms were so classic he was advised to have a coronary angiogram.  This showed 90% LAD D1 disease with aneurysm of the D1 also has aneurysm of obtuse marginal branch.  May be some  dilatation of the RCA.  Patient is scheduled to have PCI on eighth of next month.  Angiogram access site is normal with no complications.  Patient is advised to continue his current medications and will return to clinic post PCI.  Patient is advised to avoid strenuous physical activity until the PCI.  Total visit duration 30 minutes.  This included face-to-face interview, physical exam, chart review, review of coronary angiogram, CT calcium score, stress echo and documentation.

## 2024-02-16 ENCOUNTER — CARE COORDINATION (OUTPATIENT)
Dept: CARDIOLOGY | Facility: CLINIC | Age: 64
End: 2024-02-16
Payer: MEDICARE

## 2024-02-16 NOTE — PATIENT INSTRUCTIONS
Pre-procedure instructions - Coronary Angiogram  Patient Education    Your arrival time is 10:00 am on 3/8/24.  Location is [address] Waiting Room  Please plan on being at the hospital all day.  At any time, emergencies and/or urgent cases may come up which could delay the start of your procedure.    Pre-procedure instructions - Coronary Angiogram  Shower in the evening before or the morning of the procedure  No solid food for 8 hours prior and nothing to drink 2 hours prior to arrival time  You can take your morning medications (except for diabetic and blood thinners) with sips of water.  Take 325 mg of Asprin 24 hours prior to the procedure and the morning of procedure.   You will need to arrange a ride to drop you off and pick you up, as you will be unable to drive home.  Prior to discharge you may be required to lay flat for approximately 2-4 hours in the recovery unit to ensure proper clotting of the artery. You will need a responsible adult to stay with you for 24 hours post-procedure.           You will need to follow up with one of our cardiology APPs 1-2 weeks after your procedure. If you need help scheduling or rescheduling your appointment, please call [phone]

## 2024-02-22 ENCOUNTER — DOCUMENTATION ONLY (OUTPATIENT)
Dept: OTHER | Facility: CLINIC | Age: 64
End: 2024-02-22
Payer: MEDICARE

## 2024-02-28 ENCOUNTER — VIRTUAL VISIT (OUTPATIENT)
Dept: BEHAVIORAL HEALTH | Facility: CLINIC | Age: 64
End: 2024-02-28
Payer: MEDICARE

## 2024-02-28 DIAGNOSIS — F43.23 ADJUSTMENT DISORDER WITH MIXED ANXIETY AND DEPRESSED MOOD: Primary | ICD-10-CM

## 2024-02-29 ASSESSMENT — COLUMBIA-SUICIDE SEVERITY RATING SCALE - C-SSRS
2. HAVE YOU ACTUALLY HAD ANY THOUGHTS OF KILLING YOURSELF?: NO
1. SINCE LAST CONTACT, HAVE YOU WISHED YOU WERE DEAD OR WISHED YOU COULD GO TO SLEEP AND NOT WAKE UP?: NO

## 2024-03-02 NOTE — PROGRESS NOTES
Transition Clinic Progress Note    Patient Name:   Car Torres Date: 2024          Service Type: Individual      VISIT TIME START: 935      VISIT TIME END:       Session Length:  TC Session Length: 45 (38-52 Minutes)    Session #:  3    Attendees: TC Attendees: Client with Spouse or Significant Other    Service Modality:  Service Modality: Video Visit:      Provider verified identity through the following two step process.  Patient provided:  Patient photo, Patient , and Patient address    Telemedicine Visit: The patient's condition can be safely assessed and treated via synchronous audio and visual telemedicine encounter.      Reason for Telemedicine Visit: Patient has requested telehealth visit    Originating Site (Patient Location): Patient's home    Distant Site (Provider Location): Provider Remote Setting- Home Office    Consent:  The patient/guardian has verbally consented to: the potential risks and benefits of telemedicine (video visit) versus in person care; bill my insurance or make self-payment for services provided; and responsibility for payment of non-covered services.     Patient would like the video invitation sent by:  My Chart    Mode of Communication:  [facility]    Distant Location (Provider):  Off-site    As the provider I attest to compliance with applicable laws and regulations related to telemedicine.    Informed Consent and Assessment Methods    Provided at intake on 2024    DATA  Interactive Complexity: No  Crisis: No        Progress Since Last Session (Related to Symptoms / Goals / Homework):   Symptoms: No change continued anxiety  Homework: Completed in session      Episode of Care Goals: Satisfactory progress - CONTEMPLATION (Considering change and yet undecided); Intervened by assessing the negative and positive thinking (ambivalence) about behavior change     Current / Ongoing Stressors and Concerns:   Patient and spouse present with  patient denying anxiety with upcoming heart surgery. Focus today on normalizing anxiety prior to a major medical procedure or surgery.  Patient reports surgery on March 8th.  He anticipates a good outcome.  Spouse will be his caregiver when he returns home and as he completed cardiac rehab.      Treatment Objective(s) Addressed in This Session:               Identify stressors : Health issues with up coming heart surgery leading to anxiety.  Identify skills to reduce stress: Mindfulness, hobbies   Identify what positive/negative taken for past experiences: review of previous coping skills.  Discussed need and options for long turn therapy      Intervention:                  Brief Therapy: reminiscence therapy:                  Strengthen existing coping skills    Validate and strengthen patient self-resiliency.       Connect past successful coping skills with current concerns.     Assessments completed prior to visit:  The following assessments were completed by patient for this visit:  PHQ9:       4/3/2023     2:08 PM 7/14/2023     8:14 AM 8/4/2023    10:53 AM 10/3/2023    10:58 AM 12/21/2023     9:26 PM 2/12/2024     5:54 PM 2/13/2024    10:13 AM   PHQ-9 SCORE   PHQ-9 Total Score MyChart 0  0 1 (Minimal depression) 0 0 0   PHQ-9 Total Score 0 0 0 1 0 0 0     GAD7:       2/26/2021    11:20 AM 5/28/2021     1:33 PM 8/30/2021    11:26 AM 12/2/2021     2:00 PM 6/9/2022     1:00 PM 12/22/2022     1:02 PM 2/12/2024     6:22 PM   SHAHNAZ-7 SCORE   Total Score      2 (minimal anxiety) 0 (minimal anxiety)   Total Score 0 0 0 0 0 2 0     CAGE-AID:       2/24/2022     8:36 AM 2/12/2024     6:26 PM   CAGE-AID Total Score   Total Score 2 0   Total Score MyChart  0 (A total score of 2 or greater is considered clinically significant)     PROMIS 10-Global Health (only subscores and total score):       11/17/2022    10:49 AM 12/22/2022     1:04 PM 2/3/2023     4:51 PM 8/1/2023    11:20 AM 12/21/2023     9:29 PM 2/12/2024     6:25 PM  2/13/2024    10:14 AM   PROMIS-10 Scores Only   Global Mental Health Score 13 13 17 13 14 14 13   Global Physical Health Score 16 10 13 16 17 15 16   PROMIS TOTAL - SUBSCORES 29 23 30 29 31 29 29     [place] Suicide Severity Rating Scale (Lifetime/Recent)      3/1/2022     2:05 PM 2/13/2024     8:00 PM   [place] Suicide Severity Rating (Lifetime/Recent)   Q1 Wish to be Dead (Lifetime)  Y   Wish to be Dead Description (Lifetime)  as a teen 1x   1. Wish to be Dead (Past 1 Month)  N   Q2 Non-Specific Active Suicidal Thoughts (Lifetime) N N   Actual Attempt (Lifetime) N    Has subject engaged in non-suicidal self-injurious behavior? (Lifetime) N N   Interrupted Attempts (Lifetime) N    Aborted or Self-Interrupted Attempt (Lifetime) N    Preparatory Acts or Behavior (Lifetime) N    Calculated C-SSRS Risk Score (Lifetime/Recent) No Risk Indicated No Risk Indicated     [place] Suicide Severity Rating Scale (Short Version)      8/2/2020    11:43 AM 1/28/2021     2:06 PM 1/5/2023    12:57 PM 2/29/2024     9:00 AM   [place] Suicide Severity Rating (Short Version)   Over the past 2 weeks have you felt down, depressed, or hopeless? yes no no    Over the past 2 weeks have you had thoughts of killing yourself? no  no    Have you ever attempted to kill yourself? no      1. Wish to be Dead (Since Last Contact)    N   2. Non-Specific Active Suicidal Thoughts (Since Last Contact)    N   Has subject engaged in non-suicidal self-injurious behavior? (Since Last Contact)    N   Calculated C-SSRS Risk Score (Since Last Contact)    No Risk Indicated         ASSESSMENT: Current Emotional / Mental Status (status of significant symptoms):   Risk status (Self / Other harm or suicidal ideation)   Patient denies current fears or concerns for personal safety.   Patient denies current or recent suicidal ideation or behaviors.   Patient denies current or recent homicidal ideation or behaviors.   Patient denies current or recent self injurious  behavior or ideation.   Patient denies other safety concerns.   Patient reports there has been no change in risk factors since their last session.     Patient reports there has been no change in protective factors since their last session.     Recommended that patient call 911 or go to the local ED should there be a change in any of these risk factors.     Appearance:   Appropriate    Eye Contact:   Good    Psychomotor Behavior: Normal    Attitude:   Cooperative    Orientation:   Person Place Time Situation   Speech    Rate / Production: Normal/ Responsive    Volume:  Normal    Mood:    Anxious  Depressed    Affect:    Appropriate    Thought Content:  Clear    Thought Form:  Coherent  Logical    Insight:    Good  and Fair      Medication Review:   No changes to current psychiatric medication(s)     Medication Compliance:   Yes     Changes in Health Issues:   None reported     Chemical Use Review:   Substance Use: Chemical use reviewed, no active concerns identified      Tobacco Use: No current tobacco use.      Diagnoses:   Adjustment Disorders  309.28 (F43.23) With mixed anxiety and depressed mood    Collateral Reports Completed:    [facility] and No Collateral obtained.    PLAN: (Patient Tasks / Therapist Tasks / Other)  Continue to follow up with medical team.  Use coping skills as needed to reduce anxiety and or depression and begin to adjust to life stage/health concerns.   Return 3/27/24.     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  PROMIS (reviewed every 90 days): 29    Referral / Collaboration:  Referral to another professional/service is not indicated at this time..    Anticipated number of session for this episode of care: 3-6 sessions  Anticipation frequency of session: Biweekly or as needed.  Anticipated Duration of each session: 38-52 minutes  Treatment plan will be reviewed in 90 days or when goals have been changed.       MeasurableTreatment Goal(s) related to diagnosis / functional impairment(s)  Adjustment disorder     *MeasurableTreatment Goal(s) related to diagnosis / functional impairment(s)     Goal 1: Patient will increase awareness of anxiety/depression symptoms and their impact on functioning.  Patient will develop skills to reduce negative impact.      I will know I've met my goal when I'm not worried about things, I have more motivation and I am able to utilize past coping skills.       Objective #A (Patient Action)   Patient will identify triggers and/or causes that contribute to feelings of anxiety/depression.   Status: New - Date: 2/28/2024      Intervention(s)   Therapist will teach emotional recognition/identification and process with patient how these emotions have impacted them.     Objective #B   Patient will utilize 3 coping skills he used in the past to manage anxiety/depression.   Status: New - Date: 2/28/2024      Intervention(s)               Therapist will will teach emotion regulation and review use of past coping skills that have worked for patient in the past.    Patient and spouse  discussed in session with therapist and agree to the plan.    Maegan Zimmer, [facility] 2/28/2024

## 2024-03-03 ENCOUNTER — MYC MEDICAL ADVICE (OUTPATIENT)
Dept: CARDIOLOGY | Facility: CLINIC | Age: 64
End: 2024-03-03
Payer: MEDICARE

## 2024-03-03 ENCOUNTER — MYC REFILL (OUTPATIENT)
Dept: PSYCHIATRY | Facility: CLINIC | Age: 64
End: 2024-03-03
Payer: MEDICARE

## 2024-03-03 ENCOUNTER — MYC MEDICAL ADVICE (OUTPATIENT)
Dept: FAMILY MEDICINE | Facility: CLINIC | Age: 64
End: 2024-03-03
Payer: MEDICARE

## 2024-03-03 DIAGNOSIS — F41.1 GAD (GENERALIZED ANXIETY DISORDER): ICD-10-CM

## 2024-03-04 RX ORDER — BUSPIRONE HYDROCHLORIDE 10 MG/1
20 TABLET ORAL 2 TIMES DAILY
Qty: 120 TABLET | Refills: 1 | Status: SHIPPED | OUTPATIENT
Start: 2024-03-04 | End: 2024-04-11

## 2024-03-04 RX ORDER — SPIRONOLACT/HYDROCHLOROTHIAZID 25 MG-25MG
1 TABLET ORAL DAILY
COMMUNITY
Start: 2024-03-04

## 2024-03-04 NOTE — TELEPHONE ENCOUNTER
Powered by [employer] message sent to patient.     Thanks,  RACHAEL Scott  [facility]

## 2024-03-04 NOTE — TELEPHONE ENCOUNTER
Dr. Perez,     Please see mychart.     Unsure of how to add this supplement to med list. There is propriety blend of apple cider vinegar (525 mg), magnesium (0.75 mg), and calcium (1 mg).       Thanks,  RACHAEL Scott  [facility]

## 2024-03-04 NOTE — TELEPHONE ENCOUNTER
Date of Last Office Visit: 12/22/23  Date of Next Office Visit: 4/11/24  No shows since last visit: 0  Cancellations since last visit: 0    Medication requested: busPIRone (BUSPAR) 10 MG tablet  Date last ordered: 1/2/24 Qty: 120 Refills: 1     Review of [place] ?: NA    Lapse in medication adherence greater than 5 days?: no  If yes, call patient and gather details: NA, wife reports pt will run out this week  Medication refill request verified as identical to current order?: yes  Result of Last DAM, VPA, Li+ Level, CBC, or Carbamazepine Level (at or since last visit): N/A      Last visit treatment plan:   ]Treatment Plan:  1.  Continue buspirone 20 mg 2 times daily  2.  Continue fluoxetine 40 mg daily  3.  Continue hydroxyzine 10 mg up to twice daily as needed for anxiety  4.  Referral made for psychological testing to clarify diagnosis     Continue all other medications as reviewed per electronic medical record today.   Safety plan reviewed. To the Emergency Department as needed or call after hours crisis line at [phone] or [phone]. [place] Crisis Text Line. Text [place] to 955687 or Suicide LifeLine Chat: suicidepreventionlifeline.org/chat/  To schedule individual or family therapy, call [facility] at [phone]  Schedule an appointment with me in April or sooner as needed. Call [facility] at [phone] to schedule.  Follow up with primary care provider as planned or for acute medical concerns.  Call the psychiatric nurse line with medication questions or concerns at [phone]  MyChart may be used to communicate with your provider, but this is not intended to be used for emergencies.      []Medication refilled per  Medication Refill in Ambulatory Care  policy.  []Medication unable to be refilled by RN due to criteria not met as indicated below:    []Eligibility - not seen in the last year   []Supervision - no future appointment   []Compliance - no shows, cancellations  or lapse in therapy   []Verification - order discrepancy   []Controlled medication   []Medication not included in policy   [x]60-day supply request   []Other

## 2024-03-08 ENCOUNTER — HOSPITAL ENCOUNTER (OUTPATIENT)
Facility: CLINIC | Age: 64
Discharge: HOME | End: 2024-03-08
Attending: INTERNAL MEDICINE | Admitting: INTERNAL MEDICINE
Payer: MEDICARE

## 2024-03-08 ENCOUNTER — APPOINTMENT (OUTPATIENT)
Dept: LAB | Facility: CLINIC | Age: 64
End: 2024-03-08
Attending: INTERNAL MEDICINE
Payer: MEDICARE

## 2024-03-08 ENCOUNTER — APPOINTMENT (OUTPATIENT)
Dept: MEDSURG UNIT | Facility: CLINIC | Age: 64
End: 2024-03-08
Attending: INTERNAL MEDICINE
Payer: MEDICARE

## 2024-03-08 VITALS
BODY MASS INDEX: 45.1 KG/M2 | HEART RATE: 42 BPM | WEIGHT: 315 LBS | RESPIRATION RATE: 18 BRPM | TEMPERATURE: 97.8 F | DIASTOLIC BLOOD PRESSURE: 62 MMHG | SYSTOLIC BLOOD PRESSURE: 116 MMHG | OXYGEN SATURATION: 97 % | HEIGHT: 70 IN

## 2024-03-08 DIAGNOSIS — I25.10 CAD (CORONARY ARTERY DISEASE): ICD-10-CM

## 2024-03-08 DIAGNOSIS — I20.89 ANGINAL EQUIVALENT (H): Primary | ICD-10-CM

## 2024-03-08 LAB
ACT BLD: 371 SECONDS (ref 74–150)
ACT BLD: 395 SECONDS (ref 74–150)
ANION GAP SERPL CALCULATED.3IONS-SCNC: 10 MMOL/L (ref 7–15)
APTT PPP: 33 SECONDS (ref 22–38)
BUN SERPL-MCNC: 20.4 MG/DL (ref 8–23)
CALCIUM SERPL-MCNC: 9.3 MG/DL (ref 8.8–10.2)
CHLORIDE SERPL-SCNC: 103 MMOL/L (ref 98–107)
CREAT SERPL-MCNC: 1.28 MG/DL (ref 0.67–1.17)
DEPRECATED HCO3 PLAS-SCNC: 23 MMOL/L (ref 22–29)
EGFRCR SERPLBLD CKD-EPI 2021: 62 ML/MIN/1.73M2
ERYTHROCYTE [DISTWIDTH] IN BLOOD BY AUTOMATED COUNT: 13 % (ref 10–15)
GLUCOSE SERPL-MCNC: 110 MG/DL (ref 70–99)
HCT VFR BLD AUTO: 42.7 % (ref 40–53)
HGB BLD-MCNC: 13.9 G/DL (ref 13.3–17.7)
INR PPP: 1.06 (ref 0.85–1.15)
MCH RBC QN AUTO: 29.9 PG (ref 26.5–33)
MCHC RBC AUTO-ENTMCNC: 32.6 G/DL (ref 31.5–36.5)
MCV RBC AUTO: 92 FL (ref 78–100)
PLATELET # BLD AUTO: 197 10E3/UL (ref 150–450)
POTASSIUM SERPL-SCNC: 4.5 MMOL/L (ref 3.4–5.3)
RBC # BLD AUTO: 4.65 10E6/UL (ref 4.4–5.9)
SODIUM SERPL-SCNC: 136 MMOL/L (ref 135–145)
WBC # BLD AUTO: 6 10E3/UL (ref 4–11)

## 2024-03-08 PROCEDURE — C1761 HC OR CATH, TRANS INTRA LITHO/CORONARY: HCPCS | Performed by: INTERNAL MEDICINE

## 2024-03-08 PROCEDURE — 36415 COLL VENOUS BLD VENIPUNCTURE: CPT | Performed by: INTERNAL MEDICINE

## 2024-03-08 PROCEDURE — 92978 ENDOLUMINL IVUS OCT C 1ST: CPT | Performed by: INTERNAL MEDICINE

## 2024-03-08 PROCEDURE — 92978 ENDOLUMINL IVUS OCT C 1ST: CPT | Mod: 26 | Performed by: INTERNAL MEDICINE

## 2024-03-08 PROCEDURE — 250N000013 HC RX MED GY IP 250 OP 250 PS 637: Performed by: INTERNAL MEDICINE

## 2024-03-08 PROCEDURE — C1725 CATH, TRANSLUMIN NON-LASER: HCPCS | Performed by: INTERNAL MEDICINE

## 2024-03-08 PROCEDURE — 85730 THROMBOPLASTIN TIME PARTIAL: CPT | Performed by: INTERNAL MEDICINE

## 2024-03-08 PROCEDURE — C1726 CATH, BAL DIL, NON-VASCULAR: HCPCS | Performed by: INTERNAL MEDICINE

## 2024-03-08 PROCEDURE — 92928 PRQ TCAT PLMT NTRAC ST 1 LES: CPT | Mod: LD | Performed by: INTERNAL MEDICINE

## 2024-03-08 PROCEDURE — 93005 ELECTROCARDIOGRAM TRACING: CPT

## 2024-03-08 PROCEDURE — 80048 BASIC METABOLIC PNL TOTAL CA: CPT | Performed by: INTERNAL MEDICINE

## 2024-03-08 PROCEDURE — C1887 CATHETER, GUIDING: HCPCS | Performed by: INTERNAL MEDICINE

## 2024-03-08 PROCEDURE — C1769 GUIDE WIRE: HCPCS | Performed by: INTERNAL MEDICINE

## 2024-03-08 PROCEDURE — 85610 PROTHROMBIN TIME: CPT | Performed by: INTERNAL MEDICINE

## 2024-03-08 PROCEDURE — 92972 PERQ TRLUML CORONRY LITHOTRP: CPT | Performed by: INTERNAL MEDICINE

## 2024-03-08 PROCEDURE — 999N000142 HC STATISTIC PROCEDURE PREP ONLY

## 2024-03-08 PROCEDURE — 258N000003 HC RX IP 258 OP 636: Performed by: INTERNAL MEDICINE

## 2024-03-08 PROCEDURE — C1760 CLOSURE DEV, VASC: HCPCS | Performed by: INTERNAL MEDICINE

## 2024-03-08 PROCEDURE — 250N000009 HC RX 250: Performed by: INTERNAL MEDICINE

## 2024-03-08 PROCEDURE — C9600 PERC DRUG-EL COR STENT SING: HCPCS | Mod: LD | Performed by: INTERNAL MEDICINE

## 2024-03-08 PROCEDURE — 85347 COAGULATION TIME ACTIVATED: CPT | Mod: 91

## 2024-03-08 PROCEDURE — 92972 PERQ TRLUML CORONRY LITHOTRP: CPT | Mod: GC | Performed by: INTERNAL MEDICINE

## 2024-03-08 PROCEDURE — C1894 INTRO/SHEATH, NON-LASER: HCPCS | Performed by: INTERNAL MEDICINE

## 2024-03-08 PROCEDURE — C1753 CATH, INTRAVAS ULTRASOUND: HCPCS | Performed by: INTERNAL MEDICINE

## 2024-03-08 PROCEDURE — 250N000011 HC RX IP 250 OP 636: Performed by: INTERNAL MEDICINE

## 2024-03-08 PROCEDURE — C1874 STENT, COATED/COV W/DEL SYS: HCPCS | Performed by: INTERNAL MEDICINE

## 2024-03-08 PROCEDURE — 85027 COMPLETE CBC AUTOMATED: CPT | Performed by: INTERNAL MEDICINE

## 2024-03-08 PROCEDURE — 999N000054 HC STATISTIC EKG NON-CHARGEABLE

## 2024-03-08 PROCEDURE — 93010 ELECTROCARDIOGRAM REPORT: CPT | Performed by: INTERNAL MEDICINE

## 2024-03-08 PROCEDURE — 272N000001 HC OR GENERAL SUPPLY STERILE: Performed by: INTERNAL MEDICINE

## 2024-03-08 PROCEDURE — 999N000134 HC STATISTIC PP CARE STAGE 3

## 2024-03-08 PROCEDURE — 99153 MOD SED SAME PHYS/QHP EA: CPT | Performed by: INTERNAL MEDICINE

## 2024-03-08 PROCEDURE — 99152 MOD SED SAME PHYS/QHP 5/>YRS: CPT | Performed by: INTERNAL MEDICINE

## 2024-03-08 PROCEDURE — 99152 MOD SED SAME PHYS/QHP 5/>YRS: CPT | Mod: GC | Performed by: INTERNAL MEDICINE

## 2024-03-08 DEVICE — CLOSURE ANGIOSEAL 6FR 610130: Type: IMPLANTABLE DEVICE | Status: FUNCTIONAL

## 2024-03-08 DEVICE — STENT CORONARY DES SYNERGY XD MR US 3.00X24MM H7493941824300: Type: IMPLANTABLE DEVICE | Status: FUNCTIONAL

## 2024-03-08 RX ORDER — HYDRALAZINE HYDROCHLORIDE 20 MG/ML
10 INJECTION INTRAMUSCULAR; INTRAVENOUS EVERY 4 HOURS PRN
Status: DISCONTINUED | OUTPATIENT
Start: 2024-03-08 | End: 2024-03-08 | Stop reason: HOSPADM

## 2024-03-08 RX ORDER — ONDANSETRON 2 MG/ML
4 INJECTION INTRAMUSCULAR; INTRAVENOUS EVERY 6 HOURS PRN
Status: DISCONTINUED | OUTPATIENT
Start: 2024-03-08 | End: 2024-03-08 | Stop reason: HOSPADM

## 2024-03-08 RX ORDER — OXYCODONE HYDROCHLORIDE 5 MG/1
5 TABLET ORAL EVERY 4 HOURS PRN
Status: DISCONTINUED | OUTPATIENT
Start: 2024-03-08 | End: 2024-03-08 | Stop reason: HOSPADM

## 2024-03-08 RX ORDER — ASPIRIN 325 MG
325 TABLET ORAL ONCE
Status: DISCONTINUED | OUTPATIENT
Start: 2024-03-08 | End: 2024-03-08 | Stop reason: HOSPADM

## 2024-03-08 RX ORDER — FENTANYL CITRATE 50 UG/ML
25 INJECTION, SOLUTION INTRAMUSCULAR; INTRAVENOUS
Status: DISCONTINUED | OUTPATIENT
Start: 2024-03-08 | End: 2024-03-08 | Stop reason: HOSPADM

## 2024-03-08 RX ORDER — ATROPINE SULFATE 0.1 MG/ML
0.5 INJECTION INTRAVENOUS
Status: DISCONTINUED | OUTPATIENT
Start: 2024-03-08 | End: 2024-03-08 | Stop reason: HOSPADM

## 2024-03-08 RX ORDER — IOPAMIDOL 755 MG/ML
INJECTION, SOLUTION INTRAVASCULAR
Status: DISCONTINUED | OUTPATIENT
Start: 2024-03-08 | End: 2024-03-08 | Stop reason: HOSPADM

## 2024-03-08 RX ORDER — SODIUM CHLORIDE 9 MG/ML
INJECTION, SOLUTION INTRAVENOUS CONTINUOUS
Status: DISCONTINUED | OUTPATIENT
Start: 2024-03-08 | End: 2024-03-08 | Stop reason: HOSPADM

## 2024-03-08 RX ORDER — NALOXONE HYDROCHLORIDE 0.4 MG/ML
0.2 INJECTION, SOLUTION INTRAMUSCULAR; INTRAVENOUS; SUBCUTANEOUS
Status: DISCONTINUED | OUTPATIENT
Start: 2024-03-08 | End: 2024-03-08 | Stop reason: HOSPADM

## 2024-03-08 RX ORDER — ASPIRIN 81 MG/1
243 TABLET, CHEWABLE ORAL ONCE
Status: DISCONTINUED | OUTPATIENT
Start: 2024-03-08 | End: 2024-03-08 | Stop reason: HOSPADM

## 2024-03-08 RX ORDER — NITROGLYCERIN 0.4 MG/1
0.4 TABLET SUBLINGUAL EVERY 5 MIN PRN
Status: DISCONTINUED | OUTPATIENT
Start: 2024-03-08 | End: 2024-03-08 | Stop reason: HOSPADM

## 2024-03-08 RX ORDER — ASPIRIN 81 MG/1
81 TABLET, CHEWABLE ORAL ONCE
Status: DISCONTINUED | OUTPATIENT
Start: 2024-03-08 | End: 2024-03-08 | Stop reason: HOSPADM

## 2024-03-08 RX ORDER — FLUMAZENIL 0.1 MG/ML
0.2 INJECTION, SOLUTION INTRAVENOUS
Status: DISCONTINUED | OUTPATIENT
Start: 2024-03-08 | End: 2024-03-08 | Stop reason: HOSPADM

## 2024-03-08 RX ORDER — FENTANYL CITRATE-0.9 % NACL/PF 10 MCG/ML
PLASTIC BAG, INJECTION (ML) INTRAVENOUS
Status: DISCONTINUED | OUTPATIENT
Start: 2024-03-08 | End: 2024-03-08 | Stop reason: HOSPADM

## 2024-03-08 RX ORDER — POTASSIUM CHLORIDE 750 MG/1
20 TABLET, EXTENDED RELEASE ORAL
Status: DISCONTINUED | OUTPATIENT
Start: 2024-03-08 | End: 2024-03-08 | Stop reason: HOSPADM

## 2024-03-08 RX ORDER — NALOXONE HYDROCHLORIDE 0.4 MG/ML
0.4 INJECTION, SOLUTION INTRAMUSCULAR; INTRAVENOUS; SUBCUTANEOUS
Status: DISCONTINUED | OUTPATIENT
Start: 2024-03-08 | End: 2024-03-08 | Stop reason: HOSPADM

## 2024-03-08 RX ORDER — ONDANSETRON 4 MG/1
4 TABLET, ORALLY DISINTEGRATING ORAL EVERY 6 HOURS PRN
Status: DISCONTINUED | OUTPATIENT
Start: 2024-03-08 | End: 2024-03-08 | Stop reason: HOSPADM

## 2024-03-08 RX ORDER — OXYCODONE HYDROCHLORIDE 10 MG/1
10 TABLET ORAL EVERY 4 HOURS PRN
Status: DISCONTINUED | OUTPATIENT
Start: 2024-03-08 | End: 2024-03-08 | Stop reason: HOSPADM

## 2024-03-08 RX ORDER — HEPARIN SODIUM 1000 [USP'U]/ML
INJECTION, SOLUTION INTRAVENOUS; SUBCUTANEOUS
Status: DISCONTINUED | OUTPATIENT
Start: 2024-03-08 | End: 2024-03-08 | Stop reason: HOSPADM

## 2024-03-08 RX ORDER — ASPIRIN 81 MG/1
81 TABLET ORAL DAILY
Status: DISCONTINUED | OUTPATIENT
Start: 2024-03-09 | End: 2024-03-08 | Stop reason: HOSPADM

## 2024-03-08 RX ORDER — POTASSIUM CHLORIDE 750 MG/1
40 TABLET, EXTENDED RELEASE ORAL
Status: DISCONTINUED | OUTPATIENT
Start: 2024-03-08 | End: 2024-03-08 | Stop reason: HOSPADM

## 2024-03-08 RX ORDER — METOPROLOL TARTRATE 1 MG/ML
5 INJECTION, SOLUTION INTRAVENOUS
Status: DISCONTINUED | OUTPATIENT
Start: 2024-03-08 | End: 2024-03-08 | Stop reason: HOSPADM

## 2024-03-08 RX ORDER — LIDOCAINE 40 MG/G
CREAM TOPICAL
Status: DISCONTINUED | OUTPATIENT
Start: 2024-03-08 | End: 2024-03-08 | Stop reason: HOSPADM

## 2024-03-08 RX ORDER — FENTANYL CITRATE 50 UG/ML
INJECTION, SOLUTION INTRAMUSCULAR; INTRAVENOUS
Status: DISCONTINUED | OUTPATIENT
Start: 2024-03-08 | End: 2024-03-08 | Stop reason: HOSPADM

## 2024-03-08 RX ADMIN — SODIUM CHLORIDE: 9 INJECTION, SOLUTION INTRAVENOUS at 10:53

## 2024-03-08 ASSESSMENT — ACTIVITIES OF DAILY LIVING (ADL)
ADLS_ACUITY_SCORE: 36

## 2024-03-08 NOTE — PROGRESS NOTES
D/I/A:  Patient is tolerating liquids and foods, ambulating, urinating, puncture sites are stable ( no bleeding and no hematoma) and patient has a .  A+O x4 and making needs known.  CCL access sites C/D/I; no bleeding or hematoma; CMS intact.  VSSA.  Sinus ofelia on monitor.  IV access removed.  Education completed and outlined in AVS or handout: medications reviewed with patient.  Questions answered prior to discharge.  Belongings returned to patient at discharge.    P: Discharged to self care.  Patient to follow up with appts as per discharge instruction.

## 2024-03-08 NOTE — Clinical Note
The first balloon was inserted into the left anterior descending and proximal left anterior descending.Max pressure = 12 cristina. Total duration = 10 seconds.

## 2024-03-08 NOTE — Clinical Note
The first balloon was inserted into the left anterior descending and ostium left anterior descending diagonal.Max pressure = 12 cristina. Total duration = 10 seconds.     Max pressure = 12 cristina. Total duration = 10 seconds.    Balloon reinflated a second time: Max pressure = 12 cristina. Total duration = 10 seconds.

## 2024-03-08 NOTE — Clinical Note
The first balloon was inserted into the left anterior descending and proximal left anterior descending.Max pressure = 4 cristina. Total duration = 10 seconds.     Max pressure = 4 cristina. Total duration = 10 seconds.   IVL therapy delivered x2.  Balloon reinflated a second time: Max pressure = 4 cristina. Total duration = 10 seconds. IVL therapy delivered x3 Balloon reinflated a third time: Max pressure = 4 cristina. Total duration = 10 seconds. IVL ther apy delivered x2 Balloon reinflated a fourth time: Max pressure = 4 cristina. Total duration = 10 seconds. IVL therapy delivered x2

## 2024-03-08 NOTE — DISCHARGE INSTRUCTIONS
Going Home after Stent Placement (Cardiac)    After you go home:  Have an adult stay with you for 24 hours.  Drink plenty of fluids.  You may eat your normal diet, unless your doctor tells you otherwise.  For 24 hours:  Relax and take it easy.  Do NOT smoke.  Do NOT make any important or legal decisions.  Do NOT drive or operate machines at home or at work.  Do NOT drink alcohol.  Remove the Band-Aid after 24 hours. If there is minor oozing, apply another Band-aid and remove it after 12 hours.  For 2 days, do NOT have sex or do any heavy exercise.  Do NOT take a bath, or use a hot tub or pool for at least 3 days. You may shower.    Care of groin site  It is normal to have a small bruise or lump at the site.  Do not scrub the site.  For the first 2 days: Do not stoop or squat. When you cough, sneeze or move your bowels, hold your hand over the puncture site and press gently.  Do not lift more than 10 pounds for at least 3 to 5 days.  Do not use lotion or powder near the puncture site for 3 days.    If you start bleeding from the site in your groin, lie down flat and press firmly  on the site. Call your doctor as soon as you can.    Care of wrist or arm site  It is normal to have soreness at the puncture site and mild tingling in your hand for up to 3 days.  For 2 days, do not use your hand or arm to support your weight (such as rising from a chair) or bend your wrist (such as lifting a garage door).  For 2 days, do not lift more than 5 pounds or exercise your arm (tennis, golf or bowling).    If you start bleeding from the site in your arm:  Sit down and press firmly on the site with your fingers for 10 minutes. Call your doctor as soon as you can.  If the bleeding stops, sit still and keep your wrist straight for 2 hours.    Medicines  If you have started taking Plavix or Effient, do not stop taking it until you talk to your heart doctor (cardiologist).  If you are on metformin (Glucophage), do not restart it  until you have blood tests (within 2 to 3 days after discharge). When your doctor tells you it is safe, you may restart the metformin.  If you have stopped any other medicines, check with your nurse or provider about when to restart them.    Call 911 right away if you have bleeding that is heavy or does not stop.    Call your doctor if:  You have a large or growing hard lump around the site.  The site is red, swollen, hot or tender.  Blood or fluid is draining from the site.  You have chills or a fever greater than 101 F (38 C).  Your leg or arm feels numb or cool.  You have hives, a rash or unusual itching.    [facility] [place]:  [phone] (7 days a week)

## 2024-03-08 NOTE — Clinical Note
dry, intact, no bleeding and no hematoma. RFA 6Fr- removed, angioseal deployed, pressure held, tegaderm.

## 2024-03-08 NOTE — PROGRESS NOTES
2A prep for CORs angiogram. VSS> Denies pain. Appropriately NPO. Took 325mg ASA today. Groin clipped. Pedal pulses +3 marked. Wife will be waiting in [place].

## 2024-03-08 NOTE — PROGRESS NOTES
D/I/A: Pt roomed on 3C in bay [address].  Arrived via litter and accompanied by cath lab RN On/Off: On monitor.  VSSA.  Rhythm upon arrival Sinus ofelia on monitor.  Denies pain or sob.  Reviewed activity restrictions and when to notify RN, ie-changes to breathing or increased chest pressure or chest pain.  CCL access:  right groin, angioseal. Pt on bedrest until 1600 .  P: Continue to monitor status.  Discharge to home once meeting criteria.

## 2024-03-08 NOTE — Clinical Note
Stent deployed in the proximal left anterior descending. Max pressure = 12 cristina. Total duration = 10 seconds.

## 2024-03-08 NOTE — Clinical Note
The first balloon was inserted into the left anterior descending and proximal left anterior descending.Max pressure = 12 cristina. Total duration = 10 seconds.     Max pressure = 12 cristina. Total duration = 10 seconds.    Balloon reinflated a second time: Max pressure = 12 cristina. Total duration = 10 seconds.  Balloon reinflated a third time: Max pressure = 12 cristina. Total duration = 10 seconds.

## 2024-03-10 LAB
ATRIAL RATE - MUSE: 52 BPM
DIASTOLIC BLOOD PRESSURE - MUSE: NORMAL MMHG
INTERPRETATION ECG - MUSE: NORMAL
P AXIS - MUSE: 65 DEGREES
PR INTERVAL - MUSE: 188 MS
QRS DURATION - MUSE: 130 MS
QT - MUSE: 484 MS
QTC - MUSE: 450 MS
R AXIS - MUSE: 33 DEGREES
SYSTOLIC BLOOD PRESSURE - MUSE: NORMAL MMHG
T AXIS - MUSE: 43 DEGREES
VENTRICULAR RATE- MUSE: 52 BPM

## 2024-03-11 LAB
ATRIAL RATE - MUSE: 46 BPM
DIASTOLIC BLOOD PRESSURE - MUSE: NORMAL MMHG
INTERPRETATION ECG - MUSE: NORMAL
P AXIS - MUSE: 68 DEGREES
PR INTERVAL - MUSE: 184 MS
QRS DURATION - MUSE: 126 MS
QT - MUSE: 514 MS
QTC - MUSE: 449 MS
R AXIS - MUSE: 33 DEGREES
SYSTOLIC BLOOD PRESSURE - MUSE: NORMAL MMHG
T AXIS - MUSE: 57 DEGREES
VENTRICULAR RATE- MUSE: 46 BPM

## 2024-03-12 ENCOUNTER — MYC MEDICAL ADVICE (OUTPATIENT)
Dept: CARDIOLOGY | Facility: CLINIC | Age: 64
End: 2024-03-12
Payer: MEDICARE

## 2024-03-12 NOTE — TELEPHONE ENCOUNTER
----- Message from Mariama Villalobos sent at 3/11/2024  9:03 AM CDT -----  Regarding: FW: PCI 3/8    ----- Message -----  From: Clare Castorena RN  Sent: 3/11/2024   9:01 AM CDT  To: [facility]  Subject: PCI 3/8                                          Hello,       Ost Cx lesion is 20% stenosed.     Mid Cx to Dist Cx lesion is 30% stenosed.     3rd Mrg lesion is 20% stenosed.     Prox LAD lesion is 90% stenosed.     1. Successful planned percutaneous coronary intervention of the proximal LAD lesion with shockwave lithotripsy and 1 drug-eluting stent-Synergy XD 3.0X 24 mm.    Right femoral artery used for access.    Julius daniel at discharge.  Aubree

## 2024-03-15 ENCOUNTER — OFFICE VISIT (OUTPATIENT)
Dept: NEUROPSYCHOLOGY | Facility: CLINIC | Age: 64
End: 2024-03-15
Payer: MEDICARE

## 2024-03-15 DIAGNOSIS — R41.89 COGNITIVE CHANGE: ICD-10-CM

## 2024-03-15 DIAGNOSIS — F41.9 ANXIETY: ICD-10-CM

## 2024-03-15 DIAGNOSIS — I67.9 CEREBROVASCULAR DISEASE, UNSPECIFIED: ICD-10-CM

## 2024-03-15 DIAGNOSIS — F06.8 OTHER SPECIFIED MENTAL DISORDERS DUE TO KNOWN PHYSIOLOGICAL CONDITION: ICD-10-CM

## 2024-03-15 DIAGNOSIS — R41.844 FRONTAL LOBE AND EXECUTIVE FUNCTION DEFICIT: Primary | ICD-10-CM

## 2024-03-15 PROCEDURE — 96132 NRPSYC TST EVAL PHYS/QHP 1ST: CPT | Performed by: CLINICAL NEUROPSYCHOLOGIST

## 2024-03-15 PROCEDURE — 96139 PSYCL/NRPSYC TST TECH EA: CPT | Performed by: CLINICAL NEUROPSYCHOLOGIST

## 2024-03-15 PROCEDURE — 90791 PSYCH DIAGNOSTIC EVALUATION: CPT | Performed by: CLINICAL NEUROPSYCHOLOGIST

## 2024-03-15 PROCEDURE — 96138 PSYCL/NRPSYC TECH 1ST: CPT | Performed by: CLINICAL NEUROPSYCHOLOGIST

## 2024-03-15 PROCEDURE — 96133 NRPSYC TST EVAL PHYS/QHP EA: CPT | Performed by: CLINICAL NEUROPSYCHOLOGIST

## 2024-03-15 NOTE — PROGRESS NOTES
The patient was seen for neuropsychological evaluation at the request of Miguel Varela MD for the purposes of diagnostic clarification and treatment planning. 178 minutes of test administration and scoring were provided by this writer. Please see Dr. Jorgito Adam's report for a full interpretation of the findings.    Radha Bae   Psychometrist

## 2024-03-15 NOTE — PROGRESS NOTES
Name: Car Torres  MR#: [id number]  YOB: 1960  Date of Exam: Mar 15, 2024    NEUROPSYCHOLOGICAL EVALUATION    IDENTIFYING INFORMATION  Car Torres is a 63 year old year old, right handed, disabled large , with 13 years of formal education. He was accompanied during the interview by his wife, Destiny.    The patient was in-clinic for the entirety of this evaluation. The interview and testing were completed face-to-face.     BACKGROUND INFORMATION / INTERVIEW FINDINGS    Records indicate that Mr. Car Torres's medical history includes unspecified psychosis, depression, hypertension, hyperlipidemia, coronary artery disease, unstable angina, dyspnea on exertion, ulcer of left foot, obstructive sleep apnea, history of sinus bradycardia, coronary artery calcification, renal cyst, diverticulosis, pulmonary nodule, chronic kidney disease stage II, sciatica, and morbid obesity.  He also has a history of dyslexia.  He developed paranoia, delusions, and hallucinations in July, 2020.  He presented to the emergency department on August 2, 2020.  A CT scan of his head on that date documented no acute intracranial abnormality.  He began to follow with Neurology and has since undergone extensive workup.  It was determined that he was using drugs including kratom and amphetamines. Initially, there was suspicion for frontotemporal dementia.  However, with improved mental health care, suspicion has been lessening for a neurodegenerative condition.  An FDG PET study of his brain on April 21, 2021 documented no significant metabolic deficits.  The most recent MRI of his brain on January 5, 2024 documented no finding to explain his neurologic symptoms, but did note multifocal T2 hyperintense lesions throughout the cerebral white matter that were felt to be nonspecific but favored to represent the sequelae of chronic small vessel ischemic disease.  These findings were noted to have  advanced mildly when compared to his October, 2020 MRI.  CSF labs were generally unremarkable.  The most recent neurology notes have suggested that there is no convincing evidence for neurodegenerative disease.  The current follow-up evaluation was requested by Dr. Miguel Varela, Dr. Pito Gonzalez, and Taniya Estrada, GER, in this context.    Of note, I have seen Mr. Torres for 2 neuropsychology exams.  The first evaluation was completed on March 26, 2021. My evaluation documented deficits and weaknesses that were felt to be consistent with significant dysfunction of frontal and subcortical brain regions. There was felt to be compromise of lateral and medial temporal brain regions as well. I felt that it was reasonable to use the label mild dementia, with the leading etiologic consideration being a frontotemporal dementia syndrome. I also noted likely longstanding contributions from dyslexia. There were deficits in executive functioning, learning, recall, and memory. Weaknesses were identified in visual scanning, semantic fluency, cognitive speed, and bilateral psychomotor speed. His wife had also described changes in his personality.  I saw him for follow-up on August 25, 2022.  This exam documented weaknesses that remained consistent with relative dysfunction of frontal and subcortical circuitry.  I again thought that frontotemporal dementia was the most likely explanation for his cognitive and behavioral changes.  I did note that his cognition was somewhat variable relative to the prior exam.  I noted that there were some improved scores, while other scores had slightly worsened.  I again pointed out that standard neuropsychological testing is not always sensitive to higher level executive functioning deficits.  I noted ongoing weaknesses in attention, working memory, cognitive speed, psychomotor speed, verbal fluency, and aspects of executive functioning.  I also noted impaired insight.  His wife continued to  "report a number of behavioral changes including apathy, changes in his diet, and taking her medications.  He was also reporting mild symptoms of depression and anxiety.    On interview, Mr. Torres and his wife confirmed the above history.  They denied interval neurologic changes.  His wife noted that he had foot surgery.  He also underwent placement of a stent in his heart about a week ago.  They noted that he has been feeling better after the surgery.    We spent some time talking about the course of his illness.  His wife stated that his cognition was normal up until 2016.  She noted a slow progressive decline until 2019 (although this was probably 2020, as he did not come to medical attention until 2020).  She stated that at that time, he then began treatment for mental health.  She stated that he subsequently had fewer hallucinations.  She noted he was still not taking care of his instrumental daily activities.  She stated that his cognition was then stable up until December 2022 when he had foot surgery.  She noted some degree of improvement in his thinking after that time.  He stopped using drugs by December, 2022.  He was then working more closely with psychiatry, and his cognition improved.  She noted further improvement in his cognition in the last few months.  He was taken off of his \"heavy-duty\" psychiatric medications (risperidone).  She indicated that his cognition is not quite back to baseline at the current time.  She noted that he still spends much of his time sitting and watching TV.  She did note that he is more attuned to what is going on in his environment now.  She did note that he still has a little bit of memory issues.  She reported that she has to fill in details in his stories.  He may forget names.  She noted that he still has little motivation to complete activities like showering.  She stated that he forgets TV shows that he watches.  She indicated that he still has some issues with " "problem-solving but acknowledged that he has been able to complete some tasks around her home such as replacing a faucet and installing a new door.  She did note her belief that he would not be capable of coordinating the preparation of a large meal. She reported that he is physically weaker and sometimes shakes.  When asked about personality changes, she described ongoing irritability.  She reported that he can go from 0-60 in terms of his anger in very little time.    Mr. Torres reported that his cognition is now at baseline.  He stated that he is doing fine in terms of his physical, mental, and cognitive health.  He attributed his issues in the past to having a psychological breakdown.    With respect to mental health, Mr. Torres reported that his mood is fine.  He noted that he disagrees with his wife once in a while.  He is under the care of a psychiatric nurse practitioner.  He sees a therapist.  He stated that he is getting tired of the sessions as he feels that they are comprised of \"Jerry bashing.\"  He denied interval psychiatric hospitalization, hallucinations, or symptoms consistent with severe mental illness.  He did acknowledge having had some anxiety when he went off of some of his medications, but he then restarted a different medication which helped the anxiety.  He denied suicidal ideation or interval suicide attempts.    Regarding other medical background, as noted, he has not had interval head injury, stroke, or seizure.  He reported that his sleep is fine.  He typically gets 8 to 10 hours of sleep at night.  He sleeps well.  He slept 7 hours the night before this exam.  He uses a CPAP.  He denied symptoms of REM behavior disorder.  He noted mild knee pain at the time of the interview.  He reported a little bit of difficulty with balance.  His wife noted that his hands shake in the evenings.  He denied sensory changes.  Per records, his current medications include apple cider vinegar tabs, " aspirin, atorvastatin, buspirone, cholecalciferol, coenzyme Q10, fluoxetine, hydroxyzine, lisinopril-hydrochlorothiazide, multivitamin, tadalafil, ticagrelor, and vitamin B complex with vitamin C.  He will rarely consume an alcoholic drink but otherwise denied substance use.    Mr. Torres continues to live at home with his wife.  He largely manages his own basic daily activities but requires some reminders from his wife.  His wife is overseeing his medications and their finances.  He does some meal preparation.  He is driving.  He passed a formal driving test.    By way of background, Mr. Torres denied significant changes in his immediate family.  He denied changes in his educational history or work history.  He remains disabled.    BEHAVIORAL OBSERVATIONS  Mr. Torres was pleasant and cooperative with the exam.  He utilized reading glasses that were provided by the clinic, as he had misplaced his own reading glasses.  He engaged in limited conversation with the examiner.  His speech was normal. His comprehension was normal. His thought processes were notable for mild apathy toward testing and mild resistance toward testing. His mood was neutral with flat affect. His effort was rated as adequate. The current results are felt to be a broadly accurate depiction of his cognitive functioning.      RESULTS OF EXAM  His performances on standardized measures of neuropsychological functioning were as follows.     He was severely disoriented to time (misstated the year by 1 year).  He was not able to provide the name of the clinic but was otherwise oriented to place.  He was oriented to time.  He was able to state the names of the current president and 4 of the 5 most recent past presidents.  He obtained generally passing scores on stand-alone measures of cognitive performance validity and mostly normal scores on embedded metrics of performance validity.  Auditory attention for digits was low average.  Mental calculations  were low average.  Learning of words in a list format was borderline impaired.  Delayed recall of list words was low average.  Percent retention of list words was average.  Delayed recognition of list words was low average.  Learning and delayed recall of story information were both average.  Delayed recognition of story information was low average.  Learning of simple geometric shapes and their spatial locations was average.  Delayed recall of the shapes and their locations was average.  Percent retention of the shapes was normal.  Delayed recognition of the shapes was normal and performed without error.  Visual spatial judgments for variably oriented lines were average.  Visual problem-solving with blocks was average.  His drawing of a complicated geometric figure was low average and notable for perseverations and mild inattention to some of the figure's details.  Comprehension of phrases and short stories was borderline impaired.  Verbal associative fluency was average.  Animal fluency was average.  Naming to confrontation was superior.  Verbal abstract reasoning was average.  Speeded visual sequencing under focused attention was low average.  A similar measure with a divided attention component was performed in the low average to average range.  Speeded word reading was impaired.  Speeded color naming was low average.  Speeded inhibition of an overlearned response was low average.  Speeded matching and cancellation was low average.  Speeded visual motor coding was low average.  Speeded fine motor dexterity was average, bilaterally.    He endorsed items consistent with minimal symptoms of depression and mild symptoms of anxiety on self-report measures.    IMPRESSIONS  Mr. Torres demonstrated mild weaknesses that are consistent with dysfunction of frontal and subcortical circuitry.  There are improvements in his cognition relative to prior exams.  This improvement argues strongly against a neurodegenerative  condition such as frontotemporal dementia.  I do wonder about persisting mild relative weaknesses in cognition due to cerebrovascular disease.  It is the case that his wife is managing many of his daily activities.  That said, it is not clear to me whether using the label dementia remains accurate.  She is also describing ongoing issues with personality change.  It is important to mention that he is reporting fewer symptoms of depression and anxiety relative to the prior exams.  I do wonder whether much of what was observed in the prior evaluations and in his day-to-day life was mediated by psychiatric factors and drug use.  On testing today, mild weaknesses were identified in cognitive speed, attention, and on one measure of verbal learning and free recall.  The remainder of his cognitive abilities were generally intact and performed in keeping with his low average to average range cognitive baseline.    RECOMMENDATIONS  Preliminary results and recommendations were provided to the patient and his wife over the telephone on March 18, 2024, and all questions were answered.     1.  Continued neurologic and psychiatric care is recommended.    2.  He will continue to benefit from some degree of support and supervision of his daily activities.    3.  If he does have difficulties with memory in day-to-day life, routine use of a memory notebook or other assistive device could be of benefit.    4. He should continue to abstain from drug use.     5. Follow-up neuropsychological evaluation could be considered in the future, if clinically indicated.    Jorgito Adam, Ph.D., L.P., ABPP  Board Certified in Clinical Neuropsychology   / Licensed Psychologist [id number]    Time spent:  One unit psychiatric diagnostic interview including interview and clinical assessment by licensed and board-certified neuropsychologist (CPT 77286). One unit (60 minutes) neuropsychological testing evaluation by licensed and  board-certified neuropsychologist, including integration of patient data, interpretation of standardized test results and clinical data, clinical decision-making, treatment planning, and report, first hour (CPT 81927). One unit(s) (50 minutes) of neuropsychological testing evaluation by licensed and board-certified neuropsychologist, including integration of patient data, interpretation of standardized test results and clinical data, clinical decision-making, treatment planning, and report, subsequent hours (CPT 81875). One unit (30 minutes) of psychological and neuropsychological test administration and scoring by technician, first 30 minutes (CPT 15513). Five units (148 minutes) psychological or neuropsychological test administration and scoring by technician, subsequent 30 minutes (CPT 89464). Diagnoses: R41.844, F06.8, I67.9,  F41.9.

## 2024-03-17 ENCOUNTER — HOSPITAL ENCOUNTER (INPATIENT)
Facility: CLINIC | Age: 64
LOS: 1 days | Discharge: HOME | DRG: 309 | End: 2024-03-18
Attending: EMERGENCY MEDICINE | Admitting: INTERNAL MEDICINE
Payer: MEDICARE

## 2024-03-17 ENCOUNTER — TELEPHONE (OUTPATIENT)
Dept: CARDIOLOGY | Facility: CLINIC | Age: 64
End: 2024-03-17
Payer: MEDICARE

## 2024-03-17 ENCOUNTER — APPOINTMENT (OUTPATIENT)
Dept: GENERAL RADIOLOGY | Facility: CLINIC | Age: 64
DRG: 309 | End: 2024-03-17
Payer: MEDICARE

## 2024-03-17 DIAGNOSIS — I25.10 ASCVD (ARTERIOSCLEROTIC CARDIOVASCULAR DISEASE): ICD-10-CM

## 2024-03-17 DIAGNOSIS — I48.91 ATRIAL FIBRILLATION WITH RAPID VENTRICULAR RESPONSE (H): ICD-10-CM

## 2024-03-17 DIAGNOSIS — R55 NEAR SYNCOPE: ICD-10-CM

## 2024-03-17 LAB
ALBUMIN SERPL BCG-MCNC: 4.5 G/DL (ref 3.5–5.2)
ALP SERPL-CCNC: 72 U/L (ref 40–150)
ALT SERPL W P-5'-P-CCNC: 18 U/L (ref 0–70)
ANION GAP SERPL CALCULATED.3IONS-SCNC: 11 MMOL/L (ref 7–15)
AST SERPL W P-5'-P-CCNC: 19 U/L (ref 0–45)
BASOPHILS # BLD AUTO: 0.1 10E3/UL (ref 0–0.2)
BASOPHILS NFR BLD AUTO: 1 %
BILIRUB SERPL-MCNC: 0.5 MG/DL
BUN SERPL-MCNC: 24.3 MG/DL (ref 8–23)
CALCIUM SERPL-MCNC: 9.1 MG/DL (ref 8.8–10.2)
CHLORIDE SERPL-SCNC: 100 MMOL/L (ref 98–107)
CREAT SERPL-MCNC: 1.41 MG/DL (ref 0.67–1.17)
DEPRECATED HCO3 PLAS-SCNC: 26 MMOL/L (ref 22–29)
EGFRCR SERPLBLD CKD-EPI 2021: 55 ML/MIN/1.73M2
EOSINOPHIL # BLD AUTO: 0.2 10E3/UL (ref 0–0.7)
EOSINOPHIL NFR BLD AUTO: 3 %
ERYTHROCYTE [DISTWIDTH] IN BLOOD BY AUTOMATED COUNT: 13.1 % (ref 10–15)
ERYTHROCYTE [DISTWIDTH] IN BLOOD BY AUTOMATED COUNT: 13.1 % (ref 10–15)
GLUCOSE SERPL-MCNC: 92 MG/DL (ref 70–99)
HCT VFR BLD AUTO: 42.5 % (ref 40–53)
HCT VFR BLD AUTO: 42.5 % (ref 40–53)
HGB BLD-MCNC: 13.9 G/DL (ref 13.3–17.7)
HGB BLD-MCNC: 13.9 G/DL (ref 13.3–17.7)
IMM GRANULOCYTES # BLD: 0 10E3/UL
IMM GRANULOCYTES NFR BLD: 0 %
LYMPHOCYTES # BLD AUTO: 1.5 10E3/UL (ref 0.8–5.3)
LYMPHOCYTES NFR BLD AUTO: 20 %
MCH RBC QN AUTO: 29.8 PG (ref 26.5–33)
MCH RBC QN AUTO: 29.8 PG (ref 26.5–33)
MCHC RBC AUTO-ENTMCNC: 32.7 G/DL (ref 31.5–36.5)
MCHC RBC AUTO-ENTMCNC: 32.7 G/DL (ref 31.5–36.5)
MCV RBC AUTO: 91 FL (ref 78–100)
MCV RBC AUTO: 91 FL (ref 78–100)
MONOCYTES # BLD AUTO: 0.7 10E3/UL (ref 0–1.3)
MONOCYTES NFR BLD AUTO: 9 %
NEUTROPHILS # BLD AUTO: 5.1 10E3/UL (ref 1.6–8.3)
NEUTROPHILS NFR BLD AUTO: 67 %
NRBC # BLD AUTO: 0 10E3/UL
NRBC BLD AUTO-RTO: 0 /100
NT-PROBNP SERPL-MCNC: 128 PG/ML (ref 0–900)
PLATELET # BLD AUTO: 211 10E3/UL (ref 150–450)
PLATELET # BLD AUTO: 211 10E3/UL (ref 150–450)
POTASSIUM SERPL-SCNC: 4.1 MMOL/L (ref 3.4–5.3)
PROT SERPL-MCNC: 6.8 G/DL (ref 6.4–8.3)
RBC # BLD AUTO: 4.67 10E6/UL (ref 4.4–5.9)
RBC # BLD AUTO: 4.67 10E6/UL (ref 4.4–5.9)
SODIUM SERPL-SCNC: 137 MMOL/L (ref 135–145)
TROPONIN T SERPL HS-MCNC: 18 NG/L
WBC # BLD AUTO: 7.6 10E3/UL (ref 4–11)
WBC # BLD AUTO: 7.6 10E3/UL (ref 4–11)

## 2024-03-17 PROCEDURE — 250N000009 HC RX 250

## 2024-03-17 PROCEDURE — 84484 ASSAY OF TROPONIN QUANT: CPT | Performed by: EMERGENCY MEDICINE

## 2024-03-17 PROCEDURE — 250N000013 HC RX MED GY IP 250 OP 250 PS 637

## 2024-03-17 PROCEDURE — 93005 ELECTROCARDIOGRAM TRACING: CPT | Mod: 76 | Performed by: EMERGENCY MEDICINE

## 2024-03-17 PROCEDURE — 71045 X-RAY EXAM CHEST 1 VIEW: CPT | Mod: 26 | Performed by: RADIOLOGY

## 2024-03-17 PROCEDURE — 99285 EMERGENCY DEPT VISIT HI MDM: CPT | Mod: 25 | Performed by: EMERGENCY MEDICINE

## 2024-03-17 PROCEDURE — 84075 ASSAY ALKALINE PHOSPHATASE: CPT | Performed by: EMERGENCY MEDICINE

## 2024-03-17 PROCEDURE — 83036 HEMOGLOBIN GLYCOSYLATED A1C: CPT

## 2024-03-17 PROCEDURE — 93010 ELECTROCARDIOGRAM REPORT: CPT | Mod: 76 | Performed by: EMERGENCY MEDICINE

## 2024-03-17 PROCEDURE — 258N000003 HC RX IP 258 OP 636: Performed by: EMERGENCY MEDICINE

## 2024-03-17 PROCEDURE — 120N000002 HC R&B MED SURG/OB UMMC

## 2024-03-17 PROCEDURE — 99222 1ST HOSP IP/OBS MODERATE 55: CPT | Mod: AI | Performed by: INTERNAL MEDICINE

## 2024-03-17 PROCEDURE — 83880 ASSAY OF NATRIURETIC PEPTIDE: CPT | Performed by: EMERGENCY MEDICINE

## 2024-03-17 PROCEDURE — 93010 ELECTROCARDIOGRAM REPORT: CPT | Performed by: EMERGENCY MEDICINE

## 2024-03-17 PROCEDURE — 85025 COMPLETE CBC W/AUTO DIFF WBC: CPT | Performed by: EMERGENCY MEDICINE

## 2024-03-17 PROCEDURE — 93005 ELECTROCARDIOGRAM TRACING: CPT | Performed by: EMERGENCY MEDICINE

## 2024-03-17 PROCEDURE — 250N000011 HC RX IP 250 OP 636: Performed by: INTERNAL MEDICINE

## 2024-03-17 PROCEDURE — 36415 COLL VENOUS BLD VENIPUNCTURE: CPT | Performed by: EMERGENCY MEDICINE

## 2024-03-17 PROCEDURE — 71045 X-RAY EXAM CHEST 1 VIEW: CPT

## 2024-03-17 RX ORDER — BUSPIRONE HYDROCHLORIDE 10 MG/1
20 TABLET ORAL 2 TIMES DAILY
Status: DISCONTINUED | OUTPATIENT
Start: 2024-03-17 | End: 2024-03-18 | Stop reason: HOSPADM

## 2024-03-17 RX ORDER — MAGNESIUM HYDROXIDE/ALUMINUM HYDROXICE/SIMETHICONE 120; 1200; 1200 MG/30ML; MG/30ML; MG/30ML
30 SUSPENSION ORAL EVERY 4 HOURS PRN
Status: DISCONTINUED | OUTPATIENT
Start: 2024-03-17 | End: 2024-03-18 | Stop reason: HOSPADM

## 2024-03-17 RX ORDER — ACETAMINOPHEN 650 MG/1
650 SUPPOSITORY RECTAL EVERY 4 HOURS PRN
Status: DISCONTINUED | OUTPATIENT
Start: 2024-03-17 | End: 2024-03-18 | Stop reason: HOSPADM

## 2024-03-17 RX ORDER — HYDROXYZINE HYDROCHLORIDE 10 MG/1
10 TABLET, FILM COATED ORAL 2 TIMES DAILY PRN
Status: DISCONTINUED | OUTPATIENT
Start: 2024-03-17 | End: 2024-03-18 | Stop reason: HOSPADM

## 2024-03-17 RX ORDER — SODIUM CHLORIDE, SODIUM LACTATE, POTASSIUM CHLORIDE, CALCIUM CHLORIDE 600; 310; 30; 20 MG/100ML; MG/100ML; MG/100ML; MG/100ML
INJECTION, SOLUTION INTRAVENOUS CONTINUOUS
Status: DISCONTINUED | OUTPATIENT
Start: 2024-03-17 | End: 2024-03-18

## 2024-03-17 RX ORDER — HEPARIN SODIUM 10000 [USP'U]/100ML
0-5000 INJECTION, SOLUTION INTRAVENOUS CONTINUOUS
Status: DISCONTINUED | OUTPATIENT
Start: 2024-03-17 | End: 2024-03-17

## 2024-03-17 RX ORDER — ASPIRIN 81 MG/1
81 TABLET ORAL DAILY
Status: DISCONTINUED | OUTPATIENT
Start: 2024-03-17 | End: 2024-03-18 | Stop reason: HOSPADM

## 2024-03-17 RX ORDER — METOPROLOL TARTRATE 1 MG/ML
5 INJECTION, SOLUTION INTRAVENOUS ONCE
Status: COMPLETED | OUTPATIENT
Start: 2024-03-17 | End: 2024-03-17

## 2024-03-17 RX ORDER — ACETAMINOPHEN 325 MG/1
650 TABLET ORAL EVERY 4 HOURS PRN
Status: DISCONTINUED | OUTPATIENT
Start: 2024-03-17 | End: 2024-03-18 | Stop reason: HOSPADM

## 2024-03-17 RX ORDER — ATORVASTATIN CALCIUM 40 MG/1
40 TABLET, FILM COATED ORAL AT BEDTIME
Status: DISCONTINUED | OUTPATIENT
Start: 2024-03-17 | End: 2024-03-18 | Stop reason: HOSPADM

## 2024-03-17 RX ORDER — HEPARIN SODIUM 10000 [USP'U]/100ML
0-5000 INJECTION, SOLUTION INTRAVENOUS CONTINUOUS
Status: DISCONTINUED | OUTPATIENT
Start: 2024-03-17 | End: 2024-03-18 | Stop reason: HOSPADM

## 2024-03-17 RX ORDER — FLUOXETINE 40 MG/1
40 CAPSULE ORAL DAILY
Status: DISCONTINUED | OUTPATIENT
Start: 2024-03-18 | End: 2024-03-18 | Stop reason: HOSPADM

## 2024-03-17 RX ORDER — LIDOCAINE 40 MG/G
CREAM TOPICAL
Status: DISCONTINUED | OUTPATIENT
Start: 2024-03-17 | End: 2024-03-18 | Stop reason: HOSPADM

## 2024-03-17 RX ADMIN — SODIUM CHLORIDE, POTASSIUM CHLORIDE, SODIUM LACTATE AND CALCIUM CHLORIDE: 600; 310; 30; 20 INJECTION, SOLUTION INTRAVENOUS at 23:44

## 2024-03-17 RX ADMIN — ATORVASTATIN CALCIUM 40 MG: 40 TABLET, FILM COATED ORAL at 23:44

## 2024-03-17 RX ADMIN — TICAGRELOR 90 MG: 90 TABLET ORAL at 23:43

## 2024-03-17 RX ADMIN — ASPIRIN 81 MG: 81 TABLET ORAL at 23:48

## 2024-03-17 RX ADMIN — HEPARIN SODIUM 1200 UNITS/HR: 10000 INJECTION, SOLUTION INTRAVENOUS at 23:51

## 2024-03-17 RX ADMIN — METOPROLOL TARTRATE 5 MG: 1 INJECTION, SOLUTION INTRAVENOUS at 23:43

## 2024-03-17 ASSESSMENT — ACTIVITIES OF DAILY LIVING (ADL)
ADLS_ACUITY_SCORE: 36
ADLS_ACUITY_SCORE: 36

## 2024-03-17 ASSESSMENT — COLUMBIA-SUICIDE SEVERITY RATING SCALE - C-SSRS
6. HAVE YOU EVER DONE ANYTHING, STARTED TO DO ANYTHING, OR PREPARED TO DO ANYTHING TO END YOUR LIFE?: NO
1. IN THE PAST MONTH, HAVE YOU WISHED YOU WERE DEAD OR WISHED YOU COULD GO TO SLEEP AND NOT WAKE UP?: NO
2. HAVE YOU ACTUALLY HAD ANY THOUGHTS OF KILLING YOURSELF IN THE PAST MONTH?: NO

## 2024-03-18 ENCOUNTER — PATIENT OUTREACH (OUTPATIENT)
Dept: GERIATRIC MEDICINE | Facility: CLINIC | Age: 64
End: 2024-03-18

## 2024-03-18 ENCOUNTER — APPOINTMENT (OUTPATIENT)
Dept: CARDIOLOGY | Facility: CLINIC | Age: 64
DRG: 309 | End: 2024-03-18
Payer: MEDICARE

## 2024-03-18 VITALS
HEIGHT: 72 IN | TEMPERATURE: 98 F | WEIGHT: 315 LBS | OXYGEN SATURATION: 96 % | DIASTOLIC BLOOD PRESSURE: 60 MMHG | SYSTOLIC BLOOD PRESSURE: 105 MMHG | BODY MASS INDEX: 42.66 KG/M2 | RESPIRATION RATE: 18 BRPM | HEART RATE: 47 BPM

## 2024-03-18 LAB
ANION GAP SERPL CALCULATED.3IONS-SCNC: 11 MMOL/L (ref 7–15)
ATRIAL RATE - MUSE: 111 BPM
ATRIAL RATE - MUSE: 49 BPM
BUN SERPL-MCNC: 22 MG/DL (ref 8–23)
CALCIUM SERPL-MCNC: 9.1 MG/DL (ref 8.8–10.2)
CHLORIDE SERPL-SCNC: 105 MMOL/L (ref 98–107)
CHOLEST SERPL-MCNC: 145 MG/DL
CREAT SERPL-MCNC: 1.17 MG/DL (ref 0.67–1.17)
DEPRECATED HCO3 PLAS-SCNC: 24 MMOL/L (ref 22–29)
DIASTOLIC BLOOD PRESSURE - MUSE: NORMAL MMHG
DIASTOLIC BLOOD PRESSURE - MUSE: NORMAL MMHG
EGFRCR SERPLBLD CKD-EPI 2021: 69 ML/MIN/1.73M2
GLUCOSE SERPL-MCNC: 94 MG/DL (ref 70–99)
HBA1C MFR BLD: 5.6 %
HDLC SERPL-MCNC: 53 MG/DL
INR PPP: 1.07 (ref 0.85–1.15)
INTERPRETATION ECG - MUSE: NORMAL
INTERPRETATION ECG - MUSE: NORMAL
LDLC SERPL CALC-MCNC: 68 MG/DL
LVEF ECHO: NORMAL
MAGNESIUM SERPL-MCNC: 2.2 MG/DL (ref 1.7–2.3)
NONHDLC SERPL-MCNC: 92 MG/DL
P AXIS - MUSE: 44 DEGREES
P AXIS - MUSE: NORMAL DEGREES
POTASSIUM SERPL-SCNC: 4.2 MMOL/L (ref 3.4–5.3)
PR INTERVAL - MUSE: 206 MS
PR INTERVAL - MUSE: NORMAL MS
QRS DURATION - MUSE: 118 MS
QRS DURATION - MUSE: 132 MS
QT - MUSE: 374 MS
QT - MUSE: 446 MS
QTC - MUSE: 402 MS
QTC - MUSE: 515 MS
R AXIS - MUSE: 22 DEGREES
R AXIS - MUSE: 6 DEGREES
SODIUM SERPL-SCNC: 140 MMOL/L (ref 135–145)
SYSTOLIC BLOOD PRESSURE - MUSE: NORMAL MMHG
SYSTOLIC BLOOD PRESSURE - MUSE: NORMAL MMHG
T AXIS - MUSE: 23 DEGREES
T AXIS - MUSE: 41 DEGREES
TRIGL SERPL-MCNC: 120 MG/DL
UFH PPP CHRO-ACNC: 0.19 IU/ML
VENTRICULAR RATE- MUSE: 114 BPM
VENTRICULAR RATE- MUSE: 49 BPM

## 2024-03-18 PROCEDURE — 93306 TTE W/DOPPLER COMPLETE: CPT | Mod: 26 | Performed by: INTERNAL MEDICINE

## 2024-03-18 PROCEDURE — 85520 HEPARIN ASSAY: CPT

## 2024-03-18 PROCEDURE — C8929 TTE W OR WO FOL WCON,DOPPLER: HCPCS

## 2024-03-18 PROCEDURE — 250N000013 HC RX MED GY IP 250 OP 250 PS 637

## 2024-03-18 PROCEDURE — 85610 PROTHROMBIN TIME: CPT | Performed by: CASE MANAGER/CARE COORDINATOR

## 2024-03-18 PROCEDURE — 255N000002 HC RX 255 OP 636: Performed by: INTERNAL MEDICINE

## 2024-03-18 PROCEDURE — 99238 HOSP IP/OBS DSCHRG MGMT 30/<: CPT | Mod: 25 | Performed by: CASE MANAGER/CARE COORDINATOR

## 2024-03-18 PROCEDURE — 80048 BASIC METABOLIC PNL TOTAL CA: CPT

## 2024-03-18 PROCEDURE — 36415 COLL VENOUS BLD VENIPUNCTURE: CPT

## 2024-03-18 PROCEDURE — 82465 ASSAY BLD/SERUM CHOLESTEROL: CPT | Performed by: CASE MANAGER/CARE COORDINATOR

## 2024-03-18 PROCEDURE — 999N000208 ECHOCARDIOGRAM COMPLETE

## 2024-03-18 PROCEDURE — 250N000013 HC RX MED GY IP 250 OP 250 PS 637: Performed by: CASE MANAGER/CARE COORDINATOR

## 2024-03-18 PROCEDURE — 83735 ASSAY OF MAGNESIUM: CPT | Performed by: CASE MANAGER/CARE COORDINATOR

## 2024-03-18 RX ORDER — MAGNESIUM SULFATE HEPTAHYDRATE 40 MG/ML
2 INJECTION, SOLUTION INTRAVENOUS
Status: DISCONTINUED | OUTPATIENT
Start: 2024-03-18 | End: 2024-03-18 | Stop reason: HOSPADM

## 2024-03-18 RX ORDER — LIDOCAINE 40 MG/G
CREAM TOPICAL
Status: DISCONTINUED | OUTPATIENT
Start: 2024-03-18 | End: 2024-03-18 | Stop reason: HOSPADM

## 2024-03-18 RX ORDER — POTASSIUM CHLORIDE 750 MG/1
40 TABLET, EXTENDED RELEASE ORAL
Status: DISCONTINUED | OUTPATIENT
Start: 2024-03-18 | End: 2024-03-18 | Stop reason: HOSPADM

## 2024-03-18 RX ORDER — POTASSIUM CHLORIDE 750 MG/1
20 TABLET, EXTENDED RELEASE ORAL
Status: DISCONTINUED | OUTPATIENT
Start: 2024-03-18 | End: 2024-03-18 | Stop reason: HOSPADM

## 2024-03-18 RX ADMIN — HYDROXYZINE HYDROCHLORIDE 10 MG: 10 TABLET ORAL at 00:26

## 2024-03-18 RX ADMIN — Medication 12.5 MG: at 00:39

## 2024-03-18 RX ADMIN — FLUOXETINE HYDROCHLORIDE 40 MG: 40 CAPSULE ORAL at 08:06

## 2024-03-18 RX ADMIN — APIXABAN 5 MG: 5 TABLET, FILM COATED ORAL at 10:21

## 2024-03-18 RX ADMIN — BUSPIRONE HYDROCHLORIDE 20 MG: 10 TABLET ORAL at 00:26

## 2024-03-18 RX ADMIN — HUMAN ALBUMIN MICROSPHERES AND PERFLUTREN 6 ML: 10; .22 INJECTION, SOLUTION INTRAVENOUS at 07:51

## 2024-03-18 RX ADMIN — BUSPIRONE HYDROCHLORIDE 20 MG: 10 TABLET ORAL at 08:05

## 2024-03-18 RX ADMIN — TICAGRELOR 90 MG: 90 TABLET ORAL at 08:05

## 2024-03-18 ASSESSMENT — ACTIVITIES OF DAILY LIVING (ADL)
ADLS_ACUITY_SCORE: 36

## 2024-03-18 NOTE — ED PROVIDER NOTES
[place] EMERGENCY DEPARTMENT ([facility])    3/17/24       ED PROVIDER NOTE   History     Chief Complaint   Patient presents with    Tachycardia    Palpitations     HPI  Car Torres is a 63 year old male with a past medical history including unspecified psychosis, depression, hypertension, hyperlipidemia, coronary artery disease, unstable angina, dyspnea on exertion, ulcer of left foot, obstructive sleep apnea, history of sinus bradycardia, coronary artery calcification, renal cyst, diverticulosis, pulmonary nodule, chronic kidney disease stage II, sciatica, and morbid obesity who presents to the ED with his wife for evaluation of tachycardia and palpitations. Patient reports he had a stent placed on 3/8/24 and yesterday (3/16/24) started experiencing lightheadedness and thought he was going to pass out. Patient states today, he felt good most of the day and then after dinner started experiencing fluttering in his chest. The patient's wife reports she has been checking his pulse since 7:15 PM and states it ranged from 57 to 121. She states he usually has bradycardia. The patent's wife reports she took his blood pressure and noted it was 70/50. She called the cardiologist and was instructed to come to the ED for evaluation. Patient reports he is on Lisinopril, Brilinta, and Aspirin. Denies known A-fib.     Past Medical History  Past Medical History:   Diagnosis Date    Dementia (H)     Depressive disorder     Heart disease 5/8/2018    Hypercholesterolemia     Hypertension goal BP (blood pressure) < 140/90     Nonspecific abnormal electrocardiogram (ECG) (EKG) 08/23/2007    Stress testing normal.     Sleep apnea     uses a c-pap, severe    Status post coronary angiogram 1/26/2024     Past Surgical History:   Procedure Laterality Date    COLONOSCOPY N/A 12/22/2020    Procedure: COLONOSCOPY, WITH POLYPECTOMY, CLIP;  Surgeon: Mihir Lang MD;  Location: [facility] OR    COLONOSCOPY N/A 1/10/2022     Procedure: COLONOSCOPY, WITH POLYPECTOMY AND BIOPSY;  Surgeon: Mihir Lang MD;  Location:  GI    CV CORONARY ANGIOGRAM N/A 1/26/2024    Procedure: Coronary Angiogram;  Surgeon: Elton Wright MD;  Location: [facility] CARDIAC CATH LAB    CV CORONARY LITHOTRIPSY PCI N/A 3/8/2024    Procedure: Percutaneous Coronary Intervention - Lithotripsy;  Surgeon: Elton Wright MD;  Location: [facility] CARDIAC CATH LAB    CV INSTANTANEOUS WAVE-FREE RATIO N/A 1/26/2024    Procedure: Instantaneous Wave-Free Ratio;  Surgeon: Elton Wright MD;  Location: [facility] CARDIAC CATH LAB    CV INTRAVASULAR ULTRASOUND N/A 3/8/2024    Procedure: Intravascular Ultrasound;  Surgeon: Elton Wright MD;  Location: [facility] CARDIAC CATH LAB    CV PCI N/A 3/8/2024    Procedure: Percutaneous Coronary Intervention;  Surgeon: Elton Wright MD;  Location:  HEART CARDIAC CATH LAB    CV RIGHT HEART CATH MEASUREMENTS RECORDED N/A 1/26/2024    Procedure: Right Heart Catheterization;  Surgeon: Elton Wright MD;  Location: [facility] CARDIAC CATH LAB    ESOPHAGOSCOPY, GASTROSCOPY, DUODENOSCOPY (EGD), COMBINED N/A 1/28/2021    Procedure: ESOPHAGOGASTRODUODENOSCOPY, WITH BIOPSY;  Surgeon: Mihir Lang MD;  Location: [facility] OR    OPEN REDUCTION INTERNAL FIXATION FOOT Left 1/5/2023    Procedure: LISFRANC OPEN REDUCTION INTERNAL FIXATION, LEFT FOOT;  Surgeon: Bailey Ruiz DPM;  Location:  OR    SURGICAL HISTORY OF -       surgery on left index finger     apixaban ANTICOAGULANT (ELIQUIS ANTICOAGULANT) 5 MG tablet  Apple Wyatt Vn-Grn Tea-Bit Or-Cr (APPLE CIDER VINEGAR PLUS) TABS  atorvastatin (LIPITOR) 40 MG tablet  Cholecalciferol (VITAMIN D PO)  co-enzyme Q-10 100 MG CAPS capsule  FLUoxetine (PROZAC) 40 MG capsule  MULTI VITAMIN MENS PO  ticagrelor (BRILINTA) 90 MG tablet  vitamin B complex with vitamin C (STRESS TAB) tablet  busPIRone (BUSPAR) 10 MG tablet  hydroCHLOROthiazide 12.5 MG tablet  hydrOXYzine HCl (ATARAX) 10  MG tablet  lisinopril (ZESTRIL) 20 MG tablet  metoprolol tartrate (LOPRESSOR) 25 MG tablet      No Known Allergies  Family History  Family History   Problem Relation Age of Onset    Cancer Mother         uterine    Other Cancer Mother         endometrial    Cerebrovascular Disease Mother     Substance Abuse Mother         Alcohol    C.A.D. Father     Hypertension Father     Hyperlipidemia Father     Breast Cancer Maternal Grandmother     Other Cancer Maternal Grandmother         Lung/brain    Substance Abuse Paternal Grandfather     Hypertension Brother     Substance Abuse Brother         Alcohol    Hyperlipidemia Brother     Breast Cancer Other     Breast Cancer Cousin     Other Cancer Other     Cerebrovascular Disease Other         Maternal Aunt    Glaucoma No family hx of     Macular Degeneration No family hx of      Social History   Social History     Tobacco Use    Smoking status: Former     Current packs/day: 0.00     Average packs/day: 1 pack/day for 25.0 years (25.0 ttl pk-yrs)     Types: Cigarettes, Cigars     Start date: 1973     Quit date: 1998     Years since quittin.2     Passive exposure: Past    Smokeless tobacco: Never    Tobacco comments:     N/A not a smoker   Vaping Use    Vaping status: Never Used   Substance Use Topics    Alcohol use: Yes     Alcohol/week: 10.0 standard drinks of alcohol     Comment: less than 6 per week    Drug use: Not Currently     Types: Amphetamines     Comment: [place]             Physical Exam   BP: (!) 132/91  Pulse: 109  Temp: 98.1  F (36.7  C)  Resp: 18  Height: 182.9 cm (6')  Weight: (!) 156.5 kg (345 lb)  SpO2: 98 %  Physical Exam  Vitals and nursing note reviewed.   Constitutional:       General: He is not in acute distress.     Appearance: He is well-developed.   HENT:      Head: Normocephalic and atraumatic.      Mouth/Throat:      Mouth: Mucous membranes are moist.   Eyes:      General: No scleral icterus.     Conjunctiva/sclera: Conjunctivae normal.       Pupils: Pupils are equal, round, and reactive to light.   Cardiovascular:      Rate and Rhythm: Normal rate and regular rhythm.      Heart sounds: Normal heart sounds.   Pulmonary:      Effort: Pulmonary effort is normal. No respiratory distress.      Breath sounds: Normal breath sounds. No wheezing.   Abdominal:      General: Abdomen is flat.      Palpations: Abdomen is soft.   Musculoskeletal:      Cervical back: Neck supple.   Skin:     General: Skin is warm and dry.   Neurological:      General: No focal deficit present.      Mental Status: He is alert and oriented to person, place, and time.      Cranial Nerves: No cranial nerve deficit.   Psychiatric:         Mood and Affect: Mood normal.         Behavior: Behavior normal.           ED Course, Procedures, & Data      Procedures            Results for orders placed or performed during the hospital encounter of 03/17/24   XR Chest Port 1 View     Status: None    Narrative    EXAM: XR CHEST PORT 1 VIEW  LOCATION: [facility]  DATE: 3/17/2024    INDICATION: new afib, evaluate pulmonary edema  COMPARISON: None.      Impression    IMPRESSION: Negative chest.   Comprehensive metabolic panel     Status: Abnormal   Result Value Ref Range    Sodium 137 135 - 145 mmol/L    Potassium 4.1 3.4 - 5.3 mmol/L    Carbon Dioxide (CO2) 26 22 - 29 mmol/L    Anion Gap 11 7 - 15 mmol/L    Urea Nitrogen 24.3 (H) 8.0 - 23.0 mg/dL    Creatinine 1.41 (H) 0.67 - 1.17 mg/dL    GFR Estimate 55 (L) >60 mL/min/1.73m2    Calcium 9.1 8.8 - 10.2 mg/dL    Chloride 100 98 - 107 mmol/L    Glucose 92 70 - 99 mg/dL    Alkaline Phosphatase 72 40 - 150 U/L    AST 19 0 - 45 U/L    ALT 18 0 - 70 U/L    Protein Total 6.8 6.4 - 8.3 g/dL    Albumin 4.5 3.5 - 5.2 g/dL    Bilirubin Total 0.5 <=1.2 mg/dL   Troponin T, High Sensitivity     Status: Normal   Result Value Ref Range    Troponin T, High Sensitivity 18 <=22 ng/L   Nt probnp inpatient (BNP)      Status: Normal   Result Value Ref Range    N terminal Pro BNP Inpatient 128 0 - 900 pg/mL   CBC with platelets and differential     Status: None   Result Value Ref Range    WBC Count 7.6 4.0 - 11.0 10e3/uL    RBC Count 4.67 4.40 - 5.90 10e6/uL    Hemoglobin 13.9 13.3 - 17.7 g/dL    Hematocrit 42.5 40.0 - 53.0 %    MCV 91 78 - 100 fL    MCH 29.8 26.5 - 33.0 pg    MCHC 32.7 31.5 - 36.5 g/dL    RDW 13.1 10.0 - 15.0 %    Platelet Count 211 150 - 450 10e3/uL    % Neutrophils 67 %    % Lymphocytes 20 %    % Monocytes 9 %    % Eosinophils 3 %    % Basophils 1 %    % Immature Granulocytes 0 %    NRBCs per 100 WBC 0 <1 /100    Absolute Neutrophils 5.1 1.6 - 8.3 10e3/uL    Absolute Lymphocytes 1.5 0.8 - 5.3 10e3/uL    Absolute Monocytes 0.7 0.0 - 1.3 10e3/uL    Absolute Eosinophils 0.2 0.0 - 0.7 10e3/uL    Absolute Basophils 0.1 0.0 - 0.2 10e3/uL    Absolute Immature Granulocytes 0.0 <=0.4 10e3/uL    Absolute NRBCs 0.0 10e3/uL   CBC with platelets     Status: Normal   Result Value Ref Range    WBC Count 7.6 4.0 - 11.0 10e3/uL    RBC Count 4.67 4.40 - 5.90 10e6/uL    Hemoglobin 13.9 13.3 - 17.7 g/dL    Hematocrit 42.5 40.0 - 53.0 %    MCV 91 78 - 100 fL    MCH 29.8 26.5 - 33.0 pg    MCHC 32.7 31.5 - 36.5 g/dL    RDW 13.1 10.0 - 15.0 %    Platelet Count 211 150 - 450 10e3/uL   Hemoglobin A1c     Status: Normal   Result Value Ref Range    Hemoglobin A1C 5.6 <5.7 %   Heparin Unfractionated Anti Xa Level     Status: Normal   Result Value Ref Range    Anti Xa Unfractionated Heparin 0.19 For Reference Range, See Comment IU/mL    Narrative    Therapeutic Range: UFH: 0.25-0.50 IU/mL for low intensity dosing,  0.30-0.70 IU/mL for high intensity dosing DVT and PE.  This test is not validated for other direct factor X inhibitors (e.g. rivaroxaban, apixaban, edoxaban, betrixaban, fondaparinux) and should not be used for monitoring of other medications.   Basic metabolic panel     Status: Normal   Result Value Ref Range    Sodium 140 135 -  145 mmol/L    Potassium 4.2 3.4 - 5.3 mmol/L    Chloride 105 98 - 107 mmol/L    Carbon Dioxide (CO2) 24 22 - 29 mmol/L    Anion Gap 11 7 - 15 mmol/L    Urea Nitrogen 22.0 8.0 - 23.0 mg/dL    Creatinine 1.17 0.67 - 1.17 mg/dL    GFR Estimate 69 >60 mL/min/1.73m2    Calcium 9.1 8.8 - 10.2 mg/dL    Glucose 94 70 - 99 mg/dL   INR     Status: Normal   Result Value Ref Range    INR 1.07 0.85 - 1.15   Magnesium     Status: Normal   Result Value Ref Range    Magnesium 2.2 1.7 - 2.3 mg/dL   Lipid panel reflex to direct LDL     Status: Normal   Result Value Ref Range    Cholesterol 145 <200 mg/dL    Triglycerides 120 <150 mg/dL    Direct Measure HDL 53 >=40 mg/dL    LDL Cholesterol Calculated 68 <=100 mg/dL    Non HDL Cholesterol 92 <130 mg/dL    Narrative    Cholesterol  Desirable:  <200 mg/dL    Triglycerides  Normal:  Less than 150 mg/dL  Borderline High:  150-199 mg/dL  High:  200-499 mg/dL  Very High:  Greater than or equal to 500 mg/dL    Direct Measure HDL  Female:  Greater than or equal to 50 mg/dL   Male:  Greater than or equal to 40 mg/dL    LDL Cholesterol  Desirable:  <100mg/dL  Above Desirable:  100-129 mg/dL   Borderline High:  130-159 mg/dL   High:  160-189 mg/dL   Very High:  >= 190 mg/dL    Non HDL Cholesterol  Desirable:  130 mg/dL  Above Desirable:  130-159 mg/dL  Borderline High:  160-189 mg/dL  High:  190-219 mg/dL  Very High:  Greater than or equal to 220 mg/dL   EKG 12 lead     Status: None   Result Value Ref Range    Systolic Blood Pressure  mmHg    Diastolic Blood Pressure  mmHg    Ventricular Rate 114 BPM    Atrial Rate 111 BPM    UT Interval  ms    QRS Duration 132 ms     ms    QTc 515 ms    P Axis  degrees    R AXIS 22 degrees    T Axis 23 degrees    Interpretation ECG       Atrial fibrillation with rapid ventricular response  Right bundle branch block  Possible Inferior infarct , age undetermined  Abnormal ECG    Unconfirmed report - interpretation of this ECG is computer generated - see  medical record for final interpretation  Confirmed by - EMERGENCY ROOM, PHYSICIAN ([id number]),  WILMA JEFFRIES ([id number]) on 3/18/2024 7:50:37 AM     EKG 12 lead     Status: None   Result Value Ref Range    Systolic Blood Pressure  mmHg    Diastolic Blood Pressure  mmHg    Ventricular Rate 116 BPM    Atrial Rate 116 BPM    OK Interval  ms    QRS Duration 108 ms     ms    QTc 480 ms    P Axis  degrees    R AXIS -30 degrees    T Axis -30 degrees    Interpretation ECG       ** Suspect arm lead reversal, interpretation assumes no reversal  Accelerated Junctional rhythm with Premature supraventricular complexes  Left axis deviation  Low voltage QRS  Inferior-posterior infarct , age undetermined  Abnormal ECG  Unconfirmed report - interpretation of this ECG is computer generated - see medical record for final interpretation  Confirmed by - EMERGENCY ROOM, PHYSICIAN ([id number]),  HARI BUSTOS ([id number]) on 3/22/2024 8:56:02 PM     EKG 12-lead, tracing only     Status: None   Result Value Ref Range    Systolic Blood Pressure  mmHg    Diastolic Blood Pressure  mmHg    Ventricular Rate 49 BPM    Atrial Rate 49 BPM    OK Interval 206 ms    QRS Duration 118 ms     ms    QTc 402 ms    P Axis 44 degrees    R AXIS 6 degrees    T Axis 41 degrees    Interpretation ECG       Sinus bradycardia  Low voltage QRS  RSR' or QR pattern in V1 suggests right ventricular conduction delay  Borderline ECG  Unconfirmed report - interpretation of this ECG is computer generated - see medical record for final interpretation  Confirmed by - EMERGENCY ROOM, PHYSICIAN ([id number]),  REYNA JEFFRIES ([id number]) on 3/18/2024 11:30:33 AM     Echo Complete     Status: None   Result Value Ref Range    LVEF  60-65%     Narrative    [id number]  [id number]  [id number]  [id number]^[email or web address]^ROSS     [facility],[place]  Echocardiography Laboratory  [address] [place] [id number]     Name: YUKI RYAN  MRN:  [id number]  : 1960  Study Date: 2024 07:28 AM  Age: 63 yrs  Gender: Male  Patient [facility]: [facility]  Reason For Study: Atrial Fibrillation  Ordering Physician: ROSS PENALOZA  Performed By: Ana Carmona RDCS     BSA: 2.7 m2  Height: 72 in  Weight: 345 lb  BP: 111/86 mmHg  ______________________________________________________________________________  Procedure  Complete Portable Echo Adult. Contrast Optison. Technically difficult  study.Extremely difficult acoustic windows despite the use of contrast for  endcardial border definition. Patient was given 6 ml mixture of 3 ml Optison  and 6 ml saline. 3 ml wasted.  ______________________________________________________________________________  Interpretation Summary  Technically difficult study.Extremely difficult acoustic windows despite the  use of contrast for endcardial border definition.  Global and regional left ventricular function is normal with an EF of 60-65%.  Right ventricular function, chamber size, wall motion, and thickness are  normal.  The inferior vena cava is normal.  No pericardial effusion is present.  ______________________________________________________________________________  Left Ventricle  Global and regional left ventricular function is normal with an EF of 60-65%.  Left ventricular size is normal. Mild concentric wall thickening consistent  with left ventricular hypertrophy is present. Diastolic function not assessed  due to atrial fibrillation. No regional wall motion abnormalities are seen.     Right Ventricle  Right ventricular function, chamber size, wall motion, and thickness are  normal.     Atria  The atria cannot be assessed.     Mitral Valve  The mitral valve is normal.     Aortic Valve  The valve leaflets are not well visualized. On Doppler interrogation, there is  no significant stenosis or regurgitation.     Tricuspid Valve  The tricuspid valve is normal. Pulmonary artery systolic pressure cannot  be  assessed.     Pulmonic Valve  The pulmonic valve cannot be assessed.     Vessels  The thoracic aorta is normal. The pulmonary artery cannot be assessed. The  inferior vena cava is normal.     Pericardium  No pericardial effusion is present.     ______________________________________________________________________________  MMode/2D Measurements & Calculations  IVSd: 1.2 cm  LVIDd: 4.5 cm  LVIDs: 3.2 cm  LVPWd: 1.2 cm     FS: 28.4 %  LV mass(C)d: 187.6 grams  LV mass(C)dI: 69.9 grams/m2  Ao root diam: 3.3 cm  asc Aorta Diam: 3.7 cm  LVOT diam: 2.4 cm  LVOT area: 4.5 cm2  Ao root diam index Ht(cm/m): 1.8  Ao root diam index BSA (cm/m2): 1.2  Asc Ao diam index BSA (cm/m2): 1.4  Asc Ao diam index Ht(cm/m): 2.0  RWT: 0.52     Doppler Measurements & Calculations  PA acc time: 0.07 sec  RV S Reji: 9.9 cm/sec     ______________________________________________________________________________  Report approved by: Kalina Markham 03/18/2024 10:02 AM         CBC with platelets differential     Status: None    Narrative    The following orders were created for panel order CBC with platelets differential.  Procedure                               Abnormality         Status                     ---------                               -----------         ------                     CBC with platelets and d...[[id number]]                      Final result                 Please view results for these tests on the individual orders.     Medications   metoprolol (LOPRESSOR) injection 5 mg (5 mg Intravenous $Given 3/17/24 2343)   heparin ANTICOAGULANT loading dose for  LOW INTENSITY TREATMENT* Give BEFORE starting heparin infusion (6,000 Units Intravenous $Given 3/17/24 2350)   metoprolol tartrate (LOPRESSOR) half-tab 12.5 mg (12.5 mg Oral $Given 3/18/24 0039)   heparin - BOLUS DOSE from infusion (3,000 Units Intravenous $Given 3/18/24 0804)   perflutren diluted 1mL to 2mL with saline (OPTISON) diluted injection 6 mL (6 mLs Intravenous  $Given 3/18/24 7044)     Labs Ordered and Resulted from Time of ED Arrival to Time of ED Departure   COMPREHENSIVE METABOLIC PANEL - Abnormal       Result Value    Sodium 137      Potassium 4.1      Carbon Dioxide (CO2) 26      Anion Gap 11      Urea Nitrogen 24.3 (*)     Creatinine 1.41 (*)     GFR Estimate 55 (*)     Calcium 9.1      Chloride 100      Glucose 92      Alkaline Phosphatase 72      AST 19      ALT 18      Protein Total 6.8      Albumin 4.5      Bilirubin Total 0.5     TROPONIN T, HIGH SENSITIVITY - Normal    Troponin T, High Sensitivity 18     NT PROBNP INPATIENT - Normal    N terminal Pro BNP Inpatient 128     CBC WITH PLATELETS - Normal    WBC Count 7.6      RBC Count 4.67      Hemoglobin 13.9      Hematocrit 42.5      MCV 91      MCH 29.8      MCHC 32.7      RDW 13.1      Platelet Count 211     HEMOGLOBIN A1C - Normal    Hemoglobin A1C 5.6     HEPARIN UNFRACTIONATED ANTI XA LEVEL - Normal    Anti Xa Unfractionated Heparin 0.19     BASIC METABOLIC PANEL - Normal    Sodium 140      Potassium 4.2      Chloride 105      Carbon Dioxide (CO2) 24      Anion Gap 11      Urea Nitrogen 22.0      Creatinine 1.17      GFR Estimate 69      Calcium 9.1      Glucose 94     INR - Normal    INR 1.07     MAGNESIUM - Normal    Magnesium 2.2     LIPID REFLEX TO DIRECT LDL PANEL - Normal    Cholesterol 145      Triglycerides 120      Direct Measure HDL 53      LDL Cholesterol Calculated 68      Non HDL Cholesterol 92     CBC WITH PLATELETS AND DIFFERENTIAL    WBC Count 7.6      RBC Count 4.67      Hemoglobin 13.9      Hematocrit 42.5      MCV 91      MCH 29.8      MCHC 32.7      RDW 13.1      Platelet Count 211      % Neutrophils 67      % Lymphocytes 20      % Monocytes 9      % Eosinophils 3      % Basophils 1      % Immature Granulocytes 0      NRBCs per 100 WBC 0      Absolute Neutrophils 5.1      Absolute Lymphocytes 1.5      Absolute Monocytes 0.7      Absolute Eosinophils 0.2      Absolute Basophils 0.1       Absolute Immature Granulocytes 0.0      Absolute NRBCs 0.0       Echo Complete   Final Result      XR Chest Port 1 View   Final Result   IMPRESSION: Negative chest.             Critical care was not performed.     Medical Decision Making  The patient's presentation was of high complexity (patient with bradycardia and tachycardia as well as spell of hypotension.  He has recent cardiac stenting and has had some chest pain associated with this.  He is not known to have atrial fibrillation.).    The patient's evaluation involved:  ordering and/or review of 3+ test(s) in this encounter (EKG x 2, chest x-ray, CBC, metabolic panel, troponin)    The patient's management necessitated high risk (a decision regarding hospitalization).    Assessment & Plan    63-year-old male with new onset atrial fibrillation with rapid ventricular response also with spells of hypotension and near syncope.  Case was discussed with cardiology and he will be admitted to the inpatient service for anticoagulation and further workup and stabilization.    I have reviewed the nursing notes. I have reviewed the findings, diagnosis, plan and need for follow up with the patient.    Discharge Medication List as of 3/18/2024 10:14 AM        START taking these medications    Details   apixaban ANTICOAGULANT (ELIQUIS ANTICOAGULANT) 5 MG tablet Take 1 tablet (5 mg) by mouth 2 times daily, Disp-90 tablet, R-6, E-Prescribe             Final diagnoses:   Atrial fibrillation with rapid ventricular response (H)   Near syncope   ASCVD (arteriosclerotic cardiovascular disease)   I, Afsaneh Landaverde, am serving as a trained medical scribe to document services personally performed by Kane Garland MD, based on the provider's statements to me.     IKane MD, was physically present and have reviewed and verified the accuracy of this note documented by Afsaneh Landaverde.     Kane Garland MD  [facility] EMERGENCY  [facility]  3/17/2024     Kane Garland MD  04/22/24 [id number]

## 2024-03-18 NOTE — DISCHARGE SUMMARY
[address] [address]  p: [phone]     Discharge Summary: Cardiology Service     Car Torres MRN# [id number]   YOB: 1960 Age: 63 year old         Admission Date: 3/17/2024  Discharge Date: 03/18/24     Discharge Diagnoses:  # Atrial fibrillation with RVR, newly diagnosed  # Hx of sinus bradycardia  # CAD s/p PCI + APRIL x1 to pLAD (3/8/24)  # HLD  # HTN  # TOMAS vs CKD  # Mood disorder     Brief HPI:  Car Torres is a 63 year old year old male with PMH of r CAD s/p APRIL to proximal LAD 3/8/2024, aneurysmal segment involving the ostial D1, hypertension, hyperlipidemia, morbid obesity who presented with palpitations found to have newly diagnosed atrial fibrillation with RVR.      Hospital Course by Diagnosis:  # Atrial fibrillation with RVR, newly diagnosed  # Hx of sinus bradycardia  Per chart review, previous EKGs consistent with sinus bradycardia in 50s. Presented with palpitations atrial fibrillation with RVR in 110-120s.His blood pressure has been stable. No concerns for volume overloaded at this time. Attempt to obtain bedside TTE however with body habitus and in afib with RVR, unable to obtain a good view.   Received metoprolol 5mg IV x1 + metoprolol tartrate 12.5mg PO once.  Notified from RN at 0845 that patient had 2 second pause and self-converted to sinus bradycardia HR 40-50s, patient asymptomatic.   [id number] = 2 (age+, HTN+)  - AC: Start Eliquis 5mg BID (co-pay $4.60)  - Defer BB initiation given sinus bradycardia     # CAD s/p PCI + APRIL x1 to pLAD (3/8/24)  Recently underwent coronary angiogram 1/26/24 for worsening angina which revealed aneurysmal segment of diag involving LAD. Underwent staged PCI on 3/8/24 now s/p PCI of pLAD with shockwave lithotripsy and APRIL x1 (Synergy XD 3.0X 24 mm). Non-obstructive CAD noted in ostial circumflex and OM3.   - DAPT: given stent placement in LM, continue ASA 81mg daily +  ticagrelor 90mg BID x1 month, then stop ASA and continue ticagrelor monotherapy (and DOAC for AF).  - continue atorvastatin 40mg daily      # HLD  Fasting lipid panel collected in ED 3/8/24 with LDL 68 (goal <70).  - continue high dose statin     # HTN  BP soft while in ED /70s.  - hold PTA lisinopril-hydrochlorothiazide until follow up     # TOMAS vs CKD  Baseline Cr <1. Cr 1.28 (3/8) -> 1.4 (3/17). Good oral intake but taking advil regularly for shoulder pain.  - monitor Cr  - advise discontinue all NSAIDs pills  - BMP at follow up     # Mood disorder  - PTA fluoxetine  - PTA buspirone     Pertinent Procedures:  1. Echo     Consults:  Pharmacy liaison      Medication Changes:  See below     Discharge medications:   Current Discharge Medication List        START taking these medications    Details   apixaban ANTICOAGULANT (ELIQUIS ANTICOAGULANT) 5 MG tablet Take 1 tablet (5 mg) by mouth 2 times daily  Qty: 90 tablet, Refills: 6    Associated Diagnoses: Atrial fibrillation with rapid ventricular response (H)           CONTINUE these medications which have NOT CHANGED    Details   Apple Wyatt Vn-Grn Tea-Bit Or-Cr (APPLE CIDER VINEGAR PLUS) TABS Take 1 tablet by mouth daily With calcium and magnesium      aspirin 81 MG EC tablet Take 1 tablet (81 mg) by mouth daily Start tomorrow morning.  Qty: 90 tablet, Refills: 3    Associated Diagnoses: Coronary artery calcification      atorvastatin (LIPITOR) 40 MG tablet Take 1 tablet (40 mg) by mouth daily  Qty: 90 tablet, Refills: 4    Associated Diagnoses: Hyperlipidemia LDL goal <100      busPIRone (BUSPAR) 10 MG tablet Take 2 tablets (20 mg) by mouth 2 times daily  Qty: 120 tablet, Refills: 1    Associated Diagnoses: SHAHNAZ (generalized anxiety disorder)      Cholecalciferol (VITAMIN D PO) Take 5,000 Units by mouth daily One tablet twice daily      co-enzyme Q-10 100 MG CAPS capsule Take 100 mg by mouth daily      FLUoxetine (PROZAC) 40 MG capsule Take 1 capsule (40 mg) by  mouth daily  Qty: 90 capsule, Refills: 4    Associated Diagnoses: Major depressive disorder, recurrent episode, moderate (H)      hydrOXYzine HCl (ATARAX) 10 MG tablet TAKE 1 TABLET (10 MG) BY MOUTH 2 TIMES DAILY AS NEEDED FOR ANXIETY  Qty: 180 tablet, Refills: 0    Associated Diagnoses: SHAHNAZ (generalized anxiety disorder)      !! MULTI VITAMIN MENS PO Take 1 tablet by mouth daily      tadalafil (CIALIS) 20 MG tablet TAKE 1/2 TO 1 TABLET BY MOUTH 30 MINUTES TO 1 HOUR BEFORE SEX  Qty: 6 tablet, Refills: 5    Associated Diagnoses: Impotence of organic origin      ticagrelor (BRILINTA) 90 MG tablet Take 1 tablet (90 mg) by mouth 2 times daily Start this evening.  Qty: 180 tablet, Refills: 3    Associated Diagnoses: Anginal equivalent      !! vitamin B complex with vitamin C (STRESS TAB) tablet Take 1 tablet by mouth daily       !! - Potential duplicate medications found. Please discuss with provider.        STOP taking these medications       lisinopril-hydrochlorothiazide (ZESTORETIC) 20-12.5 MG tablet Comments:   Reason for Stopping:               Follow-up:  1 month with cardiology ELIJAH  7-10 days with PCP     Labs or imaging requiring follow-up after discharge:  BMP     Code status:  full    Condition on discharge  Temp:  [98  F (36.7  C)-98.1  F (36.7  C)] 98  F (36.7  C)  Pulse:  [] 115  Resp:  [18] 18  BP: ()/(67-91) 100/68  SpO2:  [94 %-98 %] 96 %  General: Alert, interactive, NAD  Eyes: sclera anicteric, EOMI  Neck: carotid 2+ bilaterally  Cardiovascular: regular rate and rhythm, normal S1 and S2, no murmurs, gallops, or rubs  Resp: clear to auscultation bilaterally, no rales, wheezes, or rhonchi  GI: Soft, nontender, nondistended. +BS.  No HSM or masses, no rebound or guarding.  Extremities: no edema, no cyanosis or clubbing, dorsalis pedis and posterior tibialis pulses 2+ bilaterally  Skin: Warm and dry, no jaundice or rash  Neuro: CN 2-12 intact, moves all extremities equally  Psych: Alert &  oriented x 3    Imaging with results:  EKG 3/17/24: atrial fibrillation       EKG 3/18/24: sinus bradycardia       Coronary Angiogram 3/8/24:  Physicians     Panel Physicians Referring Physician Case Authorizing Physician   Elton Wright MD (Primary)   Elton Wright MD Rajput, Furqan A, MD (Fellow - Assisting)          Procedures     Panel 1     Primary Surgeon: Elton Wright MD   Procedure: Percutaneous Coronary Intervention     Intravascular Ultrasound     Percutaneous Coronary Intervention - Lithotripsy         Indications     Coronary artery disease involving native coronary artery of native heart, unspecified whether angina present [I25.10 (ICD-10-CM)]      Comments/Patient Narrative     Patient is a 63-year-old male with a medical history significant for hypertension, hyperlipidemia, morbid obesity who had a coronary angiogram done on 01/26/2024 and was found to have proximal LAD lesion along with aneurysmal segment involving the ostial D1.  He presented to the cardiac Cath Lab today for planned percutaneous coronary intervention of the LAD lesion.      Pre Procedure Diagnosis     worsening anginaheart failurestable known CAD        Conclusion          Ost Cx lesion is 20% stenosed.    Mid Cx to Dist Cx lesion is 30% stenosed.    3rd Mrg lesion is 20% stenosed.    Prox LAD lesion is 90% stenosed.     Successful planned percutaneous coronary intervention of the proximal LAD lesion with shockwave lithotripsy and 1 drug-eluting stent-Synergy XD 3.0X 24 mm.           Plan      Follow bedrest per protocol   Continued medical management and lifestyle modifications for cardiovascular risk factor optimizations.   Follow up visit with Nurse Practitioner in 1-2 weeks.   Arterial sheath removed from femoral artery with closure device.   Femoral angiogram identifies arterial sheath placement suitable for closure device.   Cardiac rehabilitation.   Discharge today per protocol        Post antiplatelet  therapy of   Aspirin; give 81 mg qd .      Ticagrelor; and 90 mg BID.   Continue high dose statin therapy      Recommendations     General Recommendations:  - Patient given specific instructions regarding care of arteriotomy site, activity restrictions, signs and symptoms of cardiac or vascular complications and to seek immediate medical evaluation should they occur.   - Follow up visit with Nurse Practitioner in 1-2 weeks.   - Arterial sheath removed from the femoral artery with closure device.   - Femoral angiogram identifies arterial sheath placement is suitable for closure device.   - Recommend cardiac rehabilitation.       Medications:  - Continue high dose statin therapy indefinitely.   - Risk factor management for atherosclerosis.      Coronary Findings     Diagnostic  Dominance: Right  Left Anterior Descending   Prox LAD lesion is 90% stenosed. Culprit lesion. The lesion is type B2 - medium risk and located at the major branch. The lesion was not previously treated.      Left Circumflex   Ost Cx lesion is 20% stenosed.   Mid Cx to Dist Cx lesion is 30% stenosed.      Third Obtuse Marginal Branch   3rd Mrg lesion is 20% stenosed.      Right Coronary Artery   The vessel was not injected.         Intervention      Prox LAD lesion   Stent   A CATH GUIDING BLUE YELLOW PTFE XB3.5 [id number] guide catheter was successfully placed. The GUIDEWIRE [employer] BLUE 190CM J TIP [id number] crossed the lesion. The pre-interventional distal flow is decreased (DENNIS 2). A STENT CORONARY APRIL SYNERGY XD MR US [id number] drug eluting stent was successfully placed. Pre-stent angioplasty was performed using a CATH BALLOON EMERGE 2.0X8MM [id number] supply. An additional CATH BALLOON EMERGE [id number] supply was used. An additional CATH BALLOON NC EMERGE 2.30K38MG [id number] supply was used. Maximum pressure:12 cristina. . The strut is apposed. No post-stent angioplasty was performed. The  post-interventional distal flow is normal (DENNIS 3). The intervention was successful. No complications occurred at this lesion. D1 was wired across the aneurysm using a whisper guidewire. Angioplasty of the ostial D1 and proximal LAD was done using emerge 2.0X 8 mm balloon. LAD was rewired using [employer] blue guidewire. Additional angioplasty was performed with the balloons mentioned above. Shockwave lithotripsy was performed using 3.0 X12 millimeter shockwave C2 plus balloon,which was followed by deployment of drug-eluting stent. Intravascular ultrasound was performed at the end of the procedure.   There is a 0% residual stenosis post intervention.            Pressures Phase: Baseline          Time Systolic (mmHg) Diastolic (mmHg) Mean (mmHg) A Wave (mmHg) V Wave (mmHg) EDP (mmHg) Max dp/dt (mmHg/sec) HR (bpm) Content (mL/dL) SAT (%)   AO Pressures  1:05     61    76            49               KLAUS Quintero, CNP  [facility]  Patient discussed with staff cardiologist, Dr. Bunch, who agrees with the above documentation and plan. Documentation represents joint decision making.     Time Spent on this Encounter   I, KLAUS JONES CNP, personally saw the patient today and spent greater than 30 minutes discharging this patient.

## 2024-03-18 NOTE — ED TRIAGE NOTES
Patient presents to the ED for c/o palpitations, tachycardia, hypotension s/p stent 3/8/24.     Onset symptoms: Yesterday    Home remedies: BP med    Triage VS: BP (!) 132/91   Pulse 109   Temp 98.1  F (36.7  C) (Oral)   Resp 18   Ht 1.829 m (6')   Wt (!) 156.5 kg (345 lb)   SpO2 98%   BMI 46.79 kg/m      Naga Rasheed RN  3/17/2024     Triage Assessment (Adult)       Row Name 03/17/24 2113          Triage Assessment    Airway WDL WDL        Respiratory WDL    Respiratory WDL WDL        Skin Circulation/Temperature WDL    Skin Circulation/Temperature WDL WDL        Cardiac WDL    Cardiac WDL X;rhythm     Pulse Rate & Regularity tachycardic        Peripheral/Neurovascular WDL    Peripheral Neurovascular WDL WDL        Cognitive/Neuro/Behavioral WDL    Cognitive/Neuro/Behavioral WDL WDL

## 2024-03-18 NOTE — PROGRESS NOTES
"NAME  Car \"Jerry\" Melissa    MRN  [id number]      [birth year]     AGE  63     SEX  Male     HANDEDNESS Right     EDUCATION 13     MCKNIGHT  3/15/24     PROVIDER  [facility]  [place]     STATION  OP            ORIENTATION      Time  -10     Personal Info.     Place      Presidents             WAIS-IV         WMI: 83      PSI: 84      RDS: 6             Raw ScS    Similarities  24 10    Block Design 28 9    Digit Span  20 7    Arithmetic  10 7    Symbol Search 18 7    Coding  36 7                  CAMDEN-O COMPLEX FIGURE       Raw T %ile   Copy  30  11-16   Time to Copy 203  >16                        COWAT FAS       Raw 34      ScS 9      T 46             ANIMAL FLUENCY      Raw 17      ScS 9      T 48              BOSTON NAMING TEST     Raw 58 /60     ScS 13      %ile 100             COMPLEX IDEATIONAL MATERIAL     Raw 10      ScS 7      T 34                    TRAIL MAKING TEST       Time Errors ScS %ile   A 53 0 6 22   B 132 0 6 11-33          STROOP        Raw %ile     Word 57 <22     Color 44 11-22     C/W 22 22            WCST         Raw %ile T   # Categories 16 >16    Total Correct 79     Total Errors 19 79 58   Perseveative Err. 10 75 57   Concept Resp. 71 70 55   FTMS:  1     LTL  0.39 >16            FABY   H   Raw 21      %ile 53-68                    GROOVED PEGBOARD       Raw Drops ScS T   RH 84 0 6 45   LH 85 1 7 48          BDI-II       Raw 0      Interp. Mimal              JAIRO       Raw 9      Interp. Mild                             WMS-IV LOGICAL MEMORY OA    Raw ScS/%ile     LM I 29 9     LM II 16 9     Recog. 17 17-25                    HVLT   4     Raw T    Trial 1  4     Trial 2  8     Trial 3  8     Learning  4     Total Recall 20 35    Delayed Recall 7 37    Percent Retention 88% 48    True Positives 11     False Positives 2     Disc. Index  9 38            BVMT   3     Raw T/%ile    Trial 1  6     Trial 2  8     Trial 3  7     Learning  2     Total Recall 21 49    Delayed Recall 8 46  "   Percent Retention 100% >16    Recognition Hits 6     Recognition F.P 0     Disc. Index  6 >16                  TOMM       Trial 1 49      Trial 2 50      Trial 3 0             DCT       E-Score 14

## 2024-03-18 NOTE — CONSULTS
Discharge Pharmacy Test Claim    Patient has pharmacy benefits through [employer] Part D plan. Eliquis and xarelto are covered with monthly copays of $4.60.    Test Claim Copay   eliquis 4.60   xarelto 4.60     Zuleika Becerril  [facility] Liaison  Ph: [phone]  Fax [phone]  Available on [employer] (learn more here)

## 2024-03-18 NOTE — H&P
[facility]    Cardiology History and Physical - Cardiology         Date of Admission:  3/17/2024    Assessment & Plan: S    Car Torres is a 63 year old male with history significant for CAD s/p APRIL to proximal LAD 3/8/2024, aneurysmal segment involving the ostial D1, hypertension, hyperlipidemia, morbid obesity who presented with palpitations found to have newly diagnosed atrial fibrillation with RVR.    # Atrial fibrillation with RVR, newly diagnosed  # Hx of sinus bradycardia  Per chart review, previous EKGs consistent with sinus bradycardia in 50s. Presented with palpitations atrial fibrillation with RVR in 110-120s.His blood pressure has been stable. No concerns for volume overloaded at this time. Attempt to obtain bedside TTE however with body habitus and in afib with RVR, unable to obtain a good view. [id number] is 2 (hypertension and age, pending Hgb A1c). Plan to rate control and consider cardioversion in AM.  - metoprolol 5 mg IV, metoprolol tartrate 12.5 mg po once  - heparin gtt  - NPO AMN for possible cardioversion  - TTE    # CAD s/p proximal LAD 3/8/2024  - aspirin 81 mg qday (patient been taking 325 mg qday)  - ticagrelor 90 mg bid  - atorvastatin 40 mg qday     # TOMAS vs CKD  Baseline Cr <1. Cr 1.28 (3/8) -> 1.4 (3/17). Good oral intake but taking advil regularly for shoulder pain.  - monitor Cr  - advise discontinue all NSAIDs pill    # HTN  - hold lisinopril for now given soft blood pressure    # Mood disorder  - PTA fluoxetine  - PTA buspirone     Diet: Combination Diet 2 gm NA Diet; No Caffeine Diet  NPO per Anesthesia Guidelines for Procedure/Surgery Except for: Meds  DVT Prophylaxis: heparin gtt  Frias Catheter: Not present  Cardiac Monitoring: ACTIVE order. Indication: Tachyarrhythmias, acute (48 hours)  Code Status: Full Code        Clinically Significant Risk Factors Present on Admission                # Drug Induced Platelet  Defect: home medication list includes an antiplatelet medication   # Hypertension: Noted on problem list   # Dementia: noted on problem list    # Severe Obesity: Estimated body mass index is 46.79 kg/m  as calculated from the following:    Height as of this encounter: 1.829 m (6').    Weight as of this encounter: 156.5 kg (345 lb).              Disposition Plan   Expected discharge: 2 - 3 days, recommended to prior living arrangement once HR is within normal range.    To be formally staffed in AM      Dayton Delong MD  PGY-2, Internal Medicine  Cardiology Service  [facility]    ______________________________________________________________________    Chief Complaint   Palpitations     History is obtained from the patient and chart review    History of Present Illness   Car Torres is a 63 year old male with history significant for CAD s/p APRIL to proximal LAD 3/8/2024, aneurysmal segment involving the ostial D1, hypertension, hyperlipidemia, morbid obesity who presented with palpitations.    Patient was accompanied by his wife. He reports that he started feeling palpitations this evening after dinner. His wife took his vitals and states that his heart rate and blood pressure were all over the place. The lowest blood pressure was 70/50s. His wife called cardiology on call who recommended that patient to come to hospital. After the PCI procedure, he felt much better however having episodes of mild shortness of breath periodically even while at rest. Currently, he denies shortness of breath, chest pain, orthopnea, PND, weight changes.    From chart review, he does not have documented atrial fibrillation prior to this episode. His most recent echocardiogram in 2020 showed EF 60-65% with moderate diastolic dysfunction. Of note, patient has been taking aspirin 325 mg that he got from over the counter. He also has been taking advil for shoulder pain.     ED  course  VS  /91  Labs pertinent for Cr 1.41 (baseline <1)  Treatment: none      Past Medical History    Past Medical History:   Diagnosis Date    Dementia (H)     Depressive disorder     Heart disease 5/8/2018    Hypercholesterolemia     Hypertension goal BP (blood pressure) < 140/90     Nonspecific abnormal electrocardiogram (ECG) (EKG) 08/23/2007    Stress testing normal.     Sleep apnea     uses a c-pap, severe    Status post coronary angiogram 1/26/2024       Past Surgical History   Past Surgical History:   Procedure Laterality Date    COLONOSCOPY N/A 12/22/2020    Procedure: COLONOSCOPY, WITH POLYPECTOMY, CLIP;  Surgeon: Mihir Lang MD;  [place] [facility] [place]    COLONOSCOPY N/A 1/10/2022    Procedure: COLONOSCOPY, WITH POLYPECTOMY AND BIOPSY;  Surgeon: Mihir Lang MD;  Location:  GI    CV CORONARY ANGIOGRAM N/A 1/26/2024    Procedure: Coronary Angiogram;  Surgeon: Elton Wright MD;  Location:  HEART CARDIAC CATH LAB    CV INSTANTANEOUS WAVE-FREE RATIO N/A 1/26/2024    Procedure: Instantaneous Wave-Free Ratio;  Surgeon: Elton Wright MD;  Location:  HEART CARDIAC CATH LAB    CV RIGHT HEART CATH MEASUREMENTS RECORDED N/A 1/26/2024    Procedure: Right Heart Catheterization;  Surgeon: Elton Wright MD;  Location: [facility] CARDIAC CATH LAB    ESOPHAGOSCOPY, GASTROSCOPY, DUODENOSCOPY (EGD), COMBINED N/A 1/28/2021    Procedure: ESOPHAGOGASTRODUODENOSCOPY, WITH BIOPSY;  Surgeon: Mihir Lang MD;  Location: [facility] [place]    OPEN REDUCTION INTERNAL FIXATION FOOT Left 1/5/2023    Procedure: LISFRANC OPEN REDUCTION INTERNAL FIXATION, LEFT FOOT;  Surgeon: Bailey Ruiz DPM;  Location:  OR    SURGICAL HISTORY OF -       surgery on left index finger       Prior to Admission Medications   Prior to Admission Medications   Prescriptions Last Dose Informant Patient Reported? Taking?   Apple Wyatt Vn-Grn Tea-Bit Or-Cr (APPLE CIDER VINEGAR PLUS) TABS   Yes No   Sig: With calcium and  magnesium   Cholecalciferol (VITAMIN D PO)   Yes No   Sig: Take 5,000 Units by mouth daily One tablet twice daily   FLUoxetine (PROZAC) 40 MG capsule   No No   Sig: Take 1 capsule (40 mg) by mouth daily   MULTI VITAMIN MENS PO   Yes No   Sig: None Entered   aspirin 81 MG EC tablet   No No   Sig: Take 1 tablet (81 mg) by mouth daily Start tomorrow morning.   atorvastatin (LIPITOR) 40 MG tablet   No No   Sig: Take 1 tablet (40 mg) by mouth daily   busPIRone (BUSPAR) 10 MG tablet   No No   Sig: Take 2 tablets (20 mg) by mouth 2 times daily   co-enzyme Q-10 100 MG CAPS capsule   Yes No   Sig: Take 100 mg by mouth daily   hydrOXYzine HCl (ATARAX) 10 MG tablet   No No   Sig: TAKE 1 TABLET (10 MG) BY MOUTH 2 TIMES DAILY AS NEEDED FOR ANXIETY   lisinopril-hydrochlorothiazide (ZESTORETIC) 20-12.5 MG tablet   No No   Sig: Take 1 tablet by mouth daily   tadalafil (CIALIS) 20 MG tablet   No No   Sig: TAKE 1/2 TO 1 TABLET BY MOUTH 30 MINUTES TO 1 HOUR BEFORE SEX   ticagrelor (BRILINTA) 90 MG tablet   No No   Sig: Take 1 tablet (90 mg) by mouth 2 times daily Start this evening.   vitamin B complex with vitamin C (STRESS TAB) tablet   Yes No   Sig: Take 1 tablet by mouth daily      Facility-Administered Medications Last Administration Doses Remaining   lidocaine 1 % injection 2 mL 2023  1:56 PM    triamcinolone (KENALOG-40) injection 40 mg 2023  1:56 PM            Social History   I have reviewed this patient's social history and updated it with pertinent information if needed.  Social History     Tobacco Use    Smoking status: Former     Packs/day: 1.00     Years: 25.00     Additional pack years: 0.00     Total pack years: 25.00     Types: Cigarettes, Cigars     Start date: 1973     Quit date: 1998     Years since quittin.1     Passive exposure: Past    Smokeless tobacco: Never    Tobacco comments:     N/A not a smoker   Vaping Use    Vaping Use: Never used   Substance Use Topics    Alcohol use: Yes      Alcohol/week: 10.0 standard drinks of alcohol     Comment: less than 6 per week    Drug use: Not Currently     Types: Amphetamines     Comment: Kratum        Physical Exam   Vital Signs: Temp: 98.1  F (36.7  C) Temp src: Oral BP: 125/78 Pulse: 115   Resp: 18 SpO2: 94 % O2 Device: None (Room air)    Weight: 345 lbs 0 oz    Constitutional: awake, alert, cooperative, no apparent distress, and appears stated age  Respiratory: No increased work of breathing, good air exchange, clear to auscultation bilaterally, no crackles or wheezing  Cardiovascular: irregular heart rhythm, no murmur heard  GI: No scars, normal bowel sounds, soft, non-distended, non-tender  Musculoskeletal: Pitting edema 1+  Neurologic: Awake, alert, oriented to name, place and time    Medical Decision Making       Please see A&P for additional details of medical decision making.      Data   ------------------------- PAST 24 HR DATA REVIEWED -----------------------------------------------

## 2024-03-18 NOTE — DISCHARGE INSTRUCTIONS
Continue taking aspirin + ticagrelor for your stent until your follow up with cardiology next month. At that time, we will likely stop the aspirin.   Start Eliquis 5mg twice daily for blood clot prevention.

## 2024-03-18 NOTE — ED NOTES
Patient ambulatory for Discharged home with spouse, discharged instruction and prescription  reviewed and understood by patient and spouse. SAMIRA Mary RN

## 2024-03-18 NOTE — PHARMACY-ADMISSION MEDICATION HISTORY
Pharmacist Admission Medication History    Admission medication history is complete. The information provided in this note is only as accurate as the sources available at the time of the update.    Information Source(s): Patient via in-person    Pertinent Information: N/A    Changes made to PTA medication list:  Added: None  Deleted: None  Changed: None    Medication History Completed By: SHORTY CAMPBELL [facility] 3/17/2024 11:07 PM    PTA Med List   Medication Sig Last Dose    Apple Wyatt Vn-Grn Tea-Bit Or-Cr (APPLE CIDER VINEGAR PLUS) TABS Take 1 tablet by mouth daily With calcium and magnesium Past Week at 0800    aspirin 81 MG EC tablet Take 1 tablet (81 mg) by mouth daily Start tomorrow morning. 3/17/2024 at 0800    atorvastatin (LIPITOR) 40 MG tablet Take 1 tablet (40 mg) by mouth daily 3/16/2024 at 2000    busPIRone (BUSPAR) 10 MG tablet Take 2 tablets (20 mg) by mouth 2 times daily 3/17/2024 at 0800    Cholecalciferol (VITAMIN D PO) Take 5,000 Units by mouth daily One tablet twice daily 3/17/2024 at 0800    co-enzyme Q-10 100 MG CAPS capsule Take 100 mg by mouth daily 3/17/2024 at 0800    FLUoxetine (PROZAC) 40 MG capsule Take 1 capsule (40 mg) by mouth daily 3/17/2024 at 0800    hydrOXYzine HCl (ATARAX) 10 MG tablet TAKE 1 TABLET (10 MG) BY MOUTH 2 TIMES DAILY AS NEEDED FOR ANXIETY 3/16/2024 at 0800    lisinopril-hydrochlorothiazide (ZESTORETIC) 20-12.5 MG tablet Take 1 tablet by mouth daily 3/17/2024 at 0800    MULTI VITAMIN MENS PO Take 1 tablet by mouth daily 3/17/2024 at 0800    tadalafil (CIALIS) 20 MG tablet TAKE 1/2 TO 1 TABLET BY MOUTH 30 MINUTES TO 1 HOUR BEFORE SEX Past Month    ticagrelor (BRILINTA) 90 MG tablet Take 1 tablet (90 mg) by mouth 2 times daily Start this evening. 3/17/2024 at 0800    vitamin B complex with vitamin C (STRESS TAB) tablet Take 1 tablet by mouth daily 3/17/2024 at 0800

## 2024-03-18 NOTE — PROGRESS NOTES
[facility] Care Coordination Contact  CC received notification of Emergency Room visit.  ER visit occurred on 3/17/2024  at [facility] with Dx of Atrial Fib.    CC contacted member and left a message requesting a return call.  Member has a follow-up appointment with PCP: Yes: scheduled on 3/21/2024  Member has had a change in condition: No  New referrals placed: No  Home Visit Needed: No  Member's MA will term 3/21/2024. Member does not wish to stay on the program.   Care plan reviewed and updated.  PCP notified of ED visit via EMR.

## 2024-03-18 NOTE — TELEPHONE ENCOUNTER
"Was called by patient's wife today. She states that yesterday patient had an episode of \"the room turning black\", however denies syncope/LOC or hitting head. States he was feeling dizzy. Vitals measured at home show a HR range of  and are \"all over the place\". They have been taking his BP q15 minutes for the past hour and BP has ranged from 70/50 to 100/60. I advised that given his symptoms and vitals that he should present to the ED for evaluation.   "

## 2024-03-18 NOTE — PROGRESS NOTES
[address] [address]  p: [phone]    Discharge Summary: Cardiology Service    Car Torres MRN# [id number]   YOB: 1960 Age: 63 year old       Admission Date: 3/17/2024  Discharge Date: 03/18/24    Discharge Diagnoses:  # Atrial fibrillation with RVR, newly diagnosed  # Hx of sinus bradycardia  # CAD s/p PCI + APRIL x1 to pLAD (3/8/24)  # HLD  # HTN  # TOMAS vs CKD  # Mood disorder    Brief HPI:  Car Torres is a 63 year old year old male with PMH of r CAD s/p APRIL to proximal LAD 3/8/2024, aneurysmal segment involving the ostial D1, hypertension, hyperlipidemia, morbid obesity who presented with palpitations found to have newly diagnosed atrial fibrillation with RVR.     Hospital Course by Diagnosis:  # Atrial fibrillation with RVR, newly diagnosed  # Hx of sinus bradycardia  Per chart review, previous EKGs consistent with sinus bradycardia in 50s. Presented with palpitations atrial fibrillation with RVR in 110-120s.His blood pressure has been stable. No concerns for volume overloaded at this time. Attempt to obtain bedside TTE however with body habitus and in afib with RVR, unable to obtain a good view.   Received metoprolol 5mg IV x1 + metoprolol tartrate 12.5mg PO once.  Notified from RN at 0845 that patient had 2 second pause and self-converted to sinus bradycardia HR 40-50s, patient asymptomatic.   [id number] = 2 (age+, HTN+)  - AC: Start Eliquis 5mg BID (co-pay $4.60)  - Defer BB initiation given sinus bradycardia    # CAD s/p PCI + APRIL x1 to pLAD (3/8/24)  Recently underwent coronary angiogram 1/26/24 for worsening angina which revealed aneurysmal segment of diag involving LAD. Underwent staged PCI on 3/8/24 now s/p PCI of pLAD with shockwave lithotripsy and APRIL x1 (Synergy XD 3.0X 24 mm). Non-obstructive CAD noted in ostial circumflex and OM3.   - DAPT: given stent placement in LM, continue ASA 81mg daily +  ticagrelor 90mg BID x1 month, then stop ASA and continue ticagrelor monotherapy (and DOAC for AF).  - continue atorvastatin 40mg daily     # HLD  Fasting lipid panel collected in ED 3/8/24 with LDL 68 (goal <70).  - continue high dose statin    # HTN  BP soft while in ED /70s.  - hold PTA lisinopril-hydrochlorothiazide until follow up    # TOMAS vs CKD  Baseline Cr <1. Cr 1.28 (3/8) -> 1.4 (3/17). Good oral intake but taking advil regularly for shoulder pain.  - monitor Cr  - advise discontinue all NSAIDs pills  - BMP at follow up    # Mood disorder  - PTA fluoxetine  - PTA buspirone    Pertinent Procedures:  1. Echo    Consults:  Pharmacy liaison     Medication Changes:  See below     Discharge medications:   Current Discharge Medication List        START taking these medications    Details   apixaban ANTICOAGULANT (ELIQUIS ANTICOAGULANT) 5 MG tablet Take 1 tablet (5 mg) by mouth 2 times daily  Qty: 90 tablet, Refills: 6    Associated Diagnoses: Atrial fibrillation with rapid ventricular response (H)           CONTINUE these medications which have NOT CHANGED    Details   Apple Wyatt Vn-Grn Tea-Bit Or-Cr (APPLE CIDER VINEGAR PLUS) TABS Take 1 tablet by mouth daily With calcium and magnesium      aspirin 81 MG EC tablet Take 1 tablet (81 mg) by mouth daily Start tomorrow morning.  Qty: 90 tablet, Refills: 3    Associated Diagnoses: Coronary artery calcification      atorvastatin (LIPITOR) 40 MG tablet Take 1 tablet (40 mg) by mouth daily  Qty: 90 tablet, Refills: 4    Associated Diagnoses: Hyperlipidemia LDL goal <100      busPIRone (BUSPAR) 10 MG tablet Take 2 tablets (20 mg) by mouth 2 times daily  Qty: 120 tablet, Refills: 1    Associated Diagnoses: SHAHNAZ (generalized anxiety disorder)      Cholecalciferol (VITAMIN D PO) Take 5,000 Units by mouth daily One tablet twice daily      co-enzyme Q-10 100 MG CAPS capsule Take 100 mg by mouth daily      FLUoxetine (PROZAC) 40 MG capsule Take 1 capsule (40 mg) by mouth  daily  Qty: 90 capsule, Refills: 4    Associated Diagnoses: Major depressive disorder, recurrent episode, moderate (H)      hydrOXYzine HCl (ATARAX) 10 MG tablet TAKE 1 TABLET (10 MG) BY MOUTH 2 TIMES DAILY AS NEEDED FOR ANXIETY  Qty: 180 tablet, Refills: 0    Associated Diagnoses: SHAHNAZ (generalized anxiety disorder)      !! MULTI VITAMIN MENS PO Take 1 tablet by mouth daily      tadalafil (CIALIS) 20 MG tablet TAKE 1/2 TO 1 TABLET BY MOUTH 30 MINUTES TO 1 HOUR BEFORE SEX  Qty: 6 tablet, Refills: 5    Associated Diagnoses: Impotence of organic origin      ticagrelor (BRILINTA) 90 MG tablet Take 1 tablet (90 mg) by mouth 2 times daily Start this evening.  Qty: 180 tablet, Refills: 3    Associated Diagnoses: Anginal equivalent      !! vitamin B complex with vitamin C (STRESS TAB) tablet Take 1 tablet by mouth daily       !! - Potential duplicate medications found. Please discuss with provider.        STOP taking these medications       lisinopril-hydrochlorothiazide (ZESTORETIC) 20-12.5 MG tablet Comments:   Reason for Stopping:               Follow-up:  1 month with cardiology ELIJAH  7-10 days with PCP    Labs or imaging requiring follow-up after discharge:  BMP    Code status:  full    Condition on discharge  Temp:  [98  F (36.7  C)-98.1  F (36.7  C)] 98  F (36.7  C)  Pulse:  [] 115  Resp:  [18] 18  BP: ()/(67-91) 100/68  SpO2:  [94 %-98 %] 96 %  General: Alert, interactive, NAD  Eyes: sclera anicteric, EOMI  Neck: carotid 2+ bilaterally  Cardiovascular: regular rate and rhythm, normal S1 and S2, no murmurs, gallops, or rubs  Resp: clear to auscultation bilaterally, no rales, wheezes, or rhonchi  GI: Soft, nontender, nondistended. +BS.  No HSM or masses, no rebound or guarding.  Extremities: no edema, no cyanosis or clubbing, dorsalis pedis and posterior tibialis pulses 2+ bilaterally  Skin: Warm and dry, no jaundice or rash  Neuro: CN 2-12 intact, moves all extremities equally  Psych: Alert & oriented x  3    Imaging with results:  EKG 3/17/24: atrial fibrillation      EKG 3/18/24: sinus bradycardia      Coronary Angiogram 3/8/24:  Physicians    Panel Physicians Referring Physician Case Authorizing Physician   Elton Wright MD (Primary)  Elton Wright MD Rajput, Furqan A, MD (Fellow - Assisting)       Procedures    Panel 1    Primary Surgeon: Elton Wright MD   Procedure: Percutaneous Coronary Intervention    Intravascular Ultrasound    Percutaneous Coronary Intervention - Lithotripsy        Indications    Coronary artery disease involving native coronary artery of native heart, unspecified whether angina present [I25.10 (ICD-10-CM)]     Comments/Patient Narrative    Patient is a 63-year-old male with a medical history significant for hypertension, hyperlipidemia, morbid obesity who had a coronary angiogram done on 01/26/2024 and was found to have proximal LAD lesion along with aneurysmal segment involving the ostial D1.  He presented to the cardiac Cath Lab today for planned percutaneous coronary intervention of the LAD lesion.     Pre Procedure Diagnosis    worsening anginaheart failurestable known CAD       Conclusion         Ost Cx lesion is 20% stenosed.    Mid Cx to Dist Cx lesion is 30% stenosed.    3rd Mrg lesion is 20% stenosed.    Prox LAD lesion is 90% stenosed.     Successful planned percutaneous coronary intervention of the proximal LAD lesion with shockwave lithotripsy and 1 drug-eluting stent-Synergy XD 3.0X 24 mm.         Plan     Follow bedrest per protocol   Continued medical management and lifestyle modifications for cardiovascular risk factor optimizations.   Follow up visit with Nurse Practitioner in 1-2 weeks.   Arterial sheath removed from femoral artery with closure device.   Femoral angiogram identifies arterial sheath placement suitable for closure device.   Cardiac rehabilitation.   Discharge today per protocol        Post antiplatelet therapy of   Aspirin; give 81 mg  qd .      Ticagrelor; and 90 mg BID.   Continue high dose statin therapy     Recommendations    General Recommendations:  - Patient given specific instructions regarding care of arteriotomy site, activity restrictions, signs and symptoms of cardiac or vascular complications and to seek immediate medical evaluation should they occur.   - Follow up visit with Nurse Practitioner in 1-2 weeks.   - Arterial sheath removed from the femoral artery with closure device.   - Femoral angiogram identifies arterial sheath placement is suitable for closure device.   - Recommend cardiac rehabilitation.       Medications:  - Continue high dose statin therapy indefinitely.   - Risk factor management for atherosclerosis.     Coronary Findings    Diagnostic  Dominance: Right  Left Anterior Descending   Prox LAD lesion is 90% stenosed. Culprit lesion. The lesion is type B2 - medium risk and located at the major branch. The lesion was not previously treated.      Left Circumflex   Ost Cx lesion is 20% stenosed.   Mid Cx to Dist Cx lesion is 30% stenosed.      Third Obtuse Marginal Branch   3rd Mrg lesion is 20% stenosed.      Right Coronary Artery   The vessel was not injected.         Intervention     Prox LAD lesion   Stent   A CATH GUIDING BLUE YELLOW PTFE XB3.5 [id number] guide catheter was successfully placed. The GUIDEWIRE [employer] BLUE 190CM J TIP [id number] crossed the lesion. The pre-interventional distal flow is decreased (DENNIS 2). A STENT CORONARY APRIL SYNERGY XD MR US 3.91A63IR [id number] drug eluting stent was successfully placed. Pre-stent angioplasty was performed using a CATH BALLOON EMERGE 2.0X8MM [id number] supply. An additional CATH BALLOON EMERGE [id number] supply was used. An additional CATH BALLOON NC EMERGE 2.33M93DH [id number] supply was used. Maximum pressure:12 cristina. . The strut is apposed. No post-stent angioplasty was performed. The post-interventional distal flow is normal  (DENNIS 3). The intervention was successful. No complications occurred at this lesion. D1 was wired across the aneurysm using a whisper guidewire. Angioplasty of the ostial D1 and proximal LAD was done using emerge 2.0X 8 mm balloon. LAD was rewired using [place] blue guidewire. Additional angioplasty was performed with the balloons mentioned above. Shockwave lithotripsy was performed using 3.0 X12 millimeter shockwave C2 plus balloon,which was followed by deployment of drug-eluting stent. Intravascular ultrasound was performed at the end of the procedure.   There is a 0% residual stenosis post intervention.           Pressures Phase: Baseline       Time Systolic (mmHg) Diastolic (mmHg) Mean (mmHg) A Wave (mmHg) V Wave (mmHg) EDP (mmHg) Max dp/dt (mmHg/sec) HR (bpm) Content (mL/dL) SAT (%)   AO Pressures  1:05     61    76        49            KLAUS Quintero, CNP  [facility]  Patient discussed with staff cardiologist, Dr. Bunch, who agrees with the above documentation and plan. Documentation represents joint decision making.   Time Spent on this Encounter   RODNEY MANCIA APRN CNP, personally saw the patient today and spent greater than 30 minutes discharging this patient.

## 2024-03-19 SDOH — HEALTH STABILITY: PHYSICAL HEALTH: ON AVERAGE, HOW MANY MINUTES DO YOU ENGAGE IN EXERCISE AT THIS LEVEL?: 0 MIN

## 2024-03-19 SDOH — HEALTH STABILITY: PHYSICAL HEALTH: ON AVERAGE, HOW MANY DAYS PER WEEK DO YOU ENGAGE IN MODERATE TO STRENUOUS EXERCISE (LIKE A BRISK WALK)?: 0 DAYS

## 2024-03-19 ASSESSMENT — SOCIAL DETERMINANTS OF HEALTH (SDOH): HOW OFTEN DO YOU GET TOGETHER WITH FRIENDS OR RELATIVES?: ONCE A WEEK
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
## 2024-03-19 NOTE — COMMUNITY RESOURCES LIST (ENGLISH)
March 19, 2024           YOUR PERSONALIZED LIST OF SERVICES & PROGRAMS           NAVIGATION    Eligibility Screening      [facility] - [facility]  [address] [place] [address] (Distance: 9.7 miles)  Phone: [phone]  Email: [email or web address]  Website: https://www.Calendargod/individual-health  Language: Greek, English, Danish, Occitan, Australian, Bulgarian  Fee: Free  Accessibility: Ada accessible, Blind accommodation, Deaf or hard of hearing, Translation services      [facility] [place] - SNAP (formerly food stamps) Screening and Application help  Phone: [phone]  Website: https://www.PAS-Analytik.org/programs/[place]-food-helpline/  Language: English  Hours: Mon 10:00 AM - 5:00 PM Tue 10:00 AM - 5:00 PM Wed 10:00 AM - 5:00 PM Thu 10:00 AM - 5:00 PM Fri 10:00 AM - 5:00 PM  Fee: Free  Accessibility: Ada accessible, Blind accommodation, Deaf or hard of hearing, Translation services      [facility] - Therese Zhang  Phone: [phone]  Email: [email or web address]  Website: https://www.Calendargod/  Language: English  Hours: Tue 9:00 AM - 5:00 PM Wed 9:00 AM - 3:00 PM Thu 9:00 AM - 5:00 PM  Fee: Free  Accessibility: Ada accessible, Blind accommodation, Deaf or hard of hearing        ASSISTANCE    Nutrition Benefits      [facility]  Phone: [phone]  Website: https://www.PAS-Analytik.org/programs/market-Pet360/  Language: English  Hours: Mon 10:00 AM - 5:00 PM Tue 10:00 AM - 5:00 PM Wed 10:00 AM - 5:00 PM Thu 10:00 AM - 5:00 PM Fri 10:00 AM - 5:00 PM  Fee: Self pay      [employer] [place] - SNAP (formerly food stamps) Screening and Application help  Phone: [phone]  Website: https://www.PAS-Analytik.org/programs/[place]-food-helpline/  Language: English  Hours: Mon 10:00 AM - 5:00 PM Tue 10:00 AM - 5:00 PM Wed 10:00 AM - 5:00 PM Thu 10:00 AM - 5:00 PM Fri 10:00 AM - 5:00 PM  Fee:  Free  Accessibility: Ada accessible, Blind accommodation, Deaf or hard of hearing, Translation services      [facility] - [facility]  Phone: [phone]  Website: https://www.[facility]ocatehealth[facility].com  Language: English, Solomon Islander  Hours: Mon 8:00 AM - 5:00 PM Tue 8:00 AM - 5:00 PM Wed 8:00 AM - 5:00 PM Thu 8:00 AM - 5:00 PM Fri 8:00 AM - 5:00 PM  Fee: Free    Pantry      in the [place] - [facility] the [place] at [facility]  [address] [place] [address] (Distance: 19.9 miles)  Phone: [phone]  Website: https://www.CipherGraph Networks.org/our-programs/feeding-the-future/food-in-the-sumner/  Language: English  Fee: Free  Accessibility: Ada accessible      [facility] - Food pantry  [address] [place] [address] (Distance: 5.1 miles)  Phone: [phone]  Website: http://www.[employer].Refulgent Software/  Language: English  Fee: Free  Accessibility: Ada accessible      [facility] - [facility]  Phone: [phone]  Website: https://www.advocateforveAVA.ains.[employer]  Language: English, [place] Islander  Hours: Mon 8:00 AM - 5:00 PM Tue 8:00 AM - 5:00 PM Wed 8:00 AM - 5:00 PM Thu 8:00 AM - 5:00 PM Fri 8:00 AM - 5:00 PM  Fee: Free  Accessibility: Ada accessible        & RECREATION    Sports      [facility] [place] - Sports clubs and recreational activities  [address] [place] [address] (Distance: 3.5 miles)  Phone: [phone]  Website: https://www.SABIA.gov/363/[place]ils  Language: English  Fee: Self pay      [facility] - Adult Enrichment  Phone: [phone]) [phone]  Website: https://Organically Maid/adults-seniors/adult-enrichment/  Language: English  Hours: Mon 7:30 AM - 4:00 PM Tue 7:30 AM - 4:00 PM Wed 7:30 AM - 4:00 PM Thu 7:30 AM - 4:00 PM Fri 7:30 AM - 4:00 PM      LEAGUE - LITTLE LEAGUE BASEBALL AND SOFTBALL  Website: http://www.littleleague.org    Classes/Groups      [employer] [facility] - [facility]  and [facility]  [address] [place] [address] (Distance: 9.0 miles)  Phone: [phone]  Website: https://www.Rx Network/ADS.html  Language: English  Fee: Free  Accessibility: Ada accessible, Translation services  Transportation Options: Free transportation      [employer] [facility] [facility] - [facility]  [address] [place] [address] (Distance: 9.0 miles)  Phone: [phone]) [phone]  Website: https://www.Rx Network/TaiChi.html  Language: English  Fee: Free  Accessibility: Ada accessible, Translation services  Transportation Options: Free transportation      - Online and Local Fitness Classes  Phone: (267) [phone]  Website: https://"[employer]".Venafi/Search/OnlineClasses  Language: English  Fee: Free               IMPORTANT NUMBERS & WEBSITES        Emergency Services  911  .   [facility]  211 http://211unitedway.org  .   Poison Control  (290) 211-2284 http://[place]poison.org http://wisconsinpoison.org  .     Suicide and Crisis Lifeline  988 http://988lifeline.org  .   Childhelp National Child Abuse Hotline  597.850.6900 http://Childhelphotline.org   .   National Sexual Assault Hotline  (243) 527-6740 (HOPE) http://Hachi Labsn.org   .     National Runaway Safeline  (411) 830-3355 (RUNAWAY) http://BioPharma Manufacturing SolutionsruTelera.org  .   Pregnancy & Postpartum Support  Call/text 344-822-1250  [place]: http://ppsupportmn.org  [place]: http://Adstrix.com/wi  .   Substance Abuse National Helpline ([facility])  076-509-HELP (2445) http://Findtreatment.gov   .                DISCLAIMER: [employer] does not endorse any service providers mentioned in this resource list. [employer] does not guarantee that the services mentioned in this resource list will be available to you or will improve your health or wellness.    RUST

## 2024-03-20 ENCOUNTER — OFFICE VISIT (OUTPATIENT)
Dept: NEUROLOGY | Facility: CLINIC | Age: 64
End: 2024-03-20
Payer: MEDICARE

## 2024-03-20 ENCOUNTER — PATIENT OUTREACH (OUTPATIENT)
Dept: GERIATRIC MEDICINE | Facility: CLINIC | Age: 64
End: 2024-03-20
Payer: MEDICARE

## 2024-03-20 VITALS
WEIGHT: 315 LBS | HEART RATE: 61 BPM | TEMPERATURE: 97.2 F | SYSTOLIC BLOOD PRESSURE: 170 MMHG | DIASTOLIC BLOOD PRESSURE: 74 MMHG | BODY MASS INDEX: 42.66 KG/M2 | HEIGHT: 72 IN

## 2024-03-20 DIAGNOSIS — R46.89 COGNITIVE AND BEHAVIORAL CHANGES: Primary | ICD-10-CM

## 2024-03-20 DIAGNOSIS — R41.89 COGNITIVE AND BEHAVIORAL CHANGES: Primary | ICD-10-CM

## 2024-03-20 ASSESSMENT — PATIENT HEALTH QUESTIONNAIRE - PHQ9
10. IF YOU CHECKED OFF ANY PROBLEMS, HOW DIFFICULT HAVE THESE PROBLEMS MADE IT FOR YOU TO DO YOUR WORK, TAKE CARE OF THINGS AT HOME, OR GET ALONG WITH OTHER PEOPLE: NOT DIFFICULT AT ALL
SUM OF ALL RESPONSES TO PHQ QUESTIONS 1-9: 1
SUM OF ALL RESPONSES TO PHQ QUESTIONS 1-9: 1

## 2024-03-20 NOTE — PROGRESS NOTES
3/20/2024 Tc to member's emergency contact and listed contact number for the member regarding the ED visit on 3/17/2024. CC left a voicemail requesting a return call. Tran Scott RN,BSN  [facility] Case Coordinator   [phone]

## 2024-03-20 NOTE — UTILIZATION REVIEW
Admission Status; Secondary Review Determination    No action to be taken. Please contact me on my Email : [email or web address] if you have any questions.    As part of the [place] Utilization review plan, a self-audit is done on Medicare inpatient admission with less than 2 midnights stay. The 2014 IPPS Final Rule allows outpatient billing in the event that a hospital determines that an inpatient admission was not medically necessary under utilization review process.     (x) Outpatient status would be Appropriate- Short Stay- Post discharge review.    RATIONALE FOR DETERMINATION  Presented with palpitations atrial fibrillation with RVR in 110-120s.His blood pressure was stable. No concerns for volume overloaded.Received metoprolol 5mg IV x1 + metoprolol tartrate 12.5mg PO once.  self-converted to sinus bradycardia HR 40-50s, patient asymptomatic.   [id number] = 2 (age+, HTN+)  - AC: Start Eliquis 5mg BID     Patient was admitted and discharge after one night stay. Record was sent by UR for a PA review. Based on the  severity of illness, intensity of service provided, expected LOS and risk for adverse outcome make the care appropriate for further outpatient/observation; however, doesn't meet criteria for hospital inpatient admission.       The information on this document is developed by the utilization review team in order for the business office to ensure compliance.  This only denotes the appropriateness of proper admission status and does not reflect the quality of care rendered.       The definitions of Inpatient Status and Observation Status used in making the determination above are those provided in the CMS Coverage Manual, Chapter 1 and Chapter 6, section 70.4.     Please cont me if you want to discuss further about this admission episode.      Marjan Esteban MD, FACP, PAUL  Medical Director - Utilization management  Staff Hospitalist  [facility]    Pager: [phone]

## 2024-03-20 NOTE — PROGRESS NOTES
[facility] Care Coordination Contact    No longer active with [facility] community case management effective 3/31/2024.  Reason for community disenrollment: ANG Crooks.  Tran Scott RN,BSN  [facility] Case Coordinator   [phone]
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
## 2024-03-21 ENCOUNTER — OFFICE VISIT (OUTPATIENT)
Dept: FAMILY MEDICINE | Facility: CLINIC | Age: 64
End: 2024-03-21
Payer: MEDICARE

## 2024-03-21 VITALS
TEMPERATURE: 97.6 F | WEIGHT: 315 LBS | RESPIRATION RATE: 24 BRPM | HEIGHT: 71 IN | BODY MASS INDEX: 44.1 KG/M2 | DIASTOLIC BLOOD PRESSURE: 71 MMHG | HEART RATE: 60 BPM | SYSTOLIC BLOOD PRESSURE: 150 MMHG | OXYGEN SATURATION: 97 %

## 2024-03-21 DIAGNOSIS — E66.01 MORBID OBESITY (H): ICD-10-CM

## 2024-03-21 DIAGNOSIS — I48.91 ATRIAL FIBRILLATION WITH RAPID VENTRICULAR RESPONSE (H): ICD-10-CM

## 2024-03-21 DIAGNOSIS — Z13.6 SCREENING FOR AAA (ABDOMINAL AORTIC ANEURYSM): ICD-10-CM

## 2024-03-21 DIAGNOSIS — I25.10 ASCVD (ARTERIOSCLEROTIC CARDIOVASCULAR DISEASE): ICD-10-CM

## 2024-03-21 DIAGNOSIS — G47.33 OSA (OBSTRUCTIVE SLEEP APNEA): Chronic | ICD-10-CM

## 2024-03-21 DIAGNOSIS — I10 HYPERTENSION GOAL BP (BLOOD PRESSURE) < 140/90: Primary | Chronic | ICD-10-CM

## 2024-03-21 PROBLEM — L97.529 ULCER OF LEFT FOOT, UNSPECIFIED ULCER STAGE (H): Status: RESOLVED | Noted: 2022-01-03 | Resolved: 2024-03-21

## 2024-03-21 PROBLEM — F02.818 DEMENTIA ASSOCIATED WITH OTHER UNDERLYING DISEASE WITH BEHAVIORAL DISTURBANCE (H): Status: RESOLVED | Noted: 2022-01-03 | Resolved: 2024-03-21

## 2024-03-21 PROBLEM — F29 UNSPECIFIED PSYCHOSIS NOT DUE TO A SUBSTANCE OR KNOWN PHYSIOLOGICAL CONDITION (H): Status: RESOLVED | Noted: 2022-01-03 | Resolved: 2024-03-21

## 2024-03-21 PROCEDURE — G0402 INITIAL PREVENTIVE EXAM: HCPCS | Performed by: INTERNAL MEDICINE

## 2024-03-21 RX ORDER — LISINOPRIL 20 MG/1
20 TABLET ORAL DAILY
Qty: 90 TABLET | Refills: 3 | Status: SHIPPED | OUTPATIENT
Start: 2024-03-21

## 2024-03-21 ASSESSMENT — PAIN SCALES - GENERAL: PAINLEVEL: MILD PAIN (3)

## 2024-03-21 NOTE — PROGRESS NOTES
Preventive Care Visit  [facility] NORMA Perez MD, Internal Medicine - Pediatrics  Mar 21, 2024      Assessment & Plan   Problem List Items Addressed This Visit       Hypertension goal BP (blood pressure) < 140/90 - Primary (Chronic)    Relevant Medications    lisinopril (ZESTRIL) 20 MG tablet    Morbid obesity (H) (Chronic)    TIA (obstructive sleep apnea) (Chronic)    ASCVD (arteriosclerotic cardiovascular disease)    Atrial fibrillation with rapid ventricular response (H)     Other Visit Diagnoses       Screening for AAA (abdominal aortic aneurysm)        Relevant Orders    [place] AAA Screening                    MED REC REQUIRED  Post Medication Reconciliation Status: discharge medications reconciled, continue medications without change  Counseling  Appropriate preventive services were discussed with this patient, including applicable screening as appropriate for fall prevention, nutrition, physical activity, Tobacco-use cessation, weight loss and cognition.  Checklist reviewing preventive services available has been given to the patient.  Reviewed patient's diet, addressing concerns and/or questions.   Discussed possible causes of fatigue. Updated plan of care.  Patient reported difficulty with activities of daily living were addressed today.        Amber Edwards is a 63 year old, presenting for the following:  Medicare Visit        3/21/2024     1:17 PM   Additional Questions   Roomed by Sydnee   Accompanied by Wife        Health Care Directive  Patient has a Health Care Directive on file  Advance care planning document is on file and is current.    HPI  May have been in afib when stent went it   Recent neurology evaluation   Room went black   Blood pressure was down to 70    in span of 1 hour   In the morning came rushing into the room   Heart stopped 2 seconds   Reset  no problems   Brilinta - lisinopril   Hard to get the heart rate elevated   Cardiac rehab  coming up   Feeling better once it reset.    3rd round of neuropsy testing - walking back in time   No signs of neurodegenerative disease  Depression with creatum and ampetamines .     Then the psych meds at that time -   improved all 3 times                 3/19/2024   General Health   How would you rate your overall physical health? (!) FAIR   Feel stress (tense, anxious, or unable to sleep) Not at all         3/19/2024   Nutrition   Diet: Regular (no restrictions)         3/19/2024   Exercise   Days per week of moderate/strenous exercise 0 days   Average minutes spent exercising at this level 0 min   (!) EXERCISE CONCERN      3/19/2024   Social Factors   Frequency of gathering with friends or relatives Once a week   Worry food won't last until get money to buy more Yes   Food not last or not have enough money for food? No   Do you have housing?  Yes   Are you worried about losing your housing? No   Lack of transportation? No   Unable to get utilities (heat,electricity)? No   (!) FOOD SECURITY CONCERN PRESENT      3/21/2024   Fall Risk   Gait Speed Test (Document in seconds) 4.47          3/19/2024   Activities of Daily Living- Home Safety   Needs help with the following daily activites Telephone use    Medication administration    Money management   Safety concerns in the home None of the above         3/19/2024   Dental   Dentist two times every year? Yes         3/19/2024   Hearing Screening   Hearing concerns? None of the above         3/19/2024   Driving Risk Screening   Patient/family members have concerns about driving No         3/19/2024   General Alertness/Fatigue Screening   Have you been more tired than usual lately? (!) YES         3/19/2024   Urinary Incontinence Screening   Bothered by leaking urine in past 6 months No         3/19/2024   TB Screening   Were you born outside of the [place]? No       Today's PHQ-9 Score:       3/20/2024    11:53 PM   PHQ-9 SCORE   PHQ-9 Total Score MyChart 1 (Minimal  depression)   PHQ-9 Total Score 1         3/19/2024   Substance Use   Alcohol more than 3/day or more than 7/wk No   Do you have a current opioid prescription? No   How severe/bad is pain from 1 to 10? 5/10   Do you use any other substances recreationally? No     Social History     Tobacco Use    Smoking status: Former     Packs/day: 1.00     Years: 25.00     Additional pack years: 0.00     Total pack years: 25.00     Types: Cigarettes, Cigars     Start date: 1973     Quit date: 1998     Years since quittin.1     Passive exposure: Past    Smokeless tobacco: Never    Tobacco comments:     N/A not a smoker   Vaping Use    Vaping Use: Never used   Substance Use Topics    Alcohol use: Yes     Alcohol/week: 10.0 standard drinks of alcohol     Comment: less than 6 per week    Drug use: Not Currently     Types: Amphetamines     Comment: Kratum       Last PSA:   PSA   Date Value Ref Range Status   2019 0.36 0 - 4 ug/L Final     Comment:     Assay Method:  Chemiluminescence using Siemens Vista analyzer     Prostate Specific Antigen Screen   Date Value Ref Range Status   10/14/2021 0.37 0.00 - 4.00 ug/L Final     ASCVD Risk   The 10-year ASCVD risk score (Ronnie VAZ, et al., 2019) is: 12.8%    Values used to calculate the score:      Age: 63 years      Sex: Male      Is Non- : No      Diabetic: No      Tobacco smoker: No      Systolic Blood Pressure: 150 mmHg      Is BP treated: No      HDL Cholesterol: 53 mg/dL      Total Cholesterol: 145 mg/dL            Reviewed and updated as needed this visit by Provider                    Lab work is in process  Labs reviewed in EPIC  BP Readings from Last 3 Encounters:   24 (!) 150/71   24 (!) 170/74   24 105/60    Wt Readings from Last 3 Encounters:   24 (!) 158.3 kg (349 lb)   24 (!) 158.8 kg (350 lb)   24 (!) 156.5 kg (345 lb)                  Patient Active Problem List   Diagnosis    Sciatica     Morbid obesity (H)    Hyperlipidemia LDL goal <130    Hypertension goal BP (blood pressure) < 140/90    CKD (chronic kidney disease) stage 2, GFR 60-89 ml/min    Renal cyst    Diverticulosis of large intestine without hemorrhage    Pulmonary nodule    Coronary artery calcification    History of sinus bradycardia    ED (erectile dysfunction)    TIA (obstructive sleep apnea)    Major depressive disorder, recurrent episode, mild with atypical features (H)    Lisfranc dislocation, left, initial encounter    Lisfranc dislocation, left, subsequent encounter    Unstable angina (H)    Coronary artery disease    MCKNIGHT (dyspnea on exertion)    Status post coronary angiogram    Anginal equivalent    CAD (coronary artery disease)    ASCVD (arteriosclerotic cardiovascular disease)    Near syncope    Atrial fibrillation with rapid ventricular response (H)     Past Surgical History:   Procedure Laterality Date    COLONOSCOPY N/A 12/22/2020    Procedure: COLONOSCOPY, WITH POLYPECTOMY, CLIP;  Surgeon: Mihir Lang MD;  Location: [facility] OR    COLONOSCOPY N/A 1/10/2022    Procedure: COLONOSCOPY, WITH POLYPECTOMY AND BIOPSY;  Surgeon: Mihir Lang MD;  Location:  GI    CV CORONARY ANGIOGRAM N/A 1/26/2024    Procedure: Coronary Angiogram;  Surgeon: Elton Wright MD;  Location: [facility] CARDIAC CATH LAB    CV CORONARY LITHOTRIPSY PCI N/A 3/8/2024    Procedure: Percutaneous Coronary Intervention - Lithotripsy;  Surgeon: Elton Wright MD;  Location:  HEART CARDIAC CATH LAB    CV INSTANTANEOUS WAVE-FREE RATIO N/A 1/26/2024    Procedure: Instantaneous Wave-Free Ratio;  Surgeon: Elton Wright MD;  Location:  HEART CARDIAC CATH LAB    CV INTRAVASULAR ULTRASOUND N/A 3/8/2024    Procedure: Intravascular Ultrasound;  Surgeon: Elton Wright MD;  Location: [facility] CARDIAC CATH LAB    CV PCI N/A 3/8/2024    Procedure: Percutaneous Coronary Intervention;  Surgeon: Elton Wright MD;  Location: [facility] CARDIAC CATH  LAB    CV RIGHT HEART CATH MEASUREMENTS RECORDED N/A 2024    Procedure: Right Heart Catheterization;  Surgeon: Elton Wright MD;  Location:  HEART CARDIAC CATH LAB    ESOPHAGOSCOPY, GASTROSCOPY, DUODENOSCOPY (EGD), COMBINED N/A 2021    Procedure: ESOPHAGOGASTRODUODENOSCOPY, WITH BIOPSY;  Surgeon: Mihir Lang MD;  Location: [facility] OR    OPEN REDUCTION INTERNAL FIXATION FOOT Left 2023    Procedure: LISFRANC OPEN REDUCTION INTERNAL FIXATION, LEFT FOOT;  Surgeon: Bailey Ruiz DPM;  Location:  OR    SURGICAL HISTORY OF -       surgery on left index finger       Social History     Tobacco Use    Smoking status: Former     Packs/day: 1.00     Years: 25.00     Additional pack years: 0.00     Total pack years: 25.00     Types: Cigarettes, Cigars     Start date: 1973     Quit date: 1998     Years since quittin.1     Passive exposure: Past    Smokeless tobacco: Never    Tobacco comments:     N/A not a smoker   Substance Use Topics    Alcohol use: Yes     Alcohol/week: 10.0 standard drinks of alcohol     Comment: less than 6 per week     Family History   Problem Relation Age of Onset    Cancer Mother         uterine    Other Cancer Mother         endometrial    Cerebrovascular Disease Mother     Substance Abuse Mother         Alcohol    C.A.D. Father     Hypertension Father     Hyperlipidemia Father     Breast Cancer Maternal Grandmother     Other Cancer Maternal Grandmother         Lung/brain    Substance Abuse Paternal Grandfather     Hypertension Brother     Substance Abuse Brother         Alcohol    Hyperlipidemia Brother     Breast Cancer Other     Breast Cancer Cousin     Other Cancer Other     Cerebrovascular Disease Other         Maternal Aunt    Glaucoma No family hx of     Macular Degeneration No family hx of          Current Outpatient Medications   Medication Sig Dispense Refill    apixaban ANTICOAGULANT (ELIQUIS ANTICOAGULANT) 5 MG tablet Take 1 tablet (5 mg) by mouth 2  times daily 90 tablet 6    Apple Wyatt Vn-Grn Tea-Bit Or-Cr (APPLE CIDER VINEGAR PLUS) TABS Take 1 tablet by mouth daily With calcium and magnesium      aspirin 81 MG EC tablet Take 1 tablet (81 mg) by mouth daily Start tomorrow morning. 90 tablet 3    atorvastatin (LIPITOR) 40 MG tablet Take 1 tablet (40 mg) by mouth daily 90 tablet 4    busPIRone (BUSPAR) 10 MG tablet Take 2 tablets (20 mg) by mouth 2 times daily 120 tablet 1    Cholecalciferol (VITAMIN D PO) Take 5,000 Units by mouth daily One tablet twice daily      co-enzyme Q-10 100 MG CAPS capsule Take 100 mg by mouth daily      FLUoxetine (PROZAC) 40 MG capsule Take 1 capsule (40 mg) by mouth daily 90 capsule 4    hydrOXYzine HCl (ATARAX) 10 MG tablet TAKE 1 TABLET (10 MG) BY MOUTH 2 TIMES DAILY AS NEEDED FOR ANXIETY 180 tablet 0    lisinopril (ZESTRIL) 20 MG tablet Take 1 tablet (20 mg) by mouth daily 90 tablet 3    MULTI VITAMIN MENS PO Take 1 tablet by mouth daily      tadalafil (CIALIS) 20 MG tablet TAKE 1/2 TO 1 TABLET BY MOUTH 30 MINUTES TO 1 HOUR BEFORE SEX 6 tablet 5    ticagrelor (BRILINTA) 90 MG tablet Take 1 tablet (90 mg) by mouth 2 times daily Start this evening. 180 tablet 3    vitamin B complex with vitamin C (STRESS TAB) tablet Take 1 tablet by mouth daily       No Known Allergies  Current providers sharing in care for this patient include:  Patient Care Team:  Mihir Perez MD as PCP - General (Internal Medicine - Pediatrics)  Taniya Estrada NP as Assigned Behavioral Health Provider  Miguel Varela MD as Assigned Neuroscience Provider  Taniya Estrada NP as Nurse Practitioner (Psychiatry)  Miguel Varela MD as MD (Neurology)  Mihir Perez MD as Assigned PCP  Bailey Ruiz DPM as Assigned Surgical Provider  Beka Santillan DO as Assigned Musculoskeletal Provider  ZANA Fair MD as MD (Cardiovascular Disease)  ZANA Fair MD as Assigned Heart and Vascular Provider  Elton Wright MD  "as MD (Cardiovascular Disease)  Soila Millard APRN CNP as Nurse Practitioner (Cardiology)    The following health maintenance items are reviewed in Epic and correct as of today:  Health Maintenance   Topic Date Due    DEPRESSION ACTION PLAN  Never done    AORTIC ANEURYSM SCREENING (SYSTEM ASSIGNED)  05/20/2023    MEDICARE ANNUAL WELLNESS VISIT  12/05/2023    PHQ-9  09/21/2024    MICROALBUMIN  12/06/2024    ANNUAL REVIEW OF HM ORDERS  12/06/2024    BMP  03/18/2025    LIPID  03/18/2025    FALL RISK ASSESSMENT  03/21/2025    COLORECTAL CANCER SCREENING  01/10/2027    GLUCOSE  03/18/2027    ADVANCE CARE PLANNING  03/21/2029    DTAP/TDAP/TD IMMUNIZATION (3 - Td or Tdap) 10/22/2031    HEPATITIS C SCREENING  Completed    INFLUENZA VACCINE  Completed    Pneumococcal Vaccine: 65+ Years  Completed    URINALYSIS  Completed    ZOSTER IMMUNIZATION  Completed    RSV VACCINE (Pregnancy & 60+)  Completed    COVID-19 Vaccine  Completed    IPV IMMUNIZATION  Aged Out    HPV IMMUNIZATION  Aged Out    MENINGITIS IMMUNIZATION  Aged Out    RSV MONOCLONAL ANTIBODY  Aged Out    HIV SCREENING  Discontinued         Review of Systems  Constitutional, HEENT, cardiovascular, pulmonary, gi and gu systems are negative, except as otherwise noted.     Objective    Exam  BP (!) 150/71 (BP Location: Right arm, Patient Position: Sitting, Cuff Size: Adult Large)   Pulse 60   Temp 97.6  F (36.4  C) (Oral)   Resp 24   Ht 1.791 m (5' 10.5\")   Wt (!) 158.3 kg (349 lb)   SpO2 97%   BMI 49.37 kg/m     Estimated body mass index is 49.37 kg/m  as calculated from the following:    Height as of this encounter: 1.791 m (5' 10.5\").    Weight as of this encounter: 158.3 kg (349 lb).    Physical Exam  GENERAL: alert and no distress  EYES: Eyes grossly normal to inspection, PERRL and conjunctivae and sclerae normal  HENT: ear canals and TM's normal, nose and mouth without ulcers or lesions  NECK: no adenopathy, no asymmetry, masses, or scars  RESP: lungs clear " to auscultation - no rales, rhonchi or wheezes  CV: regular rate and rhythm, normal S1 S2, no S3 or S4, no murmur, click or rub, no peripheral edema  ABDOMEN: soft, nontender, no hepatosplenomegaly, no masses and bowel sounds normal  MS: no gross musculoskeletal defects noted, no edema  SKIN: no suspicious lesions or rashes  NEURO: Normal strength and tone, mentation intact and speech normal  BACK: no CVA tenderness, no paralumbar tenderness  PSYCH: mentation appears normal, affect normal/bright  LYMPH: no cervical, supraclavicular, axillary, or inguinal adenopathy        3/21/2024   Mini Cog   Clock Draw Score 2 Normal   3 Item Recall 2 objects recalled   Mini Cog Total Score 4         Vision Screen  Vision Screen Results: Pass      Signed Electronically by: Mihir Perez MD    Answers submitted by the patient for this visit:  Patient Health Questionnaire (Submitted on 3/20/2024)  If you checked off any problems, how difficult have these problems made it for you to do your work, take care of things at home, or get along with other people?: Not difficult at all  PHQ9 TOTAL SCORE: 1

## 2024-03-22 ENCOUNTER — HOSPITAL ENCOUNTER (INPATIENT)
Facility: CLINIC | Age: 64
LOS: 1 days | Discharge: HOME | DRG: 309 | End: 2024-03-23
Attending: EMERGENCY MEDICINE | Admitting: STUDENT IN AN ORGANIZED HEALTH CARE EDUCATION/TRAINING PROGRAM
Payer: MEDICARE

## 2024-03-22 ENCOUNTER — TELEPHONE (OUTPATIENT)
Dept: CARDIOLOGY | Facility: CLINIC | Age: 64
End: 2024-03-22
Payer: MEDICARE

## 2024-03-22 DIAGNOSIS — I48.91 ATRIAL FIBRILLATION WITH RAPID VENTRICULAR RESPONSE (H): ICD-10-CM

## 2024-03-22 LAB
ALBUMIN SERPL BCG-MCNC: 4.3 G/DL (ref 3.5–5.2)
ALP SERPL-CCNC: 79 U/L (ref 40–150)
ALT SERPL W P-5'-P-CCNC: 18 U/L (ref 0–70)
ANION GAP SERPL CALCULATED.3IONS-SCNC: 11 MMOL/L (ref 7–15)
ANION GAP SERPL CALCULATED.3IONS-SCNC: 12 MMOL/L (ref 7–15)
AST SERPL W P-5'-P-CCNC: 19 U/L (ref 0–45)
ATRIAL RATE - MUSE: 115 BPM
ATRIAL RATE - MUSE: 116 BPM
ATRIAL RATE - MUSE: 125 BPM
ATRIAL RATE - MUSE: 131 BPM
ATRIAL RATE - MUSE: 288 BPM
ATRIAL RATE - MUSE: 57 BPM
BASOPHILS # BLD AUTO: 0 10E3/UL (ref 0–0.2)
BASOPHILS NFR BLD AUTO: 1 %
BILIRUB DIRECT SERPL-MCNC: <0.2 MG/DL (ref 0–0.3)
BILIRUB SERPL-MCNC: 0.3 MG/DL
BUN SERPL-MCNC: 21.1 MG/DL (ref 8–23)
BUN SERPL-MCNC: 24.3 MG/DL (ref 8–23)
CALCIUM SERPL-MCNC: 8.8 MG/DL (ref 8.8–10.2)
CALCIUM SERPL-MCNC: 9.5 MG/DL (ref 8.8–10.2)
CHLORIDE SERPL-SCNC: 104 MMOL/L (ref 98–107)
CHLORIDE SERPL-SCNC: 106 MMOL/L (ref 98–107)
CREAT SERPL-MCNC: 1 MG/DL (ref 0.67–1.17)
CREAT SERPL-MCNC: 1.1 MG/DL (ref 0.67–1.17)
DEPRECATED HCO3 PLAS-SCNC: 22 MMOL/L (ref 22–29)
DEPRECATED HCO3 PLAS-SCNC: 24 MMOL/L (ref 22–29)
DIASTOLIC BLOOD PRESSURE - MUSE: NORMAL MMHG
EGFRCR SERPLBLD CKD-EPI 2021: 74 ML/MIN/1.73M2
EGFRCR SERPLBLD CKD-EPI 2021: 84 ML/MIN/1.73M2
EOSINOPHIL # BLD AUTO: 0.3 10E3/UL (ref 0–0.7)
EOSINOPHIL NFR BLD AUTO: 5 %
ERYTHROCYTE [DISTWIDTH] IN BLOOD BY AUTOMATED COUNT: 13 % (ref 10–15)
GLUCOSE SERPL-MCNC: 100 MG/DL (ref 70–99)
GLUCOSE SERPL-MCNC: 110 MG/DL (ref 70–99)
HCT VFR BLD AUTO: 42.3 % (ref 40–53)
HGB BLD-MCNC: 14.5 G/DL (ref 13.3–17.7)
HOLD SPECIMEN: NORMAL
HOLD SPECIMEN: NORMAL
IMM GRANULOCYTES # BLD: 0 10E3/UL
IMM GRANULOCYTES NFR BLD: 0 %
INR PPP: 0.99 (ref 0.85–1.15)
INTERPRETATION ECG - MUSE: NORMAL
LYMPHOCYTES # BLD AUTO: 2 10E3/UL (ref 0.8–5.3)
LYMPHOCYTES NFR BLD AUTO: 30 %
MAGNESIUM SERPL-MCNC: 2.4 MG/DL (ref 1.7–2.3)
MCH RBC QN AUTO: 31 PG (ref 26.5–33)
MCHC RBC AUTO-ENTMCNC: 34.3 G/DL (ref 31.5–36.5)
MCV RBC AUTO: 90 FL (ref 78–100)
MONOCYTES # BLD AUTO: 0.6 10E3/UL (ref 0–1.3)
MONOCYTES NFR BLD AUTO: 8 %
NEUTROPHILS # BLD AUTO: 3.8 10E3/UL (ref 1.6–8.3)
NEUTROPHILS NFR BLD AUTO: 56 %
NRBC # BLD AUTO: 0 10E3/UL
NRBC BLD AUTO-RTO: 0 /100
P AXIS - MUSE: NORMAL DEGREES
PLATELET # BLD AUTO: 196 10E3/UL (ref 150–450)
POTASSIUM SERPL-SCNC: 4.2 MMOL/L (ref 3.4–5.3)
POTASSIUM SERPL-SCNC: 4.4 MMOL/L (ref 3.4–5.3)
PR INTERVAL - MUSE: 170 MS
PR INTERVAL - MUSE: NORMAL MS
PROT SERPL-MCNC: 6.6 G/DL (ref 6.4–8.3)
QRS DURATION - MUSE: 108 MS
QRS DURATION - MUSE: 112 MS
QRS DURATION - MUSE: 112 MS
QRS DURATION - MUSE: 114 MS
QRS DURATION - MUSE: 116 MS
QRS DURATION - MUSE: 118 MS
QT - MUSE: 338 MS
QT - MUSE: 342 MS
QT - MUSE: 346 MS
QT - MUSE: 364 MS
QT - MUSE: 366 MS
QT - MUSE: 450 MS
QTC - MUSE: 438 MS
QTC - MUSE: 467 MS
QTC - MUSE: 479 MS
QTC - MUSE: 480 MS
QTC - MUSE: 487 MS
QTC - MUSE: 506 MS
R AXIS - MUSE: -24 DEGREES
R AXIS - MUSE: -30 DEGREES
R AXIS - MUSE: 13 DEGREES
R AXIS - MUSE: 157 DEGREES
R AXIS - MUSE: 22 DEGREES
R AXIS - MUSE: 38 DEGREES
RBC # BLD AUTO: 4.68 10E6/UL (ref 4.4–5.9)
SODIUM SERPL-SCNC: 139 MMOL/L (ref 135–145)
SODIUM SERPL-SCNC: 140 MMOL/L (ref 135–145)
SYSTOLIC BLOOD PRESSURE - MUSE: NORMAL MMHG
T AXIS - MUSE: -30 DEGREES
T AXIS - MUSE: 11 DEGREES
T AXIS - MUSE: 13 DEGREES
T AXIS - MUSE: 139 DEGREES
T AXIS - MUSE: 21 DEGREES
T AXIS - MUSE: 29 DEGREES
TROPONIN T SERPL HS-MCNC: 19 NG/L
TSH SERPL DL<=0.005 MIU/L-ACNC: 3.98 UIU/ML (ref 0.3–4.2)
VENTRICULAR RATE- MUSE: 115 BPM
VENTRICULAR RATE- MUSE: 116 BPM
VENTRICULAR RATE- MUSE: 118 BPM
VENTRICULAR RATE- MUSE: 125 BPM
VENTRICULAR RATE- MUSE: 57 BPM
VENTRICULAR RATE- MUSE: 99 BPM
WBC # BLD AUTO: 6.8 10E3/UL (ref 4–11)

## 2024-03-22 PROCEDURE — 250N000013 HC RX MED GY IP 250 OP 250 PS 637

## 2024-03-22 PROCEDURE — 5A2204Z RESTORATION OF CARDIAC RHYTHM, SINGLE: ICD-10-PCS | Performed by: EMERGENCY MEDICINE

## 2024-03-22 PROCEDURE — 85025 COMPLETE CBC W/AUTO DIFF WBC: CPT | Performed by: EMERGENCY MEDICINE

## 2024-03-22 PROCEDURE — 99291 CRITICAL CARE FIRST HOUR: CPT | Mod: 25 | Performed by: EMERGENCY MEDICINE

## 2024-03-22 PROCEDURE — 99233 SBSQ HOSP IP/OBS HIGH 50: CPT | Mod: 25

## 2024-03-22 PROCEDURE — 93005 ELECTROCARDIOGRAM TRACING: CPT | Mod: 76 | Performed by: EMERGENCY MEDICINE

## 2024-03-22 PROCEDURE — 85610 PROTHROMBIN TIME: CPT

## 2024-03-22 PROCEDURE — 84484 ASSAY OF TROPONIN QUANT: CPT | Performed by: EMERGENCY MEDICINE

## 2024-03-22 PROCEDURE — 99222 1ST HOSP IP/OBS MODERATE 55: CPT | Mod: 25 | Performed by: INTERNAL MEDICINE

## 2024-03-22 PROCEDURE — 250N000011 HC RX IP 250 OP 636: Performed by: EMERGENCY MEDICINE

## 2024-03-22 PROCEDURE — 80048 BASIC METABOLIC PNL TOTAL CA: CPT | Performed by: EMERGENCY MEDICINE

## 2024-03-22 PROCEDURE — 96375 TX/PRO/DX INJ NEW DRUG ADDON: CPT | Performed by: EMERGENCY MEDICINE

## 2024-03-22 PROCEDURE — 83735 ASSAY OF MAGNESIUM: CPT

## 2024-03-22 PROCEDURE — 36415 COLL VENOUS BLD VENIPUNCTURE: CPT | Performed by: EMERGENCY MEDICINE

## 2024-03-22 PROCEDURE — 84443 ASSAY THYROID STIM HORMONE: CPT

## 2024-03-22 PROCEDURE — 93010 ELECTROCARDIOGRAM REPORT: CPT | Mod: 76 | Performed by: EMERGENCY MEDICINE

## 2024-03-22 PROCEDURE — 93010 ELECTROCARDIOGRAM REPORT: CPT | Performed by: EMERGENCY MEDICINE

## 2024-03-22 PROCEDURE — 93005 ELECTROCARDIOGRAM TRACING: CPT | Performed by: EMERGENCY MEDICINE

## 2024-03-22 PROCEDURE — 250N000009 HC RX 250

## 2024-03-22 PROCEDURE — 80053 COMPREHEN METABOLIC PANEL: CPT | Performed by: EMERGENCY MEDICINE

## 2024-03-22 PROCEDURE — 96365 THER/PROPH/DIAG IV INF INIT: CPT | Performed by: EMERGENCY MEDICINE

## 2024-03-22 PROCEDURE — 99207 PR NO CHARGE LOS: CPT

## 2024-03-22 PROCEDURE — 82248 BILIRUBIN DIRECT: CPT

## 2024-03-22 PROCEDURE — 99285 EMERGENCY DEPT VISIT HI MDM: CPT | Mod: 25 | Performed by: EMERGENCY MEDICINE

## 2024-03-22 PROCEDURE — 36415 COLL VENOUS BLD VENIPUNCTURE: CPT

## 2024-03-22 PROCEDURE — 120N000002 HC R&B MED SURG/OB UMMC

## 2024-03-22 RX ORDER — FLUOXETINE 40 MG/1
40 CAPSULE ORAL DAILY
Status: DISCONTINUED | OUTPATIENT
Start: 2024-03-22 | End: 2024-03-23 | Stop reason: HOSPADM

## 2024-03-22 RX ORDER — NITROGLYCERIN 0.4 MG/1
0.4 TABLET SUBLINGUAL EVERY 5 MIN PRN
Status: DISCONTINUED | OUTPATIENT
Start: 2024-03-22 | End: 2024-03-23 | Stop reason: HOSPADM

## 2024-03-22 RX ORDER — MAGNESIUM HYDROXIDE/ALUMINUM HYDROXICE/SIMETHICONE 120; 1200; 1200 MG/30ML; MG/30ML; MG/30ML
30 SUSPENSION ORAL EVERY 4 HOURS PRN
Status: DISCONTINUED | OUTPATIENT
Start: 2024-03-22 | End: 2024-03-23 | Stop reason: HOSPADM

## 2024-03-22 RX ORDER — MAGNESIUM SULFATE HEPTAHYDRATE 40 MG/ML
2 INJECTION, SOLUTION INTRAVENOUS ONCE
Status: COMPLETED | OUTPATIENT
Start: 2024-03-22 | End: 2024-03-22

## 2024-03-22 RX ORDER — DOFETILIDE 0.5 MG/1
500 CAPSULE ORAL EVERY 12 HOURS SCHEDULED
Status: DISCONTINUED | OUTPATIENT
Start: 2024-03-22 | End: 2024-03-22

## 2024-03-22 RX ORDER — ATORVASTATIN CALCIUM 40 MG/1
40 TABLET, FILM COATED ORAL DAILY
Status: DISCONTINUED | OUTPATIENT
Start: 2024-03-22 | End: 2024-03-23 | Stop reason: HOSPADM

## 2024-03-22 RX ORDER — ACETAMINOPHEN 650 MG/1
650 SUPPOSITORY RECTAL EVERY 4 HOURS PRN
Status: DISCONTINUED | OUTPATIENT
Start: 2024-03-22 | End: 2024-03-23 | Stop reason: HOSPADM

## 2024-03-22 RX ORDER — DILTIAZEM HYDROCHLORIDE 5 MG/ML
20 INJECTION INTRAVENOUS ONCE
Status: COMPLETED | OUTPATIENT
Start: 2024-03-22 | End: 2024-03-22

## 2024-03-22 RX ORDER — DOFETILIDE 0.25 MG/1
250 CAPSULE ORAL EVERY 12 HOURS SCHEDULED
Status: DISCONTINUED | OUTPATIENT
Start: 2024-03-22 | End: 2024-03-23

## 2024-03-22 RX ORDER — ACETAMINOPHEN 325 MG/1
650 TABLET ORAL EVERY 4 HOURS PRN
Status: DISCONTINUED | OUTPATIENT
Start: 2024-03-22 | End: 2024-03-23 | Stop reason: HOSPADM

## 2024-03-22 RX ORDER — AMOXICILLIN 250 MG
2 CAPSULE ORAL 2 TIMES DAILY PRN
Status: DISCONTINUED | OUTPATIENT
Start: 2024-03-22 | End: 2024-03-23 | Stop reason: HOSPADM

## 2024-03-22 RX ORDER — BUSPIRONE HYDROCHLORIDE 10 MG/1
20 TABLET ORAL 2 TIMES DAILY
Status: DISCONTINUED | OUTPATIENT
Start: 2024-03-22 | End: 2024-03-23 | Stop reason: HOSPADM

## 2024-03-22 RX ORDER — ASPIRIN 81 MG/1
81 TABLET ORAL DAILY
Status: DISCONTINUED | OUTPATIENT
Start: 2024-03-22 | End: 2024-03-23 | Stop reason: HOSPADM

## 2024-03-22 RX ORDER — LIDOCAINE 40 MG/G
CREAM TOPICAL
Status: DISCONTINUED | OUTPATIENT
Start: 2024-03-22 | End: 2024-03-23 | Stop reason: HOSPADM

## 2024-03-22 RX ORDER — ETOMIDATE 2 MG/ML
INJECTION INTRAVENOUS
Status: DISCONTINUED
Start: 2024-03-22 | End: 2024-03-22 | Stop reason: HOSPADM

## 2024-03-22 RX ORDER — AMOXICILLIN 250 MG
1 CAPSULE ORAL 2 TIMES DAILY PRN
Status: DISCONTINUED | OUTPATIENT
Start: 2024-03-22 | End: 2024-03-23 | Stop reason: HOSPADM

## 2024-03-22 RX ORDER — LISINOPRIL 5 MG/1
20 TABLET ORAL DAILY
Status: DISCONTINUED | OUTPATIENT
Start: 2024-03-22 | End: 2024-03-23 | Stop reason: HOSPADM

## 2024-03-22 RX ORDER — HYDROXYZINE HYDROCHLORIDE 10 MG/1
10 TABLET, FILM COATED ORAL 2 TIMES DAILY PRN
Status: DISCONTINUED | OUTPATIENT
Start: 2024-03-22 | End: 2024-03-23 | Stop reason: HOSPADM

## 2024-03-22 RX ADMIN — FLUOXETINE HYDROCHLORIDE 40 MG: 40 CAPSULE ORAL at 10:04

## 2024-03-22 RX ADMIN — APIXABAN 5 MG: 5 TABLET, FILM COATED ORAL at 08:21

## 2024-03-22 RX ADMIN — APIXABAN 5 MG: 5 TABLET, FILM COATED ORAL at 20:50

## 2024-03-22 RX ADMIN — ASPIRIN 81 MG: 81 TABLET ORAL at 08:20

## 2024-03-22 RX ADMIN — Medication 125 MCG: at 10:04

## 2024-03-22 RX ADMIN — ATORVASTATIN CALCIUM 40 MG: 40 TABLET, FILM COATED ORAL at 08:20

## 2024-03-22 RX ADMIN — DOFETILIDE 500 MCG: 0.5 CAPSULE ORAL at 13:24

## 2024-03-22 RX ADMIN — BUSPIRONE HYDROCHLORIDE 20 MG: 10 TABLET ORAL at 10:04

## 2024-03-22 RX ADMIN — BUSPIRONE HYDROCHLORIDE 20 MG: 10 TABLET ORAL at 20:51

## 2024-03-22 RX ADMIN — DILTIAZEM HYDROCHLORIDE 20 MG: 5 INJECTION INTRAVENOUS at 03:48

## 2024-03-22 RX ADMIN — TICAGRELOR 90 MG: 90 TABLET ORAL at 20:50

## 2024-03-22 RX ADMIN — MAGNESIUM SULFATE HEPTAHYDRATE 2 G: 2 INJECTION, SOLUTION INTRAVENOUS at 03:49

## 2024-03-22 RX ADMIN — ETOMIDATE: 2 INJECTION, SOLUTION INTRAVENOUS at 03:33

## 2024-03-22 RX ADMIN — TICAGRELOR 90 MG: 90 TABLET ORAL at 08:20

## 2024-03-22 ASSESSMENT — COLUMBIA-SUICIDE SEVERITY RATING SCALE - C-SSRS
2. HAVE YOU ACTUALLY HAD ANY THOUGHTS OF KILLING YOURSELF IN THE PAST MONTH?: NO
6. HAVE YOU EVER DONE ANYTHING, STARTED TO DO ANYTHING, OR PREPARED TO DO ANYTHING TO END YOUR LIFE?: NO
1. IN THE PAST MONTH, HAVE YOU WISHED YOU WERE DEAD OR WISHED YOU COULD GO TO SLEEP AND NOT WAKE UP?: NO

## 2024-03-22 ASSESSMENT — ACTIVITIES OF DAILY LIVING (ADL)
ADLS_ACUITY_SCORE: 36

## 2024-03-22 NOTE — PROGRESS NOTES
Brief EP Note   Car Torres is a 63 year old with past medical history significant for CAD s/p APRIL to proximal LAD 3/8/2024, aneurysmal segment involving the ostial D1, hypertension, hyperlipidemia, morbid obesity and atrial fibrillation newly diagnosed earlier this month.    Recommendations:   He was started on Dofetilide 500 mg bid today for recurrent AF, first dose given with mild prolonging of QT to ~480 on ECG. Recommend dose decrease to 250 mg twice daily. Continue telemetry and ECG 1-2 hours after doses.       Anita Donovan PA-C  [facility]  Electrophysiology Consult Service  Pager: [phone]

## 2024-03-22 NOTE — ED PROVIDER NOTES
ED Provider Note  March 22, 2024  [facility]      History     Chief Complaint: Irregular Heart Beat      HPI  Car Torres is a 63 year old year old male with PMH of r CAD s/p APRIL to proximal LAD 3/8/2024, aneurysmal segment involving the ostial D1, hypertension, hyperlipidemia, morbid obesity who presented with palpitations found to have atrial fibrillation with RVR. Was seen for similar during recent hosp and discharged following spont conversion.   Pt has been taking eliquis as well as all his other regular medications.    Onset of palpitations occurred late this evening and were well-recognized by the patient.  No associated lightheadedness or weakness.    Prior to this had been feeling well.  No hypotensive episodes at home as patient had his blood pressure measured.    At baseline has a heart rate in the 50s    Currently denies chest pain no nausea vomiting or abdominal pain.    Echo from 3/17/2024 technically limited due to difficult acoustic windows  EF 60-65, no gross RV dysfunction      Past Medical History  Past Medical History:   Diagnosis Date    Dementia (H)     Depressive disorder     Heart disease 5/8/2018    Hypercholesterolemia     Hypertension goal BP (blood pressure) < 140/90     Nonspecific abnormal electrocardiogram (ECG) (EKG) 08/23/2007    Stress testing normal.     Sleep apnea     uses a c-pap, severe    Status post coronary angiogram 1/26/2024     Past Surgical History:   Procedure Laterality Date    COLONOSCOPY N/A 12/22/2020    Procedure: COLONOSCOPY, WITH POLYPECTOMY, CLIP;  Surgeon: Mihir Lang MD;  Location: [facility] OR    COLONOSCOPY N/A 1/10/2022    Procedure: COLONOSCOPY, WITH POLYPECTOMY AND BIOPSY;  Surgeon: Mihir Lang MD;  Location:  GI    CV CORONARY ANGIOGRAM N/A 1/26/2024    Procedure: Coronary Angiogram;  Surgeon: Elton Wright MD;  Location:  HEART CARDIAC CATH LAB    CV CORONARY LITHOTRIPSY PCI N/A 3/8/2024    Procedure:  Percutaneous Coronary Intervention - Lithotripsy;  Surgeon: Elton Wright MD;  Location:  HEART CARDIAC CATH LAB    CV INSTANTANEOUS WAVE-FREE RATIO N/A 1/26/2024    Procedure: Instantaneous Wave-Free Ratio;  Surgeon: Elton Wright MD;  Location:  HEART CARDIAC CATH LAB    CV INTRAVASULAR ULTRASOUND N/A 3/8/2024    Procedure: Intravascular Ultrasound;  Surgeon: Elton Wright MD;  Location: [facility] CARDIAC CATH LAB    CV PCI N/A 3/8/2024    Procedure: Percutaneous Coronary Intervention;  Surgeon: Elton Wright MD;  Location: [facility] CARDIAC CATH LAB    CV RIGHT HEART CATH MEASUREMENTS RECORDED N/A 1/26/2024    Procedure: Right Heart Catheterization;  Surgeon: Elton Wright MD;  Location: [facility] CARDIAC CATH LAB    ESOPHAGOSCOPY, GASTROSCOPY, DUODENOSCOPY (EGD), COMBINED N/A 1/28/2021    Procedure: ESOPHAGOGASTRODUODENOSCOPY, WITH BIOPSY;  Surgeon: Mihir Lang MD;  Location: [facility] OR    OPEN REDUCTION INTERNAL FIXATION FOOT Left 1/5/2023    Procedure: LISFRANC OPEN REDUCTION INTERNAL FIXATION, LEFT FOOT;  Surgeon: Bailey Ruiz DPM;  Location: [facility] OR    SURGICAL HISTORY OF -       surgery on left index finger     apixaban ANTICOAGULANT (ELIQUIS ANTICOAGULANT) 5 MG tablet  Apple Wyatt Vn-Grn Tea-Bit Or-Cr (APPLE CIDER VINEGAR PLUS) TABS  aspirin 81 MG EC tablet  atorvastatin (LIPITOR) 40 MG tablet  busPIRone (BUSPAR) 10 MG tablet  Cholecalciferol (VITAMIN D PO)  co-enzyme Q-10 100 MG CAPS capsule  FLUoxetine (PROZAC) 40 MG capsule  hydrOXYzine HCl (ATARAX) 10 MG tablet  lisinopril (ZESTRIL) 20 MG tablet  MULTI VITAMIN MENS PO  tadalafil (CIALIS) 20 MG tablet  ticagrelor (BRILINTA) 90 MG tablet  vitamin B complex with vitamin C (STRESS TAB) tablet      No Known Allergies  Family History  Family History   Problem Relation Age of Onset    Cancer Mother         uterine    Other Cancer Mother         endometrial    Cerebrovascular Disease Mother     Substance Abuse Mother          "Alcohol    C.A.D. Father     Hypertension Father     Hyperlipidemia Father     Breast Cancer Maternal Grandmother     Other Cancer Maternal Grandmother         Lung/brain    Substance Abuse Paternal Grandfather     Hypertension Brother     Substance Abuse Brother         Alcohol    Hyperlipidemia Brother     Breast Cancer Other     Breast Cancer Cousin     Other Cancer Other     Cerebrovascular Disease Other         Maternal Aunt    Glaucoma No family hx of     Macular Degeneration No family hx of      Social History   Social History     Tobacco Use    Smoking status: Former     Packs/day: 1.00     Years: 25.00     Additional pack years: 0.00     Total pack years: 25.00     Types: Cigarettes, Cigars     Start date: 1973     Quit date: 1998     Years since quittin.1     Passive exposure: Past    Smokeless tobacco: Never    Tobacco comments:     N/A not a smoker   Vaping Use    Vaping Use: Never used   Substance Use Topics    Alcohol use: Yes     Alcohol/week: 10.0 standard drinks of alcohol     Comment: less than 6 per week    Drug use: Not Currently     Types: Amphetamines     Comment: Kratum        Past medical history, past surgical history, medications, allergies, family history, and social history were reviewed with the patient. No additional pertinent items.      A medically appropriate review of systems was performed with pertinent positives and negatives noted in the HPI, and all other systems negative.    Physical Exam   /74   Pulse 77   Temp 97.7  F (36.5  C)   Resp 20   Ht 1.778 m (5' 10\")   Wt (!) 158.3 kg (349 lb)   SpO2 98%   BMI 50.08 kg/m      GEN: Well appearing, non toxic, cooperative and conversant.   HEENT: The head is normocephalic and atraumatic. Pupils are equal round and reactive to light. Extraocular motions are intact. There is no facial swelling. The neck is nontender and supple.   CV: R irregularly irregular tachycardia. 2+ radial pulses bilaterally.  PULM: Clear " to auscultation bilaterally.  ABD: Soft, nontender, nondistended.  Obese  EXT: Full range of motion.  No edema.  NEURO: Cranial nerves II through XII are intact and symmetric. Bilateral upper and lower extremities grossly show full range of motion without any focal deficits.   PSYCH: Calm and cooperative, interactive.       ED Course, Procedures, & Data      [employer]    -Cardioversion External    Date/Time: 3/22/2024 4:30 AM    Performed by: Benito Page MD  Authorized by: Benito Page MD    Risks, benefits and alternatives discussed.      PRE-PROCEDURE DETAILS:     Cardioversion basis:  Emergent    Rhythm:  Atrial fibrillation  Attempt one:     Cardioversion mode:  Synchronous    Waveform:  Biphasic    Shock (Joules):  200    Shock outcome:  No change in rhythm  Attempt two:     Cardioversion mode:  Synchronous    Waveform:  Biphasic    Shock (Joules):  200    Shock outcome:  No change in rhythm  Post-procedure details:     Patient status:  Awake      PROCEDURE    Patient Tolerance:  Patient tolerated the procedure well with no immediate complications                   EKG at 0153.    palpitations at time of ECG.  ECG interpretted by me within 10 minutes of production  AFIB 125 bpm. Normal axis.  QRS prolonged to 116, similar to prior ECG from March 18, 2024 when patient was in normal sinus rhythm at 49 bpm.  . No ST-T elevations or depressions. Pathologic T-wave inversion are absent     Interpretation: A-fib RVR     Results for orders placed or performed during the hospital encounter of 03/22/24   Basic metabolic panel     Status: Abnormal   Result Value Ref Range    Sodium 139 135 - 145 mmol/L    Potassium 4.4 3.4 - 5.3 mmol/L    Chloride 104 98 - 107 mmol/L    Carbon Dioxide (CO2) 24 22 - 29 mmol/L    Anion Gap 11 7 - 15 mmol/L    Urea Nitrogen 24.3 (H) 8.0 - 23.0 mg/dL    Creatinine 1.10 0.67 - 1.17 mg/dL    GFR Estimate 74 >60 mL/min/1.73m2     Calcium 9.5 8.8 - 10.2 mg/dL    Glucose 100 (H) 70 - 99 mg/dL   [facility] Draw     Status: None    Narrative    The following orders were created for panel order [facility].  Procedure                               Abnormality         Status                     ---------                               -----------         ------                     Extra Blue Top Tube[[id number]]                              Final result               Extra Red Top Tube[[id number]]                               Final result                 Please view results for these tests on the individual orders.   CBC with platelets and differential     Status: None   Result Value Ref Range    WBC Count 6.8 4.0 - 11.0 10e3/uL    RBC Count 4.68 4.40 - 5.90 10e6/uL    Hemoglobin 14.5 13.3 - 17.7 g/dL    Hematocrit 42.3 40.0 - 53.0 %    MCV 90 78 - 100 fL    MCH 31.0 26.5 - 33.0 pg    MCHC 34.3 31.5 - 36.5 g/dL    RDW 13.0 10.0 - 15.0 %    Platelet Count 196 150 - 450 10e3/uL    % Neutrophils 56 %    % Lymphocytes 30 %    % Monocytes 8 %    % Eosinophils 5 %    % Basophils 1 %    % Immature Granulocytes 0 %    NRBCs per 100 WBC 0 <1 /100    Absolute Neutrophils 3.8 1.6 - 8.3 10e3/uL    Absolute Lymphocytes 2.0 0.8 - 5.3 10e3/uL    Absolute Monocytes 0.6 0.0 - 1.3 10e3/uL    Absolute Eosinophils 0.3 0.0 - 0.7 10e3/uL    Absolute Basophils 0.0 0.0 - 0.2 10e3/uL    Absolute Immature Granulocytes 0.0 <=0.4 10e3/uL    Absolute NRBCs 0.0 10e3/uL   Extra Blue Top Tube     Status: None   Result Value Ref Range    Hold Specimen JIC    Extra Red Top Tube     Status: None   Result Value Ref Range    Hold Specimen JIC    Troponin T, High Sensitivity     Status: Normal   Result Value Ref Range    Troponin T, High Sensitivity 19 <=22 ng/L   EKG 12-lead, tracing only     Status: None (Preliminary result)   Result Value Ref Range    Systolic Blood Pressure  mmHg    Diastolic Blood Pressure  mmHg    Ventricular Rate 125 BPM    Atrial Rate 288 BPM    WI Interval  ms     QRS Duration 116 ms     ms    QTc 487 ms    P Axis  degrees    R AXIS 22 degrees    T Axis 21 degrees    Interpretation ECG       Atrial fibrillation with rapid ventricular response with premature ventricular or aberrantly conducted complexes  Low voltage QRS  Abnormal ECG     EKG 12-lead, tracing only     Status: None (Preliminary result)   Result Value Ref Range    Systolic Blood Pressure  mmHg    Diastolic Blood Pressure  mmHg    Ventricular Rate 118 BPM    Atrial Rate 125 BPM    CA Interval  ms    QRS Duration 114 ms     ms    QTc 479 ms    P Axis  degrees    R AXIS 38 degrees    T Axis 11 degrees    Interpretation ECG       Atrial fibrillation with rapid ventricular response  Low voltage QRS  RSR' or QR pattern in V1 suggests right ventricular conduction delay  Inferior infarct , age undetermined  Abnormal ECG     CBC with platelets differential     Status: None    Narrative    The following orders were created for panel order CBC with platelets differential.  Procedure                               Abnormality         Status                     ---------                               -----------         ------                     CBC with platelets and d...[[id number]]                      Final result                 Please view results for these tests on the individual orders.     Medications   magnesium sulfate 2 g in 50 mL sterile water intermittent infusion (0 g Intravenous Stopped 3/22/24 6198)   diltiazem (CARDIZEM) injection 20 mg (20 mg Intravenous $Given 3/22/24 1946)     Labs Ordered and Resulted from Time of ED Arrival to Time of ED Departure   BASIC METABOLIC PANEL - Abnormal       Result Value    Sodium 139      Potassium 4.4      Chloride 104      Carbon Dioxide (CO2) 24      Anion Gap 11      Urea Nitrogen 24.3 (*)     Creatinine 1.10      GFR Estimate 74      Calcium 9.5      Glucose 100 (*)    TROPONIN T, HIGH SENSITIVITY - Normal    Troponin T, High Sensitivity 19     CBC WITH  PLATELETS AND DIFFERENTIAL    WBC Count 6.8      RBC Count 4.68      Hemoglobin 14.5      Hematocrit 42.3      MCV 90      MCH 31.0      MCHC 34.3      RDW 13.0      Platelet Count 196      % Neutrophils 56      % Lymphocytes 30      % Monocytes 8      % Eosinophils 5      % Basophils 1      % Immature Granulocytes 0      NRBCs per 100 WBC 0      Absolute Neutrophils 3.8      Absolute Lymphocytes 2.0      Absolute Monocytes 0.6      Absolute Eosinophils 0.3      Absolute Basophils 0.0      Absolute Immature Granulocytes 0.0      Absolute NRBCs 0.0       -Cardioversion External    (Results Pending)            Medical Decision Making Matrix    Critical Care MDM:   My initial assessment, based on my review of any available prehospital provider reports review of nursing observations, review of vital signs, focused history, physical exam and chart review, established a high suspicion that this patient has AF RVR in setting of recent stent placement which requires immediate intervention, and therefore she is critically ill.     After the initial assessment, the care team initiated multiple lab tests, initiated IV fluid administration, initiated medication therapy with Etomidate IV, consulted with Cardiology, and performed electrical cardioversion  to provide stabilization care.   Due to the critical nature of this patient, I reassessed nursing observations, vital signs, physical exam, review of cardiac rhythm monitor, interpretation of all ordered and performed imaging, mental status, neurologic status, and respiratory status multiple times prior to her disposition.     Time also spent performing documentation, discussion with family to obtain medical information for decision making, reviewing test results, discussion with consultants, coordination of care, and and admission to hospital  .     Critical care time (excluding teaching time and procedures): 45 minutes.       Labs reviewed and unrevealing with normal  potassium, troponin 19      Assessment & Plan    63-year-old male history of CAD status post stent to the LAD, presenting with recurrence of atrial fibrillation with rapid ventricular response status post failed electrical cardioversion.    Patient presented hemodynamically stable, but with concerning escalating tachycardia.  He was symptomatic in the past although currently appears to be well.  Is at somewhat increased risk for deterioration due to recent procedure although no stenosis is suspected at this time.  Patient did have anginal equivalents in the past with shortness of breath and less so chest pain.  For this reason the patient was placed into the stabilization room, consented and electrical cardioversion was attempted at 200 J x 2 with pad repositioning although unfortunately normal sinus rhythm was not established.    Subsequently given magnesium 2 mg IV x 1 and diltiazem 20 mg IV x 1 with good rate control  Discussed and admitted to cardiology for further management.    I have reviewed the nursing notes. I have reviewed the findings, diagnosis, plan and need for follow up with the patient.    New Prescriptions    No medications on file       Final diagnoses:   Atrial fibrillation with rapid ventricular response (H)       Benito Page MD  [facility] EMERGENCY DEPARTMENT  March 22, 2024       Benito Page MD  03/22/24 [id number]

## 2024-03-22 NOTE — TELEPHONE ENCOUNTER
Was called by patient's wife regarding palpitations and tachycardia. -140's for 2 hours. SBP's stable 110-130's. Recently admitted with afib-RVR and hypotension earlier this week. Afib converted to sinus bradycardia so was discharged without rate control medications and lisinopril-hydrochlorothiazide was d/c'ed. Was seen by PCP who represcribed lisinopril without hydrochlorothiazide. Attempted valsalva maneuver x2, broke to sinus ofelia, and then within 1 minute returned to HR of 110-140. Advised patient to return to the ED for evaluation.

## 2024-03-22 NOTE — MEDICATION SCRIBE - ADMISSION MEDICATION HISTORY
Medication Scribe Admission Medication History    Admission medication history is complete. The information provided in this note is only as accurate as the sources available at the time of the update.    Information Source(s): Patient and CareEverywhere/SureScripts via in-person    Pertinent Information: None    Changes made to PTA medication list:  Added: None  Deleted: tadalafil 20 mg (not taking, doesn't have).  Changed: None    Allergies reviewed with patient and updates made in EHR: yes    Medication History Completed By: Deepika Delarosa 3/22/2024 10:56 AM    PTA Med List   Medication Sig Note Last Dose    apixaban ANTICOAGULANT (ELIQUIS ANTICOAGULANT) 5 MG tablet Take 1 tablet (5 mg) by mouth 2 times daily  3/21/2024 at PM    Apple Wyatt Vn-Grn Tea-Bit Or-Cr (APPLE CIDER VINEGAR PLUS) TABS Take 1 tablet by mouth daily With calcium and magnesium  3/20/2024 at AM    aspirin 81 MG EC tablet Take 1 tablet (81 mg) by mouth daily Start tomorrow morning.  Past Week at Few days ago    atorvastatin (LIPITOR) 40 MG tablet Take 1 tablet (40 mg) by mouth daily  3/21/2024 at AM    busPIRone (BUSPAR) 10 MG tablet Take 2 tablets (20 mg) by mouth 2 times daily  3/21/2024 at AM    Cholecalciferol (VITAMIN D PO) Take 5,000 Units by mouth daily One tablet twice daily 3/22/2024: Taking once daily Past Week at Few days ago    co-enzyme Q-10 100 MG CAPS capsule Take 100 mg by mouth daily  3/21/2024 at AM    FLUoxetine (PROZAC) 40 MG capsule Take 1 capsule (40 mg) by mouth daily  3/21/2024 at AM    hydrOXYzine HCl (ATARAX) 10 MG tablet TAKE 1 TABLET (10 MG) BY MOUTH 2 TIMES DAILY AS NEEDED FOR ANXIETY  3/21/2024 at AM    lisinopril (ZESTRIL) 20 MG tablet Take 1 tablet (20 mg) by mouth daily  3/20/2024 at PM    MULTI VITAMIN MENS PO Take 1 tablet by mouth daily  3/21/2024 at AM    ticagrelor (BRILINTA) 90 MG tablet Take 1 tablet (90 mg) by mouth 2 times daily Start this evening.  3/21/2024 at AM    vitamin B complex with vitamin C  (STRESS TAB) tablet Take 1 tablet by mouth daily  3/21/2024 at AM

## 2024-03-22 NOTE — CONSULTS
Electrophysiology Consultation Note   EP Attending: Dr. BRO Kerns.   Reason for consultation: recurrent A fib, sinus bradycardia.   Provider requesting consultation: Dr Jewell  Date of Service: 3/22/2024      HPI:   Car Torres is a 63 year old with past medical history significant for CAD s/p APRIL to proximal LAD 3/8/2024, aneurysmal segment involving the ostial D1, hypertension, hyperlipidemia, morbid obesity and newly diagnosed atrial fibrillation.   Patient recently presented to the ER 3/17 with lightheadedness and fluttering. Noted his HR was in the 100s (generally 40-50 bpm), prompting ER visit. ECG in the ER demonstrates atrial fibrillation 117 bpm, this was a new diagnosis for him. He was not hypervolemic. Echo done 3/17, technically limited study but LVEF grossly normal at 60-65%. He was given IV metoprolol and admitted for possible cardioversion the next day, spontaneously converted overnight . He was discharged 3/18 on a DOAC given chadsvasc 3 (+age, +htn, +cad). BB initiation deferred due to resting bradycardia.   Today, patient presented to the ER overnight with similar symptoms of palpitations and increased HR. ECG upon arrival 125 bpm. Cardioversion attempted x 2 in the ER but was unsuccessful. He has been given 1x diltiazem 20mg IV with improvement in rates to 110-110 bpm. EP consulted for recommendations given failed cardioversion and baseline sinus bradycardia.   Past Medical History:   Past Medical History:   Diagnosis Date    Dementia (H)     Depressive disorder     Heart disease 5/8/2018    Hypercholesterolemia     Hypertension goal BP (blood pressure) < 140/90     Nonspecific abnormal electrocardiogram (ECG) (EKG) 08/23/2007    Stress testing normal.     Sleep apnea     uses a c-pap, severe    Status post coronary angiogram 1/26/2024     Past Surgical History:   Past Surgical History:   Procedure Laterality Date    COLONOSCOPY N/A 12/22/2020    Procedure: COLONOSCOPY, WITH POLYPECTOMY,  CLIP;  Surgeon: Mihir Lang MD;  [place] [facility] [place]    COLONOSCOPY N/A 1/10/2022    Procedure: COLONOSCOPY, WITH POLYPECTOMY AND BIOPSY;  Surgeon: Mihir Lang MD;  Location:  GI    CV CORONARY ANGIOGRAM N/A 1/26/2024    Procedure: Coronary Angiogram;  Surgeon: Elton Wright MD;  Location:  HEART CARDIAC CATH LAB    CV CORONARY LITHOTRIPSY PCI N/A 3/8/2024    Procedure: Percutaneous Coronary Intervention - Lithotripsy;  Surgeon: Elton Wright MD;  Location: [facility] CARDIAC CATH LAB    CV INSTANTANEOUS WAVE-FREE RATIO N/A 1/26/2024    Procedure: Instantaneous Wave-Free Ratio;  Surgeon: Elton Wright MD;  Location: [facility] CARDIAC CATH LAB    CV INTRAVASULAR ULTRASOUND N/A 3/8/2024    Procedure: Intravascular Ultrasound;  Surgeon: Elton Wright MD;  Location: [facility] CARDIAC CATH LAB    CV PCI N/A 3/8/2024    Procedure: Percutaneous Coronary Intervention;  Surgeon: Elton Wright MD;  Location: [facility] CARDIAC CATH LAB    CV RIGHT HEART CATH MEASUREMENTS RECORDED N/A 1/26/2024    Procedure: Right Heart Catheterization;  Surgeon: Elton Wright MD;  Location: [facility] CARDIAC CATH LAB    ESOPHAGOSCOPY, GASTROSCOPY, DUODENOSCOPY (EGD), COMBINED N/A 1/28/2021    Procedure: ESOPHAGOGASTRODUODENOSCOPY, WITH BIOPSY;  Surgeon: Mihir Lang MD;  [place] [facility] [place]    OPEN REDUCTION INTERNAL FIXATION FOOT Left 1/5/2023    Procedure: LISFRANC OPEN REDUCTION INTERNAL FIXATION, LEFT FOOT;  Surgeon: Bailey Ruiz DPM;  Location:  OR    SURGICAL HISTORY OF -       surgery on left index finger     Allergies: Per MAR   No Known Allergies  Medications:   Per MAR current outpatient cardiovascular medications include: (Not in a hospital admission)    Current Outpatient Medications   Medication Sig Dispense Refill    apixaban ANTICOAGULANT (ELIQUIS ANTICOAGULANT) 5 MG tablet Take 1 tablet (5 mg) by mouth 2 times daily 90 tablet 6    Apple Wyatt Vn-Grn Tea-Bit Or-Cr (APPLE CIDER  VINEGAR PLUS) TABS Take 1 tablet by mouth daily With calcium and magnesium      aspirin 81 MG EC tablet Take 1 tablet (81 mg) by mouth daily Start tomorrow morning. 90 tablet 3    atorvastatin (LIPITOR) 40 MG tablet Take 1 tablet (40 mg) by mouth daily 90 tablet 4    busPIRone (BUSPAR) 10 MG tablet Take 2 tablets (20 mg) by mouth 2 times daily 120 tablet 1    Cholecalciferol (VITAMIN D PO) Take 5,000 Units by mouth daily One tablet twice daily      co-enzyme Q-10 100 MG CAPS capsule Take 100 mg by mouth daily      FLUoxetine (PROZAC) 40 MG capsule Take 1 capsule (40 mg) by mouth daily 90 capsule 4    hydrOXYzine HCl (ATARAX) 10 MG tablet TAKE 1 TABLET (10 MG) BY MOUTH 2 TIMES DAILY AS NEEDED FOR ANXIETY 180 tablet 0    lisinopril (ZESTRIL) 20 MG tablet Take 1 tablet (20 mg) by mouth daily 90 tablet 3    MULTI VITAMIN MENS PO Take 1 tablet by mouth daily      tadalafil (CIALIS) 20 MG tablet TAKE 1/2 TO 1 TABLET BY MOUTH 30 MINUTES TO 1 HOUR BEFORE SEX 6 tablet 5    ticagrelor (BRILINTA) 90 MG tablet Take 1 tablet (90 mg) by mouth 2 times daily Start this evening. 180 tablet 3    vitamin B complex with vitamin C (STRESS TAB) tablet Take 1 tablet by mouth daily       Current Facility-Administered Medications   Medication Dose Route Frequency    apixaban ANTICOAGULANT  5 mg Oral BID    [Held by provider] aspirin  81 mg Oral Daily    atorvastatin  40 mg Oral Daily    busPIRone  20 mg Oral BID    cholecalciferol  125 mcg Oral Daily    FLUoxetine  40 mg Oral Daily    [Held by provider] lisinopril  20 mg Oral Daily    sodium chloride (PF)  3 mL Intracatheter Q8H    ticagrelor  90 mg Oral BID     Family History:   Family History   Problem Relation Age of Onset    Cancer Mother         uterine    Other Cancer Mother         endometrial    Cerebrovascular Disease Mother     Substance Abuse Mother         Alcohol    C.A.D. Father     Hypertension Father     Hyperlipidemia Father     Breast Cancer Maternal Grandmother     Other  "Cancer Maternal Grandmother         Lung/brain    Substance Abuse Paternal Grandfather     Hypertension Brother     Substance Abuse Brother         Alcohol    Hyperlipidemia Brother     Breast Cancer Other     Breast Cancer Cousin     Other Cancer Other     Cerebrovascular Disease Other         Maternal Aunt    Glaucoma No family hx of     Macular Degeneration No family hx of      Social History:   Social History     Tobacco Use    Smoking status: Former     Packs/day: 1.00     Years: 25.00     Additional pack years: 0.00     Total pack years: 25.00     Types: Cigarettes, Cigars     Start date: 1973     Quit date: 1998     Years since quittin.1     Passive exposure: Past    Smokeless tobacco: Never    Tobacco comments:     N/A not a smoker   Substance Use Topics    Alcohol use: Yes     Alcohol/week: 10.0 standard drinks of alcohol     Comment: less than 6 per week       ROS:   A comprehensive 10 point ROS was negative other than as mentioned in HPI.    Physical Examination:   VITALS: /84   Pulse 105   Temp 97.8  F (36.6  C) (Oral)   Resp 18   Ht 1.778 m (5' 10\")   Wt (!) 158.3 kg (349 lb)   SpO2 95%   BMI 50.08 kg/m      GENERAL APPEARANCE: AxO, NAD   HEENT: NCAT, MMM.   CHEST: CTAB   CARDIOVASCULAR: S1S2, Irregular rhythm, regular rate   ABDOMEN: soft, nontender, nondistended   EXTREMITIES: No pedal edema. Distal pulses intact.   NEURO: Grossly nonfocal.   PSYCH: Normal affect.  SKIN: Warm and dry.   Data:   Labs:  BMP  Recent Labs   Lab 24  0545 24  0211 24  0653 24    139 140 137   POTASSIUM 4.2 4.4 4.2 4.1   CHLORIDE 106 104 105 100   SHAHID 8.8 9.5 9.1 9.1   CO2  26   BUN 21.1 24.3* 22.0 24.3*   CR 1.00 1.10 1.17 1.41*   * 100* 94 92     CBC  Recent Labs   Lab 24   WBC 6.8 7.6  7.6   RBC 4.68 4.67  4.67   HGB 14.5 13.9  13.9   HCT 42.3 42.5  42.5   MCV 90 91  91   MCH 31.0 29.8  29.8   MCHC 34.3 32.7  " "32.7   RDW 13.0 13.1  13.1    211  211     INR  Recent Labs   Lab 03/22/24  0211 03/18/24  0653   INR 0.99 1.07     No results found for: \"CKTOTAL\", \"CKMB\", \"TROPN\"  Cholesterol (mg/dL)   Date Value   03/18/2024 145   12/06/2023 155   12/05/2022 146   10/14/2021 156   04/23/2021 161   07/09/2019 166   12/20/2017 142   10/14/2016 163     Cholesterol/HDL Ratio (no units)   Date Value   07/13/2015 3.1   07/21/2014 2.4   06/28/2013 2.8   04/19/2012 2.6     HDL Cholesterol (mg/dL)   Date Value   04/23/2021 79   07/09/2019 76   12/20/2017 52   10/14/2016 60     Direct Measure HDL (mg/dL)   Date Value   03/18/2024 53   12/06/2023 54   12/05/2022 54   10/14/2021 59     LDL Cholesterol Calculated (mg/dL)   Date Value   03/18/2024 68   12/06/2023 78   12/05/2022 60   10/14/2021 69   04/23/2021 65   07/09/2019 77   12/20/2017 62   10/14/2016 84     EKG:    TODAY 3/22/24: A fib 125 bpm   3/18/24: sinus 49 bpm Qtc 402 ms    3/17/24: A fib 114 bpm, Qtc 515 ms   3/8/24: sinus 52 bpm Qtc 450 ms   1/26/24: sinus 50 bpm, Qtc 423 ms   10/19/15: sinus 56 bpm, Qtc 416 ms     TODAY: 3/22/24      ECHO: 3/18/2024   Interpretation Summary  Technically difficult study.Extremely difficult acoustic windows despite the  use of contrast for endcardial border definition.  Global and regional left ventricular function is normal with an EF of 60-65%.  Right ventricular function, chamber size, wall motion, and thickness are  normal.  The inferior vena cava is normal.  No pericardial effusion is present.    Assessment:   Car Torres is a 63 year old with past medical history significant for CAD s/p APRIL to proximal LAD 3/8/2024, aneurysmal segment involving the ostial D1, hypertension, hyperlipidemia, morbid obesity and newly diagnosed atrial fibrillation. Patient recently presented to the ER 3/17 with lightheadedness and fluttering. Noted his HR was in the 100s (generally 40-50 bpm), prompting ER visit. ECG in the ER demonstrates " atrial fibrillation 117 bpm, this was a new diagnosis for him. He was not hypervolemic. Echo done 3/17, technically limited study but LVEF grossly normal at 60-65%. He was given IV metoprolol and admitted for possible cardioversion the next day, spontaneously converted overnight . He was discharged 3/18 on a DOAC given chadsvasc 2 (+age, +htn). BB initiation deferred due to resting bradycardia.   Today, patient presented to the ER overnight with similar symptoms of palpitations and increased HR. ECG upon arrival 125 bpm. Cardioversion attempted x 2 in the ER but was unsuccessful. He has been given 1x diltiazem 20mg IV with improvement in rates to 110-110 bpm. EP consulted for recommendations given failed cardioversion and baseline sinus bradycardia.   EP Recommendations:  Atrial Fibrillation, paroxysmal vs persistent   We discussed in detail with the patient management/treatment options for A.fib includin. Stroke Prophylaxis: CHADSVASC=+age, +htn, +CAD 3, corresponding to a 3.2% annual stroke / systemic emolism event rate. indicating need for long term oral anticoagulation.  Continue eliquis 5 mg bid (64 yo, 349 lbs, cr 1.00).   2. Rate Control: Defer CCB/BB d/t resting bradycardia.   3. Rhythm Control: Cardioversion, Antiarrhythmics and/or ablation are options for rhythm control. We discussed given early recurrence of A fib, and unsuccessful cardioversion, we would recommend initiation of an AAD at this time. Echo done during prior admission while in A fib with preserved LVEF, his kidney function is stable. Sinus rates on prior ECGs 48-52 bpm. Would recommend admission for dofetilide initiation to minimize suppression of sinus rates. Recommend starting dofetilide 500 mg twice daily, Qtc intervals on ECG today stable ~450 ms. Can pursue cardioversion prior to discharge if he remains in A fib. We'll arrange EP follow up 3 months after discharge.   QTc interval should be measured 2-3 hours after each dose for 5  monitored doses. If the QTc is >15% of baseline, or if the QTc is >500 msec (550 msec in patients with ventricular conduction abnormalities), dofetilide should be reduced by half or discontinued- call EP in this case.       The patient states understanding and is agreeable with plan.   Thank you for allowing us to participate in the care of this patient.     The patient was discussed w/ Dr. BRO Kerns.  The above note reflects our joint plan.    Anita Donovan PA-C  [facility]  Electrophysiology Consult Service  Pager: [phone]

## 2024-03-22 NOTE — SEDATION DOCUMENTATION
10mg etomidate given at 0333  Shock 200J at 0334  0.5mg etomidate given at 0338  3mg etomidate given at 0339  5mg etomidate given at 0341  Shock 200J at 0342

## 2024-03-22 NOTE — PROGRESS NOTES
[facility]   Cardiology   Progress Note     ASSESSMENT/PLAN:  Car Torres is a 63 year old male admitted on 3/22/2024 for recurrent afib with RVR. PMH is notable for unspecified psychosis, depression, hypertension, hyperlipidemia, coronary artery disease, unstable angina, dyspnea on exertion, ulcer of left foot, obstructive sleep apnea, history of sinus bradycardia, coronary artery calcification, renal cyst, diverticulosis, pulmonary nodule, chronic kidney disease stage II, sciatica, and morbid obesity      # Atrial fibrillation with RVR, newly diagnosed  # Hx of sinus bradycardia  New afib diagnosis on 3/17 admission, presenting in hemodynamically stable Received metoprolol 5mg IV x1 + metoprolol tartrate 12.5mg PO once in ED during prior admission, but ultimately self-converted to sinus ofelia. Discharged on DOAC, but BB and other rate control deferred given history of baseline bradycardia. Re-presented to ED 3/22 following recurrence of RVR, initially presenting as palpitations at home with rates on home oximeter up to the 140s. In ED, s/p 1x diltiazem 20mg IV, and failed cardioversion x2. Rates however improved to the low 100s. Admitted for further management of afib, [id number] = 2 (age+, HTN+)  - AC: Continue Eliquis 5mg BID   - EP consulted, recommend Tikosyn initiation this admission; start Tikosyn 500mg BID and plan for cardioversion Monday (3/25) if still in Afib at that time; needs to be monitored through Sunday AM at a minimum  - ECG 2-3 hours after each dose of Tikosyn x 5 doses to monitor QT  - Consider EP follow up to discuss ablation  - K>4, Mg>2   - Telemetry     # CAD s/p PCI + APRIL x1 to pLAD (3/8/24)  # HLD  Recently underwent coronary angiogram 1/26/24 for worsening angina which revealed aneurysmal segment of diag involving LAD. Underwent staged PCI on 3/8/24 now s/p PCI of pLAD with shockwave lithotripsy and APRIL x1 (Synergy XD 3.0X 24 mm). Non-obstructive CAD  noted in ostial circumflex and OM3. Fasting lipid panel collected in ED 3/8/24 with LDL 68 (goal <70).  - DAPT: given stent placement in LM, continue ASA 81mg daily + ticagrelor 90mg BID x1 month until 4/8/2024, then stop ASA and continue ticagrelor monotherapy (and DOAC for AF).  - continue atorvastatin 40mg daily   - continue PTA asa 81mg every day until 4/8/24  - continue PTA ticagrelor 90mg BID   - continue DOAC as above      # HTN  BP soft during prior hospital admission 3/17 in /70 . On post-hospital follow up with PCP, patient hypertensive with systolics >160, and pt restarted on lisinopril monotherapy. In ED, following attempted cardioversion systolic pressures again in the 90s-100, lisinopril held at that time.   - Resume PTA lisinopril      #CKD   At baseline Cr ~1.   - daily BMP     # Mood disorder  - PTA fluoxetine  - PTA buspirone     #TIA  - home CPAP settings     FEN: Low Sat Fat  Code status: Full Code  Prophylaxis:  DOAC  Isolation: None  Disposition: Plan for discharge Monday pending Tikosyn/cardioversion    Patient seen and discussed with Dr. Bunch, who agrees with above plan.    Heaven Blankenship MS, PA-C  [facility] Cardiology Team  Pager [phone]/Vocera    Interval History:  Patient denying any symptoms other than palpitations at this time. Appears anxious. Normotensive after Lisinopril held, plan to resume tomorrow.     Physical Exam:  Temp:  [97.6  F (36.4  C)-97.7  F (36.5  C)] 97.7  F (36.5  C)  Pulse:  [] 78  Resp:  [20-24] 20  BP: ()/(64-93) 115/75  SpO2:  [95 %-100 %] 98 %    I/O: No intake or output data in the 24 hours ending 03/22/24 0749    Wt:   Wt Readings from Last 5 Encounters:   03/22/24 (!) 158.3 kg (349 lb)   03/21/24 (!) 158.3 kg (349 lb)   03/20/24 (!) 158.8 kg (350 lb)   03/17/24 (!) 156.5 kg (345 lb)   03/08/24 (!) 155.6 kg (343 lb 0.6 oz)     General: NAD  HEENT:  PERRLA, EOMI.   CV: Irregularly irregular tachycardic rhythm. No murmur appreciated. No rubs or gallops.    Resp: No increased work of breathing or use of accessory muscles, breathing comfortably on room air.  Lung sounds clear throughout/bilaterally  Abdomen:   Abdomen is soft. No distension, non-tender to palpation.    Extremities: Warm. Trace bilateral pre-tibial edema. No cyanosis or clubbing.  Skin:  Warm and dry. No erythema, rashes, ulceration or diaphoresis.  Neuro: Alert and oriented x3.      Medications:   apixaban ANTICOAGULANT  5 mg Oral BID    aspirin  81 mg Oral Daily    atorvastatin  40 mg Oral Daily    busPIRone  20 mg Oral BID    cholecalciferol  125 mcg Oral Daily    FLUoxetine  40 mg Oral Daily    [Held by provider] lisinopril  20 mg Oral Daily    sodium chloride (PF)  3 mL Intracatheter Q8H    ticagrelor  90 mg Oral BID      - MEDICATION INSTRUCTIONS -         Labs:   CMP  Recent Labs   Lab 03/22/24  0545 03/22/24 0211 03/18/24  0653 03/17/24 2129    139 140 137   POTASSIUM 4.2 4.4 4.2 4.1   CHLORIDE 106 104 105 100   CO2 22 24 24 26   ANIONGAP 12 11 11 11   * 100* 94 92   BUN 21.1 24.3* 22.0 24.3*   CR 1.00 1.10 1.17 1.41*   GFRESTIMATED 84 74 69 55*   SHAHID 8.8 9.5 9.1 9.1   MAG 2.4*  --  2.2  --    PROTTOTAL  --  6.6  --  6.8   ALBUMIN  --  4.3  --  4.5   BILITOTAL  --  0.3  --  0.5   ALKPHOS  --  79  --  72   AST  --  19  --  19   ALT  --  18  --  18     CBC  Recent Labs   Lab 03/22/24 0211 03/17/24 2129   WBC 6.8 7.6  7.6   RBC 4.68 4.67  4.67   HGB 14.5 13.9  13.9   HCT 42.3 42.5  42.5   MCV 90 91  91   MCH 31.0 29.8  29.8   MCHC 34.3 32.7  32.7   RDW 13.0 13.1  13.1    211  211     INR  Recent Labs   Lab 03/22/24  0211 03/18/24  0653   INR 0.99 1.07     Arterial Blood GasNo lab results found in last 7 days.    Diagnostics:  ECG:      Echo 3/22/24:  Interpretation Summary  Technically difficult study.Extremely difficult acoustic windows despite the  use of contrast for endcardial border definition.  Global and regional left ventricular function is normal with an  EF of 60-65%.  Right ventricular function, chamber size, wall motion, and thickness are  normal.  The inferior vena cava is normal.  No pericardial effusion is present.  ______________________________________________________________________________  Left Ventricle  Global and regional left ventricular function is normal with an EF of 60-65%.  Left ventricular size is normal. Mild concentric wall thickening consistent  with left ventricular hypertrophy is present. Diastolic function not assessed  due to atrial fibrillation. No regional wall motion abnormalities are seen.     Right Ventricle  Right ventricular function, chamber size, wall motion, and thickness are  normal.     Atria  The atria cannot be assessed.     Mitral Valve  The mitral valve is normal.     Aortic Valve  The valve leaflets are not well visualized. On Doppler interrogation, there is  no significant stenosis or regurgitation.     Tricuspid Valve  The tricuspid valve is normal. Pulmonary artery systolic pressure cannot be  assessed.     Pulmonic Valve  The pulmonic valve cannot be assessed.     Vessels  The thoracic aorta is normal. The pulmonary artery cannot be assessed. The  inferior vena cava is normal.     Pericardium  No pericardial effusion is present.     ______________________________________________________________________________  MMode/2D Measurements & Calculations  IVSd: 1.2 cm  LVIDd: 4.5 cm  LVIDs: 3.2 cm  LVPWd: 1.2 cm     FS: 28.4 %  LV mass(C)d: 187.6 grams  LV mass(C)dI: 69.9 grams/m2  Ao root diam: 3.3 cm  asc Aorta Diam: 3.7 cm  LVOT diam: 2.4 cm  LVOT area: 4.5 cm2  Ao root diam index Ht(cm/m): 1.8  Ao root diam index BSA (cm/m2): 1.2  Asc Ao diam index BSA (cm/m2): 1.4  Asc Ao diam index Ht(cm/m): 2.0  RWT: 0.52     Doppler Measurements & Calculations  PA acc time: 0.07 sec  RV S Reji: 9.9 cm/sec       Recent Results (from the past 24 hour(s))   -Cardioversion External    Narrative    Benito Page MD     3/22/2024  4:33  [facility]    -Cardioversion External    Date/Time: 3/22/2024 4:30 AM    Performed by: Benito Page MD  Authorized by: Benito Page MD    Risks, benefits and alternatives discussed.      PRE-PROCEDURE DETAILS:     Cardioversion basis:  Emergent    Rhythm:  Atrial fibrillation  Attempt one:     Cardioversion mode:  Synchronous    Waveform:  Biphasic    Shock (Joules):  200    Shock outcome:  No change in rhythm  Attempt two:     Cardioversion mode:  Synchronous    Waveform:  Biphasic    Shock (Joules):  200    Shock outcome:  No change in rhythm  Post-procedure details:     Patient status:  Awake      PROCEDURE    Patient Tolerance:  Patient tolerated the procedure well with no immediate   complications       Medical Decision Making     60 MINUTES SPENT BY ME on the date of service doing chart review, history, exam, documentation & further activities per the note.

## 2024-03-22 NOTE — H&P
[facility]    Cardiology History and Physical - Cardiology         Date of Admission:  3/22/2024    Assessment & Plan: S    Car Torres is a 63 year old male admitted on 3/22/2024 for recurrent afib with RVR. PMH is notable for unspecified psychosis, depression, hypertension, hyperlipidemia, coronary artery disease, unstable angina, dyspnea on exertion, ulcer of left foot, obstructive sleep apnea, history of sinus bradycardia, coronary artery calcification, renal cyst, diverticulosis, pulmonary nodule, chronic kidney disease stage II, sciatica, and morbid obesity     # Atrial fibrillation with RVR, newly diagnosed  # Hx of sinus bradycardia  New afib diagnosis on 3/17 admission, presenting in hemodynamically stable Received metoprolol 5mg IV x1 + metoprolol tartrate 12.5mg PO once in ED during prior admission, but ultimately self-converted to sinus ofelia. Discharged on DOAC, but BB and other rate control deferred given history of baseline bradycardia. Represented to ED 3/22 following recurrence of RVR, initially presenting as palpitations home with rates on home oximeter up to the 140s. In ED s/p 1x diltiazem 20mg IV, and failed cardioversion x2. Rates however improved to the low 100s. Admitted for further management of afib,   [id number] = 2 (age+, HTN+)  - AC: Continue Eliquis 5mg BID   - EP consult to assist for eval of cardioversion vs ablation vs safe antiarrhtymic medication in the setting of baseline sinus bradycardia  -cardiac diet, no caffeine   -K>4, Mg>2   -telemetry   -defer repeat TTE (poor windows during prior admission) to day team   -TSH laying in bed comfortably,     # CAD s/p PCI + APRIL x1 to pLAD (3/8/24)  Recently underwent coronary angiogram 1/26/24 for worsening angina which revealed aneurysmal segment of diag involving LAD. Underwent staged PCI on 3/8/24 now s/p PCI of pLAD with shockwave lithotripsy and APRIL x1 (Synergy XD 3.0X 24  "mm). Non-obstructive CAD noted in ostial circumflex and OM3.   - DAPT: given stent placement in LM, continue ASA 81mg daily + ticagrelor 90mg BID x1 month until 4/8/2024, then stop ASA and continue ticagrelor monotherapy (and DOAC for AF).  - continue atorvastatin 40mg daily   - continue PTA asa 81mg every day  - continue PTA ticagrelor 90mg BID   - continue DOAC as above      # HLD  Fasting lipid panel collected in ED 3/8/24 with LDL 68 (goal <70).  - continue high dose statin     # HTN  BP soft during prior hospital admission 3/17 in /70 . On post-hospital follow up with PCP, patient hypertensive with systolics >160, and pt restarted on lisinopril monotherapy. In ED, following attempted cardioversion systolic pressures again in the 90s-100.   - Hold PTA lisinopril      #CKD   At baseline Cr ~1.   -CTM      # Mood disorder  - PTA fluoxetine  - PTA buspirone    #TIA  -home CPAP settings        Diet:  Cardiac, no caffeine   DVT Prophylaxis: DOAC  Frias Catheter: Not present  Cardiac Monitoring: None  Code Status:  FULL CODE         Clinically Significant Risk Factors Present on Admission               # Drug Induced Coagulation Defect: home medication list includes an anticoagulant medication  # Drug Induced Platelet Defect: home medication list includes an antiplatelet medication   # Hypertension: Noted on problem list      # Severe Obesity: Estimated body mass index is 50.08 kg/m  as calculated from the following:    Height as of this encounter: 1.778 m (5' 10\").    Weight as of this encounter: 158.3 kg (349 lb).           Disposition Plan   Expected discharge: 2 - 3 days, recommended to prior living arrangement once arrhythmia stabilized .    Entered: Sla Jewell MD 03/22/2024, 4:16 AM   To be staffed in the AM       Sal Jewell MD  [facility]    ______________________________________________________________________    Chief Complaint   Palpitations "     History is obtained from the patient and electronic health record    History of Present Illness   Car Torres is a 63 year old male who was brought to the ED for heart rates in the 140s.     Briefly, Jerry was recently admitted to the hospital 3/17 after presenting with high heart rates and symptomatic low blood pressure (which he describes as feeling lightheaded, and an episode where his vision blacked out while sitting with friends) at which time he is diagnosed with A-fib with RVR.  He initially had failed treatment with IV metoprolol, as well as p.o. metoprolol but eventually self converted back to sinus rhythm.  Native rhythm typically sinus bradycardia.  He was discharged home on anticoagulation, and rate control was deferred given his baseline bradycardia.      Night of 3/21 dog again felt acute [place] new palpitations while walking to the kitchen.  No chest pain, shortness of breath, dizziness, lightheadedness, presyncope associated with this episode.  His wife was with him and helped him check his heart rates as well as pressures.  His blood pressure remained normotensive, however his rates will be in the 120s to 140.  He called the overnight cardiologist who recommended he come into the ED for further evaluation after failing Valsalva maneuvers x 2.  In the ED his heart rates were in the 120s, rhythm consistent with atrial fibrillation.  Received 1 dose of 20 mg diltiazem IV, and underwent 2 failed cardioversions in the ED.  While he did not convert back to sinus, his heart rates did improve.  To the 70s the patient reported improvement in his palpitations as well.    No recent changes in over-the-counter medications, and is taking all prescribed medications as directed.  No other episode of intermittent palpitations since his last hospital visit.  Using CPAP nightly.  Denies any lower extremity edema, orthopnea, PND.  Previous smoker, quit approximately 25 years ago.  Reports intermittent alcohol  use, primarily at social gatherings.  Prior to retiring however he was drinking a few beers and night after work.  No current recreational drug use, however in the past to use kratom which he quit a few years prior and does have a history of uppers/methamphetamine use in the past.    Denies any known history of MI.  Family history notable for father undergoing a vessel CABG in his early 50s.  No known family history of heart failure.  No known family history of arrhythmia.  No known family history of sudden cardiac death.    No recent travel outside of the [place].  Previously worked maintenance, now retired.  No new symptoms of hypo or hyperthyroidism including unintentional weight changes, temperature intolerance, movement changes, dry skin, brittle nails,  or diaphoresis.      Past Medical History    Past Medical History:   Diagnosis Date    Dementia (H)     Depressive disorder     Heart disease 5/8/2018    Hypercholesterolemia     Hypertension goal BP (blood pressure) < 140/90     Nonspecific abnormal electrocardiogram (ECG) (EKG) 08/23/2007    Stress testing normal.     Sleep apnea     uses a c-pap, severe    Status post coronary angiogram 1/26/2024       Past Surgical History   Past Surgical History:   Procedure Laterality Date    COLONOSCOPY N/A 12/22/2020    Procedure: COLONOSCOPY, WITH POLYPECTOMY, CLIP;  Surgeon: Mihir Lang MD;  Location: [facility] [place]    COLONOSCOPY N/A 1/10/2022    Procedure: COLONOSCOPY, WITH POLYPECTOMY AND BIOPSY;  Surgeon: Mihir Lang MD;  Location:  GI    CV CORONARY ANGIOGRAM N/A 1/26/2024    Procedure: Coronary Angiogram;  Surgeon: Elton Wright MD;  Location: [facility] CARDIAC CATH LAB    CV CORONARY LITHOTRIPSY PCI N/A 3/8/2024    Procedure: Percutaneous Coronary Intervention - Lithotripsy;  Surgeon: Elton Wright MD;  Location: [facility] CARDIAC CATH LAB    CV INSTANTANEOUS WAVE-FREE RATIO N/A 1/26/2024    Procedure: Instantaneous Wave-Free Ratio;  Surgeon:  Elton Wright MD;  Location:  HEART CARDIAC CATH LAB    CV INTRAVASULAR ULTRASOUND N/A 3/8/2024    Procedure: Intravascular Ultrasound;  Surgeon: Elton Wright MD;  Location: [facility] CARDIAC CATH LAB    CV PCI N/A 3/8/2024    Procedure: Percutaneous Coronary Intervention;  Surgeon: Elton Wright MD;  Location: [facility] CARDIAC CATH LAB    CV RIGHT HEART CATH MEASUREMENTS RECORDED N/A 1/26/2024    Procedure: Right Heart Catheterization;  Surgeon: Elton Wright MD;  Location: [facility] CARDIAC CATH LAB    ESOPHAGOSCOPY, GASTROSCOPY, DUODENOSCOPY (EGD), COMBINED N/A 1/28/2021    Procedure: ESOPHAGOGASTRODUODENOSCOPY, WITH BIOPSY;  Surgeon: Mihir Lang MD;  Location: [facility] OR    OPEN REDUCTION INTERNAL FIXATION FOOT Left 1/5/2023    Procedure: LISFRANC OPEN REDUCTION INTERNAL FIXATION, LEFT FOOT;  Surgeon: Bailey Ruiz DPM;  Location:  OR    SURGICAL HISTORY OF -       surgery on left index finger       Prior to Admission Medications   Prior to Admission Medications   Prescriptions Last Dose Informant Patient Reported? Taking?   Apple Wyatt Vn-Grn Tea-Bit Or-Cr (APPLE CIDER VINEGAR PLUS) TABS   Yes No   Sig: Take 1 tablet by mouth daily With calcium and magnesium   Cholecalciferol (VITAMIN D PO)   Yes No   Sig: Take 5,000 Units by mouth daily One tablet twice daily   FLUoxetine (PROZAC) 40 MG capsule   No No   Sig: Take 1 capsule (40 mg) by mouth daily   MULTI VITAMIN MENS PO   Yes No   Sig: Take 1 tablet by mouth daily   apixaban ANTICOAGULANT (ELIQUIS ANTICOAGULANT) 5 MG tablet   No No   Sig: Take 1 tablet (5 mg) by mouth 2 times daily   aspirin 81 MG EC tablet   No No   Sig: Take 1 tablet (81 mg) by mouth daily Start tomorrow morning.   atorvastatin (LIPITOR) 40 MG tablet   No No   Sig: Take 1 tablet (40 mg) by mouth daily   busPIRone (BUSPAR) 10 MG tablet   No No   Sig: Take 2 tablets (20 mg) by mouth 2 times daily   co-enzyme Q-10 100 MG CAPS capsule   Yes No   Sig: Take 100 mg  by mouth daily   hydrOXYzine HCl (ATARAX) 10 MG tablet   No No   Sig: TAKE 1 TABLET (10 MG) BY MOUTH 2 TIMES DAILY AS NEEDED FOR ANXIETY   lisinopril (ZESTRIL) 20 MG tablet   No No   Sig: Take 1 tablet (20 mg) by mouth daily   tadalafil (CIALIS) 20 MG tablet   No No   Sig: TAKE 1/2 TO 1 TABLET BY MOUTH 30 MINUTES TO 1 HOUR BEFORE SEX   ticagrelor (BRILINTA) 90 MG tablet   No No   Sig: Take 1 tablet (90 mg) by mouth 2 times daily Start this evening.   vitamin B complex with vitamin C (STRESS TAB) tablet   Yes No   Sig: Take 1 tablet by mouth daily      Facility-Administered Medications: None        Review of Systems    The 10 point Review of Systems is negative other than noted in the HPI or here.      Physical Exam   Vital Signs: Temp: 97.7  F (36.5  C)   BP: 93/64 Pulse: 85   Resp: 20 SpO2: 97 % O2 Device: Nasal cannula Oxygen Delivery: 2 LPM  Weight: 349 lbs 0 oz    General Appearance: \In bed comfortably, head atraumatic, normocephalic, mucous membranes moist, no acute distress.  Respiratory: Breathing comfortably, nasal cannula partially in place.  Breath sounds clear anteriorly.  Cardiovascular: Distant heart sounds in the setting of large body habitus, irregularly irregular.  Unable to appreciate any murmurs rubs or gallops.  GI: Soft, obese, no tenderness to palpation in any quadrant.  Skin: \Venous stasis dermatitis changes, no lower extremity edema  Other: ANO x 4    Medical Decision Making       Please see A&P for additional details of medical decision making.      Data     I have personally reviewed the following data over the past 24 hrs:    6.8  \   14.5   / 196     139 104 24.3 (H) /  100 (H)   4.4 24 1.10 \     Trop: 19 BNP: N/A       Imaging results reviewed over the past 24 hrs:   No results found for this or any previous visit (from the past 24 hour(s)).

## 2024-03-22 NOTE — ED TRIAGE NOTES
Ambulatory to ED for reports of tachycardia, at home it went as high as 144 at rest. Pt self report that he is usually bradycardic, low 40's except for tonight. Recently diagnosed with a-fib. No other accompanying symptoms

## 2024-03-23 VITALS
DIASTOLIC BLOOD PRESSURE: 79 MMHG | TEMPERATURE: 98.2 F | OXYGEN SATURATION: 99 % | HEIGHT: 70 IN | BODY MASS INDEX: 45.1 KG/M2 | RESPIRATION RATE: 14 BRPM | WEIGHT: 315 LBS | HEART RATE: 53 BPM | SYSTOLIC BLOOD PRESSURE: 157 MMHG

## 2024-03-23 PROCEDURE — 250N000013 HC RX MED GY IP 250 OP 250 PS 637

## 2024-03-23 PROCEDURE — 99238 HOSP IP/OBS DSCHRG MGMT 30/<: CPT | Mod: FS | Performed by: NURSE PRACTITIONER

## 2024-03-23 RX ORDER — METOPROLOL TARTRATE 25 MG/1
25 TABLET, FILM COATED ORAL EVERY 8 HOURS PRN
Qty: 30 TABLET | Refills: 1 | Status: SHIPPED | OUTPATIENT
Start: 2024-03-23

## 2024-03-23 RX ADMIN — LISINOPRIL 20 MG: 5 TABLET ORAL at 07:33

## 2024-03-23 RX ADMIN — ATORVASTATIN CALCIUM 40 MG: 40 TABLET, FILM COATED ORAL at 07:32

## 2024-03-23 RX ADMIN — Medication 125 MCG: at 07:33

## 2024-03-23 RX ADMIN — FLUOXETINE HYDROCHLORIDE 40 MG: 40 CAPSULE ORAL at 07:32

## 2024-03-23 RX ADMIN — DOFETILIDE 250 MCG: 0.25 CAPSULE ORAL at 01:08

## 2024-03-23 RX ADMIN — APIXABAN 5 MG: 5 TABLET, FILM COATED ORAL at 07:33

## 2024-03-23 RX ADMIN — TICAGRELOR 90 MG: 90 TABLET ORAL at 07:33

## 2024-03-23 RX ADMIN — ASPIRIN 81 MG: 81 TABLET ORAL at 07:33

## 2024-03-23 RX ADMIN — BUSPIRONE HYDROCHLORIDE 20 MG: 10 TABLET ORAL at 07:32

## 2024-03-23 ASSESSMENT — ACTIVITIES OF DAILY LIVING (ADL)
ADLS_ACUITY_SCORE: 36

## 2024-03-23 NOTE — DISCHARGE SUMMARY
[address] [address]  p: [phone]    Discharge Summary: Cardiology Service    Car Torres MRN# [id number]   YOB: 1960 Age: 63 year old       Admission Date: 3/22/2024  Discharge Date: 03/23/2024    Discharge Diagnoses:  # Atrial fibrillation with RVR, newly diagnosed  # Hx of sinus bradycardia  # CAD s/p PCI + APRIL x1 to pLAD (3/8/24)  # HLD  # HLD  # CKD  # Mood disorder  # TIA    Brief HPI:  Car Torres is a 63 year old male admitted on 3/22/2024 for recurrent afib with RVR. PMH is notable for unspecified psychosis, depression, hypertension, hyperlipidemia, coronary artery disease, unstable angina, dyspnea on exertion, ulcer of left foot, obstructive sleep apnea, history of sinus bradycardia, coronary artery calcification, renal cyst, diverticulosis, pulmonary nodule, chronic kidney disease stage II, sciatica, and morbid obesity.      Hospital Course by Diagnosis:  # Atrial fibrillation with RVR, newly diagnosed  # Hx of sinus bradycardia  New afib diagnosis on 3/17 admission, presenting in hemodynamically stable Received metoprolol 5mg IV x1 + metoprolol tartrate 12.5mg PO once in ED during prior admission, but ultimately self-converted to sinus ofelia. Discharged on DOAC, but BB and other rate control deferred given history of baseline bradycardia. Re-presented to ED 3/22 following recurrence of RVR, initially presenting as palpitations at home with rates on home oximeter up to the 140s. In ED, s/p 1x diltiazem 20mg IV, and failed cardioversion x2. Rates however improved to the low 100s. Admitted for further management of afib, [id number] = 2 (age+, HTN+)  - AC: Continue Eliquis 5mg BID   - EP consulted, recommend Tikosyn initiation this admission; start Tikosyn 500mg BID and plan for cardioversion Monday (3/25) if still in Afib at that time; needs to be monitored through Sunday AM at a minimum                           Converted to SB 3/22 PM. Discussed Qtc and SB with EP fellow Dr. Issa. HR dipping to 30's intermittently, asymptomatic. Unable to continue Tikosyn  - EP follow up to discuss ablation vs other treatment options     # CAD s/p PCI + APRIL x1 to pLAD (3/8/24)  # HLD  Recently underwent coronary angiogram 1/26/24 for worsening angina which revealed aneurysmal segment of diag involving LAD. Underwent staged PCI on 3/8/24 now s/p PCI of pLAD with shockwave lithotripsy and APRIL x1 (Synergy XD 3.0X 24 mm). Non-obstructive CAD noted in ostial circumflex and OM3. Fasting lipid panel collected in ED 3/8/24 with LDL 68 (goal <70).  - DAPT: given stent placement in LM, continue ASA 81mg daily + ticagrelor 90mg BID x1 month until 4/8/2024, then stop ASA and continue ticagrelor monotherapy (and DOAC for AF).  - continue atorvastatin 40mg daily   - continue PTA asa 81mg every day until 4/8/24  - continue PTA ticagrelor 90mg BID   - continue DOAC as above      # HTN  BP soft during prior hospital admission 3/17 in /70 . On post-hospital follow up with PCP, patient hypertensive with systolics >160, and pt restarted on lisinopril monotherapy.   - Resume PTA lisinopril, -130's during admission     #CKD   At baseline Cr ~1.   - Crt at discharge 1     # Mood disorder  - PTA fluoxetine  - PTA buspirone     #TIA  - home CPAP settings     Pertinent Procedures:  1. DCCV    Consults:  Electrophysiology    Medication Changes:  See below     Discharge medications:   Current Discharge Medication List        START taking these medications    Details   metoprolol tartrate (LOPRESSOR) 25 MG tablet Take 1 tablet (25 mg) by mouth every 8 hours as needed (Atrial fibrillation with RVR, HR > 120)  Qty: 30 tablet, Refills: 1    Associated Diagnoses: Atrial fibrillation with rapid ventricular response (H)           CONTINUE these medications which have NOT CHANGED    Details   apixaban ANTICOAGULANT (ELIQUIS ANTICOAGULANT) 5 MG tablet Take 1  tablet (5 mg) by mouth 2 times daily  Qty: 90 tablet, Refills: 6    Associated Diagnoses: Atrial fibrillation with rapid ventricular response (H)      Apple Wyatt Vn-Grn Tea-Bit Or-Cr (APPLE CIDER VINEGAR PLUS) TABS Take 1 tablet by mouth daily With calcium and magnesium      aspirin 81 MG EC tablet Take 1 tablet (81 mg) by mouth daily Start tomorrow morning.  Qty: 90 tablet, Refills: 3    Associated Diagnoses: Coronary artery calcification      atorvastatin (LIPITOR) 40 MG tablet Take 1 tablet (40 mg) by mouth daily  Qty: 90 tablet, Refills: 4    Associated Diagnoses: Hyperlipidemia LDL goal <100      busPIRone (BUSPAR) 10 MG tablet Take 2 tablets (20 mg) by mouth 2 times daily  Qty: 120 tablet, Refills: 1    Associated Diagnoses: SHAHNAZ (generalized anxiety disorder)      Cholecalciferol (VITAMIN D PO) Take 5,000 Units by mouth daily One tablet twice daily      co-enzyme Q-10 100 MG CAPS capsule Take 100 mg by mouth daily      FLUoxetine (PROZAC) 40 MG capsule Take 1 capsule (40 mg) by mouth daily  Qty: 90 capsule, Refills: 4    Associated Diagnoses: Major depressive disorder, recurrent episode, moderate (H)      hydrOXYzine HCl (ATARAX) 10 MG tablet TAKE 1 TABLET (10 MG) BY MOUTH 2 TIMES DAILY AS NEEDED FOR ANXIETY  Qty: 180 tablet, Refills: 0    Associated Diagnoses: SHAHNAZ (generalized anxiety disorder)      lisinopril (ZESTRIL) 20 MG tablet Take 1 tablet (20 mg) by mouth daily  Qty: 90 tablet, Refills: 3    Associated Diagnoses: Hypertension goal BP (blood pressure) < 140/90      !! MULTI VITAMIN MENS PO Take 1 tablet by mouth daily      ticagrelor (BRILINTA) 90 MG tablet Take 1 tablet (90 mg) by mouth 2 times daily Start this evening.  Qty: 180 tablet, Refills: 3    Associated Diagnoses: Anginal equivalent (H24)      !! vitamin B complex with vitamin C (STRESS TAB) tablet Take 1 tablet by mouth daily       !! - Potential duplicate medications found. Please discuss with provider.          Follow-up:  Electrophysiology  1 month    Labs or imaging requiring follow-up after discharge:  EKG    Code status:  FULL    Condition on discharge  Temp:  [97.6  F (36.4  C)] 97.6  F (36.4  C)  Pulse:  [] 54  Resp:  [14] 14  BP: (100-138)/(59-77) 135/66  SpO2:  [93 %-100 %] 98 %  General: Alert, interactive, no acute distress  HEENT: Normocephalic, atraumatic. Sclera anicteric.   Neck: JVP not elevated  Cardiovascular: Regular rate and rhythm, normal S1 and S2, no murmurs, gallops, or rubs. Radial and pedal pulses palpable bilaterally.   Resp: Regular work of breathing on room air. Clear to auscultation bilaterally, no rales, wheezes, or rhonchi  GI: Soft, nontender, nondistended.   Extremities: no edema, no cyanosis or clubbing, warm and well perfused  Skin: Warm and dry, no jaundice or rash  Neuro: Alert and oriented x 3. CN 2-12 intact, moves all extremities equally, normal speech  Psych: Mood and affect are appropriate    Imaging with results:  EKG 12 Lead 3/23 SB:       ECG:       Echo 3/22/24:  Interpretation Summary  Technically difficult study.Extremely difficult acoustic windows despite the  use of contrast for endcardial border definition.  Global and regional left ventricular function is normal with an EF of 60-65%.  Right ventricular function, chamber size, wall motion, and thickness are  normal.  The inferior vena cava is normal.  No pericardial effusion is present.  ______________________________________________________________________________  Left Ventricle  Global and regional left ventricular function is normal with an EF of 60-65%.  Left ventricular size is normal. Mild concentric wall thickening consistent  with left ventricular hypertrophy is present. Diastolic function not assessed  due to atrial fibrillation. No regional wall motion abnormalities are seen.     Right Ventricle  Right ventricular function, chamber size, wall motion, and thickness are  normal.     Atria  The atria cannot be assessed.     Mitral  Valve  The mitral valve is normal.     Aortic Valve  The valve leaflets are not well visualized. On Doppler interrogation, there is  no significant stenosis or regurgitation.     Tricuspid Valve  The tricuspid valve is normal. Pulmonary artery systolic pressure cannot be  assessed.     Pulmonic Valve  The pulmonic valve cannot be assessed.     Vessels  The thoracic aorta is normal. The pulmonary artery cannot be assessed. The  inferior vena cava is normal.     Pericardium  No pericardial effusion is present.     ______________________________________________________________________________  MMode/2D Measurements & Calculations  IVSd: 1.2 cm  LVIDd: 4.5 cm  LVIDs: 3.2 cm  LVPWd: 1.2 cm     FS: 28.4 %  LV mass(C)d: 187.6 grams  LV mass(C)dI: 69.9 grams/m2  Ao root diam: 3.3 cm  asc Aorta Diam: 3.7 cm  LVOT diam: 2.4 cm  LVOT area: 4.5 cm2  Ao root diam index Ht(cm/m): 1.8  Ao root diam index BSA (cm/m2): 1.2  Asc Ao diam index BSA (cm/m2): 1.4  Asc Ao diam index Ht(cm/m): 2.0  RWT: 0.52     Doppler Measurements & Calculations  PA acc time: 0.07 sec  RV S Reji: 9.9 cm/sec              Recent Results (from the past 24 hour(s))   -Cardioversion External     Narrative     Benito Page MD     3/22/2024  4:33 AM  [facility]     -Cardioversion External     Date/Time: 3/22/2024 4:30 AM     Performed by: Benito Page MD  Authorized by: Benito Page MD    Risks, benefits and alternatives discussed.        PRE-PROCEDURE DETAILS:     Cardioversion basis:  Emergent    Rhythm:  Atrial fibrillation  Attempt one:     Cardioversion mode:  Synchronous    Waveform:  Biphasic    Shock (Joules):  200    Shock outcome:  No change in rhythm  Attempt two:     Cardioversion mode:  Synchronous    Waveform:  Biphasic    Shock (Joules):  200    Shock outcome:  No change in rhythm  Post-procedure details:     Patient status:  Awake        PROCEDURE     Patient Tolerance:  Patient  tolerated the procedure well with no immediate   complications         Recent Labs   Lab 03/22/24  0545 03/22/24  0211 03/18/24  0653 03/17/24  2129    139 140 137   POTASSIUM 4.2 4.4 4.2 4.1   CHLORIDE 106 104 105 100   CO2 22 24 24 26   ANIONGAP 12 11 11 11   * 100* 94 92   BUN 21.1 24.3* 22.0 24.3*   CR 1.00 1.10 1.17 1.41*   GFRESTIMATED 84 74 69 55*   SHAHID 8.8 9.5 9.1 9.1   MAG 2.4*  --  2.2  --          Patient Care Team:  Mihir Perez MD as PCP - General (Internal Medicine - Pediatrics)  Taniya Estrada NP as Assigned Behavioral Health Provider  Miguel Varela MD as Assigned Neuroscience Provider  Taniya Estrada NP as Nurse Practitioner (Psychiatry)  Mgiuel Varela MD as MD (Neurology)  Mihir Perez MD as Assigned PCP  Bailey Ruiz DPM as Assigned Surgical Provider  Beka Santillan DO as Assigned Musculoskeletal Provider  ZANA Fair MD as MD (Cardiovascular Disease)  ZANA Fair MD as Assigned Heart and Vascular Provider  Elton Wright MD as MD (Cardiovascular Disease)  Soila Millard APRN CNP as Nurse Practitioner (Cardiology)    Time Spent on this Encounter   I, KLAUS Hatfield CNP, personally saw the patient today and spent greater than 30 minutes discharging this patient.    >30 minutes spent in discharge, including >50% in counseling and coordination of care, medication review and plan of care recommended on follow up. Questions were answered.   It was our pleasure to care for Car Torres during this hospitalization. Please do not hesitate to contact me should there be questions regarding the hospital course or discharge plan.    Patient discussed with staff cardiologist, Dr. Bunch, who agrees with the above documentation and plan. Documentation represents joint decision making.     KLAUS Sotelo, CNP  [facility] Cardiology

## 2024-03-23 NOTE — PROGRESS NOTES
ED 12 - D.R  I see you unheld the tikosyn. Pt. HR jumps from high 30s for a few seconds to low 50s. Not safe to give at the moment. Pt. also asking what plan is. Thank you!  RN [phone]    Kaur Mcqueen RN on 3/23/2024 at 1:39 PM

## 2024-03-23 NOTE — PROGRESS NOTES
Cardiology Progress Note      Assessment & Plan:  Car Torres is a 63 year old male admitted on 3/22/2024 for recurrent afib with RVR. PMH is notable for unspecified psychosis, depression, hypertension, hyperlipidemia, coronary artery disease, unstable angina, dyspnea on exertion, ulcer of left foot, obstructive sleep apnea, history of sinus bradycardia, coronary artery calcification, renal cyst, diverticulosis, pulmonary nodule, chronic kidney disease stage II, sciatica, and morbid obesity.     Today's Update:  - Discussed at length with EP, ok to continue Tikosyn at decreased dose    # Atrial fibrillation with RVR, newly diagnosed  # Hx of sinus bradycardia  New afib diagnosis on 3/17 admission, presenting in hemodynamically stable Received metoprolol 5mg IV x1 + metoprolol tartrate 12.5mg PO once in ED during prior admission, but ultimately self-converted to sinus ofelia. Discharged on DOAC, but BB and other rate control deferred given history of baseline bradycardia. Re-presented to ED 3/22 following recurrence of RVR, initially presenting as palpitations at home with rates on home oximeter up to the 140s. In ED, s/p 1x diltiazem 20mg IV, and failed cardioversion x2. Rates however improved to the low 100s. Admitted for further management of afib, [id number] = 2 (age+, HTN+)  - AC: Continue Eliquis 5mg BID   - EP consulted, recommend Tikosyn initiation this admission; start Tikosyn 500mg BID and plan for cardioversion Monday (3/25) if still in Afib at that time; needs to be monitored through Sunday AM at a minimum    Converted to SB 3/22 PM. Discussed Qtc and SB with EP fellow Dr. Issa who confirmed ok to continue Tikosyn at decreased dose 250 mcg BID with ECG 2-3 hours after each dose    QTc interval should be measured 2-3 hours after each dose for 5 monitored doses. If the QTc is >15% of baseline, or if the QTc is >500 msec (550 msec in patients with ventricular conduction abnormalities), dofetilide  should be reduced by half or discontinued- call EP in this case.   - Consider EP follow up to discuss ablation  - K>4, Mg>2   - Telemetry      # CAD s/p PCI + APRIL x1 to pLAD (3/8/24)  # HLD  Recently underwent coronary angiogram 1/26/24 for worsening angina which revealed aneurysmal segment of diag involving LAD. Underwent staged PCI on 3/8/24 now s/p PCI of pLAD with shockwave lithotripsy and APRIL x1 (Synergy XD 3.0X 24 mm). Non-obstructive CAD noted in ostial circumflex and OM3. Fasting lipid panel collected in ED 3/8/24 with LDL 68 (goal <70).  - DAPT: given stent placement in LM, continue ASA 81mg daily + ticagrelor 90mg BID x1 month until 4/8/2024, then stop ASA and continue ticagrelor monotherapy (and DOAC for AF).  - continue atorvastatin 40mg daily   - continue PTA asa 81mg every day until 4/8/24  - continue PTA ticagrelor 90mg BID   - continue DOAC as above      # HTN  BP soft during prior hospital admission 3/17 in /70 . On post-hospital follow up with PCP, patient hypertensive with systolics >160, and pt restarted on lisinopril monotherapy. In ED, following attempted cardioversion systolic pressures again in the 90s-100, lisinopril held at that time.   - Resume PTA lisinopril      #CKD   At baseline Cr ~1.   - daily BMP     # Mood disorder  - PTA fluoxetine  - PTA buspirone     #TIA  - home CPAP settings      FEN: Low Sat Fat  Code status: Full Code  Prophylaxis:  DOAC  Isolation: None  Disposition: Plan for discharge Monday pending Tikosyn/cardioversion     Patient seen and discussed with Dr. Bunch, who agrees with above plan.    Sandra Toure, APRN, CNP  [facility]  Cards 1  [place] [id number]    Medical Decision Making       35 MINUTES SPENT BY ME on the date of service doing chart review, history, exam, documentation & further activities per the note.      Interval History:  NAEO, telemetry, labs, vital signs and nursing notes reviewed.  Discussed at length with EP and  "pharmacy. Continue current plan with close Qtc monitoring.     Most recent vital signs:  /65   Pulse (!) 42   Temp 97.6  F (36.4  C) (Oral)   Resp 14   Ht 1.778 m (5' 10\")   Wt (!) 158.3 kg (349 lb)   SpO2 96%   BMI 50.08 kg/m    Temp:  [97.6  F (36.4  C)] 97.6  F (36.4  C)  Pulse:  [] 42  Resp:  [14] 14  BP: (100-138)/(59-77) 131/65  SpO2:  [93 %-100 %] 96 %  Wt Readings from Last 2 Encounters:   03/22/24 (!) 158.3 kg (349 lb)   03/21/24 (!) 158.3 kg (349 lb)     No intake or output data in the 24 hours ending 03/23/24 1235    Physical exam:  General: Pleasant elderly male. Appears comfortable and in no acute distress. Alert and interactive  HEENT: Normocephalic, atraumatic. No scleral icterus or injection  CARDIAC: Regular rate and rhythm, no m/r/g appreciated. Peripheral pulses 2+  RESP: Normal work of breathing on room air without use of accessory breathing muscles. Clear to auscultation in all fields. No wheezes, rhonchi or crackles appreciated.  GI: No abdominal distention. Soft and nontender.   EXTREMITIES: Without lower extremity edema. No cyanosis or clubbing. Warm and well perfused. No venous stasis changes.   SKIN: No acute lesions appreciated. Warm and dry to touch  NEURO: Alert and oriented X3, CN II-XII grossly intact, no focal neurological deficits noted, normal speech  PSYCH: Mood and affect are appropriate      Labs (Past three days):  CBC  Recent Labs   Lab 03/22/24  0211 03/17/24 2129   WBC 6.8 7.6  7.6   RBC 4.68 4.67  4.67   HGB 14.5 13.9  13.9   HCT 42.3 42.5  42.5   MCV 90 91  91   MCH 31.0 29.8  29.8   MCHC 34.3 32.7  32.7   RDW 13.0 13.1  13.1    211  211     BMP  Recent Labs   Lab 03/22/24  0545 03/22/24  0211 03/18/24  0653 03/17/24  2129    139 140 137   POTASSIUM 4.2 4.4 4.2 4.1   CHLORIDE 106 104 105 100   CO2 22 24 24 26   ANIONGAP 12 11 11 11   * 100* 94 92   BUN 21.1 24.3* 22.0 24.3*   CR 1.00 1.10 1.17 1.41*   GFRESTIMATED 84 74 69 " 55*   SHAHID 8.8 9.5 9.1 9.1   MAG 2.4*  --  2.2  --      Troponins:   Lab Results   Component Value Date    TROPI  10/19/2015     <0.015  The 99th percentile for upper reference range is 0.045 ug/L.  Troponin values in   the range of 0.045 - 0.120 ug/L may be associated with risks of adverse   clinical events.      TROPONIN 0.01 10/19/2015    TROPONIN <0.04 08/18/2007        INR  Recent Labs   Lab 03/22/24  0211 03/18/24  0653   INR 0.99 1.07     Liver panel  Recent Labs   Lab 03/22/24  0211 03/17/24  2129   PROTTOTAL 6.6 6.8   ALBUMIN 4.3 4.5   BILITOTAL 0.3 0.5   ALKPHOS 79 72   AST 19 19   ALT 18 18       Imaging/procedure results:  EKG 12 Lead 3/23 SB:      ECG:       Echo 3/22/24:  Interpretation Summary  Technically difficult study.Extremely difficult acoustic windows despite the  use of contrast for endcardial border definition.  Global and regional left ventricular function is normal with an EF of 60-65%.  Right ventricular function, chamber size, wall motion, and thickness are  normal.  The inferior vena cava is normal.  No pericardial effusion is present.  ______________________________________________________________________________  Left Ventricle  Global and regional left ventricular function is normal with an EF of 60-65%.  Left ventricular size is normal. Mild concentric wall thickening consistent  with left ventricular hypertrophy is present. Diastolic function not assessed  due to atrial fibrillation. No regional wall motion abnormalities are seen.     Right Ventricle  Right ventricular function, chamber size, wall motion, and thickness are  normal.     Atria  The atria cannot be assessed.     Mitral Valve  The mitral valve is normal.     Aortic Valve  The valve leaflets are not well visualized. On Doppler interrogation, there is  no significant stenosis or regurgitation.     Tricuspid Valve  The tricuspid valve is normal. Pulmonary artery systolic pressure cannot be  assessed.     Pulmonic Valve  The  pulmonic valve cannot be assessed.     Vessels  The thoracic aorta is normal. The pulmonary artery cannot be assessed. The  inferior vena cava is normal.     Pericardium  No pericardial effusion is present.     ______________________________________________________________________________  MMode/2D Measurements & Calculations  IVSd: 1.2 cm  LVIDd: 4.5 cm  LVIDs: 3.2 cm  LVPWd: 1.2 cm     FS: 28.4 %  LV mass(C)d: 187.6 grams  LV mass(C)dI: 69.9 grams/m2  Ao root diam: 3.3 cm  asc Aorta Diam: 3.7 cm  LVOT diam: 2.4 cm  LVOT area: 4.5 cm2  Ao root diam index Ht(cm/m): 1.8  Ao root diam index BSA (cm/m2): 1.2  Asc Ao diam index BSA (cm/m2): 1.4  Asc Ao diam index Ht(cm/m): 2.0  RWT: 0.52     Doppler Measurements & Calculations  PA acc time: 0.07 sec  RV S Reji: 9.9 cm/sec            Recent Results (from the past 24 hour(s))   -Cardioversion External     Narrative     Benito Page MD     3/22/2024  4:33 AM  [facility]     -Cardioversion External     Date/Time: 3/22/2024 4:30 AM     Performed by: Benito Page MD  Authorized by: Benito Page MD    Risks, benefits and alternatives discussed.        PRE-PROCEDURE DETAILS:     Cardioversion basis:  Emergent    Rhythm:  Atrial fibrillation  Attempt one:     Cardioversion mode:  Synchronous    Waveform:  Biphasic    Shock (Joules):  200    Shock outcome:  No change in rhythm  Attempt two:     Cardioversion mode:  Synchronous    Waveform:  Biphasic    Shock (Joules):  200    Shock outcome:  No change in rhythm  Post-procedure details:     Patient status:  Awake        PROCEDURE     Patient Tolerance:  Patient tolerated the procedure well with no immediate   complications

## 2024-03-23 NOTE — PROVIDER NOTIFICATION
ED 12 - D.R  When are we discharging this pt? I see that discharge planning orders are in but not the actual discharge order. Thank you!  RN [id number]

## 2024-03-25 LAB
ATRIAL RATE - MUSE: 54 BPM
DIASTOLIC BLOOD PRESSURE - MUSE: NORMAL MMHG
INTERPRETATION ECG - MUSE: NORMAL
P AXIS - MUSE: 20 DEGREES
PR INTERVAL - MUSE: 142 MS
QRS DURATION - MUSE: 118 MS
QT - MUSE: 502 MS
QTC - MUSE: 476 MS
R AXIS - MUSE: 34 DEGREES
SYSTOLIC BLOOD PRESSURE - MUSE: NORMAL MMHG
T AXIS - MUSE: 52 DEGREES
VENTRICULAR RATE- MUSE: 54 BPM

## 2024-03-27 ENCOUNTER — VIRTUAL VISIT (OUTPATIENT)
Dept: BEHAVIORAL HEALTH | Facility: CLINIC | Age: 64
End: 2024-03-27
Payer: MEDICARE

## 2024-03-27 DIAGNOSIS — F41.9 ANXIETY: ICD-10-CM

## 2024-03-27 DIAGNOSIS — F33.0 MAJOR DEPRESSIVE DISORDER, RECURRENT EPISODE, MILD (H): Primary | ICD-10-CM

## 2024-03-27 ASSESSMENT — PATIENT HEALTH QUESTIONNAIRE - PHQ9
SUM OF ALL RESPONSES TO PHQ QUESTIONS 1-9: 0
SUM OF ALL RESPONSES TO PHQ QUESTIONS 1-9: 0
10. IF YOU CHECKED OFF ANY PROBLEMS, HOW DIFFICULT HAVE THESE PROBLEMS MADE IT FOR YOU TO DO YOUR WORK, TAKE CARE OF THINGS AT HOME, OR GET ALONG WITH OTHER PEOPLE: NOT DIFFICULT AT ALL

## 2024-03-27 NOTE — PROGRESS NOTES
Transition Clinic Progress Note    Patient Name:   Car Torres Date: 2024          Service Type: Individual        VISIT TIME START: 932      VISIT TIME END:       Session Length:  TC Session Length: 45 (38-52 Minutes)    Session #:  3    Attendees: TC Attendees: Client with Spouse or Significant Other    Service Modality:  Service Modality: Video Visit:      Provider verified identity through the following two step process.  Patient provided:  Patient photo, Patient , and Patient address    Telemedicine Visit: The patient's condition can be safely assessed and treated via synchronous audio and visual telemedicine encounter.      Reason for Telemedicine Visit: Patient has requested telehealth visit    Originating Site (Patient Location): Patient's home    Distant Site (Provider Location): Provider Remote Setting- Home Office    Consent:  The patient/guardian has verbally consented to: the potential risks and benefits of telemedicine (video visit) versus in person care; bill my insurance or make self-payment for services provided; and responsibility for payment of non-covered services.     Patient would like the video invitation sent by:  My Chart    Mode of Communication:  [facility] Conference [facility]    Distant Location (Provider):  Off-site    As the provider I attest to compliance with applicable laws and regulations related to telemedicine.    Informed Consent and Assessment Methods    Provided at Intake om 2024      DATA  Interactive Complexity: No  Crisis: No        Progress Since Last Session (Related to Symptoms / Goals / Homework):   Symptoms: Improving with surgery behind him his mood has improved.  Decreased depression has resulted in improved cognitive functioning.  Homework: Completed in session      Episode of Care Goals: Satisfactory progress - ACTION (Actively working towards change); Intervened by reinforcing change plan / affirming steps taken     Current / Ongoing  "Stressors and Concerns:   Patient presented with hs spouse. He reports surgery went well but with a few issues that resulted in brief hospital stays.  He states he in not anxious but more nervous. He and spouse shared relationship communication challenges. Provider opened discussion on communication of expectations.  Referred to a book, \"The Seven Principles of Making marriage work\" by Nabeel Astorga, Ph.D..  denied suicidal ideations. Reports they looks forward to visits with family over the weekend. He will start Cardiac Rehab when scheduled. Encouraged use of relaxation tech.    Treatment Objective(s) Addressed in This Session:               Identify stressors : Health issues with up coming heart surgery leading to anxiety.  Identify skills to reduce stress: Mindfulness, hobbies   Identify what positive/negative taken for past experiences: review of previous coping skills.  Discussed ongoing TC care meeting needs at 1x a month.  Patient ans spouse wish to remain with this provider with plan to phase out of needing supportive therapy in a couple of months..    Intervention:                  Brief Therapy: reminiscence therapy:                  Strengthen existing coping skills    Validate and strengthen patient self-resiliency.       Connect past successful coping skills with current concerns.     Assessments completed prior to visit:  The following assessments were completed by patient for this visit:  PHQ9:       8/4/2023    10:53 AM 10/3/2023    10:58 AM 12/21/2023     9:26 PM 2/12/2024     5:54 PM 2/13/2024    10:13 AM 3/20/2024    11:53 PM 3/27/2024     8:51 AM   PHQ-9 SCORE   PHQ-9 Total Score MyChart 0 1 (Minimal depression) 0 0 0 1 (Minimal depression) 0   PHQ-9 Total Score 0 1 0 0 0 1 0     GAD7:       2/26/2021    11:20 AM 5/28/2021     1:33 PM 8/30/2021    11:26 AM 12/2/2021     2:00 PM 6/9/2022     1:00 PM 12/22/2022     1:02 PM 2/12/2024     6:22 PM   SHAHNAZ-7 SCORE   Total Score      2 (minimal anxiety) " 0 (minimal anxiety)   Total Score 0 0 0 0 0 2 0     CAGE-AID:       2/24/2022     8:36 AM 2/12/2024     6:26 PM   CAGE-AID Total Score   Total Score 2 0   Total Score MyChart  0 (A total score of 2 or greater is considered clinically significant)     PROMIS 10-Global Health (only subscores and total score):       12/22/2022     1:04 PM 2/3/2023     4:51 PM 8/1/2023    11:20 AM 12/21/2023     9:29 PM 2/12/2024     6:25 PM 2/13/2024    10:14 AM 3/27/2024    10:00 AM   PROMIS-10 Scores Only   Global Mental Health Score 13 17 13 14 14 13 13   Global Physical Health Score 10 13 16 17 15 16 10   PROMIS TOTAL - SUBSCORES 23 30 29 31 29 29 23     [place] Suicide Severity Rating Scale (Short Version)      8/2/2020    11:43 AM 1/28/2021     2:06 PM 1/5/2023    12:57 PM 2/29/2024     9:00 AM 3/17/2024     9:16 PM 3/22/2024     1:33 AM 3/27/2024    10:00 AM   [place] Suicide Severity Rating (Short Version)   Over the past 2 weeks have you felt down, depressed, or hopeless? yes no no       Over the past 2 weeks have you had thoughts of killing yourself? no  no       Have you ever attempted to kill yourself? no         Q1 Wished to be Dead (Past Month)     0-->no 0-->no    Q2 Suicidal Thoughts (Past Month)     0-->no 0-->no    Q6 Suicide Behavior (Lifetime)     0-->no 0-->no    Level of Risk per Screen     no risks indicated no risks indicated    1. Wish to be Dead (Since Last Contact)    N   N   2. Non-Specific Active Suicidal Thoughts (Since Last Contact)    N   N   Has subject engaged in non-suicidal self-injurious behavior? (Since Last Contact)    N   N   Calculated C-SSRS Risk Score (Since Last Contact)    No Risk Indicated   No Risk Indicated         ASSESSMENT: Current Emotional / Mental Status (status of significant symptoms):   Risk status (Self / Other harm or suicidal ideation)   Patient denies current fears or concerns for personal safety.   Patient denies current or recent suicidal ideation or behaviors.   Patient  "denies current or recent homicidal ideation or behaviors.   Patient denies current or recent self injurious behavior or ideation.   Patient denies other safety concerns.   Patient reports there has been no change in risk factors since their last session.     Patient reports there has been no change in protective factors since their last session.     Recommended that patient call 911 or go to the local ED should there be a change in any of these risk factors.     Appearance:   Appropriate    Eye Contact:   Fair    Psychomotor Behavior: slowed    Attitude:   Cooperative  Friendly   Orientation:   Person Place Time Situation   Speech    Rate / Production: Normal/ Responsive Normal     Volume:  Normal    Mood:    Depressed    Affect:    Appropriate    Thought Content:  Clear    Thought Form:  Coherent  Goal Directed    Insight:    Good      Medication Review:   No changes to current psychiatric medication(s)     Medication Compliance:   Yes     Changes in Health Issues:   Yes: Cardiac issues, No Psychological Distress     Chemical Use Review:   Substance Use: Chemical use reviewed, no active concerns identified      Tobacco Use: No current tobacco use.      Diagnoses:   296.31 (F33.0) Major Depressive Disorder, Recurrent Episode, Mild _  300.00 (F41.9) Unspecified Anxiety Disorder    Collateral Reports Completed:   TC Collateral: [facility] records reviewed. and No Collateral obtained.    PLAN: (Patient Tasks / Therapist Tasks / Other)  Continue to follow health plan to meed cardiac treatment needs. Obtain and read 'Seven Principles for Making marriage Work\" by Nabeel Grove.  Return Ap    Procedure Code: Psychotherapy (with patient) - 45 [CPT 51585]    FRANCESCA Magaña                                                         ______________________________________________________________________    Treatment Plan    Patient's Name: Car Torres  YOB: 1960  Date " "of Creation: 3/27/2024  Date Treatment Plan Last Reviewed/Revised: new    DSM5 Diagnoses: 296.32 (F33.1) Major Depressive Disorder, Recurrent Episode, Moderate With anxious distress  Psychosocial / Contextual Factors: Cardiac problems lead to anxiety and depression.  Cognitive issues may be due to cardiac issues or depression.  Spiritual patient and family. Helps to reduce anxiety increase hope.    PROMIS (reviewed every 90 days): 23 score continues to be reflective of cardiac concerns and resulting surgery to place stints.     Referral / Collaboration:  Referral to another professional/service is not indicated at this time..    Anticipated number of session for this episode of care: 3-6 sessions  Anticipation frequency of session: Monthly   or as needed  Anticipated Duration of each session: 38-52 minutes  Treatment plan will be reviewed in 90 days or when goals have been changed.       MeasurableTreatment Goal(s) related to diagnosis / functional impairment(s)    Goal 1: Patient will report a decrease in depressive symptoms.    I will know I've met my goal when I have more energy and my physical pain is better.        Objective #A (Patient Action)    Patient will describe thoughts, feelings, and actions associated with depression.    Status: New - Date: until discharge          Intervention(s)    Therapist will will explore and process with patient how depression has impacted them.        Goal 2: Patient will decrease level of anxiety as evidenced by SHAHNAZ-7 score <5.    I will know I've met my goal when I don't feel as anxious or reactive and my mind is more peaceful.\"         Objective #A (Patient Action)       Patient will experience improved health.      Patient will use relaxation strategies 3x times per day to reduce the physical symptoms of anxiety.          Status: New  - Date: until discharge          Intervention(s)   Therapist will teach relaxation stategies, grounding and emotional regulation skills such " "as progressive muscle relaxation, deep breathing, meditation, mindfulness skills. Therapist will assign homework for patient to implement relaxation strategies outside of session to feel grounded and decrease agitation.     Objective #B?(Patient Action)??????????????????????????   Patient will?Reduce overall level, frequency and intensity of the anxiety so that daily ?functioning?is not impaired.?   ? I will?know I've met my goal when \" I am feeling more at peace with things.\"?   1.???Patient will??comply medication schedules   2. ??Identify ways that anxiety causes difficulty in managing health care.   3.?? Clarity regarding healthy conversations in relationships versus clearly identifying expectation while ensuring self-care.? ??????????  Status:?Continued - Date: until discharge     ?Intervention(s)?   Therapist will?continue to provide education regarding the impact that stress can have Cardiac Health    Patient and significant other/spouse and provider discussed patient goals and treatment intervention and objectives. .      Maegan Zimmer, [facility]  March 27, 2024    Answers submitted by the patient for this visit:  Patient Health Questionnaire (Submitted on 3/27/2024)  If you checked off any problems, how difficult have these problems made it for you to do your work, take care of things at home, or get along with other people?: Not difficult at all  PHQ9 TOTAL SCORE: 0    "

## 2024-03-28 ENCOUNTER — HOSPITAL ENCOUNTER (OUTPATIENT)
Dept: CARDIAC REHAB | Facility: CLINIC | Age: 64
Discharge: HOME | End: 2024-03-28
Attending: HOSPITALIST
Payer: MEDICARE

## 2024-03-28 DIAGNOSIS — I20.89 ANGINAL EQUIVALENT (H): ICD-10-CM

## 2024-03-28 PROCEDURE — 93797 PHYS/QHP OP CAR RHAB WO ECG: CPT | Mod: XU

## 2024-03-28 PROCEDURE — 93798 PHYS/QHP OP CAR RHAB W/ECG: CPT

## 2024-04-01 ENCOUNTER — HOSPITAL ENCOUNTER (OUTPATIENT)
Dept: CARDIAC REHAB | Facility: CLINIC | Age: 64
Discharge: HOME | End: 2024-04-01
Attending: HOSPITALIST
Payer: MEDICARE

## 2024-04-01 PROCEDURE — 93798 PHYS/QHP OP CAR RHAB W/ECG: CPT

## 2024-04-01 NOTE — PROGRESS NOTES
[facility] Neurology Follow Up Visit    Car Torres MRN# [id number]   Age: 63 year old YOB: 1960     Brief history of symptoms: The patient was initially seen in neurologic consultation on 12/23/2020 for evaluation of cognitive changes. Please see the comprehensive neurologic consultation note from that date in the Epic records for details.     Interval history:   He was diagnosed with atrial fibrillation. He is now on blood thinners. He has an appointment with EP to discuss possible ablation.     He is here to review recent neuropsychology results. He has been doing better from a cognitive standpoint in the last month. He reports good mood.       Past Medical History:     Patient Active Problem List   Diagnosis    Sciatica    Morbid obesity (H)    Hyperlipidemia LDL goal <130    Hypertension goal BP (blood pressure) < 140/90    CKD (chronic kidney disease) stage 2, GFR 60-89 ml/min    Renal cyst    Diverticulosis of large intestine without hemorrhage    Pulmonary nodule    Coronary artery calcification    History of sinus bradycardia    ED (erectile dysfunction)    TIA (obstructive sleep apnea)    Major depressive disorder, recurrent episode, mild with atypical features (H)    Lisfranc dislocation, left, initial encounter    Lisfranc dislocation, left, subsequent encounter    Unstable angina (H)    Coronary artery disease    MCKNIGHT (dyspnea on exertion)    Status post coronary angiogram    Anginal equivalent (H24)    CAD (coronary artery disease)    ASCVD (arteriosclerotic cardiovascular disease)    Near syncope    Atrial fibrillation with rapid ventricular response (H)     Past Medical History:   Diagnosis Date    Dementia (H)     Depressive disorder     Heart disease 5/8/2018    Hypercholesterolemia     Hypertension goal BP (blood pressure) < 140/90     Nonspecific abnormal electrocardiogram (ECG) (EKG) 08/23/2007    Stress testing normal.     Sleep apnea     uses a c-pap, severe    Status post  coronary angiogram 1/26/2024        Past Surgical History:     Past Surgical History:   Procedure Laterality Date    COLONOSCOPY N/A 12/22/2020    Procedure: COLONOSCOPY, WITH POLYPECTOMY, CLIP;  Surgeon: Mihir Lang MD;  Location: [facility] OR    COLONOSCOPY N/A 1/10/2022    Procedure: COLONOSCOPY, WITH POLYPECTOMY AND BIOPSY;  Surgeon: Mihir Lang MD;  Location:  GI    CV CORONARY ANGIOGRAM N/A 1/26/2024    Procedure: Coronary Angiogram;  Surgeon: Elton Wright MD;  Location:  HEART CARDIAC CATH LAB    CV CORONARY LITHOTRIPSY PCI N/A 3/8/2024    Procedure: Percutaneous Coronary Intervention - Lithotripsy;  Surgeon: Elton Wright MD;  Location:  HEART CARDIAC CATH LAB    CV INSTANTANEOUS WAVE-FREE RATIO N/A 1/26/2024    Procedure: Instantaneous Wave-Free Ratio;  Surgeon: Elton Wright MD;  Location:  HEART CARDIAC CATH LAB    CV INTRAVASULAR ULTRASOUND N/A 3/8/2024    Procedure: Intravascular Ultrasound;  Surgeon: Elton Wright MD;  Location: [facility] CARDIAC CATH LAB    CV PCI N/A 3/8/2024    Procedure: Percutaneous Coronary Intervention;  Surgeon: Elton Wright MD;  Location:  HEART CARDIAC CATH LAB    CV RIGHT HEART CATH MEASUREMENTS RECORDED N/A 1/26/2024    Procedure: Right Heart Catheterization;  Surgeon: Elton Wright MD;  Location: [facility] CARDIAC CATH LAB    ESOPHAGOSCOPY, GASTROSCOPY, DUODENOSCOPY (EGD), COMBINED N/A 1/28/2021    Procedure: ESOPHAGOGASTRODUODENOSCOPY, WITH BIOPSY;  Surgeon: Mihir Lang MD;  Location: [facility] OR    OPEN REDUCTION INTERNAL FIXATION FOOT Left 1/5/2023    Procedure: LISFRANC OPEN REDUCTION INTERNAL FIXATION, LEFT FOOT;  Surgeon: Bailey Ruiz DPM;  Location:  OR    SURGICAL HISTORY OF -       surgery on left index finger        Social History:     Social History     Tobacco Use    Smoking status: Former     Packs/day: 1.00     Years: 25.00     Additional pack years: 0.00     Total pack years: 25.00     Types:  Cigarettes, Cigars     Start date: 1973     Quit date: 1998     Years since quittin.1     Passive exposure: Past    Smokeless tobacco: Never    Tobacco comments:     N/A not a smoker   Vaping Use    Vaping Use: Never used   Substance Use Topics    Alcohol use: Yes     Alcohol/week: 10.0 standard drinks of alcohol     Comment: less than 6 per week    Drug use: Not Currently     Types: Amphetamines     Comment: Kratum        Family History:     Family History   Problem Relation Age of Onset    Cancer Mother         uterine    Other Cancer Mother         endometrial    Cerebrovascular Disease Mother     Substance Abuse Mother         Alcohol    C.A.D. Father     Hypertension Father     Hyperlipidemia Father     Breast Cancer Maternal Grandmother     Other Cancer Maternal Grandmother         Lung/brain    Substance Abuse Paternal Grandfather     Hypertension Brother     Substance Abuse Brother         Alcohol    Hyperlipidemia Brother     Breast Cancer Other     Breast Cancer Cousin     Other Cancer Other     Cerebrovascular Disease Other         Maternal Aunt    Glaucoma No family hx of     Macular Degeneration No family hx of         Medications:     Current Outpatient Medications   Medication Sig    apixaban ANTICOAGULANT (ELIQUIS ANTICOAGULANT) 5 MG tablet Take 1 tablet (5 mg) by mouth 2 times daily    Apple Wyatt Vn-Grn Tea-Bit Or-Cr (APPLE CIDER VINEGAR PLUS) TABS Take 1 tablet by mouth daily With calcium and magnesium    aspirin 81 MG EC tablet Take 1 tablet (81 mg) by mouth daily Start tomorrow morning.    atorvastatin (LIPITOR) 40 MG tablet Take 1 tablet (40 mg) by mouth daily    busPIRone (BUSPAR) 10 MG tablet Take 2 tablets (20 mg) by mouth 2 times daily    Cholecalciferol (VITAMIN D PO) Take 5,000 Units by mouth daily One tablet twice daily    co-enzyme Q-10 100 MG CAPS capsule Take 100 mg by mouth daily    FLUoxetine (PROZAC) 40 MG capsule Take 1 capsule (40 mg) by mouth daily    hydrOXYzine HCl  "(ATARAX) 10 MG tablet TAKE 1 TABLET (10 MG) BY MOUTH 2 TIMES DAILY AS NEEDED FOR ANXIETY    lisinopril (ZESTRIL) 20 MG tablet Take 1 tablet (20 mg) by mouth daily    metoprolol tartrate (LOPRESSOR) 25 MG tablet Take 1 tablet (25 mg) by mouth every 8 hours as needed (Atrial fibrillation with RVR, HR > 120)    MULTI VITAMIN MENS PO Take 1 tablet by mouth daily    ticagrelor (BRILINTA) 90 MG tablet Take 1 tablet (90 mg) by mouth 2 times daily Start this evening.    vitamin B complex with vitamin C (STRESS TAB) tablet Take 1 tablet by mouth daily     No current facility-administered medications for this visit.        Allergies:   No Known Allergies     Review of Systems:   As above     Physical Exam:   Vitals: BP (!) 143/70   Pulse (!) 46   Ht 1.778 m (5' 10\")   Wt (!) 158.3 kg (349 lb)   BMI 50.08 kg/m     Repeat blood pressure normal  General: Seated comfortably in no acute distress.  Lungs: breathing comfortably  Neurologic:     Mental Status: Alert. Attentive     Data reviewed on previous visits    Imaging:  MRI brain 10/16/2020  IMPRESSION:  1. Exam mildly limited by motion artifact.  2. Few minimal nonspecific white matter lesions.  3. No evidence for intracranial hemorrhage, acute infarct, or any  focal mass lesions.     Laboratory:  TSH, CMP wnl 8/2020  B12, CBC wnl 12/2020    PET brain 4/2021  IMPRESSION:  No significant metabolic deficits when compared to  control database. This may be due to the possible underlying dementia  being too early to characterize, versus the patient's symptomatology  being due to some other process than dementia.    Labs 9/2023 - [facility] Alzheimer's disease evaluation normal, Autoimmune encephalopathy panel pending, WBC 0, normal protein/glucose, Neurofilament light chain normal    Pertinent Investigations since last visit:   None         Assessment and Plan:   Assessment:  Patient is a 62 y/o male who presents for follow-up of cognitive changes. Cognitive changes of paranoia, " hallucinations, delusions developed initially around spring 2020, with significant worsening by fall 2020. He had no prior history of psychiatric disease. Initial neuropsychological testing showed dysfunction in frontal and subcortical networks. There was concern for a neurodegenerative process given presentation. Thankfully PET scan and CSF testing has been reassuring without evidence of neurodegenerative process and neuropsychology testing last month showed improvement in some areas. At this point we have a lower suspicion of neurodegenerative process and presentation is more suggestive of mood disorder with psychotic features in the setting of drug use and possible life long dyslexia. He is following with psychiatry for mood. We discussed following up in a year to re-evaluate or sooner if new issues develop in the interim.     Plan:  - Continue to follow-up with psychiatrist   - Call with new/worsening symptoms    Follow up in Neurology clinic in 1 year or sooner if new issues arise    Miguel Varela MD   of Neurology  [facility]      The total time of this encounter today amounted to 26 minutes. This time included time spent with the patient, prep work, ordering tests, and performing post visit documentation.

## 2024-04-02 ENCOUNTER — OFFICE VISIT (OUTPATIENT)
Dept: NEUROLOGY | Facility: CLINIC | Age: 64
End: 2024-04-02
Payer: MEDICARE

## 2024-04-02 VITALS
WEIGHT: 315 LBS | HEART RATE: 46 BPM | DIASTOLIC BLOOD PRESSURE: 70 MMHG | BODY MASS INDEX: 45.1 KG/M2 | HEIGHT: 70 IN | SYSTOLIC BLOOD PRESSURE: 143 MMHG

## 2024-04-02 DIAGNOSIS — R46.89 COGNITIVE AND BEHAVIORAL CHANGES: Primary | ICD-10-CM

## 2024-04-02 DIAGNOSIS — R41.89 COGNITIVE AND BEHAVIORAL CHANGES: Primary | ICD-10-CM

## 2024-04-02 PROCEDURE — 99213 OFFICE O/P EST LOW 20 MIN: CPT | Performed by: INTERNAL MEDICINE

## 2024-04-02 NOTE — LETTER
4/2/2024         RE: Car Torres  [address]  [place] [address]        Dear Colleague,    Thank you for referring your patient, Car Torres, to the [facility] [place]. Please see a copy of my visit note below.    [facility] Neurology Follow Up Visit    Car Torres MRN# [id number]   Age: 63 year old YOB: 1960     Brief history of symptoms: The patient was initially seen in neurologic consultation on 12/23/2020 for evaluation of cognitive changes. Please see the comprehensive neurologic consultation note from that date in the Epic records for details.     Interval history:   He was diagnosed with atrial fibrillation. He is now on blood thinners. He has an appointment with EP to discuss possible ablation.     He is here to review recent neuropsychology results. He has been doing better from a cognitive standpoint in the last month. He reports good mood.       Past Medical History:     Patient Active Problem List   Diagnosis     Sciatica     Morbid obesity (H)     Hyperlipidemia LDL goal <130     Hypertension goal BP (blood pressure) < 140/90     CKD (chronic kidney disease) stage 2, GFR 60-89 ml/min     Renal cyst     Diverticulosis of large intestine without hemorrhage     Pulmonary nodule     Coronary artery calcification     History of sinus bradycardia     ED (erectile dysfunction)     TIA (obstructive sleep apnea)     Major depressive disorder, recurrent episode, mild with atypical features (H)     Lisfranc dislocation, left, initial encounter     Lisfranc dislocation, left, subsequent encounter     Unstable angina (H)     Coronary artery disease     MCKNIGHT (dyspnea on exertion)     Status post coronary angiogram     Anginal equivalent (H24)     CAD (coronary artery disease)     ASCVD (arteriosclerotic cardiovascular disease)     Near syncope     Atrial fibrillation with rapid ventricular response (H)     Past Medical History:   Diagnosis Date      Dementia (H)      Depressive disorder      Heart disease 5/8/2018     Hypercholesterolemia      Hypertension goal BP (blood pressure) < 140/90      Nonspecific abnormal electrocardiogram (ECG) (EKG) 08/23/2007    Stress testing normal.      Sleep apnea     uses a c-pap, severe     Status post coronary angiogram 1/26/2024        Past Surgical History:     Past Surgical History:   Procedure Laterality Date     COLONOSCOPY N/A 12/22/2020    Procedure: COLONOSCOPY, WITH POLYPECTOMY, CLIP;  Surgeon: Mihir Lang MD;  Location: [facility] OR     COLONOSCOPY N/A 1/10/2022    Procedure: COLONOSCOPY, WITH POLYPECTOMY AND BIOPSY;  Surgeon: Mihir Lang MD;  Location:  GI     CV CORONARY ANGIOGRAM N/A 1/26/2024    Procedure: Coronary Angiogram;  Surgeon: Elton Wright MD;  Location: [facility] CARDIAC CATH LAB     CV CORONARY LITHOTRIPSY PCI N/A 3/8/2024    Procedure: Percutaneous Coronary Intervention - Lithotripsy;  Surgeon: Elton Wright MD;  Location:  HEART CARDIAC CATH LAB     CV INSTANTANEOUS WAVE-FREE RATIO N/A 1/26/2024    Procedure: Instantaneous Wave-Free Ratio;  Surgeon: Elton Wright MD;  Location:  HEART CARDIAC CATH LAB     CV INTRAVASULAR ULTRASOUND N/A 3/8/2024    Procedure: Intravascular Ultrasound;  Surgeon: Elton Wright MD;  Location: [facility] CARDIAC CATH LAB     CV PCI N/A 3/8/2024    Procedure: Percutaneous Coronary Intervention;  Surgeon: Elton Wright MD;  Location:  HEART CARDIAC CATH LAB     CV RIGHT HEART CATH MEASUREMENTS RECORDED N/A 1/26/2024    Procedure: Right Heart Catheterization;  Surgeon: Elton Wright MD;  Location: [facility] CARDIAC CATH LAB     ESOPHAGOSCOPY, GASTROSCOPY, DUODENOSCOPY (EGD), COMBINED N/A 1/28/2021    Procedure: ESOPHAGOGASTRODUODENOSCOPY, WITH BIOPSY;  Surgeon: Mihir Lang MD;  Location: [facility] OR     OPEN REDUCTION INTERNAL FIXATION FOOT Left 1/5/2023    Procedure: LISFRANC OPEN REDUCTION INTERNAL FIXATION, LEFT FOOT;  Surgeon:  Bailey Ruiz DPM;  Location: [facility] OR     SURGICAL HISTORY OF -       surgery on left index finger        Social History:     Social History     Tobacco Use     Smoking status: Former     Packs/day: 1.00     Years: 25.00     Additional pack years: 0.00     Total pack years: 25.00     Types: Cigarettes, Cigars     Start date: 1973     Quit date: 1998     Years since quittin.1     Passive exposure: Past     Smokeless tobacco: Never     Tobacco comments:     N/A not a smoker   Vaping Use     Vaping Use: Never used   Substance Use Topics     Alcohol use: Yes     Alcohol/week: 10.0 standard drinks of alcohol     Comment: less than 6 per week     Drug use: Not Currently     Types: Amphetamines     Comment: Kratum        Family History:     Family History   Problem Relation Age of Onset     Cancer Mother         uterine     Other Cancer Mother         endometrial     Cerebrovascular Disease Mother      Substance Abuse Mother         Alcohol     C.A.D. Father      Hypertension Father      Hyperlipidemia Father      Breast Cancer Maternal Grandmother      Other Cancer Maternal Grandmother         Lung/brain     Substance Abuse Paternal Grandfather      Hypertension Brother      Substance Abuse Brother         Alcohol     Hyperlipidemia Brother      Breast Cancer Other      Breast Cancer Cousin      Other Cancer Other      Cerebrovascular Disease Other         Maternal Aunt     Glaucoma No family hx of      Macular Degeneration No family hx of         Medications:     Current Outpatient Medications   Medication Sig     apixaban ANTICOAGULANT (ELIQUIS ANTICOAGULANT) 5 MG tablet Take 1 tablet (5 mg) by mouth 2 times daily     Apple Wyatt Vn-Grn Tea-Bit Or-Cr (APPLE CIDER VINEGAR PLUS) TABS Take 1 tablet by mouth daily With calcium and magnesium     aspirin 81 MG EC tablet Take 1 tablet (81 mg) by mouth daily Start tomorrow morning.     atorvastatin (LIPITOR) 40 MG tablet Take 1 tablet (40 mg) by mouth daily      "busPIRone (BUSPAR) 10 MG tablet Take 2 tablets (20 mg) by mouth 2 times daily     Cholecalciferol (VITAMIN D PO) Take 5,000 Units by mouth daily One tablet twice daily     co-enzyme Q-10 100 MG CAPS capsule Take 100 mg by mouth daily     FLUoxetine (PROZAC) 40 MG capsule Take 1 capsule (40 mg) by mouth daily     hydrOXYzine HCl (ATARAX) 10 MG tablet TAKE 1 TABLET (10 MG) BY MOUTH 2 TIMES DAILY AS NEEDED FOR ANXIETY     lisinopril (ZESTRIL) 20 MG tablet Take 1 tablet (20 mg) by mouth daily     metoprolol tartrate (LOPRESSOR) 25 MG tablet Take 1 tablet (25 mg) by mouth every 8 hours as needed (Atrial fibrillation with RVR, HR > 120)     MULTI VITAMIN MENS PO Take 1 tablet by mouth daily     ticagrelor (BRILINTA) 90 MG tablet Take 1 tablet (90 mg) by mouth 2 times daily Start this evening.     vitamin B complex with vitamin C (STRESS TAB) tablet Take 1 tablet by mouth daily     No current facility-administered medications for this visit.        Allergies:   No Known Allergies     Review of Systems:   As above     Physical Exam:   Vitals: BP (!) 143/70   Pulse (!) 46   Ht 1.778 m (5' 10\")   Wt (!) 158.3 kg (349 lb)   BMI 50.08 kg/m     Repeat blood pressure normal  General: Seated comfortably in no acute distress.  Lungs: breathing comfortably  Neurologic:     Mental Status: Alert. Attentive     Data reviewed on previous visits    Imaging:  MRI brain 10/16/2020  IMPRESSION:  1. Exam mildly limited by motion artifact.  2. Few minimal nonspecific white matter lesions.  3. No evidence for intracranial hemorrhage, acute infarct, or any  focal mass lesions.     Laboratory:  TSH, CMP wnl 8/2020  B12, CBC wnl 12/2020    PET brain 4/2021  IMPRESSION:  No significant metabolic deficits when compared to  control database. This may be due to the possible underlying dementia  being too early to characterize, versus the patient's symptomatology  being due to some other process than dementia.    Labs 9/2023 - [facility] Alzheimer[facility] " disease evaluation normal, Autoimmune encephalopathy panel pending, WBC 0, normal protein/glucose, Neurofilament light chain normal    Pertinent Investigations since last visit:   None         Assessment and Plan:   Assessment:  Patient is a 62 y/o male who presents for follow-up of cognitive changes. Cognitive changes of paranoia, hallucinations, delusions developed initially around spring 2020, with significant worsening by fall 2020. He had no prior history of psychiatric disease. Initial neuropsychological testing showed dysfunction in frontal and subcortical networks. There was concern for a neurodegenerative process given presentation. Thankfully PET scan and CSF testing has been reassuring without evidence of neurodegenerative process and neuropsychology testing last month showed improvement in some areas. At this point we have a lower suspicion of neurodegenerative process and presentation is more suggestive of mood disorder with psychotic features in the setting of drug use and possible life long dyslexia. He is following with psychiatry for mood. We discussed following up in a year to re-evaluate or sooner if new issues develop in the interim.     Plan:  - Continue to follow-up with psychiatrist   - Call with new/worsening symptoms    Follow up in Neurology clinic in 1 year or sooner if new issues arise    Miguel Varela MD   of Neurology  [facility]      The total time of this encounter today amounted to 26 minutes. This time included time spent with the patient, prep work, ordering tests, and performing post visit documentation.      Again, thank you for allowing me to participate in the care of your patient.        Sincerely,        Miguel Varela MD

## 2024-04-02 NOTE — NURSING NOTE
"Car Torres's goals for this visit include:   Chief Complaint   Patient presents with    RECHECK     Review neuro psych results        He requests these members of his care team be copied on today's visit information: yes    PCP: Mihir Perez    Referring Provider:  Mihir Perez MD  [address] [facility]  [place] [address]    BP (!) 143/70   Pulse (!) 46   Ht 1.778 m (5' 10\")   Wt (!) 158.3 kg (349 lb)   BMI 50.08 kg/m      Do you need any medication refills at today's visit? No  SERGIO Harirs., CMA ([facility])      "

## 2024-04-03 ENCOUNTER — HOSPITAL ENCOUNTER (OUTPATIENT)
Dept: CARDIAC REHAB | Facility: CLINIC | Age: 64
Discharge: HOME | End: 2024-04-03
Attending: HOSPITALIST
Payer: MEDICARE

## 2024-04-03 DIAGNOSIS — F41.1 GAD (GENERALIZED ANXIETY DISORDER): ICD-10-CM

## 2024-04-03 PROCEDURE — 93798 PHYS/QHP OP CAR RHAB W/ECG: CPT

## 2024-04-03 NOTE — TELEPHONE ENCOUNTER
Date of Last Office Visit: 12/22/24  Date of Next Office Visit: 4/11/24  No shows since last visit: 0  Cancellations since last visit: 0    Medication requested: hydrOXYzine HCl (ATARAX) 10 MG tablet  Date last ordered: 1/4/24 Qty: 180 Refills: 0     Review of [place] ?: NA    Lapse in medication adherence greater than 5 days?: No  If yes, call patient and gather details: na  Medication refill request verified as identical to current order?: yes  Result of Last DAM, VPA, Li+ Level, CBC, or Carbamazepine Level (at or since last visit): N/A    Last visit treatment plan: 1.  Continue buspirone 20 mg 2 times daily  2.  Continue fluoxetine 40 mg daily  3.  Continue hydroxyzine 10 mg up to twice daily as needed for anxiety  4.  Referral made for psychological testing to clarify diagnosis  Schedule an appointment with me in April or sooner as needed.        []Medication refilled per  Medication Refill in Ambulatory Care  policy.  [x]Medication unable to be refilled by RN due to criteria not met as indicated below:    []Eligibility - not seen in the last year   []Supervision - no future appointment   []Compliance - no shows, cancellations or lapse in therapy   []Verification - order discrepancy   []Controlled medication   []Medication not included in policy   [x]90-day supply request   []Other

## 2024-04-05 ENCOUNTER — HOSPITAL ENCOUNTER (OUTPATIENT)
Dept: CARDIAC REHAB | Facility: CLINIC | Age: 64
Discharge: HOME | End: 2024-04-05
Attending: HOSPITALIST
Payer: MEDICARE

## 2024-04-05 PROCEDURE — 93798 PHYS/QHP OP CAR RHAB W/ECG: CPT

## 2024-04-08 ENCOUNTER — DOCUMENTATION ONLY (OUTPATIENT)
Dept: CARDIOLOGY | Facility: CLINIC | Age: 64
End: 2024-04-08

## 2024-04-08 ENCOUNTER — TELEPHONE (OUTPATIENT)
Dept: SLEEP MEDICINE | Facility: CLINIC | Age: 64
End: 2024-04-08

## 2024-04-08 ENCOUNTER — OFFICE VISIT (OUTPATIENT)
Dept: CARDIOLOGY | Facility: CLINIC | Age: 64
End: 2024-04-08
Payer: MEDICARE

## 2024-04-08 ENCOUNTER — PREP FOR PROCEDURE (OUTPATIENT)
Dept: CARDIOLOGY | Facility: CLINIC | Age: 64
End: 2024-04-08

## 2024-04-08 VITALS
BODY MASS INDEX: 45.1 KG/M2 | RESPIRATION RATE: 16 BRPM | HEIGHT: 70 IN | WEIGHT: 315 LBS | HEART RATE: 75 BPM | DIASTOLIC BLOOD PRESSURE: 68 MMHG | SYSTOLIC BLOOD PRESSURE: 134 MMHG

## 2024-04-08 DIAGNOSIS — I48.0 PAROXYSMAL ATRIAL FIBRILLATION (H): Primary | ICD-10-CM

## 2024-04-08 DIAGNOSIS — I20.0 UNSTABLE ANGINA (H): ICD-10-CM

## 2024-04-08 PROCEDURE — G2211 COMPLEX E/M VISIT ADD ON: HCPCS | Performed by: INTERNAL MEDICINE

## 2024-04-08 PROCEDURE — 99205 OFFICE O/P NEW HI 60 MIN: CPT | Performed by: INTERNAL MEDICINE

## 2024-04-08 RX ORDER — LIDOCAINE 40 MG/G
CREAM TOPICAL
Status: CANCELLED | OUTPATIENT
Start: 2024-04-08

## 2024-04-08 RX ORDER — FENTANYL CITRATE 50 UG/ML
25 INJECTION, SOLUTION INTRAMUSCULAR; INTRAVENOUS
Status: CANCELLED | OUTPATIENT
Start: 2024-05-30

## 2024-04-08 RX ORDER — SODIUM CHLORIDE 9 MG/ML
100 INJECTION, SOLUTION INTRAVENOUS CONTINUOUS
Status: CANCELLED | OUTPATIENT
Start: 2024-05-30

## 2024-04-08 NOTE — PATIENT INSTRUCTIONS
[facility]  Cardiac Electrophysiology  [address]  [place] [address]   Office: [phone]  Fax: [phone]     Thank you for seeing us in clinic today - it is a pleasure to be a part of your care team.  Below is a summary of our plan from today's visit.       You have paroxysmal atrial fibrillation.  We reviewed atrial fibrillation, treatment options (ablation vs antiarrhythmic drug therapy), and long term stroke risk prevention (blood thinner vs Watchman device).    We will plan for the following:  - our office will work with you to coordinate an atrial fibrillation ablation  - continue apixaban (Eliquis)  - consider a Kardia device for intermittent rhythm assessments     Please do not hesitate to be in touch with our office at [phone] with any questions that may arise.       Thank you for trusting us with your care,    Sandee Sheldon MD  Clinical Cardiac Electrophysiology  [facility]  [address]  [place] [address]   Office: [phone]  Fax: [phone]        ATRIAL FIBRILLATION: Patient Information    What is atrial fibrillation?  Atrial fibrillation (AF, A-fib) is a common heart rhythm problem (arrhythmia) occurring within the upper chambers of the heart (the atria).  In normal rhythm, the upper and lower chambers of the heart are electrically driven to contract in a coordinated sequence.  In atrial fibrillation, the atria lose their ability to contract because of rapid and chaotic electrical activity.  The lower chambers of the heart (the ventricles) continue to pump blood throughout the body, though with irregular and often faster rate due to the chaotic activity within the atria.        How do I know if I have atrial fibrillation?   Some people may feel their heart beating faster, harder, or irregularly while in atrial fibrillation.  Others may be lightheaded, fatigued, feel weak or tired or become more  short of breath especially with activities.  Some patients have no symptoms at all.  Atrial fibrillation may be found due to an irregular pulse or on an electrocardiogram (ECG). Atrial fibrillation can start and stop on its own, and episodes can last from seconds to several months.      How common is atrial fibrillation?   An estimated 3-6 million people in the [place] have atrial fibrillation.  Atrial fibrillation is a common heart rhythm problem for older persons, affecting as estimated 12-15% of people over the age of 65 years of age.    What causes atrial fibrillation?   Age is the most important risk factor for atrial fibrillation.  Atrial fibrillation is more common in people with other heart disease, high blood pressure, diabetes, obesity, sleep apnea and in people who regularly consume alcohol.  Surgery, lung disease, or thyroid problems can lead to atrial fibrillation.  Atrial fibrillation has multiple possible causes, and in most cases a single cause cannot be found.  Atrial fibrillation is a progressive condition, usually starting with at an early stage with short and infrequent episodes.  In later stages of disease, more frequent and longer lasting episodes of atrial fibrillation occur, ultimately culminating in episodes which do not spontaneously terminate.  Generally, more enlargement and scarring within the upper chambers of the heart is observed as atrial fibrillation progresses from early to late-stage disease.    How is atrial fibrillation diagnosed and evaluated?    Because of its start-stop nature, atrial fibrillation can be challenging to diagnose.  Atrial fibrillation is most commonly diagnosed via cardiac rhythm recordings - either an ECG or wearable cardiac rhythm monitor.  For patients with pacemakers, defibrillators or implantable loop recorders, atrial fibrillation may be recorded via these devices.  Recently, commercially available devices (eg. Apple Watch, Kardia device, certain  [employer] devices, others) can allow patients to take 30 second cardiac rhythm recordings which may document atrial fibrillation.  Once atrial fibrillation is diagnosed, additional tests include blood tests and an echocardiogram.  The echocardiogram uses ultrasound to look at your heart to assess your cardiac function and evaluate for other heart disease.  Additional evaluation may include CT or MRI studies.    Is atrial fibrillation dangerous?   Atrial fibrillation is not usually a life-threatening arrhythmia.  The most serious consequences of atrial fibrillation including stroke and worsening of overall cardiac function.  While in atrial fibrillation, the upper cardiac chambers do not contract normally, resulting in slower blood flow and increased risk of clot formation.  If this blood clot becomes detached from the heart a stroke can occur.  Unfortunately, stroke can be the first sign of atrial fibrillation for some people.  With a stroke, you may notice abnormal sensation, weakness on one side of the body or face, changes in your vision or speech.  If you have any of these signs, you should contact EMS and be evaluated in an emergency room as soon as possible.      How is atrial fibrillation treated?     Several treatment options exist for suppressing atrial fibrillation - however, it is not an easily curable arrhythmia.  The first goal in managing atrial fibrillation is to minimize stroke risk.  The second goal is to improve symptoms associated with atrial fibrillation.  Finally, in patients with reduced cardiac function, maintaining normal rhythm can help improve cardiac function.      Blood thinners are used to reduce the risk of stroke in patients with high estimated stroke risk related to atrial fibrillation.  For patients at higher risk of bleeding related to blood thinner use, implantable devices may be an option to reduce stroke risk without the need for long term blood thinner use.      Atrial fibrillation  can be managed via two strategies: rate control and rhythm control.  In a rate control strategy, continued atrial fibrillation is accepted and medications (eg. beta-blockers or calcium channel blockers) are used to control the lower chamber rate.  In a rhythm control strategy, anti-arrhythmic medications or catheter ablation are used to maintain normal cardiac rhythm and slow disease progression by suppressing atrial fibrillation.  A procedure called a cardioversion, in which an electric shock is delivered through patches placed on the chest wall while under deep sedation, can be performed to temporarily restore normal cardiac rhythm, though does not address the chance of atrial fibrillation recurrence.  Treatments are more effective for earlier rather than later stage atrial fibrillation.  Lifestyle modifications (maintaining a healthy weight, aerobic exercise, diagnosing and treating sleep apnea, and minimizing alcohol intake) are important elements of atrial fibrillation rhythm control.     What is catheter ablation for atrial fibrillation?  Cardiac catheter ablation is a commonly performed, minimally invasive procedure performed by a cardiac electrophysiologist to treat many different cardiac rhythm abnormalities.  During catheter ablation, long, thin, flexible tubes are advanced into the heart via small sheaths inserted into the femoral veins and thermal energy (either heating or cooling) is applied within the heart to disrupt abnormal electrical activity.  Atrial fibrillation ablation is performed under general anesthesia, with procedures generally taking approximately 2-3 hours.  Patients are typically observed for 3-5 hours after the ablation, and in most cases can be discharged home the same day.  Atrial fibrillation ablation is associated with better outcomes (mortality, cardiovascular hospitalizations, atrial arrhythmia recurrences) compared to antiarrhythmic drug therapy.  However, atrial fibrillation  recurrences are not uncommon, and repeat catheter ablation may be offered.  Your electrophysiology team can review atrial fibrillation ablation, anticipated success rates, risks, and recovery expectations with you.    What are anti-arrhythmic medications?  Anti-arrhythmic medications are specialized drugs which alter cardiac electrical functioning to suppress arrhythmias.  There are several anti-arrhythmic medications available, each with its own success rate and side effects.  Some anti-arrhythmic medications are less effective though safer to use, others are more effective though have serious potential toxicities.  Atrial fibrillation recurrences are common and may require dose adjustment or change in antiarrhythmic therapy.  Your electrophysiology team will carefully consider which medication would be the best and safest for your particular case.      Can I live a normal life?    The goal of atrial fibrillation management is for patients to live normal lives without being limited by symptoms related to atrial fibrillation.    Are any additional educational resources available?  There are a number of excellent atrial fibrillation education resources available to you online.  A few options you may wish to review include:  hrsonline.org/guide-atrial-fibrillation  afibmatters.org  getsmartaboutafib.com  stopaf.com    What comes next?    Consider your management options and let us know how we can help in your decision process.  Please take medications as they have been prescribed.  You should also get any tests that may have been ordered for you.      When to Call Your Doctor or seek emergency care:  Call your doctor or seek emergency care if you have any significant changes with the following:  Weakness  Dizziness  Fainting  Fatigue  Shortness of breath  Chest pain with increased activity  If you are concerned that your heart rate is too fast or too slow  Bleeding that does not stop in 10 minutes  Coughing or  throwing up blood  Bloody diarrhea or bleeding hemorrhoids  Dark-colored urine or black stool  Allergic reactions:  Rash  Itching  Swelling  Trouble breathing or swallowing      Please call the [facility] at [phone] if you have concerns about your symptoms, your medicines, or your follow-up appointments.

## 2024-04-08 NOTE — TELEPHONE ENCOUNTER
Pt was called to reschedule appt on 5/24 with Jamarcus Solorio. LVM for them to call back. [employer] message sent also.

## 2024-04-08 NOTE — PROGRESS NOTES
[facility]  Cardiac Electrophysiology  [address]  [place] [address]   Office: [phone]  Fax: [phone]     Patient: Car Torres   : 1960     Referring Provider: Elton Wright MD  Primary Care Provider: Mihir Perez MD    CHIEF COMPLAINT/REASON FOR VISIT  Paroxysmal atrial fibrillation      Assessment/Recommendations   Car Torres is a 63 year old male with paroxysmal atrial fibrillation, sinus node dysfunction, CAD with LAD PCI (3/8/2024), HTN, obesity, TIA referred by Dr. Wright for consultation regarding atrial fibrillation.    [id number]/Paroxysmal atrial fibrillation - symptomatic with palpitations.  Co-existing sinus node dysfunction  [id number]  We reviewed atrial fibrillation physiology, managing stroke risk, cardioversion, antiarrhythmic drug therapy, and catheter ablation.  We discussed atrial fibrillation ablation procedures, anticipated success rates, the potential need for re-do ablation vs addition of anti-arrhythmic drugs, procedural risks (including groin bleeding, tamponade, phrenic or esophageal injury, stroke, pulmonary vein stenosis) and recovery expectations.  He would prefer catheter ablation  - PVI, assessment for non-PV triggers, general anesthesia, continue apixaban.  May require straight catheterization post procedure.  - continue metoprolol 25mg every 8hrs as needed  - continue apixaban 5mg twice daily.  Suggest discussion regarding stopping aspirin or ticagrelor at upcoming visit 2024.  - consider a Kardia device  - we discussed the ongoing importance of lifestyle modification (maintaining a healthy weight, aerobic activity, sleep apnea diagnosis and management, alcohol avoidance) as part of a long term strategy for atrial fibrillation management    Sinus node dysfunction - mild-moderate, asymptomatic, perhaps limiting medical therapy for AF  - expectant management    Follow up: as above    "        History of Present Illness   Car Torres is a 63 year old male with paroxysmal atrial fibrillation, CAD with LAD PCI (3/8/2024), HTN, obesity, TIA referred by Dr. Wright for consultation regarding atrial fibrillation.    Mr. Torres's atrial fibrillation history is as summarized below:  Symptoms: palpitations  Symptom onset date: paroxysmal around 2022  Diagnosis date: 3/17/2024  Admissions/ER visits: 3/17/2024 (spontaneous conversion to SR), 3/22/2024 (DCCV)  Prior medical therapies: no prior AADs  Prior DCCVs: 3/22/2024  Prior ablations: none  Percutaneous left atrial appendage occlusion: none    He notes recurrent AF yesterday evening..  He denies chest pain, syncope.  He has had issues with urinary retention following PCI.       Physical Examination  Review of Systems   VITALS: /68 (BP Location: Left arm, Patient Position: Sitting, Cuff Size: Adult Large)   Pulse 75   Resp 16   Ht 1.778 m (5' 10\")   Wt (!) 156.5 kg (345 lb)   BMI 49.50 kg/m    Wt Readings from Last 3 Encounters:   04/02/24 (!) 158.3 kg (349 lb)   03/22/24 (!) 158.3 kg (349 lb)   03/21/24 (!) 158.3 kg (349 lb)     CONSTITUTIONAL: well nourished, comfortable, no distress  EYES:  Conjunctivae pink, sclerae clear.    E/N/T:  Oral mucosa pink  RESPIRATORY:  Respiratory effort is normal  CARDIOVASCULAR:  irregular, normal S1 and S2  GASTROINTESTINAL:  Abdomen without masses or tenderness  EXTREMITIES:  No clubbing or cyanosis.    MUSCULOSKELETAL:  Overall grossly normal muscle strength  SKIN:  Overall, skin warm and dry, no lesions.  NEURO/PSYCH:  Oriented x 3 with normal affect.   Constitutional:  No weight loss or loss of appetite    Eyes:  No difficulty with vision, no double vision, no dry eyes  ENT:  No sore throat, difficulty swallowing; changes in hearing or tinnitus  Cardiovascular: As detailed above  Respiratory:  No cough  Musculoskeletal  No joint pain, muscle aches  Neurologic:  No syncope, lightheadedness, " fainting spells   Hematologic: No easy bruising, excessive bleeding tendency   Gastrointestinal:  No jaundice, abdominal pain or abdominal bloating  Genitourinary: No changes in urinary habits, no trouble urinating    Psychiatric: No anxiety or depression      Medical History  Surgical History   Past Medical History:   Diagnosis Date    Dementia (H)     Depressive disorder     Heart disease 5/8/2018    Hypercholesterolemia     Hypertension goal BP (blood pressure) < 140/90     Nonspecific abnormal electrocardiogram (ECG) (EKG) 08/23/2007    Stress testing normal.     Sleep apnea     uses a c-pap, severe    Status post coronary angiogram 1/26/2024    Past Surgical History:   Procedure Laterality Date    COLONOSCOPY N/A 12/22/2020    Procedure: COLONOSCOPY, WITH POLYPECTOMY, CLIP;  Surgeon: Mihir Lang MD;  Location: [facility] OR    COLONOSCOPY N/A 1/10/2022    Procedure: COLONOSCOPY, WITH POLYPECTOMY AND BIOPSY;  Surgeon: Mihir Lang MD;  Location:  GI    CV CORONARY ANGIOGRAM N/A 1/26/2024    Procedure: Coronary Angiogram;  Surgeon: Elton Wright MD;  Location:  HEART CARDIAC CATH LAB    CV CORONARY LITHOTRIPSY PCI N/A 3/8/2024    Procedure: Percutaneous Coronary Intervention - Lithotripsy;  Surgeon: Elton Wright MD;  Location:  HEART CARD[facility] CATH LAB    CV INSTANTANEOUS WAVE-FREE RATIO N/A 1/26/2024    Procedure: Instantaneous Wave-Free Ratio;  Surgeon: Elton Wright MD;  Location:  HEART CARDIAC CATH LAB    CV INTRAVASULAR ULTRASOUND N/A 3/8/2024    Procedure: Intravascular Ultrasound;  Surgeon: Elton Wright MD;  Location:  HEART CARDIAC CATH LAB    CV PCI N/A 3/8/2024    Procedure: Percutaneous Coronary Intervention;  Surgeon: Elton Wright MD;  Location: [facility] CARDIAC CATH LAB    CV RIGHT HEART CATH MEASUREMENTS RECORDED N/A 1/26/2024    Procedure: Right Heart Catheterization;  Surgeon: Elton Wright MD;  Location: [facility] CARDIAC CATH LAB    ESOPHAGOSCOPY,  GASTROSCOPY, DUODENOSCOPY (EGD), COMBINED N/A 2021    Procedure: ESOPHAGOGASTRODUODENOSCOPY, WITH BIOPSY;  Surgeon: Mihir Lang MD;  Location: [facility] OR    OPEN REDUCTION INTERNAL FIXATION FOOT Left 2023    Procedure: LISFRANC OPEN REDUCTION INTERNAL FIXATION, LEFT FOOT;  Surgeon: Bailey Ruiz DPM;  Location: [facility] OR    SURGICAL HISTORY OF -       surgery on left index finger         Family History Social History   Family History   Problem Relation Age of Onset    Cancer Mother         uterine    Other Cancer Mother         endometrial    Cerebrovascular Disease Mother     Substance Abuse Mother         Alcohol    C.A.D. Father     Hypertension Father     Hyperlipidemia Father     Breast Cancer Maternal Grandmother     Other Cancer Maternal Grandmother         Lung/brain    Substance Abuse Paternal Grandfather     Hypertension Brother     Substance Abuse Brother         Alcohol    Hyperlipidemia Brother     Breast Cancer Other     Breast Cancer Cousin     Other Cancer Other     Cerebrovascular Disease Other         Maternal Aunt    Glaucoma No family hx of     Macular Degeneration No family hx of         Social History     Tobacco Use    Smoking status: Former     Packs/day: 1.00     Years: 25.00     Additional pack years: 0.00     Total pack years: 25.00     Types: Cigarettes, Cigars     Start date: 1973     Quit date: 1998     Years since quittin.2     Passive exposure: Past    Smokeless tobacco: Never    Tobacco comments:     N/A not a smoker   Vaping Use    Vaping Use: Never used   Substance Use Topics    Alcohol use: Yes     Alcohol/week: 10.0 standard drinks of alcohol     Comment: less than 6 per week    Drug use: Not Currently     Types: Amphetamines     Comment: Kratum         Medications  Allergies     Current Outpatient Medications:     apixaban ANTICOAGULANT (ELIQUIS ANTICOAGULANT) 5 MG tablet, Take 1 tablet (5 mg) by mouth 2 times daily, Disp: 90 tablet, Rfl: 6    Apple Wyatt  Vn-Grn Tea-Bit Or-Cr (APPLE CIDER VINEGAR PLUS) TABS, Take 1 tablet by mouth daily With calcium and magnesium, Disp: , Rfl:     aspirin 81 MG EC tablet, Take 1 tablet (81 mg) by mouth daily Start tomorrow morning., Disp: 90 tablet, Rfl: 3    atorvastatin (LIPITOR) 40 MG tablet, Take 1 tablet (40 mg) by mouth daily, Disp: 90 tablet, Rfl: 4    busPIRone (BUSPAR) 10 MG tablet, Take 2 tablets (20 mg) by mouth 2 times daily, Disp: 120 tablet, Rfl: 1    Cholecalciferol (VITAMIN D PO), Take 5,000 Units by mouth daily One tablet twice daily, Disp: , Rfl:     co-enzyme Q-10 100 MG CAPS capsule, Take 100 mg by mouth daily, Disp: , Rfl:     FLUoxetine (PROZAC) 40 MG capsule, Take 1 capsule (40 mg) by mouth daily, Disp: 90 capsule, Rfl: 4    hydrOXYzine HCl (ATARAX) 10 MG tablet, TAKE 1 TABLET (10 MG) BY MOUTH 2 TIMES DAILY AS NEEDED FOR ANXIETY, Disp: 180 tablet, Rfl: 0    lisinopril (ZESTRIL) 20 MG tablet, Take 1 tablet (20 mg) by mouth daily, Disp: 90 tablet, Rfl: 3    metoprolol tartrate (LOPRESSOR) 25 MG tablet, Take 1 tablet (25 mg) by mouth every 8 hours as needed (Atrial fibrillation with RVR, HR > 120), Disp: 30 tablet, Rfl: 1    MULTI VITAMIN MENS PO, Take 1 tablet by mouth daily, Disp: , Rfl:     ticagrelor (BRILINTA) 90 MG tablet, Take 1 tablet (90 mg) by mouth 2 times daily Start this evening., Disp: 180 tablet, Rfl: 3    vitamin B complex with vitamin C (STRESS TAB) tablet, Take 1 tablet by mouth daily, Disp: , Rfl:    No Known Allergies       Lab Results    Chemistry CBC Cardiac Enzymes/BNP/TSH/INR   Recent Labs   Lab Test 03/22/24  0545      POTASSIUM 4.2   CHLORIDE 106   CO2 22   *   BUN 21.1   CR 1.00   GFRESTIMATED 84   SHAHID 8.8     Recent Labs   Lab Test 03/22/24  0545 03/22/24  0211 03/18/24  0653   CR 1.00 1.10 1.17          Recent Labs   Lab Test 03/22/24  0211   WBC 6.8   HGB 14.5   HCT 42.3   MCV 90        Recent Labs   Lab Test 03/22/24  0211 03/17/24 2129 03/08/24  1011   HGB 14.5  "13.9  13.9 13.9    No results for input(s): \"TROPONINI\" in the last 72572 hours.  Recent Labs   Lab Test 03/17/24  2129   NTBNPI 128     Recent Labs   Lab Test 03/22/24  0211   TSH 3.98     Recent Labs   Lab Test 03/22/24  0211 03/18/24  0653 03/08/24  1011   INR 0.99 1.07 1.06         Data Review    ECGs (tracings independently reviewed)  3/23/2023 - SR 54bpm, borderline RBBB QRS 118ms  3/22/2024 - AF, ventricular rate 115bpm, incomplete RBBB  3/17/2024 - AF, ventricular rate 114bpm    3/18/2024 TTE  Technically difficult study.Extremely difficult acoustic windows despite the  use of contrast for endcardial border definition.  Global and regional left ventricular function is normal with an EF of 60-65%.  Right ventricular function, chamber size, wall motion, and thickness are  normal.  The inferior vena cava is normal.  No pericardial effusion is present.    3/8/2024 coronary angiography and PCI    Ost Cx lesion is 20% stenosed.    Mid Cx to Dist Cx lesion is 30% stenosed.    3rd Mrg lesion is 20% stenosed.    Prox LAD lesion is 90% stenosed.  Successful planned percutaneous coronary intervention of the proximal LAD lesion with shockwave lithotripsy and 1 drug-eluting stent-Synergy XD 3.0X 24 mm.       Cc: Elton Wright MD, Mihir Perez MD Amila Dilusha William, MD  4/8/2024  1:00 PM      "

## 2024-04-08 NOTE — LETTER
2024    Mihir Perez MD  [address] [place] [address]    RE: Car Torres       Dear Colleague,     I had the pleasure of seeing Car Torres in the [facility].     [facility]  Cardiac Electrophysiology  [address]  [place] [address]   Office: [phone]  Fax: [phone]     Patient: Car Torres   : 1960     Referring Provider: Elton Wright MD  Primary Care Provider: Mihir Perez MD    CHIEF COMPLAINT/REASON FOR VISIT  Paroxysmal atrial fibrillation      Assessment/Recommendations   Car Torres is a 63 year old male with paroxysmal atrial fibrillation, sinus node dysfunction, CAD with LAD PCI (3/8/2024), HTN, obesity, TIA referred by Dr. Wright for consultation regarding atrial fibrillation.    [id number]/Paroxysmal atrial fibrillation - symptomatic with palpitations.  Co-existing sinus node dysfunction  [id number] 3  We reviewed atrial fibrillation physiology, managing stroke risk, cardioversion, antiarrhythmic drug therapy, and catheter ablation.  We discussed atrial fibrillation ablation procedures, anticipated success rates, the potential need for re-do ablation vs addition of anti-arrhythmic drugs, procedural risks (including groin bleeding, tamponade, phrenic or esophageal injury, stroke, pulmonary vein stenosis) and recovery expectations.  He would prefer catheter ablation  - PVI, assessment for non-PV triggers, general anesthesia, continue apixaban.  May require straight catheterization post procedure.  - continue metoprolol 25mg every 8hrs as needed  - continue apixaban 5mg twice daily.  Suggest discussion regarding stopping aspirin or ticagrelor at upcoming visit 2024.  - consider a Kardia device  - we discussed the ongoing importance of lifestyle modification (maintaining a healthy weight, aerobic activity, sleep apnea diagnosis and management, alcohol avoidance) as part of a  "long term strategy for atrial fibrillation management    Sinus node dysfunction - mild-moderate, asymptomatic, perhaps limiting medical therapy for AF  - expectant management    Follow up: as above           History of Present Illness   Car Torres is a 63 year old male with paroxysmal atrial fibrillation, CAD with LAD PCI (3/8/2024), HTN, obesity, TIA referred by Dr. Wright for consultation regarding atrial fibrillation.    Mr. Torres's atrial fibrillation history is as summarized below:  Symptoms: palpitations  Symptom onset date: paroxysmal around 2022  Diagnosis date: 3/17/2024  Admissions/ER visits: 3/17/2024 (spontaneous conversion to SR), 3/22/2024 (DCCV)  Prior medical therapies: no prior AADs  Prior DCCVs: 3/22/2024  Prior ablations: none  Percutaneous left atrial appendage occlusion: none    He notes recurrent AF yesterday evening..  He denies chest pain, syncope.  He has had issues with urinary retention following PCI.       Physical Examination  Review of Systems   VITALS: /68 (BP Location: Left arm, Patient Position: Sitting, Cuff Size: Adult Large)   Pulse 75   Resp 16   Ht 1.778 m (5' 10\")   Wt (!) 156.5 kg (345 lb)   BMI 49.50 kg/m    Wt Readings from Last 3 Encounters:   04/02/24 (!) 158.3 kg (349 lb)   03/22/24 (!) 158.3 kg (349 lb)   03/21/24 (!) 158.3 kg (349 lb)     CONSTITUTIONAL: well nourished, comfortable, no distress  EYES:  Conjunctivae pink, sclerae clear.    E/N/T:  Oral mucosa pink  RESPIRATORY:  Respiratory effort is normal  CARDIOVASCULAR:  irregular, normal S1 and S2  GASTROINTESTINAL:  Abdomen without masses or tenderness  EXTREMITIES:  No clubbing or cyanosis.    MUSCULOSKELETAL:  Overall grossly normal muscle strength  SKIN:  Overall, skin warm and dry, no lesions.  NEURO/PSYCH:  Oriented x 3 with normal affect.   Constitutional:  No weight loss or loss of appetite    Eyes:  No difficulty with vision, no double vision, no dry eyes  ENT:  No sore throat, " difficulty swallowing; changes in hearing or tinnitus  Cardiovascular: As detailed above  Respiratory:  No cough  Musculoskeletal  No joint pain, muscle aches  Neurologic:  No syncope, lightheadedness, fainting spells   Hematologic: No easy bruising, excessive bleeding tendency   Gastrointestinal:  No jaundice, abdominal pain or abdominal bloating  Genitourinary: No changes in urinary habits, no trouble urinating    Psychiatric: No anxiety or depression      Medical History  Surgical History   Past Medical History:   Diagnosis Date    Dementia (H)     Depressive disorder     Heart disease 5/8/2018    Hypercholesterolemia     Hypertension goal BP (blood pressure) < 140/90     Nonspecific abnormal electrocardiogram (ECG) (EKG) 08/23/2007    Stress testing normal.     Sleep apnea     uses a c-pap, severe    Status post coronary angiogram 1/26/2024    Past Surgical History:   Procedure Laterality Date    COLONOSCOPY N/A 12/22/2020    Procedure: COLONOSCOPY, WITH POLYPECTOMY, CLIP;  Surgeon: Mihir Lang MD;  [place] [facility] [place]ONOSCOPY N/A 1/10/2022    Procedure: COLONOSCOPY, WITH POLYPECTOMY AND BIOPSY;  Surgeon: Mihir Lang MD;  Location:  GI    CV CORONARY ANGIOGRAM N/A 1/26/2024    Procedure: Coronary Angiogram;  Surgeon: Elton Wright MD;  Location: [facility] CARDIAC CATH LAB    CV CORONARY LITHOTRIPSY PCI N/A 3/8/2024    Procedure: Percutaneous Coronary Intervention - Lithotripsy;  Surgeon: Elton Wright MD;  Location: [facility] CARDIAC CATH LAB    CV INSTANTANEOUS WAVE-FREE RATIO N/A 1/26/2024    Procedure: Instantaneous Wave-Free Ratio;  Surgeon: Elton Wright MD;  Location: [facility] CARDIAC CATH LAB    CV INTRAVASULAR ULTRASOUND N/A 3/8/2024    Procedure: Intravascular Ultrasound;  Surgeon: Elton Wright MD;  Location: [facility] CARDIAC CATH LAB    CV PCI N/A 3/8/2024    Procedure: Percutaneous Coronary Intervention;  Surgeon: Elton Wright MD;  Location: [facility] CARDIAC  CATH LAB    CV RIGHT HEART CATH MEASUREMENTS RECORDED N/A 2024    Procedure: Right Heart Catheterization;  Surgeon: Elton Wright MD;  Location:  HEART CARDIAC CATH LAB    ESOPHAGOSCOPY, GASTROSCOPY, DUODENOSCOPY (EGD), COMBINED N/A 2021    Procedure: ESOPHAGOGASTRODUODENOSCOPY, WITH BIOPSY;  Surgeon: Mihir Lang MD;  Location: [facility] OR    OPEN REDUCTION INTERNAL FIXATION FOOT Left 2023    Procedure: LISFRANC OPEN REDUCTION INTERNAL FIXATION, LEFT FOOT;  Surgeon: Bailey Ruiz DPM;  Location: [facility] OR    SURGICAL HISTORY OF -       surgery on left index finger         Family History Social History   Family History   Problem Relation Age of Onset    Cancer Mother         uterine    Other Cancer Mother         endometrial    Cerebrovascular Disease Mother     Substance Abuse Mother         Alcohol    C.A.D. Father     Hypertension Father     Hyperlipidemia Father     Breast Cancer Maternal Grandmother     Other Cancer Maternal Grandmother         Lung/brain    Substance Abuse Paternal Grandfather     Hypertension Brother     Substance Abuse Brother         Alcohol    Hyperlipidemia Brother     Breast Cancer Other     Breast Cancer Cousin     Other Cancer Other     Cerebrovascular Disease Other         Maternal Aunt    Glaucoma No family hx of     Macular Degeneration No family hx of         Social History     Tobacco Use    Smoking status: Former     Packs/day: 1.00     Years: 25.00     Additional pack years: 0.00     Total pack years: 25.00     Types: Cigarettes, Cigars     Start date: 1973     Quit date: 1998     Years since quittin.2     Passive exposure: Past    Smokeless tobacco: Never    Tobacco comments:     N/A not a smoker   Vaping Use    Vaping Use: Never used   Substance Use Topics    Alcohol use: Yes     Alcohol/week: 10.0 standard drinks of alcohol     Comment: less than 6 per week    Drug use: Not Currently     Types: Amphetamines     Comment: Kratum          Medications  Allergies     Current Outpatient Medications:     apixaban ANTICOAGULANT (ELIQUIS ANTICOAGULANT) 5 MG tablet, Take 1 tablet (5 mg) by mouth 2 times daily, Disp: 90 tablet, Rfl: 6    Apple Wyatt Vn-Grn Tea-Bit Or-Cr (APPLE CIDER VINEGAR PLUS) TABS, Take 1 tablet by mouth daily With calcium and magnesium, Disp: , Rfl:     aspirin 81 MG EC tablet, Take 1 tablet (81 mg) by mouth daily Start tomorrow morning., Disp: 90 tablet, Rfl: 3    atorvastatin (LIPITOR) 40 MG tablet, Take 1 tablet (40 mg) by mouth daily, Disp: 90 tablet, Rfl: 4    busPIRone (BUSPAR) 10 MG tablet, Take 2 tablets (20 mg) by mouth 2 times daily, Disp: 120 tablet, Rfl: 1    Cholecalciferol (VITAMIN D PO), Take 5,000 Units by mouth daily One tablet twice daily, Disp: , Rfl:     co-enzyme Q-10 100 MG CAPS capsule, Take 100 mg by mouth daily, Disp: , Rfl:     FLUoxetine (PROZAC) 40 MG capsule, Take 1 capsule (40 mg) by mouth daily, Disp: 90 capsule, Rfl: 4    hydrOXYzine HCl (ATARAX) 10 MG tablet, TAKE 1 TABLET (10 MG) BY MOUTH 2 TIMES DAILY AS NEEDED FOR ANXIETY, Disp: 180 tablet, Rfl: 0    lisinopril (ZESTRIL) 20 MG tablet, Take 1 tablet (20 mg) by mouth daily, Disp: 90 tablet, Rfl: 3    metoprolol tartrate (LOPRESSOR) 25 MG tablet, Take 1 tablet (25 mg) by mouth every 8 hours as needed (Atrial fibrillation with RVR, HR > 120), Disp: 30 tablet, Rfl: 1    MULTI VITAMIN MENS PO, Take 1 tablet by mouth daily, Disp: , Rfl:     ticagrelor (BRILINTA) 90 MG tablet, Take 1 tablet (90 mg) by mouth 2 times daily Start this evening., Disp: 180 tablet, Rfl: 3    vitamin B complex with vitamin C (STRESS TAB) tablet, Take 1 tablet by mouth daily, Disp: , Rfl:    No Known Allergies       Lab Results    Chemistry CBC Cardiac Enzymes/BNP/TSH/INR   Recent Labs   Lab Test 03/22/24  0545      POTASSIUM 4.2   CHLORIDE 106   CO2 22   *   BUN 21.1   CR 1.00   GFRESTIMATED 84   SHAHID 8.8     Recent Labs   Lab Test 03/22/24  0545 03/22/24  0211  "03/18/24  0653   CR 1.00 1.10 1.17          Recent Labs   Lab Test 03/22/24  0211   WBC 6.8   HGB 14.5   HCT 42.3   MCV 90        Recent Labs   Lab Test 03/22/24 0211 03/17/24 2129 03/08/24  1011   HGB 14.5 13.9  13.9 13.9    No results for input(s): \"TROPONINI\" in the last [id number] hours.  Recent Labs   Lab Test 03/17/24 2129   NTBNPI 128     Recent Labs   Lab Test 03/22/24  0211   TSH 3.98     Recent Labs   Lab Test 03/22/24  0211 03/18/24  0653 03/08/24  1011   INR 0.99 1.07 1.06         Data Review    ECGs (tracings independently reviewed)  3/23/2023 - SR 54bpm, borderline RBBB QRS 118ms  3/22/2024 - AF, ventricular rate 115bpm, incomplete RBBB  3/17/2024 - AF, ventricular rate 114bpm    3/18/2024 TTE  Technically difficult study.Extremely difficult acoustic windows despite the  use of contrast for endcardial border definition.  Global and regional left ventricular function is normal with an EF of 60-65%.  Right ventricular function, chamber size, wall motion, and thickness are  normal.  The inferior vena cava is normal.  No pericardial effusion is present.    3/8/2024 coronary angiography and PCI    Ost Cx lesion is 20% stenosed.    Mid Cx to Dist Cx lesion is 30% stenosed.    3rd Mrg lesion is 20% stenosed.    Prox LAD lesion is 90% stenosed.  Successful planned percutaneous coronary intervention of the proximal LAD lesion with shockwave lithotripsy and 1 drug-eluting stent-Synergy XD 3.0X 24 mm.       Cc: Elton Wright MD, Mihir Perez MD Amila Dilusha William, MD  4/8/2024  1:00 PM        Thank you for allowing me to participate in the care of your patient.      Sincerely,     Sandee Sheldon MD     [facility]  cc:   Elton Wright MD  [address] [place] [id number]      "

## 2024-04-10 ENCOUNTER — HOSPITAL ENCOUNTER (OUTPATIENT)
Dept: CARDIAC REHAB | Facility: CLINIC | Age: 64
Discharge: HOME | End: 2024-04-10
Attending: HOSPITALIST
Payer: MEDICARE

## 2024-04-10 PROCEDURE — 93797 PHYS/QHP OP CAR RHAB WO ECG: CPT | Mod: XU

## 2024-04-10 PROCEDURE — 93798 PHYS/QHP OP CAR RHAB W/ECG: CPT

## 2024-04-10 NOTE — PROGRESS NOTES
General Cardiology Clinic      HPI: Mr. Car Torres is a 63 year old male presenting to cardiology clinic today for post-PCI follow-up. PMH significant for HTN, HLD, paroxysmal atrial fibrillation, CAD s/p PCI to LAD (3/8/24), TIA on CPAP.    Today in clinic, he denies any current chest pain, dizziness, shortness of breath, MCKNIGHT, or PND. He attends Cardiac rehab three times a week, and says it is going well. Patient denies any activity or exercise limitations at this time. He says he is also actively working on making nutrition changes with the support of this family.    Blood pressures at cardiac rehab are around 120-130, has seen SBP up to the 160's at home in the evening. Patient was taken off hydrochlorothiazide recently, and noticed an uptrend in his SBP since then. He also has noticed some mild edema in his BLE since stopping the hydrochlorothiazide.    Patient had history of palpitations but had not sought treatment until March 2024. Patient was seen by EP after an ER visit in March. Was started on Eliquis and PRN metoprolol. Scheduled for ablation in May. Has a [employer] device to help him monitor. Patient does feel he is going in and out of Afib maybe once a week, lasting for up to 20 hours with heart rates as high as 130. He says he has only taken the PRN metoprolol once, and his HR came down about 30 points when he did.     Patient states he maybe has about 1 drink a month with family. Quit smoking in 1998.      Current Cardiac Medications:   ASA 81 mg daily  Eliquis 5 mg BID  Atorvastatin 40 mg daily  Lisinopril 20 mg daily  Metoprolol 25 mg TID PRN  Brilinta 90 mg BID      PAST MEDICAL HISTORY:  Past Medical History:   Diagnosis Date    Dementia (H)     Depressive disorder     Heart disease 5/8/2018    Hypercholesterolemia     Hypertension goal BP (blood pressure) < 140/90     Nonspecific abnormal electrocardiogram (ECG) (EKG) 08/23/2007    Stress testing normal.     Sleep apnea      uses a c-pap, severe    Status post coronary angiogram 1/26/2024       CURRENT MEDICATIONS:  Current Outpatient Medications   Medication Sig Dispense Refill    apixaban ANTICOAGULANT (ELIQUIS ANTICOAGULANT) 5 MG tablet Take 1 tablet (5 mg) by mouth 2 times daily 90 tablet 6    Apple Wyatt Vn-Grn Tea-Bit Or-Cr (APPLE CIDER VINEGAR PLUS) TABS Take 1 tablet by mouth daily With calcium and magnesium      atorvastatin (LIPITOR) 40 MG tablet Take 1 tablet (40 mg) by mouth daily 90 tablet 4    busPIRone (BUSPAR) 10 MG tablet Take 2 tablets (20 mg) by mouth 2 times daily 120 tablet 1    Cholecalciferol (VITAMIN D PO) Take 5,000 Units by mouth daily One tablet twice daily      co-enzyme Q-10 100 MG CAPS capsule Take 100 mg by mouth daily      FLUoxetine (PROZAC) 40 MG capsule Take 1 capsule (40 mg) by mouth daily 90 capsule 4    hydroCHLOROthiazide 12.5 MG tablet Take 1 tablet (12.5 mg) by mouth daily 90 tablet 3    hydrOXYzine HCl (ATARAX) 10 MG tablet Take 1 tablet (10 mg) by mouth 2 times daily as needed for anxiety 180 tablet 0    lisinopril (ZESTRIL) 20 MG tablet Take 1 tablet (20 mg) by mouth daily 90 tablet 3    metoprolol tartrate (LOPRESSOR) 25 MG tablet Take 1 tablet (25 mg) by mouth every 8 hours as needed (Atrial fibrillation with RVR, HR > 120) 30 tablet 1    MULTI VITAMIN MENS PO Take 1 tablet by mouth daily      ticagrelor (BRILINTA) 90 MG tablet Take 1 tablet (90 mg) by mouth 2 times daily Start this evening. 180 tablet 3    vitamin B complex with vitamin C (STRESS TAB) tablet Take 1 tablet by mouth daily         PAST SURGICAL HISTORY:  Past Surgical History:   Procedure Laterality Date    COLONOSCOPY N/A 12/22/2020    Procedure: COLONOSCOPY, WITH POLYPECTOMY, CLIP;  Surgeon: Mihir Lang MD;  Location: [facility] OR    COLONOSCOPY N/A 1/10/2022    Procedure: COLONOSCOPY, WITH POLYPECTOMY AND BIOPSY;  Surgeon: Mihir Lang MD;  Location: [facility]    CV CORONARY ANGIOGRAM N/A 1/26/2024    Procedure: Coronary  Angiogram;  Surgeon: Elton Wright MD;  Location: [facility] CARDIAC CATH LAB    CV CORONARY LITHOTRIPSY PCI N/A 3/8/2024    Procedure: Percutaneous Coronary Intervention - Lithotripsy;  Surgeon: Elton Wright MD;  Location: [facility] CARDIAC CATH LAB    CV INSTANTANEOUS WAVE-FREE RATIO N/A 1/26/2024    Procedure: Instantaneous Wave-Free Ratio;  Surgeon: Elton Wright MD;  Location: [facility] CARDIAC CATH LAB    CV INTRAVASULAR ULTRASOUND N/A 3/8/2024    Procedure: Intravascular Ultrasound;  Surgeon: Elton Wright MD;  Location: [facility] CARDIAC CATH LAB    CV PCI N/A 3/8/2024    Procedure: Percutaneous Coronary Intervention;  Surgeon: Elton Wright MD;  Location: [facility] CARDIAC CATH LAB    CV RIGHT HEART CATH MEASUREMENTS RECORDED N/A 1/26/2024    Procedure: Right Heart Catheterization;  Surgeon: Elton Wright MD;  Location: [facility] CARDIAC CATH LAB    ESOPHAGOSCOPY, GASTROSCOPY, DUODENOSCOPY (EGD), COMBINED N/A 1/28/2021    Procedure: ESOPHAGOGASTRODUODENOSCOPY, WITH BIOPSY;  Surgeon: Mihir Lang MD;  Location: [facility] OR    OPEN REDUCTION INTERNAL FIXATION FOOT Left 1/5/2023    Procedure: LISFRANC OPEN REDUCTION INTERNAL FIXATION, LEFT FOOT;  Surgeon: Bailey Ruiz DPM;  Location:  OR    SURGICAL HISTORY OF -       surgery on left index finger     ALLERGIES:  No Known Allergies    FAMILY HISTORY:  Family History   Problem Relation Age of Onset    Cancer Mother         uterine    Other Cancer Mother         endometrial    Cerebrovascular Disease Mother     Substance Abuse Mother         Alcohol    C.A.D. Father     Hypertension Father     Hyperlipidemia Father     Breast Cancer Maternal Grandmother     Other Cancer Maternal Grandmother         Lung/brain    Substance Abuse Paternal Grandfather     Hypertension Brother     Substance Abuse Brother         Alcohol    Hyperlipidemia Brother     Breast Cancer Other     Breast Cancer Cousin     Other Cancer Other      Cerebrovascular Disease Other         Maternal Aunt    Glaucoma No family hx of     Macular Degeneration No family hx of        SOCIAL HISTORY:  Social History     Tobacco Use    Smoking status: Former     Current packs/day: 0.00     Average packs/day: 1 pack/day for 25.0 years (25.0 ttl pk-yrs)     Types: Cigarettes, Cigars     Start date: 1973     Quit date: 1998     Years since quittin.2     Passive exposure: Past    Smokeless tobacco: Never    Tobacco comments:     N/A not a smoker   Vaping Use    Vaping status: Never Used   Substance Use Topics    Alcohol use: Yes     Alcohol/week: 10.0 standard drinks of alcohol     Comment: less than 6 per week    Drug use: Not Currently     Types: Amphetamines     Comment: Kratum       ROS:   Constitutional: No fever, chills, or sweats.  Cardiovascular: As per HPI.       Exam:  BP (!) 154/83 (BP Location: Left arm, Patient Position: Chair, Cuff Size: Adult Large)   Wt (!) 156.8 kg (345 lb 11.2 oz)   SpO2 98%   BMI 49.60 kg/m     GENERAL: alert and no distress  HEENT: no icterus, no central cyanosis  LYMPH/NECK: no adenopathy, no asymmetry  RESPIRATORY: lungs clear to auscultation - no rales, rhonchi or wheezes, no use of accessory muscles, respirations are unlabored, normal respiratory rate  CARDIOVASCULAR: RRR, +S1S2, no murmur, rub, or gallop.   GI: rounded, non tender  EXTREMITIES: peripheral pulses normal, mild BLE peripheral edema, groin site with no ecchymosis or hematoma  NEURO: alert, normal speech and affect  SKIN: no jaundice, no rashes or lesions      Labs:  Lab Results   Component Value Date    WBC 6.8 2024    WBC 7.1 12/15/2020    RBC 4.68 2024    RBC 5.24 12/15/2020    HGB 14.5 2024    HGB 15.7 12/15/2020    HCT 42.3 2024    HCT 47.0 12/15/2020    MCV 90 2024    MCV 90 12/15/2020    MCH 31.0 2024    MCH 30.0 12/15/2020    MCHC 34.3 2024    MCHC 33.4 12/15/2020    RDW 13.0 2024    RDW 13.2  12/15/2020     03/22/2024     12/15/2020       Lab Results   Component Value Date     03/22/2024     08/02/2020    POTASSIUM 4.2 03/22/2024    POTASSIUM 4.1 01/05/2023    POTASSIUM 4.0 08/02/2020    CHLORIDE 106 03/22/2024    CHLORIDE 100 12/29/2022    CHLORIDE 105 08/02/2020    CO2 22 03/22/2024    CO2 30 12/29/2022    CO2 28 08/02/2020    ANIONGAP 12 03/22/2024    ANIONGAP 6 12/29/2022    ANIONGAP 5 08/02/2020     (H) 03/22/2024     (H) 12/29/2022     (A) 04/21/2021    BUN 21.1 03/22/2024    BUN 17 12/29/2022    BUN 28 08/02/2020    CR 1.00 03/22/2024    CR 1.17 08/02/2020    GFRESTIMATED 84 03/22/2024    GFRESTIMATED 66 08/02/2020    GFRESTBLACK 77 08/02/2020    SHAHID 8.8 03/22/2024    SHAHID 8.9 08/02/2020        Lab Results   Component Value Date    INR 0.99 03/22/2024    INR 0.96 08/18/2007       Lab Results   Component Value Date    CHOL 155 12/06/2023    CHOL 161 04/23/2021     Lab Results   Component Value Date    HDL 54 12/06/2023    HDL 79 04/23/2021     Lab Results   Component Value Date    LDL 78 12/06/2023    LDL 65 04/23/2021     Lab Results   Component Value Date    TRIG 117 12/06/2023    TRIG 87 04/23/2021     Lab Results   Component Value Date    CHOLHDLRATIO 3.1 07/13/2015       Diagnostics  EKG 3/8/24      Echocardiogram 12/17/20  Interpretation Summary  Global and regional left ventricular function is normal with an EF of 60-65%.  Mild concentric wall thickening consistent with left ventricular hypertrophy  is present.  Right ventricular function, chamber size, wall motion, and thickness are  normal.  Grade II or moderate diastolic dysfunction.  IVC diameter and respiratory changes fall into an intermediate range  suggesting an RA pressure of 8 mmHg.  No significant valvular abnormalities present.  No significant change from prior.    Coronary Angiogram 3/8/24    Conclusion         Ost Cx lesion is 20% stenosed.    Mid Cx to Dist Cx lesion is 30%  stenosed.    3rd Mrg lesion is 20% stenosed.    Prox LAD lesion is 90% stenosed.     Successful planned percutaneous coronary intervention of the proximal LAD lesion with shockwave lithotripsy and 1 drug-eluting stent-Synergy XD 3.0X 24 mm.         Plan     Follow bedrest per protocol   Continued medical management and lifestyle modifications for cardiovascular risk factor optimizations.   Follow up visit with Nurse Practitioner in 1-2 weeks.   Arterial sheath removed from femoral artery with closure device.   Femoral angiogram identifies arterial sheath placement suitable for closure device.   Cardiac rehabilitation.   Discharge today per protocol        Post antiplatelet therapy of   Aspirin; give 81 mg qd .      Ticagrelor; and 90 mg BID.   Continue high dose statin therapy     Recommendations    General Recommendations:  - Patient given specific instructions regarding care of arteriotomy site, activity restrictions, signs and symptoms of cardiac or vascular complications and to seek immediate medical evaluation should they occur.   - Follow up visit with Nurse Practitioner in 1-2 weeks.   - Arterial sheath removed from the femoral artery with closure device.   - Femoral angiogram identifies arterial sheath placement is suitable for closure device.   - Recommend cardiac rehabilitation.       Medications:  - Continue high dose statin therapy indefinitely.   - Risk factor management for atherosclerosis.     Coronary Findings    Diagnostic  Dominance: Right  Left Anterior Descending   Prox LAD lesion is 90% stenosed. Culprit lesion. The lesion is type B2 - medium risk and located at the major branch. The lesion was not previously treated.      Left Circumflex   Ost Cx lesion is 20% stenosed.   Mid Cx to Dist Cx lesion is 30% stenosed.      Third Obtuse Marginal Branch   3rd Mrg lesion is 20% stenosed.      Right Coronary Artery   The vessel was not injected.         Intervention     Prox LAD lesion   Stent   A CATH  GUIDING BLUE YELLOW PTFE XB3.5 [id number] guide catheter was successfully placed. The GUIDEWIRE ZampleON BLUE 190CM J TIP [id number] crossed the lesion. The pre-interventional distal flow is decreased (DENNIS 2). A STENT CORONARY APRIL SYNERGY XD MR BRAUN 3.22Z03FN [id number] drug eluting stent was successfully placed. Pre-stent angioplasty was performed using a CATH BALLOON EMERGE 2.0X8MM [id number] supply. An additional CATH BALLOON EMERGE [id number] supply was used. An additional CATH BALLOON NC EMERGE 2.92A01XL [id number] supply was used. Maximum pressure:12 cristina. . The strut is apposed. No post-stent angioplasty was performed. The post-interventional distal flow is normal (DENNIS 3). The intervention was successful. No complications occurred at this lesion. D1 was wired across the aneurysm using a whisper guidewire. Angioplasty of the ostial D1 and proximal LAD was done using emerge 2.0X 8 mm balloon. LAD was rewired using [place] blue guidewire. Additional angioplasty was performed with the balloons mentioned above. Shockwave lithotripsy was performed using 3.0 X12 millimeter shockwave C2 plus balloon,which was followed by deployment of drug-eluting stent. Intravascular ultrasound was performed at the end of the procedure.   There is a 0% residual stenosis post intervention.               Assessment and Plan:   Mr. Torres is a 63 year old male with a PMH of HTN, HLD, pAF, CAD s/p PCI to LAD (3/8/24), TIA on CPAP.    # CAD, s/p PCI to LAD (3/2024)  # HLD  Coronary angiogram done on 01/26/2024 and was found to have proximal LAD lesion along with aneurysmal segment involving the ostial D1. Cath Lab 03/08/2024 with successful planned percutaneous coronary intervention of the proximal LAD lesion with shockwave lithotripsy and 1 drug-eluting stent-Synergy XD 3.0X 24 mm.   Most recent LDL 68 on 3/18/24  - DAPT: Stop ASA 81 mg daily (recently started on Eliquis for new Afib diagnosis), Brilinta  90 mg BID x 12 months  - In 12 months, stop Brilinta and resume ASA 81 mg daily  - Continue atorvastatin 40 mg daily  - Cardiac rehab as scheduled  - Encourage lifestyle modifications including: weight loss, eating a diet with primarily fruits and vegetables and high in fiber, reducing sugar and processed food intake, avoiding alcohol, maintenance of TIA with CPAP    # Paroxysmal Atrial Fibrillation  History of palpitations since 2022, patient undiagnosed until March 2024. Underwent DCCV on 3/22/24 x 2 with no change in rhythm. Given magnesium 2 mg IV x 1 and diltiazem 20 mg IV x 1 with good rate control. Followed up with EP outpatient and scheduled for ablation in May.  Discussed and admitted to cardiology for further management.  CHADSVASC 3  AC: Eliquis 5 mg BID  Rate: Metoprolol 25 mg PRN  Rhythm: Ablation scheduled for May  - Lifestyle modifications as above    # HTN  - Continue lisinopril 20 mg daily  - Restart hydrochlorothiazide 12.5 mg daily  - Lifestyle modifications as above      Follow up:  - with EP as scheduled  - in 6 months with me or sooner if needed      KLAUS Noble, AMIRAHP-C  [facility] General Cardiology  Securely message with [employer]   Text page via [employer] Paging/Directory          Chart review time: 12 minutes  Visit time: 34 minutes  Chart completion/documentation: 13 minutes  Total time spent: 59 minutes

## 2024-04-11 ENCOUNTER — VIRTUAL VISIT (OUTPATIENT)
Dept: PSYCHIATRY | Facility: CLINIC | Age: 64
End: 2024-04-11
Payer: MEDICARE

## 2024-04-11 ENCOUNTER — OFFICE VISIT (OUTPATIENT)
Dept: CARDIOLOGY | Facility: CLINIC | Age: 64
End: 2024-04-11
Payer: MEDICARE

## 2024-04-11 VITALS
BODY MASS INDEX: 49.6 KG/M2 | SYSTOLIC BLOOD PRESSURE: 154 MMHG | OXYGEN SATURATION: 98 % | DIASTOLIC BLOOD PRESSURE: 83 MMHG | WEIGHT: 315 LBS

## 2024-04-11 DIAGNOSIS — I25.10 CORONARY ARTERY DISEASE INVOLVING NATIVE CORONARY ARTERY OF NATIVE HEART WITHOUT ANGINA PECTORIS: Primary | ICD-10-CM

## 2024-04-11 DIAGNOSIS — F41.1 GAD (GENERALIZED ANXIETY DISORDER): ICD-10-CM

## 2024-04-11 DIAGNOSIS — I10 ESSENTIAL HYPERTENSION: ICD-10-CM

## 2024-04-11 DIAGNOSIS — F33.1 MAJOR DEPRESSIVE DISORDER, RECURRENT EPISODE, MODERATE (H): Primary | ICD-10-CM

## 2024-04-11 PROCEDURE — 99215 OFFICE O/P EST HI 40 MIN: CPT

## 2024-04-11 PROCEDURE — 99214 OFFICE O/P EST MOD 30 MIN: CPT | Mod: 95 | Performed by: NURSE PRACTITIONER

## 2024-04-11 PROCEDURE — G0463 HOSPITAL OUTPT CLINIC VISIT: HCPCS

## 2024-04-11 RX ORDER — HYDROCHLOROTHIAZIDE 12.5 MG/1
12.5 TABLET ORAL DAILY
Qty: 90 TABLET | Refills: 3 | Status: SHIPPED | OUTPATIENT
Start: 2024-04-11

## 2024-04-11 RX ORDER — BUSPIRONE HYDROCHLORIDE 10 MG/1
20 TABLET ORAL 2 TIMES DAILY
Qty: 120 TABLET | Refills: 1 | Status: SHIPPED | OUTPATIENT
Start: 2024-04-11 | End: 2024-07-02

## 2024-04-11 RX ORDER — HYDROXYZINE HYDROCHLORIDE 10 MG/1
10 TABLET, FILM COATED ORAL 2 TIMES DAILY PRN
Qty: 180 TABLET | Refills: 0 | Status: SHIPPED | OUTPATIENT
Start: 2024-04-11 | End: 2024-07-02

## 2024-04-11 ASSESSMENT — PAIN SCALES - GENERAL
PAINLEVEL: NO PAIN (0)
PAINLEVEL: NO PAIN (0)

## 2024-04-11 NOTE — PROGRESS NOTES
Virtual Visit Details    Type of service:  Video Visit   Video Start Time: 11:16 AM  Video End Time: 11:45 AM    Originating Location (pt. Location): Home    Distant Location (provider location):  On-site  Platform used for Video Visit: Vilma

## 2024-04-11 NOTE — PROGRESS NOTES
Optimal Vascular Metrics    Blood Pressure   BP < 140/90 No: Increase meds    On Aspirin  No: Contraindicated due to: discontinued today as patient was started on Eliquis for Afib. Also on Brilinta. Will resume ASA in 12 months when Brilinta therapy is completed.    On Statin  Yes    Tobacco use  Former - Quit 1998

## 2024-04-11 NOTE — PATIENT INSTRUCTIONS
You were seen today in the Cardiovascular Clinic at the [facility] by:       Soila FLORIAN     Your visit summary and instructions are as follows:    Stop aspirin while taking Eliquis and Brilinta  Restart hydrochlorothiazide 12.5 mg daily  Continue metoprolol as needed for atrial fibrillations as prescribed by EP  Continue to work toward the recommendation of 150 minutes of moderate intensity exercise/week and maintain a healthy lifestyle (avoid illicit drugs, smoking and moderate alcohol consumption)      Return to cardiology clinic in 6 months     Thank you for your visit today!     Please MyChart message me or call my nurse Magaly if you have any questions or concerns.      During Business Hours:  [phone], option # 1 (University) then option # 4 (medical questions) and ask to speak with my nurse.     After hours, weekends or holidays:   [phone], Option #4  Ask to speak to the On-Call Cardiologist. Inform them you are a cardiology patient at the [place].

## 2024-04-11 NOTE — LETTER
4/11/2024      RE: Car Torres  [address]  [place] [address]       Dear Colleague,    Thank you for the opportunity to participate in the care of your patient, Car Torres, at the [facility] [place] [facility]. Please see a copy of my visit note below.                      General Cardiology Clinic      HPI: Mr. Car Torres is a 63 year old male presenting to cardiology clinic today for post-PCI follow-up. PMH significant for HTN, HLD, paroxysmal atrial fibrillation, CAD s/p PCI to LAD (3/8/24), TIA on CPAP.    Today in clinic, he denies any current chest pain, dizziness, shortness of breath, MCKNIGHT, or PND. He attends Cardiac rehab three times a week, and says it is going well. Patient denies any activity or exercise limitations at this time. He says he is also actively working on making nutrition changes with the support of this family.    Blood pressures at cardiac rehab are around 120-130, has seen SBP up to the 160's at home in the evening. Patient was taken off hydrochlorothiazide recently, and noticed an uptrend in his SBP since then. He also has noticed some mild edema in his BLE since stopping the hydrochlorothiazide.    Patient had history of palpitations but had not sought treatment until March 2024. Patient was seen by EP after an ER visit in March. Was started on Eliquis and PRN metoprolol. Scheduled for ablation in May. Has a [employer] device to help him monitor. Patient does feel he is going in and out of Afib maybe once a week, lasting for up to 20 hours with heart rates as high as 130. He says he has only taken the PRN metoprolol once, and his HR came down about 30 points when he did.     Patient states he maybe has about 1 drink a month with family. Quit smoking in 1998.      Current Cardiac Medications:   ASA 81 mg daily  Eliquis 5 mg BID  Atorvastatin 40 mg daily  Lisinopril 20 mg daily  Metoprolol 25 mg TID  PRN  Brilinta 90 mg BID      PAST MEDICAL HISTORY:  Past Medical History:   Diagnosis Date    Dementia (H)     Depressive disorder     Heart disease 5/8/2018    Hypercholesterolemia     Hypertension goal BP (blood pressure) < 140/90     Nonspecific abnormal electrocardiogram (ECG) (EKG) 08/23/2007    Stress testing normal.     Sleep apnea     uses a c-pap, severe    Status post coronary angiogram 1/26/2024       CURRENT MEDICATIONS:  Current Outpatient Medications   Medication Sig Dispense Refill    apixaban ANTICOAGULANT (ELIQUIS ANTICOAGULANT) 5 MG tablet Take 1 tablet (5 mg) by mouth 2 times daily 90 tablet 6    Apple Wyatt Vn-Grn Tea-Bit Or-Cr (APPLE CIDER VINEGAR PLUS) TABS Take 1 tablet by mouth daily With calcium and magnesium      atorvastatin (LIPITOR) 40 MG tablet Take 1 tablet (40 mg) by mouth daily 90 tablet 4    busPIRone (BUSPAR) 10 MG tablet Take 2 tablets (20 mg) by mouth 2 times daily 120 tablet 1    Cholecalciferol (VITAMIN D PO) Take 5,000 Units by mouth daily One tablet twice daily      co-enzyme Q-10 100 MG CAPS capsule Take 100 mg by mouth daily      FLUoxetine (PROZAC) 40 MG capsule Take 1 capsule (40 mg) by mouth daily 90 capsule 4    hydroCHLOROthiazide 12.5 MG tablet Take 1 tablet (12.5 mg) by mouth daily 90 tablet 3    hydrOXYzine HCl (ATARAX) 10 MG tablet Take 1 tablet (10 mg) by mouth 2 times daily as needed for anxiety 180 tablet 0    lisinopril (ZESTRIL) 20 MG tablet Take 1 tablet (20 mg) by mouth daily 90 tablet 3    metoprolol tartrate (LOPRESSOR) 25 MG tablet Take 1 tablet (25 mg) by mouth every 8 hours as needed (Atrial fibrillation with RVR, HR > 120) 30 tablet 1    MULTI VITAMIN MENS PO Take 1 tablet by mouth daily      ticagrelor (BRILINTA) 90 MG tablet Take 1 tablet (90 mg) by mouth 2 times daily Start this evening. 180 tablet 3    vitamin B complex with vitamin C (STRESS TAB) tablet Take 1 tablet by mouth daily         PAST SURGICAL HISTORY:  Past Surgical History:   Procedure  Laterality Date    COLONOSCOPY N/A 12/22/2020    Procedure: COLONOSCOPY, WITH POLYPECTOMY, CLIP;  Surgeon: Mihir Lang MD;  Location: [facility] [place] [place] OR    COLONOSCOPY N/A 1/10/2022    Procedure: COLONOSCOPY, WITH POLYPECTOMY AND BIOPSY;  Surgeon: Mihir Lang MD;  Location:  GI    CV CORONARY ANGIOGRAM N/A 1/26/2024    Procedure: Coronary Angiogram;  Surgeon: Elton Wright MD;  Location:  HEART CARDIAC CATH LAB    CV CORONARY LITHOTRIPSY PCI N/A 3/8/2024    Procedure: Percutaneous Coronary Intervention - Lithotripsy;  Surgeon: Elton Wright MD;  Location: [facility] CARDIAC CATH LAB    CV INSTANTANEOUS WAVE-FREE RATIO N/A 1/26/2024    Procedure: Instantaneous Wave-Free Ratio;  Surgeon: Elton Wright MD;  Location:  HEART CARDIAC CATH LAB    CV INTRAVASULAR ULTRASOUND N/A 3/8/2024    Procedure: Intravascular Ultrasound;  Surgeon: Elton Wright MD;  Location: [facility] CARDIAC CATH LAB    CV PCI N/A 3/8/2024    Procedure: Percutaneous Coronary Intervention;  Surgeon: Elton Wright MD;  Location:  HEART CARDIAC CATH LAB    CV RIGHT HEART CATH MEASUREMENTS RECORDED N/A 1/26/2024    Procedure: Right Heart Catheterization;  Surgeon: Elton Wright MD;  Location: [facility] CARDIAC CATH LAB    ESOPHAGOSCOPY, GASTROSCOPY, DUODENOSCOPY (EGD), COMBINED N/A 1/28/2021    Procedure: ESOPHAGOGASTRODUODENOSCOPY, WITH BIOPSY;  Surgeon: Mihir Lang MD;  Location: [facility] [place] [place] OR    OPEN REDUCTION INTERNAL FIXATION FOOT Left 1/5/2023    Procedure: LISFRANC OPEN REDUCTION INTERNAL FIXATION, LEFT FOOT;  Surgeon: Bailey Ruiz DPM;  Location:  OR    SURGICAL HISTORY OF -       surgery on left index finger     ALLERGIES:  No Known Allergies    FAMILY HISTORY:  Family History   Problem Relation Age of Onset    Cancer Mother         uterine    Other Cancer Mother         endometrial    Cerebrovascular Disease Mother     Substance Abuse Mother         Alcohol    C.A.D. Father     Hypertension Father      Hyperlipidemia Father     Breast Cancer Maternal Grandmother     Other Cancer Maternal Grandmother         Lung/brain    Substance Abuse Paternal Grandfather     Hypertension Brother     Substance Abuse Brother         Alcohol    Hyperlipidemia Brother     Breast Cancer Other     Breast Cancer Cousin     Other Cancer Other     Cerebrovascular Disease Other         Maternal Aunt    Glaucoma No family hx of     Macular Degeneration No family hx of        SOCIAL HISTORY:  Social History     Tobacco Use    Smoking status: Former     Current packs/day: 0.00     Average packs/day: 1 pack/day for 25.0 years (25.0 ttl pk-yrs)     Types: Cigarettes, Cigars     Start date: 1973     Quit date: 1998     Years since quittin.2     Passive exposure: Past    Smokeless tobacco: Never    Tobacco comments:     N/A not a smoker   Vaping Use    Vaping status: Never Used   Substance Use Topics    Alcohol use: Yes     Alcohol/week: 10.0 standard drinks of alcohol     Comment: less than 6 per week    Drug use: Not Currently     Types: Amphetamines     Comment: Kratum       ROS:   Constitutional: No fever, chills, or sweats.  Cardiovascular: As per HPI.       Exam:  BP (!) 154/83 (BP Location: Left arm, Patient Position: Chair, Cuff Size: Adult Large)   Wt (!) 156.8 kg (345 lb 11.2 oz)   SpO2 98%   BMI 49.60 kg/m     GENERAL: alert and no distress  HEENT: no icterus, no central cyanosis  LYMPH/NECK: no adenopathy, no asymmetry  RESPIRATORY: lungs clear to auscultation - no rales, rhonchi or wheezes, no use of accessory muscles, respirations are unlabored, normal respiratory rate  CARDIOVASCULAR: RRR, +S1S2, no murmur, rub, or gallop.   GI: rounded, non tender  EXTREMITIES: peripheral pulses normal, mild BLE peripheral edema, groin site with no ecchymosis or hematoma  NEURO: alert, normal speech and affect  SKIN: no jaundice, no rashes or lesions      Labs:  Lab Results   Component Value Date    WBC 6.8 2024    WBC 7.1  12/15/2020    RBC 4.68 03/22/2024    RBC 5.24 12/15/2020    HGB 14.5 03/22/2024    HGB 15.7 12/15/2020    HCT 42.3 03/22/2024    HCT 47.0 12/15/2020    MCV 90 03/22/2024    MCV 90 12/15/2020    MCH 31.0 03/22/2024    MCH 30.0 12/15/2020    MCHC 34.3 03/22/2024    MCHC 33.4 12/15/2020    RDW 13.0 03/22/2024    RDW 13.2 12/15/2020     03/22/2024     12/15/2020       Lab Results   Component Value Date     03/22/2024     08/02/2020    POTASSIUM 4.2 03/22/2024    POTASSIUM 4.1 01/05/2023    POTASSIUM 4.0 08/02/2020    CHLORIDE 106 03/22/2024    CHLORIDE 100 12/29/2022    CHLORIDE 105 08/02/2020    CO2 22 03/22/2024    CO2 30 12/29/2022    CO2 28 08/02/2020    ANIONGAP 12 03/22/2024    ANIONGAP 6 12/29/2022    ANIONGAP 5 08/02/2020     (H) 03/22/2024     (H) 12/29/2022     (A) 04/21/2021    BUN 21.1 03/22/2024    BUN 17 12/29/2022    BUN 28 08/02/2020    CR 1.00 03/22/2024    CR 1.17 08/02/2020    GFRESTIMATED 84 03/22/2024    GFRESTIMATED 66 08/02/2020    GFRESTBLACK 77 08/02/2020    SHAHID 8.8 03/22/2024    SHAHID 8.9 08/02/2020        Lab Results   Component Value Date    INR 0.99 03/22/2024    INR 0.96 08/18/2007       Lab Results   Component Value Date    CHOL 155 12/06/2023    CHOL 161 04/23/2021     Lab Results   Component Value Date    HDL 54 12/06/2023    HDL 79 04/23/2021     Lab Results   Component Value Date    LDL 78 12/06/2023    LDL 65 04/23/2021     Lab Results   Component Value Date    TRIG 117 12/06/2023    TRIG 87 04/23/2021     Lab Results   Component Value Date    CHOLHDLRATIO 3.1 07/13/2015       Diagnostics  EKG 3/8/24      Echocardiogram 12/17/20  Interpretation Summary  Global and regional left ventricular function is normal with an EF of 60-65%.  Mild concentric wall thickening consistent with left ventricular hypertrophy  is present.  Right ventricular function, chamber size, wall motion, and thickness are  normal.  Grade II or moderate diastolic  dysfunction.  IVC diameter and respiratory changes fall into an intermediate range  suggesting an RA pressure of 8 mmHg.  No significant valvular abnormalities present.  No significant change from prior.    Coronary Angiogram 3/8/24    Conclusion         Ost Cx lesion is 20% stenosed.    Mid Cx to Dist Cx lesion is 30% stenosed.    3rd Mrg lesion is 20% stenosed.    Prox LAD lesion is 90% stenosed.     Successful planned percutaneous coronary intervention of the proximal LAD lesion with shockwave lithotripsy and 1 drug-eluting stent-Synergy XD 3.0X 24 mm.         Plan     Follow bedrest per protocol   Continued medical management and lifestyle modifications for cardiovascular risk factor optimizations.   Follow up visit with Nurse Practitioner in 1-2 weeks.   Arterial sheath removed from femoral artery with closure device.   Femoral angiogram identifies arterial sheath placement suitable for closure device.   Cardiac rehabilitation.   Discharge today per protocol        Post antiplatelet therapy of   Aspirin; give 81 mg qd .      Ticagrelor; and 90 mg BID.   Continue high dose statin therapy     Recommendations    General Recommendations:  - Patient given specific instructions regarding care of arteriotomy site, activity restrictions, signs and symptoms of cardiac or vascular complications and to seek immediate medical evaluation should they occur.   - Follow up visit with Nurse Practitioner in 1-2 weeks.   - Arterial sheath removed from the femoral artery with closure device.   - Femoral angiogram identifies arterial sheath placement is suitable for closure device.   - Recommend cardiac rehabilitation.       Medications:  - Continue high dose statin therapy indefinitely.   - Risk factor management for atherosclerosis.     Coronary Findings    Diagnostic  Dominance: Right  Left Anterior Descending   Prox LAD lesion is 90% stenosed. Culprit lesion. The lesion is type B2 - medium risk and located at the major branch.  The lesion was not previously treated.      Left Circumflex   Ost Cx lesion is 20% stenosed.   Mid Cx to Dist Cx lesion is 30% stenosed.      Third Obtuse Marginal Branch   3rd Mrg lesion is 20% stenosed.      Right Coronary Artery   The vessel was not injected.         Intervention     Prox LAD lesion   Stent   A CATH GUIDING BLUE YELLOW PTFE XB3.5 [id number] guide catheter was successfully placed. The GUIDEWIRE Flickme NATHANAEL BLUE 190CM J TIP [id number] crossed the lesion. The pre-interventional distal flow is decreased (DENNIS 2). A STENT CORONARY APRIL SYNERGY XD MR US 3.41V57IW [id number] drug eluting stent was successfully placed. Pre-stent angioplasty was performed using a CATH BALLOON EMERGE 2.0X8MM [id number] supply. An additional CATH BALLOON EMERGE [id number] supply was used. An additional CATH BALLOON NC EMERGE 2.28N79AU [id number] supply was used. Maximum pressure:12 cristina. . The strut is apposed. No post-stent angioplasty was performed. The post-interventional distal flow is normal (DENNIS 3). The intervention was successful. No complications occurred at this lesion. D1 was wired across the aneurysm using a whisper guidewire. Angioplasty of the ostial D1 and proximal LAD was done using emerge 2.0X 8 mm balloon. LAD was rewired using [place] blue guidewire. Additional angioplasty was performed with the balloons mentioned above. Shockwave lithotripsy was performed using 3.0 X12 millimeter shockwave C2 plus balloon,which was followed by deployment of drug-eluting stent. Intravascular ultrasound was performed at the end of the procedure.   There is a 0% residual stenosis post intervention.               Assessment and Plan:   Mr. Torres is a 63 year old male with a PMH of HTN, HLD, pAF, CAD s/p PCI to LAD (3/8/24), TIA on CPAP.    # CAD, s/p PCI to LAD (3/2024)  # HLD  Coronary angiogram done on 01/26/2024 and was found to have proximal LAD lesion along with aneurysmal segment involving  the ostial D1. Cath Lab 03/08/2024 with successful planned percutaneous coronary intervention of the proximal LAD lesion with shockwave lithotripsy and 1 drug-eluting stent-Synergy XD 3.0X 24 mm.   Most recent LDL 68 on 3/18/24  - DAPT: Stop ASA 81 mg daily (recently started on Eliquis for new Afib diagnosis), Brilinta 90 mg BID x 12 months  - In 12 months, stop Brilinta and resume ASA 81 mg daily  - Continue atorvastatin 40 mg daily  - Cardiac rehab as scheduled  - Encourage lifestyle modifications including: weight loss, eating a diet with primarily fruits and vegetables and high in fiber, reducing sugar and processed food intake, avoiding alcohol, maintenance of TIA with CPAP    # Paroxysmal Atrial Fibrillation  History of palpitations since 2022, patient undiagnosed until March 2024. Underwent DCCV on 3/22/24 x 2 with no change in rhythm. Given magnesium 2 mg IV x 1 and diltiazem 20 mg IV x 1 with good rate control. Followed up with EP outpatient and scheduled for ablation in May.  Discussed and admitted to cardiology for further management.  CHADSVASC 3  AC: Eliquis 5 mg BID  Rate: Metoprolol 25 mg PRN  Rhythm: Ablation scheduled for May  - Lifestyle modifications as above    # HTN  - Continue lisinopril 20 mg daily  - Restart hydrochlorothiazide 12.5 mg daily  - Lifestyle modifications as above      Follow up:  - with EP as scheduled  - in 6 months with me or sooner if needed      KLAUS Noble, AMIRAHP-C  [facility] General Cardiology  Securely message with [employer]linetours   Text page via [employer] Paging/Directory          Chart review time: 12 minutes  Visit time: 34 minutes  Chart completion/documentation: 13 minutes  Total time spent: 59 minutes     Optimal Vascular Metrics    Blood Pressure   BP < 140/90 No: Increase meds    On Aspirin  No: Contraindicated due to: discontinued today as patient was started on Eliquis for Afib. Also on Brilinta. Will resume ASA in 12 months when Brilinta therapy is completed.    On  Statin  Yes    Tobacco use  Former - Quit 1998

## 2024-04-11 NOTE — NURSING NOTE
Chief Complaint   Patient presents with    Follow Up     Return cardiology     Vitals were taken and medications reconciled.    Lucas Summers, EMT  2:46 PM

## 2024-04-11 NOTE — PROGRESS NOTES
"         Outpatient Psychiatric Progress Note    Name: Car Torres   : 1960                    Primary Care Provider: Mihir Perez MD   Therapist: None    PHQ-9 scores:      3/20/2024    11:53 PM 3/27/2024     8:51 AM 4/10/2024     9:33 AM   PHQ-9 SCORE   PHQ-9 Total Score MyChart 1 (Minimal depression) 0 1 (Minimal depression)   PHQ-9 Total Score 1 0 1       SHAHNAZ-7 scores:      2022     1:00 PM 2022     1:02 PM 2024     6:22 PM   SHAHNAZ-7 SCORE   Total Score  2 (minimal anxiety) 0 (minimal anxiety)   Total Score 0 2 0       Patient Identification:    Patient is a 63 year old year old,   White Not  or  male  who presents for return visit with me.  Patient is currently retired. Patient attended the session with his wife , who they agreed to have interview with. Patient prefers to be called: \" Jerry\".    Current medications include:   Current Outpatient Medications   Medication Sig Dispense Refill    apixaban ANTICOAGULANT (ELIQUIS ANTICOAGULANT) 5 MG tablet Take 1 tablet (5 mg) by mouth 2 times daily 90 tablet 6    Apple Wyatt Vn-Grn Tea-Bit Or-Cr (APPLE CIDER VINEGAR PLUS) TABS Take 1 tablet by mouth daily With calcium and magnesium      aspirin 81 MG EC tablet Take 1 tablet (81 mg) by mouth daily Start tomorrow morning. 90 tablet 3    atorvastatin (LIPITOR) 40 MG tablet Take 1 tablet (40 mg) by mouth daily 90 tablet 4    busPIRone (BUSPAR) 10 MG tablet Take 2 tablets (20 mg) by mouth 2 times daily 120 tablet 1    Cholecalciferol (VITAMIN D PO) Take 5,000 Units by mouth daily One tablet twice daily      co-enzyme Q-10 100 MG CAPS capsule Take 100 mg by mouth daily      FLUoxetine (PROZAC) 40 MG capsule Take 1 capsule (40 mg) by mouth daily 90 capsule 4    hydrOXYzine HCl (ATARAX) 10 MG tablet TAKE 1 TABLET (10 MG) BY MOUTH 2 TIMES DAILY AS NEEDED FOR ANXIETY 180 tablet 0    lisinopril (ZESTRIL) 20 MG tablet Take 1 tablet (20 mg) by mouth daily 90 tablet 3    metoprolol " tartrate (LOPRESSOR) 25 MG tablet Take 1 tablet (25 mg) by mouth every 8 hours as needed (Atrial fibrillation with RVR, HR > 120) 30 tablet 1    MULTI VITAMIN MENS PO Take 1 tablet by mouth daily      ticagrelor (BRILINTA) 90 MG tablet Take 1 tablet (90 mg) by mouth 2 times daily Start this evening. 180 tablet 3    vitamin B complex with vitamin C (STRESS TAB) tablet Take 1 tablet by mouth daily       No current facility-administered medications for this visit.        The [place] Prescription Monitoring Program has been reviewed and there are no concerns about diversionary activity for controlled substances at this time.      I was able to review most recent Primary Care Provider, specialty provider, and therapy visit notes that I have access to.     Today, patient reports he was recently hospitalized for Afib.  He is scheduled to have an ablation May 24.  Blood thinners continue.  3rd round of neuropsych testing revealed that he does not have dementia and that his symptoms were more likely due to depression.  Olanzapine and donepezil have been discontinued.  Since that time, he has been more alert and seems to care more about his surroundings.  His wife tells me that Jerry has been engaging more in relationships with family members and friends.  He had cardioversion twice. No sleep concerns.  10 mg hydroxyzine at bedtime.  He has a hard time managing panic at times.  Emotions can go from Zero to 100.  He then has a hard time stabilizing for a bit of time after he gets panicky     has a past medical history of Dementia (H), Depressive disorder, Heart disease (5/8/2018), Hypercholesterolemia, Hypertension goal BP (blood pressure) < 140/90, Nonspecific abnormal electrocardiogram (ECG) (EKG) (08/23/2007), Sleep apnea, and Status post coronary angiogram (1/26/2024).    He has no past medical history of Arthritis, Cancer (H), Cerebral infarction (H), Congestive heart failure (H), Congestive heart failure, unspecified,  COPD (chronic obstructive pulmonary disease) (H), Diabetes (H), Thyroid disease, or Uncomplicated asthma.    Social history updates:    No changes in his social history to report    Substance use updates:    No alcohol use.  No kratom use  Tobacco use: No    Vital Signs:   There were no vitals taken for this visit.    Labs:    Most recent laboratory results reviewed and no new labs.     Mental Status Examination:  Appearance: awake, alert and casual dress  Attitude: cooperative  Eye Contact:  adequate  Gait and Station: No dizziness or falls  Psychomotor Behavior:   Sitting in chair  Oriented to:  time, person, and place  Attention Span and Concentration:  Normal  Speech:   vtspeech: clear, coherent and Speaks: English  Mood:  anxious, depressed, and better  Affect:  mood congruent  Associations:  no loose associations  Thought Process:  goal oriented  Thought Content:  no evidence of suicidal ideation or homicidal ideation, no auditory hallucinations present, and no visual hallucinations present  Recent and Remote Memory:  intact Not formally assessed. No amnesia.  Fund of Knowledge: appropriate  Insight:  good  Judgment: good  Impulse Control:  good    Suicide Risk Assessment:  Today Car Torres reports no thoughts to harm themself or others. In addition, there are notable risk factors for self-harm, including anxiety. However, risk is mitigated by commitment to family, history of seeking help when needed, future oriented, denies suicidal intent or plan, and denies homicidal ideation, intent, or plan. Therefore, based on all available evidence including the factors cited above, Car Torres does not appear to be at imminent risk for self-harm, does not meet criteria for a 72-hr hold, and therefore remains appropriate for ongoing outpatient level of care.  A thorough assessment of risk factors related to suicide and self-harm have been reviewed and are noted above. The patient convincingly denies  suicidality on several occasions. Local community safety resources printed and reviewed for patient to use if needed. There was no deceit detected, and the patient presented in a manner that was believable.     DSM5 Diagnosis:  296.32 (F33.1) Major Depressive Disorder, Recurrent Episode, Moderate _ and With mixed features  300.02 (F41.1) Generalized Anxiety Disorder    Medical comorbidities include:   Patient Active Problem List    Diagnosis Date Noted    Hypertension goal BP (blood pressure) < 140/90 10/20/2010     Priority: High    Hyperlipidemia LDL goal <130 06/11/2010     Priority: High    ASCVD (arteriosclerotic cardiovascular disease) 03/17/2024     Priority: Medium    Near syncope 03/17/2024     Priority: Medium    Paroxysmal atrial fibrillation (H) 03/17/2024     Priority: Medium    CAD (coronary artery disease) 03/08/2024     Priority: Medium    Unstable angina (H) 01/26/2024     Priority: Medium    Coronary artery disease 01/26/2024     Priority: Medium    MCKNIGHT (dyspnea on exertion) 01/26/2024     Priority: Medium    Status post coronary angiogram 01/26/2024     Priority: Medium    Anginal equivalent (H24) 01/26/2024     Priority: Medium    Lisfranc dislocation, left, initial encounter 12/28/2022     Priority: Medium    Lisfranc dislocation, left, subsequent encounter 12/28/2022     Priority: Medium    Major depressive disorder, recurrent episode, mild with atypical features (H) 12/06/2021     Priority: Medium    TIA (obstructive sleep apnea) 07/19/2018     Priority: Medium     Home Sleep Apnea Testing - 7/18/18: 295 lbs 0 oz: AHI 45.3/hr. Supine AHI 61.9/hr. Oxygen [place] of 66%.  Baseline 93%.  Sp02 =< 88% for 47 minutes.      History of sinus bradycardia 06/07/2018     Priority: Medium    ED (erectile dysfunction) 06/07/2018     Priority: Medium    Coronary artery calcification 05/08/2018     Priority: Medium     Per CT CHEST WITHOUT CONTRAST April 23, 2018. CT calcium score planned.       Renal cyst  11/18/2015     Priority: Medium     Simple, non enhanced, 3.5 cm on right kidney, Bosniak 1 - x 2 stable findings      Diverticulosis of large intestine without hemorrhage 11/18/2015     Priority: Medium     Incidental finding on CT scan       Pulmonary nodule 11/18/2015     Priority: Medium     Incidental finding on CT scan, right posterior lung - considered benign / stable       CKD (chronic kidney disease) stage 2, GFR 60-89 ml/min 10/21/2010     Priority: Medium     60 to 80 - have discussed Lisinopril - will consider       Sciatica 02/27/2006     Priority: Medium    Morbid obesity (H) 02/27/2006     Priority: Medium       Assessment:    Car Torres recently was hospitalized with some cardiac issues that continue.  Recent neuropsych testing revealed that he does not have dementia and donepezil and olanzapine have been discontinued.  Fluoxetine continues for his depression symptoms that are stable.  Buspirone and hydroxyzine are being taken to manage anxiety with hydroxyzine as needed when he starts to feel panicky.  He also has hydroxyzine available at bedtime to help him sleep if he needs it.    Medication side effects and alternatives were reviewed. Health promotion activities recommended and reviewed today. All questions addressed. Education and counseling completed regarding risks and benefits of medications and psychotherapy options.    Treatment Plan:      1.  Continue buspirone 20 mg 2 times daily  2.  Continue fluoxetine 40 mg daily  3.  Continue hydroxyzine 10 mg up to 2 times daily as needed for anxiety    Continue all other medications as reviewed per electronic medical record today.   Safety plan reviewed. To the Emergency Department as needed or call after hours crisis line at [phone] or [phone]. [place] Crisis Text Line. Text [place] to 109773 or Suicide LifeLine Chat: suicidepreventionlifeline.org/chat/  To schedule individual or family therapy, call [facility] at  [phone]  Schedule an appointment with me in 3 months or sooner as needed. Call [facility] at [phone] to schedule.  Follow up with primary care provider as planned or for acute medical concerns.  Call the psychiatric nurse line with medication questions or concerns at [phone]  MyChart may be used to communicate with your provider, but this is not intended to be used for emergencies.        Crisis Resources   The EmPath is an adults only unit located at [facility] is a short term (generally less than 23 hour stay) designed for crisis intervention and stabilization. Pts have the opportunity to meet quickly with a behavioral health team for evaluation in a calm and peaceful therapuetic environment. To be evaluated for admission pts are triaged throught the [facility] ED.      The following hotlines are for both adults and children. The and are open 24 hours a day, 7 days a week unless noted otherwise.        Crisis Lines      Crisis Text Line  Text 672857  You will be connected with a trained live crisis counselor to provide support.        Gambling Hotline  2.168.838.0140 [hope]        línea de crisis española  [phone]        [facility] [employer] [facility]  [phone]        National Hope Line  1.747.320.5299 [hope]        National Suicide Prevention Lifeline  Free and confidential support  988 or .405.151.TALK [8255]  http://suicidepreventionlifeline.org        The Erick Project (LGBTQ Youth Crisis Line)  4.478.638.0465  text START to 741-118        's Crisis Line  4.848.370.4609 (Press 1)  or text 383963    [facility] Mental Health Crisis Response  Within [place], call **CRISIS [**631699] to be connected to a mental health professional who can assist you.        [facility] Crisis  [phone]      [facility] Mobile Crisis  [phone]      [facility] Crisis  [phone]      [facility] Mobile Crisis  [phone] (adults)   [phone] (children)      [facility] Mobile Crisis  [phone] (adults)  [phone] (children)      [facility] Mobile Crisis  [phone]      [facility] Mobile Crisis  [phone]    Community Resources      Fast Tracker  Linking people to mental health and substance use disorder resources  Emailagen.Danger        [place] Mental Health Warmline  Peer to peer support  5 pm to 9 am 7 days/week  9.947.379.5842  https://mnwitw.org/aranza        National [place] on Mental Illness (DENAE)  457.043.3887 or 1.888.DENAE.HELPS  https://namimn.org/        [facility] for Adult Mental Health  [facility] residents [address]  [phone]        [facility]  Free mental health counseling  https://walkin.org/  612.870.0565 X2    Mental Health Apps      Calm Harm  https://[employer].co.uk/      My3  https://my3app.org/      Bruna Safety Plan  https://www.mysafetyplan.org/   Administrative Billing:   Time spent with patient includes counseling and coordination of care regarding above diagnoses and treatment plan.    Patient Status:  This is a continuous care patient and medications will be prescribed by the psychiatric provider until further indicated.    Signed:   BELEN Singh-BC   Psychiatry       Answers submitted by the patient for this visit:  Patient Health Questionnaire (Submitted on 4/10/2024)  If you checked off any problems, how difficult have these problems made it for you to do your work, take care of things at home, or get along with other people?: Not difficult at all  PHQ9 TOTAL SCORE: 1

## 2024-04-11 NOTE — NURSING NOTE
Is the patient currently in the state of [place]? YES    Visit mode:VIDEO    If the visit is dropped, the patient can be reconnected by: VIDEO VISIT: Text to cell phone:   Telephone Information:   Mobile [phone]   Mobile Not on file.       Will anyone else be joining the visit? NO  (If patient encounters technical issues they should call [phone] :[phone])    How would you like to obtain your AVS? MyChart    Are changes needed to the allergy or medication list? No    Are refills needed on medications prescribed by this physician? NO    Reason for visit: RECHECK    Sancho DARLING

## 2024-04-12 ENCOUNTER — HOSPITAL ENCOUNTER (OUTPATIENT)
Dept: CARDIAC REHAB | Facility: CLINIC | Age: 64
Discharge: HOME | End: 2024-04-12
Attending: HOSPITALIST
Payer: MEDICARE

## 2024-04-12 PROCEDURE — 93798 PHYS/QHP OP CAR RHAB W/ECG: CPT | Performed by: OCCUPATIONAL THERAPIST

## 2024-04-12 NOTE — PATIENT INSTRUCTIONS
"Patient Education   The Panel Psychiatry Program  What to Expect  Here's what to expect in the Panel Psychiatry Program.   About the program  You'll be meeting with a psychiatric doctor to check your mental health. A psychiatric doctor helps you deal with troubling thoughts and feelings by giving you medicine. They'll make sure you know the plan for your care. You may see them for a long time. When you're feeling better, they may refer you back to seeing your family doctor.   If you have any questions, we'll be glad to talk to you.  About visits  Be open  At your visits, please talk openly about your problems. It may feel hard, but it's the best way for us to help you.  Cancelling visits  If you can't come to your visit, please call us right away at [phone]. If you don't cancel at least 24 hours (1 full day) before your visit, that's \"late cancellation.\"  Not showing up for your visits  Being very late is the same as not showing up. You'll be a \"no show\" if:  You're more than 15 minutes late for a 30-minute (half hour) visit.  You're more than 30 minutes late for a 60-minute (full hour) visit.  If you cancel late or don't show up 2 times within 6 months, we may end your care.  Getting help between visits  If you need help between visits, you can call us Monday to Friday from 8 a.m. to 4:30 p.m. at [phone].  Emergency care  Call 911 or go to the nearest emergency department if your life or someone else's life is in danger.  Call 988 anytime to reach the national Suicide and Crisis hotline.  Medicine refills  To refill your medicine, call your pharmacy. You can also call [facility]'s Behavioral Access at [phone], Monday to Friday, 8 a.m. to 4:30 p.m. It can take 1 to 3 business days to get a refill.   Forms, letters, and tests  You may have papers to fill out, like FMLA, short-term disability, and workability. We can help you with these forms at your visits, but you must have an " appointment. You may need more than 1 visit for this, to be in an intensive therapy program, or both.  Before we can give you medicine for ADHD, we may refer you to get tested for it or confirm it another way.  We may not be able to give you an emotional support animal letter.  We don't do mental health checks ordered by the court.   We don't do mental health testing, but we can refer you to get tested.   Thank you for choosing us for your care.  For informational purposes only. Not to replace the advice of your health care provider. Copyright   2022 [employer]. All rights reserved. [employer] 580341 - 12/22.       Treatment Plan:      1.  Continue buspirone 20 mg 2 times daily  2.  Continue fluoxetine 40 mg daily  3.  Continue hydroxyzine 10 mg up to 2 times daily as needed for anxiety    Continue all other medical directions per primary care provider.   Continue all other medications as reviewed per electronic medical record today.   Safety plan reviewed. To the Emergency Department as needed or call after hours crisis line at [phone] or [phone]. [place] Crisis Text Line: Text [place] to 364007  or  Suicide LifeLine Chat: suicidepreventionlifeline.org/chat/  To schedule individual or family therapy, call [facility] at [phone].   Schedule an appointment with me in 3 months or sooner as needed.  Call [facility] at [phone] to schedule.  Follow up with primary care provider as planned or for acute medical concerns.  Call the psychiatric nurse line with medication questions or concerns at [phone].  Crediihart may be used to communicate with your provider, but this is not intended to be used for emergencies.        Crisis Resources   The EmPath is an adults only unit located at [facility] is a short term (generally less than 23 hour stay) designed for crisis intervention and stabilization. Pts have the opportunity to meet quickly with a  behavioral health team for evaluation in a calm and peaceful therapuetic environment. To be evaluated for admission pts are triaged throught the [facility] ED.      The following hotlines are for both adults and children. The and are open 24 hours a day, 7 days a week unless noted otherwise.        Crisis Lines      Crisis Text Line  Text 882778  You will be connected with a trained live crisis counselor to provide support.        Gambling Hotline  0.623.488.2698 [hope]        línea de crisis española  [phone]        [place] [employer] Helpline  380.383.4673        National Hope Line  6.288.473.8906 [hope]        National Suicide Prevention Lifeline  Free and confidential support  988 or 1.625.890.TALK [8255]  http://suicidepreventionlifeline.org        The Erick Project (LGBTQ Youth Crisis Line)  8.365.735.2533  text START to 216-499        [facility] Crisis Line  4.283.213.6589 (Press 1)  or text 882392    [facility] Mental Health Crisis Response  Within [place], call **CRISIS [**921181] to be connected to a mental health professional who can assist you.        [facility] Crisis  [phone]      [facility] Mobile Crisis  [phone]      [facility] Crisis  [phone]      [facility] Mobile Crisis  [phone] (adults)  [phone] (children)      [facility] Mobile Crisis  [phone] (adults)  [phone] (children)      [facility] Mobile Crisis  [phone]      [facility] Mobile Crisis  [phone]    Community Resources      Fast Tracker  Linking people to mental health and substance use disorder resources  fasttrackermn.org        [place] Mental Health Warmline  Peer to peer support  5 pm to 9 am 7 days/week  5.555.629.0313  https://mnwitw.org/aranza        National [place] on Mental Illness (DENAE)  370.287.0619 or 1.888.DENAE.HELPS  https://namimn.org/        [facility] Urgent Care for Adult Mental Health  [facility] residents  [address]  [phone]        [facility]  Free mental health counseling  https://walkin.org/  612.870.0565 X2    Mental Health Apps      Calm Harm  https://calmharm.co.uk/      My3  https://my3app.org/      Bruna Safety Plan  https://www.mysafetyplan.org/

## 2024-04-15 ENCOUNTER — HOSPITAL ENCOUNTER (OUTPATIENT)
Dept: CARDIAC REHAB | Facility: CLINIC | Age: 64
Discharge: HOME | End: 2024-04-15
Attending: HOSPITALIST
Payer: MEDICARE

## 2024-04-15 PROCEDURE — 93798 PHYS/QHP OP CAR RHAB W/ECG: CPT

## 2024-04-15 PROCEDURE — 93797 PHYS/QHP OP CAR RHAB WO ECG: CPT | Mod: XU

## 2024-04-17 ENCOUNTER — ANCILLARY PROCEDURE (OUTPATIENT)
Dept: ULTRASOUND IMAGING | Facility: CLINIC | Age: 64
End: 2024-04-17
Attending: INTERNAL MEDICINE
Payer: MEDICARE

## 2024-04-17 ENCOUNTER — HOSPITAL ENCOUNTER (OUTPATIENT)
Dept: CARDIAC REHAB | Facility: CLINIC | Age: 64
Discharge: HOME | End: 2024-04-17
Attending: HOSPITALIST
Payer: MEDICARE

## 2024-04-17 DIAGNOSIS — Z13.6 SCREENING FOR AAA (ABDOMINAL AORTIC ANEURYSM): ICD-10-CM

## 2024-04-17 PROCEDURE — 93798 PHYS/QHP OP CAR RHAB W/ECG: CPT

## 2024-04-17 PROCEDURE — 76706 US ABDL AORTA SCREEN AAA: CPT | Mod: TC | Performed by: RADIOLOGY

## 2024-04-19 ENCOUNTER — HOSPITAL ENCOUNTER (OUTPATIENT)
Dept: CARDIAC REHAB | Facility: CLINIC | Age: 64
Discharge: HOME | End: 2024-04-19
Attending: HOSPITALIST
Payer: MEDICARE

## 2024-04-19 PROCEDURE — 93798 PHYS/QHP OP CAR RHAB W/ECG: CPT

## 2024-04-22 ENCOUNTER — TELEPHONE (OUTPATIENT)
Dept: BEHAVIORAL HEALTH | Facility: CLINIC | Age: 64
End: 2024-04-22
Payer: MEDICARE

## 2024-04-22 ENCOUNTER — HOSPITAL ENCOUNTER (OUTPATIENT)
Dept: CARDIAC REHAB | Facility: CLINIC | Age: 64
Discharge: HOME | End: 2024-04-22
Attending: HOSPITALIST
Payer: MEDICARE

## 2024-04-22 PROCEDURE — 93798 PHYS/QHP OP CAR RHAB W/ECG: CPT

## 2024-04-22 NOTE — TELEPHONE ENCOUNTER
Reached patient wife (consent to communicate on file). Rescheduled with Yadira 5/7/2024.    Catalina Ram  Transition Clinic Coordinator  04/22/24 1:28 PM

## 2024-04-24 ENCOUNTER — HOSPITAL ENCOUNTER (OUTPATIENT)
Dept: CARDIAC REHAB | Facility: CLINIC | Age: 64
Discharge: HOME | End: 2024-04-24
Attending: HOSPITALIST
Payer: MEDICARE

## 2024-04-24 PROCEDURE — 93797 PHYS/QHP OP CAR RHAB WO ECG: CPT | Mod: XU

## 2024-04-24 PROCEDURE — 93798 PHYS/QHP OP CAR RHAB W/ECG: CPT

## 2024-04-26 ENCOUNTER — HOSPITAL ENCOUNTER (OUTPATIENT)
Dept: CARDIAC REHAB | Facility: CLINIC | Age: 64
Discharge: HOME | End: 2024-04-26
Attending: HOSPITALIST
Payer: MEDICARE

## 2024-04-26 PROCEDURE — 93798 PHYS/QHP OP CAR RHAB W/ECG: CPT

## 2024-04-29 ENCOUNTER — HOSPITAL ENCOUNTER (OUTPATIENT)
Dept: CARDIAC REHAB | Facility: CLINIC | Age: 64
Discharge: HOME | End: 2024-04-29
Attending: HOSPITALIST
Payer: MEDICARE

## 2024-04-29 PROCEDURE — 93798 PHYS/QHP OP CAR RHAB W/ECG: CPT

## 2024-05-01 ENCOUNTER — HOSPITAL ENCOUNTER (OUTPATIENT)
Dept: CARDIAC REHAB | Facility: CLINIC | Age: 64
Discharge: HOME | End: 2024-05-01
Attending: HOSPITALIST
Payer: MEDICARE

## 2024-05-01 PROCEDURE — 93798 PHYS/QHP OP CAR RHAB W/ECG: CPT

## 2024-05-03 ENCOUNTER — HOSPITAL ENCOUNTER (OUTPATIENT)
Dept: CARDIAC REHAB | Facility: CLINIC | Age: 64
Discharge: HOME | End: 2024-05-03
Attending: HOSPITALIST
Payer: MEDICARE

## 2024-05-03 PROCEDURE — 93798 PHYS/QHP OP CAR RHAB W/ECG: CPT

## 2024-05-06 ENCOUNTER — HOSPITAL ENCOUNTER (OUTPATIENT)
Dept: CARDIAC REHAB | Facility: CLINIC | Age: 64
Discharge: HOME | End: 2024-05-06
Attending: HOSPITALIST
Payer: MEDICARE

## 2024-05-06 PROCEDURE — 93798 PHYS/QHP OP CAR RHAB W/ECG: CPT

## 2024-05-07 ENCOUNTER — VIRTUAL VISIT (OUTPATIENT)
Dept: BEHAVIORAL HEALTH | Facility: CLINIC | Age: 64
End: 2024-05-07
Payer: MEDICARE

## 2024-05-07 DIAGNOSIS — F32.9 CURRENT EPISODE OF MAJOR DEPRESSIVE DISORDER WITHOUT PRIOR EPISODE, UNSPECIFIED DEPRESSION EPISODE SEVERITY: Primary | ICD-10-CM

## 2024-05-07 NOTE — PROGRESS NOTES
Transition Clinic Progress Note    Patient Name:   Car Torres Date: May 7, 2024          Service Type: Family with client present        VISIT TIME START: 1134      VISIT TIME END: 1215    41 min   Session Length:  TC Session Length: 45 (38-52 Minutes)    Session #:  4    Attendees: TC Attendees: Client with Spouse or Significant Other    Service Modality:  Service Modality: Video Visit:      Provider verified identity through the following two step process.  Patient provided:  Patient photo    Telemedicine Visit: The patient's condition can be safely assessed and treated via synchronous audio and visual telemedicine encounter.      Reason for Telemedicine Visit: Patient convenience (e.g. access to timely appointments / distance to available provider)    Originating Site (Patient Location): Patient's home    Distant Site (Provider Location): Provider Remote Setting- Home Office    Consent:  The patient/guardian has verbally consented to: the potential risks and benefits of telemedicine (video visit) versus in person care; bill my insurance or make self-payment for services provided; and responsibility for payment of non-covered services.     Patient would like the video invitation sent by:  My Chart    Mode of Communication:  [facility]    Distant Location (Provider):  Off-site    As the provider I attest to compliance with applicable laws and regulations related to telemedicine.    Informed Consent and Assessment Methods    This provider and patient discussed HIPAA, and the limits of confidentiality; including mandated reporting, the possibility of collaborative discussions with patient's primary care provider and the multidisciplinary team in the MH clinic during consultation.  Discussed the no show policy, and the benefits and possible unintended consequences of therapy.  Patient indicated understanding Transition Clinic services are short term, solution focused, until a referral can  be made and patient can bridge to long term therapy.  Patient verbally indicated understanding the informed consent.        DATA  Interactive Complexity: No  Crisis: No        Progress Since Last Session (Related to Symptoms / Goals / Homework):   Symptoms: Improving increased functioning, participation in activities of daily living   Homework: Achieved / completed to satisfaction      Episode of Care Goals: Satisfactory progress - CONTEMPLATION (Considering change and yet undecided); Intervened by assessing the negative and positive thinking (ambivalence) about behavior change     Current / Ongoing Stressors and Concerns:   Previously working w/ [employer].               Patient's spouse communicates      Treatment Objective(s) Addressed in This Session:   Increase interest, engagement, and pleasure in doing things  Increase communication with spouse about how patient is feeling, what he feels able to do      Intervention:   Solution Focused Brief Therapy,Brief Psychotherapy - discussed and prioritizing the most efficient treatment.    Assessments completed prior to visit:  The following assessments were completed by patient for this visit:  [place] Suicide Severity Rating Scale (Short Version)      8/2/2020    11:43 AM 1/28/2021     2:06 PM 1/5/2023    12:57 PM 2/29/2024     9:00 AM 3/17/2024     9:16 PM 3/22/2024     1:33 AM 3/27/2024    10:00 AM   [place] Suicide Severity Rating (Short Version)   Over the past 2 weeks have you felt down, depressed, or hopeless? yes no no       Over the past 2 weeks have you had thoughts of killing yourself? no  no       Have you ever attempted to kill yourself? no         Q1 Wished to be Dead (Past Month)     0-->no 0-->no    Q2 Suicidal Thoughts (Past Month)     0-->no 0-->no    Q6 Suicide Behavior (Lifetime)     0-->no 0-->no    Level of Risk per Screen     no risks indicated no risks indicated    1. Wish to be Dead (Since Last Contact)    N   N   2. Non-Specific  Active Suicidal Thoughts (Since Last Contact)    N   N   Has subject engaged in non-suicidal self-injurious behavior? (Since Last Contact)    N   N   Calculated C-SSRS Risk Score (Since Last Contact)    No Risk Indicated   No Risk Indicated         ASSESSMENT: Current Emotional / Mental Status (status of significant symptoms):   Risk status (Self / Other harm or suicidal ideation)   Patient denies current fears or concerns for personal safety.   Patient denies current or recent suicidal ideation or behaviors.   Patient denies current or recent homicidal ideation or behaviors.   Patient denies current or recent self injurious behavior or ideation.   Patient denies other safety concerns.   Patient reports there has been no change in risk factors since their last session.     Patient reports there has been no change in protective factors since their last session.     Recommended that patient call 911 or go to the local ED should there be a change in any of these risk factors.     Appearance:   Appropriate    Eye Contact:   Good    Psychomotor Behavior: Normal    Attitude:   Cooperative    Orientation:   All   Speech    Rate / Production: Normal     Volume:  Normal    Mood:    Normal   Affect:    Appropriate    Thought Content:  Clear    Thought Form:  Coherent  Logical    Insight:    Good      Medication Review:   No changes to current psychiatric medication(s)     Medication Compliance:   Yes     Changes in Health Issues:   None reported     Chemical Use Review:   Substance Use: Chemical use reviewed, no active concerns identified      Tobacco Use: No current tobacco use.      Diagnoses:   311 (F32.9) Unspecified Depressive Disorder     Collateral Reports Completed:   JENNIFER Collateral: [facility] electronic medical records reviewed.    PLAN: (Patient Tasks / Therapist Tasks / Other)  Services provided by TC were explained to pt and his spouse.  Writer will coordinate a long term referral for psychotherapy.  If this  appt is scheduled more than 2 weeks out, pt will be seen by this writer at  for follow up care.    Patient will spend time checking in with partner to increase communication with focus on increase in functioning within parameters of physical limitations that may be present.   Consider acts of kindness.   Patient expressed understanding of plan.  His spouse understands plan as well.      Procedure Code: Family psychotherapy with patient present - 50 [CPT 25869]    Bettina Parra Psy.D, LP

## 2024-05-08 ENCOUNTER — TELEPHONE (OUTPATIENT)
Dept: BEHAVIORAL HEALTH | Facility: CLINIC | Age: 64
End: 2024-05-08
Payer: MEDICARE

## 2024-05-08 ENCOUNTER — HOSPITAL ENCOUNTER (OUTPATIENT)
Dept: CARDIAC REHAB | Facility: CLINIC | Age: 64
Discharge: HOME | End: 2024-05-08
Attending: HOSPITALIST
Payer: MEDICARE

## 2024-05-08 PROCEDURE — 93798 PHYS/QHP OP CAR RHAB W/ECG: CPT

## 2024-05-08 NOTE — TELEPHONE ENCOUNTER
Writer left patient a voicemail to schedule long term therapy appointment on [facility]. Insurance may need updating.Referral postponed for tomorrow.    Dania Rey  05/08/2024  180

## 2024-05-08 NOTE — TELEPHONE ENCOUNTER
----- Message from Bettina Parra Psy.D, LP sent at 5/8/2024  9:14 AM CDT -----  Sorry, I forgot to mention if the appt w/ a long term provider is more than 2 weeks out, can you schedule them a follow up visit with me while waiting for the long term appt ? Thank you !  ----- Message -----  From: Bettina Parra Psy.D, LP  Sent: 5/8/2024   8:00 AM CDT  To: Transition Clinic    Transition Clinic Next Level of Care Referral Request    MRN: [id number]  Patient Name:  Car Torres  Phone:  [phone] (home)   E-mail Address: [email or web address]    Type of patient appointment needed: Individual therapy    Provider Specialties: Specialty Services Requested: Depression    Service Modality:  Service Modality: Virtual    Clinician Gender Preference: both    Clinical Comments:  needs to establish care with a long term provider for ongoing therapy.   Hx of severe depression.  Wife would like to join appts at times.   Please schedule and ensure that referral accepts his insurance.     Bettina Parra Psy.D, ROMEO

## 2024-05-09 ENCOUNTER — TELEPHONE (OUTPATIENT)
Dept: BEHAVIORAL HEALTH | Facility: CLINIC | Age: 64
End: 2024-05-09
Payer: MEDICARE

## 2024-05-09 NOTE — TELEPHONE ENCOUNTER
Second attempt at reaching patient to offer scheduling assistance. Left message asking for a return call to TC.     Catalina Ram  Transition Clinic Coordinator  05/09/24 8:42 AM

## 2024-05-09 NOTE — TELEPHONE ENCOUNTER
----- Message from Bettina Parra Psy.D, LP sent at 5/8/2024  9:14 AM CDT -----  Sorry, I forgot to mention if the appt w/ a long term provider is more than 2 weeks out, can you schedule them a follow up visit with me while waiting for the long term appt ? Thank you !  ----- Message -----  From: Bettina Parra Psy.D, LP  Sent: 5/8/2024   8:00 AM CDT  To: Transition Clinic    Transition Clinic Next Level of Care Referral Request    MRN: [id number]  Patient Name:  Car Torres  Phone:  [phone] (home)   E-mail Address: [email or web address]    Type of patient appointment needed: Individual therapy    Provider Specialties: Specialty Services Requested: Depression    Service Modality:  Service Modality: Virtual    Clinician Gender Preference: both    Clinical Comments:  needs to establish care with a long term provider for ongoing therapy.   Hx of severe depression.  Wife would like to join appts at times.   Please schedule and ensure that referral accepts his insurance.     Bettina Parra Psy.D, ROMEO

## 2024-05-10 ENCOUNTER — HOSPITAL ENCOUNTER (OUTPATIENT)
Dept: CARDIAC REHAB | Facility: CLINIC | Age: 64
Discharge: HOME | End: 2024-05-10
Attending: HOSPITALIST
Payer: MEDICARE

## 2024-05-10 ENCOUNTER — TELEPHONE (OUTPATIENT)
Dept: BEHAVIORAL HEALTH | Facility: CLINIC | Age: 64
End: 2024-05-10
Payer: MEDICARE

## 2024-05-10 PROCEDURE — 93798 PHYS/QHP OP CAR RHAB W/ECG: CPT

## 2024-05-10 NOTE — TELEPHONE ENCOUNTER
Writer spoke with patient and patients spouse and scheduled TC therapy return visit on 05/15/2024 @ 2:00 pm.Long term resources sent.    Dania Rey  05/10/2024  404

## 2024-05-13 ENCOUNTER — HOSPITAL ENCOUNTER (OUTPATIENT)
Dept: CARDIAC REHAB | Facility: CLINIC | Age: 64
Discharge: HOME | End: 2024-05-13
Attending: HOSPITALIST
Payer: MEDICARE

## 2024-05-13 PROCEDURE — 93798 PHYS/QHP OP CAR RHAB W/ECG: CPT

## 2024-05-15 ENCOUNTER — HOSPITAL ENCOUNTER (OUTPATIENT)
Dept: CARDIAC REHAB | Facility: CLINIC | Age: 64
Discharge: HOME | End: 2024-05-15
Attending: HOSPITALIST
Payer: MEDICARE

## 2024-05-15 PROCEDURE — 93798 PHYS/QHP OP CAR RHAB W/ECG: CPT

## 2024-05-16 ENCOUNTER — VIRTUAL VISIT (OUTPATIENT)
Dept: BEHAVIORAL HEALTH | Facility: CLINIC | Age: 64
End: 2024-05-16
Payer: MEDICARE

## 2024-05-16 ENCOUNTER — TELEPHONE (OUTPATIENT)
Dept: BEHAVIORAL HEALTH | Facility: CLINIC | Age: 64
End: 2024-05-16
Payer: MEDICARE

## 2024-05-16 DIAGNOSIS — I48.0 PAROXYSMAL ATRIAL FIBRILLATION (H): Primary | ICD-10-CM

## 2024-05-16 DIAGNOSIS — F32.9 CURRENT EPISODE OF MAJOR DEPRESSIVE DISORDER WITHOUT PRIOR EPISODE, UNSPECIFIED DEPRESSION EPISODE SEVERITY: Primary | ICD-10-CM

## 2024-05-16 NOTE — PROGRESS NOTES
Transition Clinic Progress Note    Patient Name:   Car Torres Date: May 16, 2024          Service Type: Family with client present        No data recorded      No data recorded      Session Length:  TC Session Length: 45 (38-52 Minutes)  42 min   Session #:  5    Attendees: TC Attendees: Client with Spouse or Significant Other    Service Modality:  Service Modality: Video Visit:      Provider verified identity through the following two step process.  Patient provided:  Patient is known previously to provider    Telemedicine Visit: The patient's condition can be safely assessed and treated via synchronous audio and visual telemedicine encounter.      Reason for Telemedicine Visit: Patient convenience (e.g. access to timely appointments / distance to available provider)    Originating Site (Patient Location): Patient's home    Distant Site (Provider Location): Provider Remote Setting- Home Office    Consent:  The patient/guardian has verbally consented to: the potential risks and benefits of telemedicine (video visit) versus in person care; bill my insurance or make self-payment for services provided; and responsibility for payment of non-covered services.     Patient would like the video invitation sent by:  My Chart    Mode of Communication:  [facility]    Distant Location (Provider):  Off-site    As the provider I attest to compliance with applicable laws and regulations related to telemedicine.    Informed Consent and Assessment Methods    This provider and patient discussed HIPAA, and the limits of confidentiality; including mandated reporting, the possibility of collaborative discussions with patient's primary care provider and the multidisciplinary team in the MH clinic during consultation.  Discussed the no show policy, and the benefits and possible unintended consequences of therapy.  Patient indicated understanding Transition Clinic services are short term, solution focused, until  a referral can be made and patient can bridge to long term therapy.  Patient verbally indicated understanding the informed consent.        DATA  Interactive Complexity: No  Crisis: No        Progress Since Last Session (Related to Symptoms / Goals / Homework):   Symptoms: Improving - pt is more verbal, increase in communication   Homework: Achieved / completed to satisfaction      Episode of Care Goals: Achieved / completed to satisfaction - PREPARATION (Decided to change - considering how); Intervened by negotiating a change plan and determining options / strategies for behavior change, identifying triggers, exploring social supports, and working towards setting a date to begin behavior change     Current / Ongoing Stressors and Concerns:   Understanding patterns in relationship that affect pt   Problem solving maladaptive communication patterns  Lack of intimacy in relationship - defer to MD   Understanding of how health issues have affected spouse and how to increase engagement in daily activities          Treatment Objective(s) Addressed in This Session:   Increase interest, engagement, and pleasure in doing things  Increase healthy communication   Increase health promoting behaviors      Intervention:   Solution Focused Brief Therap, Brief Psychotherapy - discussed and prioritizing the most efficient treatment.    Assessments completed prior to visit:  The following assessments were completed by patient for this visit:   [place] Suicide Severity Rating Scale (Short Version)      1/28/2021     2:06 PM 1/5/2023    12:57 PM 2/29/2024     9:00 AM 3/17/2024     9:16 PM 3/22/2024     1:33 AM 3/27/2024    10:00 AM 5/16/2024     2:00 PM   [place] Suicide Severity Rating (Short Version)   Over the past 2 weeks have you felt down, depressed, or hopeless? no no        Over the past 2 weeks have you had thoughts of killing yourself?  no        Q1 Wished to be Dead (Past Month)    0-->no 0-->no     Q2 Suicidal Thoughts  (Past Month)    0-->no 0-->no     Q6 Suicide Behavior (Lifetime)    0-->no 0-->no     Level of Risk per Screen    no risks indicated no risks indicated     1. Wish to be Dead (Since Last Contact)   N   N N   2. Non-Specific Active Suicidal Thoughts (Since Last Contact)   N   N N   Has subject engaged in non-suicidal self-injurious behavior? (Since Last Contact)   N   N    Calculated C-SSRS Risk Score (Since Last Contact)   No Risk Indicated   No Risk Indicated No Risk Indicated         ASSESSMENT: Current Emotional / Mental Status (status of significant symptoms):   Risk status (Self / Other harm or suicidal ideation)   Patient denies current fears or concerns for personal safety.   Patient denies current or recent suicidal ideation or behaviors.   Patient denies current or recent homicidal ideation or behaviors.   Patient denies current or recent self injurious behavior or ideation.   Patient denies other safety concerns.   Patient reports there has been no change in risk factors since their last session.     Patient reports there has been no change in protective factors since their last session.     Recommended that patient call 911 or go to the local ED should there be a change in any of these risk factors.     Appearance:   Appropriate    Eye Contact:   Good    Psychomotor Behavior: Normal    Attitude:   Cooperative    Orientation:   All   Speech    Rate / Production: Normal     Volume:  Normal    Mood:    Normal   Affect:    Appropriate    Thought Content:  Clear    Thought Form:  Coherent  Logical    Insight:    Good      Medication Review:   No changes to current psychiatric medication(s)     Medication Compliance:   Yes     Changes in Health Issues:   None reported     Chemical Use Review:   Substance Use: Chemical use reviewed, no active concerns identified      Tobacco Use: did not discuss     Diagnoses:   311 (F32.9) Unspecified Depressive Disorder     Collateral Reports Completed:   TC Collateral:  [facility] electronic medical records reviewed.    PLAN: (Patient Tasks / Therapist Tasks / Other)  Pt and spouse will discuss daily tasks and responsibilities to     Is this the patient's last discharge?: No    Procedure Code: Psychotherapy (with patient) - 45 [CPT 18193]                                                             Bettina Parra Psy.D, LP  May 16, 2024

## 2024-05-16 NOTE — TELEPHONE ENCOUNTER
Coordinator called patient and LVM stating calling to offer scheduling assistance. Requested c/b to TC.    Catalina Ram  Transition Clinic Coordinator  05/16/24 3:10 PM

## 2024-05-20 ENCOUNTER — HOSPITAL ENCOUNTER (OUTPATIENT)
Dept: CARDIAC REHAB | Facility: CLINIC | Age: 64
Discharge: HOME | End: 2024-05-20
Attending: HOSPITALIST
Payer: MEDICARE

## 2024-05-20 PROCEDURE — 93798 PHYS/QHP OP CAR RHAB W/ECG: CPT

## 2024-05-22 ENCOUNTER — HOSPITAL ENCOUNTER (OUTPATIENT)
Dept: CARDIAC REHAB | Facility: CLINIC | Age: 64
Discharge: HOME | End: 2024-05-22
Attending: HOSPITALIST
Payer: MEDICARE

## 2024-05-22 PROCEDURE — 93798 PHYS/QHP OP CAR RHAB W/ECG: CPT

## 2024-05-23 ENCOUNTER — OFFICE VISIT (OUTPATIENT)
Dept: CARDIOLOGY | Facility: CLINIC | Age: 64
End: 2024-05-23
Payer: MEDICARE

## 2024-05-23 ENCOUNTER — ALLIED HEALTH/NURSE VISIT (OUTPATIENT)
Dept: CARDIOLOGY | Facility: CLINIC | Age: 64
End: 2024-05-23
Payer: MEDICARE

## 2024-05-23 ENCOUNTER — LAB (OUTPATIENT)
Dept: CARDIOLOGY | Facility: CLINIC | Age: 64
End: 2024-05-23
Payer: MEDICARE

## 2024-05-23 VITALS
HEIGHT: 70 IN | DIASTOLIC BLOOD PRESSURE: 64 MMHG | SYSTOLIC BLOOD PRESSURE: 120 MMHG | BODY MASS INDEX: 45.1 KG/M2 | WEIGHT: 315 LBS | RESPIRATION RATE: 16 BRPM | HEART RATE: 46 BPM

## 2024-05-23 DIAGNOSIS — I25.10 CORONARY ARTERY DISEASE INVOLVING NATIVE CORONARY ARTERY OF NATIVE HEART WITHOUT ANGINA PECTORIS: ICD-10-CM

## 2024-05-23 DIAGNOSIS — I49.5 SINUS NODE DYSFUNCTION (H): ICD-10-CM

## 2024-05-23 DIAGNOSIS — I48.0 PAROXYSMAL ATRIAL FIBRILLATION (H): Primary | ICD-10-CM

## 2024-05-23 DIAGNOSIS — G47.33 OSA (OBSTRUCTIVE SLEEP APNEA): Chronic | ICD-10-CM

## 2024-05-23 DIAGNOSIS — I48.91 ATRIAL FIBRILLATION WITH RAPID VENTRICULAR RESPONSE (H): ICD-10-CM

## 2024-05-23 DIAGNOSIS — I10 HYPERTENSION GOAL BP (BLOOD PRESSURE) < 140/90: Chronic | ICD-10-CM

## 2024-05-23 LAB
ANION GAP SERPL CALCULATED.3IONS-SCNC: 8 MMOL/L (ref 7–15)
BUN SERPL-MCNC: 26.3 MG/DL (ref 8–23)
CALCIUM SERPL-MCNC: 9.7 MG/DL (ref 8.8–10.2)
CHLORIDE SERPL-SCNC: 103 MMOL/L (ref 98–107)
CREAT SERPL-MCNC: 1.11 MG/DL (ref 0.67–1.17)
DEPRECATED HCO3 PLAS-SCNC: 26 MMOL/L (ref 22–29)
EGFRCR SERPLBLD CKD-EPI 2021: 73 ML/MIN/1.73M2
ERYTHROCYTE [DISTWIDTH] IN BLOOD BY AUTOMATED COUNT: 13.1 % (ref 10–15)
GLUCOSE SERPL-MCNC: 101 MG/DL (ref 70–99)
HCT VFR BLD AUTO: 41.3 % (ref 40–53)
HGB BLD-MCNC: 13.8 G/DL (ref 13.3–17.7)
MCH RBC QN AUTO: 30.1 PG (ref 26.5–33)
MCHC RBC AUTO-ENTMCNC: 33.4 G/DL (ref 31.5–36.5)
MCV RBC AUTO: 90 FL (ref 78–100)
PLATELET # BLD AUTO: 223 10E3/UL (ref 150–450)
POTASSIUM SERPL-SCNC: 4.9 MMOL/L (ref 3.4–5.3)
RBC # BLD AUTO: 4.58 10E6/UL (ref 4.4–5.9)
SODIUM SERPL-SCNC: 137 MMOL/L (ref 135–145)
WBC # BLD AUTO: 6.2 10E3/UL (ref 4–11)

## 2024-05-23 PROCEDURE — 93000 ELECTROCARDIOGRAM COMPLETE: CPT | Performed by: GENERAL ACUTE CARE HOSPITAL

## 2024-05-23 PROCEDURE — 36415 COLL VENOUS BLD VENIPUNCTURE: CPT

## 2024-05-23 PROCEDURE — G2211 COMPLEX E/M VISIT ADD ON: HCPCS | Performed by: NURSE PRACTITIONER

## 2024-05-23 PROCEDURE — 99207 PR NO CHARGE NURSE ONLY: CPT

## 2024-05-23 PROCEDURE — 99215 OFFICE O/P EST HI 40 MIN: CPT | Performed by: NURSE PRACTITIONER

## 2024-05-23 PROCEDURE — 85027 COMPLETE CBC AUTOMATED: CPT

## 2024-05-23 PROCEDURE — 80048 BASIC METABOLIC PNL TOTAL CA: CPT

## 2024-05-23 RX ORDER — PANTOPRAZOLE SODIUM 40 MG/1
40 TABLET, DELAYED RELEASE ORAL DAILY
Qty: 45 TABLET | Refills: 0 | Status: SHIPPED | OUTPATIENT
Start: 2024-05-27 | End: 2024-07-15

## 2024-05-23 NOTE — PROGRESS NOTES
HEART CARE ELECTROPHYSIOLOGY NOTE      [facility]  [phone]      Assessment/Recommendations   Assessment/Plan:  1.  Stage 3A/paroxysmal atrial fibrillation:  Initially diagnosed 3/17/2024 symptoms dating back to 2022.  Symptoms consist of palpitations.  He is scheduled for pulmonary vein isolation ablation with Dr. Sheldon on 5/30/2024.  We discussed ablation, <1-2% risk for complication, expected recovery, and follow-up.  Okay to continue metoprolol tartrate 25 mg every 8 hours as needed for AF-RVR.  Start pantoprazole 40 mg daily 3 days prior to ablation, to continue for 6 weeks after ablation.  He states that his questions were answered to his satisfaction and he is ready to proceed.      May need straight cath post ablation, previous issue with urinary retention  May resume cardiac rehab 3 days after ablation, but no strenuous activity or lifting >10 pounds for 1 week.  Keep sustained heart rate <130 bpm    He has a [id number] score of 3 for age 65-74, CAD, HTN.  HAS-BLED score of 2 for age, concomitant antiplatelet therapy.   Continue Eliquis 5 mg twice daily for stroke prophylaxis.  He reports no missed doses or bleeding issues.      2.  Hypertension: Controlled with lisinopril, hydrochlorothiazide    3.  Coronary artery disease: Status post PCI to LAD 3/8/2024.  Denies anginal symptoms.  Continue atorvastatin and ticagrelor.    4.  Obstructive sleep apnea: Consistent use of CPAP nightly.  He was instructed to bring his CPAP to use following ablation.    Telephone follow up with EP RN on 6/3/2024  Follow-up with me 7/11/2024, 6 weeks post PVI     History of Present Illness/Subjective    HPI: Car Torres is a 64 year old male who comes in for EP follow up of atrial fibrillation and history and physical prior to pulmonary vein isolation ablation.  He has a history of paroxysmal atrial fibrillation, sinus node dysfunction, CAD with LAD PCI (3/8/2024), HTN, HLD, obesity, TIA on  CPAP.    Arrhythmia history  Dx/date: Paroxysmal atrial fibrillation diagnosed 3/17/2024, intermittent symptoms since about 2022.  Sx: Palpitations  [id number] score: 3 for age 65-74, CAD, HTN  HAS-BLED score of 2 for age, concomitant antiplatelet therapy  Oral anticoagulation: Eliquis 5 mg twice daily  Antiarrhythmic medications, AV michael blocking agents: None-medication options limited by SND  Procedures  DCCV: 3/22/2024  Ablation: Scheduled 5/30/2024 with Dr. Sheldon    He states that he is feeling well.  He has not had any recent episodes of A-fib.  He denies chest discomfort, palpitations, abdominal fullness/bloating or peripheral edema, shortness of breath, paroxysmal nocturnal dyspnea, orthopnea, lightheadedness, dizziness, pre-syncope, or syncope.    Cardiographics (EKG personally reviewed):  EKG done 5/23/2024 shows sinus bradycardia at 46 bpm,  ms, QT/QTc is 462/404 ms.      ECHO done 3/18/2024:  echnically difficult study.Extremely difficult acoustic windows despite the  use of contrast for endcardial border definition.  Global and regional left ventricular function is normal with an EF of 60-65%.  Right ventricular function, chamber size, wall motion, and thickness are  normal.  The inferior vena cava is normal.  No pericardial effusion is present.    Cardiac catheterization done 3/8/2024:    Ost Cx lesion is 20% stenosed.    Mid Cx to Dist Cx lesion is 30% stenosed.    3rd Mrg lesion is 20% stenosed.    Prox LAD lesion is 90% stenosed.     Successful planned percutaneous coronary intervention of the proximal LAD lesion with shockwave lithotripsy and 1 drug-eluting stent-Synergy XD 3.0X 24 mm.    I have reviewed and updated the patient's Past Medical History, Social History, Family History and Medication List.  Outside records personally reviewed.     Physical Examination  Review of Systems   Vitals: /64 (BP Location: Right arm, Patient Position: Sitting, Cuff Size: Adult Large)   Pulse  "(!) 46   Resp 16   Ht 1.778 m (5' 10\")   Wt (!) 155.8 kg (343 lb 6.4 oz)   BMI 49.27 kg/m    BMI= Body mass index is 49.27 kg/m .  Wt Readings from Last 3 Encounters:   05/23/24 (!) 155.8 kg (343 lb 6.4 oz)   04/11/24 (!) 156.8 kg (345 lb 11.2 oz)   04/08/24 (!) 156.5 kg (345 lb)       General Appearance:   Alert, well-appearing and in no acute distress.   HEENT: Atraumatic, normocephalic.  No scleral icterus, normal conjunctivae, EOMs intact, PERRL.  Mucous membranes pink and moist.     Chest/Lungs:   Chest symmetric, spine straight.  Respirations unlabored.  Lungs are clear to auscultation.   Cardiovascular:   Regular rate and rhythm.  Normal first and second heart sounds with no murmurs, rubs, or gallops; radial and posterior tibial pulses are intact, trace bilateral lower extremity edema.   Abdomen:  Soft, obese, nondistended, bowel sounds present.   Extremities: No cyanosis or clubbing.   Musculoskeletal: Moves all extremities.   Skin: Warm, dry, intact.    Neurologic: Mood and affect are appropriate.  Alert and oriented to person, place, time, and situation.     ROS: 10 point ROS neg other than the symptoms noted above in the HPI.         Medical History  Surgical History Family History Social History   Past Medical History:   Diagnosis Date    CKD (chronic kidney disease) stage 2, GFR 60-89 ml/min 10/21/2010    60 to 80 - have discussed Lisinopril - will consider       Coronary artery disease 01/26/2024    Dementia (H)     Depressive disorder     Diverticulosis of large intestine without hemorrhage 11/18/2015    Incidental finding on CT scan       Heart disease 05/08/2018    Hypercholesterolemia     Hypertension goal BP (blood pressure) < 140/90     Nonspecific abnormal electrocardiogram (ECG) (EKG) 08/23/2007    Stress testing normal.     Paroxysmal atrial fibrillation (H) 03/17/2024    Sinus node dysfunction (H) 05/23/2024    Sleep apnea     uses a c-pap, severe    Status post coronary angiogram " 01/26/2024     Past Surgical History:   Procedure Laterality Date    COLONOSCOPY N/A 12/22/2020    Procedure: COLONOSCOPY, WITH POLYPECTOMY, CLIP;  Surgeon: Mihir Lang MD;  [place] [facility] [place]ONOSCOPY N/A 1/10/2022    Procedure: COLONOSCOPY, WITH POLYPECTOMY AND BIOPSY;  Surgeon: Mihir Lang MD;  Location:  GI    CV CORONARY ANGIOGRAM N/A 1/26/2024    Procedure: Coronary Angiogram;  Surgeon: Elton Wright MD;  Location:  HEART CARDIAC CATH LAB    CV CORONARY LITHOTRIPSY PCI N/A 3/8/2024    Procedure: Percutaneous Coronary Intervention - Lithotripsy;  Surgeon: Elton Wright MD;  Location:  HEART CARDIAC CATH LAB    CV INSTANTANEOUS WAVE-FREE RATIO N/A 1/26/2024    Procedure: Instantaneous Wave-Free Ratio;  Surgeon: Elton Wright MD;  Location:  HEART CARDIAC CATH LAB    CV INTRAVASULAR ULTRASOUND N/A 3/8/2024    Procedure: Intravascular Ultrasound;  Surgeon: Elton Wright MD;  Location:  HEART CARDIAC CATH LAB    CV PCI N/A 3/8/2024    Procedure: Percutaneous Coronary Intervention;  Surgeon: Elton Wright MD;  Location:  HEART CARDIAC CATH LAB    CV RIGHT HEART CATH MEASUREMENTS RECORDED N/A 1/26/2024    Procedure: Right Heart Catheterization;  Surgeon: Elton Wright MD;  Location:  HEART CARDIAC CATH LAB    ESOPHAGOSCOPY, GASTROSCOPY, DUODENOSCOPY (EGD), COMBINED N/A 1/28/2021    Procedure: ESOPHAGOGASTRODUODENOSCOPY, WITH BIOPSY;  Surgeon: Mihir Lang MD;  [place] [facility] [place]    OPEN REDUCTION INTERNAL FIXATION FOOT Left 1/5/2023    Procedure: LISFRANC OPEN REDUCTION INTERNAL FIXATION, LEFT FOOT;  Surgeon: Bailey Ruiz DPM;  Location:  OR    SURGICAL HISTORY OF -       surgery on left index finger     Family History   Problem Relation Age of Onset    Cancer Mother         uterine    Other Cancer Mother         endometrial    Cerebrovascular Disease Mother     Substance Abuse Mother         Alcohol    C.A.D. Father     Hypertension Father      Hyperlipidemia Father     Breast Cancer Maternal Grandmother     Other Cancer Maternal Grandmother         Lung/brain    Substance Abuse Paternal Grandfather     Hypertension Brother     Substance Abuse Brother         Alcohol    Hyperlipidemia Brother     Breast Cancer Other     Breast Cancer Cousin     Other Cancer Other     Cerebrovascular Disease Other         Maternal Aunt    Glaucoma No family hx of     Macular Degeneration No family hx of         Social History     Socioeconomic History    Marital status:      Spouse name: Luzma    Number of children: 3    Years of education: 14    Highest education level: Not on file   Occupational History    Occupation: [employer]     Employer: OTHER     Comment: [employer]   Tobacco Use    Smoking status: Former     Current packs/day: 0.00     Average packs/day: 1 pack/day for 25.0 years (25.0 ttl pk-yrs)     Types: Cigarettes, Cigars     Start date: 1973     Quit date: 1998     Years since quittin.3     Passive exposure: Past    Smokeless tobacco: Never    Tobacco comments:     N/A not a smoker   Vaping Use    Vaping status: Never Used   Substance and Sexual Activity    Alcohol use: Yes     Alcohol/week: 10.0 standard drinks of alcohol     Comment: less than 6 per week    Drug use: Not Currently     Types: Amphetamines     Comment: Kratum    Sexual activity: Not Currently     Partners: Female     Birth control/protection: Female Surgical, None     Comment:    Other Topics Concern    Parent/sibling w/ CABG, MI or angioplasty before 65F 55M? Yes     Comment: Father age 50 bypass. Grandfather death in age 40 s heart   Social History Narrative    Not on file     Social Determinants of Health     Financial Resource Strain: Low Risk  (3/19/2024)    Financial Resource Strain     Within the past 12 months, have you or your family members you live with been unable to get utilities (heat, electricity) when it was really needed?: No   Food Insecurity:  High Risk (3/19/2024)    Food Insecurity     Within the past 12 months, did you worry that your food would run out before you got money to buy more?: No     Within the past 12 months, did the food you bought just not last and you didn t have money to get more?: Yes   Transportation Needs: Low Risk  (3/19/2024)    Transportation Needs     Within the past 12 months, has lack of transportation kept you from medical appointments, getting your medicines, non-medical meetings or appointments, work, or from getting things that you need?: No   Physical Activity: Inactive (3/19/2024)    Exercise Vital Sign     Days of Exercise per Week: 0 days     Minutes of Exercise per Session: 0 min   Stress: No Stress Concern Present (3/19/2024)    [employer] [place] [employer] - Occupational Stress Questionnaire     Feeling of Stress : Not at all   Social Connections: Moderately Isolated (3/19/2024)    Social Connection and Isolation Panel [NHANES]     Frequency of Communication with Friends and Family: Three times a week     Frequency of Social Gatherings with Friends and Family: Once a week     Attends Hindu Services: Never     Active Member of Clubs or Organizations: No     Attends Club or Organization Meetings: Never     Marital Status:    Interpersonal Safety: Low Risk  (12/6/2023)    Interpersonal Safety     Do you feel physically and emotionally safe where you currently live?: Yes     Within the past 12 months, have you been hit, slapped, kicked or otherwise physically hurt by someone?: No     Within the past 12 months, have you been humiliated or emotionally abused in other ways by your partner or ex-partner?: No   Housing Stability: Low Risk  (3/19/2024)    Housing Stability     Do you have housing? : Yes     Are you worried about losing your housing?: No           Medications  Allergies   Current Outpatient Medications   Medication Sig Dispense Refill    apixaban ANTICOAGULANT (ELIQUIS ANTICOAGULANT) 5 MG  tablet Take 1 tablet (5 mg) by mouth 2 times daily 180 tablet 3    Apple Wyatt Vn-Grn Tea-Bit Or-Cr (APPLE CIDER VINEGAR PLUS) TABS Take 1 tablet by mouth daily With calcium and magnesium      atorvastatin (LIPITOR) 40 MG tablet Take 1 tablet (40 mg) by mouth daily 90 tablet 4    busPIRone (BUSPAR) 10 MG tablet Take 2 tablets (20 mg) by mouth 2 times daily 120 tablet 1    Cholecalciferol (VITAMIN D PO) Take 5,000 Units by mouth daily One tablet twice daily      co-enzyme Q-10 100 MG CAPS capsule Take 100 mg by mouth daily      FLUoxetine (PROZAC) 40 MG capsule Take 1 capsule (40 mg) by mouth daily 90 capsule 4    hydroCHLOROthiazide 12.5 MG tablet Take 1 tablet (12.5 mg) by mouth daily 90 tablet 3    hydrOXYzine HCl (ATARAX) 10 MG tablet Take 1 tablet (10 mg) by mouth 2 times daily as needed for anxiety (Patient taking differently: Take 10 mg by mouth daily) 180 tablet 0    lisinopril (ZESTRIL) 20 MG tablet Take 1 tablet (20 mg) by mouth daily 90 tablet 3    MULTI VITAMIN MENS PO Take 1 tablet by mouth daily      [START ON 5/27/2024] pantoprazole (PROTONIX) 40 MG EC tablet Take 1 tablet (40 mg) by mouth daily Continue for 6 weeks after ablation 45 tablet 0    ticagrelor (BRILINTA) 90 MG tablet Take 1 tablet (90 mg) by mouth 2 times daily Start this evening. 180 tablet 3    metoprolol tartrate (LOPRESSOR) 25 MG tablet Take 1 tablet (25 mg) by mouth every 8 hours as needed (Atrial fibrillation with RVR, HR > 120) (Patient not taking: Reported on 5/23/2024) 30 tablet 1    vitamin B complex with vitamin C (STRESS TAB) tablet Take 1 tablet by mouth daily (Patient not taking: Reported on 5/23/2024)       No Known Allergies     Denies previous adverse reaction to anesthesia      Lab Results    Chemistry/lipid CBC Cardiac Enzymes/BNP/TSH/INR   Recent Labs   Lab Test 03/18/24  0653   CHOL 145   HDL 53   LDL 68   TRIG 120     Recent Labs   Lab Test 03/18/24  0653 12/06/23  1016 12/05/22  0801   LDL 68 78 60     Recent Labs  "  Lab Test 03/22/24  0545      POTASSIUM 4.2   CHLORIDE 106   CO2 22   *   BUN 21.1   CR 1.00   GFRESTIMATED 84   SHAHID 8.8     Recent Labs   Lab Test 03/22/24  0545 03/22/24 0211 03/18/24  0653   CR 1.00 1.10 1.17     Recent Labs   Lab Test 03/17/24 2129   A1C 5.6     BMP and CBC drawn today, results pending   Recent Labs   Lab Test 03/22/24 0211   WBC 6.8   HGB 14.5   HCT 42.3   MCV 90        Recent Labs   Lab Test 03/22/24 0211 03/17/24 2129 03/08/24  1011   HGB 14.5 13.9  13.9 13.9    No results for input(s): \"TROPONINI\" in the last 60862 hours.  Recent Labs   Lab Test 03/17/24 2129   NTBNPI 128     Recent Labs   Lab Test 03/22/24 0211   TSH 3.98     Recent Labs   Lab Test 03/22/24 0211 03/18/24  0653 03/08/24  1011   INR 0.99 1.07 1.06      41 minutes were spent on the date of encounter performing chart review, history and exam, documentation, and further activities as noted above.    The longitudinal plan of care for the diagnosis(es)/condition(s) as documented were addressed during this visit. Due to the added complexity in care, I will continue to support Jerry in the subsequent management and with ongoing continuity of care.                                       "

## 2024-05-23 NOTE — LETTER
5/23/2024    Mihir Perez MD  [address]  [place] [address]    RE: Car Torres       Dear Colleague,     I had the pleasure of seeing Car Torres in the [facility].    HEART CARE ELECTROPHYSIOLOGY NOTE      [facility]  [phone]      Assessment/Recommendations   Assessment/Plan:  1.  Stage 3A/paroxysmal atrial fibrillation:  Initially diagnosed 3/17/2024 symptoms dating back to 2022.  Symptoms consist of palpitations.  He is scheduled for pulmonary vein isolation ablation with Dr. Sheldon on 5/30/2024.  We discussed ablation, <1-2% risk for complication, expected recovery, and follow-up.  Okay to continue metoprolol tartrate 25 mg every 8 hours as needed for AF-RVR.  Start pantoprazole 40 mg daily 3 days prior to ablation, to continue for 6 weeks after ablation.  He states that his questions were answered to his satisfaction and he is ready to proceed.      May need straight cath post ablation, previous issue with urinary retention  May resume cardiac rehab 3 days after ablation, but no strenuous activity or lifting >10 pounds for 1 week.  Keep sustained heart rate <130 bpm    He has a [id number] score of 3 for age 65-74, CAD, HTN.  HAS-BLED score of 2 for age, concomitant antiplatelet therapy.   Continue Eliquis 5 mg twice daily for stroke prophylaxis.  He reports no missed doses or bleeding issues.      2.  Hypertension: Controlled with lisinopril, hydrochlorothiazide    3.  Coronary artery disease: Status post PCI to LAD 3/8/2024.  Denies anginal symptoms.  Continue atorvastatin and ticagrelor.    4.  Obstructive sleep apnea: Consistent use of CPAP nightly.  He was instructed to bring his CPAP to use following ablation.    Telephone follow up with EP RN on 6/3/2024  Follow-up with me 7/11/2024, 6 weeks post PVI     History of Present Illness/Subjective    HPI: Car Torres is a 64 year old male who comes in for EP follow up of atrial  fibrillation and history and physical prior to pulmonary vein isolation ablation.  He has a history of paroxysmal atrial fibrillation, sinus node dysfunction, CAD with LAD PCI (3/8/2024), HTN, HLD, obesity, TIA on CPAP.    Arrhythmia history  Dx/date: Paroxysmal atrial fibrillation diagnosed 3/17/2024, intermittent symptoms since about 2022.  Sx: Palpitations  [id number] score: 3 for age 65-74, CAD, HTN  HAS-BLED score of 2 for age, concomitant antiplatelet therapy  Oral anticoagulation: Eliquis 5 mg twice daily  Antiarrhythmic medications, AV michael blocking agents: None-medication options limited by SND  Procedures  DCCV: 3/22/2024  Ablation: Scheduled 5/30/2024 with Dr. Sheldon    He states that he is feeling well.  He has not had any recent episodes of A-fib.  He denies chest discomfort, palpitations, abdominal fullness/bloating or peripheral edema, shortness of breath, paroxysmal nocturnal dyspnea, orthopnea, lightheadedness, dizziness, pre-syncope, or syncope.    Cardiographics (EKG personally reviewed):  EKG done 5/23/2024 shows sinus bradycardia at 46 bpm,  ms, QT/QTc is 462/404 ms.      ECHO done 3/18/2024:  echnically difficult study.Extremely difficult acoustic windows despite the  use of contrast for endcardial border definition.  Global and regional left ventricular function is normal with an EF of 60-65%.  Right ventricular function, chamber size, wall motion, and thickness are  normal.  The inferior vena cava is normal.  No pericardial effusion is present.    Cardiac catheterization done 3/8/2024:    Ost Cx lesion is 20% stenosed.    Mid Cx to Dist Cx lesion is 30% stenosed.    3rd Mrg lesion is 20% stenosed.    Prox LAD lesion is 90% stenosed.     Successful planned percutaneous coronary intervention of the proximal LAD lesion with shockwave lithotripsy and 1 drug-eluting stent-Synergy XD 3.0X 24 mm.    I have reviewed and updated the patient's Past Medical History, Social History, Family  "History and Medication List.  Outside records personally reviewed.     Physical Examination  Review of Systems   Vitals: /64 (BP Location: Right arm, Patient Position: Sitting, Cuff Size: Adult Large)   Pulse (!) 46   Resp 16   Ht 1.778 m (5' 10\")   Wt (!) 155.8 kg (343 lb 6.4 oz)   BMI 49.27 kg/m    BMI= Body mass index is 49.27 kg/m .  Wt Readings from Last 3 Encounters:   05/23/24 (!) 155.8 kg (343 lb 6.4 oz)   04/11/24 (!) 156.8 kg (345 lb 11.2 oz)   04/08/24 (!) 156.5 kg (345 lb)       General Appearance:   Alert, well-appearing and in no acute distress.   HEENT: Atraumatic, normocephalic.  No scleral icterus, normal conjunctivae, EOMs intact, PERRL.  Mucous membranes pink and moist.     Chest/Lungs:   Chest symmetric, spine straight.  Respirations unlabored.  Lungs are clear to auscultation.   Cardiovascular:   Regular rate and rhythm.  Normal first and second heart sounds with no murmurs, rubs, or gallops; radial and posterior tibial pulses are intact, trace bilateral lower extremity edema.   Abdomen:  Soft, obese, nondistended, bowel sounds present.   Extremities: No cyanosis or clubbing.   Musculoskeletal: Moves all extremities.   Skin: Warm, dry, intact.    Neurologic: Mood and affect are appropriate.  Alert and oriented to person, place, time, and situation.     ROS: 10 point ROS neg other than the symptoms noted above in the HPI.         Medical History  Surgical History Family History Social History   Past Medical History:   Diagnosis Date    CKD (chronic kidney disease) stage 2, GFR 60-89 ml/min 10/21/2010    60 to 80 - have discussed Lisinopril - will consider       Coronary artery disease 01/26/2024    Dementia (H)     Depressive disorder     Diverticulosis of large intestine without hemorrhage 11/18/2015    Incidental finding on CT scan       Heart disease 05/08/2018    Hypercholesterolemia     Hypertension goal BP (blood pressure) < 140/90     Nonspecific abnormal electrocardiogram (ECG) " (EKG) 08/23/2007    Stress testing normal.     Paroxysmal atrial fibrillation (H) 03/17/2024    Sinus node dysfunction (H) 05/23/2024    Sleep apnea     uses a c-pap, severe    Status post coronary angiogram 01/26/2024     Past Surgical History:   Procedure Laterality Date    COLONOSCOPY N/A 12/22/2020    Procedure: COLONOSCOPY, WITH POLYPECTOMY, CLIP;  Surgeon: Mihir Lang MD;  Location: [facility] OR    COLONOSCOPY N/A 1/10/2022    Procedure: COLONOSCOPY, WITH POLYPECTOMY AND BIOPSY;  Surgeon: Mihir Lang MD;  Location:  GI    CV CORONARY ANGIOGRAM N/A 1/26/2024    Procedure: Coronary Angiogram;  Surgeon: Elton Wright MD;  Location: [facility] CARDIAC CATH LAB    CV CORONARY LITHOTRIPSY PCI N/A 3/8/2024    Procedure: Percutaneous Coronary Intervention - Lithotripsy;  Surgeon: Elton Wright MD;  Location: [facility] CARDIAC CATH LAB    CV INSTANTANEOUS WAVE-FREE RATIO N/A 1/26/2024    Procedure: Instantaneous Wave-Free Ratio;  Surgeon: Elton Wright MD;  Location:  HEART CARDIAC CATH LAB    CV INTRAVASULAR ULTRASOUND N/A 3/8/2024    Procedure: Intravascular Ultrasound;  Surgeon: Elton Wright MD;  Location: [facility] CARDIAC CATH LAB    CV PCI N/A 3/8/2024    Procedure: Percutaneous Coronary Intervention;  Surgeon: Elton Wright MD;  Location: [facility] CARDIAC CATH LAB    CV RIGHT HEART CATH MEASUREMENTS RECORDED N/A 1/26/2024    Procedure: Right Heart Catheterization;  Surgeon: Elton Wright MD;  Location: [facility] CARDIAC CATH LAB    ESOPHAGOSCOPY, GASTROSCOPY, DUODENOSCOPY (EGD), COMBINED N/A 1/28/2021    Procedure: ESOPHAGOGASTRODUODENOSCOPY, WITH BIOPSY;  Surgeon: Mihir Lang MD;  [place] [facility]    OPEN REDUCTION INTERNAL FIXATION FOOT Left 1/5/2023    Procedure: LISFRANC OPEN REDUCTION INTERNAL FIXATION, LEFT FOOT;  Surgeon: Bailey Ruiz DPM;  Location:  OR    SURGICAL HISTORY OF -       surgery on left index finger     Family History   Problem Relation Age  of Onset    Cancer Mother         uterine    Other Cancer Mother         endometrial    Cerebrovascular Disease Mother     Substance Abuse Mother         Alcohol    C.A.D. Father     Hypertension Father     Hyperlipidemia Father     Breast Cancer Maternal Grandmother     Other Cancer Maternal Grandmother         Lung/brain    Substance Abuse Paternal Grandfather     Hypertension Brother     Substance Abuse Brother         Alcohol    Hyperlipidemia Brother     Breast Cancer Other     Breast Cancer Cousin     Other Cancer Other     Cerebrovascular Disease Other         Maternal Aunt    Glaucoma No family hx of     Macular Degeneration No family hx of         Social History     Socioeconomic History    Marital status:      Spouse name: Luzma    Number of children: 3    Years of education: 14    Highest education level: Not on file   Occupational History    Occupation: [employer]     Employer: OTHER     Comment: [employer]   Tobacco Use    Smoking status: Former     Current packs/day: 0.00     Average packs/day: 1 pack/day for 25.0 years (25.0 ttl pk-yrs)     Types: Cigarettes, Cigars     Start date: 1973     Quit date: 1998     Years since quittin.3     Passive exposure: Past    Smokeless tobacco: Never    Tobacco comments:     N/A not a smoker   Vaping Use    Vaping status: Never Used   Substance and Sexual Activity    Alcohol use: Yes     Alcohol/week: 10.0 standard drinks of alcohol     Comment: less than 6 per week    Drug use: Not Currently     Types: Amphetamines     Comment: Kratum    Sexual activity: Not Currently     Partners: Female     Birth control/protection: Female Surgical, None     Comment:    Other Topics Concern    Parent/sibling w/ CABG, MI or angioplasty before 65F 55M? Yes     Comment: Father age 50 bypass. Grandfather death in age 40 s heart   Social History Narrative    Not on file     Social Determinants of Health     Financial Resource Strain: Low Risk   (3/19/2024)    Financial Resource Strain     Within the past 12 months, have you or your family members you live with been unable to get utilities (heat, electricity) when it was really needed?: No   Food Insecurity: High Risk (3/19/2024)    Food Insecurity     Within the past 12 months, did you worry that your food would run out before you got money to buy more?: No     Within the past 12 months, did the food you bought just not last and you didn t have money to get more?: Yes   Transportation Needs: Low Risk  (3/19/2024)    Transportation Needs     Within the past 12 months, has lack of transportation kept you from medical appointments, getting your medicines, non-medical meetings or appointments, work, or from getting things that you need?: No   Physical Activity: Inactive (3/19/2024)    Exercise Vital Sign     Days of Exercise per Week: 0 days     Minutes of Exercise per Session: 0 min   Stress: No Stress Concern Present (3/19/2024)    [employer] [employer] - Occupational Stress Questionnaire     Feeling of Stress : Not at all   Social Connections: Moderately Isolated (3/19/2024)    Social Connection and Isolation Panel [NHANES]     Frequency of Communication with Friends and Family: Three times a week     Frequency of Social Gatherings with Friends and Family: Once a week     Attends Gnosticist Services: Never     Active Member of Clubs or Organizations: No     Attends Club or Organization Meetings: Never     Marital Status:    Interpersonal Safety: Low Risk  (12/6/2023)    Interpersonal Safety     Do you feel physically and emotionally safe where you currently live?: Yes     Within the past 12 months, have you been hit, slapped, kicked or otherwise physically hurt by someone?: No     Within the past 12 months, have you been humiliated or emotionally abused in other ways by your partner or ex-partner?: No   Housing Stability: Low Risk  (3/19/2024)    Housing Stability     Do you have  housing? : Yes     Are you worried about losing your housing?: No           Medications  Allergies   Current Outpatient Medications   Medication Sig Dispense Refill    apixaban ANTICOAGULANT (ELIQUIS ANTICOAGULANT) 5 MG tablet Take 1 tablet (5 mg) by mouth 2 times daily 180 tablet 3    Apple Wyatt Vn-Grn Tea-Bit Or-Cr (APPLE CIDER VINEGAR PLUS) TABS Take 1 tablet by mouth daily With calcium and magnesium      atorvastatin (LIPITOR) 40 MG tablet Take 1 tablet (40 mg) by mouth daily 90 tablet 4    busPIRone (BUSPAR) 10 MG tablet Take 2 tablets (20 mg) by mouth 2 times daily 120 tablet 1    Cholecalciferol (VITAMIN D PO) Take 5,000 Units by mouth daily One tablet twice daily      co-enzyme Q-10 100 MG CAPS capsule Take 100 mg by mouth daily      FLUoxetine (PROZAC) 40 MG capsule Take 1 capsule (40 mg) by mouth daily 90 capsule 4    hydroCHLOROthiazide 12.5 MG tablet Take 1 tablet (12.5 mg) by mouth daily 90 tablet 3    hydrOXYzine HCl (ATARAX) 10 MG tablet Take 1 tablet (10 mg) by mouth 2 times daily as needed for anxiety (Patient taking differently: Take 10 mg by mouth daily) 180 tablet 0    lisinopril (ZESTRIL) 20 MG tablet Take 1 tablet (20 mg) by mouth daily 90 tablet 3    MULTI VITAMIN MENS PO Take 1 tablet by mouth daily      [START ON 5/27/2024] pantoprazole (PROTONIX) 40 MG EC tablet Take 1 tablet (40 mg) by mouth daily Continue for 6 weeks after ablation 45 tablet 0    ticagrelor (BRILINTA) 90 MG tablet Take 1 tablet (90 mg) by mouth 2 times daily Start this evening. 180 tablet 3    metoprolol tartrate (LOPRESSOR) 25 MG tablet Take 1 tablet (25 mg) by mouth every 8 hours as needed (Atrial fibrillation with RVR, HR > 120) (Patient not taking: Reported on 5/23/2024) 30 tablet 1    vitamin B complex with vitamin C (STRESS TAB) tablet Take 1 tablet by mouth daily (Patient not taking: Reported on 5/23/2024)       No Known Allergies     Denies previous adverse reaction to anesthesia      Lab Results    Chemistry/lipid  "CBC Cardiac Enzymes/BNP/TSH/INR   Recent Labs   Lab Test 03/18/24  0653   CHOL 145   HDL 53   LDL 68   TRIG 120     Recent Labs   Lab Test 03/18/24  0653 12/06/23  1016 12/05/22  0801   LDL 68 78 60     Recent Labs   Lab Test 03/22/24  0545      POTASSIUM 4.2   CHLORIDE 106   CO2 22   *   BUN 21.1   CR 1.00   GFRESTIMATED 84   SHAHID 8.8     Recent Labs   Lab Test 03/22/24  0545 03/22/24  0211 03/18/24  0653   CR 1.00 1.10 1.17     Recent Labs   Lab Test 03/17/24 2129   A1C 5.6     BMP and CBC drawn today, results pending   Recent Labs   Lab Test 03/22/24  0211   WBC 6.8   HGB 14.5   HCT 42.3   MCV 90        Recent Labs   Lab Test 03/22/24  0211 03/17/24 2129 03/08/24  1011   HGB 14.5 13.9  13.9 13.9    No results for input(s): \"TROPONINI\" in the last 06133 hours.  Recent Labs   Lab Test 03/17/24 2129   NTBNPI 128     Recent Labs   Lab Test 03/22/24  0211   TSH 3.98     Recent Labs   Lab Test 03/22/24  0211 03/18/24  0653 03/08/24  1011   INR 0.99 1.07 1.06      41 minutes were spent on the date of encounter performing chart review, history and exam, documentation, and further activities as noted above.    The longitudinal plan of care for the diagnosis(es)/condition(s) as documented were addressed during this visit. Due to the added complexity in care, I will continue to support Jerry in the subsequent management and with ongoing continuity of care.          Thank you for allowing me to participate in the care of your patient.      Sincerely,     KLAUS [facility]  cc:   Sandee Sheldon MD  [address] [facility]  [place] [address]      "

## 2024-05-23 NOTE — H&P (VIEW-ONLY)
HEART CARE ELECTROPHYSIOLOGY NOTE      [facility]  [phone]      Assessment/Recommendations   Assessment/Plan:  1.  Stage 3A/paroxysmal atrial fibrillation:  Initially diagnosed 3/17/2024 symptoms dating back to 2022.  Symptoms consist of palpitations.  He is scheduled for pulmonary vein isolation ablation with Dr. Sheldon on 5/30/2024.  We discussed ablation, <1-2% risk for complication, expected recovery, and follow-up.  Okay to continue metoprolol tartrate 25 mg every 8 hours as needed for AF-RVR.  Start pantoprazole 40 mg daily 3 days prior to ablation, to continue for 6 weeks after ablation.  He states that his questions were answered to his satisfaction and he is ready to proceed.      May need straight cath post ablation, previous issue with urinary retention  May resume cardiac rehab 3 days after ablation, but no strenuous activity or lifting >10 pounds for 1 week.  Keep sustained heart rate <130 bpm    He has a [id number] score of 3 for age 65-74, CAD, HTN.  HAS-BLED score of 2 for age, concomitant antiplatelet therapy.   Continue Eliquis 5 mg twice daily for stroke prophylaxis.  He reports no missed doses or bleeding issues.      2.  Hypertension: Controlled with lisinopril, hydrochlorothiazide    3.  Coronary artery disease: Status post PCI to LAD 3/8/2024.  Denies anginal symptoms.  Continue atorvastatin and ticagrelor.    4.  Obstructive sleep apnea: Consistent use of CPAP nightly.  He was instructed to bring his CPAP to use following ablation.    Telephone follow up with EP RN on 6/3/2024  Follow-up with me 7/11/2024, 6 weeks post PVI     History of Present Illness/Subjective    HPI: Car Torres is a 64 year old male who comes in for EP follow up of atrial fibrillation and history and physical prior to pulmonary vein isolation ablation.  He has a history of paroxysmal atrial fibrillation, sinus node dysfunction, CAD with LAD PCI (3/8/2024), HTN, HLD, obesity, TIA on  CPAP.    Arrhythmia history  Dx/date: Paroxysmal atrial fibrillation diagnosed 3/17/2024, intermittent symptoms since about 2022.  Sx: Palpitations  [id number] score: 3 for age 65-74, CAD, HTN  HAS-BLED score of 2 for age, concomitant antiplatelet therapy  Oral anticoagulation: Eliquis 5 mg twice daily  Antiarrhythmic medications, AV michael blocking agents: None-medication options limited by SND  Procedures  DCCV: 3/22/2024  Ablation: Scheduled 5/30/2024 with Dr. Sheldon    He states that he is feeling well.  He has not had any recent episodes of A-fib.  He denies chest discomfort, palpitations, abdominal fullness/bloating or peripheral edema, shortness of breath, paroxysmal nocturnal dyspnea, orthopnea, lightheadedness, dizziness, pre-syncope, or syncope.    Cardiographics (EKG personally reviewed):  EKG done 5/23/2024 shows sinus bradycardia at 46 bpm,  ms, QT/QTc is 462/404 ms.      ECHO done 3/18/2024:  echnically difficult study.Extremely difficult acoustic windows despite the  use of contrast for endcardial border definition.  Global and regional left ventricular function is normal with an EF of 60-65%.  Right ventricular function, chamber size, wall motion, and thickness are  normal.  The inferior vena cava is normal.  No pericardial effusion is present.    Cardiac catheterization done 3/8/2024:    Ost Cx lesion is 20% stenosed.    Mid Cx to Dist Cx lesion is 30% stenosed.    3rd Mrg lesion is 20% stenosed.    Prox LAD lesion is 90% stenosed.     Successful planned percutaneous coronary intervention of the proximal LAD lesion with shockwave lithotripsy and 1 drug-eluting stent-Synergy XD 3.0X 24 mm.    I have reviewed and updated the patient's Past Medical History, Social History, Family History and Medication List.  Outside records personally reviewed.     Physical Examination  Review of Systems   Vitals: /64 (BP Location: Right arm, Patient Position: Sitting, Cuff Size: Adult Large)   Pulse  "(!) 46   Resp 16   Ht 1.778 m (5' 10\")   Wt (!) 155.8 kg (343 lb 6.4 oz)   BMI 49.27 kg/m    BMI= Body mass index is 49.27 kg/m .  Wt Readings from Last 3 Encounters:   05/23/24 (!) 155.8 kg (343 lb 6.4 oz)   04/11/24 (!) 156.8 kg (345 lb 11.2 oz)   04/08/24 (!) 156.5 kg (345 lb)       General Appearance:   Alert, well-appearing and in no acute distress.   HEENT: Atraumatic, normocephalic.  No scleral icterus, normal conjunctivae, EOMs intact, PERRL.  Mucous membranes pink and moist.     Chest/Lungs:   Chest symmetric, spine straight.  Respirations unlabored.  Lungs are clear to auscultation.   Cardiovascular:   Regular rate and rhythm.  Normal first and second heart sounds with no murmurs, rubs, or gallops; radial and posterior tibial pulses are intact, trace bilateral lower extremity edema.   Abdomen:  Soft, obese, nondistended, bowel sounds present.   Extremities: No cyanosis or clubbing.   Musculoskeletal: Moves all extremities.   Skin: Warm, dry, intact.    Neurologic: Mood and affect are appropriate.  Alert and oriented to person, place, time, and situation.     ROS: 10 point ROS neg other than the symptoms noted above in the HPI.         Medical History  Surgical History Family History Social History   Past Medical History:   Diagnosis Date    CKD (chronic kidney disease) stage 2, GFR 60-89 ml/min 10/21/2010    60 to 80 - have discussed Lisinopril - will consider       Coronary artery disease 01/26/2024    Dementia (H)     Depressive disorder     Diverticulosis of large intestine without hemorrhage 11/18/2015    Incidental finding on CT scan       Heart disease 05/08/2018    Hypercholesterolemia     Hypertension goal BP (blood pressure) < 140/90     Nonspecific abnormal electrocardiogram (ECG) (EKG) 08/23/2007    Stress testing normal.     Paroxysmal atrial fibrillation (H) 03/17/2024    Sinus node dysfunction (H) 05/23/2024    Sleep apnea     uses a c-pap, severe    Status post coronary angiogram " 01/26/2024     Past Surgical History:   Procedure Laterality Date    COLONOSCOPY N/A 12/22/2020    Procedure: COLONOSCOPY, WITH POLYPECTOMY, CLIP;  Surgeon: Mihir Lang MD;  Location: [facility] OR    COLONOSCOPY N/A 1/10/2022    Procedure: COLONOSCOPY, WITH POLYPECTOMY AND BIOPSY;  Surgeon: Mihir Lang MD;  Location:  GI    CV CORONARY ANGIOGRAM N/A 1/26/2024    Procedure: Coronary Angiogram;  Surgeon: Elton Wright MD;  Location:  HEART CARDIAC CATH LAB    CV CORONARY LITHOTRIPSY PCI N/A 3/8/2024    Procedure: Percutaneous Coronary Intervention - Lithotripsy;  Surgeon: Elton Wright MD;  Location:  HEART CARDIAC CATH LAB    CV INSTANTANEOUS WAVE-FREE RATIO N/A 1/26/2024    Procedure: Instantaneous Wave-Free Ratio;  Surgeon: Elton Wright MD;  Location:  HEART CARDIAC CATH LAB    CV INTRAVASULAR ULTRASOUND N/A 3/8/2024    Procedure: Intravascular Ultrasound;  Surgeon: Elton Wright MD;  Location:  HEART CARDIAC CATH LAB    CV PCI N/A 3/8/2024    Procedure: Percutaneous Coronary Intervention;  Surgeon: Elton Wright MD;  Location:  HEART CARDIAC CATH LAB    CV RIGHT HEART CATH MEASUREMENTS RECORDED N/A 1/26/2024    Procedure: Right Heart Catheterization;  Surgeon: Elton Wright MD;  Location:  HEART CARDIAC CATH LAB    ESOPHAGOSCOPY, GASTROSCOPY, DUODENOSCOPY (EGD), COMBINED N/A 1/28/2021    Procedure: ESOPHAGOGASTRODUODENOSCOPY, WITH BIOPSY;  Surgeon: Mihir Lang MD;  Location: [facility] OR    OPEN REDUCTION INTERNAL FIXATION FOOT Left 1/5/2023    Procedure: LISFRANC OPEN REDUCTION INTERNAL FIXATION, LEFT FOOT;  Surgeon: Bailey Ruiz DPM;  Location:  OR    SURGICAL HISTORY OF -       surgery on left index finger     Family History   Problem Relation Age of Onset    Cancer Mother         uterine    Other Cancer Mother         endometrial    Cerebrovascular Disease Mother     Substance Abuse Mother         Alcohol    C.A.D. Father     Hypertension Father      Hyperlipidemia Father     Breast Cancer Maternal Grandmother     Other Cancer Maternal Grandmother         Lung/brain    Substance Abuse Paternal Grandfather     Hypertension Brother     Substance Abuse Brother         Alcohol    Hyperlipidemia Brother     Breast Cancer Other     Breast Cancer Cousin     Other Cancer Other     Cerebrovascular Disease Other         Maternal Aunt    Glaucoma No family hx of     Macular Degeneration No family hx of         Social History     Socioeconomic History    Marital status:      Spouse name: Luzma    Number of children: 3    Years of education: 14    Highest education level: Not on file   Occupational History    Occupation: [employer]     Employer: OTHER     Comment: [employer]   Tobacco Use    Smoking status: Former     Current packs/day: 0.00     Average packs/day: 1 pack/day for 25.0 years (25.0 ttl pk-yrs)     Types: Cigarettes, Cigars     Start date: 1973     Quit date: 1998     Years since quittin.3     Passive exposure: Past    Smokeless tobacco: Never    Tobacco comments:     N/A not a smoker   Vaping Use    Vaping status: Never Used   Substance and Sexual Activity    Alcohol use: Yes     Alcohol/week: 10.0 standard drinks of alcohol     Comment: less than 6 per week    Drug use: Not Currently     Types: Amphetamines     Comment: Kratum    Sexual activity: Not Currently     Partners: Female     Birth control/protection: Female Surgical, None     Comment:    Other Topics Concern    Parent/sibling w/ CABG, MI or angioplasty before 65F 55M? Yes     Comment: Father age 50 bypass. Grandfather death in age 40 s heart   Social History Narrative    Not on file     Social Determinants of Health     Financial Resource Strain: Low Risk  (3/19/2024)    Financial Resource Strain     Within the past 12 months, have you or your family members you live with been unable to get utilities (heat, electricity) when it was really needed?: No   Food Insecurity:  High Risk (3/19/2024)    Food Insecurity     Within the past 12 months, did you worry that your food would run out before you got money to buy more?: No     Within the past 12 months, did the food you bought just not last and you didn t have money to get more?: Yes   Transportation Needs: Low Risk  (3/19/2024)    Transportation Needs     Within the past 12 months, has lack of transportation kept you from medical appointments, getting your medicines, non-medical meetings or appointments, work, or from getting things that you need?: No   Physical Activity: Inactive (3/19/2024)    Exercise Vital Sign     Days of Exercise per Week: 0 days     Minutes of Exercise per Session: 0 min   Stress: No Stress Concern Present (3/19/2024)    [employer] - Occupational Stress Questionnaire     Feeling of Stress : Not at all   Social Connections: Moderately Isolated (3/19/2024)    Social Connection and Isolation Panel [NHANES]     Frequency of Communication with Friends and Family: Three times a week     Frequency of Social Gatherings with Friends and Family: Once a week     Attends Mandaeism Services: Never     Active Member of Clubs or Organizations: No     Attends Club or Organization Meetings: Never     Marital Status:    Interpersonal Safety: Low Risk  (12/6/2023)    Interpersonal Safety     Do you feel physically and emotionally safe where you currently live?: Yes     Within the past 12 months, have you been hit, slapped, kicked or otherwise physically hurt by someone?: No     Within the past 12 months, have you been humiliated or emotionally abused in other ways by your partner or ex-partner?: No   Housing Stability: Low Risk  (3/19/2024)    Housing Stability     Do you have housing? : Yes     Are you worried about losing your housing?: No           Medications  Allergies   Current Outpatient Medications   Medication Sig Dispense Refill    apixaban ANTICOAGULANT (ELIQUIS ANTICOAGULANT) 5 MG  tablet Take 1 tablet (5 mg) by mouth 2 times daily 180 tablet 3    Apple Wyatt Vn-Grn Tea-Bit Or-Cr (APPLE CIDER VINEGAR PLUS) TABS Take 1 tablet by mouth daily With calcium and magnesium      atorvastatin (LIPITOR) 40 MG tablet Take 1 tablet (40 mg) by mouth daily 90 tablet 4    busPIRone (BUSPAR) 10 MG tablet Take 2 tablets (20 mg) by mouth 2 times daily 120 tablet 1    Cholecalciferol (VITAMIN D PO) Take 5,000 Units by mouth daily One tablet twice daily      co-enzyme Q-10 100 MG CAPS capsule Take 100 mg by mouth daily      FLUoxetine (PROZAC) 40 MG capsule Take 1 capsule (40 mg) by mouth daily 90 capsule 4    hydroCHLOROthiazide 12.5 MG tablet Take 1 tablet (12.5 mg) by mouth daily 90 tablet 3    hydrOXYzine HCl (ATARAX) 10 MG tablet Take 1 tablet (10 mg) by mouth 2 times daily as needed for anxiety (Patient taking differently: Take 10 mg by mouth daily) 180 tablet 0    lisinopril (ZESTRIL) 20 MG tablet Take 1 tablet (20 mg) by mouth daily 90 tablet 3    MULTI VITAMIN MENS PO Take 1 tablet by mouth daily      [START ON 5/27/2024] pantoprazole (PROTONIX) 40 MG EC tablet Take 1 tablet (40 mg) by mouth daily Continue for 6 weeks after ablation 45 tablet 0    ticagrelor (BRILINTA) 90 MG tablet Take 1 tablet (90 mg) by mouth 2 times daily Start this evening. 180 tablet 3    metoprolol tartrate (LOPRESSOR) 25 MG tablet Take 1 tablet (25 mg) by mouth every 8 hours as needed (Atrial fibrillation with RVR, HR > 120) (Patient not taking: Reported on 5/23/2024) 30 tablet 1    vitamin B complex with vitamin C (STRESS TAB) tablet Take 1 tablet by mouth daily (Patient not taking: Reported on 5/23/2024)       No Known Allergies     Denies previous adverse reaction to anesthesia      Lab Results    Chemistry/lipid CBC Cardiac Enzymes/BNP/TSH/INR   Recent Labs   Lab Test 03/18/24  0653   CHOL 145   HDL 53   LDL 68   TRIG 120     Recent Labs   Lab Test 03/18/24  0653 12/06/23  1016 12/05/22  0801   LDL 68 78 60     Recent Labs  "  Lab Test 03/22/24  0545      POTASSIUM 4.2   CHLORIDE 106   CO2 22   *   BUN 21.1   CR 1.00   GFRESTIMATED 84   SHAHID 8.8     Recent Labs   Lab Test 03/22/24  0545 03/22/24 0211 03/18/24  0653   CR 1.00 1.10 1.17     Recent Labs   Lab Test 03/17/24 2129   A1C 5.6     BMP and CBC drawn today, results pending   Recent Labs   Lab Test 03/22/24 0211   WBC 6.8   HGB 14.5   HCT 42.3   MCV 90        Recent Labs   Lab Test 03/22/24 0211 03/17/24 2129 03/08/24  1011   HGB 14.5 13.9  13.9 13.9    No results for input(s): \"TROPONINI\" in the last [id number] hours.  Recent Labs   Lab Test 03/17/24 2129   NTBNPI 128     Recent Labs   Lab Test 03/22/24 0211   TSH 3.98     Recent Labs   Lab Test 03/22/24 0211 03/18/24  0653 03/08/24  1011   INR 0.99 1.07 1.06      41 minutes were spent on the date of encounter performing chart review, history and exam, documentation, and further activities as noted above.    The longitudinal plan of care for the diagnosis(es)/condition(s) as documented were addressed during this visit. Due to the added complexity in care, I will continue to support Jerry in the subsequent management and with ongoing continuity of care.                                       "

## 2024-05-23 NOTE — PROGRESS NOTES
Pre-Procedure Pulmonary Vein Ablation (AF) Education    Procedure: PVI with Dr Sheldon on 5/30/24 with arrival time 5:30am    COVID: Pt denies COVID like symptoms, and is aware if he/she develops COVID like symptoms they would need to complete an at home with a rapid antigen COVID test 1-2 days prior to your procedure date. If COVID + pt is aware the procedure will need to be rescheduled, and to contact CV scheduling as soon as possible    Type & Screen: Is not required for PVI Ablation    Pre-Op H&P: Completed today with EP ELIJAH- See record in Epic    Education:   Reviewed with pt in Clinic today  Pre-Procedure Instruction: NPO after midnight pre procedure, Defined NPO, Remove all jewelry and leave all valuables at home, Shower prior to arrival, Anesthesia and intubation plan/orders, Intra-procedure PVI process, Post- PVI procedure expectations/recovery, Transportation requirements and arrangements post procedure, Post-procedure follow up process, Letter sent to pt via [employer] and mail with written instructions (Refer to letters tab), Lab results would be called to pt if abnormal  Risks:   Atrial Fibrillation Ablation/Left Atrial Ablation  Cardiac Ablation  <1% Hypotension, Hemorrhage, Thrombophlebitis, Systemic or pulmonic emboli, Cardiac perforation (tamponade), Infection, Pneumothorax, Arrhythmias, Proarrhythmic effects of drugs, Radiation exposure, Catheter entrapment  <1 % Vascular injury including perforation of vein, artery or heart  1-2% Tamponade and Aortic puncture with left sided transeptal approach  1% CVA   <1% MI  <0.1% death  If external defibrillation or CV is needed, 25% risk for superficial burn  Risks associated with general anesthesia will be addressed by the Anesthesiology Department  Radiofrequency Risks:  In addition to standard risks for Radiofrequency Ablation, there is:  <2% Significant pulmonary vein stenosis  <2% Embolic events  <1% Esophageal fistula  <1% Phrenic nerve paralysis    Cryoablation Risks:  In addition to standard risks for Cryoablation Ablation, there is:  <1% Phrenic nerve paralysis  <1% Pulmonary vein stenosis  <1% Esophageal fistula    Medication:   Instructions regarding anticoagulants: Eliquis- Continue anticoagulation uninterrupted through their procedure, do not miss any doses of AC prior to procedure, importance of taking AC for stroke prevention, taking AC as prescribed, to call prior to PVI if missed a dose of AC, and if upon arrival pt reports missing a dose of AC PVI will potentially be cnx/postponed  Instructions given to pt regarding antiarrhythmic medication: None- N/A  Instructions given to pt regarding PPI medication: Start Protonix 40mg Daily 3 days prior, 6wk post  Instructions given to pt regarding diuretics medication: None  Instructions given to pt regarding DM/GLP-1 medication:   DM- None  GLP-1- None  Instructions for medication, other than anticoagulants and antiarrhythmics listed above, given to pt: Take all medication AM of procedure with small sips of water     Important patient information for staff: Pt may need straight cath after procedure. Please assess per Dr. Sheldon    5/23/2024 2:15 PM  Emily Waddell, RN

## 2024-05-23 NOTE — PATIENT INSTRUCTIONS
Car Torres,    It was a pleasure to see you today at the [facility].     My recommendations after this visit include:    You are scheduled for pulmonary vein isolation ablation with Dr. Sheldon on 5/30/2024  Bring your CPAP to use following ablation    On 5/27/2024, start pantoprazole 40 mg daily, to continue for 6 weeks after ablation    Telephone follow-up with EP RN on 6/3/2024  Follow up with me on 7/11/2024    Tanvi Bella CNP  [facility], Electrophysiology  [phone]  EP nurses [phone]

## 2024-05-24 ENCOUNTER — HOSPITAL ENCOUNTER (OUTPATIENT)
Dept: CARDIAC REHAB | Facility: CLINIC | Age: 64
Discharge: HOME | End: 2024-05-24
Attending: HOSPITALIST
Payer: MEDICARE

## 2024-05-24 LAB
ATRIAL RATE - MUSE: 46 BPM
DIASTOLIC BLOOD PRESSURE - MUSE: NORMAL MMHG
INTERPRETATION ECG - MUSE: NORMAL
P AXIS - MUSE: 49 DEGREES
PR INTERVAL - MUSE: 174 MS
QRS DURATION - MUSE: 124 MS
QT - MUSE: 462 MS
QTC - MUSE: 404 MS
R AXIS - MUSE: 35 DEGREES
SYSTOLIC BLOOD PRESSURE - MUSE: NORMAL MMHG
T AXIS - MUSE: 43 DEGREES
VENTRICULAR RATE- MUSE: 46 BPM

## 2024-05-24 PROCEDURE — 93798 PHYS/QHP OP CAR RHAB W/ECG: CPT

## 2024-05-27 ENCOUNTER — MYC MEDICAL ADVICE (OUTPATIENT)
Dept: CARDIOLOGY | Facility: CLINIC | Age: 64
End: 2024-05-27
Payer: MEDICARE

## 2024-05-27 DIAGNOSIS — I20.89 ANGINAL EQUIVALENT (H): ICD-10-CM

## 2024-05-28 ENCOUNTER — VIRTUAL VISIT (OUTPATIENT)
Dept: BEHAVIORAL HEALTH | Facility: CLINIC | Age: 64
End: 2024-05-28
Payer: MEDICARE

## 2024-05-28 DIAGNOSIS — F32.9 MAJOR DEPRESSIVE DISORDER WITH CURRENT ACTIVE EPISODE, UNSPECIFIED DEPRESSION EPISODE SEVERITY, UNSPECIFIED WHETHER RECURRENT: Primary | ICD-10-CM

## 2024-05-28 PROCEDURE — 99207 PR NO CHARGE LOS: CPT | Mod: 95 | Performed by: PSYCHOLOGIST

## 2024-05-28 NOTE — PROGRESS NOTES
Transition Clinic Progress Note    Patient Name:   Car Torres Date: May 28, 2024          Service Type: Couple        VISIT TIME START: 1502      VISIT TIME END: 1531      Session Length:  TC Session Length: 30 (16-37 Minutes)    Session #:  3    Attendees: TC Attendees: Client with Parent/Guardian    Service Modality:  Service Modality: Video Visit:      Provider verified identity through the following two step process.  Patient provided:  Patient is known previously to provider    Telemedicine Visit: The patient's condition can be safely assessed and treated via synchronous audio and visual telemedicine encounter.      Reason for Telemedicine Visit: Patient convenience (e.g. access to timely appointments / distance to available provider)    Originating Site (Patient Location): Patient's home    Distant Site (Provider Location): Provider Remote Setting- Home Office    Consent:  The patient/guardian has verbally consented to: the potential risks and benefits of telemedicine (video visit) versus in person care; bill my insurance or make self-payment for services provided; and responsibility for payment of non-covered services.     Patient would like the video invitation sent by:  My Chart    Mode of Communication:  [facility] Conference [facility]    Distant Location (Provider):  Off-site    As the provider I attest to compliance with applicable laws and regulations related to telemedicine.    Informed Consent and Assessment Methods    This provider and patient discussed HIPAA, and the limits of confidentiality; including mandated reporting, the possibility of collaborative discussions with patient's primary care provider and the multidisciplinary team in the MH clinic during consultation.  Discussed the no show policy, and the benefits and possible unintended consequences of therapy.  Patient indicated understanding Transition Clinic services are short term, solution focused, until a referral can be made and  "patient can bridge to long term therapy.  Patient verbally indicated understanding the informed consent.        DATA  Interactive Complexity: No  Crisis: No        Progress Since Last Session (Related to Symptoms / Goals / Homework):   Symptoms: No change increase in irritability related to upcoming medical procedure, decrease in communication from previous weeks but overall stable  Homework: Partially completed      Episode of Care Goals: Satisfactory progress - CONTEMPLATION (Considering change and yet undecided); Intervened by assessing the negative and positive thinking (ambivalence) about behavior change     Current / Ongoing Stressors and Concerns:   Procedure to heart later this week, anxiety about this.  When he is anxious, tends to shut down and comes off as irritability to spouse.     Depression feels better managed.  Explored how to \"accept\" and allow where they each are regarding upcoming procedure.  Encouraged conversation about how to offer support to each other.  Patient states he didn't want to talk about the procedure and we agreed to end session early     Treatment Objective(s) Addressed in This Session:   Decrease frequency and intensity of feeling down, depressed, hopeless       Intervention:   Solution Focused Brief Therapy, Brief Psychotherapy - discussed and prioritizing the most efficient treatment.    Assessments completed prior to visit:  The following assessments were completed by patient for this visit:  PROMIS 10-Global Health (only subscores and total score):       2/3/2023     4:51 PM 8/1/2023    11:20 AM 12/21/2023     9:29 PM 2/12/2024     6:25 PM 2/13/2024    10:14 AM 3/27/2024    10:00 AM 4/10/2024     9:35 AM   PROMIS-10 Scores Only   Global Mental Health Score 17 13 14 14 13 13 16   Global Physical Health Score 13 16 17 15 16 10 12   PROMIS TOTAL - SUBSCORES 30 29 31 29 29 23 28     [place] Suicide Severity Rating Scale (Short Version)      1/5/2023    12:57 PM 2/29/2024     " 9:00 AM 3/17/2024     9:16 PM 3/22/2024     1:33 AM 3/27/2024    10:00 AM 5/16/2024     2:00 PM 5/30/2024     5:49 AM   [place] Suicide Severity Rating (Short Version)   Over the past 2 weeks have you felt down, depressed, or hopeless? no         Over the past 2 weeks have you had thoughts of killing yourself? no         Q1 Wished to be Dead (Past Month)   0-->no 0-->no   0-->no   Q2 Suicidal Thoughts (Past Month)   0-->no 0-->no   0-->no   Q6 Suicide Behavior (Lifetime)   0-->no 0-->no   0-->no   Level of Risk per Screen   no risks indicated no risks indicated   no risks indicated   1. Wish to be Dead (Since Last Contact)  N   N N    2. Non-Specific Active Suicidal Thoughts (Since Last Contact)  N   N N    Has subject engaged in non-suicidal self-injurious behavior? (Since Last Contact)  N   N     Calculated C-SSRS Risk Score (Since Last Contact)  No Risk Indicated   No Risk Indicated No Risk Indicated          ASSESSMENT: Current Emotional / Mental Status (status of significant symptoms):   Risk status (Self / Other harm or suicidal ideation)   Patient denies current fears or concerns for personal safety.   Patient denies current or recent suicidal ideation or behaviors.   Patient denies current or recent homicidal ideation or behaviors.   Patient denies current or recent self injurious behavior or ideation.   Patient denies other safety concerns.   Patient reports there has been no change in risk factors since their last session.     Patient reports there has been no change in protective factors since their last session.     Recommended that patient call 911 or go to the local ED should there be a change in any of these risk factors.     Appearance:   Appropriate    Eye Contact:   Good    Psychomotor Behavior: Normal    Attitude:   Cooperative    Orientation:   All   Speech    Rate / Production: Normal/ Responsive Normal     Volume:  Normal    Mood:    Irritable  Normal   Affect:    Appropriate    Thought  Content:  Clear    Thought Form:  Coherent  Logical    Insight:    Fair      Medication Review:   No changes to current psychiatric medication(s)     Medication Compliance:   Yes     Changes in Health Issues:   None reported     Chemical Use Review:   Substance Use: Chemical use reviewed, no active concerns identified      Tobacco Use: Did not discuss    Diagnoses:   311 (F32.9) Unspecified Depressive Disorder     Collateral Reports Completed:   TC Collateral: [facility] [place] electronic medical records reviewed.    PLAN: (Patient Tasks / Therapist Tasks / Other)  Pt / wife stated he did not receive msg about coordinating care for long term therapy and due to upcoming procedure, opted not to discuss care coordination today.  Let him know that he will be contacted by coordinator to explain options for long term care and they will help him schedule upcoming appt with this writer.  Wished him well for upcoming medical procedure    Is this the patient's last discharge?: No    Procedure Code: Psychotherapy (with patient) - 30  [CPT 42472]                                                       _______________  Bettina Parra Psy.D, LP  May 28, 2024

## 2024-05-29 ENCOUNTER — ANESTHESIA EVENT (OUTPATIENT)
Dept: CARDIOLOGY | Facility: HOSPITAL | Age: 64
End: 2024-05-29
Payer: MEDICARE

## 2024-05-29 ENCOUNTER — HOSPITAL ENCOUNTER (OUTPATIENT)
Dept: CARDIAC REHAB | Facility: CLINIC | Age: 64
Discharge: HOME | End: 2024-05-29
Attending: HOSPITALIST
Payer: MEDICARE

## 2024-05-29 PROCEDURE — 93798 PHYS/QHP OP CAR RHAB W/ECG: CPT

## 2024-05-30 ENCOUNTER — HOSPITAL ENCOUNTER (OUTPATIENT)
Facility: HOSPITAL | Age: 64
Discharge: HOME | End: 2024-05-30
Attending: INTERNAL MEDICINE | Admitting: INTERNAL MEDICINE
Payer: MEDICARE

## 2024-05-30 ENCOUNTER — ANESTHESIA (OUTPATIENT)
Dept: CARDIOLOGY | Facility: HOSPITAL | Age: 64
End: 2024-05-30
Payer: MEDICARE

## 2024-05-30 VITALS
TEMPERATURE: 98 F | RESPIRATION RATE: 16 BRPM | SYSTOLIC BLOOD PRESSURE: 134 MMHG | OXYGEN SATURATION: 96 % | HEART RATE: 62 BPM | DIASTOLIC BLOOD PRESSURE: 73 MMHG

## 2024-05-30 DIAGNOSIS — I48.0 PAROXYSMAL ATRIAL FIBRILLATION (H): ICD-10-CM

## 2024-05-30 LAB
ACT BLD: 487 SECONDS (ref 74–150)
ATRIAL RATE - MUSE: 60 BPM
DIASTOLIC BLOOD PRESSURE - MUSE: NORMAL MMHG
INTERPRETATION ECG - MUSE: NORMAL
P AXIS - MUSE: 60 DEGREES
PR INTERVAL - MUSE: 178 MS
QRS DURATION - MUSE: 124 MS
QT - MUSE: 462 MS
QTC - MUSE: 462 MS
R AXIS - MUSE: 10 DEGREES
SYSTOLIC BLOOD PRESSURE - MUSE: NORMAL MMHG
T AXIS - MUSE: 43 DEGREES
VENTRICULAR RATE- MUSE: 60 BPM

## 2024-05-30 PROCEDURE — 250N000011 HC RX IP 250 OP 636: Performed by: NURSE ANESTHETIST, CERTIFIED REGISTERED

## 2024-05-30 PROCEDURE — 93656 COMPRE EP EVAL ABLTJ ATR FIB: CPT | Performed by: INTERNAL MEDICINE

## 2024-05-30 PROCEDURE — 93615 ESOPHAGEAL RECORDING: CPT | Performed by: INTERNAL MEDICINE

## 2024-05-30 PROCEDURE — 999N000054 HC STATISTIC EKG NON-CHARGEABLE

## 2024-05-30 PROCEDURE — C1733 CATH, EP, OTHR THAN COOL-TIP: HCPCS | Performed by: INTERNAL MEDICINE

## 2024-05-30 PROCEDURE — 85347 COAGULATION TIME ACTIVATED: CPT

## 2024-05-30 PROCEDURE — C1766 INTRO/SHEATH,STRBLE,NON-PEEL: HCPCS | Performed by: INTERNAL MEDICINE

## 2024-05-30 PROCEDURE — 93623 PRGRMD STIMJ&PACG IV RX NFS: CPT | Performed by: INTERNAL MEDICINE

## 2024-05-30 PROCEDURE — C1732 CATH, EP, DIAG/ABL, 3D/VECT: HCPCS | Performed by: INTERNAL MEDICINE

## 2024-05-30 PROCEDURE — 250N000011 HC RX IP 250 OP 636: Performed by: INTERNAL MEDICINE

## 2024-05-30 PROCEDURE — 370N000017 HC ANESTHESIA TECHNICAL FEE, PER MIN: Performed by: INTERNAL MEDICINE

## 2024-05-30 PROCEDURE — C1769 GUIDE WIRE: HCPCS | Performed by: INTERNAL MEDICINE

## 2024-05-30 PROCEDURE — 93010 ELECTROCARDIOGRAM REPORT: CPT | Mod: HOP | Performed by: INTERNAL MEDICINE

## 2024-05-30 PROCEDURE — 258N000003 HC RX IP 258 OP 636: Performed by: NURSE ANESTHETIST, CERTIFIED REGISTERED

## 2024-05-30 PROCEDURE — 250N000009 HC RX 250: Performed by: INTERNAL MEDICINE

## 2024-05-30 PROCEDURE — 93615 ESOPHAGEAL RECORDING: CPT | Mod: 26 | Performed by: INTERNAL MEDICINE

## 2024-05-30 PROCEDURE — C1887 CATHETER, GUIDING: HCPCS | Performed by: INTERNAL MEDICINE

## 2024-05-30 PROCEDURE — 710N000010 HC RECOVERY PHASE 1, LEVEL 2, PER MIN

## 2024-05-30 PROCEDURE — 93005 ELECTROCARDIOGRAM TRACING: CPT

## 2024-05-30 PROCEDURE — C1759 CATH, INTRA ECHOCARDIOGRAPHY: HCPCS | Performed by: INTERNAL MEDICINE

## 2024-05-30 PROCEDURE — C1894 INTRO/SHEATH, NON-LASER: HCPCS | Performed by: INTERNAL MEDICINE

## 2024-05-30 PROCEDURE — 250N000009 HC RX 250: Performed by: NURSE ANESTHETIST, CERTIFIED REGISTERED

## 2024-05-30 PROCEDURE — C1730 CATH, EP, 19 OR FEW ELECT: HCPCS | Performed by: INTERNAL MEDICINE

## 2024-05-30 PROCEDURE — 272N000001 HC OR GENERAL SUPPLY STERILE: Performed by: INTERNAL MEDICINE

## 2024-05-30 PROCEDURE — 258N000003 HC RX IP 258 OP 636: Performed by: INTERNAL MEDICINE

## 2024-05-30 RX ORDER — DEXAMETHASONE SODIUM PHOSPHATE 10 MG/ML
INJECTION, SOLUTION INTRAMUSCULAR; INTRAVENOUS PRN
Status: DISCONTINUED | OUTPATIENT
Start: 2024-05-30 | End: 2024-05-30

## 2024-05-30 RX ORDER — ONDANSETRON 2 MG/ML
4 INJECTION INTRAMUSCULAR; INTRAVENOUS EVERY 6 HOURS PRN
Status: DISCONTINUED | OUTPATIENT
Start: 2024-05-30 | End: 2024-05-30 | Stop reason: HOSPADM

## 2024-05-30 RX ORDER — LIDOCAINE 40 MG/G
CREAM TOPICAL
Status: DISCONTINUED | OUTPATIENT
Start: 2024-05-30 | End: 2024-05-30 | Stop reason: HOSPADM

## 2024-05-30 RX ORDER — HYDROMORPHONE HCL IN WATER/PF 6 MG/30 ML
0.2 PATIENT CONTROLLED ANALGESIA SYRINGE INTRAVENOUS EVERY 5 MIN PRN
Status: DISCONTINUED | OUTPATIENT
Start: 2024-05-30 | End: 2024-05-30 | Stop reason: HOSPADM

## 2024-05-30 RX ORDER — LIDOCAINE HYDROCHLORIDE 10 MG/ML
INJECTION, SOLUTION INFILTRATION; PERINEURAL PRN
Status: DISCONTINUED | OUTPATIENT
Start: 2024-05-30 | End: 2024-05-30

## 2024-05-30 RX ORDER — ONDANSETRON 4 MG/1
4 TABLET, ORALLY DISINTEGRATING ORAL EVERY 30 MIN PRN
Status: DISCONTINUED | OUTPATIENT
Start: 2024-05-30 | End: 2024-05-30 | Stop reason: HOSPADM

## 2024-05-30 RX ORDER — HALOPERIDOL 5 MG/ML
1 INJECTION INTRAMUSCULAR
Status: DISCONTINUED | OUTPATIENT
Start: 2024-05-30 | End: 2024-05-30 | Stop reason: HOSPADM

## 2024-05-30 RX ORDER — LIDOCAINE HYDROCHLORIDE AND EPINEPHRINE 10; 10 MG/ML; UG/ML
INJECTION, SOLUTION INFILTRATION; PERINEURAL
Status: DISCONTINUED | OUTPATIENT
Start: 2024-05-30 | End: 2024-05-30 | Stop reason: HOSPADM

## 2024-05-30 RX ORDER — HYDROMORPHONE HCL IN WATER/PF 6 MG/30 ML
0.4 PATIENT CONTROLLED ANALGESIA SYRINGE INTRAVENOUS EVERY 5 MIN PRN
Status: DISCONTINUED | OUTPATIENT
Start: 2024-05-30 | End: 2024-05-30 | Stop reason: HOSPADM

## 2024-05-30 RX ORDER — FENTANYL CITRATE 50 UG/ML
25 INJECTION, SOLUTION INTRAMUSCULAR; INTRAVENOUS EVERY 5 MIN PRN
Status: DISCONTINUED | OUTPATIENT
Start: 2024-05-30 | End: 2024-05-30 | Stop reason: HOSPADM

## 2024-05-30 RX ORDER — OXYCODONE HYDROCHLORIDE 5 MG/1
10 TABLET ORAL
Status: DISCONTINUED | OUTPATIENT
Start: 2024-05-30 | End: 2024-05-30 | Stop reason: HOSPADM

## 2024-05-30 RX ORDER — HEPARIN SODIUM 10000 [USP'U]/100ML
INJECTION, SOLUTION INTRAVENOUS CONTINUOUS PRN
Status: DISCONTINUED | OUTPATIENT
Start: 2024-05-30 | End: 2024-05-30 | Stop reason: HOSPADM

## 2024-05-30 RX ORDER — FENTANYL CITRATE 50 UG/ML
INJECTION, SOLUTION INTRAMUSCULAR; INTRAVENOUS PRN
Status: DISCONTINUED | OUTPATIENT
Start: 2024-05-30 | End: 2024-05-30

## 2024-05-30 RX ORDER — SODIUM CHLORIDE 9 MG/ML
100 INJECTION, SOLUTION INTRAVENOUS CONTINUOUS
Status: DISCONTINUED | OUTPATIENT
Start: 2024-05-30 | End: 2024-05-30 | Stop reason: HOSPADM

## 2024-05-30 RX ORDER — ONDANSETRON 2 MG/ML
INJECTION INTRAMUSCULAR; INTRAVENOUS PRN
Status: DISCONTINUED | OUTPATIENT
Start: 2024-05-30 | End: 2024-05-30

## 2024-05-30 RX ORDER — ACETAMINOPHEN 325 MG/1
650 TABLET ORAL EVERY 4 HOURS PRN
Status: DISCONTINUED | OUTPATIENT
Start: 2024-05-30 | End: 2024-05-30 | Stop reason: HOSPADM

## 2024-05-30 RX ORDER — NALOXONE HYDROCHLORIDE 0.4 MG/ML
0.1 INJECTION, SOLUTION INTRAMUSCULAR; INTRAVENOUS; SUBCUTANEOUS
Status: DISCONTINUED | OUTPATIENT
Start: 2024-05-30 | End: 2024-05-30 | Stop reason: HOSPADM

## 2024-05-30 RX ORDER — ONDANSETRON 2 MG/ML
4 INJECTION INTRAMUSCULAR; INTRAVENOUS EVERY 30 MIN PRN
Status: DISCONTINUED | OUTPATIENT
Start: 2024-05-30 | End: 2024-05-30 | Stop reason: HOSPADM

## 2024-05-30 RX ORDER — FENTANYL CITRATE 50 UG/ML
25 INJECTION, SOLUTION INTRAMUSCULAR; INTRAVENOUS
Status: DISCONTINUED | OUTPATIENT
Start: 2024-05-30 | End: 2024-05-30 | Stop reason: HOSPADM

## 2024-05-30 RX ORDER — SODIUM CHLORIDE, SODIUM LACTATE, POTASSIUM CHLORIDE, CALCIUM CHLORIDE 600; 310; 30; 20 MG/100ML; MG/100ML; MG/100ML; MG/100ML
INJECTION, SOLUTION INTRAVENOUS CONTINUOUS
Status: DISCONTINUED | OUTPATIENT
Start: 2024-05-30 | End: 2024-05-30 | Stop reason: HOSPADM

## 2024-05-30 RX ORDER — FENTANYL CITRATE 50 UG/ML
50 INJECTION, SOLUTION INTRAMUSCULAR; INTRAVENOUS EVERY 5 MIN PRN
Status: DISCONTINUED | OUTPATIENT
Start: 2024-05-30 | End: 2024-05-30 | Stop reason: HOSPADM

## 2024-05-30 RX ORDER — IBUPROFEN 600 MG/1
600 TABLET, FILM COATED ORAL EVERY 6 HOURS PRN
Status: DISCONTINUED | OUTPATIENT
Start: 2024-05-30 | End: 2024-05-30 | Stop reason: HOSPADM

## 2024-05-30 RX ORDER — PROPOFOL 10 MG/ML
INJECTION, EMULSION INTRAVENOUS PRN
Status: DISCONTINUED | OUTPATIENT
Start: 2024-05-30 | End: 2024-05-30

## 2024-05-30 RX ORDER — PROTAMINE SULFATE 10 MG/ML
INJECTION, SOLUTION INTRAVENOUS PRN
Status: DISCONTINUED | OUTPATIENT
Start: 2024-05-30 | End: 2024-05-30

## 2024-05-30 RX ORDER — DIMENHYDRINATE 50 MG/ML
25 INJECTION, SOLUTION INTRAMUSCULAR; INTRAVENOUS
Status: DISCONTINUED | OUTPATIENT
Start: 2024-05-30 | End: 2024-05-30 | Stop reason: HOSPADM

## 2024-05-30 RX ORDER — HEPARIN SODIUM 1000 [USP'U]/ML
INJECTION, SOLUTION INTRAVENOUS; SUBCUTANEOUS
Status: DISCONTINUED | OUTPATIENT
Start: 2024-05-30 | End: 2024-05-30 | Stop reason: HOSPADM

## 2024-05-30 RX ORDER — ONDANSETRON 4 MG/1
4 TABLET, ORALLY DISINTEGRATING ORAL EVERY 6 HOURS PRN
Status: DISCONTINUED | OUTPATIENT
Start: 2024-05-30 | End: 2024-05-30 | Stop reason: HOSPADM

## 2024-05-30 RX ORDER — GLYCOPYRROLATE 0.2 MG/ML
INJECTION, SOLUTION INTRAMUSCULAR; INTRAVENOUS PRN
Status: DISCONTINUED | OUTPATIENT
Start: 2024-05-30 | End: 2024-05-30

## 2024-05-30 RX ORDER — SODIUM CHLORIDE 9 MG/ML
INJECTION, SOLUTION INTRAVENOUS CONTINUOUS PRN
Status: DISCONTINUED | OUTPATIENT
Start: 2024-05-30 | End: 2024-05-30

## 2024-05-30 RX ORDER — ADENOSINE 3 MG/ML
INJECTION, SOLUTION INTRAVENOUS
Status: DISCONTINUED | OUTPATIENT
Start: 2024-05-30 | End: 2024-05-30 | Stop reason: HOSPADM

## 2024-05-30 RX ORDER — KETAMINE HYDROCHLORIDE 10 MG/ML
INJECTION INTRAMUSCULAR; INTRAVENOUS PRN
Status: DISCONTINUED | OUTPATIENT
Start: 2024-05-30 | End: 2024-05-30

## 2024-05-30 RX ORDER — OXYCODONE HYDROCHLORIDE 5 MG/1
5 TABLET ORAL
Status: DISCONTINUED | OUTPATIENT
Start: 2024-05-30 | End: 2024-05-30 | Stop reason: HOSPADM

## 2024-05-30 RX ADMIN — DEXAMETHASONE SODIUM PHOSPHATE 10 MG: 10 INJECTION, SOLUTION INTRAMUSCULAR; INTRAVENOUS at 07:11

## 2024-05-30 RX ADMIN — ONDANSETRON 4 MG: 2 INJECTION INTRAMUSCULAR; INTRAVENOUS at 08:10

## 2024-05-30 RX ADMIN — PHENYLEPHRINE HYDROCHLORIDE 100 MCG: 10 INJECTION INTRAVENOUS at 07:52

## 2024-05-30 RX ADMIN — FENTANYL CITRATE 100 MCG: 50 INJECTION INTRAMUSCULAR; INTRAVENOUS at 07:11

## 2024-05-30 RX ADMIN — PROPOFOL 200 MG: 10 INJECTION, EMULSION INTRAVENOUS at 07:11

## 2024-05-30 RX ADMIN — GLYCOPYRROLATE 0.4 MG: 0.2 INJECTION INTRAMUSCULAR; INTRAVENOUS at 07:11

## 2024-05-30 RX ADMIN — SODIUM CHLORIDE: 9 INJECTION, SOLUTION INTRAVENOUS at 07:01

## 2024-05-30 RX ADMIN — ROCURONIUM BROMIDE 40 MG: 50 INJECTION, SOLUTION INTRAVENOUS at 07:37

## 2024-05-30 RX ADMIN — LIDOCAINE HYDROCHLORIDE 5 ML: 10 INJECTION, SOLUTION INFILTRATION; PERINEURAL at 07:11

## 2024-05-30 RX ADMIN — PHENYLEPHRINE HYDROCHLORIDE 100 MCG: 10 INJECTION INTRAVENOUS at 07:11

## 2024-05-30 RX ADMIN — PROTAMINE SULFATE 50 MG: 10 INJECTION, SOLUTION INTRAVENOUS at 08:10

## 2024-05-30 RX ADMIN — Medication 100 MG: at 07:11

## 2024-05-30 RX ADMIN — KETAMINE HYDROCHLORIDE 50 MG: 10 INJECTION INTRAMUSCULAR; INTRAVENOUS at 07:11

## 2024-05-30 RX ADMIN — SUGAMMADEX 400 MG: 100 INJECTION, SOLUTION INTRAVENOUS at 08:11

## 2024-05-30 RX ADMIN — SODIUM CHLORIDE 100 ML/HR: 9 INJECTION, SOLUTION INTRAVENOUS at 06:58

## 2024-05-30 ASSESSMENT — ACTIVITIES OF DAILY LIVING (ADL)
ADLS_ACUITY_SCORE: 36

## 2024-05-30 ASSESSMENT — ENCOUNTER SYMPTOMS: DYSRHYTHMIAS: 1

## 2024-05-30 NOTE — Clinical Note
Arrhythmia Type: atrial fibrillation.   Method of Cardioversion: synchronous.   The arrhythmia was terminated.   Energy shock delivered: 360 joules.   Post cardioversion rhythm: sinus rhythm.   No early return to atrial fibrillation (ERAF). No immediate return to atrial fibrillation (IRAF).

## 2024-05-30 NOTE — DISCHARGE INSTRUCTIONS
[facility]  Cardiac Electrophysiology  [address]  [place] [address]   Office: [phone]  Fax: [phone]       Cardiac Electrophysiology - Post Ablation Discharge Instructions      PROCEDURE   Atrial fibrillation ablation         MEDICATION INSTRUCTIONS   Continue taking your prior to procedure medications.  Continue taking your blood thinner apixaban (Eliquis).          DISCHARGE INSTRUCTIONS   Medications   Take your medications as prescribed.   It is important for you to continue your blood thinner without interruption, unless your electrophysiologist instructs you otherwise.     General instructions   Have an adult stay with you until tomorrow.   You may resume your normal diet.     You may shower tomorrow.  Do not take a bath, or use a hot tub or pool for at least 1 week.    Activity recommendations   Do not drive for 3 days.   Avoid stooping or squatting more than 90 degrees at the hips for 7 days.   Avoid repetitive motions such as loading , vacuuming, raking or shoveling for 7 days.   Avoid heavy lifting (greater than 25lbs) for 7 days.       Groin care instructions   For the first 24 hours after your ablation, check the groin access sites every 1-2 hours while awake.   You may keep a bandaid over the puncture sites for 1 or 2 days post-procedure and thereafter may keep these sites uncovered.  Change the bandaid daily.  If there is minor oozing, apply another bandaid and remove it after 12 hours.   For 2 days, when you cough, sneeze, laugh or move your bowels, hold your hand over the puncture sites and press firmly.   Do not scrub the groin access sites.   Do not use lotion or powder near the groin access sites.       Arrhythmias following ablation  Recurrent atrial fibrillation and palpitations are common within the first 3 months post ablation while your heart recovers from the procedure.  These are usually more frequent in the first few weeks  following ablation and should occur less frequently over time.  Please contact us if you are having frequent or long lasting atrial fibrillation episodes following ablation.    Common findings after ablation  Bruising and a dime or pea size lump at the access sites is common.  If you notice increased swelling, external bleeding, or have other concerns regarding your groin access sites please call your electrophysiology team's office, and if after hours consider emergency department evaluation.   Soreness and mild pain at your groin sites is normal, to help relieve this pain you can apply ice/cold packs to the sites for 20min 3-4 times per day.   Pleuritic chest discomfort (chest pain worse with taking deep breaths, worse with laying flat on your back) can occur after ablation, usually coming about within the first 24-72hrs post ablation.  If this occurs and is severe enough to be troublesome to you, please call us and consider starting a course of ibuprofen 400mg three times daily for 5 to 7 days.     Things to watch for   As with any type of procedure, please be more attentive to unusual symptoms post ablation (eg. fever, neurologic changes, pain with swallowing, loss of consciousness, etc) - we recommend ER evaluation for any such symptoms in the first few weeks post procedure.       Contact the EP Nurse line with any of the following.  Contact the cardiology on-call number after business hours.    Consider ER evaluation for severe symptoms.   Groin pain, swelling, or growing hard lump around the puncture sites.   Groin redness, tenderness, swelling, or drainage (blood or pus).   Neurological changes (for example: leg, arm or face weakness or numbness, difficulties with speech or word finding, problems walking or with your balance, vision changes).   Any numbness, coolness or changes in color in your extremities.  Sudden onset severe abdominal or back pain.  Moderate or severe chest pain not relieved by Tylenol or  Ibuprofen, particularly after the first 48 hours.   Shortness of breath.   Chills or fever greater than 100 F.   Difficulty swallowing food or liquids.  Coughing up blood.   Blood in your stool.  Nausea and vomiting.  Difficulty urinating.  Recurrent atrial fibrillation associated with prolonged rapid heart rates (for example, heart rates over 140bpm for greater than 4 hours) or associated with additional concerning symptoms (for example, chest pain, lightheadedness, loss of consciousness, sweating).     If you are being evaluated at an emergency department, please tell your ER doctor that you have recently underwent an atrial fibrillation ablation and ask the ER to contact our office.  Many special considerations apply following ablation - these may be overlooked during an ER evaluation.      Our office will have a follow-up visit scheduled for you in approximately 6 weeks.  Please do not hesitate to call us before that time should issues arise.         [facility]      To reach the EP nurses working with Dr. Sheldon:   [phone]        To reach the general Heart Care Clinic line or after hours service:   [phone]   If you are calling after hours, please listen to the entire voicemail,    a live  will answer at the end of the message.

## 2024-05-30 NOTE — INTERVAL H&P NOTE
"I have reviewed the surgical (or preoperative) H&P that is linked to this encounter, and examined the patient. There are no significant changes    Clinical Conditions Present on Arrival:  Clinically Significant Risk Factors Present on Admission                # Drug Induced Coagulation Defect: home medication list includes an anticoagulant medication  # Drug Induced Platelet Defect: home medication list includes an antiplatelet medication  # Severe Obesity: Estimated body mass index is 49.27 kg/m  as calculated from the following:    Height as of 5/23/24: 1.778 m (5' 10\").    Weight as of 5/23/24: 155.8 kg (343 lb 6.4 oz).       "

## 2024-05-30 NOTE — ANESTHESIA CARE TRANSFER NOTE
Patient: Car Torres    Procedure: Procedure(s):  Ablation Atrial Fibrillation       Diagnosis: paroxysmal atrial fibrillation  Diagnosis Additional Information: No value filed.    Anesthesia Type:   General     Note:    Oropharynx: oropharynx clear of all foreign objects and spontaneously breathing  Level of Consciousness: awake  Oxygen Supplementation: face mask  Level of Supplemental Oxygen (L/min / FiO2): 8  Independent Airway: airway patency satisfactory and stable  Dentition: dentition unchanged  Vital Signs Stable: post-procedure vital signs reviewed and stable  Report to RN Given: handoff report given  Patient transferred to: Cardiac Special Care          Vitals:  Vitals Value Taken Time   BP     Temp 98.1    Pulse     Resp     SpO2         Electronically Signed By: KLAUS Leach CRNA  May 30, 2024  8:26 AM

## 2024-05-30 NOTE — PROGRESS NOTES
Uneventful recovery post Ablation. Right groin site  without swelling or bleeding. Denies pain. Neuros at baseline. Family at bedside. Adia ice chips.Hand off and bedside site check with Jamie CANO.

## 2024-05-30 NOTE — ANESTHESIA POSTPROCEDURE EVALUATION
Patient: Car Torres    Procedure: Procedure(s):  Ablation Atrial Fibrillation       Anesthesia Type:  General    Note:     Postop Pain Control: Uneventful            Sign Out: Well controlled pain   PONV: No   Neuro/Psych: Uneventful            Sign Out: Acceptable/Baseline neuro status   Airway/Respiratory: Uneventful            Sign Out: O2 supplementation               Oxygen: Nasal Cannula   CV/Hemodynamics: Uneventful            Sign Out: Acceptable CV status; No obvious hypovolemia; No obvious fluid overload   Other NRE: NONE   DID A NON-ROUTINE EVENT OCCUR?            Last vitals:  Vitals Value Taken Time   /73 05/30/24 1000   Temp     Pulse 58 05/30/24 1010   Resp 22 05/30/24 1001   SpO2 95 % 05/30/24 1010   Vitals shown include unfiled device data.    Electronically Signed By: Navi Conti MD  May 30, 2024  10:19 AM

## 2024-05-30 NOTE — PLAN OF CARE
Goal Outcome Evaluation:             Pt admitted for an ablation of atrial fibrillation.  Pt prepped and ready for procedure. Pt's Wife and daughter are here for support.      Kathe Coroan RN

## 2024-05-30 NOTE — Clinical Note
[employer] Med system 12 lead EKG, hemodynamics 5 lead, pulse oximetery, NIBP, Physiocontrol hands off defibrillator/external pacer, with 3 monitoring leads to patient. Baseline assessment done.

## 2024-05-30 NOTE — ANESTHESIA PREPROCEDURE EVALUATION
Anesthesia Pre-Procedure Evaluation    Patient: Car Torres   MRN: [id number] : 1960        Procedure : Procedure(s):  Ablation Atrial Fibrillation          Past Medical History:   Diagnosis Date    CKD (chronic kidney disease) stage 2, GFR 60-89 ml/min 10/21/2010    60 to 80 - have discussed Lisinopril - will consider       Coronary artery disease 2024    Dementia (H)     Depressive disorder     Diverticulosis of large intestine without hemorrhage 2015    Incidental finding on CT scan       Heart disease 2018    Hypercholesterolemia     Hypertension goal BP (blood pressure) < 140/90     Nonspecific abnormal electrocardiogram (ECG) (EKG) 2007    Stress testing normal.     Paroxysmal atrial fibrillation (H) 2024    Sinus node dysfunction (H) 2024    Sleep apnea     uses a c-pap, severe    Status post coronary angiogram 2024      Past Surgical History:   Procedure Laterality Date    COLONOSCOPY N/A 2020    Procedure: COLONOSCOPY, WITH POLYPECTOMY, CLIP;  Surgeon: Mihir Lang MD;  Location: [facility] OR    COLONOSCOPY N/A 1/10/2022    Procedure: COLONOSCOPY, WITH POLYPECTOMY AND BIOPSY;  Surgeon: Mihir Lang MD;  Location:  GI    CV CORONARY ANGIOGRAM N/A 2024    Procedure: Coronary Angiogram;  Surgeon: Elton Wright MD;  Location:  HEART CARDIAC CATH LAB    CV CORONARY LITHOTRIPSY PCI N/A 3/8/2024    Procedure: Percutaneous Coronary Intervention - Lithotripsy;  Surgeon: Elton Wright MD;  Location:  HEART CARDIAC CATH LAB    CV INSTANTANEOUS WAVE-FREE RATIO N/A 2024    Procedure: Instantaneous Wave-Free Ratio;  Surgeon: Elton Wright MD;  Location:  HEART CARDIAC CATH LAB    CV INTRAVASULAR ULTRASOUND N/A 3/8/2024    Procedure: Intravascular Ultrasound;  Surgeon: Elton Wright MD;  Location:  HEART CARDIAC CATH LAB    CV PCI N/A 3/8/2024    Procedure: Percutaneous Coronary Intervention;  Surgeon: Kyle  MD Elton;  Location:  HEART CARDIAC CATH LAB    CV RIGHT HEART CATH MEASUREMENTS RECORDED N/A 2024    Procedure: Right Heart Catheterization;  Surgeon: Elton Wright MD;  Location:  HEART CARDIAC CATH LAB    ESOPHAGOSCOPY, GASTROSCOPY, DUODENOSCOPY (EGD), COMBINED N/A 2021    Procedure: ESOPHAGOGASTRODUODENOSCOPY, WITH BIOPSY;  Surgeon: Mihir Lang MD;  Location: [facility] OR    OPEN REDUCTION INTERNAL FIXATION FOOT Left 2023    Procedure: LISFRANC OPEN REDUCTION INTERNAL FIXATION, LEFT FOOT;  Surgeon: Bailey Ruiz DPM;  Location:  OR    SURGICAL HISTORY OF -       surgery on left index finger      No Known Allergies   Social History     Tobacco Use    Smoking status: Former     Current packs/day: 0.00     Average packs/day: 1 pack/day for 25.0 years (25.0 ttl pk-yrs)     Types: Cigarettes, Cigars     Start date: 1973     Quit date: 1998     Years since quittin.3     Passive exposure: Past    Smokeless tobacco: Never    Tobacco comments:     N/A not a smoker   Substance Use Topics    Alcohol use: Yes     Alcohol/week: 10.0 standard drinks of alcohol     Comment: less than 6 per week      Wt Readings from Last 1 Encounters:   24 (!) 155.8 kg (343 lb 6.4 oz)        Anesthesia Evaluation   Pt has had prior anesthetic.     No history of anesthetic complications       ROS/MED HX  ENT/Pulmonary:     (+) sleep apnea, severe, uses CPAP,                                      Neurologic:  - neg neurologic ROS     Cardiovascular:     (+) Dyslipidemia hypertension- -  CAD -  - -   Taking blood thinners                     dysrhythmias,              METS/Exercise Tolerance: >4 METS    Hematologic:  - neg hematologic  ROS     Musculoskeletal:       GI/Hepatic:  - neg GI/hepatic ROS     Renal/Genitourinary:     (+) renal disease, type: CRI,            Endo:     (+)               Obesity (morbid),       Psychiatric/Substance Use:     (+) psychiatric history depression      "  Infectious Disease:  - neg infectious disease ROS     Malignancy:  - neg malignancy ROS     Other:  - neg other ROS    (+)  , H/O Chronic Pain,         Physical Exam    Airway  airway exam normal      Mallampati: II   TM distance: > 3 FB   Neck ROM: full   Mouth opening: > 3 cm    Respiratory Devices and Support         Dental  no notable dental history         Cardiovascular          Rhythm and rate: bradycardia     Pulmonary   pulmonary exam normal                OUTSIDE LABS:  CBC:   Lab Results   Component Value Date    WBC 6.2 05/23/2024    WBC 6.8 03/22/2024    HGB 13.8 05/23/2024    HGB 14.5 03/22/2024    HCT 41.3 05/23/2024    HCT 42.3 03/22/2024     05/23/2024     03/22/2024     BMP:   Lab Results   Component Value Date     05/23/2024     03/22/2024    POTASSIUM 4.9 05/23/2024    POTASSIUM 4.2 03/22/2024    CHLORIDE 103 05/23/2024    CHLORIDE 106 03/22/2024    CO2 26 05/23/2024    CO2 22 03/22/2024    BUN 26.3 (H) 05/23/2024    BUN 21.1 03/22/2024    CR 1.11 05/23/2024    CR 1.00 03/22/2024     (H) 05/23/2024     (H) 03/22/2024     COAGS:   Lab Results   Component Value Date    PTT 33 03/08/2024    INR 0.99 03/22/2024     POC: No results found for: \"BGM\", \"HCG\", \"HCGS\"  HEPATIC:   Lab Results   Component Value Date    ALBUMIN 4.3 03/22/2024    PROTTOTAL 6.6 03/22/2024    ALT 18 03/22/2024    AST 19 03/22/2024    ALKPHOS 79 03/22/2024    BILITOTAL 0.3 03/22/2024     OTHER:   Lab Results   Component Value Date    A1C 5.6 03/17/2024    SHAHID 9.7 05/23/2024    MAG 2.4 (H) 03/22/2024    LIPASE 75 08/18/2007    AMYLASE 57 08/18/2007    TSH 3.98 03/22/2024       Anesthesia Plan    ASA Status:  4    NPO Status:  NPO Appropriate    Anesthesia Type: General.     - Airway: ETT   Induction: Intravenous, Propofol.   Maintenance: Balanced.   Techniques and Equipment:     - Airway: Video-Laryngoscope       Consents    Anesthesia Plan(s) and associated risks, benefits, and realistic " "alternatives discussed. Questions answered and patient/representative(s) expressed understanding.     - Discussed:     - Discussed with:  Patient      - Extended Intubation/Ventilatory Support Discussed: No.      - Patient is DNR/DNI Status: No     Use of blood products discussed: No .     Postoperative Care    Pain management: IV analgesics, Multi-modal analgesia.     - Plan for long acting post-op opioid use   PONV prophylaxis: Ondansetron (or other 5HT-3), Dexamethasone or Solumedrol, Droperidol or Haldol     Comments:    Other Comments: 50 mg ketamine IV on induction.             Navi Conti MD    I have reviewed the pertinent notes and labs in the chart from the past 30 days and (re)examined the patient.  Any updates or changes from those notes are reflected in this note.            # Drug Induced Coagulation Defect: home medication list includes an anticoagulant medication  # Drug Induced Platelet Defect: home medication list includes an antiplatelet medication  # Severe Obesity: Estimated body mass index is 49.27 kg/m  as calculated from the following:    Height as of 5/23/24: 1.778 m (5' 10\").    Weight as of 5/23/24: 155.8 kg (343 lb 6.4 oz).      "

## 2024-05-30 NOTE — ANESTHESIA PROCEDURE NOTES
Airway       Patient location during procedure: OR       Procedure Start/Stop Times: 5/30/2024 7:12 AM  Staff -        Anesthesiologist:  Navi Conti MD       CRNA: Jake Fournier APRN CRNA       Performed By: anesthesiologist  Consent for Airway        Urgency: elective  Indications and Patient Condition       Indications for airway management: queenie-procedural       Induction type:intravenous       Mask difficulty assessment: 2 - vent by mask + OA or adjuvant +/- NMBA    Final Airway Details       Final airway type: endotracheal airway       Successful airway: ETT - single  Endotracheal Airway Details        ETT size (mm): 8.0       Cuffed: yes       Successful intubation technique: video laryngoscopy       VL Blade Size: Glidescope 4       Grade View of Cords: 1       Adjucts: stylet       Position: Right       Measured from: lips       Secured at (cm): 24       Bite block used: None    Post intubation assessment        Placement verified by: capnometry, equal breath sounds and chest rise        Number of attempts at approach: 1       Number of other approaches attempted: 0       Secured with: silk tape       Ease of procedure: easy       Dentition: Intact    Medication(s) Administered   Medication Administration Time: 5/30/2024 7:12 AM

## 2024-06-03 ENCOUNTER — VIRTUAL VISIT (OUTPATIENT)
Dept: CARDIOLOGY | Facility: CLINIC | Age: 64
End: 2024-06-03
Payer: MEDICARE

## 2024-06-03 DIAGNOSIS — I48.0 PAROXYSMAL ATRIAL FIBRILLATION (H): Primary | ICD-10-CM

## 2024-06-03 PROCEDURE — 99207 PR NO CHARGE NURSE ONLY: CPT | Mod: 93

## 2024-06-03 NOTE — PATIENT INSTRUCTIONS
Instructions Following your Ablation Procedure    Your anticoagulation medication Eliquis:  It is important to remain on your anticoagulation medication uninterrupted after your ablation to reduce your risk of a stroke or heart attack, do not stop this medication  Please contact me if you have any questions regarding your anticoagulation medication    Groin care instructions  Keep the site clean and dry, do not place a bandage over the site. If there is minor oozing, apply another bandaid and remove it after 12 hours.  Mild bruising at the access sites is normal. If you notice increased swelling, external bleeding, or have other concerns regarding your access sites please consider emergency department evaluation for significant changes and call your electrophysiology team's office.  You may experience mild discomfort at your groin sites, applying ice packs 20min 3-4 times a day can help alleviate this discomfort.    Activity recommendations  You can resume driving.  Avoid stooping or squatting more than 90 degrees at the hips, repetitive motions such as loading , vacuuming, raking or shoveling, and heavy lifting (greater than 25lbs) for 1 week  Increase your activity gradually over the next 5-10 days, working back to your normal daily activity/routine.    Post ablation instructions  Stay well hydrated, and increase your fluid intake during this recovery period  High protein foods aide in your bodies healing process  You may have some irregular heartbeats and/or atrial fibrillation following your ablation which is normal to recovery, these episodes should occur less frequently over time.   Recurrent atrial fibrillation can occur within the first 3 months post ablation while your heart recovers from the procedure. Please call the electrophysiology team's office if you have an episode lasting greater than 4 hours, or if you notice the episodes are increasing in frequency or duration  Pleuritic chest  discomfort (chest pain worse with taking deep breaths, worse with laying flat on your back) can occur after ablation, usually coming about within the first 24-48hrs post ablation. If this occurs and is severe enough to be troublesome to you, please call us and consider starting a course of ibuprofen 400mg three times daily for 5 to 7 days    Things to watch for  As with any type of procedure, please be more attentive to unusual symptoms post ablation (eg. fever, neurologic changes, pain with swallowing, loss of consciousness, etc) - we recommend ER evaluation for any such symptoms in the first few weeks post procedure.    Consider ER evaluation for the following:  Severe chest pain not relieved by Tylenol or Ibuprofen  You have chills or a fever greater than 101 F (38 C)  Neurologic changes (eg. leg, arm or face weakness or numbness, difficulties with speech or word finding, problems  walking or with your balance, vision changes)  Severe difficulty swallowing and/or you are coughing up blood  Shortness of breath  Increased groin pain or a large or growing hard lump around the site  Groin is red, swollen, hot or tender  Blood or fluid is draining from the groin site  Any numbness, coolness or changes in color in your extremities  Groin pain not relieved by Tylenol or Advil  Recurrent atrial fibrillation associated with sustained rapid heart rates or associated with additional concerning  symptoms.    Your follow up appointments are as follows:  You will be seen by the electrophysiologist nurse practitioner at 6 weeks after your ablation  At your 6 week appointment, a 3 month follow-up appointment will be arranged with either the Nurse Practitioner or the Electrophysiology provider     Sincerely,  Yessica Gusman RN [phone]    After hours please contact the on call service at # [phone]

## 2024-06-03 NOTE — PROGRESS NOTES
Pt is s/p PVI PO day 4, today 6/3/2024 , PVI was completed on 5-30-24 with Dr. Sheldon and pt was discharged the same day.  PC was placed to pt, spoke to pt    General Assessment:     Weight: Pt reports weight is at baseline compared to pre procedural weight    Vital signs:  Pt reports normal heart rates and blood pressure and Pt has been afebrile.    Pain: Pt denies generalized or localized pain abnormal to healing s/p     /GI: Pt denies difficulty swallowing, denies heart burn, denies constipation, denies urinary retention/difficulty, reports normal appetite, and reports staying hydrated.    Respiratory: Pt denies SOB, denies changes/abnormal sputum, and denies any further symptoms abnormal to normal healing process s/p PVI.    Activity: Pt is gradually working into baseline activity.     Neurological:  Pt denies vision changes, changes in speech, changes in balance, and changes in strength or motor function.    Rhythm Assessment:   Pt reports occasional palpitations normal to PVI recovery, denies irregularities in HR or rhythm, and denies symptoms or sustained AF episodes.    Procedure Site Assessment:   Pt reports some bruising around sites without significant change from hospital discharge and normal to PVI recovery    Anticoagulation/Medication:  Pt remain on Eliquis without interruption  Pt  confirms taking pantoprazole and metoprolol as directed    Education completed with pt at this visit:  Reviewed normal post-op PVI healing process, when to contact EP-RN/EP-MD, contact information was given to the pt for further concerns or questions, after hours contact was reviewed with patient, and pt verbalized understanding    Follow up:  AVS mailed to patient and is available through my chart.  Pt Does have a 6 week follow up scheduled with Tanvi Bella on 7-11-24 .    6/3/2024 3:33 PM  Yessica Gusman RN

## 2024-06-04 DIAGNOSIS — I48.0 PAROXYSMAL ATRIAL FIBRILLATION (H): ICD-10-CM

## 2024-06-05 ENCOUNTER — HOSPITAL ENCOUNTER (OUTPATIENT)
Dept: CARDIAC REHAB | Facility: CLINIC | Age: 64
Discharge: HOME | End: 2024-06-05
Attending: HOSPITALIST
Payer: MEDICARE

## 2024-06-05 PROCEDURE — 93798 PHYS/QHP OP CAR RHAB W/ECG: CPT

## 2024-06-05 RX ORDER — PANTOPRAZOLE SODIUM 40 MG/1
40 TABLET, DELAYED RELEASE ORAL DAILY
Qty: 45 TABLET | Refills: 0 | OUTPATIENT
Start: 2024-06-05

## 2024-06-07 ENCOUNTER — HOSPITAL ENCOUNTER (OUTPATIENT)
Dept: CARDIAC REHAB | Facility: CLINIC | Age: 64
Discharge: HOME | End: 2024-06-07
Attending: HOSPITALIST
Payer: MEDICARE

## 2024-06-07 PROCEDURE — 93798 PHYS/QHP OP CAR RHAB W/ECG: CPT | Performed by: OCCUPATIONAL THERAPIST

## 2024-06-10 ENCOUNTER — HOSPITAL ENCOUNTER (OUTPATIENT)
Dept: CARDIAC REHAB | Facility: CLINIC | Age: 64
Discharge: HOME | End: 2024-06-10
Attending: HOSPITALIST
Payer: MEDICARE

## 2024-06-10 PROCEDURE — 93798 PHYS/QHP OP CAR RHAB W/ECG: CPT

## 2024-06-12 ENCOUNTER — HOSPITAL ENCOUNTER (OUTPATIENT)
Dept: CARDIAC REHAB | Facility: CLINIC | Age: 64
Discharge: HOME | End: 2024-06-12
Attending: HOSPITALIST
Payer: MEDICARE

## 2024-06-12 PROCEDURE — 93798 PHYS/QHP OP CAR RHAB W/ECG: CPT

## 2024-06-14 ENCOUNTER — HOSPITAL ENCOUNTER (OUTPATIENT)
Dept: CARDIAC REHAB | Facility: CLINIC | Age: 64
Discharge: HOME | End: 2024-06-14
Attending: HOSPITALIST
Payer: MEDICARE

## 2024-06-14 PROCEDURE — 93798 PHYS/QHP OP CAR RHAB W/ECG: CPT | Performed by: OCCUPATIONAL THERAPIST

## 2024-06-17 ENCOUNTER — HOSPITAL ENCOUNTER (OUTPATIENT)
Dept: CARDIAC REHAB | Facility: CLINIC | Age: 64
Discharge: HOME | End: 2024-06-17
Attending: HOSPITALIST
Payer: MEDICARE

## 2024-06-17 PROCEDURE — 93798 PHYS/QHP OP CAR RHAB W/ECG: CPT

## 2024-06-18 ENCOUNTER — HOSPITAL ENCOUNTER (OUTPATIENT)
Dept: CARDIAC REHAB | Facility: CLINIC | Age: 64
Discharge: HOME | End: 2024-06-18
Attending: HOSPITALIST
Payer: MEDICARE

## 2024-06-18 PROCEDURE — 93798 PHYS/QHP OP CAR RHAB W/ECG: CPT

## 2024-06-18 ASSESSMENT — SLEEP AND FATIGUE QUESTIONNAIRES
HOW LIKELY ARE YOU TO NOD OFF OR FALL ASLEEP WHILE SITTING QUIETLY AFTER LUNCH WITHOUT ALCOHOL: WOULD NEVER DOZE
HOW LIKELY ARE YOU TO NOD OFF OR FALL ASLEEP WHILE WATCHING TV: WOULD NEVER DOZE
HOW LIKELY ARE YOU TO NOD OFF OR FALL ASLEEP WHILE SITTING INACTIVE IN A PUBLIC PLACE: WOULD NEVER DOZE
HOW LIKELY ARE YOU TO NOD OFF OR FALL ASLEEP WHILE SITTING AND TALKING TO SOMEONE: WOULD NEVER DOZE
HOW LIKELY ARE YOU TO NOD OFF OR FALL ASLEEP WHEN YOU ARE A PASSENGER IN A CAR FOR AN HOUR WITHOUT A BREAK: WOULD NEVER DOZE
HOW LIKELY ARE YOU TO NOD OFF OR FALL ASLEEP WHILE LYING DOWN TO REST IN THE AFTERNOON WHEN CIRCUMSTANCES PERMIT: WOULD NEVER DOZE
HOW LIKELY ARE YOU TO NOD OFF OR FALL ASLEEP WHILE SITTING AND READING: SLIGHT CHANCE OF DOZING
HOW LIKELY ARE YOU TO NOD OFF OR FALL ASLEEP IN A CAR, WHILE STOPPED FOR A FEW MINUTES IN TRAFFIC: WOULD NEVER DOZE

## 2024-06-20 ENCOUNTER — OFFICE VISIT (OUTPATIENT)
Dept: SLEEP MEDICINE | Facility: CLINIC | Age: 64
End: 2024-06-20
Payer: MEDICARE

## 2024-06-20 VITALS
DIASTOLIC BLOOD PRESSURE: 69 MMHG | HEART RATE: 58 BPM | BODY MASS INDEX: 45.1 KG/M2 | HEIGHT: 70 IN | OXYGEN SATURATION: 96 % | RESPIRATION RATE: 16 BRPM | SYSTOLIC BLOOD PRESSURE: 144 MMHG | WEIGHT: 315 LBS

## 2024-06-20 DIAGNOSIS — G47.33 OSA (OBSTRUCTIVE SLEEP APNEA): ICD-10-CM

## 2024-06-20 DIAGNOSIS — G31.09 FRONTOTEMPORAL DEMENTIA (H): Primary | ICD-10-CM

## 2024-06-20 DIAGNOSIS — F02.80 FRONTOTEMPORAL DEMENTIA (H): Primary | ICD-10-CM

## 2024-06-20 PROCEDURE — 99214 OFFICE O/P EST MOD 30 MIN: CPT | Performed by: INTERNAL MEDICINE

## 2024-06-20 NOTE — PROGRESS NOTES
[facility] [address] [address]  Phone: [phone]    Patient:  Car Torres, Date of birth 1960  Date of Visit:  06/20/2024  Referring Provider Collette V[facility]  Outpatient Sleep Medicine Consultation  June 20, 2024    Name: Car Torres MRN# [id number]   Age: 64 year old YOB: 1960     Date of Consultation: June 20, 2024  Consultation is requested by: Collette Tracy MD  [address] [place] [id number]  Primary care provider: Mihir Perez                Assessment and Plan:   Car Torres is a 64 year old male with history of hypertension, hyperlipidemia, coronary artery disease paroxysmal atrial fibrillation, chronic kidney disease and class III obesity was diagnosed with severe obstructive sleep apnea in 2018 and has been on CPAP therapy since then.  He is here to reestablish ongoing sleep medicine care.  Sleep disordered breathing.  Severe obstructive sleep apnea.  Diagnosed in 2018 with a type III home sleep test which showed a combined apnea-hypopnea index of 45.3 events per hour.  He had significant positional variability with a supine AHI of 62 events per hour.  This was associated with sustained sleep-related hypoxia which was corrected by use of continuous positive airway pressure.  On auto titrating CPAP and overnight oximetry confirmed resolution of sleep-related hypoxia.  He has excellent PAP compliance and effective therapy with a residual AHI of 2.4 events per hour.  Excessive daytime sleepiness.  He has minimal to no subjective daytime sleepiness.  He does have increased sleep requirements which likely is associated with phase delayed sleep.      Comorbid Diagnoses:    Class III obesity.  Hypertension.  Coronary artery disease.  Paroxysmal atrial fibrillation.  Chronic kidney disease    Summary  Recommendations:    Continue current therapy with auto titrating CPAP 10-16 cm of water.  Prescription for PAP supplies was provided.  Currently using nasal mask with minimal to small intermittent leak.  Does complain of occasional mouth dryness and hence will benefit from a chinstrap.  Enroll the patient in sleep therapy management program for remote monitoring of his therapy.    Summary Counseling:  Recommend yearly follow-up.  Check out [email or web address]           History of Present Illness:     Car Torres is a 64 year old male with history of hypertension, hyperlipidemia, coronary artery disease, paroxysmal atrial fibrillation, chronic kidney disease and class III obesity is being managed for severe obstructive sleep apnea since 2018.  He is currently using auto titrating CPAP 10-16 cm of water with his 95th percentile pressures around 13 cm of water.  He has no subjective complaint of excessive daytime sleepiness and overall is content with the quality of his sleep.    Car is accompanied by his wife.  They are both enjoying their retired life and their family home in [place], [place].  He usually goes to sleep between midnight to 1 AM and will fall asleep within few minutes.  During the night he may wake up once or twice to use the bathroom and is able to fall back to sleep within few minutes.  He usually wakes up around 10 AM with an alarm as otherwise he might sleep longer.  He is overall sleeping well, his wife has not noticed him snores while using the CPAP.  He has an occasional leak and does complain of an occasional dryness of his mouth.  In morning he does feel well rested and usually has minimal to no complaints of daytime sleepiness apart from when he is watching television.      PREVIOUS SLEEP STUDIES:  Date: 07/18/2018  AHI: 45.3/hr  Intervention: auto-CPAP 6-16 cm H2O      CURRENT THERAPY-Subjective:  Sleep wake schedule:   Bedtime 12 AM - 1 AM  Awakening 10 AM using alarm    Awakenings 1-2 for 2-5 minutes to use the bathroom, nightly/week  Naps: 0     Overall, he rates the experience with PAP as 8 (0 poor, 10 great). The mask is comfortable.The mask is not leaking.  He is not snoring with the mask on. He is not having gasp arousals.  He occasionally having significant oral/nasal dryness. The pressure is comfortable.     His PAP interface is Nasal Mask.    He does feel rested in the morning.    [place] Sleepiness Scale: 1/24    Objective:  CPAP Compliance Targets:   >70% days > 4 hours AHI < 5   30 days ending June 20, 2024   [employer]     Auto-PAP 10.0 - 16.0 cmH2O 30 day usage data:    96% of days with > 4 hours of use. 1/30 days with no use.   Average use 512 minutes per day.   95%ile Leak 34 L/min.   CPAP 95% pressure 13.4 cm.   AHI 2.4 events per hour.                 Medications:     Current Outpatient Medications   Medication Sig Dispense Refill    apixaban ANTICOAGULANT (ELIQUIS ANTICOAGULANT) 5 MG tablet Take 1 tablet (5 mg) by mouth 2 times daily 180 tablet 3    Apple Wyatt Vn-Grn Tea-Bit Or-Cr (APPLE CIDER VINEGAR PLUS) TABS Take 1 tablet by mouth daily With calcium and magnesium      atorvastatin (LIPITOR) 40 MG tablet TAKE 1 TABLET BY MOUTH EVERY DAY 90 tablet 2    busPIRone (BUSPAR) 10 MG tablet Take 2 tablets (20 mg) by mouth 2 times daily 120 tablet 1    Cholecalciferol (VITAMIN D PO) Take 5,000 Units by mouth daily One tablet twice daily      co-enzyme Q-10 100 MG CAPS capsule Take 100 mg by mouth daily      FLUoxetine (PROZAC) 40 MG capsule TAKE 1 CAPSULE BY MOUTH EVERY DAY 90 capsule 0    hydroCHLOROthiazide 12.5 MG tablet Take 1 tablet (12.5 mg) by mouth daily 90 tablet 3    hydrOXYzine HCl (ATARAX) 10 MG tablet Take 1 tablet (10 mg) by mouth 2 times daily as needed for anxiety (Patient taking differently: Take 10 mg by mouth daily) 180 tablet 0    lisinopril (ZESTRIL) 20 MG tablet Take 1 tablet (20 mg) by mouth daily 90 tablet 3    metoprolol tartrate (LOPRESSOR) 25 MG  tablet Take 1 tablet (25 mg) by mouth every 8 hours as needed (Atrial fibrillation with RVR, HR > 120) 30 tablet 1    MULTI VITAMIN MENS PO Take 1 tablet by mouth daily      pantoprazole (PROTONIX) 40 MG EC tablet Take 1 tablet (40 mg) by mouth daily Continue for 6 weeks after ablation 45 tablet 0    ticagrelor (BRILINTA) 90 MG tablet Take 1 tablet (90 mg) by mouth 2 times daily Start this evening. 180 tablet 3    vitamin B complex with vitamin C (STRESS TAB) tablet Take 1 tablet by mouth daily       No current facility-administered medications for this visit.        No Known Allergies         Past Medical History:     Does not need 02 supplement at night   Past Medical History:   Diagnosis Date    CKD (chronic kidney disease) stage 2, GFR 60-89 ml/min 10/21/2010    60 to 80 - have discussed Lisinopril - will consider       Coronary artery disease 01/26/2024    Dementia (H)     Depressive disorder     Diverticulosis of large intestine without hemorrhage 11/18/2015    Incidental finding on CT scan       Heart disease 05/08/2018    Hypercholesterolemia     Hypertension goal BP (blood pressure) < 140/90     Nonspecific abnormal electrocardiogram (ECG) (EKG) 08/23/2007    Stress testing normal.     Paroxysmal atrial fibrillation (H) 03/17/2024    Sinus node dysfunction (H) 05/23/2024    Sleep apnea     uses a c-pap, severe    Status post coronary angiogram 01/26/2024             Past Surgical History:    No h/o  upper airway surgery  Past Surgical History:   Procedure Laterality Date    COLONOSCOPY N/A 12/22/2020    Procedure: COLONOSCOPY, WITH POLYPECTOMY, CLIP;  Surgeon: Mihir Lang MD;  Location: [facility] OR    COLONOSCOPY N/A 1/10/2022    Procedure: COLONOSCOPY, WITH POLYPECTOMY AND BIOPSY;  Surgeon: Mihir Lang MD;  Location:  GI    CV CORONARY ANGIOGRAM N/A 1/26/2024    Procedure: Coronary Angiogram;  Surgeon: Elton Wright MD;  Location:  HEART CARDIAC CATH LAB    CV CORONARY LITHOTRIPSY PCI N/A  "3/8/2024    Procedure: Percutaneous Coronary Intervention - Lithotripsy;  Surgeon: Elton Wright MD;  Location: [facility] CARDIAC CATH LAB    CV INSTANTANEOUS WAVE-FREE RATIO N/A 1/26/2024    Procedure: Instantaneous Wave-Free Ratio;  Surgeon: Elton Wright MD;  Location: [facility] CARDIAC CATH LAB    CV INTRAVASULAR ULTRASOUND N/A 3/8/2024    Procedure: Intravascular Ultrasound;  Surgeon: Elton Wright MD;  Location: [facility] CARDIAC CATH LAB    CV PCI N/A 3/8/2024    Procedure: Percutaneous Coronary Intervention;  Surgeon: Elton Wright MD;  Location: [facility] CARDIAC CATH LAB    CV RIGHT HEART CATH MEASUREMENTS RECORDED N/A 1/26/2024    Procedure: Right Heart Catheterization;  Surgeon: Elton Wright MD;  Location: [facility] CARDIAC CATH LAB    EP ABLATION PULMONARY VEIN ISOLATION N/A 5/30/2024    Procedure: Ablation Atrial Fibrillation;  Surgeon: Sandee Sheldon MD;  Location: [facility] CATH LAB CV    ESOPHAGOSCOPY, GASTROSCOPY, DUODENOSCOPY (EGD), COMBINED N/A 1/28/2021    Procedure: ESOPHAGOGASTRODUODENOSCOPY, WITH BIOPSY;  Surgeon: Mihir Lang MD;  [place] [facility] [place]    OPEN REDUCTION INTERNAL FIXATION FOOT Left 1/5/2023    Procedure: LISFRANC OPEN REDUCTION INTERNAL FIXATION, LEFT FOOT;  Surgeon: Bailey Ruiz DPM;  Location:  OR    SURGICAL HISTORY OF -       surgery on left index finger            Physical Examination:     Objective   Vitals:  BP (!) 144/69   Pulse 58   Resp 16   Ht 1.778 m (5' 10\")   Wt (!) 154.3 kg (340 lb 1.6 oz)   SpO2 96%   BMI 48.80 kg/m     General: healthy, alert, and no distress  HEENT: Pupils are equal and reactive bilaterally, normocephalic atraumatic, Mallampati score 4  Psych: Alert and oriented times 3; coherent speech, normal   rate and volume, able to articulate logical thoughts, able   to abstract reason, no tangential thoughts, no hallucinations   or delusions  His affect is normal    Copy to: Mihir Perez    Total of " 37 minutes of time was spent with patient, this included the interview and exam, and review of the chart/labs/imaging/sleep study/PAP therapy data on 06/20/2024. Greater than 50% of which was spent counseling and coordinating care.     Rossana Murdock MD 6/20/2024     [facility]   [address], [address]   [address] [place] [address]. [place] [address]   Appointments: [phone]    Rossana Murdock MD

## 2024-06-26 ENCOUNTER — DOCUMENTATION ONLY (OUTPATIENT)
Dept: SLEEP MEDICINE | Facility: CLINIC | Age: 64
End: 2024-06-26
Payer: MEDICARE

## 2024-06-26 NOTE — TELEPHONE ENCOUNTER
Date of Last Office Visit: 12/22/23  Date of Next Office Visit: 4/11/23  No shows since last visit: 0  Cancellations since last visit: 0    Medication requested: busPIRone (BUSPAR) 10 MG tablet Date last ordered: 10/3/23 Qty: 120 Refills: 1     Review of [place] ?: NA    Lapse in medication adherence greater than 5 days?: Possibly. 10/3/23 script should have ended 12/3/23  If yes, call patient and gather details: Call to patient attempted and reached Voice mail.  Medication refill request verified as identical to current order?: Yes  Result of Last DAM, VPA, Li+ Level, CBC, or Carbamazepine Level (at or since last visit): N/A    Last visit treatment plan:     Treatment Plan:        1.  Continue buspirone 20 mg 2 times daily  2.  Continue fluoxetine 40 mg daily  3.  Continue hydroxyzine 10 mg up to twice daily as needed for anxiety  4.  Referral made for psychological testing to clarify diagnosis       Instructions      Return in about 4 months (around 4/22/2024) for Medication followup.    []Medication refilled per  Medication Refill in Ambulatory Care  policy.  [x]Medication unable to be refilled by RN due to criteria not met as indicated below:    []Eligibility - not seen in the last year   []Supervision - no future appointment   [x]Compliance - no shows, cancellations or lapse in therapy. - Per prescription Script quantity should have ended 12/3/23    [x]Verification - order discrepancy   []Controlled medication   []Medication not included in policy   []90-day supply request   [x]Other- 12/22/23 visit note unsigned.   Anticipated Starting Dosage (Optional): 40mg Daily Detail Level: Generalized Ipledge Number (Optional): [id number]

## 2024-06-26 NOTE — PROGRESS NOTES
[facility] Recheck: Called patient spoke with him and his wife gave them my contact information. Patient stated he is doing well with CPAP.

## 2024-07-02 ENCOUNTER — MYC MEDICAL ADVICE (OUTPATIENT)
Dept: FAMILY MEDICINE | Facility: CLINIC | Age: 64
End: 2024-07-02
Payer: MEDICARE

## 2024-07-02 DIAGNOSIS — F41.1 GAD (GENERALIZED ANXIETY DISORDER): ICD-10-CM

## 2024-07-02 RX ORDER — BUSPIRONE HYDROCHLORIDE 10 MG/1
20 TABLET ORAL 2 TIMES DAILY
Qty: 120 TABLET | Refills: 1 | Status: SHIPPED | OUTPATIENT
Start: 2024-07-02 | End: 2024-07-12

## 2024-07-02 RX ORDER — HYDROXYZINE HYDROCHLORIDE 10 MG/1
10 TABLET, FILM COATED ORAL 2 TIMES DAILY PRN
Qty: 180 TABLET | Refills: 1 | Status: SHIPPED | OUTPATIENT
Start: 2024-07-02 | End: 2024-08-08

## 2024-07-02 NOTE — TELEPHONE ENCOUNTER
Date of Last Office Visit: 04/11/2024  Date of Next Office Visit:  07/12/2024  No shows since last visit: No  More than 2 cancellations since last visit? No    []Medication refilled per  Medication Refill in Ambulatory Care  policy.  []Medication unable to be refilled by RN due to criteria not met as indicated below:    []Eligibility: has not had a provider visit within last 6 months   []Supervision: no future appointment; < 7 days before next appointment   []Compliance: no shows; cancellations; lapse in therapy   []Verification: order discrepancy; may need modification...   []90-day supply request   []Advanced refill request: > 7 days before refill date   []Controlled medication   []Medication not included in policy   []Review: new med; med adjusted ? 30 days; safety alert; requires lab monitoring...   []Scope of Practice: refill request processed by LPN/MA   [x]Other:      Medication(s) requested:     -  hydrOXYzine HCl (ATARAX) 10 MG tablet   Date last ordered: 04/11/2024  Qty: 180  Refills: 0  Appropriate for refill? Yes    -  busPIRone (BUSPAR) 10 MG tablet   Date last ordered: 04/11/2024  Qty: 120  Refills: 1  Appropriate for refill? Yes          Any Controlled Substance(s)? No    Requested medication(s) verified as identical to current order? Yes    Any lapse in adherence to medication(s) greater than 5 days? No      Action required? no.    Last visit treatment plan:   Return in about 3 months (around 7/11/2024) for Medication followup.     1.  Continue buspirone 20 mg 2 times daily  2.  Continue fluoxetine 40 mg daily  3.  Continue hydroxyzine 10 mg up to 2 times daily as needed for anxiety    Any medication(s) require lab monitoring? No

## 2024-07-11 ASSESSMENT — PATIENT HEALTH QUESTIONNAIRE - PHQ9
10. IF YOU CHECKED OFF ANY PROBLEMS, HOW DIFFICULT HAVE THESE PROBLEMS MADE IT FOR YOU TO DO YOUR WORK, TAKE CARE OF THINGS AT HOME, OR GET ALONG WITH OTHER PEOPLE: SOMEWHAT DIFFICULT
SUM OF ALL RESPONSES TO PHQ QUESTIONS 1-9: 1
SUM OF ALL RESPONSES TO PHQ QUESTIONS 1-9: 1

## 2024-07-12 ENCOUNTER — VIRTUAL VISIT (OUTPATIENT)
Dept: PSYCHIATRY | Facility: CLINIC | Age: 64
End: 2024-07-12
Payer: MEDICARE

## 2024-07-12 DIAGNOSIS — F41.1 GAD (GENERALIZED ANXIETY DISORDER): ICD-10-CM

## 2024-07-12 DIAGNOSIS — F33.41 MAJOR DEPRESSIVE DISORDER, RECURRENT EPISODE, IN PARTIAL REMISSION (H): Primary | ICD-10-CM

## 2024-07-12 PROCEDURE — 99214 OFFICE O/P EST MOD 30 MIN: CPT | Mod: 95 | Performed by: NURSE PRACTITIONER

## 2024-07-12 RX ORDER — BUSPIRONE HYDROCHLORIDE 10 MG/1
20 TABLET ORAL 2 TIMES DAILY
Qty: 360 TABLET | Refills: 1 | Status: SHIPPED | OUTPATIENT
Start: 2024-07-12 | End: 2024-08-08

## 2024-07-12 RX ORDER — FLUOXETINE 40 MG/1
40 CAPSULE ORAL DAILY
Qty: 90 CAPSULE | Refills: 1 | Status: SHIPPED | OUTPATIENT
Start: 2024-07-12 | End: 2024-08-08

## 2024-07-12 NOTE — NURSING NOTE
Current patient location:  Pt currently driving will be home by appt time    Is the patient currently in the state of [place]? YES    Visit mode:VIDEO    If the visit is dropped, the patient can be reconnected by: VIDEO VISIT: Text to cell phone:   Telephone Information:   Mobile [phone]   Mobile Not on file.       Will anyone else be joining the visit? NO  (If patient encounters technical issues they should call [phone])    How would you like to obtain your AVS? MyChart    Are changes needed to the allergy or medication list? Pt stated no changes to allergies and Pt stated no med changes    Are refills needed on medications prescribed by this physician? YES    Reason for visit: KOURTNEY DARLING

## 2024-07-12 NOTE — PATIENT INSTRUCTIONS
"Patient Education   The Panel Psychiatry Program  What to Expect  Here's what to expect in the Panel Psychiatry Program.   About the program  You'll be meeting with a psychiatric doctor to check your mental health. A psychiatric doctor helps you deal with troubling thoughts and feelings by giving you medicine. They'll make sure you know the plan for your care. You may see them for a long time. When you're feeling better, they may refer you back to seeing your family doctor.   If you have any questions, we'll be glad to talk to you.  About visits  Be open  At your visits, please talk openly about your problems. It may feel hard, but it's the best way for us to help you.  Cancelling visits  If you can't come to your visit, please call us right away at [phone]. If you don't cancel at least 24 hours (1 full day) before your visit, that's \"late cancellation.\"  Not showing up for your visits  Being very late is the same as not showing up. You'll be a \"no show\" if:  You're more than 15 minutes late for a 30-minute (half hour) visit.  You're more than 30 minutes late for a 60-minute (full hour) visit.  If you cancel late or don't show up 2 times within 6 months, we may end your care.  Getting help between visits  If you need help between visits, you can call us Monday to Friday from 8 a.m. to 4:30 p.m. at [phone].  Emergency care  Call 911 or go to the nearest emergency department if your life or someone else's life is in danger.  Call 988 anytime to reach the national Suicide and Crisis hotline.  Medicine refills  To refill your medicine, call your pharmacy. You can also call [facility]'s Behavioral Access at [phone], Monday to Friday, 8 a.m. to 4:30 p.m. It can take 1 to 3 business days to get a refill.   Forms, letters, and tests  You may have papers to fill out, like FMLA, short-term disability, and workability. We can help you with these forms at your visits, but you must have an " appointment. You may need more than 1 visit for this, to be in an intensive therapy program, or both.  Before we can give you medicine for ADHD, we may refer you to get tested for it or confirm it another way.  We may not be able to give you an emotional support animal letter.  We don't do mental health checks ordered by the court.   We don't do mental health testing, but we can refer you to get tested.   Thank you for choosing us for your care.  For informational purposes only. Not to replace the advice of your health care provider. Copyright   2022 [facility]. All rights reserved. [employer] 402061 - 12/22.     Treatment Plan:      1.  Continue buspirone 20 mg 2 times daily for anxiety  2.  Continue fluoxetine 40 mg daily for depression and anxiety management  3.  Continue hydroxyzine 10 mg up to twice daily as needed for anxiety and insomnia  4.  I will contact her primary care provider to inquire about future refills for your medications    Continue all other medical directions per primary care provider.   Continue all other medications as reviewed per electronic medical record today.   Safety plan reviewed. To the Emergency Department as needed or call after hours crisis line at [phone] or [phone]. [place] Crisis Text [place]: Text [place] to 172332  or  Suicide LifeLine Chat: suicidepreventionlifeline.org/chat/  To schedule individual or family therapy, call [facility] at [phone].   Schedule an appointment as needed.  Call [facility] at [phone] to schedule.  Follow up with primary care provider as planned or for acute medical concerns.  Call the psychiatric nurse line with medication questions or concerns at [phone].  [employer] may be used to communicate with your provider, but this is not intended to be used for emergencies.        Crisis Resources   The EmPath is an adults only unit located at [facility] in [place] is a short term  (generally less than 23 hour stay) designed for crisis intervention and stabilization. Pts have the opportunity to meet quickly with a behavioral health team for evaluation in a calm and peaceful therapuetic environment. To be evaluated for admission pts are triaged throught the [facility] ED.      The following hotlines are for both adults and children. The and are open 24 hours a day, 7 days a week unless noted otherwise.        Crisis Lines      Crisis Text Line  Text 102701  You will be connected with a trained live crisis counselor to provide support.        Gambling Hotline  9.672.850.7574 [hope]        línea de crisis española  [phone]        [facility] TV TubeX Helpline  211.675.6822        National Hope Line  0.977.395.5314 [hope]        National Suicide Prevention Lifeline  Free and confidential support  988 or 1.121.889.TALK [8255]  http://suicidepreventionlifeline.org        The Erick Project (LGBTQ Youth Crisis Line)  3.225.753.2494  text START to 316-551        's Crisis Line  9.713.950.4583 (Press 1)  or text 859919    [facility] Mental Health Crisis Response  Within [place], call **CRISIS [**051329] to be connected to a mental health professional who can assist you.        [facility] Crisis  [phone]      [facility] Mobile Crisis  [phone]      [facility] Crisis  [phone]      [facility] Mobile Crisis  [phone] (adults)  [phone] (children)      [facility] Mobile Crisis  [phone] (adults)  [phone] (children)      [facility] Mobile Crisis  [phone]      [facility] Mobile Crisis  [phone]    Community Resources      Fast Tracker  Linking people to mental health and substance use disorder resources  fasttrackPlaceSpeakn.org        [place] Mental Health Warmline  Peer to peer support  5 pm to 9 am 7 days/week  5.750.286.1906  https://mnwitw.org/aranza        National [place] on Mental Illness (DENAE)   303.598.9687 or 1.888.DENAE.HELPS  https://namimn.org/        [facility] Urgent Care for Adult Mental Health  [facility] residents only   [address] [facility]  [phone]        [facility]  Free mental health counseling  https://walkin.org/  612.870.0565 X2    Mental Health Apps      Calm Harm  https://calmharm.co.uk/      My3  https://my3app.org/      Bruna Safety Plan  https://www.mysafetyplan.org/

## 2024-07-12 NOTE — PROGRESS NOTES
"Virtual Visit Details    Type of service:  Video Visit   Video Start Time: 11:09 AM  Video End Time: 11:38 AM    Originating Location (pt. Location): Home    Distant Location (provider location):  Off-site  Platform used for Video Visit: [facility]                 Outpatient Psychiatric Progress Note    Name: Car Torres   : 1960                    Primary Care Provider: Mihir Perez MD   Therapist: None    PHQ-9 scores:      4/10/2024     9:33 AM 2024     1:47 PM 2024    11:44 AM   PHQ-9 SCORE   PHQ-9 Total Score MyChart 1 (Minimal depression) 0 1 (Minimal depression)   PHQ-9 Total Score 1 0 1       SHAHNAZ-7 scores:      2022     1:00 PM 2022     1:02 PM 2024     6:22 PM   SHAHNAZ-7 SCORE   Total Score  2 (minimal anxiety) 0 (minimal anxiety)   Total Score 0 2 0       Patient Identification:    Patient is a 64 year old year old,   White Not  or  male  who presents for return visit with me.  Patient is currently retired. Patient attended the session with his wife and student Romelia , who they agreed to have interview with. Patient prefers to be called: \" Jerry\".    Current medications include:   Current Outpatient Medications   Medication Sig Dispense Refill    apixaban ANTICOAGULANT (ELIQUIS ANTICOAGULANT) 5 MG tablet Take 1 tablet (5 mg) by mouth 2 times daily 180 tablet 3    Apple Wyatt Vn-Grn Tea-Bit Or-Cr (APPLE CIDER VINEGAR PLUS) TABS Take 1 tablet by mouth daily With calcium and magnesium      atorvastatin (LIPITOR) 40 MG tablet TAKE 1 TABLET BY MOUTH EVERY DAY 90 tablet 2    busPIRone (BUSPAR) 10 MG tablet Take 2 tablets (20 mg) by mouth 2 times daily 120 tablet 1    Cholecalciferol (VITAMIN D PO) Take 5,000 Units by mouth daily One tablet twice daily      co-enzyme Q-10 100 MG CAPS capsule Take 100 mg by mouth daily      FLUoxetine (PROZAC) 40 MG capsule TAKE 1 CAPSULE BY MOUTH EVERY DAY 90 capsule 0    hydroCHLOROthiazide 12.5 MG tablet Take 1 tablet " "(12.5 mg) by mouth daily 90 tablet 3    hydrOXYzine HCl (ATARAX) 10 MG tablet Take 1 tablet (10 mg) by mouth 2 times daily as needed for anxiety 180 tablet 1    lisinopril (ZESTRIL) 20 MG tablet Take 1 tablet (20 mg) by mouth daily 90 tablet 3    metoprolol tartrate (LOPRESSOR) 25 MG tablet Take 1 tablet (25 mg) by mouth every 8 hours as needed (Atrial fibrillation with RVR, HR > 120) 30 tablet 1    MULTI VITAMIN MENS PO Take 1 tablet by mouth daily      pantoprazole (PROTONIX) 40 MG EC tablet Take 1 tablet (40 mg) by mouth daily Continue for 6 weeks after ablation 45 tablet 0    ticagrelor (BRILINTA) 90 MG tablet Take 1 tablet (90 mg) by mouth 2 times daily Start this evening. 180 tablet 3    vitamin B complex with vitamin C (STRESS TAB) tablet Take 1 tablet by mouth daily       No current facility-administered medications for this visit.        The [place] Prescription Monitoring Program has been reviewed and there are no concerns about diversionary activity for controlled substances at this time.      I was able to review most recent Primary Care Provider, specialty provider, and therapy visit notes that I have access to.     Today, patient reports he completed a cardiac ablation.  Yet he his wife tells me he has not been \"feeling the best\".  He still seems fatigued and at times she thinks he might be depressed, despite Car telling me he feels little depression or anxiety.  He is able to spend time with his grandchildren and also does mohan on his telephone.  Overall both of them have noticed an improvement in Jerry's ability to problem solve with clear thought processes that continue to improve.       has a past medical history of CKD (chronic kidney disease) stage 2, GFR 60-89 ml/min (10/21/2010), Coronary artery disease (01/26/2024), Dementia (H), Depressive disorder, Diverticulosis of large intestine without hemorrhage (11/18/2015), Heart disease (05/08/2018), Hypercholesterolemia, Hypertension goal BP " (blood pressure) < 140/90, Nonspecific abnormal electrocardiogram (ECG) (EKG) (08/23/2007), Paroxysmal atrial fibrillation (H) (03/17/2024), Sinus node dysfunction (H) (05/23/2024), Sleep apnea, and Status post coronary angiogram (01/26/2024).    He has no past medical history of Arthritis, Cancer (H), Cerebral infarction (H), Congestive heart failure (H), Congestive heart failure, unspecified, COPD (chronic obstructive pulmonary disease) (H), Diabetes (H), Thyroid disease, or Uncomplicated asthma.    Social history updates:    No changes in his social history to report today    Substance use updates:    No alcohol use reported  Tobacco use: No    Vital Signs:   There were no vitals taken for this visit.    Labs:    Most recent laboratory results reviewed and no new labs.     Mental Status Examination:  Appearance: awake, alert and casual dress  Attitude: cooperative  Eye Contact:  adequate  Gait and Station: No dizziness or falls  Psychomotor Behavior:  intact station, gait and muscle tone  Oriented to:  time, person, and place  Attention Span and Concentration:  Normal  Speech:   vtspeech: clear, coherent and Speaks: English  Mood:  anxious, depressed, and better  Affect:  mood congruent  Associations:  no loose associations  Thought Process:  goal oriented  Thought Content:  no evidence of suicidal ideation or homicidal ideation, no auditory hallucinations present, and no visual hallucinations present  Recent and Remote Memory:  intact Not formally assessed. No amnesia.  Fund of Knowledge: appropriate  Insight:  good  Judgment: good  Impulse Control:  good    Suicide Risk Assessment:  Today Car Torres reports no thoughts to harm themself or others. In addition, there are notable risk factors for self-harm, including anxiety. However, risk is mitigated by commitment to family, history of seeking help when needed, future oriented, denies suicidal intent or plan, and denies homicidal ideation, intent, or  plan. Therefore, based on all available evidence including the factors cited above, Car Torres does not appear to be at imminent risk for self-harm, does not meet criteria for a 72-hr hold, and therefore remains appropriate for ongoing outpatient level of care.  A thorough assessment of risk factors related to suicide and self-harm have been reviewed and are noted above. The patient convincingly denies suicidality on several occasions. Local community safety resources printed and reviewed for patient to use if needed. There was no deceit detected, and the patient presented in a manner that was believable.     DSM5 Diagnosis:  296.35 (F33.41)  Major Depressive Disorder, Recurrent Episode, In partial remission _ and With anxious distress  300.02 (F41.1) Generalized Anxiety Disorder    Medical comorbidities include:   Patient Active Problem List    Diagnosis Date Noted    Hypertension goal BP (blood pressure) < 140/90 10/20/2010     Priority: High    Hyperlipidemia LDL goal <130 06/11/2010     Priority: High    Frontotemporal dementia (H) 06/20/2024     Priority: Medium    Sinus node dysfunction (H) 05/23/2024     Priority: Medium    Near syncope 03/17/2024     Priority: Medium    Paroxysmal atrial fibrillation (H) 03/17/2024     Priority: Medium    Unstable angina (H) 01/26/2024     Priority: Medium    Coronary artery disease 01/26/2024     Priority: Medium    MCKNIGHT (dyspnea on exertion) 01/26/2024     Priority: Medium    Status post coronary angiogram 01/26/2024     Priority: Medium    Anginal equivalent (H24) 01/26/2024     Priority: Medium    Lisfranc dislocation, left, initial encounter 12/28/2022     Priority: Medium    Lisfranc dislocation, left, subsequent encounter 12/28/2022     Priority: Medium    Major depressive disorder, recurrent episode, mild with atypical features (H) 12/06/2021     Priority: Medium    TIA (obstructive sleep apnea) 07/19/2018     Priority: Medium     Home Sleep Apnea Testing -  7/18/18: 295 lbs 0 oz: AHI 45.3/hr. Supine AHI 61.9/hr. Oxygen [place] of 66%.  Baseline 93%.  Sp02 =< 88% for 47 minutes.      History of sinus bradycardia 06/07/2018     Priority: Medium    ED (erectile dysfunction) 06/07/2018     Priority: Medium    Renal cyst 11/18/2015     Priority: Medium     Simple, non enhanced, 3.5 cm on right kidney, Bosniak 1 - x 2 stable findings      Diverticulosis of large intestine without hemorrhage 11/18/2015     Priority: Medium     Incidental finding on CT scan       Pulmonary nodule 11/18/2015     Priority: Medium     Incidental finding on CT scan, right posterior lung - considered benign / stable       CKD (chronic kidney disease) stage 2, GFR 60-89 ml/min 10/21/2010     Priority: Medium     60 to 80 - have discussed Lisinopril - will consider       Sciatica 02/27/2006     Priority: Medium    Morbid obesity (H) 02/27/2006     Priority: Medium       Assessment:    Car Torres and his wife Destiny met with me today to report that Jerry's symptoms are manageable.  They are pleased that he has become more alert and responsive.  While he has periods of mild anxiety, they are manageable with his medications.  Today I informed them of my upcoming detention and I will be reaching out to their primary care provider to see about having Jerry's medications managed by them.  Jerry has no suicide thinking.  He is engaged with his wife and his family.    Medication side effects and alternatives were reviewed. Health promotion activities recommended and reviewed today. All questions addressed. Education and counseling completed regarding risks and benefits of medications and psychotherapy options.    Treatment Plan:      1.  Continue buspirone 20 mg 2 times daily for anxiety  2.  Continue fluoxetine 40 mg daily for depression and anxiety management  3.  Continue hydroxyzine 10 mg up to twice daily as needed for anxiety and insomnia  4.  I will contact her primary care provider to inquire  about future refills for your medications    Continue all other medications as reviewed per electronic medical record today.   Safety plan reviewed. To the Emergency Department as needed or call after hours crisis line at [phone] or [phone]. [place] Crisis Text Line. Text [place] to 555328 or Suicide LifeLine Chat: suicideNexsanline.org/chat/  To schedule individual or family therapy, call [facility] at [phone]  Schedule an appointment as needed. Call [facility] at [phone] to schedule.  Follow up with primary care provider as planned or for acute medical concerns.  Call the psychiatric nurse line with medication questions or concerns at [phone]  [employer] may be used to communicate with your provider, but this is not intended to be used for emergencies.        Crisis Resources   The EmPath is an adults only unit located at [facility] is a short term (generally less than 23 hour stay) designed for crisis intervention and stabilization. Pts have the opportunity to meet quickly with a behavioral health team for evaluation in a calm and peaceful therapuetic environment. To be evaluated for admission pts are triaged throught the [facility] ED.      The following hotlines are for both adults and children. The and are open 24 hours a day, 7 days a week unless noted otherwise.        Crisis Lines      Crisis Text Line  Text 096187  You will be connected with a trained live crisis counselor to provide support.        Gambling Hotline  6.981.775.9019 [hope]        línea de crisis española  [phone]        [facility] Nutzvieh24 [facility]  [phone]        National Hope Line  4.955.598.3937 [hope]        National Suicide Prevention Lifeline  Free and confidential support  988 or 1.067.707.TALK [8255]  http://suicidepreventionlifeline.org        The Erick Project (LGBTQ Youth Crisis Line)  2.195.056.6868  text START to 144-800         [place] Crisis Line  7.488.080.8070 (Press 1)  or text 203946    [facility] Mental Health Crisis Response  Within [place], call **CRISIS [**657055] to be connected to a mental health professional who can assist you.        [facility] Crisis  [phone]      [facility] Mobile Crisis  [phone]      [facility] Crisis  [phone]      [facility] Mobile Crisis  [phone] (adults)  [phone] (children)      [facility] Mobile Crisis  [phone] (adults)  [phone] (children)      [facility] Mobile Crisis  [phone]      [facility] Mobile Crisis  [phone]    Community Resources      Fast Tracker  Linking people to mental health and substance use disorder resources  fasttrackTicket Hoyn.org        [place] Mental Health Warmline  Peer to peer support  5 pm to 9 am 7 days/week  3.832.357.6918  https://mnwitw.org/aranza        National [place] on Mental Illness (DENAE)  382.538.0365 or 1.888.DENAE.HELPS  https://namimn.org/        [facility] Urgent Care for Adult Mental Health  [facility] residents [address]  [phone]        [facility]  Free mental health counseling  https://walkin.org/  612.870.0565 X2    Mental Health Apps      Calm Harm  https://calmharm.co.uk/      My3  https://my3app.org/      Bruna Safety Plan  https://www.mysafetyplan.org/   Administrative Billing:   Time spent with patient includes counseling and coordination of care regarding above diagnoses and treatment plan.    Patient Status:  The patient is being returned to the primary care provider for ongoing care and medication prescribing.      Signed:   BELEN Singh-BC   Psychiatry       Answers submitted by the patient for this visit:  Patient Health Questionnaire (Submitted on 7/11/2024)  If you checked off any problems, how difficult have these problems made it for you to do your work, take care of things  at home, or get along with other people?: Somewhat difficult  PHQ9 TOTAL SCORE: 1

## 2024-07-12 NOTE — Clinical Note
Good afternoon Dr. Perez, and visiting with Car and his wife Destiny today, I informed them of my upcoming FDC in September.  As Car has been stable on his current medication regiment of hydroxyzine, fluoxetine, and buspirone, I was wondering if you would feel comfortable providing him with refills as they arise.  He has been compliant with his medications, more engaged with family and friends, and has had little depression or anxiety symptoms.  Thank you for your consideration.  Adelia

## 2024-07-15 ENCOUNTER — ORDERS ONLY (AUTO-RELEASED) (OUTPATIENT)
Dept: CARDIOLOGY | Facility: CLINIC | Age: 64
End: 2024-07-15
Payer: MEDICARE

## 2024-07-15 ENCOUNTER — OFFICE VISIT (OUTPATIENT)
Dept: CARDIOLOGY | Facility: CLINIC | Age: 64
End: 2024-07-15
Attending: INTERNAL MEDICINE
Payer: MEDICARE

## 2024-07-15 VITALS
BODY MASS INDEX: 47.78 KG/M2 | HEART RATE: 57 BPM | WEIGHT: 315 LBS | SYSTOLIC BLOOD PRESSURE: 138 MMHG | RESPIRATION RATE: 16 BRPM | DIASTOLIC BLOOD PRESSURE: 62 MMHG

## 2024-07-15 DIAGNOSIS — I48.0 PAROXYSMAL ATRIAL FIBRILLATION (H): ICD-10-CM

## 2024-07-15 DIAGNOSIS — Z98.890 STATUS POST CATHETER ABLATION OF ATRIAL FIBRILLATION: ICD-10-CM

## 2024-07-15 DIAGNOSIS — I49.5 SINUS NODE DYSFUNCTION (H): ICD-10-CM

## 2024-07-15 DIAGNOSIS — I48.0 PAROXYSMAL ATRIAL FIBRILLATION (H): Primary | ICD-10-CM

## 2024-07-15 DIAGNOSIS — G47.33 OSA (OBSTRUCTIVE SLEEP APNEA): Chronic | ICD-10-CM

## 2024-07-15 PROCEDURE — G2211 COMPLEX E/M VISIT ADD ON: HCPCS | Performed by: NURSE PRACTITIONER

## 2024-07-15 PROCEDURE — 99214 OFFICE O/P EST MOD 30 MIN: CPT | Performed by: NURSE PRACTITIONER

## 2024-07-15 NOTE — PROGRESS NOTES
[facility]  Cardiac Electrophysiology  [address]  [place] [address]   Office: [phone]  Fax: [phone]     HEART CARE ELECTROPHYSIOLOGY FOLLOW UP    Primary Care: Mihir Perez MD    Assessment/Recommendations     Paroxysmal atrial fibrillation/stage 3D: Symptomatic with palpitations.  Status post PVI 5/30/2024.  Unremarkable recovery without symptomatology or documentation of recurrent arrhythmia.  He is off AV michael blocking agents due to sinus node dysfunction.  His activity tolerance remains poor    [id number] score of 3-age 65-74, CAD, HTN; HAS BLED 2 for age, indication for concurrent antiplatelet therapy. No bleeding complications     TIA: consistent use of CPAP    Sinus node dysfunction    HTN: controlled. Avoid decongestants     Plan:  Continue Eliquis 5 mg twice a day for stroke prophylaxis  14d day Zio  Follow-up 6 weeks     History of Present Illness/Subjective    Car Torres is a 64 year old male with past medical history significant for paroxysmal AF, SND, CAD status post PCI 3/8/2024, HTN, TIA, dyslipidemia, CKD, obesity, seen today for EP follow-up     One week after LAD PCI 3/8/2024 he developed acute lightheadedness and palpitations, eventually associated with hypotension. This prompted ED evaluation where he was documented in newly diagnosed atrial fibrillation with rapid ventricular response 3/17/2024, treated with IV and oral metoprolol and converting to sinus bradycardia.  He developed recurrent symptoms and failed ED DCCV 3/21/2024, was admitted and briefly treated with dofetilide but discontinued due to sinus bradycardia.  He underwent PVI 5/30/2024.    His recovery from ablation has been generally unremarkable without recurrent AF. His allergies worsened recently so he was frequently using Afrin but with intermittent lightheadedness and dizziness, improved when he switched to Flonase. After breaking his foot he gained 80  pounds and is actively trying to lose weight.  He used PRN metoprolol once prior to ablation.     His wife reports his stamina continues to be poor over the past several months, needing to rest frequently when completing yard work. He has no palpitations, dyspnea on exertion or at rest, orthopnea, pedal edema, presyncope or syncope.     Data Review     Arrhythmia hx:   Sx: Palpitations, lightheadedness  Sx onset:   Dx/date: Paroxysmal AF diagnosed 3/17/2024  [id number], OAC: 3-age 65-74, CAD, HTN; apixaban  Rate control: None (SND)  AAD: None  DCCV: ED 3/22/2024  Ablation: PVI 2024 (Dr. Sheldon)    EK2024: SR 60 bpm, incomplete RBBB  ms, QT/QTc 462 ms  Personally reviewed.     TTE 3/18/2024  Technically difficult study.Extremely difficult acoustic windows despite the  use of contrast for endcardial border definition.  Global and regional left ventricular function is normal with an EF of 60-65%.  Right ventricular function, chamber size, wall motion, and thickness are  normal.  The inferior vena cava is normal.  No pericardial effusion is present.      I have reviewed and updated the patient's past medical history, allergy list and medication list.                Physical Examination Review of Systems   BMI= Body mass index is 47.78 kg/m .    Wt Readings from Last 3 Encounters:   07/15/24 (!) 151 kg (333 lb)   24 (!) 154.3 kg (340 lb 1.6 oz)   24 (!) 155.8 kg (343 lb 6.4 oz)       Vitals: /62 (BP Location: Right arm, Patient Position: Sitting, Cuff Size: Adult Large)   Pulse 57   Resp 16   Wt (!) 151 kg (333 lb)   BMI 47.78 kg/m    General   Appearance:   Alert and oriented, in no acute distress.    HEENT:  Normocephalic and atraumatic. Conjunctiva and sclera are clear. Moist oral mucosa.    Neck: No JVP, carotid bruit or obvious thyromegaly.   Lungs:   Respirations unlabored. Clear bilaterally with no rales, rhonchi, or wheezes.     Cardiovascular:   Rhythm is  regular. S1 and S2 are normal. No significant murmur is present. Lower extremities demonstrate no significant edema. Posterior tibial pulses are intact bilaterally.   Extremities: No cyanosis or clubbing   Skin: Skin is warm, dry, and otherwise intact.   Neurologic: Gait not assessed. Mood and affect appropriate.    A 12 point comprehensive review of systems was  negative except as noted.      Medical History  Surgical History Family History Social History   Past Medical History:   Diagnosis Date    CKD (chronic kidney disease) stage 2, GFR 60-89 ml/min 10/21/2010    60 to 80 - have discussed Lisinopril - will consider       Coronary artery disease 01/26/2024    Dementia (H)     Depressive disorder     Diverticulosis of large intestine without hemorrhage 11/18/2015    Incidental finding on CT scan       Heart disease 05/08/2018    Hypercholesterolemia     Hypertension goal BP (blood pressure) < 140/90     Nonspecific abnormal electrocardiogram (ECG) (EKG) 08/23/2007    Stress testing normal.     Paroxysmal atrial fibrillation (H) 03/17/2024    Sinus node dysfunction (H) 05/23/2024    Sleep apnea     uses a c-pap, severe    Status post coronary angiogram 01/26/2024    Past Surgical History:   Procedure Laterality Date    COLONOSCOPY N/A 12/22/2020    Procedure: COLONOSCOPY, WITH POLYPECTOMY, CLIP;  Surgeon: Mihir Lang MD;  Location: [facility] OR    COLONOSCOPY N/A 1/10/2022    Procedure: COLONOSCOPY, WITH POLYPECTOMY AND BIOPSY;  Surgeon: Mihir Lang MD;  Location:  GI    CV CORONARY ANGIOGRAM N/A 1/26/2024    Procedure: Coronary Angiogram;  Surgeon: Elton Wright MD;  Location:  HEART CARDIAC CATH LAB    CV CORONARY LITHOTRIPSY PCI N/A 3/8/2024    Procedure: Percutaneous Coronary Intervention - Lithotripsy;  Surgeon: Elton Wright MD;  Location: [facility] CARDIAC CATH LAB    CV INSTANTANEOUS WAVE-FREE RATIO N/A 1/26/2024    Procedure: Instantaneous Wave-Free Ratio;  Surgeon: Elton Wright MD;   Location:  HEART CARDIAC CATH LAB    CV INTRAVASULAR ULTRASOUND N/A 3/8/2024    Procedure: Intravascular Ultrasound;  Surgeon: lEton Wright MD;  Location: [facility] CARDIAC CATH LAB    CV PCI N/A 3/8/2024    Procedure: Percutaneous Coronary Intervention;  Surgeon: Elton Wright MD;  Location: [facility] CARDIAC CATH LAB    CV RIGHT HEART CATH MEASUREMENTS RECORDED N/A 1/26/2024    Procedure: Right Heart Catheterization;  Surgeon: Elton Wright MD;  Location: [facility] CARDIAC CATH LAB    EP ABLATION PULMONARY VEIN ISOLATION N/A 5/30/2024    Procedure: Ablation Atrial Fibrillation;  Surgeon: Sandee Sheldon MD;  Location: [facility] CV    ESOPHAGOSCOPY, GASTROSCOPY, DUODENOSCOPY (EGD), COMBINED N/A 1/28/2021    Procedure: ESOPHAGOGASTRODUODENOSCOPY, WITH BIOPSY;  Surgeon: Mihir Lang MD;  Location: [facility] OR    OPEN REDUCTION INTERNAL FIXATION FOOT Left 1/5/2023    Procedure: LISFRANC OPEN REDUCTION INTERNAL FIXATION, LEFT FOOT;  Surgeon: Bailey Ruiz DPM;  Location: [facility] OR    SURGICAL HISTORY OF -       surgery on left index finger    Family History   Problem Relation Age of Onset    Cancer Mother         uterine    Other Cancer Mother         endometrial    Cerebrovascular Disease Mother     Substance Abuse Mother         Alcohol    C.A.D. Father     Hypertension Father     Hyperlipidemia Father     Breast Cancer Maternal Grandmother     Other Cancer Maternal Grandmother         Lung/brain    Substance Abuse Paternal Grandfather     Hypertension Brother     Substance Abuse Brother         Alcohol    Hyperlipidemia Brother     Breast Cancer Other     Breast Cancer Cousin     Other Cancer Other     Cerebrovascular Disease Other         Maternal Aunt    Glaucoma No family hx of     Macular Degeneration No family hx of     Social History     Socioeconomic History    Marital status:      Spouse name: Luzma    Number of children: 3    Years of education: 14    Highest  education level: Not on file   Occupational History    Occupation: [employer]     Employer: OTHER     Comment:    Tobacco Use    Smoking status: Former     Current packs/day: 0.00     Average packs/day: 1 pack/day for 25.0 years (25.0 ttl pk-yrs)     Types: Cigarettes, Cigars     Start date: 1973     Quit date: 1998     Years since quittin.4     Passive exposure: Past    Smokeless tobacco: Never    Tobacco comments:     N/A not a smoker   Vaping Use    Vaping status: Never Used   Substance and Sexual Activity    Alcohol use: Yes     Alcohol/week: 10.0 standard drinks of alcohol     Comment: less than 6 per week    Drug use: Not Currently     Types: Amphetamines     Comment: Kratum    Sexual activity: Not Currently     Partners: Female     Birth control/protection: Female Surgical, None     Comment:    Other Topics Concern    Parent/sibling w/ CABG, MI or angioplasty before 65F 55M? Yes     Comment: Father age 50 bypass. Grandfather death in age 40 s heart   Social History Narrative    Not on file     Social Determinants of Health     Financial Resource Strain: Low Risk  (3/19/2024)    Financial Resource Strain     Within the past 12 months, have you or your family members you live with been unable to get utilities (heat, electricity) when it was really needed?: No   Food Insecurity: High Risk (3/19/2024)    Food Insecurity     Within the past 12 months, did you worry that your food would run out before you got money to buy more?: No     Within the past 12 months, did the food you bought just not last and you didn t have money to get more?: Yes   Transportation Needs: Low Risk  (3/19/2024)    Transportation Needs     Within the past 12 months, has lack of transportation kept you from medical appointments, getting your medicines, non-medical meetings or appointments, work, or from getting things that you need?: No   Physical Activity: Inactive (3/19/2024)    Exercise Vital Sign     Days  of Exercise per Week: 0 days     Minutes of Exercise per Session: 0 min   Stress: No Stress Concern Present (3/19/2024)    [employer] - Occupational Stress Questionnaire     Feeling of Stress : Not at all   Social Connections: Moderately Isolated (3/19/2024)    Social Connection and Isolation Panel [NHANES]     Frequency of Communication with Friends and Family: Three times a week     Frequency of Social Gatherings with Friends and Family: Once a week     Attends Nondenominational Services: Never     Active Member of Clubs or Organizations: No     Attends Club or Organization Meetings: Never     Marital Status:    Interpersonal Safety: Low Risk  (12/6/2023)    Interpersonal Safety     Do you feel physically and emotionally safe where you currently live?: Yes     Within the past 12 months, have you been hit, slapped, kicked or otherwise physically hurt by someone?: No     Within the past 12 months, have you been humiliated or emotionally abused in other ways by your partner or ex-partner?: No   Housing Stability: Low Risk  (3/19/2024)    Housing Stability     Do you have housing? : Yes     Are you worried about losing your housing?: No          Medications  Allergies   Scheduled Meds:  Current Outpatient Medications   Medication Sig Dispense Refill    apixaban ANTICOAGULANT (ELIQUIS ANTICOAGULANT) 5 MG tablet Take 1 tablet (5 mg) by mouth 2 times daily 180 tablet 3    Apple Wyatt Vn-Grn Tea-Bit Or-Cr (APPLE CIDER VINEGAR PLUS) TABS Take 1 tablet by mouth daily With calcium and magnesium      atorvastatin (LIPITOR) 40 MG tablet TAKE 1 TABLET BY MOUTH EVERY DAY 90 tablet 2    busPIRone (BUSPAR) 10 MG tablet Take 2 tablets (20 mg) by mouth 2 times daily 360 tablet 1    Cholecalciferol (VITAMIN D PO) Take 5,000 Units by mouth daily One tablet twice daily      co-enzyme Q-10 100 MG CAPS capsule Take 100 mg by mouth daily      FLUoxetine (PROZAC) 40 MG capsule Take 1 capsule (40 mg) by mouth  daily 90 capsule 1    hydroCHLOROthiazide 12.5 MG tablet Take 1 tablet (12.5 mg) by mouth daily 90 tablet 3    hydrOXYzine HCl (ATARAX) 10 MG tablet Take 1 tablet (10 mg) by mouth 2 times daily as needed for anxiety 180 tablet 1    lisinopril (ZESTRIL) 20 MG tablet Take 1 tablet (20 mg) by mouth daily 90 tablet 3    metoprolol tartrate (LOPRESSOR) 25 MG tablet Take 1 tablet (25 mg) by mouth every 8 hours as needed (Atrial fibrillation with RVR, HR > 120) 30 tablet 1    MULTI VITAMIN MENS PO Take 1 tablet by mouth daily      pantoprazole (PROTONIX) 40 MG EC tablet Take 1 tablet (40 mg) by mouth daily Continue for 6 weeks after ablation 45 tablet 0    ticagrelor (BRILINTA) 90 MG tablet Take 1 tablet (90 mg) by mouth 2 times daily Start this evening. 180 tablet 3    vitamin B complex with vitamin C (STRESS TAB) tablet Take 1 tablet by mouth daily      No Known Allergies      Lab Results    Chemistry/lipid CBC Cardiac Enzymes/BNP/TSH/INR   Lab Results   Component Value Date    CHOL 145 03/18/2024    HDL 53 03/18/2024    TRIG 120 03/18/2024    BUN 26.3 (H) 05/23/2024     05/23/2024    CO2 26 05/23/2024    Lab Results   Component Value Date    WBC 6.2 05/23/2024    HGB 13.8 05/23/2024    HCT 41.3 05/23/2024    MCV 90 05/23/2024     05/23/2024    @RESUFAST(BMP,CBC,BNP,TSH,  INR)@      39 minutes spent reviewing prior records (including documentation, laboratory studies, cardiac testing/imaging), history and physical exam, planning, and subsequent documentation.     The longitudinal plan of care for the diagnosis(es)/condition(s) as documented were addressed during this visit. Due to the added complexity in care, I will continue to support Jerry in the subsequent management and with ongoing continuity of care.     This note has been dictated using voice recognition software. Any grammatical, typographical, or context distortions are unintentional and inherent to the software.    Kristine Gamez, CNP  Clinical  Cardiac Electrophysiology  [facility]  Clinic and [phone]  Fax: [phone]  Electrophysiology Nurses: [phone]

## 2024-07-15 NOTE — LETTER
7/15/2024    Mihir Perez MD  [address] [place] [address]    RE: Car Torres       Dear Colleague,     I had the [facility] of seeing Car [facility] the [facility].       [facility]  Cardiac Electrophysiology  [address]  [place] [address]   Office: [phone]  Fax: [phone]     HEART CARE ELECTROPHYSIOLOGY FOLLOW UP    Primary Care: Mihir Perez MD    Assessment/Recommendations     Paroxysmal atrial fibrillation/stage 3D: Symptomatic with palpitations.  Status post PVI 5/30/2024.  Unremarkable recovery without symptomatology or documentation of recurrent arrhythmia.  He is off AV michael blocking agents due to sinus node dysfunction.  His activity tolerance remains poor    [id number] score of 3-age 65-74, CAD, HTN; HAS BLED 2 for age, indication for concurrent antiplatelet therapy. No bleeding complications     TIA: consistent use of CPAP    Sinus node dysfunction    HTN: controlled. Avoid decongestants     Plan:  Continue Eliquis 5 mg twice a day for stroke prophylaxis  14d day Zio  Follow-up 6 weeks     History of Present Illness/Subjective    Car Torres is a 64 year old male with past medical history significant for paroxysmal AF, SND, CAD status post PCI 3/8/2024, HTN, TIA, dyslipidemia, CKD, obesity, seen today for EP follow-up     One week after LAD PCI 3/8/2024 he developed acute lightheadedness and palpitations, eventually associated with hypotension. This prompted ED evaluation where he was documented in newly diagnosed atrial fibrillation with rapid ventricular response 3/17/2024, treated with IV and oral metoprolol and converting to sinus bradycardia.  He developed recurrent symptoms and failed ED DCCV 3/21/2024, was admitted and briefly treated with dofetilide but discontinued due to sinus bradycardia.  He underwent PVI 5/30/2024.    His recovery from ablation has been generally unremarkable without  recurrent AF. His allergies worsened recently so he was frequently using Afrin but with intermittent lightheadedness and dizziness, improved when he switched to Flonase. After breaking his foot he gained 80 pounds and is actively trying to lose weight.  He used PRN metoprolol once prior to ablation.     His wife reports his stamina continues to be poor over the past several months, needing to rest frequently when completing yard work. He has no palpitations, dyspnea on exertion or at rest, orthopnea, pedal edema, presyncope or syncope.     Data Review     Arrhythmia hx:   Sx: Palpitations, lightheadedness  Sx onset:   Dx/date: Paroxysmal AF diagnosed 3/17/2024  [id number], OAC: 3-age 65-74, CAD, HTN; apixaban  Rate control: None (SND)  AAD: None  DCCV: ED 3/22/2024  Ablation: PVI 2024 (Dr. Sheldon)    EK2024: SR 60 bpm, incomplete RBBB  ms, QT/QTc 462 ms  Personally reviewed.     TTE 3/18/2024  Technically difficult study.Extremely difficult acoustic windows despite the  use of contrast for endcardial border definition.  Global and regional left ventricular function is normal with an EF of 60-65%.  Right ventricular function, chamber size, wall motion, and thickness are  normal.  The inferior vena cava is normal.  No pericardial effusion is present.      I have reviewed and updated the patient's past medical history, allergy list and medication list.                Physical Examination Review of Systems   BMI= Body mass index is 47.78 kg/m .    Wt Readings from Last 3 Encounters:   07/15/24 (!) 151 kg (333 lb)   24 (!) 154.3 kg (340 lb 1.6 oz)   24 (!) 155.8 kg (343 lb 6.4 oz)       Vitals: /62 (BP Location: Right arm, Patient Position: Sitting, Cuff Size: Adult Large)   Pulse 57   Resp 16   Wt (!) 151 kg (333 lb)   BMI 47.78 kg/m    General   Appearance:   Alert and oriented, in no acute distress.    HEENT:  Normocephalic and atraumatic. Conjunctiva and sclera are  clear. Moist oral mucosa.    Neck: No JVP, carotid bruit or obvious thyromegaly.   Lungs:   Respirations unlabored. Clear bilaterally with no rales, rhonchi, or wheezes.     Cardiovascular:   Rhythm is regular. S1 and S2 are normal. No significant murmur is present. Lower extremities demonstrate no significant edema. Posterior tibial pulses are intact bilaterally.   Extremities: No cyanosis or clubbing   Skin: Skin is warm, dry, and otherwise intact.   Neurologic: Gait not assessed. Mood and affect appropriate.    A 12 point comprehensive review of systems was  negative except as noted.      Medical History  Surgical History Family History Social History   Past Medical History:   Diagnosis Date    CKD (chronic kidney disease) stage 2, GFR 60-89 ml/min 10/21/2010    60 to 80 - have discussed Lisinopril - will consider       Coronary artery disease 01/26/2024    Dementia (H)     Depressive disorder     Diverticulosis of large intestine without hemorrhage 11/18/2015    Incidental finding on CT scan       Heart disease 05/08/2018    Hypercholesterolemia     Hypertension goal BP (blood pressure) < 140/90     Nonspecific abnormal electrocardiogram (ECG) (EKG) 08/23/2007    Stress testing normal.     Paroxysmal atrial fibrillation (H) 03/17/2024    Sinus node dysfunction (H) 05/23/2024    Sleep apnea     uses a c-pap, severe    Status post coronary angiogram 01/26/2024    Past Surgical History:   Procedure Laterality Date    COLONOSCOPY N/A 12/22/2020    Procedure: COLONOSCOPY, WITH POLYPECTOMY, CLIP;  Surgeon: Mihir Lang MD;  Location: [facility] OR    COLONOSCOPY N/A 1/10/2022    Procedure: COLONOSCOPY, WITH POLYPECTOMY AND BIOPSY;  Surgeon: Mihir Lang MD;  Location:  GI    CV CORONARY ANGIOGRAM N/A 1/26/2024    Procedure: Coronary Angiogram;  Surgeon: Elton Wright MD;  Location:  HEART CARDIAC CATH LAB    CV CORONARY LITHOTRIPSY PCI N/A 3/8/2024    Procedure: Percutaneous Coronary Intervention -  Lithotripsy;  Surgeon: Elton Wright MD;  Location: [facility] CARDIAC CATH LAB    CV INSTANTANEOUS WAVE-FREE RATIO N/A 1/26/2024    Procedure: Instantaneous Wave-Free Ratio;  Surgeon: Elton Wright MD;  Location: [facility] CARDIAC CATH LAB    CV INTRAVASULAR ULTRASOUND N/A 3/8/2024    Procedure: Intravascular Ultrasound;  Surgeon: Elton Wright MD;  Location: [facility] CARDIAC CATH LAB    CV PCI N/A 3/8/2024    Procedure: Percutaneous Coronary Intervention;  Surgeon: Elton Wright MD;  Location: [facility] CARDIAC CATH LAB    CV RIGHT HEART CATH MEASUREMENTS RECORDED N/A 1/26/2024    Procedure: Right Heart Catheterization;  Surgeon: Elton Wright MD;  Location: [facility] CARDIAC CATH LAB    EP ABLATION PULMONARY VEIN ISOLATION N/A 5/30/2024    Procedure: Ablation Atrial Fibrillation;  Surgeon: Sandee Sheldon MD;  Location: [facility] LAB CV    ESOPHAGOSCOPY, GASTROSCOPY, DUODENOSCOPY (EGD), COMBINED N/A 1/28/2021    Procedure: ESOPHAGOGASTRODUODENOSCOPY, WITH BIOPSY;  Surgeon: Mihir Lang MD;  Location: [facility] OR    OPEN REDUCTION INTERNAL FIXATION FOOT Left 1/5/2023    Procedure: LISFRANC OPEN REDUCTION INTERNAL FIXATION, LEFT FOOT;  Surgeon: Bailey Ruiz DPM;  Location: [facility] OR    SURGICAL HISTORY OF -       surgery on left index finger    Family History   Problem Relation Age of Onset    Cancer Mother         uterine    Other Cancer Mother         endometrial    Cerebrovascular Disease Mother     Substance Abuse Mother         Alcohol    C.A.D. Father     Hypertension Father     Hyperlipidemia Father     Breast Cancer Maternal Grandmother     Other Cancer Maternal Grandmother         Lung/brain    Substance Abuse Paternal Grandfather     Hypertension Brother     Substance Abuse Brother         Alcohol    Hyperlipidemia Brother     Breast Cancer Other     Breast Cancer Cousin     Other Cancer Other     Cerebrovascular Disease Other         Maternal Aunt    Glaucoma No  family hx of     Macular Degeneration No family hx of     Social History     Socioeconomic History    Marital status:      Spouse name: Luzma    Number of children: 3    Years of education: 14    Highest education level: Not on file   Occupational History    Occupation: [employer]     Employer: OTHER     Comment:    Tobacco Use    Smoking status: Former     Current packs/day: 0.00     Average packs/day: 1 pack/day for 25.0 years (25.0 ttl pk-yrs)     Types: Cigarettes, Cigars     Start date: 1973     Quit date: 1998     Years since quittin.4     Passive exposure: Past    Smokeless tobacco: Never    Tobacco comments:     N/A not a smoker   Vaping Use    Vaping status: Never Used   Substance and Sexual Activity    Alcohol use: Yes     Alcohol/week: 10.0 standard drinks of alcohol     Comment: less than 6 per week    Drug use: Not Currently     Types: Amphetamines     Comment: Kratum    Sexual activity: Not Currently     Partners: Female     Birth control/protection: Female Surgical, None     Comment:    Other Topics Concern    Parent/sibling w/ CABG, MI or angioplasty before 65F 55M? Yes     Comment: Father age 50 bypass. Grandfather death in age 40 s heart   Social History Narrative    Not on file     Social Determinants of Health     Financial Resource Strain: Low Risk  (3/19/2024)    Financial Resource Strain     Within the past 12 months, have you or your family members you live with been unable to get utilities (heat, electricity) when it was really needed?: No   Food Insecurity: High Risk (3/19/2024)    Food Insecurity     Within the past 12 months, did you worry that your food would run out before you got money to buy more?: No     Within the past 12 months, did the food you bought just not last and you didn t have money to get more?: Yes   Transportation Needs: Low Risk  (3/19/2024)    Transportation Needs     Within the past 12 months, has lack of transportation kept  you from medical appointments, getting your medicines, non-medical meetings or appointments, work, or from getting things that you need?: No   Physical Activity: Inactive (3/19/2024)    Exercise Vital Sign     Days of Exercise per Week: 0 days     Minutes of Exercise per Session: 0 min   Stress: No Stress Concern Present (3/19/2024)    [employer] [place] [employer] - Occupational Stress Questionnaire     Feeling of Stress : Not at all   Social Connections: Moderately Isolated (3/19/2024)    Social Connection and Isolation Panel [NHANES]     Frequency of Communication with Friends and Family: Three times a week     Frequency of Social Gatherings with Friends and Family: Once a week     Attends Orthodox Services: Never     Active Member of Clubs or Organizations: No     Attends Club or Organization Meetings: Never     Marital Status:    Interpersonal Safety: Low Risk  (12/6/2023)    Interpersonal Safety     Do you feel physically and emotionally safe where you currently live?: Yes     Within the past 12 months, have you been hit, slapped, kicked or otherwise physically hurt by someone?: No     Within the past 12 months, have you been humiliated or emotionally abused in other ways by your partner or ex-partner?: No   Housing Stability: Low Risk  (3/19/2024)    Housing Stability     Do you have housing? : Yes     Are you worried about losing your housing?: No          Medications  Allergies   Scheduled Meds:  Current Outpatient Medications   Medication Sig Dispense Refill    apixaban ANTICOAGULANT (ELIQUIS ANTICOAGULANT) 5 MG tablet Take 1 tablet (5 mg) by mouth 2 times daily 180 tablet 3    Apple Wyatt Vn-Grn Tea-Bit Or-Cr (APPLE CIDER VINEGAR PLUS) TABS Take 1 tablet by mouth daily With calcium and magnesium      atorvastatin (LIPITOR) 40 MG tablet TAKE 1 TABLET BY MOUTH EVERY DAY 90 tablet 2    busPIRone (BUSPAR) 10 MG tablet Take 2 tablets (20 mg) by mouth 2 times daily 360 tablet 1     Cholecalciferol (VITAMIN D PO) Take 5,000 Units by mouth daily One tablet twice daily      co-enzyme Q-10 100 MG CAPS capsule Take 100 mg by mouth daily      FLUoxetine (PROZAC) 40 MG capsule Take 1 capsule (40 mg) by mouth daily 90 capsule 1    hydroCHLOROthiazide 12.5 MG tablet Take 1 tablet (12.5 mg) by mouth daily 90 tablet 3    hydrOXYzine HCl (ATARAX) 10 MG tablet Take 1 tablet (10 mg) by mouth 2 times daily as needed for anxiety 180 tablet 1    lisinopril (ZESTRIL) 20 MG tablet Take 1 tablet (20 mg) by mouth daily 90 tablet 3    metoprolol tartrate (LOPRESSOR) 25 MG tablet Take 1 tablet (25 mg) by mouth every 8 hours as needed (Atrial fibrillation with RVR, HR > 120) 30 tablet 1    MULTI VITAMIN MENS PO Take 1 tablet by mouth daily      pantoprazole (PROTONIX) 40 MG EC tablet Take 1 tablet (40 mg) by mouth daily Continue for 6 weeks after ablation 45 tablet 0    ticagrelor (BRILINTA) 90 MG tablet Take 1 tablet (90 mg) by mouth 2 times daily Start this evening. 180 tablet 3    vitamin B complex with vitamin C (STRESS TAB) tablet Take 1 tablet by mouth daily      No Known Allergies      Lab Results    Chemistry/lipid CBC Cardiac Enzymes/BNP/TSH/INR   Lab Results   Component Value Date    CHOL 145 03/18/2024    HDL 53 03/18/2024    TRIG 120 03/18/2024    BUN 26.3 (H) 05/23/2024     05/23/2024    CO2 26 05/23/2024    Lab Results   Component Value Date    WBC 6.2 05/23/2024    HGB 13.8 05/23/2024    HCT 41.3 05/23/2024    MCV 90 05/23/2024     05/23/2024    @RESUFAST(BMP,CBC,BNP,TSH,  INR)@      39 minutes spent reviewing prior records (including documentation, laboratory studies, cardiac testing/imaging), history and physical exam, planning, and subsequent documentation.     The longitudinal plan of care for the diagnosis(es)/condition(s) as documented were addressed during this visit. Due to the added complexity in care, I will continue to support Jerry in the subsequent management and with ongoing  continuity of care.     This note has been dictated using voice recognition software. Any grammatical, typographical, or context distortions are unintentional and inherent to the software.    Kristine Gamez CNP  Clinical Cardiac Electrophysiology  [facility]  Clinic and [phone]  Fax: [phone]  Electrophysiology Nurses: [phone]        Thank you for allowing me to participate in the care of your patient.      Sincerely,     KLAUS FIELD CNP     [facility]  cc:   Sandee Sheldon MD  [address]  [place] [address]

## 2024-08-05 ENCOUNTER — MYC MEDICAL ADVICE (OUTPATIENT)
Dept: FAMILY MEDICINE | Facility: CLINIC | Age: 64
End: 2024-08-05
Payer: MEDICARE

## 2024-08-05 DIAGNOSIS — F33.41 MAJOR DEPRESSIVE DISORDER, RECURRENT EPISODE, IN PARTIAL REMISSION (H): ICD-10-CM

## 2024-08-05 DIAGNOSIS — F41.1 GAD (GENERALIZED ANXIETY DISORDER): ICD-10-CM

## 2024-08-07 ENCOUNTER — TELEPHONE (OUTPATIENT)
Dept: NEUROLOGY | Facility: CLINIC | Age: 64
End: 2024-08-07
Payer: MEDICARE

## 2024-08-07 NOTE — TELEPHONE ENCOUNTER
Left Voicemail (1st Attempt) and Sent Mychart (1st Attempt) for the patient to call back and schedule the following:    Appointment type: Return Neuro  Provider: Dr. Varela  Return date: next available  Specialty phone number: [phone]  Additional appointment(s) needed:   Additonal Notes:     Attempted to reschedule the appointment with Dr. Varela on 4/2/2025, provider attending/precepting.    Also sent a Explain My Surgery message and appointment reschedule letter.    Jazmin COOK/Kaylee Procedure    [facility]   Neurology, NeuroSurgery, NeuroPsychology, Pain Management and Cardiology Specialties  Medical/Surgical Adult Specialties

## 2024-08-08 RX ORDER — HYDROXYZINE HYDROCHLORIDE 10 MG/1
10 TABLET, FILM COATED ORAL 2 TIMES DAILY PRN
Qty: 180 TABLET | Refills: 5 | Status: SHIPPED | OUTPATIENT
Start: 2024-08-08

## 2024-08-08 RX ORDER — FLUOXETINE 40 MG/1
40 CAPSULE ORAL DAILY
Qty: 90 CAPSULE | Refills: 4 | Status: SHIPPED | OUTPATIENT
Start: 2024-08-08

## 2024-08-08 RX ORDER — BUSPIRONE HYDROCHLORIDE 10 MG/1
20 TABLET ORAL 2 TIMES DAILY
Qty: 360 TABLET | Refills: 4 | Status: SHIPPED | OUTPATIENT
Start: 2024-08-08

## 2024-08-09 PROCEDURE — 93248 EXT ECG>7D<15D REV&INTERPJ: CPT | Performed by: INTERNAL MEDICINE

## 2024-09-10 ENCOUNTER — VIRTUAL VISIT (OUTPATIENT)
Dept: FAMILY MEDICINE | Facility: CLINIC | Age: 64
End: 2024-09-10
Payer: MEDICARE

## 2024-09-10 DIAGNOSIS — R79.9 ABNORMAL FINDING OF BLOOD CHEMISTRY, UNSPECIFIED: ICD-10-CM

## 2024-09-10 DIAGNOSIS — E66.01 MORBID OBESITY (H): ICD-10-CM

## 2024-09-10 DIAGNOSIS — E55.9 VITAMIN D DEFICIENCY: ICD-10-CM

## 2024-09-10 DIAGNOSIS — E78.5 HYPERLIPIDEMIA LDL GOAL <100: ICD-10-CM

## 2024-09-10 DIAGNOSIS — N52.9 IMPOTENCE OF ORGANIC ORIGIN: ICD-10-CM

## 2024-09-10 DIAGNOSIS — N52.01 ERECTILE DYSFUNCTION DUE TO ARTERIAL INSUFFICIENCY: Primary | ICD-10-CM

## 2024-09-10 DIAGNOSIS — I10 HYPERTENSION GOAL BP (BLOOD PRESSURE) < 140/90: ICD-10-CM

## 2024-09-10 DIAGNOSIS — G60.9 HEREDITARY AND IDIOPATHIC NEUROPATHY, UNSPECIFIED: ICD-10-CM

## 2024-09-10 DIAGNOSIS — I25.10 ASCVD (ARTERIOSCLEROTIC CARDIOVASCULAR DISEASE): ICD-10-CM

## 2024-09-10 DIAGNOSIS — N18.2 CKD (CHRONIC KIDNEY DISEASE) STAGE 2, GFR 60-89 ML/MIN: ICD-10-CM

## 2024-09-10 PROCEDURE — 99214 OFFICE O/P EST MOD 30 MIN: CPT | Mod: 95 | Performed by: INTERNAL MEDICINE

## 2024-09-10 NOTE — PROGRESS NOTES
Jerry is a 64 year old who is being evaluated via a billable video visit.    How would you like to obtain your AVS? MyChart  If the video visit is dropped, the invitation should be resent by: Text to cell phone: [phone]  Will anyone else be joining your video visit? Yes: MORENITA. How would they like to receive their invitation? WILL BE ON THE SAME PHONE/ VIDEO       Assessment & Plan   Problem List Items Addressed This Visit       Hypertension goal BP (blood pressure) < 140/90 (Chronic)    Relevant Orders    Comprehensive metabolic panel (BMP + Alb, Alk Phos, ALT, AST, Total. Bili, TP) (Completed)    Hemoglobin A1c (Completed)    CKD (chronic kidney disease) stage 2, GFR 60-89 ml/min (Chronic)    Relevant Orders    Comprehensive metabolic panel (BMP + Alb, Alk Phos, ALT, AST, Total. Bili, TP) (Completed)    TSH with free T4 reflex (Completed)    ED (erectile dysfunction) - Primary (Chronic)    Relevant Orders    Testosterone Free and Total    Prolactin (Completed)    Morbid obesity (H) (Chronic)    Relevant Medications    semaglutide (OZEMPIC) 2 MG/3ML pen    insulin pen needle (32G X 6 MM) 32G X 6 MM miscellaneous    Other Relevant Orders    Hemoglobin A1c (Completed)     Other Visit Diagnoses       Hyperlipidemia LDL goal <100        Relevant Orders    Comprehensive metabolic panel (BMP + Alb, Alk Phos, ALT, AST, Total. Bili, TP) (Completed)    Hemoglobin A1c (Completed)    Lipid panel reflex to direct LDL Fasting (Completed)    ASCVD (arteriosclerotic cardiovascular disease)        Relevant Orders    CBC with platelets and differential (Completed)    TSH with free T4 reflex (Completed)    Impotence of organic origin        Vitamin D deficiency        Hereditary and idiopathic neuropathy, unspecified        Relevant Orders    TSH with free T4 reflex (Completed)    Abnormal finding of blood chemistry, unspecified        Relevant Orders    Hemoglobin A1c (Completed)                  BMI  Estimated body mass index  "is 47.78 kg/m  as calculated from the following:    Height as of 6/20/24: 1.778 m (5' 10\").    Weight as of 7/15/24: 151 kg (333 lb).   Weight management plan: Discussed healthy diet and exercise guidelines    Work on weight loss  Regular exercise      Amber Edwards is a 64 year old, presenting for the following health issues:  Weight Loss (DISCUSS MEDICATION )        9/10/2024    10:33 AM   Additional Questions   Roomed by DADA     Having stamina and put on weight after   327 - has lost weight   And ED issues  Alexandra spark - not taking insurance.   Metoprolol     Sleeping and apnea - uses CPAP.  Sleep good with CPAP   8-10 hours .  Protein bar smothie     Lunch cheese and meat to snack   Smaller portion of the meals   Beverages  zero sugar      Video Start Time:  10:55 AM    History of Present Illness       Reason for visit:  Weight loss and ED   He is taking medications regularly.               Review of Systems  Constitutional, HEENT, cardiovascular, pulmonary, gi and gu systems are negative, except as otherwise noted.      Objective           Vitals:  No vitals were obtained today due to virtual visit.    Physical Exam   GENERAL: alert and no distress  EYES: Eyes grossly normal to inspection.  No discharge or erythema, or obvious scleral/conjunctival abnormalities.  HENT: Normal cephalic/atraumatic.  External ears, nose and mouth without ulcers or lesions.  No nasal drainage visible.  NECK: No asymmetry, visible masses or scars  RESP: No audible wheeze, cough, or visible cyanosis.    SKIN: Visible skin clear. No significant rash, abnormal pigmentation or lesions.  NEURO: Cranial nerves grossly intact.  Mentation and speech appropriate for age.  PSYCH: Appropriate affect, tone, and pace of words    No results found for any visits on 09/10/24.      Video-Visit Details    Type of service:  Video Visit   Video End Time: 11:25 AM  Originating Location (pt. Location): Home    Distant Location (provider location):  " On-site  Platform used for Video Visit: Vilma  Signed Electronically by: Mihir Perez MD

## 2024-09-10 NOTE — PATIENT INSTRUCTIONS
Come in for labs  Take fluoxetine every other day for 1 week, then every 3rd day for one week, then stop  After 3 weeks, let me know if you want to start the wellbutyrin

## 2024-09-11 ENCOUNTER — LAB (OUTPATIENT)
Dept: LAB | Facility: CLINIC | Age: 64
End: 2024-09-11
Payer: MEDICARE

## 2024-09-11 DIAGNOSIS — E78.5 HYPERLIPIDEMIA LDL GOAL <100: ICD-10-CM

## 2024-09-11 DIAGNOSIS — R79.9 ABNORMAL FINDING OF BLOOD CHEMISTRY, UNSPECIFIED: ICD-10-CM

## 2024-09-11 DIAGNOSIS — N52.01 ERECTILE DYSFUNCTION DUE TO ARTERIAL INSUFFICIENCY: ICD-10-CM

## 2024-09-11 DIAGNOSIS — N18.2 CKD (CHRONIC KIDNEY DISEASE) STAGE 2, GFR 60-89 ML/MIN: ICD-10-CM

## 2024-09-11 DIAGNOSIS — E66.01 MORBID OBESITY (H): ICD-10-CM

## 2024-09-11 DIAGNOSIS — G60.9 HEREDITARY AND IDIOPATHIC NEUROPATHY, UNSPECIFIED: ICD-10-CM

## 2024-09-11 DIAGNOSIS — I10 HYPERTENSION GOAL BP (BLOOD PRESSURE) < 140/90: ICD-10-CM

## 2024-09-11 DIAGNOSIS — I25.10 ASCVD (ARTERIOSCLEROTIC CARDIOVASCULAR DISEASE): ICD-10-CM

## 2024-09-11 LAB
ALBUMIN SERPL BCG-MCNC: 4.4 G/DL (ref 3.5–5.2)
ALP SERPL-CCNC: 59 U/L (ref 40–150)
ALT SERPL W P-5'-P-CCNC: 18 U/L (ref 0–70)
ANION GAP SERPL CALCULATED.3IONS-SCNC: 10 MMOL/L (ref 7–15)
AST SERPL W P-5'-P-CCNC: 22 U/L (ref 0–45)
BASOPHILS # BLD AUTO: 0.1 10E3/UL (ref 0–0.2)
BASOPHILS NFR BLD AUTO: 1 %
BILIRUB SERPL-MCNC: 0.5 MG/DL
BUN SERPL-MCNC: 27.3 MG/DL (ref 8–23)
CALCIUM SERPL-MCNC: 9.1 MG/DL (ref 8.8–10.4)
CHLORIDE SERPL-SCNC: 104 MMOL/L (ref 98–107)
CHOLEST SERPL-MCNC: 163 MG/DL
CREAT SERPL-MCNC: 1.23 MG/DL (ref 0.67–1.17)
EGFRCR SERPLBLD CKD-EPI 2021: 65 ML/MIN/1.73M2
EOSINOPHIL # BLD AUTO: 0.5 10E3/UL (ref 0–0.7)
EOSINOPHIL NFR BLD AUTO: 7 %
ERYTHROCYTE [DISTWIDTH] IN BLOOD BY AUTOMATED COUNT: 13.1 % (ref 10–15)
FASTING STATUS PATIENT QL REPORTED: YES
FASTING STATUS PATIENT QL REPORTED: YES
GLUCOSE SERPL-MCNC: 100 MG/DL (ref 70–99)
HBA1C MFR BLD: 5.4 % (ref 0–5.6)
HCO3 SERPL-SCNC: 24 MMOL/L (ref 22–29)
HCT VFR BLD AUTO: 39.3 % (ref 40–53)
HDLC SERPL-MCNC: 48 MG/DL
HGB BLD-MCNC: 12.9 G/DL (ref 13.3–17.7)
IMM GRANULOCYTES # BLD: 0 10E3/UL
IMM GRANULOCYTES NFR BLD: 0 %
LDLC SERPL CALC-MCNC: 87 MG/DL
LYMPHOCYTES # BLD AUTO: 1.8 10E3/UL (ref 0.8–5.3)
LYMPHOCYTES NFR BLD AUTO: 27 %
MCH RBC QN AUTO: 29.6 PG (ref 26.5–33)
MCHC RBC AUTO-ENTMCNC: 32.8 G/DL (ref 31.5–36.5)
MCV RBC AUTO: 90 FL (ref 78–100)
MONOCYTES # BLD AUTO: 0.6 10E3/UL (ref 0–1.3)
MONOCYTES NFR BLD AUTO: 9 %
NEUTROPHILS # BLD AUTO: 3.8 10E3/UL (ref 1.6–8.3)
NEUTROPHILS NFR BLD AUTO: 56 %
NONHDLC SERPL-MCNC: 115 MG/DL
PLATELET # BLD AUTO: 202 10E3/UL (ref 150–450)
POTASSIUM SERPL-SCNC: 4.9 MMOL/L (ref 3.4–5.3)
PROLACTIN SERPL 3RD IS-MCNC: 15 NG/ML (ref 4–15)
PROT SERPL-MCNC: 6.9 G/DL (ref 6.4–8.3)
RBC # BLD AUTO: 4.36 10E6/UL (ref 4.4–5.9)
SHBG SERPL-SCNC: 40 NMOL/L (ref 11–80)
SODIUM SERPL-SCNC: 138 MMOL/L (ref 135–145)
TRIGL SERPL-MCNC: 140 MG/DL
TSH SERPL DL<=0.005 MIU/L-ACNC: 2.29 UIU/ML (ref 0.3–4.2)
WBC # BLD AUTO: 6.8 10E3/UL (ref 4–11)

## 2024-09-11 PROCEDURE — 84270 ASSAY OF SEX HORMONE GLOBUL: CPT

## 2024-09-11 PROCEDURE — 80061 LIPID PANEL: CPT

## 2024-09-11 PROCEDURE — 83036 HEMOGLOBIN GLYCOSYLATED A1C: CPT

## 2024-09-11 PROCEDURE — 84146 ASSAY OF PROLACTIN: CPT

## 2024-09-11 PROCEDURE — 84403 ASSAY OF TOTAL TESTOSTERONE: CPT

## 2024-09-11 PROCEDURE — 80053 COMPREHEN METABOLIC PANEL: CPT

## 2024-09-11 PROCEDURE — 85025 COMPLETE CBC W/AUTO DIFF WBC: CPT

## 2024-09-11 PROCEDURE — 36415 COLL VENOUS BLD VENIPUNCTURE: CPT

## 2024-09-11 PROCEDURE — 84443 ASSAY THYROID STIM HORMONE: CPT

## 2024-09-15 ENCOUNTER — MYC MEDICAL ADVICE (OUTPATIENT)
Dept: FAMILY MEDICINE | Facility: CLINIC | Age: 64
End: 2024-09-15
Payer: MEDICARE

## 2024-09-15 DIAGNOSIS — N52.01 ERECTILE DYSFUNCTION DUE TO ARTERIAL INSUFFICIENCY: Primary | ICD-10-CM

## 2024-09-15 LAB
TESTOST FREE SERPL-MCNC: 1.56 NG/DL
TESTOST SERPL-MCNC: 96 NG/DL (ref 240–950)

## 2024-09-16 NOTE — TELEPHONE ENCOUNTER
Forwarding to PCP to review/advise regarding testosterone results.     MA- please add covid 19 vaccine info. Thanks!    Huma Harvey RN

## 2024-09-17 NOTE — TELEPHONE ENCOUNTER
MA added covid vaccine to immunization record.   Forwarding on to pcp .....        Mychat message.  Wow.  This looks extremely  low.  You had mentioned that there were several options for treating this. We are all ears.    Also  As an FYI, Jerry received a Covid vaccination yesterday at [facility].   Thank you!  Jerry & Destiny

## 2024-09-18 ENCOUNTER — MYC MEDICAL ADVICE (OUTPATIENT)
Dept: FAMILY MEDICINE | Facility: CLINIC | Age: 64
End: 2024-09-18

## 2024-09-18 DIAGNOSIS — E66.01 MORBID OBESITY (H): Primary | Chronic | ICD-10-CM

## 2024-09-20 RX ORDER — SILDENAFIL 100 MG/1
100 TABLET, FILM COATED ORAL DAILY PRN
Qty: 20 TABLET | Refills: 4 | Status: SHIPPED | OUTPATIENT
Start: 2024-09-20

## 2024-09-20 NOTE — TELEPHONE ENCOUNTER
Please see MyChart messages from patient.     PA was started on semaglutide (Ozempic) 9/10 and came back denied (see telephone encounter dated 9/10).    Huma Harvey RN

## 2024-09-23 RX ORDER — METFORMIN HCL 500 MG
500 TABLET, EXTENDED RELEASE 24 HR ORAL
Qty: 90 TABLET | Refills: 3 | Status: SHIPPED | OUTPATIENT
Start: 2024-09-23

## 2024-09-23 NOTE — TELEPHONE ENCOUNTER
Routing to PCP    He would like to try the metformin    Willing to send rx for this?    Palma Graham RN

## 2024-09-24 ENCOUNTER — IMMUNIZATION (OUTPATIENT)
Dept: FAMILY MEDICINE | Facility: CLINIC | Age: 64
End: 2024-09-24
Payer: MEDICARE

## 2024-09-24 VITALS — TEMPERATURE: 97 F

## 2024-09-24 DIAGNOSIS — Z23 ENCOUNTER FOR IMMUNIZATION: Primary | ICD-10-CM

## 2024-09-24 PROCEDURE — 90662 IIV NO PRSV INCREASED AG IM: CPT

## 2024-09-24 PROCEDURE — G0008 ADMIN INFLUENZA VIRUS VAC: HCPCS

## 2024-09-24 NOTE — PROGRESS NOTES
Prior to immunization administration, verified patients identity using patient s name and date of birth. Please see Immunization Activity for additional information.     Is the patient's temperature normal (100.5 or less)? Yes     Patient MEETS CRITERIA. PROCEED with vaccine administration.      Patient instructed to remain in clinic for 15 minutes afterwards, and to report any adverse reactions.      Link to Ancillary Visit Immunization Standing Orders SmartSet     Screening performed by Sujata Webb CMA on 9/24/2024 at 9:55 AM.

## 2024-10-10 ENCOUNTER — OFFICE VISIT (OUTPATIENT)
Dept: CARDIOLOGY | Facility: CLINIC | Age: 64
End: 2024-10-10
Payer: MEDICARE

## 2024-10-10 VITALS
DIASTOLIC BLOOD PRESSURE: 72 MMHG | BODY MASS INDEX: 48.44 KG/M2 | HEART RATE: 51 BPM | OXYGEN SATURATION: 96 % | SYSTOLIC BLOOD PRESSURE: 128 MMHG | WEIGHT: 315 LBS

## 2024-10-10 DIAGNOSIS — I25.10 CORONARY ARTERY DISEASE INVOLVING NATIVE CORONARY ARTERY OF NATIVE HEART WITHOUT ANGINA PECTORIS: Primary | ICD-10-CM

## 2024-10-10 DIAGNOSIS — I10 ESSENTIAL HYPERTENSION: ICD-10-CM

## 2024-10-10 DIAGNOSIS — I48.0 PAROXYSMAL ATRIAL FIBRILLATION (H): ICD-10-CM

## 2024-10-10 PROCEDURE — 99215 OFFICE O/P EST HI 40 MIN: CPT

## 2024-10-10 PROCEDURE — G0463 HOSPITAL OUTPT CLINIC VISIT: HCPCS

## 2024-10-10 RX ORDER — HYDROCHLOROTHIAZIDE 25 MG/1
25 TABLET ORAL DAILY
Qty: 90 TABLET | Refills: 3 | Status: SHIPPED | OUTPATIENT
Start: 2024-10-10

## 2024-10-10 ASSESSMENT — PAIN SCALES - GENERAL: PAINLEVEL: NO PAIN (0)

## 2024-10-10 NOTE — PROGRESS NOTES
General Cardiology Clinic      HPI: Mr. Car Torres is a 63 year old male presenting to cardiology clinic today for 6 month follow-up. PMH significant for HTN, HLD, paroxysmal atrial fibrillation s/p PVI ablation (5/30/24), CAD s/p PCI to LAD (3/8/24), TIA on CPAP.    Underwent PVI ablation in May since our last visit. Follows with EP in [place], with most recent visit in July.    Today in clinic, he denies any current chest pain, dizziness, shortness of breath, or PND. He states that since his ablation he has not had any AF recurrences, and has not needed to take the PRN metoprolol.     Has been experiencing some mild LE edema the past couple of days, and reports feeling unsure if he is sick with a virus, but he's been achy as well. He does endorse seasonal allergies and sinus issues every autumn.     He does continue to report generalized reduced exercise tolerance. This has not changed much with stent and ablation this year. He says after he broke his foot over a year ago, he was sedentary for about 5 months and had gained a lot of weight. He feels like he is deconditioned, although he does think cardiac rehab went well when he was in the program. He has lost some motivation now that he is on his own and no one is there to hold him accountable. Did get Ozempic prescribed from his PCP but it was over $800, has gone through online doctor which is also for Ozempic but at a reduced price. He plans to start this next Monday.    They haven't been checking blood pressures at home. Quit smoking in 1998. Has maybe 1 alcoholic drinks/month for social family events.      Current Cardiac Medications:   Eliquis 5 mg BID  Atorvastatin 40 mg daily  Hydrochlorothiazide 12.5 mg daily  Lisinopril 20 mg daily  Metoprolol 25 mg TID PRN  Brilinta 90 mg BID    Interval History 4/11/24  S/p PCI to LAD on 3/8/24. He attends Cardiac rehab three times a week, and says it is going well. Patient was taken off  hydrochlorothiazide recently, and noticed an uptrend in his SBP since then. He also has noticed some mild edema in his BLE since stopping the hydrochlorothiazide.  Patient had history of palpitations but had not sought treatment until March 2024.Was started on Eliquis and PRN metoprolol. Scheduled for ablation in May. Started back on hydrochlorothiazide 12.5 mg daily at this visit.    PAST MEDICAL HISTORY:  Past Medical History:   Diagnosis Date    CKD (chronic kidney disease) stage 2, GFR 60-89 ml/min 10/21/2010    60 to 80 - have discussed Lisinopril - will consider       Coronary artery disease 01/26/2024    Dementia (H)     Depressive disorder     Diverticulosis of large intestine without hemorrhage 11/18/2015    Incidental finding on CT scan       Heart disease 05/08/2018    Hypercholesterolemia     Hypertension goal BP (blood pressure) < 140/90     Nonspecific abnormal electrocardiogram (ECG) (EKG) 08/23/2007    Stress testing normal.     Paroxysmal atrial fibrillation (H) 03/17/2024    Sinus node dysfunction (H) 05/23/2024    Sleep apnea     uses a c-pap, severe    Status post coronary angiogram 01/26/2024       CURRENT MEDICATIONS:  Current Outpatient Medications   Medication Sig Dispense Refill    apixaban ANTICOAGULANT (ELIQUIS ANTICOAGULANT) 5 MG tablet Take 1 tablet (5 mg) by mouth 2 times daily 180 tablet 3    atorvastatin (LIPITOR) 40 MG tablet TAKE 1 TABLET BY MOUTH EVERY DAY 90 tablet 2    busPIRone (BUSPAR) 10 MG tablet Take 2 tablets (20 mg) by mouth 2 times daily 360 tablet 4    Cholecalciferol (VITAMIN D PO) Take 5,000 Units by mouth daily One tablet twice daily      co-enzyme Q-10 100 MG CAPS capsule Take 100 mg by mouth daily      hydroCHLOROthiazide 12.5 MG tablet Take 1 tablet (12.5 mg) by mouth daily 90 tablet 3    hydrOXYzine HCl (ATARAX) 10 MG tablet Take 1 tablet (10 mg) by mouth 2 times daily as needed for anxiety 180 tablet 5    lisinopril (ZESTRIL) 20 MG tablet Take 1 tablet (20 mg) by  mouth daily 90 tablet 3    metFORMIN (GLUCOPHAGE XR) 500 MG 24 hr tablet Take 1 tablet (500 mg) by mouth daily (with dinner). 90 tablet 3    metoprolol tartrate (LOPRESSOR) 25 MG tablet Take 1 tablet (25 mg) by mouth every 8 hours as needed (Atrial fibrillation with RVR, HR > 120) 30 tablet 1    MULTI VITAMIN MENS PO Take 1 tablet by mouth daily      sildenafil (VIAGRA) 100 MG tablet Take 1 tablet (100 mg) by mouth daily as needed. 20 tablet 4    ticagrelor (BRILINTA) 90 MG tablet Take 1 tablet (90 mg) by mouth 2 times daily Start this evening. 180 tablet 3    vitamin B complex with vitamin C (STRESS TAB) tablet Take 1 tablet by mouth daily      FLUoxetine (PROZAC) 40 MG capsule Take 1 capsule (40 mg) by mouth daily (Patient not taking: Reported on 10/10/2024) 90 capsule 4    insulin pen needle (32G X 6 MM) 32G X 6 MM miscellaneous Use 100 pen needles daily or as directed. (Patient not taking: Reported on 10/10/2024) 100 each 3       PAST SURGICAL HISTORY:  Past Surgical History:   Procedure Laterality Date    COLONOSCOPY N/A 12/22/2020    Procedure: COLONOSCOPY, WITH POLYPECTOMY, CLIP;  Surgeon: Mihir Lang MD;  Location: [facility] OR    COLONOSCOPY N/A 1/10/2022    Procedure: COLONOSCOPY, WITH POLYPECTOMY AND BIOPSY;  Surgeon: Mihir Lang MD;  Location: [facility]    CV CORONARY ANGIOGRAM N/A 1/26/2024    Procedure: Coronary Angiogram;  Surgeon: Elton Wright MD;  Location:  [facility]    CV CORONARY LITHOTRIPSY PCI N/A 3/8/2024    Procedure: Percutaneous Coronary Intervention - Lithotripsy;  Surgeon: Elton Wright MD;  Location:  [facility]ATH [facility]    CV INSTANTANEOUS WAVE-FREE RATIO N/A 1/26/2024    Procedure: Instantaneous Wave-Free Ratio;  Surgeon: Elton Wright MD;  Location: [facility] CARDIAC CATH LAB    CV INTRAVASULAR ULTRASOUND N/A 3/8/2024    Procedure: Intravascular Ultrasound;  Surgeon: Elton Wright MD;  Location: [facility] CARDIAC CATH LAB    CV PCI N/A 3/8/2024     Procedure: Percutaneous Coronary Intervention;  Surgeon: Elton Wright MD;  Location:  HE[facility] CARDIAC CATH LAB    CV RIGHT HEART CATH MEASUREMENTS RECORDED N/A 2024    Procedure: Right Heart Catheterization;  Surgeon: Elton Wright MD;  Location: [facility] CARDIAC CATH LAB    EP ABLATION PULMONARY VEIN ISOLATION N/A 2024    Procedure: Ablation Atrial Fibrillation;  Surgeon: Sandee Sheldon MD;  Location: [facility] CATH LAB CV    ESOPHAGOSCOPY, GASTROSCOPY, DUODENOSCOPY (EGD), COMBINED N/A 2021    Procedure: ESOPHAGOGASTRODUODENOSCOPY, WITH BIOPSY;  Surgeon: Mihir Lang MD;  Location: [facility] OR    OPEN REDUCTION INTERNAL FIXATION FOOT Left 2023    Procedure: LISFRANC OPEN REDUCTION INTERNAL FIXATION, LEFT FOOT;  Surgeon: Bailey Ruiz DPM;  Location:  OR    SURGICAL HISTORY OF -       surgery on left index finger     ALLERGIES:  No Known Allergies    FAMILY HISTORY:  Family History   Problem Relation Age of Onset    Cancer Mother         uterine    Other Cancer Mother         endometrial    Cerebrovascular Disease Mother     Substance Abuse Mother         Alcohol    C.A.D. Father     Hypertension Father     Hyperlipidemia Father     Breast Cancer Maternal Grandmother     Other Cancer Maternal Grandmother         Lung/brain    Substance Abuse Paternal Grandfather     Hypertension Brother     Substance Abuse Brother         Alcohol    Hyperlipidemia Brother     Breast Cancer Other     Breast Cancer Cousin     Other Cancer Other     Cerebrovascular Disease Other         Maternal Aunt    Glaucoma No family hx of     Macular Degeneration No family hx of        SOCIAL HISTORY:  Social History     Tobacco Use    Smoking status: Former     Current packs/day: 0.00     Average packs/day: 1 pack/day for 25.0 years (25.0 ttl pk-yrs)     Types: Cigarettes, Cigars     Start date: 1973     Quit date: 1998     Years since quittin.7     Passive exposure: Past     Smokeless tobacco: Never    Tobacco comments:     N/A not a smoker   Vaping Use    Vaping status: Never Used   Substance Use Topics    Alcohol use: Yes     Alcohol/week: 10.0 standard drinks of alcohol     Comment: less than 6 per week    Drug use: Not Currently     Types: Amphetamines     Comment: Kratum       ROS:   Constitutional: No fever, chills, or sweats.  Cardiovascular: As per HPI.       Exam:  BP (!) 152/71 (BP Location: Left arm, Patient Position: Chair, Cuff Size: Adult Large)   Pulse 51   Wt (!) 153.1 kg (337 lb 9.6 oz)   SpO2 96%   BMI 48.44 kg/m     GENERAL: alert and no distress  RESPIRATORY: lungs clear to auscultation - no rales, rhonchi or wheezes, no use of accessory muscles, respirations are unlabored, normal respiratory rate  CARDIOVASCULAR: RRR, +S1S2, no murmur, rub, or gallop.   GI: rounded, non tender  EXTREMITIES: mild BLE peripheral edema  NEURO: alert, normal speech and affect  SKIN: scattered scratches on BUE      Labs:  Lab Results   Component Value Date    WBC 6.8 09/11/2024    WBC 7.1 12/15/2020    RBC 4.36 (L) 09/11/2024    RBC 5.24 12/15/2020    HGB 12.9 (L) 09/11/2024    HGB 15.7 12/15/2020    HCT 39.3 (L) 09/11/2024    HCT 47.0 12/15/2020    MCV 90 09/11/2024    MCV 90 12/15/2020    MCH 29.6 09/11/2024    MCH 30.0 12/15/2020    MCHC 32.8 09/11/2024    MCHC 33.4 12/15/2020    RDW 13.1 09/11/2024    RDW 13.2 12/15/2020     09/11/2024     12/15/2020       Lab Results   Component Value Date     09/11/2024     08/02/2020    POTASSIUM 4.9 09/11/2024    POTASSIUM 4.1 01/05/2023    POTASSIUM 4.0 08/02/2020    CHLORIDE 104 09/11/2024    CHLORIDE 100 12/29/2022    CHLORIDE 105 08/02/2020    CO2 24 09/11/2024    CO2 30 12/29/2022    CO2 28 08/02/2020    ANIONGAP 10 09/11/2024    ANIONGAP 6 12/29/2022    ANIONGAP 5 08/02/2020     (H) 09/11/2024     (H) 12/29/2022     (A) 04/21/2021    BUN 27.3 (H) 09/11/2024    BUN 17 12/29/2022    BUN 28  08/02/2020    CR 1.23 (H) 09/11/2024    CR 1.17 08/02/2020    GFRESTIMATED 65 09/11/2024    GFRESTIMATED 66 08/02/2020    GFRESTBLACK 77 08/02/2020    SHAHID 9.1 09/11/2024    SHAHID 8.9 08/02/2020        Lab Results   Component Value Date    INR 0.99 03/22/2024    INR 0.96 08/18/2007       Lab Results   Component Value Date    CHOL 155 12/06/2023    CHOL 161 04/23/2021     Lab Results   Component Value Date    HDL 54 12/06/2023    HDL 79 04/23/2021     Lab Results   Component Value Date    LDL 78 12/06/2023    LDL 65 04/23/2021     Lab Results   Component Value Date    TRIG 117 12/06/2023    TRIG 87 04/23/2021     Lab Results   Component Value Date    CHOLHDLRATIO 3.1 07/13/2015       Diagnostics  [employer] monitoring from 7/22/2024 to 8/5/2024  Predominant underlying rhythm was sinus rhythm, 33 to 91bpm, average 48bpm.  No sustained tachyarrhythmias.  No atrial fibrillation.  There were no pauses of greater than 3 seconds.  Rare supraventricular ectopic beats (<1%). 2 non sustained SVT runs occurred, the run with the fastest interval lasting 7 beats with a max rate of 164 bpm (avg 134 bpm); the run with the fastest interval was also the longest.  Rare premature ventricular contractions (<1%).  Symptom triggers - none.    Echocardiogram 3/18/24  Left Ventricle  Global and regional left ventricular function is normal with an EF of 60-65%.  Left ventricular size is normal. Mild concentric wall thickening consistent  with left ventricular hypertrophy is present. Diastolic function not assessed  due to atrial fibrillation. No regional wall motion abnormalities are seen.     Right Ventricle  Right ventricular function, chamber size, wall motion, and thickness are  normal.     Atria  The atria cannot be assessed.     Mitral Valve  The mitral valve is normal.     Aortic Valve  The valve leaflets are not well visualized. On Doppler interrogation, there is  no significant stenosis or regurgitation.     Tricuspid Valve  The tricuspid  valve is normal. Pulmonary artery systolic pressure cannot be  assessed.     Pulmonic Valve  The pulmonic valve cannot be assessed.     Vessels  The thoracic aorta is normal. The pulmonary artery cannot be assessed. The  inferior vena cava is normal.     Pericardium  No pericardial effusion is present.    Coronary Angiogram 3/8/24    Ost Cx lesion is 20% stenosed.    Mid Cx to Dist Cx lesion is 30% stenosed.    3rd Mrg lesion is 20% stenosed.    Prox LAD lesion is 90% stenosed.     Successful planned percutaneous coronary intervention of the proximal LAD lesion with shockwave lithotripsy and 1 drug-eluting stent-Synergy XD 3.0X 24 mm.        Assessment and Plan:   Mr. Torres is a 63 year old male with a PMH of HTN, HLD, pAF, CAD s/p PCI to LAD (3/8/24), TIA on CPAP.    # CAD, s/p PCI to LAD (3/2024)  # HLD  Cath Lab 03/08/2024 with successful planned percutaneous coronary intervention of the proximal LAD lesion with shockwave lithotripsy and 1 drug-eluting stent-Synergy XD 3.0X 24 mm.   Most recent LDL 87 on 9/11/24  - DAPT: Currently off ASA 81 mg daily (started on Eliquis w/ Afib diagnosis), Brilinta 90 mg BID x 12 months  - In March 2025, stop Brilinta and resume ASA 81 mg daily  - Continue atorvastatin 40 mg daily    # Paroxysmal Atrial Fibrillation s/p ablation 5/2024)  CHADSVASC 3. Asymptomatic in clinic today. Follows with EP at [place].  AC: Eliquis 5 mg BID  Rate: Metoprolol 25 mg PRN  - Referral sent to structural team per patient request, as he would like to look into Watchman device    # HTN  # LE Edema  Blood pressure elevated in clinic today.   - lisinopril 20 mg daily  - increase hydrochlorothiazide to 25 mg daily  - check BP regularly at home  - BMP in 1-2 weeks    # Obesity  Offered weight management referral. Declined at this time as patient would like to stick with his current plan to start Ozempic next week.    - Encourage lifestyle modifications including: weight loss, eating a diet with  primarily fruits and vegetables and high in fiber, reducing sugar and processed food intake, avoiding alcohol, maintenance of TIA with CPAP      Follow up:  - with EP as scheduled  - in clinic end of March/early April      KLAUS Noble, AMIRAHP-C  [facility] General Cardiology  Securely message with [employer]   Text page via [facility] Paging/Directory          Chart review time: 10 minutes  Visit time: 26 minutes  Chart completion/documentation: 5 minutes  Total time spent: 41 minutes

## 2024-10-10 NOTE — PATIENT INSTRUCTIONS
You were seen today in the Cardiovascular Clinic at the [facility] by:       Soila FLORIAN     Your visit summary and instructions are as follows:    Increase hydrochlorothiazide to 25 mg daily  Check your BP regularly and let us know if it remains >130/80 consistently  BMP in 1-2 weeks  Referral to structural team to discuss Watchman as requested  Continue to work toward the recommendation of 150 minutes of moderate intensity exercise/week and maintain a healthy lifestyle (avoid illicit drugs, smoking and moderate alcohol consumption)      Return to cardiology clinic end of March/early April.     Thank you for your visit today!     Please MyChart message me or call my nurse Magaly if you have any questions or concerns.      During Business Hours:  [phone], option # 1 (University) then option # 4 (medical questions) and ask to speak with my nurse.     After hours, weekends or holidays:   [phone], Option #4  Ask to speak to the On-Call Cardiologist. Inform them you are a cardiology patient at the [facility].

## 2024-10-10 NOTE — NURSING NOTE
Chief Complaint   Patient presents with    Follow Up     6 month general cards follow-up       Vitals were taken, medications reconciled.    Stephanie Martinez, Clinic Assistant   12:54 PM

## 2024-10-10 NOTE — LETTER
10/10/2024      RE: Car Torres  [address]  [facility] [address]       Dear Colleague,    Thank you for the opportunity to participate in the care of your patient, Car Torres, at the [facility] [place] [facility]. Please see a copy of my visit note below.                      General Cardiology Clinic      HPI: Mr. Car Torres is a 63 year old male presenting to cardiology clinic today for 6 month follow-up. PMH significant for HTN, HLD, paroxysmal atrial fibrillation s/p PVI ablation (5/30/24), CAD s/p PCI to LAD (3/8/24), TIA on CPAP.    Underwent PVI ablation in May since our last visit. Follows with EP in [place], with most recent visit in July.    Today in clinic, he denies any current chest pain, dizziness, shortness of breath, or PND. He states that since his ablation he has not had any AF recurrences, and has not needed to take the PRN metoprolol.     Has been experiencing some mild LE edema the past couple of days, and reports feeling unsure if he is sick with a virus, but he's been achy as well. He does endorse seasonal allergies and sinus issues every autumn.     He does continue to report generalized reduced exercise tolerance. This has not changed much with stent and ablation this year. He says after he broke his foot over a year ago, he was sedentary for about 5 months and had gained a lot of weight. He feels like he is deconditioned, although he does think cardiac rehab went well when he was in the program. He has lost some motivation now that he is on his own and no one is there to hold him accountable. Did get Ozempic prescribed from his PCP but it was over $800, has gone through online doctor which is also for Ozempic but at a reduced price. He plans to start this next Monday.    They haven't been checking blood pressures at home. Quit smoking in 1998. Has maybe 1 alcoholic drinks/month for social family  events.      Current Cardiac Medications:   Eliquis 5 mg BID  Atorvastatin 40 mg daily  Hydrochlorothiazide 12.5 mg daily  Lisinopril 20 mg daily  Metoprolol 25 mg TID PRN  Brilinta 90 mg BID    Interval History 4/11/24  S/p PCI to LAD on 3/8/24. He attends Cardiac rehab three times a week, and says it is going well. Patient was taken off hydrochlorothiazide recently, and noticed an uptrend in his SBP since then. He also has noticed some mild edema in his BLE since stopping the hydrochlorothiazide.  Patient had history of palpitations but had not sought treatment until March 2024.Was started on Eliquis and PRN metoprolol. Scheduled for ablation in May. Started back on hydrochlorothiazide 12.5 mg daily at this visit.    PAST MEDICAL HISTORY:  Past Medical History:   Diagnosis Date     CKD (chronic kidney disease) stage 2, GFR 60-89 ml/min 10/21/2010    60 to 80 - have discussed Lisinopril - will consider        Coronary artery disease 01/26/2024     Dementia (H)      Depressive disorder      Diverticulosis of large intestine without hemorrhage 11/18/2015    Incidental finding on CT scan        Heart disease 05/08/2018     Hypercholesterolemia      Hypertension goal BP (blood pressure) < 140/90      Nonspecific abnormal electrocardiogram (ECG) (EKG) 08/23/2007    Stress testing normal.      Paroxysmal atrial fibrillation (H) 03/17/2024     Sinus node dysfunction (H) 05/23/2024     Sleep apnea     uses a c-pap, severe     Status post coronary angiogram 01/26/2024       CURRENT MEDICATIONS:  Current Outpatient Medications   Medication Sig Dispense Refill     apixaban ANTICOAGULANT (ELIQUIS ANTICOAGULANT) 5 MG tablet Take 1 tablet (5 mg) by mouth 2 times daily 180 tablet 3     atorvastatin (LIPITOR) 40 MG tablet TAKE 1 TABLET BY MOUTH EVERY DAY 90 tablet 2     busPIRone (BUSPAR) 10 MG tablet Take 2 tablets (20 mg) by mouth 2 times daily 360 tablet 4     Cholecalciferol (VITAMIN D PO) Take 5,000 Units by mouth daily One  tablet twice daily       co-enzyme Q-10 100 MG CAPS capsule Take 100 mg by mouth daily       hydroCHLOROthiazide 12.5 MG tablet Take 1 tablet (12.5 mg) by mouth daily 90 tablet 3     hydrOXYzine HCl (ATARAX) 10 MG tablet Take 1 tablet (10 mg) by mouth 2 times daily as needed for anxiety 180 tablet 5     lisinopril (ZESTRIL) 20 MG tablet Take 1 tablet (20 mg) by mouth daily 90 tablet 3     metFORMIN (GLUCOPHAGE XR) 500 MG 24 hr tablet Take 1 tablet (500 mg) by mouth daily (with dinner). 90 tablet 3     metoprolol tartrate (LOPRESSOR) 25 MG tablet Take 1 tablet (25 mg) by mouth every 8 hours as needed (Atrial fibrillation with RVR, HR > 120) 30 tablet 1     MULTI VITAMIN MENS PO Take 1 tablet by mouth daily       sildenafil (VIAGRA) 100 MG tablet Take 1 tablet (100 mg) by mouth daily as needed. 20 tablet 4     ticagrelor (BRILINTA) 90 MG tablet Take 1 tablet (90 mg) by mouth 2 times daily Start this evening. 180 tablet 3     vitamin B complex with vitamin C (STRESS TAB) tablet Take 1 tablet by mouth daily       FLUoxetine (PROZAC) 40 MG capsule Take 1 capsule (40 mg) by mouth daily (Patient not taking: Reported on 10/10/2024) 90 capsule 4     insulin pen needle (32G X 6 MM) 32G X 6 MM miscellaneous Use 100 pen needles daily or as directed. (Patient not taking: Reported on 10/10/2024) 100 each 3       PAST SURGICAL HISTORY:  Past Surgical History:   Procedure Laterality Date     COLONOSCOPY N/A 12/22/2020    Procedure: COLONOSCOPY, WITH POLYPECTOMY, CLIP;  Surgeon: Mihir Lang MD;  Location: [facility] OR     COLONOSCOPY N/A 1/10/2022    Procedure: COLONOSCOPY, WITH POLYPECTOMY AND BIOPSY;  Surgeon: Mihir Lang MD;  Location:  GI     CV CORONARY ANGIOGRAM N/A 1/26/2024    Procedure: Coronary Angiogram;  Surgeon: Elton Wright MD;  Location:  HEART CARDIAC CATH LAB     CV CORONARY LITHOTRIPSY PCI N/A 3/8/2024    Procedure: Percutaneous Coronary Intervention - Lithotripsy;  Surgeon: Elton Wright MD;   Location:  HEART CARDIAC CATH LAB     CV INSTANTANEOUS WAVE-FREE RATIO N/A 1/26/2024    Procedure: Instantaneous Wave-Free Ratio;  Surgeon: Elton Wright MD;  Location: [facility] CARDIAC CATH LAB     CV INTRAVASULAR ULTRASOUND N/A 3/8/2024    Procedure: Intravascular Ultrasound;  Surgeon: Elton Wright MD;  Location: [facility] CARDIAC CATH LAB     CV PCI N/A 3/8/2024    Procedure: Percutaneous Coronary Intervention;  Surgeon: Elton Wright MD;  Location: [facility] CARDIAC CATH LAB     CV RIGHT HEART CATH MEASUREMENTS RECORDED N/A 1/26/2024    Procedure: Right Heart Catheterization;  Surgeon: Elton Wright MD;  Location: [facility] CARDIAC CATH LAB     EP ABLATION PULMONARY VEIN ISOLATION N/A 5/30/2024    Procedure: Ablation Atrial Fibrillation;  Surgeon: Sandee Sheldon MD;  Location: [facility] LAB CV     ESOPHAGOSCOPY, GASTROSCOPY, DUODENOSCOPY (EGD), COMBINED N/A 1/28/2021    Procedure: ESOPHAGOGASTRODUODENOSCOPY, WITH BIOPSY;  Surgeon: Mihir Lang MD;  Location: [facility] OR     OPEN REDUCTION INTERNAL FIXATION FOOT Left 1/5/2023    Procedure: LISFRANC OPEN REDUCTION INTERNAL FIXATION, LEFT FOOT;  Surgeon: Bailey Ruiz DPM;  Location:  OR     SURGICAL HISTORY OF -       surgery on left index finger     ALLERGIES:  No Known Allergies    FAMILY HISTORY:  Family History   Problem Relation Age of Onset     Cancer Mother         uterine     Other Cancer Mother         endometrial     Cerebrovascular Disease Mother      Substance Abuse Mother         Alcohol     C.A.D. Father      Hypertension Father      Hyperlipidemia Father      Breast Cancer Maternal Grandmother      Other Cancer Maternal Grandmother         Lung/brain     Substance Abuse Paternal Grandfather      Hypertension Brother      Substance Abuse Brother         Alcohol     Hyperlipidemia Brother      Breast Cancer Other      Breast Cancer Cousin      Other Cancer Other      Cerebrovascular Disease Other          Maternal Aunt     Glaucoma No family hx of      Macular Degeneration No family hx of        SOCIAL HISTORY:  Social History     Tobacco Use     Smoking status: Former     Current packs/day: 0.00     Average packs/day: 1 pack/day for 25.0 years (25.0 ttl pk-yrs)     Types: Cigarettes, Cigars     Start date: 1973     Quit date: 1998     Years since quittin.7     Passive exposure: Past     Smokeless tobacco: Never     Tobacco comments:     N/A not a smoker   Vaping Use     Vaping status: Never Used   Substance Use Topics     Alcohol use: Yes     Alcohol/week: 10.0 standard drinks of alcohol     Comment: less than 6 per week     Drug use: Not Currently     Types: Amphetamines     Comment: Kratum       ROS:   Constitutional: No fever, chills, or sweats.  Cardiovascular: As per HPI.       Exam:  BP (!) 152/71 (BP Location: Left arm, Patient Position: Chair, Cuff Size: Adult Large)   Pulse 51   Wt (!) 153.1 kg (337 lb 9.6 oz)   SpO2 96%   BMI 48.44 kg/m     GENERAL: alert and no distress  RESPIRATORY: lungs clear to auscultation - no rales, rhonchi or wheezes, no use of accessory muscles, respirations are unlabored, normal respiratory rate  CARDIOVASCULAR: RRR, +S1S2, no murmur, rub, or gallop.   GI: rounded, non tender  EXTREMITIES: mild BLE peripheral edema  NEURO: alert, normal speech and affect  SKIN: scattered scratches on BUE      Labs:  Lab Results   Component Value Date    WBC 6.8 2024    WBC 7.1 12/15/2020    RBC 4.36 (L) 2024    RBC 5.24 12/15/2020    HGB 12.9 (L) 2024    HGB 15.7 12/15/2020    HCT 39.3 (L) 2024    HCT 47.0 12/15/2020    MCV 90 2024    MCV 90 12/15/2020    MCH 29.6 2024    MCH 30.0 12/15/2020    MCHC 32.8 2024    MCHC 33.4 12/15/2020    RDW 13.1 2024    RDW 13.2 12/15/2020     2024     12/15/2020       Lab Results   Component Value Date     2024     2020    POTASSIUM 4.9 2024     POTASSIUM 4.1 01/05/2023    POTASSIUM 4.0 08/02/2020    CHLORIDE 104 09/11/2024    CHLORIDE 100 12/29/2022    CHLORIDE 105 08/02/2020    CO2 24 09/11/2024    CO2 30 12/29/2022    CO2 28 08/02/2020    ANIONGAP 10 09/11/2024    ANIONGAP 6 12/29/2022    ANIONGAP 5 08/02/2020     (H) 09/11/2024     (H) 12/29/2022     (A) 04/21/2021    BUN 27.3 (H) 09/11/2024    BUN 17 12/29/2022    BUN 28 08/02/2020    CR 1.23 (H) 09/11/2024    CR 1.17 08/02/2020    GFRESTIMATED 65 09/11/2024    GFRESTIMATED 66 08/02/2020    GFRESTBLACK 77 08/02/2020    SHAHID 9.1 09/11/2024    SHAHID 8.9 08/02/2020        Lab Results   Component Value Date    INR 0.99 03/22/2024    INR 0.96 08/18/2007       Lab Results   Component Value Date    CHOL 155 12/06/2023    CHOL 161 04/23/2021     Lab Results   Component Value Date    HDL 54 12/06/2023    HDL 79 04/23/2021     Lab Results   Component Value Date    LDL 78 12/06/2023    LDL 65 04/23/2021     Lab Results   Component Value Date    TRIG 117 12/06/2023    TRIG 87 04/23/2021     Lab Results   Component Value Date    CHOLHDLRATIO 3.1 07/13/2015       Diagnostics  Zio monitoring from 7/22/2024 to 8/5/2024  Predominant underlying rhythm was sinus rhythm, 33 to 91bpm, average 48bpm.  No sustained tachyarrhythmias.  No atrial fibrillation.  There were no pauses of greater than 3 seconds.  Rare supraventricular ectopic beats (<1%). 2 non sustained SVT runs occurred, the run with the fastest interval lasting 7 beats with a max rate of 164 bpm (avg 134 bpm); the run with the fastest interval was also the longest.  Rare premature ventricular contractions (<1%).  Symptom triggers - none.    Echocardiogram 3/18/24  Left Ventricle  Global and regional left ventricular function is normal with an EF of 60-65%.  Left ventricular size is normal. Mild concentric wall thickening consistent  with left ventricular hypertrophy is present. Diastolic function not assessed  due to atrial fibrillation. No  details regional wall motion abnormalities are seen.     Right Ventricle  Right ventricular function, chamber size, wall motion, and thickness are  normal.     Atria  The atria cannot be assessed.     Mitral Valve  The mitral valve is normal.     Aortic Valve  The valve leaflets are not well visualized. On Doppler interrogation, there is  no significant stenosis or regurgitation.     Tricuspid Valve  The tricuspid valve is normal. Pulmonary artery systolic pressure cannot be  assessed.     Pulmonic Valve  The pulmonic valve cannot be assessed.     Vessels  The thoracic aorta is normal. The pulmonary artery cannot be assessed. The  inferior vena cava is normal.     Pericardium  No pericardial effusion is present.    Coronary Angiogram 3/8/24     Ost Cx lesion is 20% stenosed.     Mid Cx to Dist Cx lesion is 30% stenosed.     3rd Mrg lesion is 20% stenosed.     Prox LAD lesion is 90% stenosed.     Successful planned percutaneous coronary intervention of the proximal LAD lesion with shockwave lithotripsy and 1 drug-eluting stent-Synergy XD 3.0X 24 mm.        Assessment and Plan:   Mr. Torres is a 63 year old male with a PMH of HTN, HLD, pAF, CAD s/p PCI to LAD (3/8/24), TIA on CPAP.    # CAD, s/p PCI to LAD (3/2024)  # HLD  Cath Lab 03/08/2024 with successful planned percutaneous coronary intervention of the proximal LAD lesion with shockwave lithotripsy and 1 drug-eluting stent-Synergy XD 3.0X 24 mm.   Most recent LDL 87 on 9/11/24  - DAPT: Currently off ASA 81 mg daily (started on Eliquis w/ Afib diagnosis), Brilinta 90 mg BID x 12 months  - In March 2025, stop Brilinta and resume ASA 81 mg daily  - Continue atorvastatin 40 mg daily    # Paroxysmal Atrial Fibrillation s/p ablation 5/2024)  CHADSVASC 3. Asymptomatic in clinic today. Follows with EP at [place].  AC: Eliquis 5 mg BID  Rate: Metoprolol 25 mg PRN  - Referral sent to structural team per patient request, as he would like to look into Watchman device    # HTN  #  LE Edema  Blood pressure elevated in clinic today.   - lisinopril 20 mg daily  - increase hydrochlorothiazide to 25 mg daily  - check BP regularly at home  - BMP in 1-2 weeks    # Obesity  Offered weight management referral. Declined at this time as patient would like to stick with his current plan to start Ozempic next week.    - Encourage lifestyle modifications including: weight loss, eating a diet with primarily fruits and vegetables and high in fiber, reducing sugar and processed food intake, avoiding alcohol, maintenance of TIA with CPAP      Follow up:  - with EP as scheduled  - in clinic end of March/early April      KLAUS Noble, FNP-C  [facility] General Cardiology  Securely message with [employer]   Text page via [employer] Paging/Directory          Chart review time: 10 minutes  Visit time: 26 minutes  Chart completion/documentation: 5 minutes  Total time spent: 41 minutes       Please do not hesitate to contact me if you have any questions/concerns.     Sincerely,     KLAUS Noble CNP

## 2024-10-23 ENCOUNTER — LAB (OUTPATIENT)
Dept: LAB | Facility: CLINIC | Age: 64
End: 2024-10-23
Payer: MEDICARE

## 2024-10-23 DIAGNOSIS — I10 ESSENTIAL HYPERTENSION: ICD-10-CM

## 2024-10-23 LAB
ANION GAP SERPL CALCULATED.3IONS-SCNC: 9 MMOL/L (ref 7–15)
BUN SERPL-MCNC: 24.1 MG/DL (ref 8–23)
CALCIUM SERPL-MCNC: 9.7 MG/DL (ref 8.8–10.4)
CHLORIDE SERPL-SCNC: 102 MMOL/L (ref 98–107)
CREAT SERPL-MCNC: 1.26 MG/DL (ref 0.67–1.17)
EGFRCR SERPLBLD CKD-EPI 2021: 63 ML/MIN/1.73M2
GLUCOSE SERPL-MCNC: 107 MG/DL (ref 70–99)
HCO3 SERPL-SCNC: 25 MMOL/L (ref 22–29)
POTASSIUM SERPL-SCNC: 4.7 MMOL/L (ref 3.4–5.3)
SODIUM SERPL-SCNC: 136 MMOL/L (ref 135–145)

## 2024-10-23 PROCEDURE — 36415 COLL VENOUS BLD VENIPUNCTURE: CPT

## 2024-10-23 PROCEDURE — 80048 BASIC METABOLIC PNL TOTAL CA: CPT

## 2024-10-29 ENCOUNTER — VIRTUAL VISIT (OUTPATIENT)
Dept: FAMILY MEDICINE | Facility: CLINIC | Age: 64
End: 2024-10-29
Payer: MEDICARE

## 2024-10-29 DIAGNOSIS — R79.9 ABNORMAL FINDING OF BLOOD CHEMISTRY, UNSPECIFIED: ICD-10-CM

## 2024-10-29 DIAGNOSIS — G25.81 RESTLESS LEG SYNDROME: Primary | ICD-10-CM

## 2024-10-29 PROBLEM — G31.09 FRONTOTEMPORAL DEMENTIA (H): Status: RESOLVED | Noted: 2024-06-20 | Resolved: 2024-10-29

## 2024-10-29 PROBLEM — F02.80 FRONTOTEMPORAL DEMENTIA (H): Status: RESOLVED | Noted: 2024-06-20 | Resolved: 2024-10-29

## 2024-10-29 PROCEDURE — 99214 OFFICE O/P EST MOD 30 MIN: CPT | Mod: 95 | Performed by: INTERNAL MEDICINE

## 2024-10-29 RX ORDER — PRAMIPEXOLE DIHYDROCHLORIDE 0.25 MG/1
0.25 TABLET ORAL AT BEDTIME
Qty: 90 TABLET | Refills: 2 | Status: SHIPPED | OUTPATIENT
Start: 2024-10-29

## 2024-10-29 NOTE — PROGRESS NOTES
Jerry is a 64 year old who is being evaluated via a billable video visit.    How would you like to obtain your AVS? MyChart  If the video visit is dropped, the invitation should be resent by: Text to cell phone: [phone]  Will anyone else be joining your video visit? Yes: wife. How would they like to receive their invitation? Text to cell phone: [phone]      Assessment & Plan   Problem List Items Addressed This Visit    None  Visit Diagnoses       Restless leg syndrome    -  Primary    Relevant Medications    pramipexole (MIRAPEX) 0.25 MG tablet    Other Relevant Orders    Iron and iron binding capacity    Abnormal finding of blood chemistry, unspecified        Relevant Orders    Iron and iron binding capacity                  Work on weight loss  Regular exercise      Subjective   Jerry is a 64 year old, presenting for the following health issues:  Follow Up (Leg issues/Ed follow-up/Weight loss )      10/29/2024     7:15 AM   Additional Questions   Roomed by sloane     Video Start Time:  7:25 AM    History of Present Illness       Reason for visit:  Restless legs  Symptom onset:  More than a month  Symptoms include:  Restless legs  Symptom intensity:  Severe  Symptom progression:  Staying the same  Had these symptoms before:  Yes  Has tried/received treatment for these symptoms:  No  What makes it worse:  Sometimes it correlates with my sciatica, but not always  What makes it better:  Move around   He is taking medications regularly.     Issues with restless leg at night - one leg                Review of Systems  Constitutional, HEENT, cardiovascular, pulmonary, gi and gu systems are negative, except as otherwise noted.      Objective           Vitals:  No vitals were obtained today due to virtual visit.    Physical Exam   GENERAL: alert and no distress  EYES: Eyes grossly normal to inspection.  No discharge or erythema, or obvious scleral/conjunctival abnormalities.  HENT: Normal cephalic/atraumatic.   External ears, nose and mouth without ulcers or lesions.  No nasal drainage visible.  NECK: No asymmetry, visible masses or scars  RESP: No audible wheeze, cough, or visible cyanosis.    SKIN: Visible skin clear. No significant rash, abnormal pigmentation or lesions.  NEURO: Cranial nerves grossly intact.  Mentation and speech appropriate for age.  PSYCH: Appropriate affect, tone, and pace of words    No results found for any visits on 10/29/24.      Video-Visit Details    Type of service:  Video Visit   Video End Time: 8:05 AM  Originating Location (pt. Location): Home    Distant Location (provider location):  On-site  Platform used for Video Visit: Vilma  Signed Electronically by: Mihir Perez MD

## 2024-11-11 ENCOUNTER — LAB (OUTPATIENT)
Dept: LAB | Facility: CLINIC | Age: 64
End: 2024-11-11
Payer: MEDICARE

## 2024-11-11 DIAGNOSIS — R79.9 ABNORMAL FINDING OF BLOOD CHEMISTRY, UNSPECIFIED: ICD-10-CM

## 2024-11-11 DIAGNOSIS — G25.81 RESTLESS LEG SYNDROME: ICD-10-CM

## 2024-11-11 PROCEDURE — 83540 ASSAY OF IRON: CPT

## 2024-11-11 PROCEDURE — 36415 COLL VENOUS BLD VENIPUNCTURE: CPT

## 2024-11-11 PROCEDURE — 83550 IRON BINDING TEST: CPT

## 2024-11-12 LAB
IRON BINDING CAPACITY (ROCHE): 370 UG/DL (ref 240–430)
IRON SATN MFR SERPL: 10 % (ref 15–46)
IRON SERPL-MCNC: 38 UG/DL (ref 61–157)

## 2024-11-21 ENCOUNTER — OFFICE VISIT (OUTPATIENT)
Dept: CARDIOLOGY | Facility: CLINIC | Age: 64
End: 2024-11-21
Attending: INTERNAL MEDICINE
Payer: MEDICARE

## 2024-11-21 VITALS
SYSTOLIC BLOOD PRESSURE: 131 MMHG | OXYGEN SATURATION: 97 % | HEART RATE: 55 BPM | DIASTOLIC BLOOD PRESSURE: 74 MMHG | WEIGHT: 315 LBS | BODY MASS INDEX: 47.49 KG/M2

## 2024-11-21 DIAGNOSIS — I48.0 PAROXYSMAL ATRIAL FIBRILLATION (H): ICD-10-CM

## 2024-11-21 PROCEDURE — 93005 ELECTROCARDIOGRAM TRACING: CPT

## 2024-11-21 PROCEDURE — 99215 OFFICE O/P EST HI 40 MIN: CPT | Performed by: NURSE PRACTITIONER

## 2024-11-21 PROCEDURE — G0463 HOSPITAL OUTPT CLINIC VISIT: HCPCS | Performed by: INTERNAL MEDICINE

## 2024-11-21 ASSESSMENT — PAIN SCALES - GENERAL: PAINLEVEL_OUTOF10: NO PAIN (0)

## 2024-11-21 NOTE — PROGRESS NOTES
[facility] HEART [facility]  CARDIOVASCULAR DIVISION    VALVE CLINIC CONSULTATION        PERTINENT CLINICAL HISTORY & REASON FOR CONSULTATION:   Car Torres is a very pleasant 64 year old year male with a history of paroxysmal atrial fibrillation s/p PVI ablation (5/30/24) w/[id number] is 3 (age, CAD, HTN) and HADBLED of 3 (age, meds and bleeding dispo) with intolerance to anticoagulation due to bruising and bleeding easily who presents for evaluation of left atrial appendage closure. Additional medical history notable for CAD s/p PCI to LAD (3/8/24), TIA on CPAP, HTN, HLD, CKD.     Doing well. No major cardiac symptoms. He is currently on Brilinta and Eliquis. He bruises and bleeds easily, even just a scratch, he will bleed profusely and hard to get the bleeding to stop. 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 He is very interested in LAAO as an alternative to long term anticoagulation.        PAST MEDICAL HISTORY:     Past Medical History:   Diagnosis Date    CKD (chronic kidney disease) stage 2, GFR 60-89 ml/min 10/21/2010    60 to 80 - have discussed Lisinopril - will consider       Coronary artery disease 01/26/2024    Dementia (H)     Depressive disorder     Diverticulosis of large intestine without hemorrhage 11/18/2015    Incidental finding on CT scan       Heart disease 05/08/2018    Hypercholesterolemia     Hypertension goal BP (blood pressure) < 140/90     Nonspecific abnormal electrocardiogram (ECG) (EKG) 08/23/2007    Stress testing normal.     Paroxysmal atrial fibrillation (H) 03/17/2024    Sinus node dysfunction (H) 05/23/2024    Sleep apnea     uses a c-pap, severe    Status post coronary angiogram 01/26/2024        PAST SURGICAL HISTORY:     Past Surgical History:   Procedure Laterality Date    COLONOSCOPY N/A 12/22/2020    Procedure: COLONOSCOPY, WITH POLYPECTOMY, CLIP;  Surgeon: Mihir Lang MD;  Location: [facility] OR    COLONOSCOPY N/A 1/10/2022    Procedure:  COLONOSCOPY, WITH POLYPECTOMY AND BIOPSY;  Surgeon: Mihir Lang MD;  Location:  GI    CV CORONARY ANGIOGRAM N/A 1/26/2024    Procedure: Coronary Angiogram;  Surgeon: Elton Wright MD;  Location: [facility] CARDIAC CATH LAB    CV CORONARY LITHOTRIPSY PCI N/A 3/8/2024    Procedure: Percutaneous Coronary Intervention - Lithotripsy;  Surgeon: Elton Wright MD;  Location: [facility] CARDIAC CATH LAB    CV INSTANTANEOUS WAVE-FREE RATIO N/A 1/26/2024    Procedure: Instantaneous Wave-Free Ratio;  Surgeon: Elton Wright MD;  Location: [facility] CARDIAC CATH LAB    CV INTRAVASULAR ULTRASOUND N/A 3/8/2024    Procedure: Intravascular Ultrasound;  Surgeon: Elton Wright MD;  Location: [facility] CARDIAC CATH LAB    CV PCI N/A 3/8/2024    Procedure: Percutaneous Coronary Intervention;  Surgeon: Elton Wright MD;  Location: [facility] CARDIAC CATH LAB    CV RIGHT HEART CATH MEASUREMENTS RECORDED N/A 1/26/2024    Procedure: Right Heart Catheterization;  Surgeon: Elton Wright MD;  Location: [facility] CARDIAC CATH LAB    EP ABLATION PULMONARY VEIN ISOLATION N/A 5/30/2024    Procedure: Ablation Atrial Fibrillation;  Surgeon: Sandee Sheldon MD;  Location: [facility] CV    ESOPHAGOSCOPY, GASTROSCOPY, DUODENOSCOPY (EGD), COMBINED N/A 1/28/2021    Procedure: ESOPHAGOGASTRODUODENOSCOPY, WITH BIOPSY;  Surgeon: Mihir Lang MD;  Location: [facility] OR    OPEN REDUCTION INTERNAL FIXATION FOOT Left 1/5/2023    Procedure: LISFRANC OPEN REDUCTION INTERNAL FIXATION, LEFT FOOT;  Surgeon: Bailey Ruiz DPM;  Location:  OR    SURGICAL HISTORY OF -       surgery on left index finger        CURRENT MEDICATIONS:     Current Outpatient Medications   Medication Sig Dispense Refill    apixaban ANTICOAGULANT (ELIQUIS ANTICOAGULANT) 5 MG tablet Take 1 tablet (5 mg) by mouth 2 times daily 180 tablet 3    atorvastatin (LIPITOR) 40 MG tablet TAKE 1 TABLET BY MOUTH EVERY DAY 90 tablet 2    busPIRone (BUSPAR)  10 MG tablet Take 2 tablets (20 mg) by mouth 2 times daily 360 tablet 4    Cholecalciferol (VITAMIN D PO) Take 5,000 Units by mouth daily One tablet twice daily      co-enzyme Q-10 100 MG CAPS capsule Take 100 mg by mouth daily      hydroCHLOROthiazide (HYDRODIURIL) 25 MG tablet Take 1 tablet (25 mg) by mouth daily. 90 tablet 3    hydrOXYzine HCl (ATARAX) 10 MG tablet Take 1 tablet (10 mg) by mouth 2 times daily as needed for anxiety 180 tablet 5    insulin pen needle (32G X 6 MM) 32G X 6 MM miscellaneous Use 100 pen needles daily or as directed. 100 each 3    lisinopril (ZESTRIL) 20 MG tablet Take 1 tablet (20 mg) by mouth daily 90 tablet 3    metFORMIN (GLUCOPHAGE XR) 500 MG 24 hr tablet Take 1 tablet (500 mg) by mouth daily (with dinner). 90 tablet 3    metoprolol tartrate (LOPRESSOR) 25 MG tablet Take 1 tablet (25 mg) by mouth every 8 hours as needed (Atrial fibrillation with RVR, HR > 120) 30 tablet 1    MULTI VITAMIN MENS PO Take 1 tablet by mouth daily      pramipexole (MIRAPEX) 0.25 MG tablet Take 1 tablet (0.25 mg) by mouth at bedtime. 90 tablet 2    Semaglutide, 2 MG/DOSE, (OZEMPIC) 8 MG/3ML pen Inject 2 mg subcutaneously every 7 days.      sildenafil (VIAGRA) 100 MG tablet Take 1 tablet (100 mg) by mouth daily as needed. 20 tablet 4    ticagrelor (BRILINTA) 90 MG tablet Take 1 tablet (90 mg) by mouth 2 times daily Start this evening. 180 tablet 3    vitamin B complex with vitamin C (STRESS TAB) tablet Take 1 tablet by mouth daily          ALLERGIES:   No Known Allergies     FAMILY HISTORY:     Family History   Problem Relation Age of Onset    Cancer Mother         uterine    Other Cancer Mother         endometrial    Cerebrovascular Disease Mother     Substance Abuse Mother         Alcohol    C.A.D. Father     Hypertension Father     Hyperlipidemia Father     Breast Cancer Maternal Grandmother     Other Cancer Maternal Grandmother         Lung/brain    Substance Abuse Paternal Grandfather     Hypertension  Brother     Substance Abuse Brother         Alcohol    Hyperlipidemia Brother     Breast Cancer Other     Breast Cancer Cousin     Other Cancer Other     Cerebrovascular Disease Other         Maternal Aunt    Glaucoma No family hx of     Macular Degeneration No family hx of         SOCIAL HISTORY:     Social History     Socioeconomic History    Marital status:      Spouse name: Luzma    Number of children: 3    Years of education: 14    Highest education level: None   Occupational History    Occupation: [employer]     Employer: OTHER     Comment:    Tobacco Use    Smoking status: Former     Current packs/day: 0.00     Average packs/day: 1 pack/day for 25.0 years (25.0 ttl pk-yrs)     Types: Cigarettes, Cigars     Start date: 1973     Quit date: 1998     Years since quittin.8     Passive exposure: Past    Smokeless tobacco: Never    Tobacco comments:     N/A not a smoker   Vaping Use    Vaping status: Never Used   Substance and Sexual Activity    Alcohol use: Yes     Alcohol/week: 10.0 standard drinks of alcohol     Comment: less than 6 per week    Drug use: Not Currently     Types: Amphetamines     Comment: Kratum    Sexual activity: Not Currently     Partners: Female     Birth control/protection: Female Surgical, None     Comment:    Other Topics Concern    Parent/sibling w/ CABG, MI or angioplasty before 65F 55M? Yes     Comment: Father age 50 bypass. Grandfather death in age 40 s heart     Social Drivers of Health     Financial Resource Strain: Low Risk  (3/19/2024)    Financial Resource Strain     Within the past 12 months, have you or your family members you live with been unable to get utilities (heat, electricity) when it was really needed?: No   Food Insecurity: High Risk (3/19/2024)    Food Insecurity     Within the past 12 months, did you worry that your food would run out before you got money to buy more?: No     Within the past 12 months, did the food you bought  just not last and you didn t have money to get more?: Yes   Transportation Needs: Low Risk  (3/19/2024)    Transportation Needs     Within the past 12 months, has lack of transportation kept you from medical appointments, getting your medicines, non-medical meetings or appointments, work, or from getting things that you need?: No   Physical Activity: Inactive (3/19/2024)    Exercise Vital Sign     Days of Exercise per Week: 0 days     Minutes of Exercise per Session: 0 min   Stress: No Stress Concern Present (3/19/2024)    [employer] - Occupational Stress Questionnaire     Feeling of Stress : Not at all   Social Connections: Moderately Isolated (3/19/2024)    Social Connection and Isolation Panel [NHANES]     Frequency of Communication with Friends and Family: Three times a week     Frequency of Social Gatherings with Friends and Family: Once a week     Attends Oriental orthodox Services: Never     Active Member of Clubs or Organizations: No     Attends Club or Organization Meetings: Never     Marital Status:    Interpersonal Safety: Low Risk  (12/6/2023)    Interpersonal Safety     Do you feel physically and emotionally safe where you currently live?: Yes     Within the past 12 months, have you been hit, slapped, kicked or otherwise physically hurt by someone?: No     Within the past 12 months, have you been humiliated or emotionally abused in other ways by your partner or ex-partner?: No   Housing Stability: Low Risk  (3/19/2024)    Housing Stability     Do you have housing? : Yes     Are you worried about losing your housing?: No        REVIEW OF SYSTEMS:     Constitutional: No fevers or chills  Skin: No new rash or itching  Eyes: No acute change in vision  Ears/Nose/Throat: No purulent rhinorrhea, new hearing loss, or new vertigo  Respiratory: No cough or hemoptysis  Cardiovascular: See HPI  Gastrointestinal: No change in appetite, vomiting, hematemesis or diarrhea  Genitourinary: No  dysuria or hematuria  Musculoskeletal: No new back pain, neck pain or muscle pain  Neurologic: No new headaches, focal weakness or behavior changes  Psychiatric: No hallucinations, excessive alcohol consumption or illegal drug usage  Hematologic/Lymphatic/Immunologic: No bleeding, chills, fever, night sweats or weight loss  Endocrine: No new cold intolerance, heat intolerance, polyphagia, polydipsia or polyuria      PHYSICAL EXAMINATION:     /74 (BP Location: Right arm, Patient Position: Chair, Cuff Size: Adult Large)   Pulse 55   Wt (!) 150.1 kg (331 lb)   SpO2 97%   BMI 47.49 kg/m      GENERAL: No acute distress.  HEENT: EOMI. Sclerae white, not injected. Nares clear. Pharynx without erythema or exudate.   Neck: No adenopathy. No thyromegaly. No jugular venous distension.   Heart: Regular rate and rhythm. No murmur.   Lungs: Clear to auscultation. No ronchi, wheezes, rales.   Abdomen: Soft, nontender, nondistended. Bowel sounds present.  Extremities: No clubbing, cyanosis, or edema.   Neurologic: Alert and oriented to person/place/time, normal speech and affect. No focal deficits.  Skin: No petechiae, purpura or rash.     LABORATORY DATA:     LIPID RESULTS:  Lab Results   Component Value Date    CHOL 163 09/11/2024    CHOL 161 04/23/2021    HDL 48 09/11/2024    HDL 79 04/23/2021    LDL 87 09/11/2024    LDL 65 04/23/2021    TRIG 140 09/11/2024    TRIG 87 04/23/2021    CHOLHDLRATIO 3.1 07/13/2015       LIVER ENZYME RESULTS:  Lab Results   Component Value Date    AST 22 09/11/2024    AST 15 08/02/2020    ALT 18 09/11/2024    ALT 27 08/02/2020       CBC RESULTS:  Lab Results   Component Value Date    WBC 6.8 09/11/2024    WBC 7.1 12/15/2020    RBC 4.36 (L) 09/11/2024    RBC 5.24 12/15/2020    HGB 12.9 (L) 09/11/2024    HGB 15.7 12/15/2020    HCT 39.3 (L) 09/11/2024    HCT 47.0 12/15/2020    MCV 90 09/11/2024    MCV 90 12/15/2020    MCH 29.6 09/11/2024    MCH 30.0 12/15/2020    MCHC 32.8 09/11/2024    MCHC  33.4 12/15/2020    RDW 13.1 09/11/2024    RDW 13.2 12/15/2020     09/11/2024     12/15/2020       BMP RESULTS:  Lab Results   Component Value Date     10/23/2024     08/02/2020    POTASSIUM 4.7 10/23/2024    POTASSIUM 4.1 01/05/2023    POTASSIUM 4.0 08/02/2020    CHLORIDE 102 10/23/2024    CHLORIDE 100 12/29/2022    CHLORIDE 105 08/02/2020    CO2 25 10/23/2024    CO2 30 12/29/2022    CO2 28 08/02/2020    ANIONGAP 9 10/23/2024    ANIONGAP 6 12/29/2022    ANIONGAP 5 08/02/2020     (H) 10/23/2024     (H) 12/29/2022     (A) 04/21/2021    BUN 24.1 (H) 10/23/2024    BUN 17 12/29/2022    BUN 28 08/02/2020    CR 1.26 (H) 10/23/2024    CR 1.17 08/02/2020    GFRESTIMATED 63 10/23/2024    GFRESTIMATED 66 08/02/2020    GFRESTBLACK 77 08/02/2020    SHAHID 9.7 10/23/2024    SHAHID 8.9 08/02/2020        A1C RESULTS:  Lab Results   Component Value Date    A1C 5.4 09/11/2024       INR RESULTS:  Lab Results   Component Value Date    INR 0.99 03/22/2024    INR 1.07 03/18/2024    INR 0.96 08/18/2007          PROCEDURES & FURTHER ASSESSMENTS:   Reviewed      CLINICAL IMPRESSION:   Car Torres is a very pleasant 64 year old year male with a history of paroxysmal atrial fibrillation s/p PVI ablation (5/30/24) w/[id number] is 3 (age, CAD, HTN) and HADBLED of 3 (age, meds and bleeding dispo) with intolerance to anticoagulation due to bruising and bleeding easily who presents for evaluation of left atrial appendage closure. Additional medical history notable for CAD s/p PCI to LAD (3/8/24), TIA on CPAP, HTN, HLD, CKD.     He is currently on Brilinta and Eliquis. He bruises and bleeds easily, even just a scratch, he will bleed profusely and hard to get the bleeding to stop. 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 Patient appears to be an appropriate candidate given elevated risk of stroke and inability to tolerate anticoagulation.     Plan Summary:  1) A-fib with  elevated [id number], inability to tolerate anticoagulation  - Cardiac CT to evaluate CHANEL dimensions   - If no evidence of CHANEL thrombus and anatomy is suitable for left atrial appendage closure, we will proceed with Watchman implantation  - Following implantation, ASA and Plavix will be instituted x 6 months   - 45 days following Watchman device implantation, patient will return for NESTOR/CT to evaluate endothelialization of the device. If adequate seal is assessed (<5mm) without DRT, dual antiplatelet therapy with clopidogrel and aspirin will be continued for total of 6 months from implantation date, with aspirin then continuing lifelong.      Patient was evaluated in clinic with Dr. Wright .    KLAUS Goel, CNP  [facility] Cardiology Team    A total of 45 minutes spent face to face with patient and > 50% of the time spent counseling and coordinating care.       CC  Patient Care Team:  Iram Perez MD as PCP - General (Internal Medicine - Pediatrics)  Taniya Estrada NP as Assigned Behavioral Health Provider  Taniya Estrada NP as Nurse Practitioner (Psychiatry)  Miguel Varela MD as MD (Neurology)  Iram Perez MD as Assigned PCP  Bailey Ruiz DPM as Assigned Surgical Provider  Beka Santillan DO as Assigned Musculoskeletal Provider  ZANA Fair MD as MD (Cardiovascular Disease)  Elton Wright MD as MD (Cardiovascular Disease)  Soila Millard APRN CNP as Nurse Practitioner (Cardiology)  Sandee Sheldon MD as MD (Cardiovascular Disease)  Kristine Gamez APRN CNP as Nurse Practitioner (Cardiology)  Pito Gonzalez MD as Assigned Neuroscience Provider  Soila Millard APRN CNP as Assigned Heart and Vascular Provider  Ruthie Clayton RN as Registered Nurse  IRAM PEREZ

## 2024-11-21 NOTE — NURSING NOTE
Chief Complaint   Patient presents with    New Patient     NEW LAAO       Vitals were taken, medications reconciled, and EKG was performed.    Hitesh Potter, EMT  3:43 PM

## 2024-11-21 NOTE — LETTER
11/21/2024      RE: Car Torres  [address]  [place] [address]       Dear Colleague,    Thank you for the opportunity to participate in the care of your patient, Car Torres, at the [facility] HEART CLINIC [place] at [facility]. Please see a copy of my visit note below.           [facility] HEART CARE  CARDIOVASCULAR DIVISION    VALVE CLINIC CONSULTATION        PERTINENT CLINICAL HISTORY & REASON FOR CONSULTATION:   Car Torres is a very pleasant 64 year old year male with a history of paroxysmal atrial fibrillation s/p PVI ablation (5/30/24) [id number] is 3 (age, CAD, HTN) and HADBLED of 3 (age, meds and bleeding dispo) with intolerance to anticoagulation due to bruising and bleeding easily who presents for evaluation of left atrial appendage closure. Additional medical history notable for CAD s/p PCI to LAD (3/8/24), TIA on CPAP, HTN, HLD, CKD.     Doing well. No major cardiac symptoms. He is currently on Brilinta and Eliquis. He bruises and bleeds easily, even just a scratch, he will bleed profusely and hard to get the bleeding to stop. 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 He is very interested in LAAO as an alternative to long term anticoagulation.        PAST MEDICAL HISTORY:     Past Medical History:   Diagnosis Date     CKD (chronic kidney disease) stage 2, GFR 60-89 ml/min 10/21/2010    60 to 80 - have discussed Lisinopril - will consider        Coronary artery disease 01/26/2024     Dementia (H)      Depressive disorder      Diverticulosis of large intestine without hemorrhage 11/18/2015    Incidental finding on CT scan        Heart disease 05/08/2018     Hypercholesterolemia      Hypertension goal BP (blood pressure) < 140/90      Nonspecific abnormal electrocardiogram (ECG) (EKG) 08/23/2007    Stress testing normal.      Paroxysmal atrial fibrillation (H) 03/17/2024     Sinus node dysfunction (H)  05/23/2024     Sleep apnea     uses a c-pap, severe     Status post coronary angiogram 01/26/2024        PAST SURGICAL HISTORY:     Past Surgical History:   Procedure Laterality Date     COLONOSCOPY N/A 12/22/2020    Procedure: COLONOSCOPY, WITH POLYPECTOMY, CLIP;  Surgeon: Mihir Lang MD;  Location: [facility] OR     COLONOSCOPY N/A 1/10/2022    Procedure: COLONOSCOPY, WITH POLYPECTOMY AND BIOPSY;  Surgeon: Mihir Lang MD;  Location:  GI     CV CORONARY ANGIOGRAM N/A 1/26/2024    Procedure: Coronary Angiogram;  Surgeon: Elton Wright MD;  Location:  HEART CARDIAC CATH LAB     CV CORONARY LITHOTRIPSY PCI N/A 3/8/2024    Procedure: Percutaneous Coronary Intervention - Lithotripsy;  Surgeon: Elton Wright MD;  Location: [facility] CARDIAC CATH LAB     CV INSTANTANEOUS WAVE-FREE RATIO N/A 1/26/2024    Procedure: Instantaneous Wave-Free Ratio;  Surgeon: Elton Wright MD;  Location: [facility] CARDIAC CATH LAB     CV INTRAVASULAR ULTRASOUND N/A 3/8/2024    Procedure: Intravascular Ultrasound;  Surgeon: Elton Wright MD;  Location:  HEART CARDIAC CATH LAB     CV PCI N/A 3/8/2024    Procedure: Percutaneous Coronary Intervention;  Surgeon: Elton Wright MD;  Location: [facility] CARDIAC CATH LAB     CV RIGHT HEART CATH MEASUREMENTS RECORDED N/A 1/26/2024    Procedure: Right Heart Catheterization;  Surgeon: Elton Wright MD;  Location: [facility] CARDIAC CATH LAB     EP ABLATION PULMONARY VEIN ISOLATION N/A 5/30/2024    Procedure: Ablation Atrial Fibrillation;  Surgeon: Sandee Sheldon MD;  Location: [facility] CV     ESOPHAGOSCOPY, GASTROSCOPY, DUODENOSCOPY (EGD), COMBINED N/A 1/28/2021    Procedure: ESOPHAGOGASTRODUODENOSCOPY, WITH BIOPSY;  Surgeon: Mihir Lang MD;  [place] [facility] [place]     OPEN REDUCTION INTERNAL FIXATION FOOT Left 1/5/2023    Procedure: LISFRANC OPEN REDUCTION INTERNAL FIXATION, LEFT FOOT;  Surgeon: Bailey Ruiz DPM;  Location:  OR     SURGICAL  HISTORY OF -       surgery on left index finger        CURRENT MEDICATIONS:     Current Outpatient Medications   Medication Sig Dispense Refill     apixaban ANTICOAGULANT (ELIQUIS ANTICOAGULANT) 5 MG tablet Take 1 tablet (5 mg) by mouth 2 times daily 180 tablet 3     atorvastatin (LIPITOR) 40 MG tablet TAKE 1 TABLET BY MOUTH EVERY DAY 90 tablet 2     busPIRone (BUSPAR) 10 MG tablet Take 2 tablets (20 mg) by mouth 2 times daily 360 tablet 4     Cholecalciferol (VITAMIN D PO) Take 5,000 Units by mouth daily One tablet twice daily       co-enzyme Q-10 100 MG CAPS capsule Take 100 mg by mouth daily       hydroCHLOROthiazide (HYDRODIURIL) 25 MG tablet Take 1 tablet (25 mg) by mouth daily. 90 tablet 3     hydrOXYzine HCl (ATARAX) 10 MG tablet Take 1 tablet (10 mg) by mouth 2 times daily as needed for anxiety 180 tablet 5     insulin pen needle (32G X 6 MM) 32G X 6 MM miscellaneous Use 100 pen needles daily or as directed. 100 each 3     lisinopril (ZESTRIL) 20 MG tablet Take 1 tablet (20 mg) by mouth daily 90 tablet 3     metFORMIN (GLUCOPHAGE XR) 500 MG 24 hr tablet Take 1 tablet (500 mg) by mouth daily (with dinner). 90 tablet 3     metoprolol tartrate (LOPRESSOR) 25 MG tablet Take 1 tablet (25 mg) by mouth every 8 hours as needed (Atrial fibrillation with RVR, HR > 120) 30 tablet 1     MULTI VITAMIN MENS PO Take 1 tablet by mouth daily       pramipexole (MIRAPEX) 0.25 MG tablet Take 1 tablet (0.25 mg) by mouth at bedtime. 90 tablet 2     Semaglutide, 2 MG/DOSE, (OZEMPIC) 8 MG/3ML pen Inject 2 mg subcutaneously every 7 days.       sildenafil (VIAGRA) 100 MG tablet Take 1 tablet (100 mg) by mouth daily as needed. 20 tablet 4     ticagrelor (BRILINTA) 90 MG tablet Take 1 tablet (90 mg) by mouth 2 times daily Start this evening. 180 tablet 3     vitamin B complex with vitamin C (STRESS TAB) tablet Take 1 tablet by mouth daily          ALLERGIES:   No Known Allergies     FAMILY HISTORY:     Family History   Problem Relation  Age of Onset     Cancer Mother         uterine     Other Cancer Mother         endometrial     Cerebrovascular Disease Mother      Substance Abuse Mother         Alcohol     C.A.D. Father      Hypertension Father      Hyperlipidemia Father      Breast Cancer Maternal Grandmother      Other Cancer Maternal Grandmother         Lung/brain     Substance Abuse Paternal Grandfather      Hypertension Brother      Substance Abuse Brother         Alcohol     Hyperlipidemia Brother      Breast Cancer Other      Breast Cancer Cousin      Other Cancer Other      Cerebrovascular Disease Other         Maternal Aunt     Glaucoma No family hx of      Macular Degeneration No family hx of         SOCIAL HISTORY:     Social History     Socioeconomic History     Marital status:      Spouse name: Luzma     Number of children: 3     Years of education: 14     Highest education level: None   Occupational History     Occupation: [employer]     Employer: OTHER     Comment: [employer]   Tobacco Use     Smoking status: Former     Current packs/day: 0.00     Average packs/day: 1 pack/day for 25.0 years (25.0 ttl pk-yrs)     Types: Cigarettes, Cigars     Start date: 1973     Quit date: 1998     Years since quittin.8     Passive exposure: Past     Smokeless tobacco: Never     Tobacco comments:     N/A not a smoker   Vaping Use     Vaping status: Never Used   Substance and Sexual Activity     Alcohol use: Yes     Alcohol/week: 10.0 standard drinks of alcohol     Comment: less than 6 per week     Drug use: Not Currently     Types: Amphetamines     Comment: Kratum     Sexual activity: Not Currently     Partners: Female     Birth control/protection: Female Surgical, None     Comment:    Other Topics Concern     Parent/sibling w/ CABG, MI or angioplasty before 65F 55M? Yes     Comment: Father age 50 bypass. Grandfather death in age 40 s heart     Social Drivers of Health     Financial Resource Strain: Low Risk   (3/19/2024)    Financial Resource Strain      Within the past 12 months, have you or your family members you live with been unable to get utilities (heat, electricity) when it was really needed?: No   Food Insecurity: High Risk (3/19/2024)    Food Insecurity      Within the past 12 months, did you worry that your food would run out before you got money to buy more?: No      Within the past 12 months, did the food you bought just not last and you didn t have money to get more?: Yes   Transportation Needs: Low Risk  (3/19/2024)    Transportation Needs      Within the past 12 months, has lack of transportation kept you from medical appointments, getting your medicines, non-medical meetings or appointments, work, or from getting things that you need?: No   Physical Activity: Inactive (3/19/2024)    Exercise Vital Sign      Days of Exercise per Week: 0 days      Minutes of Exercise per Session: 0 min   Stress: No Stress Concern Present (3/19/2024)    [employer] [employer] - Occupational Stress Questionnaire      Feeling of Stress : Not at all   Social Connections: Moderately Isolated (3/19/2024)    Social Connection and Isolation Panel [NHANES]      Frequency of Communication with Friends and Family: Three times a week      Frequency of Social Gatherings with Friends and Family: Once a week      Attends Confucianism Services: Never      Active Member of Clubs or Organizations: No      Attends Club or Organization Meetings: Never      Marital Status:    Interpersonal Safety: Low Risk  (12/6/2023)    Interpersonal Safety      Do you feel physically and emotionally safe where you currently live?: Yes      Within the past 12 months, have you been hit, slapped, kicked or otherwise physically hurt by someone?: No      Within the past 12 months, have you been humiliated or emotionally abused in other ways by your partner or ex-partner?: No   Housing Stability: Low Risk  (3/19/2024)    Housing Stability       Do you have housing? : Yes      Are you worried about losing your housing?: No        REVIEW OF SYSTEMS:     Constitutional: No fevers or chills  Skin: No new rash or itching  Eyes: No acute change in vision  Ears/Nose/Throat: No purulent rhinorrhea, new hearing loss, or new vertigo  Respiratory: No cough or hemoptysis  Cardiovascular: See HPI  Gastrointestinal: No change in appetite, vomiting, hematemesis or diarrhea  Genitourinary: No dysuria or hematuria  Musculoskeletal: No new back pain, neck pain or muscle pain  Neurologic: No new headaches, focal weakness or behavior changes  Psychiatric: No hallucinations, excessive alcohol consumption or illegal drug usage  Hematologic/Lymphatic/Immunologic: No bleeding, chills, fever, night sweats or weight loss  Endocrine: No new cold intolerance, heat intolerance, polyphagia, polydipsia or polyuria      PHYSICAL EXAMINATION:     /74 (BP Location: Right arm, Patient Position: Chair, Cuff Size: Adult Large)   Pulse 55   Wt (!) 150.1 kg (331 lb)   SpO2 97%   BMI 47.49 kg/m      GENERAL: No acute distress.  HEENT: EOMI. Sclerae white, not injected. Nares clear. Pharynx without erythema or exudate.   Neck: No adenopathy. No thyromegaly. No jugular venous distension.   Heart: Regular rate and rhythm. No murmur.   Lungs: Clear to auscultation. No ronchi, wheezes, rales.   Abdomen: Soft, nontender, nondistended. Bowel sounds present.  Extremities: No clubbing, cyanosis, or edema.   Neurologic: Alert and oriented to person/place/time, normal speech and affect. No focal deficits.  Skin: No petechiae, purpura or rash.     LABORATORY DATA:     LIPID RESULTS:  Lab Results   Component Value Date    CHOL 163 09/11/2024    CHOL 161 04/23/2021    HDL 48 09/11/2024    HDL 79 04/23/2021    LDL 87 09/11/2024    LDL 65 04/23/2021    TRIG 140 09/11/2024    TRIG 87 04/23/2021    CHOLHDLRATIO 3.1 07/13/2015       LIVER ENZYME RESULTS:  Lab Results   Component Value Date    AST 22  09/11/2024    AST 15 08/02/2020    ALT 18 09/11/2024    ALT 27 08/02/2020       CBC RESULTS:  Lab Results   Component Value Date    WBC 6.8 09/11/2024    WBC 7.1 12/15/2020    RBC 4.36 (L) 09/11/2024    RBC 5.24 12/15/2020    HGB 12.9 (L) 09/11/2024    HGB 15.7 12/15/2020    HCT 39.3 (L) 09/11/2024    HCT 47.0 12/15/2020    MCV 90 09/11/2024    MCV 90 12/15/2020    MCH 29.6 09/11/2024    MCH 30.0 12/15/2020    MCHC 32.8 09/11/2024    MCHC 33.4 12/15/2020    RDW 13.1 09/11/2024    RDW 13.2 12/15/2020     09/11/2024     12/15/2020       BMP RESULTS:  Lab Results   Component Value Date     10/23/2024     08/02/2020    POTASSIUM 4.7 10/23/2024    POTASSIUM 4.1 01/05/2023    POTASSIUM 4.0 08/02/2020    CHLORIDE 102 10/23/2024    CHLORIDE 100 12/29/2022    CHLORIDE 105 08/02/2020    CO2 25 10/23/2024    CO2 30 12/29/2022    CO2 28 08/02/2020    ANIONGAP 9 10/23/2024    ANIONGAP 6 12/29/2022    ANIONGAP 5 08/02/2020     (H) 10/23/2024     (H) 12/29/2022     (A) 04/21/2021    BUN 24.1 (H) 10/23/2024    BUN 17 12/29/2022    BUN 28 08/02/2020    CR 1.26 (H) 10/23/2024    CR 1.17 08/02/2020    GFRESTIMATED 63 10/23/2024    GFRESTIMATED 66 08/02/2020    GFRESTBLACK 77 08/02/2020    SHAHID 9.7 10/23/2024    SHAHID 8.9 08/02/2020        A1C RESULTS:  Lab Results   Component Value Date    A1C 5.4 09/11/2024       INR RESULTS:  Lab Results   Component Value Date    INR 0.99 03/22/2024    INR 1.07 03/18/2024    INR 0.96 08/18/2007          PROCEDURES & FURTHER ASSESSMENTS:   Reviewed      CLINICAL IMPRESSION:   Car Torres is a very pleasant 64 year old year male with a history of paroxysmal atrial fibrillation s/p PVI ablation (5/30/24) [id number] is 3 (age, CAD, HTN) and HADBLED of 3 (age, meds and bleeding dispo) with intolerance to anticoagulation due to bruising and bleeding easily who presents for evaluation of left atrial appendage closure. Additional medical history notable  for CAD s/p PCI to LAD (3/8/24), TIA on CPAP, HTN, HLD, CKD.     He is currently on Brilinta and Eliquis. He bruises and bleeds easily, even just a scratch, he will bleed profusely and hard to get the bleeding to stop. 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 Patient appears to be an appropriate candidate given elevated risk of stroke and inability to tolerate anticoagulation.     Plan Summary:  1) A-fib with elevated [id number], inability to tolerate anticoagulation  - Cardiac CT to evaluate CHANEL dimensions   - If no evidence of CHANEL thrombus and anatomy is suitable for left atrial appendage closure, we will proceed with Watchman implantation  - Following implantation, ASA and Plavix will be instituted x 6 months   - 45 days following Watchman device implantation, patient will return for NESTOR/CT to evaluate endothelialization of the device. If adequate seal is assessed (<5mm) without DRT, dual antiplatelet therapy with clopidogrel and aspirin will be continued for total of 6 months from implantation date, with aspirin then continuing lifelong.      Patient was evaluated in clinic with Dr. Wright .    KLAUS Goel, CNP  [facility] Cardiology Team    A total of 45 minutes spent face to face with patient and > 50% of the time spent counseling and coordinating care.       CC  Patient Care Team:  Mihir Perez MD as PCP - General (Internal Medicine - Pediatrics)  Taniya Estrada NP as Assigned Behavioral Health Provider  Taniya Estrada NP as Nurse Practitioner (Psychiatry)  Miguel Varela MD as MD (Neurology)  Mihir Perez MD as Assigned PCP  Bailey Ruiz DPM as Assigned Surgical Provider  Beka Santillan DO as Assigned Musculoskeletal Provider  ZANA Fair MD as MD (Cardiovascular Disease)  Elton Wright MD as MD (Cardiovascular Disease)  Soila Millard APRN CNP as Nurse Practitioner (Cardiology)  Sandee Sheldon MD as MD  (Cardiovascular Disease)  Kristine Gamez APRN CNP as Nurse Practitioner (Cardiology)  Pito Gonzalez MD as Assigned Neuroscience Provider  Soila Millard APRN CNP as Assigned Heart and Vascular Provider  Ruthie Clayton RN as Registered Nurse  IRAM KINNEY       Please do not hesitate to contact me if you have any questions/concerns.     Sincerely,     Elton Wright MD

## 2024-11-21 NOTE — PATIENT INSTRUCTIONS
You were seen today in the Cardiovascular Clinic at the [facility].      Cardiology Providers you saw during your visit:  Dr. Wirght    Recommendations:    CT heart  We will send results to the company and then notify you if we will proceed with Watchman    Follow-up:    Once all your testing is complete, I will present your case at valve conference. (Valve conference is held every Thursday. I will contact you either Thursday or Friday with the results)    Nursing questions:  Ruthie Clayton RN;  Tutor Assignment messages are great! Otherwise [phone] if you don't hear back within 24 hrs please call back.   For emergencies call 911.      Thank you for your visit today!   Ruthie Clayton RN  Lead Structural Heart Coordinator  TAV[employer], [employer] and Watchman

## 2024-11-22 LAB
ATRIAL RATE - MUSE: 53 BPM
DIASTOLIC BLOOD PRESSURE - MUSE: NORMAL MMHG
INTERPRETATION ECG - MUSE: NORMAL
P AXIS - MUSE: 63 DEGREES
PR INTERVAL - MUSE: 184 MS
QRS DURATION - MUSE: 122 MS
QT - MUSE: 442 MS
QTC - MUSE: 414 MS
R AXIS - MUSE: 19 DEGREES
SYSTOLIC BLOOD PRESSURE - MUSE: NORMAL MMHG
T AXIS - MUSE: 42 DEGREES
VENTRICULAR RATE- MUSE: 53 BPM

## 2024-12-02 ENCOUNTER — HOSPITAL ENCOUNTER (OUTPATIENT)
Dept: CT IMAGING | Facility: CLINIC | Age: 64
Discharge: HOME | End: 2024-12-02
Attending: NURSE PRACTITIONER | Admitting: NURSE PRACTITIONER
Payer: MEDICARE

## 2024-12-02 DIAGNOSIS — I48.0 PAROXYSMAL ATRIAL FIBRILLATION (H): ICD-10-CM

## 2024-12-02 PROCEDURE — 75572 CT HRT W/3D IMAGE: CPT | Mod: MG

## 2024-12-02 PROCEDURE — G1010 CDSM STANSON: HCPCS | Performed by: INTERNAL MEDICINE

## 2024-12-02 PROCEDURE — G1010 CDSM STANSON: HCPCS

## 2024-12-02 PROCEDURE — 75572 CT HRT W/3D IMAGE: CPT | Mod: 26 | Performed by: INTERNAL MEDICINE

## 2024-12-02 PROCEDURE — 250N000011 HC RX IP 250 OP 636: Performed by: INTERNAL MEDICINE

## 2024-12-02 RX ORDER — IOPAMIDOL 755 MG/ML
110 INJECTION, SOLUTION INTRAVASCULAR ONCE
Status: COMPLETED | OUTPATIENT
Start: 2024-12-02 | End: 2024-12-02

## 2024-12-02 RX ADMIN — IOPAMIDOL 110 ML: 755 INJECTION, SOLUTION INTRAVENOUS at 13:54

## 2024-12-19 ENCOUNTER — TELEPHONE (OUTPATIENT)
Dept: CARDIOLOGY | Facility: CLINIC | Age: 64
End: 2024-12-19
Payer: COMMERCIAL

## 2024-12-19 NOTE — TELEPHONE ENCOUNTER
Telephone call to patient. Left voicemail to give us a call back with regards to next steps with Watchman procedure.        Ruthie Clayton RN on 12/19/2024 at 2:46 PM

## 2024-12-23 ENCOUNTER — CARE COORDINATION (OUTPATIENT)
Dept: CARDIOLOGY | Facility: CLINIC | Age: 64
End: 2024-12-23
Payer: COMMERCIAL

## 2024-12-23 DIAGNOSIS — I48.0 PAROXYSMAL ATRIAL FIBRILLATION (H): Primary | ICD-10-CM

## 2024-12-23 NOTE — PROGRESS NOTES
Return telephone call to discuss scheduling Watchman. Discussed with patient's wife, Destiny, about Watchman and all other appointments. Will get Jerry scheduled for 1/23 and will send information in [employer].    Ruthie Clayton RN on 12/23/2024 at 10:33 AM

## 2024-12-26 ENCOUNTER — CARE COORDINATION (OUTPATIENT)
Dept: CARDIOLOGY | Facility: CLINIC | Age: 64
End: 2024-12-26
Payer: COMMERCIAL

## 2024-12-26 DIAGNOSIS — I48.0 PAROXYSMAL ATRIAL FIBRILLATION (H): Primary | ICD-10-CM

## 2024-12-26 RX ORDER — SODIUM CHLORIDE 9 MG/ML
1000 INJECTION, SOLUTION INTRAVENOUS CONTINUOUS
OUTPATIENT
Start: 2024-12-26

## 2024-12-26 RX ORDER — LIDOCAINE 40 MG/G
CREAM TOPICAL
OUTPATIENT
Start: 2024-12-26

## 2024-12-26 RX ORDER — CEFAZOLIN SODIUM IN 0.9 % NACL 3 G/100 ML
3 INTRAVENOUS SOLUTION, PIGGYBACK (ML) INTRAVENOUS
OUTPATIENT
Start: 2024-12-26

## 2024-12-26 RX ORDER — ASPIRIN 325 MG
325 TABLET, DELAYED RELEASE (ENTERIC COATED) ORAL ONCE
OUTPATIENT
Start: 2024-12-26 | End: 2024-12-26

## 2024-12-26 NOTE — PATIENT INSTRUCTIONS
LOIS     Check in to the hospital, at 8:30 am on 1/23/25      Please disregard any [employer] messages or reminders in regards to ECHO scheduling, this is done as part of procedure.   If you are unsure if your check in time has changed, please call number [phone].    3rd floor: [address],     [facility], [place]   [address] [place]  [address]        parking is available in front of the hospital, 24 hours a day  -  Stop at the Information Desk and the admissions desk in the lobby.    -------------------------------------------------------------------------  Nothing to eat after midnight  Clear liquids like water, apple juice or 7up/Sprite is OK up to two hours prior to your scheduled arrival time.    You may take your medications in the morning with a sip of water.    * Take a shower, using Chlorhexidine Gluconate (CHG) soap, Hibiclens, the night before the procedure, and the morning of the procedure.    Shower using your usual soap and shampoo.  Turn off the water or move away from the shower stream.  Apply Hibiclens directly on your skin or on a wet washcloth, rub thoroughly for 5 minutes, from neck to groin, avoiding the face and genital area.  Rise soap off, do not wash with regular soap afterwards.    If you did NOT receive these Hibiclens soap at your pre-op History and Physical, then:   *Chlorhexidine Gluconate (CHG) soap is available free of charge at any [facility] pharmacy  *Antibacterial soap can be used      Contrast Allergy:   No    Medications Instructions:      Please TAKE the following NEW medications:  325 mg Asprin: Please take day before and day of procedure (if you normally take 81 mg of Asprin, please take 4 pills total to equal 324 mg.)    Please TAKE the following medications morning of procedure as prescribed:    atorvastatin (LIPITOR)    busPIRone (BUSPAR)    metoprolol tartrate (LOPRESSOR)    Please HOLD the following medications morning of  procedure:    hydroCHLOROthiazide (HYDRODIURIL)    sildenafil (VIAGRA)    Hydroxyzine (atarax)    Cholecalciferol (VITAMIN D PO)        MULTI VITAMIN MENS PO, Take 1 tablet by mouth daily, Disp: , Rfl: )        co-enzyme Q-10 100 MG CAPS capsule, Take 100 mg by mouth daily, Disp: , Rfl: )        vitamin B complex with vitamin C (STRESS TAB)        lisinopril (ZESTRIL)        pramipexole (MIRAPEX)      Special Medication Considerations:  Diabetic medication Instructions:      Please HOLD the following diabetic medications morning of procedure:    metFORMIN (GLUCOPHAGE XR)    Please HOLD the following diabetic medications 7 days before procedure:    Semaglutide, 2 MG/DOSE, (OZEMPIC)    your last dose will be on 1/16/25        Anticoagulation medication HOLD:      apixaban ANTICOAGULANT (ELIQUIS ANTICOAGULANT) hold 2 days beforehand: your last dose will be on 1/21      ticagrelor (BRILINTA)- stop 3-5 days prior to procedure.       If any questions please contact:  Ruthie Clayton RN  Structural Heart Care Coordinator  [facility]  Office: [phone]

## 2025-01-08 ENCOUNTER — OFFICE VISIT (OUTPATIENT)
Dept: CARDIOLOGY | Facility: CLINIC | Age: 65
End: 2025-01-08
Payer: MEDICARE

## 2025-01-08 VITALS
RESPIRATION RATE: 16 BRPM | DIASTOLIC BLOOD PRESSURE: 74 MMHG | WEIGHT: 315 LBS | BODY MASS INDEX: 47.21 KG/M2 | HEART RATE: 60 BPM | SYSTOLIC BLOOD PRESSURE: 138 MMHG

## 2025-01-08 DIAGNOSIS — G47.33 OSA (OBSTRUCTIVE SLEEP APNEA): Chronic | ICD-10-CM

## 2025-01-08 DIAGNOSIS — I48.0 PAROXYSMAL ATRIAL FIBRILLATION (H): Primary | ICD-10-CM

## 2025-01-08 DIAGNOSIS — I49.5 SINUS NODE DYSFUNCTION (H): ICD-10-CM

## 2025-01-08 PROCEDURE — G2211 COMPLEX E/M VISIT ADD ON: HCPCS | Performed by: NURSE PRACTITIONER

## 2025-01-08 PROCEDURE — 99214 OFFICE O/P EST MOD 30 MIN: CPT | Performed by: NURSE PRACTITIONER

## 2025-01-08 NOTE — PROGRESS NOTES
[facility]  Cardiac Electrophysiology  [address]  [place] [address]   Office: [phone]  Fax: [phone]     HEART CARE ELECTROPHYSIOLOGY FOLLOW UP    Primary Care: Mihir Perez MD    Assessment/Recommendations     Paroxysmal atrial fibrillation/stage 3D: Symptomatic with palpitations and lightheadedness.  Status post PVI 5/30/2024.  Unremarkable recovery without symptomatology or documentation of recurrent arrhythmia.  He is off antiarrhythmic drug therapy    [id number] score of 3 for age 65-74, CAD, HTN; HAS BLED 3 for age, indication for concurrent antiplatelet therapy, bleeding predisposition with spontaneous bruising and prolonged bleeding.  He is also a  putting him at occupational risk for complications on chronic oral anticoagulation.  He plans to undergo percutaneous left atrial appendage closure this month    TIA: consistent use of CPAP    Sinus node dysfunction: off scheduled AV michael agents. Continue expectant management    Plan:  Continue Eliquis 5 mg twice a day for stroke prophylaxis pending pLAAC   He has metoprolol tartrate 25 mg available as needed for atrial fibrillation which he has never used   EP follow-up 1 year     History of Present Illness/Subjective    Car Torres is a 64 year old male with past medical history significant for paroxysmal AF, SND, CAD status post PCI 3/8/2024, HTN, TIA on CPAP, dyslipidemia, CKD, obesity, seen today for EP follow-up    One week after LAD PCI 3/8/2024 he developed acute lightheadedness and palpitations, eventually associated with hypotension. This prompted ED evaluation where he was documented in newly diagnosed atrial fibrillation with rapid ventricular response 3/17/2024, treated with IV and oral metoprolol and converting to sinus bradycardia.  He developed recurrent symptoms and failed ED DCCV 3/21/2024, was admitted and briefly treated with dofetilide but discontinued due to sinus  bradycardia.  He underwent PVI 2024 with an unremarkable recovery. An ambulatory monitor -2024 was unremarkable for recurrent atrial fibrillation or sustained atrial tachyarrhythmias.    Jerry has felt well over the past several months.  He has not had any symptoms of atrial fibrillation.  He is walking 1-2 miles on the treadmill every day, actively trying to lose weight and feels his stamina is slowly improving.  He is able to elevate his heart rate to the 120s with activity. He is planning to undergo percutaneous left atrial appendage closure later this month.  He denies palpitations, dyspnea, chest discomfort, lightheadedness/dizziness, pedal edema or syncope.     Data Review     Arrhythmia hx:   Sx: Palpitations, lightheadedness  Sx onset:   Dx/date: Paroxysmal AF diagnosed 3/17/2024  [id number], OAC: 3-age 65-74, CAD, HTN; apixaban  Rate control: None (SND)  AAD: None  DCCV: ED 3/22/2024  Ablation: PVI 2024 (Dr. Sheldon)    EK2024: SR 60 bpm, incomplete RBBB  ms, QT/QTc 462 ms  Personally reviewed.     TTE 3/18/2024  Technically difficult study.Extremely difficult acoustic windows despite the  use of contrast for endcardial border definition.  Global and regional left ventricular function is normal with an EF of 60-65%.  Right ventricular function, chamber size, wall motion, and thickness are  normal.  The inferior vena cava is normal.  No pericardial effusion is present.    Zio monitoring from 2024 to 2024 (duration 13d 22h).  Predominant underlying rhythm was sinus rhythm, 33 to 91bpm, average 48bpm.  No sustained tachyarrhythmias.  No atrial fibrillation.  There were no pauses of greater than 3 seconds.  Rare supraventricular ectopic beats (<1%). 2 non sustained SVT runs occurred, the run with the fastest interval lasting 7 beats with a max rate of 164 bpm (avg 134 bpm); the run with the fastest interval was also the longest.  Rare premature ventricular  contractions (<1%).  Symptom triggers - none.      I have reviewed and updated the patient's past medical history, allergy list and medication list.                Physical Examination Review of Systems   BMI= Body mass index is 47.21 kg/m .    Wt Readings from Last 3 Encounters:   01/08/25 149.2 kg (329 lb)   11/21/24 (!) 150.1 kg (331 lb)   10/10/24 (!) 153.1 kg (337 lb 9.6 oz)       Vitals: /74 (BP Location: Left arm, Patient Position: Sitting, Cuff Size: Adult Large)   Pulse 60   Resp 16   Wt 149.2 kg (329 lb)   BMI 47.21 kg/m    General   Appearance:   Alert and oriented, no acute distress   HEENT:  Normocephalic and atraumatic   Neck: No JVP, carotid bruit or obvious thyromegaly   Lungs:   Respirations unlabored   Cardiovascular:   Rhythm is regular. S1 and S2 are normal. No significant murmur is present   Extremities: No cyanosis or clubbing   Skin: Skin is warm, dry, and otherwise intact   Neurologic: Gait not assessed. Mood and affect appropriate       A 12 point comprehensive review of systems was  negative except as noted.      Medical History  Surgical History Family History Social History   Past Medical History:   Diagnosis Date    CKD (chronic kidney disease) stage 2, GFR 60-89 ml/min 10/21/2010    60 to 80 - have discussed Lisinopril - will consider       Coronary artery disease 01/26/2024    Dementia (H)     Depressive disorder     Diverticulosis of large intestine without hemorrhage 11/18/2015    Incidental finding on CT scan       Heart disease 05/08/2018    Hypercholesterolemia     Hypertension goal BP (blood pressure) < 140/90     Nonspecific abnormal electrocardiogram (ECG) (EKG) 08/23/2007    Stress testing normal.     Paroxysmal atrial fibrillation (H) 03/17/2024    Sinus node dysfunction (H) 05/23/2024    Sleep apnea     uses a c-pap, severe    Status post coronary angiogram 01/26/2024    Past Surgical History:   Procedure Laterality Date    COLONOSCOPY N/A 12/22/2020    Procedure:  COLONOSCOPY, WITH POLYPECTOMY, CLIP;  Surgeon: Mihir Lang MD;  Location: [facility] [place] OR    COLONOSCOPY N/A 1/10/2022    Procedure: COLONOSCOPY, WITH POLYPECTOMY AND BIOPSY;  Surgeon: Mihir Lang MD;  Location:  GI    CV CORONARY ANGIOGRAM N/A 1/26/2024    Procedure: Coronary Angiogram;  Surgeon: Elton Wright MD;  Location:  HEART CARDIAC CATH LAB    CV CORONARY LITHOTRIPSY PCI N/A 3/8/2024    Procedure: Percutaneous Coronary Intervention - Lithotripsy;  Surgeon: Elton Wright MD;  Location:  HEART CARDIAC CATH LAB    CV INSTANTANEOUS WAVE-FREE RATIO N/A 1/26/2024    Procedure: Instantaneous Wave-Free Ratio;  Surgeon: Elton Wright MD;  Location: [facility] CARDIAC CATH LAB    CV INTRAVASULAR ULTRASOUND N/A 3/8/2024    Procedure: Intravascular Ultrasound;  Surgeon: Elton Wright MD;  Location: [facility] CARDIAC CATH LAB    CV PCI N/A 3/8/2024    Procedure: Percutaneous Coronary Intervention;  Surgeon: Elton Wright MD;  Location: [facility] CARDIAC CATH LAB    CV RIGHT HEART CATH MEASUREMENTS RECORDED N/A 1/26/2024    Procedure: Right Heart Catheterization;  Surgeon: Elton Wright MD;  Location: [facility] CARDIAC CATH LAB    EP ABLATION PULMONARY VEIN ISOLATION N/A 5/30/2024    Procedure: Ablation Atrial Fibrillation;  Surgeon: Sandee Sheldon MD;  Location: [facility] CATH LAB CV    ESOPHAGOSCOPY, GASTROSCOPY, DUODENOSCOPY (EGD), COMBINED N/A 1/28/2021    Procedure: ESOPHAGOGASTRODUODENOSCOPY, WITH BIOPSY;  Surgeon: Mihir Lang MD;  Location: [facility] [place] OR    OPEN REDUCTION INTERNAL FIXATION FOOT Left 1/5/2023    Procedure: LISFRANC OPEN REDUCTION INTERNAL FIXATION, LEFT FOOT;  Surgeon: Bailey Ruiz DPM;  Location:  OR    SURGICAL HISTORY OF -       surgery on left index finger    Family History   Problem Relation Age of Onset    Cancer Mother         uterine    Other Cancer Mother         endometrial    Cerebrovascular Disease Mother     Substance Abuse Mother          Alcohol    C.A.D. Father     Hypertension Father     Hyperlipidemia Father     Breast Cancer Maternal Grandmother     Other Cancer Maternal Grandmother         Lung/brain    Substance Abuse Paternal Grandfather     Hypertension Brother     Substance Abuse Brother         Alcohol    Hyperlipidemia Brother     Breast Cancer Other     Breast Cancer Cousin     Other Cancer Other     Cerebrovascular Disease Other         Maternal Aunt    Glaucoma No family hx of     Macular Degeneration No family hx of     Social History     Socioeconomic History    Marital status:      Spouse name: Luzma    Number of children: 3    Years of education: 14    Highest education level: Not on file   Occupational History    Occupation: [employer]     Employer: OTHER     Comment: [employer]   Tobacco Use    Smoking status: Former     Current packs/day: 0.00     Average packs/day: 1 pack/day for 25.0 years (25.0 ttl pk-yrs)     Types: Cigarettes, Cigars     Start date: 1973     Quit date: 1998     Years since quittin.9     Passive exposure: Past    Smokeless tobacco: Never    Tobacco comments:     N/A not a smoker   Vaping Use    Vaping status: Never Used   Substance and Sexual Activity    Alcohol use: Yes     Alcohol/week: 10.0 standard drinks of alcohol     Comment: less than 6 per week    Drug use: Not Currently     Types: Amphetamines     Comment: Kratum    Sexual activity: Not Currently     Partners: Female     Birth control/protection: Female Surgical, None     Comment:    Other Topics Concern    Parent/sibling w/ CABG, MI or angioplasty before 65F 55M? Yes     Comment: Father age 50 bypass. Grandfather death in age 40 s heart   Social History Narrative    Not on file     Social Drivers of Health     Financial Resource Strain: Low Risk  (3/19/2024)    Financial Resource Strain     Within the past 12 months, have you or your family members you live with been unable to get utilities (heat, electricity)  when it was really needed?: No   Food Insecurity: High Risk (3/19/2024)    Food Insecurity     Within the past 12 months, did you worry that your food would run out before you got money to buy more?: No     Within the past 12 months, did the food you bought just not last and you didn t have money to get more?: Yes   Transportation Needs: Low Risk  (3/19/2024)    Transportation Needs     Within the past 12 months, has lack of transportation kept you from medical appointments, getting your medicines, non-medical meetings or appointments, work, or from getting things that you need?: No   Physical Activity: Inactive (3/19/2024)    Exercise Vital Sign     Days of Exercise per Week: 0 days     Minutes of Exercise per Session: 0 min   Stress: No Stress Concern Present (3/19/2024)    [place] [employer] - Occupational Stress Questionnaire     Feeling of Stress : Not at all   Social Connections: Moderately Isolated (3/19/2024)    Social Connection and Isolation Panel [NHANES]     Frequency of Communication with Friends and Family: Three times a week     Frequency of Social Gatherings with Friends and Family: Once a week     Attends L[employer] day Services: Never     Active Member of Clubs or Organizations: No     Attends Club or Organization Meetings: Never     Marital Status:    Interpersonal Safety: Low Risk  (12/6/2023)    Interpersonal Safety     Do you feel physically and emotionally safe where you currently live?: Yes     Within the past 12 months, have you been hit, slapped, kicked or otherwise physically hurt by someone?: No     Within the past 12 months, have you been humiliated or emotionally abused in other ways by your partner or ex-partner?: No   Housing Stability: Low Risk  (3/19/2024)    Housing Stability     Do you have housing? : Yes     Are you worried about losing your housing?: No          Medications  Allergies   Scheduled Meds:  Current Outpatient Medications   Medication Sig  Dispense Refill    apixaban ANTICOAGULANT (ELIQUIS ANTICOAGULANT) 5 MG tablet Take 1 tablet (5 mg) by mouth 2 times daily 180 tablet 3    atorvastatin (LIPITOR) 40 MG tablet TAKE 1 TABLET BY MOUTH EVERY DAY 90 tablet 2    busPIRone (BUSPAR) 10 MG tablet Take 2 tablets (20 mg) by mouth 2 times daily 360 tablet 4    Cholecalciferol (VITAMIN D PO) Take 5,000 Units by mouth daily One tablet twice daily      co-enzyme Q-10 100 MG CAPS capsule Take 100 mg by mouth daily      hydroCHLOROthiazide (HYDRODIURIL) 25 MG tablet Take 1 tablet (25 mg) by mouth daily. 90 tablet 3    hydrOXYzine HCl (ATARAX) 10 MG tablet Take 1 tablet (10 mg) by mouth 2 times daily as needed for anxiety 180 tablet 5    insulin pen needle (32G X 6 MM) 32G X 6 MM miscellaneous Use 100 pen needles daily or as directed. 100 each 3    lisinopril (ZESTRIL) 20 MG tablet Take 1 tablet (20 mg) by mouth daily 90 tablet 3    metFORMIN (GLUCOPHAGE XR) 500 MG 24 hr tablet Take 1 tablet (500 mg) by mouth daily (with dinner). 90 tablet 3    metoprolol tartrate (LOPRESSOR) 25 MG tablet Take 1 tablet (25 mg) by mouth every 8 hours as needed (Atrial fibrillation with RVR, HR > 120) 30 tablet 1    MULTI VITAMIN MENS PO Take 1 tablet by mouth daily      pramipexole (MIRAPEX) 0.25 MG tablet Take 1 tablet (0.25 mg) by mouth at bedtime. 90 tablet 2    Semaglutide, 2 MG/DOSE, (OZEMPIC) 8 MG/3ML pen Inject 2 mg subcutaneously every 7 days.      sildenafil (VIAGRA) 100 MG tablet Take 1 tablet (100 mg) by mouth daily as needed. 20 tablet 4    ticagrelor (BRILINTA) 90 MG tablet Take 1 tablet (90 mg) by mouth 2 times daily Start this evening. 180 tablet 3    vitamin B complex with vitamin C (STRESS TAB) tablet Take 1 tablet by mouth daily      No Known Allergies      Lab Results    Chemistry/lipid CBC Cardiac Enzymes/BNP/TSH/INR   Lab Results   Component Value Date    CHOL 163 09/11/2024    HDL 48 09/11/2024    TRIG 140 09/11/2024    BUN 24.1 (H) 10/23/2024     10/23/2024     CO2 25 10/23/2024    Lab Results   Component Value Date    WBC 6.8 2024    HGB 12.9 (L) 2024    HCT 39.3 (L) 2024    MCV 90 2024     2024    @RESUFAST(BMP,CBC,BNP,TSH,  INR)@      30 minutes spent reviewing prior records (including documentation, laboratory studies, cardiac testing/imaging), history and physical exam, planning, and subsequent documentation.     The longitudinal plan of care for the diagnosis(es)/condition(s) as documented were addressed during this visit. Due to the added complexity in care, I will continue to support Jerry in the subsequent management and with ongoing continuity of care.     This note has been dictated using voice recognition software. Any grammatical, typographical, or context distortions are unintentional and inherent to the software.    Kristine Gamez, CNP  Clinical Cardiac Electrophysiology  [facility]  Clinic and [phone]  Fax: [phone]  Electrophysiology Nurses: [phone]

## 2025-01-08 NOTE — LETTER
1/8/2025    Mihir Perez MD  [address] [place] [address]    RE: Car Torres       Dear Colleague,     I had the pleasure of seeing Car Torres in the [facility].       [facility]  Cardiac Electrophysiology  [address]  [place] [address]   Office: [phone]  Fax: [phone]     HEART CARE ELECTROPHYSIOLOGY FOLLOW UP    Primary Care: Mihir Perez MD    Assessment/Recommendations     Paroxysmal atrial fibrillation/stage 3D: Symptomatic with palpitations and lightheadedness.  Status post PVI 5/30/2024.  Unremarkable recovery without symptomatology or documentation of recurrent arrhythmia.  He is off antiarrhythmic drug therapy    [id number] score of 3 for age 65-74, CAD, HTN; HAS BLED 3 for age, indication for concurrent antiplatelet therapy, bleeding predisposition with spontaneous bruising and prolonged bleeding.  He is also a  putting him at occupational risk for complications on chronic oral anticoagulation.  He plans to undergo percutaneous left atrial appendage closure this month    TIA: consistent use of CPAP    Sinus node dysfunction: off scheduled AV michael agents. Continue expectant management    Plan:  Continue Eliquis 5 mg twice a day for stroke prophylaxis pending pLAAC   He has metoprolol tartrate 25 mg available as needed for atrial fibrillation which he has never used   EP follow-up 1 year     History of Present Illness/Subjective    Car Torres is a 64 year old male with past medical history significant for paroxysmal AF, SND, CAD status post PCI 3/8/2024, HTN, TIA on CPAP, dyslipidemia, CKD, obesity, seen today for EP follow-up    One week after LAD PCI 3/8/2024 he developed acute lightheadedness and palpitations, eventually associated with hypotension. This prompted ED evaluation where he was documented in newly diagnosed atrial fibrillation with rapid ventricular response 3/17/2024,  treated with IV and oral metoprolol and converting to sinus bradycardia.  He developed recurrent symptoms and failed ED DCCV 3/21/2024, was admitted and briefly treated with dofetilide but discontinued due to sinus bradycardia.  He underwent PVI 2024 with an unremarkable recovery. An ambulatory monitor -2024 was unremarkable for recurrent atrial fibrillation or sustained atrial tachyarrhythmias.    Jerry has felt well over the past several months.  He has not had any symptoms of atrial fibrillation.  He is walking 1-2 miles on the treadmill every day, actively trying to lose weight and feels his stamina is slowly improving.  He is able to elevate his heart rate to the 120s with activity. He is planning to undergo percutaneous left atrial appendage closure later this month.  He denies palpitations, dyspnea, chest discomfort, lightheadedness/dizziness, pedal edema or syncope.     Data Review     Arrhythmia hx:   Sx: Palpitations, lightheadedness  Sx onset:   Dx/date: Paroxysmal AF diagnosed 3/17/2024  [id number], OAC: 3-age 65-74, CAD, HTN; apixaban  Rate control: None (SND)  AAD: None  DCCV: ED 3/22/2024  Ablation: PVI 2024 (Dr. Sheldon)    EK2024: SR 60 bpm, incomplete RBBB  ms, QT/QTc 462 ms  Personally reviewed.     TTE 3/18/2024  Technically difficult study.Extremely difficult acoustic windows despite the  use of contrast for endcardial border definition.  Global and regional left ventricular function is normal with an EF of 60-65%.  Right ventricular function, chamber size, wall motion, and thickness are  normal.  The inferior vena cava is normal.  No pericardial effusion is present.    Zio monitoring from 2024 to 2024 (duration 13d 22h).  Predominant underlying rhythm was sinus rhythm, 33 to 91bpm, average 48bpm.  No sustained tachyarrhythmias.  No atrial fibrillation.  There were no pauses of greater than 3 seconds.  Rare supraventricular ectopic beats (<1%). 2 non  sustained SVT runs occurred, the run with the fastest interval lasting 7 beats with a max rate of 164 bpm (avg 134 bpm); the run with the fastest interval was also the longest.  Rare premature ventricular contractions (<1%).  Symptom triggers - none.      I have reviewed and updated the patient's past medical history, allergy list and medication list.                Physical Examination Review of Systems   BMI= Body mass index is 47.21 kg/m .    Wt Readings from Last 3 Encounters:   01/08/25 149.2 kg (329 lb)   11/21/24 (!) 150.1 kg (331 lb)   10/10/24 (!) 153.1 kg (337 lb 9.6 oz)       Vitals: /74 (BP Location: Left arm, Patient Position: Sitting, Cuff Size: Adult Large)   Pulse 60   Resp 16   Wt 149.2 kg (329 lb)   BMI 47.21 kg/m    General   Appearance:   Alert and oriented, no acute distress   HEENT:  Normocephalic and atraumatic   Neck: No JVP, carotid bruit or obvious thyromegaly   Lungs:   Respirations unlabored   Cardiovascular:   Rhythm is regular. S1 and S2 are normal. No significant murmur is present   Extremities: No cyanosis or clubbing   Skin: Skin is warm, dry, and otherwise intact   Neurologic: Gait not assessed. Mood and affect appropriate       A 12 point comprehensive review of systems was  negative except as noted.      Medical History  Surgical History Family History Social History   Past Medical History:   Diagnosis Date     CKD (chronic kidney disease) stage 2, GFR 60-89 ml/min 10/21/2010    60 to 80 - have discussed Lisinopril - will consider        Coronary artery disease 01/26/2024     Dementia (H)      Depressive disorder      Diverticulosis of large intestine without hemorrhage 11/18/2015    Incidental finding on CT scan        Heart disease 05/08/2018     Hypercholesterolemia      Hypertension goal BP (blood pressure) < 140/90      Nonspecific abnormal electrocardiogram (ECG) (EKG) 08/23/2007    Stress testing normal.      Paroxysmal atrial fibrillation (H) 03/17/2024     Sinus  node dysfunction (H) 05/23/2024     Sleep apnea     uses a c-pap, severe     Status post coronary angiogram 01/26/2024    Past Surgical History:   Procedure Laterality Date     COLONOSCOPY N/A 12/22/2020    Procedure: COLONOSCOPY, WITH POLYPECTOMY, CLIP;  Surgeon: Mihir Lang MD;  Location: [facility] OR     COLONOSCOPY N/A 1/10/2022    Procedure: COLONOSCOPY, WITH POLYPECTOMY AND BIOPSY;  Surgeon: Mihir Lang MD;  Location:  GI     CV CORONARY ANGIOGRAM N/A 1/26/2024    Procedure: Coronary Angiogram;  Surgeon: Elton Wright MD;  Location: [facility] CARDIAC CATH LAB     CV CORONARY LITHOTRIPSY PCI N/A 3/8/2024    Procedure: Percutaneous Coronary Intervention - Lithotripsy;  Surgeon: Elton Wright MD;  Location: [facility] CARDIAC CATH LAB     CV INSTANTANEOUS WAVE-FREE RATIO N/A 1/26/2024    Procedure: Instantaneous Wave-Free Ratio;  Surgeon: Elton Wright MD;  Location: [facility] CARDIAC CATH LAB     CV INTRAVASULAR ULTRASOUND N/A 3/8/2024    Procedure: Intravascular Ultrasound;  Surgeon: Elton Wright MD;  Location: [facility] CARDIAC CATH LAB     CV PCI N/A 3/8/2024    Procedure: Percutaneous Coronary Intervention;  Surgeon: Elton Wright MD;  Location: [facility] CARDIAC CATH LAB     CV RIGHT HEART CATH MEASUREMENTS RECORDED N/A 1/26/2024    Procedure: Right Heart Catheterization;  Surgeon: Elton Wright MD;  Location: [facility] CARDIAC CATH LAB     EP ABLATION PULMONARY VEIN ISOLATION N/A 5/30/2024    Procedure: Ablation Atrial Fibrillation;  Surgeon: Sandee Sheldon MD;  Location: [facility] CV     ESOPHAGOSCOPY, GASTROSCOPY, DUODENOSCOPY (EGD), COMBINED N/A 1/28/2021    Procedure: ESOPHAGOGASTRODUODENOSCOPY, WITH BIOPSY;  Surgeon: Mihir Lang MD;  Location: [facility] OR     OPEN REDUCTION INTERNAL FIXATION FOOT Left 1/5/2023    Procedure: LISFRANC OPEN REDUCTION INTERNAL FIXATION, LEFT FOOT;  Surgeon: Bailey Ruiz DPM;  Location:  OR     SURGICAL HISTORY OF  -       surgery on left index finger    Family History   Problem Relation Age of Onset     Cancer Mother         uterine     Other Cancer Mother         endometrial     Cerebrovascular Disease Mother      Substance Abuse Mother         Alcohol     C.A.D. Father      Hypertension Father      Hyperlipidemia Father      Breast Cancer Maternal Grandmother      Other Cancer Maternal Grandmother         Lung/brain     Substance Abuse Paternal Grandfather      Hypertension Brother      Substance Abuse Brother         Alcohol     Hyperlipidemia Brother      Breast Cancer Other      Breast Cancer Cousin      Other Cancer Other      Cerebrovascular Disease Other         Maternal Aunt     Glaucoma No family hx of      Macular Degeneration No family hx of     Social History     Socioeconomic History     Marital status:      Spouse name: Luzma     Number of children: 3     Years of education: 14     Highest education level: Not on file   Occupational History     Occupation: [employer]     Employer: OTHER     Comment: [email or web address]   Tobacco Use     Smoking status: Former     Current packs/day: 0.00     Average packs/day: 1 pack/day for 25.0 years (25.0 ttl pk-yrs)     Types: Cigarettes, Cigars     Start date: 1973     Quit date: 1998     Years since quittin.9     Passive exposure: Past     Smokeless tobacco: Never     Tobacco comments:     N/A not a smoker   Vaping Use     Vaping status: Never Used   Substance and Sexual Activity     Alcohol use: Yes     Alcohol/week: 10.0 standard drinks of alcohol     Comment: less than 6 per week     Drug use: Not Currently     Types: Amphetamines     Comment: Kratum     Sexual activity: Not Currently     Partners: Female     Birth control/protection: Female Surgical, None     Comment:    Other Topics Concern     Parent/sibling w/ CABG, MI or angioplasty before 65F 55M? Yes     Comment: Father age 50 bypass. Grandfather death in age 40 s heart   Social History  Narrative     Not on file     Social Drivers of Health     Financial Resource Strain: Low Risk  (3/19/2024)    Financial Resource Strain      Within the past 12 months, have you or your family members you live with been unable to get utilities (heat, electricity) when it was really needed?: No   Food Insecurity: High Risk (3/19/2024)    Food Insecurity      Within the past 12 months, did you worry that your food would run out before you got money to buy more?: No      Within the past 12 months, did the food you bought just not last and you didn t have money to get more?: Yes   Transportation Needs: Low Risk  (3/19/2024)    Transportation Needs      Within the past 12 months, has lack of transportation kept you from medical appointments, getting your medicines, non-medical meetings or appointments, work, or from getting things that you need?: No   Physical Activity: Inactive (3/19/2024)    Exercise Vital Sign      Days of Exercise per Week: 0 days      Minutes of Exercise per Session: 0 min   Stress: No Stress Concern Present (3/19/2024)    [employer] - Occupational Stress Questionnaire      Feeling of Stress : Not at all   Social Connections: Moderately Isolated (3/19/2024)    Social Connection and Isolation Panel [NHANES]      Frequency of Communication with Friends and Family: Three times a week      Frequency of Social Gatherings with Friends and Family: Once a week      Attends [employer] Services: Never      Active Member of Clubs or Organizations: No      Attends Club or Organization Meetings: Never      Marital Status:    Interpersonal Safety: Low Risk  (12/6/2023)    Interpersonal Safety      Do you feel physically and emotionally safe where you currently live?: Yes      Within the past 12 months, have you been hit, slapped, kicked or otherwise physically hurt by someone?: No      Within the past 12 months, have you been humiliated or emotionally abused in other ways by  your partner or ex-partner?: No   Housing Stability: Low Risk  (3/19/2024)    Housing Stability      Do you have housing? : Yes      Are you worried about losing your housing?: No          Medications  Allergies   Scheduled Meds:  Current Outpatient Medications   Medication Sig Dispense Refill     apixaban ANTICOAGULANT (ELIQUIS ANTICOAGULANT) 5 MG tablet Take 1 tablet (5 mg) by mouth 2 times daily 180 tablet 3     atorvastatin (LIPITOR) 40 MG tablet TAKE 1 TABLET BY MOUTH EVERY DAY 90 tablet 2     busPIRone (BUSPAR) 10 MG tablet Take 2 tablets (20 mg) by mouth 2 times daily 360 tablet 4     Cholecalciferol (VITAMIN D PO) Take 5,000 Units by mouth daily One tablet twice daily       co-enzyme Q-10 100 MG CAPS capsule Take 100 mg by mouth daily       hydroCHLOROthiazide (HYDRODIURIL) 25 MG tablet Take 1 tablet (25 mg) by mouth daily. 90 tablet 3     hydrOXYzine HCl (ATARAX) 10 MG tablet Take 1 tablet (10 mg) by mouth 2 times daily as needed for anxiety 180 tablet 5     insulin pen needle (32G X 6 MM) 32G X 6 MM miscellaneous Use 100 pen needles daily or as directed. 100 each 3     lisinopril (ZESTRIL) 20 MG tablet Take 1 tablet (20 mg) by mouth daily 90 tablet 3     metFORMIN (GLUCOPHAGE XR) 500 MG 24 hr tablet Take 1 tablet (500 mg) by mouth daily (with dinner). 90 tablet 3     metoprolol tartrate (LOPRESSOR) 25 MG tablet Take 1 tablet (25 mg) by mouth every 8 hours as needed (Atrial fibrillation with RVR, HR > 120) 30 tablet 1     MULTI VITAMIN MENS PO Take 1 tablet by mouth daily       pramipexole (MIRAPEX) 0.25 MG tablet Take 1 tablet (0.25 mg) by mouth at bedtime. 90 tablet 2     Semaglutide, 2 MG/DOSE, (OZEMPIC) 8 MG/3ML pen Inject 2 mg subcutaneously every 7 days.       sildenafil (VIAGRA) 100 MG tablet Take 1 tablet (100 mg) by mouth daily as needed. 20 tablet 4     ticagrelor (BRILINTA) 90 MG tablet Take 1 tablet (90 mg) by mouth 2 times daily Start this evening. 180 tablet 3     vitamin B complex with  vitamin C (STRESS TAB) tablet Take 1 tablet by mouth daily      No Known Allergies      Lab Results    Chemistry/lipid CBC Cardiac Enzymes/BNP/TSH/INR   Lab Results   Component Value Date    CHOL 163 2024    HDL 48 2024    TRIG 140 2024    BUN 24.1 (H) 10/23/2024     10/23/2024    CO2 25 10/23/2024    Lab Results   Component Value Date    WBC 6.8 2024    HGB 12.9 (L) 2024    HCT 39.3 (L) 2024    MCV 90 2024     2024    @RESUFAST(BMP,CBC,BNP,TSH,  INR)@      30 minutes spent reviewing prior records (including documentation, laboratory studies, cardiac testing/imaging), history and physical exam, planning, and subsequent documentation.     The longitudinal plan of care for the diagnosis(es)/condition(s) as documented were addressed during this visit. Due to the added complexity in care, I will continue to support Jerry in the subsequent management and with ongoing continuity of care.     This note has been dictated using voice recognition software. Any grammatical, typographical, or context distortions are unintentional and inherent to the software.    Kristine Gamez CNP  Clinical Cardiac Electrophysiology  [facility]  Clinic and [phone]  Fax: [phone]  Electrophysiology Nurses: [phone]        Thank you for allowing me to participate in the care of your patient.      Sincerely,     KLAUS Youssef CNP     [facility]  cc:   KLAUS Youssef CNP  [facility] [address]  [address] [place] [address]

## 2025-01-09 NOTE — PROGRESS NOTES
STRUCTURAL HEART CLINIC  H&P    Referring Provider: Dr. Wright   Primary Cardiologist: Kristine Gamez CNP    History of Present Illness  Car Torres is a 64 year old male who presents for pre-operative H&P in preparation for left atrial appendage occluder on 1/23/25 at [facility].     Patient with a history of paroxysmal atrial fibrillation s/p PVI ablation (5/30/24) [id number] is 3 (age, CAD, HTN) and HADBLED of 3 (age, meds and bleeding dispo) with intolerance to anticoagulation due to bruising and bleeding easily. He was evaluated in structural clinic where he was deemed an appropriate watchman candidate.    Additional medical history notable for CAD s/p PCI to LAD (3/8/24), TIA on CPAP, HTN, HLD, CKD.     He is recovering from a head cold. No fevers, chills or productive cough.     History of blood transfusions/reactions: No  History of abnormal bleeding/anti-platelet use: no  Steroid use in the last year: no  Personal or family history with difficulty with anesthesia: No   Infection precautions: no  Difficulty w/intubation/bedrest: No      Current Medications:  Current Outpatient Medications   Medication Sig Dispense Refill    apixaban ANTICOAGULANT (ELIQUIS ANTICOAGULANT) 5 MG tablet Take 1 tablet (5 mg) by mouth 2 times daily 180 tablet 3    atorvastatin (LIPITOR) 40 MG tablet TAKE 1 TABLET BY MOUTH EVERY DAY 90 tablet 2    busPIRone (BUSPAR) 10 MG tablet Take 2 tablets (20 mg) by mouth 2 times daily 360 tablet 4    Cholecalciferol (VITAMIN D PO) Take 5,000 Units by mouth daily One tablet twice daily      co-enzyme Q-10 100 MG CAPS capsule Take 100 mg by mouth daily      hydroCHLOROthiazide (HYDRODIURIL) 25 MG tablet Take 1 tablet (25 mg) by mouth daily. 90 tablet 3    hydrOXYzine HCl (ATARAX) 10 MG tablet Take 1 tablet (10 mg) by mouth 2 times daily as needed for anxiety 180 tablet 5    insulin pen needle (32G X 6 MM) 32G X 6 MM miscellaneous Use 100 pen needles  daily or as directed. 100 each 3    lisinopril (ZESTRIL) 20 MG tablet Take 1 tablet (20 mg) by mouth daily 90 tablet 3    metFORMIN (GLUCOPHAGE XR) 500 MG 24 hr tablet Take 1 tablet (500 mg) by mouth daily (with dinner). 90 tablet 3    metoprolol tartrate (LOPRESSOR) 25 MG tablet Take 1 tablet (25 mg) by mouth every 8 hours as needed (Atrial fibrillation with RVR, HR > 120) 30 tablet 1    MULTI VITAMIN MENS PO Take 1 tablet by mouth daily      pramipexole (MIRAPEX) 0.25 MG tablet Take 1 tablet (0.25 mg) by mouth at bedtime. 90 tablet 2    Semaglutide, 2 MG/DOSE, (OZEMPIC) 8 MG/3ML pen Inject 2 mg subcutaneously every 7 days.      sildenafil (VIAGRA) 100 MG tablet Take 1 tablet (100 mg) by mouth daily as needed. 20 tablet 4    ticagrelor (BRILINTA) 90 MG tablet Take 1 tablet (90 mg) by mouth 2 times daily Start this evening. 180 tablet 3    vitamin B complex with vitamin C (STRESS TAB) tablet Take 1 tablet by mouth daily         Allergies:   No Known Allergies    Past Medical History:  Past Medical History:   Diagnosis Date    CKD (chronic kidney disease) stage 2, GFR 60-89 ml/min 10/21/2010    60 to 80 - have discussed Lisinopril - will consider       Coronary artery disease 01/26/2024    Dementia (H)     Depressive disorder     Diverticulosis of large intestine without hemorrhage 11/18/2015    Incidental finding on CT scan       Heart disease 05/08/2018    Hypercholesterolemia     Hypertension goal BP (blood pressure) < 140/90     Nonspecific abnormal electrocardiogram (ECG) (EKG) 08/23/2007    Stress testing normal.     Paroxysmal atrial fibrillation (H) 03/17/2024    Sinus node dysfunction (H) 05/23/2024    Sleep apnea     uses a c-pap, severe    Status post coronary angiogram 01/26/2024       Past Surgical History:  Past Surgical History:   Procedure Laterality Date    COLONOSCOPY N/A 12/22/2020    Procedure: COLONOSCOPY, WITH POLYPECTOMY, CLIP;  Surgeon: Mihir Lang MD;  Location: [facility] OR    COLONOSCOPY N/A  1/10/2022    Procedure: COLONOSCOPY, WITH POLYPECTOMY AND BIOPSY;  Surgeon: Mihir Lang MD;  Location:  GI    CV CORONARY ANGIOGRAM N/A 1/26/2024    Procedure: Coronary Angiogram;  Surgeon: Elton Wright MD;  Location: [facility] CARDIAC CATH LAB    CV CORONARY LITHOTRIPSY PCI N/A 3/8/2024    Procedure: Percutaneous Coronary Intervention - Lithotripsy;  Surgeon: Elton Wright MD;  Location: [facility] CARDIAC CATH LAB    CV INSTANTANEOUS WAVE-FREE RATIO N/A 1/26/2024    Procedure: Instantaneous Wave-Free Ratio;  Surgeon: Elton Wright MD;  Location: [facility] CARDIAC CATH LAB    CV INTRAVASULAR ULTRASOUND N/A 3/8/2024    Procedure: Intravascular Ultrasound;  Surgeon: Elton Wright MD;  Location: [facility] CARDIAC CATH LAB    CV PCI N/A 3/8/2024    Procedure: Percutaneous Coronary Intervention;  Surgeon: Elton Wright MD;  Location: [facility] CARDIAC CATH LAB    CV RIGHT HEART CATH MEASUREMENTS RECORDED N/A 1/26/2024    Procedure: Right Heart Catheterization;  Surgeon: Elton Wright MD;  Location: [facility] CARDIAC CATH LAB    EP ABLATION PULMONARY VEIN ISOLATION N/A 5/30/2024    Procedure: Ablation Atrial Fibrillation;  Surgeon: Sandee Sheldon MD;  Location: [facility] CV    ESOPHAGOSCOPY, GASTROSCOPY, DUODENOSCOPY (EGD), COMBINED N/A 1/28/2021    Procedure: ESOPHAGOGASTRODUODENOSCOPY, WITH BIOPSY;  Surgeon: Mihir Lang MD;  Location: [facility] OR    OPEN REDUCTION INTERNAL FIXATION FOOT Left 1/5/2023    Procedure: LISFRANC OPEN REDUCTION INTERNAL FIXATION, LEFT FOOT;  Surgeon: Bailey Ruiz DPM;  Location:  OR    SURGICAL HISTORY OF -       surgery on left index finger       Family History:  Family History   Problem Relation Age of Onset    Cancer Mother         uterine    Other Cancer Mother         endometrial    Cerebrovascular Disease Mother     Substance Abuse Mother         Alcohol    C.A.D. Father     Hypertension Father     Hyperlipidemia Father      Breast Cancer Maternal Grandmother     Other Cancer Maternal Grandmother         Lung/brain    Substance Abuse Paternal Grandfather     Hypertension Brother     Substance Abuse Brother         Alcohol    Hyperlipidemia Brother     Breast Cancer Other     Breast Cancer Cousin     Other Cancer Other     Cerebrovascular Disease Other         Maternal Aunt    Glaucoma No family hx of     Macular Degeneration No family hx of        Social History:  Social History     Socioeconomic History    Marital status:      Spouse name: Luzma    Number of children: 3    Years of education: 14   Occupational History    Occupation: [employer]     Employer: OTHER     Comment: Firework   Tobacco Use    Smoking status: Former     Current packs/day: 0.00     Average packs/day: 1 pack/day for 25.0 years (25.0 ttl pk-yrs)     Types: Cigarettes, Cigars     Start date: 1973     Quit date: 1998     Years since quittin.9     Passive exposure: Past    Smokeless tobacco: Never    Tobacco comments:     N/A not a smoker   Vaping Use    Vaping status: Never Used   Substance and Sexual Activity    Alcohol use: Yes     Alcohol/week: 10.0 standard drinks of alcohol     Comment: less than 6 per week    Drug use: Not Currently     Types: Amphetamines     Comment: Kratum    Sexual activity: Not Currently     Partners: Female     Birth control/protection: Female Surgical, None     Comment:    Other Topics Concern    Parent/sibling w/ CABG, MI or angioplasty before 65F 55M? Yes     Comment: Father age 50 bypass. Grandfather death in age 40 s heart     Social Drivers of Health     Financial Resource Strain: Low Risk  (3/19/2024)    Financial Resource Strain     Within the past 12 months, have you or your family members you live with been unable to get utilities (heat, electricity) when it was really needed?: No   Food Insecurity: High Risk (3/19/2024)    Food Insecurity     Within the past 12 months, did you worry that your  food would run out before you got money to buy more?: No     Within the past 12 months, did the food you bought just not last and you didn t have money to get more?: Yes   Transportation Needs: Low Risk  (3/19/2024)    Transportation Needs     Within the past 12 months, has lack of transportation kept you from medical appointments, getting your medicines, non-medical meetings or appointments, work, or from getting things that you need?: No   Physical Activity: Inactive (3/19/2024)    Exercise Vital Sign     Days of Exercise per Week: 0 days     Minutes of Exercise per Session: 0 min   Stress: No Stress Concern Present (3/19/2024)    [employer] [place] [employer] - Occupational Stress Questionnaire     Feeling of Stress : Not at all   Social Connections: Unknown (3/19/2024)    Social Connection and Isolation Panel [NHANES]     Frequency of Social Gatherings with Friends and Family: Once a week   Recent Concern: Social Connections - Moderately Isolated (3/19/2024)    Social Connection and Isolation Panel [NHANES]     Frequency of Communication with Friends and Family: Three times a week     Frequency of Social Gatherings with Friends and Family: Once a week     Attends Rastafarian Services: Never     Active Member of Clubs or Organizations: No     Attends Club or Organization Meetings: Never     Marital Status:    Interpersonal Safety: Low Risk  (12/6/2023)    Interpersonal Safety     Do you feel physically and emotionally safe where you currently live?: Yes     Within the past 12 months, have you been hit, slapped, kicked or otherwise physically hurt by someone?: No     Within the past 12 months, have you been humiliated or emotionally abused in other ways by your partner or ex-partner?: No   Housing Stability: Low Risk  (3/19/2024)    Housing Stability     Do you have housing? : Yes     Are you worried about losing your housing?: No       Review of Systems:    Functional status: Independent in ADL's.  Able to achieve METS > 4    Constitutional: No fever, chills, or sweats. No weight gain/loss   ENT: No visual disturbance, ear ache, epistaxis, sore throat  Allergies/Immunologic: Negative.   Respiratory: No cough, hemoptysia  Cardiovascular: As per HPI  GI: No nausea, vomiting, hematemesis, melena, or hematochezia  : No urinary frequency, dysuria, or hematuria  Integument: Negative  Psychiatric: Negative  Neuro: Negative  Endocrinology: Negative   Musculoskeletal: Negative      Physical Exam:  Vitals: /77 (BP Location: Right arm, Patient Position: Chair, Cuff Size: Adult Large)   Pulse (!) 47   Wt 147.9 kg (326 lb)   SpO2 99%   BMI 46.78 kg/m     General: NAD  HEENT:  Dentition intact.    Neck: No jugular venous distension.   Heart: RRR    Lungs: CTA.    Abdomen: Soft, nontender, nondistended.   Extremities: No clubbing, cyanosis, or edema.  The pulses are +4/4 at the radial, brachial, femoral, popliteal, DP, and PT sites bilaterally.  No bruits are noted.  Neurologic: Alert and oriented to person/place/time, normal speech, gait and affect  Skin: No petechiae, purpura or rash.      Diagnostic Studies:      ECG 1/14/25:  Personally reviewed and interpreted by me.  SB HR 48     CT heart 12/2/24  IMPRESSION:  1.  The left atrial appendage depth is: 28.7 mm.  2.  The left atrial appendage landing zone maximal diameter is 21.8 mm  and the minimal diameter is 15.4 mm; the landing zone circumference is   59.2 mm.   3.  The left atrial appendage is free of thrombus.      FINDINGS:   1.  The left atrial appendage has a  chicken wing morphology.  2.  The left atrial appendage depth is: 28.7 mm.  3.  The left atrial appendage landing zone average diameter is 18.0 mm  (21.8 mm x 15.4 mm).  4.  The left atrial appendage landing zone perimeter is  59.2 mm.  5.  Normal pulmonary venous anatomy with all pulmonary veins draining  into the left atrium.  6.  Coronary images are non-diagnostic for  stenosis/atherosclerosis  due to cardiac motion artifact.  There is a stent in the LAD. There  are moderate coronary artery calcifications.  7.  The visualized aortic root, ascending aorta and descending  thoracic aorta are normal.  8.  Please review the separate Radiology report from the original  study site and date for incidental noncardiac findings.     MARY FERRERA MD     Laboratory Studies:  Personally reviewed and interpreted by me.    LIPID RESULTS:  Lab Results   Component Value Date    CHOL 163 09/11/2024    CHOL 161 04/23/2021    HDL 48 09/11/2024    HDL 79 04/23/2021    LDL 87 09/11/2024    LDL 65 04/23/2021    TRIG 140 09/11/2024    TRIG 87 04/23/2021    CHOLHDLRATIO 3.1 07/13/2015       LIVER ENZYME RESULTS:  Lab Results   Component Value Date    AST 22 09/11/2024    AST 15 08/02/2020    ALT 18 09/11/2024    ALT 27 08/02/2020       CBC RESULTS:  Lab Results   Component Value Date    WBC 6.8 09/11/2024    WBC 7.1 12/15/2020    RBC 4.36 (L) 09/11/2024    RBC 5.24 12/15/2020    HGB 12.9 (L) 09/11/2024    HGB 15.7 12/15/2020    HCT 39.3 (L) 09/11/2024    HCT 47.0 12/15/2020    MCV 90 09/11/2024    MCV 90 12/15/2020    MCH 29.6 09/11/2024    MCH 30.0 12/15/2020    MCHC 32.8 09/11/2024    MCHC 33.4 12/15/2020    RDW 13.1 09/11/2024    RDW 13.2 12/15/2020     09/11/2024     12/15/2020       BMP RESULTS:  Lab Results   Component Value Date     10/23/2024     08/02/2020    POTASSIUM 4.7 10/23/2024    POTASSIUM 4.1 01/05/2023    POTASSIUM 4.0 08/02/2020    CHLORIDE 102 10/23/2024    CHLORIDE 100 12/29/2022    CHLORIDE 105 08/02/2020    CO2 25 10/23/2024    CO2 30 12/29/2022    CO2 28 08/02/2020    ANIONGAP 9 10/23/2024    ANIONGAP 6 12/29/2022    ANIONGAP 5 08/02/2020     (H) 10/23/2024     (H) 12/29/2022     (A) 04/21/2021    BUN 24.1 (H) 10/23/2024    BUN 17 12/29/2022    BUN 28 08/02/2020    CR 1.26 (H) 10/23/2024    CR 1.17 08/02/2020    GFRESTIMATED 63  10/23/2024    GFRESTIMATED 66 08/02/2020    GFRESTBLACK 77 08/02/2020    SHAHID 9.7 10/23/2024    SHAHID 8.9 08/02/2020        A1C RESULTS:  Lab Results   Component Value Date    A1C 5.4 09/11/2024       INR RESULTS:  Lab Results   Component Value Date    INR 0.99 03/22/2024    INR 1.07 03/18/2024    INR 0.96 08/18/2007       Assessment and Plan   Car Torres is a 64 year old male who presents for pre-operative H&P in preparation for transcatheter aortic valve replacement on 1/23/25 at [facility].     He has the following specific operative considerations:      - RCRI score 1 corresponding with a 1% perioperative risk of MACE      - TIA: known wears CPAP      - VTE risk = 1. If equal to or > 4, pharmacologic prophylaxis is indicated      - RIsk of PONV score = 1. If > 2, anti-emetic intervention recommended    1. Paroxysmal atrial fibrillation:   2. Intolerance to anticoagulation:  Patient with a history of paroxysmal atrial fibrillation s/p PVI ablation (5/30/24) [id number] is 3 (age, CAD, HTN) and HADBLED of 3 (age, meds and bleeding dispo) with intolerance to anticoagulation due to bruising and bleeding easily. He was evaluated in structural clinic where he was deemed an appropriate watchman candidate.  CT heart shows favorable anatomy for LAAO.    Hold Eliquis 48 hours preprocedure  Take aspirin 325-day before and morning of procedure  All questions answered  Type and screen orders complete  Supplies for scrubbing provided  No known contrast dye allergy   No trouble with anesthesia in the past  Labs today stable, no s/s of infection    3. CAD:   4. Hyperlipidemia:  5. Hypertension:  History of LAD stent in March 2024.  Currently maintained on Brilinta and Eliquis.  -Continue Brilinta day of procedure  -Continue statin therapy day of procedure  -Continue metoprolol day of procedure  -Holding lisinopril and hydrochlorothiazide day of procedure    6. CKD:  Last creatinine 1.26 GFR  greater than 60.  -Preprocedure labs pending    7. TIA:  -Continue CPAP    8.  Type 2 diabetes  -Holding Ozempic 1 week prior to procedure  -Holding metformin day of procedure in 48 hours postprocedure    9.  Depression  -Continue prior to admission BuSpar day of procedure  -Holding Mirapex day of procedure    Patient is optimized and is acceptable candidate for the proposed procedure pending laboratory studies and 1 22-25.  No further diagnostic evaluation is needed. Pre-procedure instructions provided in written format.     KLAUS Goel, CNP  Structural Heart Care Nurse Practitioner  [facility]  Clinic: [phone]  Pager: [phone]

## 2025-01-13 ENCOUNTER — TELEPHONE (OUTPATIENT)
Dept: CARDIOLOGY | Facility: CLINIC | Age: 65
End: 2025-01-13
Payer: MEDICARE

## 2025-01-13 NOTE — TELEPHONE ENCOUNTER
Telephone call to wife, Luzma. Explained shared decision making requirement for Watchman procedure and plan for video visit with Dr. Joy. Patient is aware it will be video visit either with [place] or [place].     Zora Hendrix, RN, BSN  Lead Structural Heart Coordinator  T[facility], ALEX and LAAO Programs

## 2025-01-14 ENCOUNTER — OFFICE VISIT (OUTPATIENT)
Dept: CARDIOLOGY | Facility: CLINIC | Age: 65
End: 2025-01-14
Attending: NURSE PRACTITIONER
Payer: MEDICARE

## 2025-01-14 VITALS
HEART RATE: 47 BPM | OXYGEN SATURATION: 99 % | WEIGHT: 315 LBS | BODY MASS INDEX: 46.78 KG/M2 | SYSTOLIC BLOOD PRESSURE: 138 MMHG | DIASTOLIC BLOOD PRESSURE: 77 MMHG

## 2025-01-14 DIAGNOSIS — D69.9 BLEEDS EASILY: ICD-10-CM

## 2025-01-14 DIAGNOSIS — I48.0 PAROXYSMAL ATRIAL FIBRILLATION (H): ICD-10-CM

## 2025-01-14 DIAGNOSIS — Z01.818 PRE-OP EXAM: Primary | ICD-10-CM

## 2025-01-14 DIAGNOSIS — R58 ECCHYMOSIS: ICD-10-CM

## 2025-01-14 PROCEDURE — 99214 OFFICE O/P EST MOD 30 MIN: CPT | Performed by: NURSE PRACTITIONER

## 2025-01-14 PROCEDURE — G0463 HOSPITAL OUTPT CLINIC VISIT: HCPCS | Performed by: NURSE PRACTITIONER

## 2025-01-14 PROCEDURE — 93005 ELECTROCARDIOGRAM TRACING: CPT

## 2025-01-14 ASSESSMENT — PAIN SCALES - GENERAL: PAINLEVEL_OUTOF10: NO PAIN (0)

## 2025-01-14 NOTE — PROGRESS NOTES
Service Date: 1/15/2025    Mihir Perez MD  [address] [facility]  [place] [address]     RE:  Car Torres   MRN:  [id number]   :  1960       Dear Dr. Perez:    It was a pleasure participating in the care of your patient, Car Torres .  As you know, he is a 64 year old year old person who I met over a virtual visit today for atrial fibrillation, coronary disease, hypertension, hyperlipidemia.    Past medical history is significant for the followin.  Hypertension  2.  Hyperlipidemia  3.  History of chronic renal insufficiency with a current baseline GFR that was normal as of 10/23/2024  4.  Depression  5.  Obstructive sleep apnea on CPAP  6.  Erectile dysfunction  7.  Renal cyst  8.  Diverticulosis  9.  Pulmonary nodule  10.  Morbid obesity  11.  Sciatica      The patient had a coronary angiogram performed on 3/8/2024 for exertionally limiting symptoms with the following results:    3/8/2024:    Left main normal  LAD proximal 90% stenosis  Circumflex mid/distal 30% stenosis  RCA (performed on 2024) 60% proximal stenosis with a 60% distal stenosis with a DPR of 0.94    Drug-eluting stent placement in the proximal LAD was performed at that time successfully.    According to Kristine Gamez, nurse practitioner's progress notes 2025:    One week after LAD PCI 3/8/2024 he developed acute lightheadedness and palpitations, eventually associated with hypotension. This prompted ED evaluation where he was documented in newly diagnosed atrial fibrillation with rapid ventricular response 3/17/2024, treated with IV and oral metoprolol and converting to sinus bradycardia.  He developed recurrent symptoms and failed ED DCCV 3/21/2024, was admitted and briefly treated with dofetilide but discontinued due to sinus bradycardia.  He underwent PVI 2024 with an unremarkable recovery. An ambulatory monitor  was unremarkable for recurrent atrial fibrillation or sustained atrial  tachyarrhythmias.     She continued current therapy, and planned on a follow-up in 1 year.  The patient also saw Dr. Wright 11/21/2024 and planned for Watchman device placement due to the patient's intolerance of anticoagulation due to easy bruising and bleeding.  He planned for a follow-up transesophageal echo 45 days after the procedure and continuing aspirin and Plavix for 6 months subsequently.    From a clinical standpoint, he is accompanied by his wife today who helps him with his clinical history.  She mentions that he has had some mental health and memory issues in the recent past and that she provides a great majority of his history today.    From an A-fib standpoint the patient has not had any recurrent symptoms of his heart racing and skipping irregularly since his ablation in May 2024.  His episodes were not accompanied by any other symptoms of dizziness lightheadedness or syncope or near syncope.  He has done well since however his main issue has been that of tolerating anticoagulation as he bruises extremely easily and bleeds even more easily after even minor cuts.  He is a  and cannot tolerate any type of cut or skin breakage due to profuse bleeding.    From a coronary disease standpoint his wife notes that he was experiencing exertional dyspnea, fatigue as well as a grayish pallor prior to his revascularization.  Subsequently after revascularization he demonstrated an improvement in color, and increase in overall exertional capacity.  He is now able to shovel, snow blow and walking 1 to 1-1/2 miles per day without symptoms.    Blood pressures at home have been running in the 115 to 125 mmHg range.  He is tolerating his medications well without gross side effects except for bleeding.    The patient otherwise denies gross chest pain, shortness of breath, PND, orthopnea, edema, palpitations, syncope or near syncope.      MEDICATIONS:    1.  Apixaban 5 mg twice daily  2.  Lipitor 40 mg a  day  3.  BuSpar  4.  Vitamin D  5.  Coenzyme every 10  6.  Hydrochlorothiazide 25 mg a day  7.  Hydroxyzine as needed  8.  Insulin  9.  Lisinopril 20 mg a day  10.  Metformin  11.  Metoprolol titrate 25 mg as needed for A-fib  12.  Mirapex  13.  Semaglutide  14.  Viagra  15.  Ticagrelor 90 mg twice daily  16.  Vitamin B        PHYSICAL EXAM:    Blood pressures currently running 115 to 125 mmHg systolic, weight 329 pounds  General:  Patient appears comfortable, well groomed  Psych:  Patient is alert and oriented X 3  Eyes:  No gross erythema, exudate  Respiratory:  Patient is breathing comfortably without gross cough    The remainder of the comprehensive physical exam was deferred secondary to the COVID-19 pandemic and secondary to virtual visit restrictions.    LABS:    Labs 9/11/2024: LDL 87, TSH normal, hemoglobin 12.9    10/23/2024: Potassium 4.7, GFR normal    EKG 11/21/2024 reveals sinus bradycardia at a rate of 53 bpm, nonspecific intraventricular conduction delay, relatively low voltage diffusely    Zio patch monitor 8/9/2024 reveals 2 episodes of SVT longest 7 beats, no symptoms reported, rare supraventricular/ventricular ectopy of less than 1% each, no sustained ligament arrhythmias identified    Echocardiogram 3/18/2024 reveals an ejection fraction of 60 to 65% without significant valvular pathology    IMPRESSION:    Jerry is a 64-year-old gentleman whose past medical history significant for morbid obesity, obstructive sleep apnea on CPAP, history of chronic renal sufficiency with a normal baseline GFR as of October 2024 with several active issues:    1.  History of atrial fibrillation status post pulmonary vein isolation/ablation 5/30/2024    The patient was seen by Dr. Wright 11/21/2024 for intolerance to anticoagulation and a Watchman device implantation was planned at that time for January 23, 2025.  Patient already saw Julee Dorado from the structural clinic yesterday for further information  "regarding the procedure.  We discussed in detail the indications and risks and benefits of the procedure as well, and all questions were answered today.  Patient appears to be a good candidate for Watchman placement at the present time.    2.  Coronary artery disease    The patient is status post drug-eluting stent placements in the proximal LAD on 3/8/2024.  He has residual 30% stenosis in the mid to distal circumflex, and 60% proximal stenosis of the right coronary artery with a normal DPR of .94.    From a clinical standpoint, he is currently asymptomatic at a moderate level exertion, continue to follow.    3.  Hypertension    Blood pressures currently running 115 to 125 mmHg systolic, continue to follow.    4.  Lipids    Last LDL 9/11/2024 was 87, goal less than 70.  Currently on Lipitor 40 mg a day, further optimization would be indicated      PLAN:    1.  Increase Lipitor to 60 mg a day, recheck fasting lipids in 3 months    2.  He is approved for his procedure to place a Watchman device next week.    3.  I would be happy to see him if he should have general cardiology issues for questions in the future as well.    Once again, it was a pleasure participating in the care of your patient, Car Torres .  Please feel free to contact me at any time if there are any questions regarding his care in the future.      Sincerely,          Jose Juan Joy M.D.  Cardiovascular Division  [facility]      The patient has been notified of following:     \"This video visit will be conducted via a call between you and your physician/provider. We have found that certain health care needs can be provided without the need for an in-person physical exam.  This service lets us provide the care you need with a video conversation.  If a prescription is necessary we can send it directly to your pharmacy.  If lab work is needed we can place an order for that and you can then stop by our lab to have the test done at a " "later time.    Video visits are billed at different rates depending on your insurance coverage.  Please reach out to your insurance provider with any questions.    If during the course of the call the physician/provider feels a video visit is not appropriate, you will not be charged for this service.\"    Patient has given verbal consent for video visit? Yes    How would you like to obtain your AVS? Mail    Video-Visit Details    Type of service:  Video Visit    Video Start Time:1030am    Video End Time:1049am    Total visit time including video visit, chart review (40min), charting, coordination of care =65min    Originating Location (pt. Location):patient home      Distant Location (provider location):   Off site home office    Platform used for Video Visit: [employer]    LORNA#:[id number]         "

## 2025-01-14 NOTE — PATIENT INSTRUCTIONS
LOIS      Check in to the hospital, at 8:30 am on 1/23/25       Please disregard any [employer] messages or reminders in regards to ECHO scheduling, this is done as part of procedure.   If you are unsure if your check in time has changed, please call number [phone].     3rd floor: [address],      [facility], [place]   [address] [place]  [address]        parking is available in front of the hospital, 24 hours a day  -  Stop at the Information Desk and the admissions desk in the lobby.     -------------------------------------------------------------------------  Nothing to eat after midnight  Clear liquids like water, apple juice or 7up/Sprite is OK up to two hours prior to your scheduled arrival time.    You may take your medications in the morning with a sip of water.     * Take a shower, using Chlorhexidine Gluconate (CHG) soap, Hibiclens, the night before the procedure, and the morning of the procedure.    Shower using your usual soap and shampoo.  Turn off the water or move away from the shower stream.  Apply Hibiclens directly on your skin or on a wet washcloth, rub thoroughly for 5 minutes, from neck to groin, avoiding the face and genital area.  Rise soap off, do not wash with regular soap afterwards.     If you did NOT receive these Hibiclens soap at your pre-op History and Physical, then:   *Chlorhexidine Gluconate (CHG) soap is available free of charge at any [place] pharmacy  *Antibacterial soap can be used        Contrast Allergy:   No     Medications Instructions:        Please TAKE the following NEW medications:  325 mg Asprin: Please take day before and day of procedure (if you normally take 81 mg of Asprin, please take 4 pills total to equal 324 mg.)     Please TAKE the following medications morning of procedure as prescribed:    atorvastatin (LIPITOR)    busPIRone (BUSPAR)    metoprolol tartrate (LOPRESSOR)    ticagrelor (BRILINTA)     Please HOLD the  following medications morning of procedure:    hydroCHLOROthiazide (HYDRODIURIL)    sildenafil (VIAGRA)    Hydroxyzine (atarax)    Cholecalciferol (VITAMIN D PO)        MULTI VITAMIN MENS PO        co-enzyme Q-10 100 MG CAPS c        vitamin B complex with vitamin C        lisinopril (ZESTRIL)        pramipexole (MIRAPEX)        Special Medication Considerations:  Diabetic medication Instructions:        Please HOLD the following diabetic medications morning of procedure:    metFORMIN (GLUCOPHAGE XR)     Please HOLD the following diabetic medications 7 days before procedure:    Semaglutide, 2 MG/DOSE, (OZEMPIC)    your last dose will be on 1/16/25           Anticoagulation medication HOLD:       apixaban ANTICOAGULANT (ELIQUIS ANTICOAGULANT) hold 2 days beforehand: your last dose will be on 1/20            If any questions please contact:  Ruthie Clayton RN  Structural Heart Care Coordinator  [facility]  Office: [phone]

## 2025-01-14 NOTE — LETTER
1/14/2025      RE: Car Torres  [address]  [place], [place] [address]       Dear Colleague,    Thank you for the opportunity to participate in the care of your patient, Car Torres, at the [facility] HEART [facility]IN[facility] [place] [facility]. Please see a copy of my visit note below.        STRUCTURAL HEART CLINIC  H&P    Referring Provider: Dr. Wright   Primary Cardiologist: Kristine Gamez CNP    History of Present Illness  Car Torres is a 64 year old male who presents for pre-operative H&P in preparation for left atrial appendage occluder on 1/23/25 at [facility].     Patient with a history of paroxysmal atrial fibrillation s/p PVI ablation (5/30/24) [id number] is 3 (age, CAD, HTN) and HADBLED of 3 (age, meds and bleeding dispo) with intolerance to anticoagulation due to bruising and bleeding easily. He was evaluated in structural clinic where he was deemed an appropriate watchman candidate.    Additional medical history notable for CAD s/p PCI to LAD (3/8/24), TIA on CPAP, HTN, HLD, CKD.     He is recovering from a head cold. No fevers, chills or productive cough.     History of blood transfusions/reactions: No  History of abnormal bleeding/anti-platelet use: no  Steroid use in the last year: no  Personal or family history with difficulty with anesthesia: No   Infection precautions: no  Difficulty w/intubation/bedrest: No      Current Medications:  Current Outpatient Medications   Medication Sig Dispense Refill     apixaban ANTICOAGULANT (ELIQUIS ANTICOAGULANT) 5 MG tablet Take 1 tablet (5 mg) by mouth 2 times daily 180 tablet 3     atorvastatin (LIPITOR) 40 MG tablet TAKE 1 TABLET BY MOUTH EVERY DAY 90 tablet 2     busPIRone (BUSPAR) 10 MG tablet Take 2 tablets (20 mg) by mouth 2 times daily 360 tablet 4     Cholecalciferol (VITAMIN D PO) Take 5,000 Units by mouth daily One tablet twice daily       co-enzyme Q-10 100 MG  CAPS capsule Take 100 mg by mouth daily       hydroCHLOROthiazide (HYDRODIURIL) 25 MG tablet Take 1 tablet (25 mg) by mouth daily. 90 tablet 3     hydrOXYzine HCl (ATARAX) 10 MG tablet Take 1 tablet (10 mg) by mouth 2 times daily as needed for anxiety 180 tablet 5     insulin pen needle (32G X 6 MM) 32G X 6 MM miscellaneous Use 100 pen needles daily or as directed. 100 each 3     lisinopril (ZESTRIL) 20 MG tablet Take 1 tablet (20 mg) by mouth daily 90 tablet 3     metFORMIN (GLUCOPHAGE XR) 500 MG 24 hr tablet Take 1 tablet (500 mg) by mouth daily (with dinner). 90 tablet 3     metoprolol tartrate (LOPRESSOR) 25 MG tablet Take 1 tablet (25 mg) by mouth every 8 hours as needed (Atrial fibrillation with RVR, HR > 120) 30 tablet 1     MULTI VITAMIN MENS PO Take 1 tablet by mouth daily       pramipexole (MIRAPEX) 0.25 MG tablet Take 1 tablet (0.25 mg) by mouth at bedtime. 90 tablet 2     Semaglutide, 2 MG/DOSE, (OZEMPIC) 8 MG/3ML pen Inject 2 mg subcutaneously every 7 days.       sildenafil (VIAGRA) 100 MG tablet Take 1 tablet (100 mg) by mouth daily as needed. 20 tablet 4     ticagrelor (BRILINTA) 90 MG tablet Take 1 tablet (90 mg) by mouth 2 times daily Start this evening. 180 tablet 3     vitamin B complex with vitamin C (STRESS TAB) tablet Take 1 tablet by mouth daily         Allergies:   No Known Allergies    Past Medical History:  Past Medical History:   Diagnosis Date     CKD (chronic kidney disease) stage 2, GFR 60-89 ml/min 10/21/2010    60 to 80 - have discussed Lisinopril - will consider        Coronary artery disease 01/26/2024     Dementia (H)      Depressive disorder      Diverticulosis of large intestine without hemorrhage 11/18/2015    Incidental finding on CT scan        Heart disease 05/08/2018     Hypercholesterolemia      Hypertension goal BP (blood pressure) < 140/90      Nonspecific abnormal electrocardiogram (ECG) (EKG) 08/23/2007    Stress testing normal.      Paroxysmal atrial fibrillation (H)  03/17/2024     Sinus node dysfunction (H) 05/23/2024     Sleep apnea     uses a c-pap, severe     Status post coronary angiogram 01/26/2024       Past Surgical History:  Past Surgical History:   Procedure Laterality Date     COLONOSCOPY N/A 12/22/2020    Procedure: COLONOSCOPY, WITH POLYPECTOMY, CLIP;  Surgeon: Mihir Lang MD;  Location: [facility] OR     COLONOSCOPY N/A 1/10/2022    Procedure: COLONOSCOPY, WITH POLYPECTOMY AND BIOPSY;  Surgeon: Mihir Lang MD;  Location:  GI     CV CORONARY ANGIOGRAM N/A 1/26/2024    Procedure: Coronary Angiogram;  Surgeon: Elton Wright MD;  Location: [facility] CARDIAC CATH LAB     CV CORONARY LITHOTRIPSY PCI N/A 3/8/2024    Procedure: Percutaneous Coronary Intervention - Lithotripsy;  Surgeon: Elton Wright MD;  Location: [facility] CARDIAC CATH LAB     CV INSTANTANEOUS WAVE-FREE RATIO N/A 1/26/2024    Procedure: Instantaneous Wave-Free Ratio;  Surgeon: Elton Wright MD;  Location: [facility] CARDIAC CATH LAB     CV INTRAVASULAR ULTRASOUND N/A 3/8/2024    Procedure: Intravascular Ultrasound;  Surgeon: Elton Wright MD;  Location: [facility] CARDIAC CATH LAB     CV PCI N/A 3/8/2024    Procedure: Percutaneous Coronary Intervention;  Surgeon: Elton Wright MD;  Location: [facility] CARDIAC CATH LAB     CV RIGHT HEART CATH MEASUREMENTS RECORDED N/A 1/26/2024    Procedure: Right Heart Catheterization;  Surgeon: Elton Wright MD;  Location: [facility] CARDIAC CATH LAB     EP ABLATION PULMONARY VEIN ISOLATION N/A 5/30/2024    Procedure: Ablation Atrial Fibrillation;  Surgeon: Sandee Sheldon MD;  Location: [facility] CATH LAB CV     ESOPHAGOSCOPY, GASTROSCOPY, DUODENOSCOPY (EGD), COMBINED N/A 1/28/2021    Procedure: ESOPHAGOGASTRODUODENOSCOPY, WITH BIOPSY;  Surgeon: Mihir Lang MD;  Location: [facility] OR     OPEN REDUCTION INTERNAL FIXATION FOOT Left 1/5/2023    Procedure: LISFRANC OPEN REDUCTION INTERNAL FIXATION, LEFT FOOT;  Surgeon: Bailey Ruiz  C, DPM;  Location: [facility] OR     SURGICAL HISTORY OF -       surgery on left index finger       Family History:  Family History   Problem Relation Age of Onset     Cancer Mother         uterine     Other Cancer Mother         endometrial     Cerebrovascular Disease Mother      Substance Abuse Mother         Alcohol     C.A.D. Father      Hypertension Father      Hyperlipidemia Father      Breast Cancer Maternal Grandmother      Other Cancer Maternal Grandmother         Lung/brain     Substance Abuse Paternal Grandfather      Hypertension Brother      Substance Abuse Brother         Alcohol     Hyperlipidemia Brother      Breast Cancer Other      Breast Cancer Cousin      Other Cancer Other      Cerebrovascular Disease Other         Maternal Aunt     Glaucoma No family hx of      Macular Degeneration No family hx of        Social History:  Social History     Socioeconomic History     Marital status:      Spouse name: Luzma     Number of children: 3     Years of education: 14   Occupational History     Occupation: [employer]     Employer: OTHER     Comment: [employer]   Tobacco Use     Smoking status: Former     Current packs/day: 0.00     Average packs/day: 1 pack/day for 25.0 years (25.0 ttl pk-yrs)     Types: Cigarettes, Cigars     Start date: 1973     Quit date: 1998     Years since quittin.9     Passive exposure: Past     Smokeless tobacco: Never     Tobacco comments:     N/A not a smoker   Vaping Use     Vaping status: Never Used   Substance and Sexual Activity     Alcohol use: Yes     Alcohol/week: 10.0 standard drinks of alcohol     Comment: less than 6 per week     Drug use: Not Currently     Types: Amphetamines     Comment: Kratum     Sexual activity: Not Currently     Partners: Female     Birth control/protection: Female Surgical, None     Comment:    Other Topics Concern     Parent/sibling w/ CABG, MI or angioplasty before 65F 55M? Yes     Comment: Father age 50 bypass.  Grandfather death in age 40 s heart     Social Drivers of Health     Financial Resource Strain: Low Risk  (3/19/2024)    Financial Resource Strain      Within the past 12 months, have you or your family members you live with been unable to get utilities (heat, electricity) when it was really needed?: No   Food Insecurity: High Risk (3/19/2024)    Food Insecurity      Within the past 12 months, did you worry that your food would run out before you got money to buy more?: No      Within the past 12 months, did the food you bought just not last and you didn t have money to get more?: Yes   Transportation Needs: Low Risk  (3/19/2024)    Transportation Needs      Within the past 12 months, has lack of transportation kept you from medical appointments, getting your medicines, non-medical meetings or appointments, work, or from getting things that you need?: No   Physical Activity: Inactive (3/19/2024)    Exercise Vital Sign      Days of Exercise per Week: 0 days      Minutes of Exercise per Session: 0 min   Stress: No Stress Concern Present (3/19/2024)    [place] [employer] - Occupational Stress Questionnaire      Feeling of Stress : Not at all   Social Connections: Unknown (3/19/2024)    Social Connection and Isolation Panel [NHANES]      Frequency of Social Gatherings with Friends and Family: Once a week   Recent Concern: Social Connections - Moderately Isolated (3/19/2024)    Social Connection and Isolation Panel [NHANES]      Frequency of Communication with Friends and Family: Three times a week      Frequency of Social Gatherings with Friends and Family: Once a week      Attends Synagogue Services: Never      Active Member of Clubs or Organizations: No      Attends Club or Organization Meetings: Never      Marital Status:    Interpersonal Safety: Low Risk  (12/6/2023)    Interpersonal Safety      Do you feel physically and emotionally safe where you currently live?: Yes      Within the  past 12 months, have you been hit, slapped, kicked or otherwise physically hurt by someone?: No      Within the past 12 months, have you been humiliated or emotionally abused in other ways by your partner or ex-partner?: No   Housing Stability: Low Risk  (3/19/2024)    Housing Stability      Do you have housing? : Yes      Are you worried about losing your housing?: No       Review of Systems:    Functional status: Independent in ADL's. Able to achieve METS > 4    Constitutional: No fever, chills, or sweats. No weight gain/loss   ENT: No visual disturbance, ear ache, epistaxis, sore throat  Allergies/Immunologic: Negative.   Respiratory: No cough, hemoptysia  Cardiovascular: As per HPI  GI: No nausea, vomiting, hematemesis, melena, or hematochezia  : No urinary frequency, dysuria, or hematuria  Integument: Negative  Psychiatric: Negative  Neuro: Negative  Endocrinology: Negative   Musculoskeletal: Negative      Physical Exam:  Vitals: /77 (BP Location: Right arm, Patient Position: Chair, Cuff Size: Adult Large)   Pulse (!) 47   Wt 147.9 kg (326 lb)   SpO2 99%   BMI 46.78 kg/m     General: NAD  HEENT:  Dentition intact.    Neck: No jugular venous distension.   Heart: RRR    Lungs: CTA.    Abdomen: Soft, nontender, nondistended.   Extremities: No clubbing, cyanosis, or edema.  The pulses are +4/4 at the radial, brachial, femoral, popliteal, DP, and PT sites bilaterally.  No bruits are noted.  Neurologic: Alert and oriented to person/place/time, normal speech, gait and affect  Skin: No petechiae, purpura or rash.      Diagnostic Studies:      ECG 1/14/25:  Personally reviewed and interpreted by me.  SB HR 48     CT heart 12/2/24  IMPRESSION:  1.  The left atrial appendage depth is: 28.7 mm.  2.  The left atrial appendage landing zone maximal diameter is 21.8 mm  and the minimal diameter is 15.4 mm; the landing zone circumference is   59.2 mm.   3.  The left atrial appendage is free of thrombus.       FINDINGS:   1.  The left atrial appendage has a  chicken wing morphology.  2.  The left atrial appendage depth is: 28.7 mm.  3.  The left atrial appendage landing zone average diameter is 18.0 mm  (21.8 mm x 15.4 mm).  4.  The left atrial appendage landing zone perimeter is  59.2 mm.  5.  Normal pulmonary venous anatomy with all pulmonary veins draining  into the left atrium.  6.  Coronary images are non-diagnostic for stenosis/atherosclerosis  due to cardiac motion artifact.  There is a stent in the LAD. There  are moderate coronary artery calcifications.  7.  The visualized aortic root, ascending aorta and descending  thoracic aorta are normal.  8.  Please review the separate Radiology report from the original  study site and date for incidental noncardiac findings.     MARY FERRERA MD     Laboratory Studies:  Personally reviewed and interpreted by me.    LIPID RESULTS:  Lab Results   Component Value Date    CHOL 163 09/11/2024    CHOL 161 04/23/2021    HDL 48 09/11/2024    HDL 79 04/23/2021    LDL 87 09/11/2024    LDL 65 04/23/2021    TRIG 140 09/11/2024    TRIG 87 04/23/2021    CHOLHDLRATIO 3.1 07/13/2015       LIVER ENZYME RESULTS:  Lab Results   Component Value Date    AST 22 09/11/2024    AST 15 08/02/2020    ALT 18 09/11/2024    ALT 27 08/02/2020       CBC RESULTS:  Lab Results   Component Value Date    WBC 6.8 09/11/2024    WBC 7.1 12/15/2020    RBC 4.36 (L) 09/11/2024    RBC 5.24 12/15/2020    HGB 12.9 (L) 09/11/2024    HGB 15.7 12/15/2020    HCT 39.3 (L) 09/11/2024    HCT 47.0 12/15/2020    MCV 90 09/11/2024    MCV 90 12/15/2020    MCH 29.6 09/11/2024    MCH 30.0 12/15/2020    MCHC 32.8 09/11/2024    MCHC 33.4 12/15/2020    RDW 13.1 09/11/2024    RDW 13.2 12/15/2020     09/11/2024     12/15/2020       BMP RESULTS:  Lab Results   Component Value Date     10/23/2024     08/02/2020    POTASSIUM 4.7 10/23/2024    POTASSIUM 4.1 01/05/2023    POTASSIUM 4.0 08/02/2020     CHLORIDE 102 10/23/2024    CHLORIDE 100 12/29/2022    CHLORIDE 105 08/02/2020    CO2 25 10/23/2024    CO2 30 12/29/2022    CO2 28 08/02/2020    ANIONGAP 9 10/23/2024    ANIONGAP 6 12/29/2022    ANIONGAP 5 08/02/2020     (H) 10/23/2024     (H) 12/29/2022     (A) 04/21/2021    BUN 24.1 (H) 10/23/2024    BUN 17 12/29/2022    BUN 28 08/02/2020    CR 1.26 (H) 10/23/2024    CR 1.17 08/02/2020    GFRESTIMATED 63 10/23/2024    GFRESTIMATED 66 08/02/2020    GFRESTBLACK 77 08/02/2020    SHAHID 9.7 10/23/2024    SHAHID 8.9 08/02/2020        A1C RESULTS:  Lab Results   Component Value Date    A1C 5.4 09/11/2024       INR RESULTS:  Lab Results   Component Value Date    INR 0.99 03/22/2024    INR 1.07 03/18/2024    INR 0.96 08/18/2007       Assessment and Plan   Car Torres is a 64 year old male who presents for pre-operative H&P in preparation for transcatheter aortic valve replacement on 1/23/25 at [facility].     He has the following specific operative considerations:      - RCRI score 1 corresponding with a 1% perioperative risk of MACE      - TIA: known wears CPAP      - VTE risk = 1. If equal to or > 4, pharmacologic prophylaxis is indicated      - RIsk of PONV score = 1. If > 2, anti-emetic intervention recommended    1. Paroxysmal atrial fibrillation:   2. Intolerance to anticoagulation:  Patient with a history of paroxysmal atrial fibrillation s/p PVI ablation (5/30/24) w/[id number] is 3 (age, CAD, HTN) and HADBLED of 3 (age, meds and bleeding dispo) with intolerance to anticoagulation due to bruising and bleeding easily. He was evaluated in structural clinic where he was deemed an appropriate watchman candidate.  CT heart shows favorable anatomy for LAAO.    Hold Eliquis 48 hours preprocedure  Take aspirin 325-day before and morning of procedure  All questions answered  Type and screen orders complete  Supplies for scrubbing provided  No known contrast dye allergy   No  trouble with anesthesia in the past  Labs today stable, no s/s of infection    3. CAD:   4. Hyperlipidemia:  5. Hypertension:  History of LAD stent in March 2024.  Currently maintained on Brilinta and Eliquis.  -Continue Brilinta day of procedure  -Continue statin therapy day of procedure  -Continue metoprolol day of procedure  -Holding lisinopril and hydrochlorothiazide day of procedure    6. CKD:  Last creatinine 1.26 GFR greater than 60.  -Preprocedure labs pending    7. TIA:  -Continue CPAP    8.  Type 2 diabetes  -Holding Ozempic 1 week prior to procedure  -Holding metformin day of procedure in 48 hours postprocedure    9.  Depression  -Continue prior to admission BuSpar day of procedure  -Holding Mirapex day of procedure    Patient is optimized and is acceptable candidate for the proposed procedure pending laboratory studies and 1 22-25.  No further diagnostic evaluation is needed. Pre-procedure instructions provided in written format.     KLAUS Goel, CNP  Structural Heart Care Nurse Practitioner  [facility]  Clinic: [phone]  Pager: [phone]      Please do not hesitate to contact me if you have any questions/concerns.     Sincerely,     Julee Dorado NP

## 2025-01-15 ENCOUNTER — VIRTUAL VISIT (OUTPATIENT)
Dept: CARDIOLOGY | Facility: OTHER | Age: 65
End: 2025-01-15
Attending: INTERNAL MEDICINE
Payer: MEDICARE

## 2025-01-15 VITALS — HEIGHT: 71 IN | BODY MASS INDEX: 44.1 KG/M2 | WEIGHT: 315 LBS

## 2025-01-15 DIAGNOSIS — E78.5 HYPERLIPIDEMIA LDL GOAL <100: Primary | ICD-10-CM

## 2025-01-15 DIAGNOSIS — E78.5 DYSLIPIDEMIA: ICD-10-CM

## 2025-01-15 LAB
ATRIAL RATE - MUSE: 48 BPM
DIASTOLIC BLOOD PRESSURE - MUSE: NORMAL MMHG
INTERPRETATION ECG - MUSE: NORMAL
P AXIS - MUSE: 62 DEGREES
PR INTERVAL - MUSE: 186 MS
QRS DURATION - MUSE: 122 MS
QT - MUSE: 458 MS
QTC - MUSE: 409 MS
R AXIS - MUSE: 5 DEGREES
SYSTOLIC BLOOD PRESSURE - MUSE: NORMAL MMHG
T AXIS - MUSE: 36 DEGREES
VENTRICULAR RATE- MUSE: 48 BPM

## 2025-01-15 RX ORDER — ATORVASTATIN CALCIUM 40 MG/1
60 TABLET, FILM COATED ORAL DAILY
Qty: 135 TABLET | Refills: 3 | Status: SHIPPED | OUTPATIENT
Start: 2025-01-15

## 2025-01-15 ASSESSMENT — PAIN SCALES - GENERAL: PAINLEVEL_OUTOF10: NO PAIN (0)

## 2025-01-15 NOTE — NURSING NOTE
Current patient location: [facility] [address] [facility]  [place] [address]    Is the patient currently in the state of [place]? YES    Visit mode: VIDEO    If the visit is dropped, the patient can be reconnected by:VIDEO VISIT: Text to cell phone:   Telephone Information:   Mobile [phone]    and VIDEO VISIT: Send to e-mail at: [email or web address]    Will anyone else be joining the visit? Pts spouse  (If patient encounters technical issues they should call [phone])    Are changes needed to the allergy or medication list? No    Patients spouse denies any changes and states that all information remains accurate since last reviewed/verified. No changes since reviewed yesterday per pts spouse    Are refills needed on medications prescribed by this physician? NA    Rooming Documentation:  Questionnaire(s) completed    Pts vitals were checked yesterday at appt per pts spouse    Reason for visit: Consult    Ivis Gallardo MA VVF

## 2025-01-15 NOTE — PATIENT INSTRUCTIONS
1.  Increase Lipitor to 60 mg a day, recheck fasting lipids in 3 months     2.  He is approved for his procedure to place a Watchman device next week.     3.  I would be happy to see him if he should have general cardiology issues for questions in the future as well.

## 2025-01-21 LAB
ABO + RH BLD: NORMAL
BLD GP AB SCN SERPL QL: NEGATIVE
SPECIMEN EXP DATE BLD: NORMAL

## 2025-01-22 ENCOUNTER — ANESTHESIA EVENT (OUTPATIENT)
Dept: SURGERY | Facility: CLINIC | Age: 65
End: 2025-01-22
Payer: MEDICARE

## 2025-01-22 ENCOUNTER — LAB (OUTPATIENT)
Dept: LAB | Facility: CLINIC | Age: 65
End: 2025-01-22
Payer: MEDICARE

## 2025-01-22 DIAGNOSIS — I48.0 PAROXYSMAL ATRIAL FIBRILLATION (H): ICD-10-CM

## 2025-01-22 LAB
ERYTHROCYTE [DISTWIDTH] IN BLOOD BY AUTOMATED COUNT: 14.5 % (ref 10–15)
HCT VFR BLD AUTO: 40.5 % (ref 40–53)
HGB BLD-MCNC: 13.4 G/DL (ref 13.3–17.7)
INR PPP: 0.94 (ref 0.85–1.15)
MCH RBC QN AUTO: 28.7 PG (ref 26.5–33)
MCHC RBC AUTO-ENTMCNC: 33.1 G/DL (ref 31.5–36.5)
MCV RBC AUTO: 87 FL (ref 78–100)
PLATELET # BLD AUTO: 231 10E3/UL (ref 150–450)
RBC # BLD AUTO: 4.67 10E6/UL (ref 4.4–5.9)
WBC # BLD AUTO: 7.3 10E3/UL (ref 4–11)

## 2025-01-22 PROCEDURE — 86900 BLOOD TYPING SEROLOGIC ABO: CPT

## 2025-01-22 PROCEDURE — 86850 RBC ANTIBODY SCREEN: CPT

## 2025-01-22 PROCEDURE — 86901 BLOOD TYPING SEROLOGIC RH(D): CPT

## 2025-01-22 PROCEDURE — 85610 PROTHROMBIN TIME: CPT

## 2025-01-22 PROCEDURE — 36415 COLL VENOUS BLD VENIPUNCTURE: CPT

## 2025-01-22 PROCEDURE — 80048 BASIC METABOLIC PNL TOTAL CA: CPT

## 2025-01-22 PROCEDURE — 85027 COMPLETE CBC AUTOMATED: CPT

## 2025-01-23 ENCOUNTER — ANESTHESIA (OUTPATIENT)
Dept: SURGERY | Facility: CLINIC | Age: 65
End: 2025-01-23
Payer: MEDICARE

## 2025-01-23 ENCOUNTER — APPOINTMENT (OUTPATIENT)
Dept: CARDIOLOGY | Facility: CLINIC | Age: 65
DRG: 274 | End: 2025-01-23
Attending: NURSE PRACTITIONER
Payer: MEDICARE

## 2025-01-23 ENCOUNTER — HOSPITAL ENCOUNTER (INPATIENT)
Facility: CLINIC | Age: 65
LOS: 1 days | Discharge: HOME | DRG: 274 | End: 2025-01-23
Attending: INTERNAL MEDICINE | Admitting: INTERNAL MEDICINE
Payer: MEDICARE

## 2025-01-23 VITALS
HEART RATE: 67 BPM | SYSTOLIC BLOOD PRESSURE: 121 MMHG | DIASTOLIC BLOOD PRESSURE: 67 MMHG | HEIGHT: 71 IN | BODY MASS INDEX: 44.1 KG/M2 | RESPIRATION RATE: 21 BRPM | TEMPERATURE: 96.9 F | WEIGHT: 315 LBS | OXYGEN SATURATION: 97 %

## 2025-01-23 DIAGNOSIS — Z95.818 PRESENCE OF WATCHMAN LEFT ATRIAL APPENDAGE CLOSURE DEVICE: Primary | ICD-10-CM

## 2025-01-23 DIAGNOSIS — I48.0 PAROXYSMAL ATRIAL FIBRILLATION (H): ICD-10-CM

## 2025-01-23 LAB
ACT BLD: 341 SECONDS (ref 74–150)
ANION GAP SERPL CALCULATED.3IONS-SCNC: 11 MMOL/L (ref 7–15)
ANION GAP SERPL CALCULATED.3IONS-SCNC: 14 MMOL/L (ref 7–15)
ATRIAL RATE - MUSE: 57 BPM
BUN SERPL-MCNC: 18.5 MG/DL (ref 8–23)
BUN SERPL-MCNC: 20.3 MG/DL (ref 8–23)
CALCIUM SERPL-MCNC: 8.7 MG/DL (ref 8.8–10.4)
CALCIUM SERPL-MCNC: 9 MG/DL (ref 8.8–10.4)
CHLORIDE SERPL-SCNC: 100 MMOL/L (ref 98–107)
CHLORIDE SERPL-SCNC: 103 MMOL/L (ref 98–107)
CREAT SERPL-MCNC: 1.05 MG/DL (ref 0.67–1.17)
CREAT SERPL-MCNC: 1.22 MG/DL (ref 0.67–1.17)
DIASTOLIC BLOOD PRESSURE - MUSE: NORMAL MMHG
EGFRCR SERPLBLD CKD-EPI 2021: 65 ML/MIN/1.73M2
EGFRCR SERPLBLD CKD-EPI 2021: 78 ML/MIN/1.73M2
GLUCOSE BLDC GLUCOMTR-MCNC: 99 MG/DL (ref 70–99)
GLUCOSE BLDC GLUCOMTR-MCNC: 99 MG/DL (ref 70–99)
GLUCOSE SERPL-MCNC: 101 MG/DL (ref 70–99)
GLUCOSE SERPL-MCNC: 97 MG/DL (ref 70–99)
HCO3 SERPL-SCNC: 22 MMOL/L (ref 22–29)
HCO3 SERPL-SCNC: 22 MMOL/L (ref 22–29)
INTERPRETATION ECG - MUSE: NORMAL
LVEF ECHO: NORMAL
P AXIS - MUSE: 51 DEGREES
POTASSIUM SERPL-SCNC: 4.1 MMOL/L (ref 3.4–5.3)
POTASSIUM SERPL-SCNC: 4.2 MMOL/L (ref 3.4–5.3)
PR INTERVAL - MUSE: 180 MS
QRS DURATION - MUSE: 120 MS
QT - MUSE: 424 MS
QTC - MUSE: 412 MS
R AXIS - MUSE: 8 DEGREES
SODIUM SERPL-SCNC: 136 MMOL/L (ref 135–145)
SODIUM SERPL-SCNC: 136 MMOL/L (ref 135–145)
SYSTOLIC BLOOD PRESSURE - MUSE: NORMAL MMHG
T AXIS - MUSE: 34 DEGREES
VENTRICULAR RATE- MUSE: 57 BPM

## 2025-01-23 PROCEDURE — 99221 1ST HOSP IP/OBS SF/LOW 40: CPT | Mod: 25 | Performed by: NURSE PRACTITIONER

## 2025-01-23 PROCEDURE — 250N000011 HC RX IP 250 OP 636: Performed by: INTERNAL MEDICINE

## 2025-01-23 PROCEDURE — 93325 DOPPLER ECHO COLOR FLOW MAPG: CPT

## 2025-01-23 PROCEDURE — 250N000009 HC RX 250: Performed by: INTERNAL MEDICINE

## 2025-01-23 PROCEDURE — 120N000002 HC R&B MED SURG/OB UMMC

## 2025-01-23 PROCEDURE — C1887 CATHETER, GUIDING: HCPCS | Performed by: INTERNAL MEDICINE

## 2025-01-23 PROCEDURE — 370N000017 HC ANESTHESIA TECHNICAL FEE, PER MIN: Performed by: INTERNAL MEDICINE

## 2025-01-23 PROCEDURE — C1894 INTRO/SHEATH, NON-LASER: HCPCS | Performed by: INTERNAL MEDICINE

## 2025-01-23 PROCEDURE — 258N000003 HC RX IP 258 OP 636

## 2025-01-23 PROCEDURE — 93308 TTE F-UP OR LMTD: CPT | Mod: 26 | Performed by: INTERNAL MEDICINE

## 2025-01-23 PROCEDURE — 93005 ELECTROCARDIOGRAM TRACING: CPT

## 2025-01-23 PROCEDURE — C1760 CLOSURE DEV, VASC: HCPCS | Performed by: INTERNAL MEDICINE

## 2025-01-23 PROCEDURE — 250N000009 HC RX 250: Performed by: NURSE ANESTHETIST, CERTIFIED REGISTERED

## 2025-01-23 PROCEDURE — 250N000011 HC RX IP 250 OP 636

## 2025-01-23 PROCEDURE — 272N000001 HC OR GENERAL SUPPLY STERILE: Performed by: INTERNAL MEDICINE

## 2025-01-23 PROCEDURE — 02L73DK OCCLUSION OF LEFT ATRIAL APPENDAGE WITH INTRALUMINAL DEVICE, PERCUTANEOUS APPROACH: ICD-10-PCS | Performed by: INTERNAL MEDICINE

## 2025-01-23 PROCEDURE — 999N000134 HC STATISTIC PP CARE STAGE 3

## 2025-01-23 PROCEDURE — 85347 COAGULATION TIME ACTIVATED: CPT

## 2025-01-23 PROCEDURE — 250N000009 HC RX 250: Performed by: STUDENT IN AN ORGANIZED HEALTH CARE EDUCATION/TRAINING PROGRAM

## 2025-01-23 PROCEDURE — 250N000013 HC RX MED GY IP 250 OP 250 PS 637: Performed by: INTERNAL MEDICINE

## 2025-01-23 PROCEDURE — 33340 PERQ CLSR TCAT L ATR APNDGE: CPT | Performed by: INTERNAL MEDICINE

## 2025-01-23 PROCEDURE — 250N000011 HC RX IP 250 OP 636: Performed by: NURSE PRACTITIONER

## 2025-01-23 PROCEDURE — 93325 DOPPLER ECHO COLOR FLOW MAPG: CPT | Mod: 26 | Performed by: INTERNAL MEDICINE

## 2025-01-23 PROCEDURE — 36415 COLL VENOUS BLD VENIPUNCTURE: CPT | Performed by: INTERNAL MEDICINE

## 2025-01-23 PROCEDURE — 250N000013 HC RX MED GY IP 250 OP 250 PS 637: Performed by: NURSE PRACTITIONER

## 2025-01-23 PROCEDURE — 250N000009 HC RX 250

## 2025-01-23 PROCEDURE — 80048 BASIC METABOLIC PNL TOTAL CA: CPT | Performed by: INTERNAL MEDICINE

## 2025-01-23 PROCEDURE — C1889 IMPLANT/INSERT DEVICE, NOC: HCPCS | Performed by: INTERNAL MEDICINE

## 2025-01-23 RX ORDER — HYDROMORPHONE HCL IN WATER/PF 6 MG/30 ML
0.2 PATIENT CONTROLLED ANALGESIA SYRINGE INTRAVENOUS EVERY 5 MIN PRN
Status: DISCONTINUED | OUTPATIENT
Start: 2025-01-23 | End: 2025-01-23 | Stop reason: HOSPADM

## 2025-01-23 RX ORDER — HYDROXYZINE HYDROCHLORIDE 25 MG/1
25 TABLET, FILM COATED ORAL ONCE
Status: DISCONTINUED | OUTPATIENT
Start: 2025-01-23 | End: 2025-01-23

## 2025-01-23 RX ORDER — IOPAMIDOL 755 MG/ML
INJECTION, SOLUTION INTRAVASCULAR
Status: DISCONTINUED | OUTPATIENT
Start: 2025-01-23 | End: 2025-01-23 | Stop reason: HOSPADM

## 2025-01-23 RX ORDER — LIDOCAINE HYDROCHLORIDE 20 MG/ML
INJECTION, SOLUTION INFILTRATION; PERINEURAL PRN
Status: DISCONTINUED | OUTPATIENT
Start: 2025-01-23 | End: 2025-01-23

## 2025-01-23 RX ORDER — ONDANSETRON 4 MG/1
4 TABLET, ORALLY DISINTEGRATING ORAL EVERY 6 HOURS PRN
Status: DISCONTINUED | OUTPATIENT
Start: 2025-01-23 | End: 2025-01-23 | Stop reason: HOSPADM

## 2025-01-23 RX ORDER — SODIUM CHLORIDE, SODIUM GLUCONATE, SODIUM ACETATE, POTASSIUM CHLORIDE AND MAGNESIUM CHLORIDE 526; 502; 368; 37; 30 MG/100ML; MG/100ML; MG/100ML; MG/100ML; MG/100ML
INJECTION, SOLUTION INTRAVENOUS CONTINUOUS PRN
Status: DISCONTINUED | OUTPATIENT
Start: 2025-01-23 | End: 2025-01-23

## 2025-01-23 RX ORDER — CEFAZOLIN SODIUM/WATER 3 G/30 ML
3 SYRINGE (ML) INTRAVENOUS
Status: COMPLETED | OUTPATIENT
Start: 2025-01-23 | End: 2025-01-23

## 2025-01-23 RX ORDER — HYDROMORPHONE HCL IN WATER/PF 6 MG/30 ML
0.4 PATIENT CONTROLLED ANALGESIA SYRINGE INTRAVENOUS EVERY 5 MIN PRN
Status: DISCONTINUED | OUTPATIENT
Start: 2025-01-23 | End: 2025-01-23 | Stop reason: HOSPADM

## 2025-01-23 RX ORDER — FENTANYL CITRATE 50 UG/ML
50 INJECTION, SOLUTION INTRAMUSCULAR; INTRAVENOUS EVERY 5 MIN PRN
Status: DISCONTINUED | OUTPATIENT
Start: 2025-01-23 | End: 2025-01-23 | Stop reason: HOSPADM

## 2025-01-23 RX ORDER — SODIUM CHLORIDE 9 MG/ML
1000 INJECTION, SOLUTION INTRAVENOUS CONTINUOUS
Status: DISCONTINUED | OUTPATIENT
Start: 2025-01-23 | End: 2025-01-23 | Stop reason: HOSPADM

## 2025-01-23 RX ORDER — SODIUM CHLORIDE, SODIUM LACTATE, POTASSIUM CHLORIDE, CALCIUM CHLORIDE 600; 310; 30; 20 MG/100ML; MG/100ML; MG/100ML; MG/100ML
INJECTION, SOLUTION INTRAVENOUS CONTINUOUS PRN
Status: DISCONTINUED | OUTPATIENT
Start: 2025-01-23 | End: 2025-01-23

## 2025-01-23 RX ORDER — OXYCODONE HYDROCHLORIDE 5 MG/1
5 TABLET ORAL
Status: DISCONTINUED | OUTPATIENT
Start: 2025-01-23 | End: 2025-01-23 | Stop reason: HOSPADM

## 2025-01-23 RX ORDER — HYDRALAZINE HYDROCHLORIDE 20 MG/ML
2.5-5 INJECTION INTRAMUSCULAR; INTRAVENOUS EVERY 10 MIN PRN
Status: DISCONTINUED | OUTPATIENT
Start: 2025-01-23 | End: 2025-01-23 | Stop reason: HOSPADM

## 2025-01-23 RX ORDER — DEXAMETHASONE SODIUM PHOSPHATE 4 MG/ML
4 INJECTION, SOLUTION INTRA-ARTICULAR; INTRALESIONAL; INTRAMUSCULAR; INTRAVENOUS; SOFT TISSUE
Status: DISCONTINUED | OUTPATIENT
Start: 2025-01-23 | End: 2025-01-23 | Stop reason: HOSPADM

## 2025-01-23 RX ORDER — ACETAMINOPHEN 325 MG/1
650 TABLET ORAL EVERY 4 HOURS PRN
Status: DISCONTINUED | OUTPATIENT
Start: 2025-01-23 | End: 2025-01-23 | Stop reason: HOSPADM

## 2025-01-23 RX ORDER — DEXAMETHASONE SODIUM PHOSPHATE 4 MG/ML
INJECTION, SOLUTION INTRA-ARTICULAR; INTRALESIONAL; INTRAMUSCULAR; INTRAVENOUS; SOFT TISSUE PRN
Status: DISCONTINUED | OUTPATIENT
Start: 2025-01-23 | End: 2025-01-23

## 2025-01-23 RX ORDER — NALOXONE HYDROCHLORIDE 0.4 MG/ML
0.1 INJECTION, SOLUTION INTRAMUSCULAR; INTRAVENOUS; SUBCUTANEOUS
Status: DISCONTINUED | OUTPATIENT
Start: 2025-01-23 | End: 2025-01-23 | Stop reason: HOSPADM

## 2025-01-23 RX ORDER — ONDANSETRON 2 MG/ML
INJECTION INTRAMUSCULAR; INTRAVENOUS PRN
Status: DISCONTINUED | OUTPATIENT
Start: 2025-01-23 | End: 2025-01-23

## 2025-01-23 RX ORDER — ONDANSETRON 2 MG/ML
4 INJECTION INTRAMUSCULAR; INTRAVENOUS EVERY 6 HOURS PRN
Status: DISCONTINUED | OUTPATIENT
Start: 2025-01-23 | End: 2025-01-23 | Stop reason: HOSPADM

## 2025-01-23 RX ORDER — LABETALOL HYDROCHLORIDE 5 MG/ML
10 INJECTION, SOLUTION INTRAVENOUS
Status: DISCONTINUED | OUTPATIENT
Start: 2025-01-23 | End: 2025-01-23 | Stop reason: HOSPADM

## 2025-01-23 RX ORDER — ONDANSETRON 2 MG/ML
4 INJECTION INTRAMUSCULAR; INTRAVENOUS EVERY 30 MIN PRN
Status: DISCONTINUED | OUTPATIENT
Start: 2025-01-23 | End: 2025-01-23 | Stop reason: HOSPADM

## 2025-01-23 RX ORDER — ASPIRIN 325 MG
325 TABLET, DELAYED RELEASE (ENTERIC COATED) ORAL ONCE
Status: COMPLETED | OUTPATIENT
Start: 2025-01-23 | End: 2025-01-23

## 2025-01-23 RX ORDER — ONDANSETRON 4 MG/1
4 TABLET, ORALLY DISINTEGRATING ORAL EVERY 30 MIN PRN
Status: DISCONTINUED | OUTPATIENT
Start: 2025-01-23 | End: 2025-01-23 | Stop reason: HOSPADM

## 2025-01-23 RX ORDER — LIDOCAINE 40 MG/G
CREAM TOPICAL
Status: DISCONTINUED | OUTPATIENT
Start: 2025-01-23 | End: 2025-01-23 | Stop reason: HOSPADM

## 2025-01-23 RX ORDER — ACETAMINOPHEN 325 MG/1
975 TABLET ORAL ONCE
Status: COMPLETED | OUTPATIENT
Start: 2025-01-23 | End: 2025-01-23

## 2025-01-23 RX ORDER — HYDROXYZINE HYDROCHLORIDE 10 MG/1
10 TABLET, FILM COATED ORAL ONCE
Status: COMPLETED | OUTPATIENT
Start: 2025-01-23 | End: 2025-01-23

## 2025-01-23 RX ORDER — PROPOFOL 10 MG/ML
INJECTION, EMULSION INTRAVENOUS PRN
Status: DISCONTINUED | OUTPATIENT
Start: 2025-01-23 | End: 2025-01-23

## 2025-01-23 RX ORDER — ASPIRIN 81 MG/1
81 TABLET ORAL DAILY
COMMUNITY
Start: 2025-01-23

## 2025-01-23 RX ORDER — PROTAMINE SULFATE 10 MG/ML
INJECTION, SOLUTION INTRAVENOUS PRN
Status: DISCONTINUED | OUTPATIENT
Start: 2025-01-23 | End: 2025-01-23

## 2025-01-23 RX ORDER — SODIUM CHLORIDE, SODIUM LACTATE, POTASSIUM CHLORIDE, CALCIUM CHLORIDE 600; 310; 30; 20 MG/100ML; MG/100ML; MG/100ML; MG/100ML
INJECTION, SOLUTION INTRAVENOUS CONTINUOUS
Status: DISCONTINUED | OUTPATIENT
Start: 2025-01-23 | End: 2025-01-23 | Stop reason: HOSPADM

## 2025-01-23 RX ORDER — MEPERIDINE HYDROCHLORIDE 25 MG/ML
12.5 INJECTION INTRAMUSCULAR; INTRAVENOUS; SUBCUTANEOUS EVERY 5 MIN PRN
Status: DISCONTINUED | OUTPATIENT
Start: 2025-01-23 | End: 2025-01-23 | Stop reason: HOSPADM

## 2025-01-23 RX ORDER — HEPARIN SODIUM 1000 [USP'U]/ML
INJECTION, SOLUTION INTRAVENOUS; SUBCUTANEOUS PRN
Status: DISCONTINUED | OUTPATIENT
Start: 2025-01-23 | End: 2025-01-23

## 2025-01-23 RX ORDER — FENTANYL CITRATE 50 UG/ML
25 INJECTION, SOLUTION INTRAMUSCULAR; INTRAVENOUS EVERY 5 MIN PRN
Status: DISCONTINUED | OUTPATIENT
Start: 2025-01-23 | End: 2025-01-23 | Stop reason: HOSPADM

## 2025-01-23 RX ORDER — FENTANYL CITRATE 50 UG/ML
25 INJECTION, SOLUTION INTRAMUSCULAR; INTRAVENOUS
Status: DISCONTINUED | OUTPATIENT
Start: 2025-01-23 | End: 2025-01-23 | Stop reason: HOSPADM

## 2025-01-23 RX ORDER — ASPIRIN 81 MG/1
81 TABLET ORAL DAILY
Status: DISCONTINUED | OUTPATIENT
Start: 2025-01-23 | End: 2025-01-23 | Stop reason: HOSPADM

## 2025-01-23 RX ORDER — FENTANYL CITRATE 50 UG/ML
INJECTION, SOLUTION INTRAMUSCULAR; INTRAVENOUS PRN
Status: DISCONTINUED | OUTPATIENT
Start: 2025-01-23 | End: 2025-01-23

## 2025-01-23 RX ORDER — EPHEDRINE SULFATE 50 MG/ML
INJECTION, SOLUTION INTRAMUSCULAR; INTRAVENOUS; SUBCUTANEOUS PRN
Status: DISCONTINUED | OUTPATIENT
Start: 2025-01-23 | End: 2025-01-23

## 2025-01-23 RX ADMIN — EPHEDRINE SULFATE 10 MG: 5 INJECTION INTRAVENOUS at 12:41

## 2025-01-23 RX ADMIN — PROTAMINE SULFATE 10 MG: 10 INJECTION, SOLUTION INTRAVENOUS at 14:07

## 2025-01-23 RX ADMIN — HEPARIN SODIUM 20000 UNITS: 1000 INJECTION INTRAVENOUS; SUBCUTANEOUS at 13:25

## 2025-01-23 RX ADMIN — ONDANSETRON 4 MG: 2 INJECTION INTRAMUSCULAR; INTRAVENOUS at 14:09

## 2025-01-23 RX ADMIN — ACETAMINOPHEN 650 MG: 325 TABLET, FILM COATED ORAL at 17:33

## 2025-01-23 RX ADMIN — BENZOCAINE 1 ML: 220 SPRAY, METERED PERIODONTAL at 18:41

## 2025-01-23 RX ADMIN — FENTANYL CITRATE 25 MCG: 50 INJECTION, SOLUTION INTRAMUSCULAR; INTRAVENOUS at 15:46

## 2025-01-23 RX ADMIN — DEXAMETHASONE SODIUM PHOSPHATE 4 MG: 4 INJECTION, SOLUTION INTRA-ARTICULAR; INTRALESIONAL; INTRAMUSCULAR; INTRAVENOUS; SOFT TISSUE at 12:27

## 2025-01-23 RX ADMIN — NOREPINEPHRINE BITARTRATE 6.4 MCG: 1 INJECTION, SOLUTION, CONCENTRATE INTRAVENOUS at 13:36

## 2025-01-23 RX ADMIN — Medication 3 G: at 12:40

## 2025-01-23 RX ADMIN — ACETAMINOPHEN 975 MG: 325 TABLET, FILM COATED ORAL at 10:34

## 2025-01-23 RX ADMIN — PROPOFOL 50 MG: 10 INJECTION, EMULSION INTRAVENOUS at 14:25

## 2025-01-23 RX ADMIN — Medication 80 MG: at 12:27

## 2025-01-23 RX ADMIN — FENTANYL CITRATE 50 MCG: 50 INJECTION, SOLUTION INTRAMUSCULAR; INTRAVENOUS at 15:36

## 2025-01-23 RX ADMIN — MIDAZOLAM 2 MG: 1 INJECTION INTRAMUSCULAR; INTRAVENOUS at 12:05

## 2025-01-23 RX ADMIN — SODIUM CHLORIDE, POTASSIUM CHLORIDE, SODIUM LACTATE AND CALCIUM CHLORIDE: 600; 310; 30; 20 INJECTION, SOLUTION INTRAVENOUS at 12:30

## 2025-01-23 RX ADMIN — Medication 20 MG: at 13:32

## 2025-01-23 RX ADMIN — PROTAMINE SULFATE 140 MG: 10 INJECTION, SOLUTION INTRAVENOUS at 14:09

## 2025-01-23 RX ADMIN — Medication 200 MG: at 14:59

## 2025-01-23 RX ADMIN — FENTANYL CITRATE 50 MCG: 50 INJECTION INTRAMUSCULAR; INTRAVENOUS at 12:26

## 2025-01-23 RX ADMIN — SODIUM CHLORIDE, SODIUM GLUCONATE, SODIUM ACETATE, POTASSIUM CHLORIDE AND MAGNESIUM CHLORIDE: 526; 502; 368; 37; 30 INJECTION, SOLUTION INTRAVENOUS at 12:08

## 2025-01-23 RX ADMIN — FENTANYL CITRATE 25 MCG: 50 INJECTION, SOLUTION INTRAMUSCULAR; INTRAVENOUS at 15:25

## 2025-01-23 RX ADMIN — LIDOCAINE HYDROCHLORIDE 100 MG: 20 INJECTION, SOLUTION INFILTRATION; PERINEURAL at 12:26

## 2025-01-23 RX ADMIN — PROPOFOL 50 MG: 10 INJECTION, EMULSION INTRAVENOUS at 12:27

## 2025-01-23 RX ADMIN — PROPOFOL 150 MG: 10 INJECTION, EMULSION INTRAVENOUS at 12:26

## 2025-01-23 RX ADMIN — HYDROXYZINE HYDROCHLORIDE 10 MG: 10 TABLET ORAL at 11:25

## 2025-01-23 ASSESSMENT — ACTIVITIES OF DAILY LIVING (ADL)
ADLS_ACUITY_SCORE: 48
ADLS_ACUITY_SCORE: 49
ADLS_ACUITY_SCORE: 48
ADLS_ACUITY_SCORE: 49
ADLS_ACUITY_SCORE: 48
ADLS_ACUITY_SCORE: 49
ADLS_ACUITY_SCORE: 49
ADLS_ACUITY_SCORE: 48
ADLS_ACUITY_SCORE: 48
ADLS_ACUITY_SCORE: 49
ADLS_ACUITY_SCORE: 48

## 2025-01-23 NOTE — DISCHARGE INSTRUCTIONS
Summary:  Today you underwent successful Watchman left atrial appendage closure without complication.    Plan:   Stop Eliquis therapy   Start ASA 81 mg daily lifelong  Continue Brilinta 90 mg twice daily   HOLD metformin 48 hours post procedure, restart 1/25/25  Defer non urgent dental procedure for 6 months post watchman, if you need an urgent dental procedure please call for oral antibiotics to be taken 30-60 minutes prior to procedure  No lifting > 10 lbs for 1 week  Monitor groin sites, call or seek medical attention if you develop severe pain, bruising, swelling or drainage  Monitor tongue laceration - please see PCP if not healing   Follow-up with structural NP or primary care provider in 1-2 weeks to check groin site  Follow-up with structural NP in 45 days with imaging and labs prior - if well seated watchman without leak or thrombus, we will continue ASA and Plavix x 4.5 months, at the 6 month gloria Plavix will be discontinued and ASA continued lifelong     KLAUS Goel, CNP  Structural Heart Nurse Practitioner  [facility]  Clinic: [phone]      After you go home:  Have an adult stay with you for 24 hours.  Drink plenty of fluids.  You may eat your normal diet, unless your doctor tells you otherwise.  For 24 hours:  Relax and take it easy.  Do NOT smoke.  Do NOT make any important or legal decisions.  Do NOT drive or operate machines at home or at work.  Do NOT drink alcohol.  For 2 days, do NOT have sex or do any heavy exercise.  Do NOT take a bath, or use a hot tub or pool for at least 3 days. You may shower.    Care of groin site  It is normal to have a small bruise or lump at the site.  Do not scrub the site.  For the first 2 days: Do not stoop or squat. When you cough, sneeze or move your bowels, hold your hand over the puncture site and press gently.  Do not lift more than 10 pounds for a week  Do not use lotion or powder near the puncture site for 3 days.    If you  start bleeding from the site in your groin, lie down flat and press firmly  on the site. Call your doctor as soon as you can.    Call 911 right away if you have bleeding that is heavy or does not stop.    Call your doctor if:  You have a large or growing hard lump around the site.  The site is red, swollen, hot or tender.  Blood or fluid is draining from the site.  You have chills or a fever greater than 101 F (38 C).  You have hives, a rash or unusual itching.

## 2025-01-23 NOTE — ANESTHESIA PREPROCEDURE EVALUATION
Anesthesia Pre-Procedure Evaluation    Patient: Car Torres   MRN: [id number] : 1960        Procedure : Procedure(s):  Left Atrial Appendage Closure          Past Medical History:   Diagnosis Date    CKD (chronic kidney disease) stage 2, GFR 60-89 ml/min 10/21/2010    60 to 80 - have discussed Lisinopril - will consider       Coronary artery disease 2024    Dementia (H)     Depressive disorder     Diverticulosis of large intestine without hemorrhage 2015    Incidental finding on CT scan       Heart disease 2018    Hypercholesterolemia     Hypertension goal BP (blood pressure) < 140/90     Nonspecific abnormal electrocardiogram (ECG) (EKG) 2007    Stress testing normal.     Paroxysmal atrial fibrillation (H) 2024    Sinus node dysfunction (H) 2024    Sleep apnea     uses a c-pap, severe    Status post coronary angiogram 2024      Past Surgical History:   Procedure Laterality Date    COLONOSCOPY N/A 2020    Procedure: COLONOSCOPY, WITH POLYPECTOMY, CLIP;  Surgeon: Mihir Lang MD;  Location: [facility] OR    COLONOSCOPY N/A 1/10/2022    Procedure: COLONOSCOPY, WITH POLYPECTOMY AND BIOPSY;  Surgeon: Mihir Lang MD;  Location:  GI    CV CORONARY ANGIOGRAM N/A 2024    Procedure: Coronary Angiogram;  Surgeon: Elton Wright MD;  Location:  HEART CARDIAC CATH LAB    CV CORONARY LITHOTRIPSY PCI N/A 3/8/2024    Procedure: Percutaneous Coronary Intervention - Lithotripsy;  Surgeon: Elton Wright MD;  Location:  HEART CARDIAC CATH LAB    CV INSTANTANEOUS WAVE-FREE RATIO N/A 2024    Procedure: Instantaneous Wave-Free Ratio;  Surgeon: Elton Wright MD;  Location:  HEART CARDIAC CATH LAB    CV INTRAVASULAR ULTRASOUND N/A 3/8/2024    Procedure: Intravascular Ultrasound;  Surgeon: Elton Wright MD;  Location:  HEART CARDIAC CATH LAB    CV PCI N/A 3/8/2024    Procedure: Percutaneous Coronary Intervention;  Surgeon: Kyle  MD Elton;  Location:  HEART CARDIAC CATH LAB    CV RIGHT HEART CATH MEASUREMENTS RECORDED N/A 2024    Procedure: Right Heart Catheterization;  Surgeon: Elton Wright MD;  Location: [facility] CARDIAC CATH LAB    EP ABLATION PULMONARY VEIN ISOLATION N/A 2024    Procedure: Ablation Atrial Fibrillation;  Surgeon: Sandee Sheldon MD;  Location: [facility] CATH LAB CV    ESOPHAGOSCOPY, GASTROSCOPY, DUODENOSCOPY (EGD), COMBINED N/A 2021    Procedure: ESOPHAGOGASTRODUODENOSCOPY, WITH BIOPSY;  Surgeon: Mihir Lang MD;  Location: [facility] OR    OPEN REDUCTION INTERNAL FIXATION FOOT Left 2023    Procedure: LISFRANC OPEN REDUCTION INTERNAL FIXATION, LEFT FOOT;  Surgeon: Bailey Ruiz DPM;  Location:  OR    SURGICAL HISTORY OF -       surgery on left index finger      No Known Allergies   Social History     Tobacco Use    Smoking status: Former     Current packs/day: 0.00     Average packs/day: 1 pack/day for 25.0 years (25.0 ttl pk-yrs)     Types: Cigarettes, Cigars     Start date: 1973     Quit date: 1998     Years since quittin.9     Passive exposure: Past    Smokeless tobacco: Never    Tobacco comments:     N/A not a smoker   Substance Use Topics    Alcohol use: Yes     Alcohol/week: 10.0 standard drinks of alcohol     Comment: less than 6 per week      Wt Readings from Last 1 Encounters:   25 (!) 151.4 kg (333 lb 12.4 oz)        Anesthesia Evaluation   Pt has had prior anesthetic.     No history of anesthetic complications       ROS/MED HX  ENT/Pulmonary: Comment: RML pulmonary nodule    (+) sleep apnea, uses CPAP,                                      Neurologic:  - neg neurologic ROS     Cardiovascular: Comment: CAD s/p APRIL stent (pLAD) 3/2024    Afib s/p ablation 2024, since normal sinus    TTE 3/18/2024:  LVEF 60-65%  Mild concentric LVH  RV normal    LHC 3/2024:  20% oCx  30% m->dCx  20% M3  90% pLAD s/p shockwave lithotripsy and APRIL placement  60% pRCA and  "dRCA    (+)  hypertension- -  CAD -  - -                                      METS/Exercise Tolerance:     Hematologic:       Musculoskeletal:       GI/Hepatic: Comment: Ozempic held for 2 weeks      Renal/Genitourinary:     (+) renal disease,             Endo:     (+)  type II DM,                    Psychiatric/Substance Use:     (+) psychiatric history anxiety       Infectious Disease:       Malignancy:       Other:            Physical Exam    Airway      Comment: Large neck    Mallampati: II   TM distance: > 3 FB   Neck ROM: full   Mouth opening: > 3 cm    Respiratory Devices and Support         Dental     Comment: Patient reports no loose or chipped teeth        Cardiovascular          Rhythm and rate: regular and normal     Pulmonary           breath sounds clear to auscultation           OUTSIDE LABS:  CBC:   Lab Results   Component Value Date    WBC 7.3 01/22/2025    WBC 6.8 09/11/2024    HGB 13.4 01/22/2025    HGB 12.9 (L) 09/11/2024    HCT 40.5 01/22/2025    HCT 39.3 (L) 09/11/2024     01/22/2025     09/11/2024     BMP:   Lab Results   Component Value Date     01/23/2025     10/23/2024    POTASSIUM 4.1 01/23/2025    POTASSIUM 4.7 10/23/2024    CHLORIDE 103 01/23/2025    CHLORIDE 102 10/23/2024    CO2 22 01/23/2025    CO2 25 10/23/2024    BUN 20.3 01/23/2025    BUN 24.1 (H) 10/23/2024    CR 1.22 (H) 01/23/2025    CR 1.26 (H) 10/23/2024    GLC 97 01/23/2025    GLC 99 01/23/2025     COAGS:   Lab Results   Component Value Date    PTT 33 03/08/2024    INR 0.94 01/22/2025     POC: No results found for: \"BGM\", \"HCG\", \"HCGS\"  HEPATIC:   Lab Results   Component Value Date    ALBUMIN 4.4 09/11/2024    PROTTOTAL 6.9 09/11/2024    ALT 18 09/11/2024    AST 22 09/11/2024    ALKPHOS 59 09/11/2024    BILITOTAL 0.5 09/11/2024     OTHER:   Lab Results   Component Value Date    A1C 5.4 09/11/2024    SHAHID 8.7 (L) 01/23/2025    MAG 2.4 (H) 03/22/2024    LIPASE 75 08/18/2007    AMYLASE 57 08/18/2007    " TSH 2.29 09/11/2024       Anesthesia Plan    ASA Status:  3    NPO Status:  NPO Appropriate    Anesthesia Type: General.     - Airway: ETT         Techniques and Equipment:     - Lines/Monitors: 2nd IV     Consents    Anesthesia Plan(s) and associated risks, benefits, and realistic alternatives discussed. Questions answered and patient/representative(s) expressed understanding.     - Discussed:     - Discussed with:  Patient      - Extended Intubation/Ventilatory Support Discussed: Yes.      - Patient is DNR/DNI Status: No     Use of blood products discussed: Yes.     - Discussed with: Patient.     - Consented: consented to blood products     Postoperative Care            Comments:    Other Comments: Dental documentation is based on patient self-reporting for any loose or chipped teeth. Any obvious visual dental abnormalities seen in the airway exam is also documented.    Risks of anesthesia was discussed with the patient, that include risk of sore throat and hoarse voice that should be temporary on the order of days, risk of oral mucosa injury (eg. lip and tongue) , and a very rare risk of dental injury requiring repair.    Additional risks of anesthesia was discussed with the patient, including heart attack, stroke, blood clots, respiratory issues, and cardiac arrest.    I did discuss that if I needed to assist placement of NESTOR probe, there would be a small risk of esophageal perforation that is very rare that may require a procedure/surgery for management    Patient was given opportunity to ask questions and express concerns, which were then all addressed.             Yoandy Slaughter MD    I have reviewed the pertinent notes and labs in the chart from the past 30 days and (re)examined the patient.  Any updates or changes from those notes are reflected in this note.    Clinically Significant Risk Factors Present on Admission                # Drug Induced Coagulation Defect: home medication list includes an  "anticoagulant medication  # Drug Induced Platelet Defect: home medication list includes an antiplatelet medication   # Hypertension: Noted on problem list           # Severe Obesity: Estimated body mass index is 46.55 kg/m  as calculated from the following:    Height as of this encounter: 1.803 m (5' 11\").    Weight as of this encounter: 151.4 kg (333 lb 12.4 oz).                "

## 2025-01-23 NOTE — DISCHARGE SUMMARY
[address] [address]  p: [phone]    Discharge Summary: Cardiology Service    Car Torres MRN# [id number]   YOB: 1960 Age: 64 year old       Admission Date: 1/23/2025  Discharge Date: 01/23/25      Discharge Diagnoses:  1. Paroxysmal atrial fibrillation  2. Intolerance to anticoagulation due to bruising/bleeds easily  3. CAD s/p PCI to LAD (3/8/24)  4. HTN  5. HLD  6. CKD  7. TIA on CPAP    Pertinent Procedures:  1. GA  2. NESTOR    Consults:  None    Imaging with results:  Post procedure NESTOR: ***    Brief HPI:    Car Torres is a very pleasant 64 year old male with a history of atrial fibrillation with paroxysmal atrial fibrillation s/p PVI ablation (5/30/24) [id number] is 3 (age, CAD, HTN) and HADBLED of 3 (age, meds and bleeding dispo) with intolerance to anticoagulation due to bruising and bleeding easily who presents for Watchman left atrial appendage occluder. Additional medical history notable for CAD s/p PCI to LAD (3/8/24), TIA on CPAP, HTN, HLD, CKD.     Hospital Course by Diagnosis:     1. Paroxysmal atrial fibrillation:   2. Intolerance to anticoagulation:  Now s/p successful implantation of 27 mm Watchman FLX. Intra-procedure NESTOR showed no evidence of queenie-device leak and no pericardial effusion. Patient seen in PACU. VSS, neuros intact, groin sites soft without hematoma.   - Stop Eliquis   - Start ASA 81 mg daily indefinitely  - Continue Brilinta 90 mg BID   - SBE prophylaxis x 6 months post LAAO   - Echo this afternoon, if no pericardial effusion patient may discharge home  - Follow-up structural ELIJAH 1 week and 45 days post procedure  - 45 days following Watchman device implantation, patient will return for NESTOR/CT *** to evaluate endothelialization of the device. If adequate seal is assessed (<5mm), dual antiplatelet therapy with clopidogrel and aspirin will be continued for total of 6 months from  implantation date, with aspirin then continuing lifelong.      3. CAD:   4. Hyperlipidemia:  5. Hypertension:  History of LAD stent in March 2024.  Currently maintained on Brilinta and Eliquis.  -Continue Brilinta 90 mg BID  -Continue statin therapy   -Continue metoprolol, lisinopril and hydrochlorothiazide day of procedure     6. CKD:  Last creatinine 1.26 GFR greater than 60.  -Preprocedure labs pending     7. TIA:  -Continue CPAP     8.  Type 2 diabetes  -Resume PTA Ozempic   -Holding metformin 48 hours postprocedure     9.  Depression  -Continue prior to admission BuSpar and Mirapex day of procedure      Condition on discharge  Temp:  [98.2  F (36.8  C)] 98.2  F (36.8  C)  Pulse:  [57] 57  Resp:  [18] 18  BP: (146)/(73) 146/73  SpO2:  [99 %] 99 %  General: Alert, interactive, NAD  Eyes: sclera anicteric, EOMI  Neck: JVP normal, carotid 2+ bilaterally  Cardiovascular: regular rate and rhythm, normal S1 and S2, no murmurs, gallops, or rubs  Resp: clear to auscultation bilaterally, no rales, wheezes, or rhonchi  GI: Soft, nontender, nondistended. +BS.  No HSM or masses, no rebound or guarding.  Extremities: mild edema, no cyanosis or clubbing, dorsalis pedis and posterior tibialis pulses 2+ bilaterally  Skin: Warm and dry, no jaundice or rash  Neuro: CN 2-12 intact, moves all extremities equally  Psych: Alert & oriented x 3      Medication Changes:  Stop Eliquis  Start Aspirin 81 mg daily   Continue Brilinta 90 mg BID     Discharge medications:   Current Discharge Medication List        START taking these medications    Details   aspirin 81 MG EC tablet Take 1 tablet (81 mg) by mouth daily.    Associated Diagnoses: Paroxysmal atrial fibrillation (H); Presence of Watchman left atrial appendage closure device           CONTINUE these medications which have NOT CHANGED    Details   atorvastatin (LIPITOR) 40 MG tablet Take 1.5 tablets (60 mg) by mouth daily.  Qty: 135 tablet, Refills: 3    Associated Diagnoses:  Hyperlipidemia LDL goal <100      busPIRone (BUSPAR) 10 MG tablet Take 2 tablets (20 mg) by mouth 2 times daily  Qty: 360 tablet, Refills: 4    Associated Diagnoses: SHAHNAZ (generalized anxiety disorder)      co-enzyme Q-10 100 MG CAPS capsule Take 100 mg by mouth daily      hydroCHLOROthiazide (HYDRODIURIL) 25 MG tablet Take 1 tablet (25 mg) by mouth daily.  Qty: 90 tablet, Refills: 3    Associated Diagnoses: Essential hypertension      hydrOXYzine HCl (ATARAX) 10 MG tablet Take 1 tablet (10 mg) by mouth 2 times daily as needed for anxiety  Qty: 180 tablet, Refills: 5    Associated Diagnoses: SHAHNAZ (generalized anxiety disorder)      lisinopril (ZESTRIL) 20 MG tablet Take 1 tablet (20 mg) by mouth daily  Qty: 90 tablet, Refills: 3    Associated Diagnoses: Hypertension goal BP (blood pressure) < 140/90      metFORMIN (GLUCOPHAGE XR) 500 MG 24 hr tablet Take 1 tablet (500 mg) by mouth daily (with dinner).  Qty: 90 tablet, Refills: 3    Associated Diagnoses: Morbid obesity (H)      metoprolol tartrate (LOPRESSOR) 25 MG tablet Take 1 tablet (25 mg) by mouth every 8 hours as needed (Atrial fibrillation with RVR, HR > 120)  Qty: 30 tablet, Refills: 1    Associated Diagnoses: Atrial fibrillation with rapid ventricular response (H)      MULTI VITAMIN MENS PO Take 1 tablet by mouth daily      pramipexole (MIRAPEX) 0.25 MG tablet Take 1 tablet (0.25 mg) by mouth at bedtime.  Qty: 90 tablet, Refills: 2    Associated Diagnoses: Restless leg syndrome      Semaglutide, 2 MG/DOSE, (OZEMPIC) 8 MG/3ML pen Inject 2 mg subcutaneously every 7 days.      ticagrelor (BRILINTA) 90 MG tablet Take 1 tablet (90 mg) by mouth 2 times daily Start this evening.  Qty: 180 tablet, Refills: 3    Associated Diagnoses: Anginal equivalent      Cholecalciferol (VITAMIN D PO) Take 5,000 Units by mouth daily One tablet twice daily      insulin pen needle (32G X 6 MM) 32G X 6 MM miscellaneous Use 100 pen needles daily or as directed.  Qty: 100 each, Refills:  3    Associated Diagnoses: Morbid obesity (H)      sildenafil (VIAGRA) 100 MG tablet Take 1 tablet (100 mg) by mouth daily as needed.  Qty: 20 tablet, Refills: 4    Associated Diagnoses: Erectile dysfunction due to arterial insufficiency           STOP taking these medications       apixaban ANTICOAGULANT (ELIQUIS ANTICOAGULANT) 5 MG tablet Comments:   Reason for Stopping:               Labs or imaging requiring follow-up after discharge:  CT and labs in 6 weeks       Follow-up:  Structural 1 week and 6 weeks    Code status:  Full    KLAUS Goel, NP-C  [facility] Cardiology    A total of 30 minutes spent face to face with patient and > 50% of the time spent counseling and coordinating care.

## 2025-01-23 NOTE — Clinical Note
Procedure done under supervision of Anesthesia.  Vital signs, charting, monitoring, Ins/Outs, medications, and sedation all per Anesthesia. See flowsheet for details.

## 2025-01-23 NOTE — H&P
[facility]      CSI History and Physicial  Car Torres MRN: [id number]  1960  Date of Admission:1/23/2025  Primary care provider: Mihir Perez         Chief Complaint:   Paroxysmal atrial fibrillation          History of Present Illness:   Car Torres is a very pleasant 64 year old male with a history of atrial fibrillation with paroxysmal atrial fibrillation s/p PVI ablation (5/30/24) w/[id number] is 3 (age, CAD, HTN) and HADBLED of 3 (age, meds and bleeding dispo) with intolerance to anticoagulation due to bruising and bleeding easily who presents for Watchman left atrial appendage occluder. Additional medical history notable for CAD s/p PCI to LAD (3/8/24), TIA on CPAP, HTN, HLD, CKD.     Patient underwent successful left atrial appendage closure with a 27mm WATCHMAN FLX device on January 23, 2025. His  intraprocedure NESTOR showed well seated CHANEL occluder without leak, small pericardial effusion unchanged pre and post procedure. Right femoral venotomy closed with suture closure device. Patient noted to have bleeding tongue laceration prior to extubation. Seen by ENT who used Afrin, no sutures needed. Will seen in PACU, extubated and oral bleeding stopped. Pending echo, he will be safe for discharge this evening.          Review of Systems:    10 point review of systems negative except for stated above in HPI.          Past Medical History:   Medical History reviewed.   Past Medical History:   Diagnosis Date    CKD (chronic kidney disease) stage 2, GFR 60-89 ml/min 10/21/2010    60 to 80 - have discussed Lisinopril - will consider       Coronary artery disease 01/26/2024    Dementia (H)     Depressive disorder     Diverticulosis of large intestine without hemorrhage 11/18/2015    Incidental finding on CT scan       Heart disease 05/08/2018    Hypercholesterolemia     Hypertension goal BP (blood pressure) < 140/90     Nonspecific abnormal electrocardiogram (ECG) (EKG) 08/23/2007     Stress testing normal.     Paroxysmal atrial fibrillation (H) 03/17/2024    Sinus node dysfunction (H) 05/23/2024    Sleep apnea     uses a c-pap, severe    Status post coronary angiogram 01/26/2024             Past Surgical History:   Surgical History reviewed.   Past Surgical History:   Procedure Laterality Date    COLONOSCOPY N/A 12/22/2020    Procedure: COLONOSCOPY, WITH POLYPECTOMY, CLIP;  Surgeon: Mihir Lang MD;  Location: [facility] OR    COLONOSCOPY N/A 1/10/2022    Procedure: COLONOSCOPY, WITH POLYPECTOMY AND BIOPSY;  Surgeon: Mihir Lang MD;  Location:  GI    CV CORONARY ANGIOGRAM N/A 1/26/2024    Procedure: Coronary Angiogram;  Surgeon: Elton Wright MD;  Location: [facility] CARDIAC CATH LAB    CV CORONARY LITHOTRIPSY PCI N/A 3/8/2024    Procedure: Percutaneous Coronary Intervention - Lithotripsy;  Surgeon: Elton Wright MD;  Location: [facility] CARDIAC CATH LAB    CV INSTANTANEOUS WAVE-FREE RATIO N/A 1/26/2024    Procedure: Instantaneous Wave-Free Ratio;  Surgeon: Elton Wright MD;  Location: [facility] CARDIAC CATH LAB    CV INTRAVASULAR ULTRASOUND N/A 3/8/2024    Procedure: Intravascular Ultrasound;  Surgeon: Elton Wright MD;  Location: [facility] CARDIAC CATH LAB    CV PCI N/A 3/8/2024    Procedure: Percutaneous Coronary Intervention;  Surgeon: Elton Wright MD;  Location: [facility] CARDIAC CATH LAB    CV RIGHT HEART CATH MEASUREMENTS RECORDED N/A 1/26/2024    Procedure: Right Heart Catheterization;  Surgeon: Elton Wright MD;  Location: [facility] CARDIAC CATH LAB    EP ABLATION PULMONARY VEIN ISOLATION N/A 5/30/2024    Procedure: Ablation Atrial Fibrillation;  Surgeon: Sandee Sheldon MD;  Location: [facility] CV    ESOPHAGOSCOPY, GASTROSCOPY, DUODENOSCOPY (EGD), COMBINED N/A 1/28/2021    Procedure: ESOPHAGOGASTRODUODENOSCOPY, WITH BIOPSY;  Surgeon: Mihir Lang MD;  Location: [facility] OR    OPEN REDUCTION INTERNAL FIXATION FOOT Left 1/5/2023    Procedure:  LISFRANC OPEN REDUCTION INTERNAL FIXATION, LEFT FOOT;  Surgeon: Bailey Ruiz DPM;  Location: [facility] OR    SURGICAL HISTORY OF -       surgery on left index finger             Social History:   Social History reviewed.  Social History     Tobacco Use    Smoking status: Former     Current packs/day: 0.00     Average packs/day: 1 pack/day for 25.0 years (25.0 ttl pk-yrs)     Types: Cigarettes, Cigars     Start date: 1973     Quit date: 1998     Years since quittin.9     Passive exposure: Past    Smokeless tobacco: Never    Tobacco comments:     N/A not a smoker   Substance Use Topics    Alcohol use: Yes     Alcohol/week: 10.0 standard drinks of alcohol     Comment: less than 6 per week             Family History:   Family History reviewed.   Family History   Problem Relation Age of Onset    Cancer Mother         uterine    Other Cancer Mother         endometrial    Cerebrovascular Disease Mother     Substance Abuse Mother         Alcohol    C.A.D. Father     Hypertension Father     Hyperlipidemia Father     Breast Cancer Maternal Grandmother     Other Cancer Maternal Grandmother         Lung/brain    Substance Abuse Paternal Grandfather     Hypertension Brother     Substance Abuse Brother         Alcohol    Hyperlipidemia Brother     Breast Cancer Other     Breast Cancer Cousin     Other Cancer Other     Cerebrovascular Disease Other         Maternal Aunt    Glaucoma No family hx of     Macular Degeneration No family hx of              Allergies:   No Known Allergies          Medications:   Medications Reviewed.   Current Facility-Administered Medications   Medication Dose Route Frequency Provider Last Rate Last Admin    ceFAZolin Sodium (ANCEF) injection 3 g  3 g Intravenous Pre-Op/Pre-procedure x 1 dose Julee Dorado NP        lactated ringers infusion   Intravenous Continuous Elton Wright MD        lidocaine (LMX4) cream   Topical Q1H PRN Julee Dorado NP        lidocaine (LMX4)  "cream   Topical Q1H PRN Elton Wright MD        lidocaine 1 % 0.1-1 mL  0.1-1 mL Other Q1H PRN Julee Dorado NP        lidocaine 1 % 0.1-1 mL  0.1-1 mL Other Q1H PRN Elton Wright MD        sodium chloride (PF) 0.9% PF flush 3 mL  3 mL Intracatheter Q8H Julee Dorado NP        sodium chloride (PF) 0.9% PF flush 3 mL  3 mL Intracatheter Q1H PRN Julee Dorado NP        sodium chloride (PF) 0.9% PF flush 3 mL  3 mL Intracatheter q1 min destinyn Julee Dorado NP        sodium chloride (PF) 0.9% PF flush 3 mL  3 mL Intracatheter Q8H Elton Wright MD        sodium chloride (PF) 0.9% PF flush 3 mL  3 mL Intracatheter q1 min prn Elton Wright MD        sodium chloride 0.9 % infusion  1,000 mL Intravenous Continuous Julee Dorado NP         Facility-Administered Medications Ordered in Other Encounters   Medication Dose Route Frequency Provider Last Rate Last Admin    dexAMETHasone (DECADRON) injection   Intravenous PRN Pushpa Hope APRN CRNA   4 mg at 01/23/25 1227    fentaNYL (PF) (SUBLIMAZE) injection   Intravenous PRN Pushpa Hope APRN CRNA   50 mcg at 01/23/25 1226    midazolam (VERSED) injection   Intravenous PRN Pushpa Hope APRN CRNA   2 mg at 01/23/25 1205    Plasma-Lyte A (electrolyte A) BOLUS   Intravenous Continuous PRN Pushpa Hope APRN CRNA   New Bag at 01/23/25 1208    propofol (DIPRIVAN) injection 10 mg/mL vial   Intravenous PRN Pushpa Hope APRN CRNA   50 mg at 01/23/25 1227    rocuronium injection   Intravenous PRN Pushpa Hope APRN CRNA   80 mg at 01/23/25 1227             Physical Exam:   Vitals were reviewed.  Blood pressure (!) 146/73, pulse 57, temperature 98.2  F (36.8  C), temperature source Oral, resp. rate 18, height 1.803 m (5' 11\"), weight (!) 151.4 kg (333 lb 12.4 oz), SpO2 99%.    General: intubated   Skin: Not jaundiced, no rash, no ecchymoses  HEENT: MMM, PERRLA, EOM intact  CV: RRR, " normal S1S2, no murmur, clicks, rubs  Resp: Clear to auscultation bilaterally, no wheezes, rhonchi  Abd: Soft, non-tender, BS+, no masses appreciated  Extremities: warm and well perfused, palpable pulses, no edema  Neuro: No lateralizing symptoms or focal neurologic deficits          Labs:   Routine Labs:  CMP  Recent Labs   Lab 01/23/25  1024 01/23/25  0854 01/22/25  1111     --  136   POTASSIUM 4.1  --  4.2   CHLORIDE 103  --  100   CO2 22  --  22   ANIONGAP 11  --  14   GLC 97 99 101*   BUN 20.3  --  18.5   CR 1.22*  --  1.05   GFRESTIMATED 65  --  78   SHAHID 8.7*  --  9.0     CBC  Recent Labs   Lab 01/22/25  1111   WBC 7.3   RBC 4.67   HGB 13.4   HCT 40.5   MCV 87   MCH 28.7   MCHC 33.1   RDW 14.5        INR  Recent Labs   Lab 01/22/25  1111   INR 0.94           Diagnostics:      Imaging:     EKG:   SB HR 57     Intraprocedure NESTOR:   PRE-PROCEDURAL SURVEY:  1. The LV function was 55-60% and the RV function was normal.  2. There was no evidence of shunting at the level of the interatrial septum.  3. The maximum pre-deployment left atrial appendage orifice width was 20.4 mm.  4. There was no intracardiac thrombus.  5. There was a small amount of pericardial fluid adjacent to the right atrium  at the beginning of the procedure.     POST-PROCEDURAL SURVEY:  1. The 27 mm Watchman FLX device was well-seated in the left atrial appendage.  There was adequate compression of the device. There was no evidence of queenie-  device leak.  2. There was no change in the amount of pericardial fluid.  3. There was a small left-right shunt at the site of the transseptal puncture.  4. The biventricular function was unchanged.    Assessment and Plan:     Car Torres is a very pleasant 64 year old male who presents for left atrial appendage closure.    1. Paroxysmal atrial fibrillation:   2. Intolerance to anticoagulation:  3. Tongue laceration:  Now s/p successful implantation of 27mm Watchman FLX. Intra-procedure NESTOR  showed no evidence of queenie-device leak, small pericardial effusion stable pre and post deployment. Patient noted to have bleeding tongue laceration prior to extubation. Seen by ENT who used Afrin, no sutures needed. Patient seen in PACU - extubated, bleeding stopped, VSS. Groin site stable.   - Stop Eliquis   - Continue ASA 81 mg daily indefinitely  - Continue Brilinta 90 mg BID   - SBE prophylaxis x 6 months post LAAO   - Echo this afternoon, if no pericardial effusion patient may discharge home  - Follow-up structural ELIJAH 1 week and 45 days post procedure  - 45 days following Watchman device implantation, patient will return for CT heart to evaluate endothelialization of the device. If adequate seal is assessed (<5mm), dual antiplatelet therapy with clopidogrel and aspirin will be continued for total of 6 months from implantation date, with aspirin then continuing lifelong.      3. CAD:   4. Hyperlipidemia:  5. Hypertension:  History of LAD stent in March 2024.  Currently maintained on Brilinta and Eliquis.  -Continue Brilinta 90 mg BID   -Continue statin therapy    -Continue metoprolol, lisinopril and hydrochlorothiazide      6. CKD:  Last creatinine 1.26 GFR greater than 60.  -CTM    7. TIA:  -Continue CPAP     8.  Type 2 diabetes  -Resume Ozempic   -Holding metformin 48 hours postprocedure     9.  Depression  -Continue prior to admission BuSpar and Mirapex day of procedure       FEN: Diabetic/Cardiac diet  Prophylaxis:  DVT: ambulation  PPI: none   Disposition: Obs admission to [facility].   Code Status: FULL CODE    KLAUS Goel, NP-C  [facility] Cardiology Team  [phone]    A total of 40 minutes spent face to face with patient and > 50% of the time spent counseling and coordinating care.        patient and this should be billed as an advanced practice provider only visit.    Elton Wright MD  Interventional Cardiology

## 2025-01-23 NOTE — Clinical Note
0 : 21. 45 : 17. 90 : 20. 135 : 19. 0 : 22. 45 : 14. 90 : 19. 135 : 12. 0 : 20.2 . 45 : 21.1 . 90 : 22.7 . 135 : 23 . CHANEL type used: OtherPosition: Passed. [place]: Passed. Size: Accepted. Seal: Complete. Jet: 0.

## 2025-01-23 NOTE — ANESTHESIA CARE TRANSFER NOTE
Patient: Car Torres    Procedure: Procedure(s):  Left Atrial Appendage Closure       Diagnosis: Paroxysmal atrial fibrillation (H) [I48.0]  Diagnosis Additional Information: No value filed.    Anesthesia Type:   General     Note:    Oropharynx: oropharynx clear of all foreign objects and spontaneously breathing  Level of Consciousness: awake  Oxygen Supplementation: face mask  Level of Supplemental Oxygen (L/min / FiO2): 10  Independent Airway: airway patency satisfactory and stable  Dentition: dentition unchanged  Vital Signs Stable: post-procedure vital signs reviewed and stable  Report to RN Given: handoff report given  Patient transferred to: PACU  Comments: Awake. Alert oriented. Right groin site good. VSS  Handoff Report: Identifed the Patient, Identified the Reponsible Provider, Reviewed the pertinent medical history, Discussed the surgical course, Reviewed Intra-OP anesthesia mangement and issues during anesthesia, Set expectations for post-procedure period and Allowed opportunity for questions and acknowledgement of understanding      Vitals:  Vitals Value Taken Time   BP 91/40 01/23/25 1515   Temp     Pulse 62 01/23/25 1524   Resp 10 01/23/25 1524   SpO2 100 % 01/23/25 1524   Vitals shown include unfiled device data.    Electronically Signed By: KLAUS Beck CRNA  January 23, 2025  3:25 PM

## 2025-01-23 NOTE — PROGRESS NOTES
D/I/A:  Pt roomed on 2A room 5.  Arrived via litter and accompanied by PACU RN on monitor.  VSS.  Rhythm upon arrival SR on monitor.  Denies pain or sob.  Reviewed activity restrictions and when to notify RN, ie-changes to breathing or increased chest pressure or chest pain.  R groin access site stable, no bleeding or hematoma.  Per report, closed with Perclose closure device.  +2 pulse; CMS intact.  Pt to remain on bedrest until 1900.  R tongue laceration stable, no bleeding.   P:  Continue to monitor status.  Discharge to home once meeting criteria.

## 2025-01-23 NOTE — ANESTHESIA PROCEDURE NOTES
Airway       Patient location during procedure: OR       Procedure Start/Stop Times: 1/23/2025 12:30 PM  Staff -        Anesthesiologist:  Yoandy Slaughter MD       CRNA: Emily Bauer APRN CRNA       Performed By: CRNA  Consent for Airway        Urgency: elective  Indications and Patient Condition       Indications for airway management: queenie-procedural       Induction type:intravenous       Mask difficulty assessment: 3 - difficult mask (inadequate, unstable, or two providers) +/- NMBA (Achievable with oral airway, 2 providers, and easiest with full relaxation)    Final Airway Details       Final airway type: endotracheal airway       Successful airway: ETT - single  Endotracheal Airway Details        ETT size (mm): 8.0       Cuffed: yes       Successful intubation technique: video laryngoscopy       VL Blade Size: Glidescope 4 (Hyperflex)       Grade View of Cords: 1       Adjucts: stylet       Position: Right       Measured from: gums/teeth       Secured at (cm): 23       Bite block used: None    Post intubation assessment        Placement verified by: capnometry, equal breath sounds and chest rise        Number of attempts at approach: 1       Number of other approaches attempted: 0       Secured with: tape (Tube tamer)       Ease of procedure: easy       Dentition: Intact and Unchanged    Medication(s) Administered   Medication Administration Time: 1/23/2025 12:30 PM

## 2025-01-23 NOTE — ANESTHESIA POSTPROCEDURE EVALUATION
Patient: Car Torres    Procedure: Procedure(s):  Left Atrial Appendage Closure       Anesthesia Type:  General    Note:  Disposition: Outpatient   Postop Pain Control: Uneventful            Sign Out: Well controlled pain   PONV: No   Neuro/Psych: Uneventful            Sign Out: Acceptable/Baseline neuro status   Airway/Respiratory: Uneventful            Sign Out: Acceptable/Baseline resp. status   CV/Hemodynamics: Uneventful            Sign Out: Acceptable CV status; No obvious hypovolemia; No obvious fluid overload   Other NRE: NONE   DID A NON-ROUTINE EVENT OCCUR? No       Last vitals:  Vitals Value Taken Time   /61 01/23/25 1532   Temp 35.8  C (96.4  F) 01/23/25 1508   Pulse 62 01/23/25 1545   Resp 20 01/23/25 1545   SpO2 100 % 01/23/25 1545   Vitals shown include unfiled device data.    Electronically Signed By: Ezra Herrera MD  January 23, 2025  3:45 PM

## 2025-01-23 NOTE — Clinical Note
[facility]  Hospitalist Discharge Summary      Date of Admission:  2/28/2021  Date of Discharge:  3/3/2021  2:13 PM  Discharging Provider: Drake Colunga MD      Discharge Diagnoses            Acute on chronic sob    Acute on chronic hypercapnic hypoxic respiratory failure : on oxygen, 2/2 COPD exacerbation    COPD exacerbation    Acute metabolic encephalopathy : multifactorial, chronic hypercapnia, ? dementia    Chronic respiratory acidosis with compensatory metabolic alkalosis    Hypokalemia    Dementia, probable    Dysphagia : on dysphagia 2 diet    Sinus tachycardia, persistent    Weakness, Generalized    Anxiety/Depression : on xanax, amitriptyline    HTN, Essential : on clonidine, lisinopril    Multiple compression fracture of spine (H)            Follow-ups Needed After Discharge   Follow-up Appointments     Follow Up and recommended labs and tests      Follow up with Nursing home physician within a  week             Unresulted Labs Ordered in the Past 30 Days of this Admission     Date and Time Order Name Status Description    3/2/2021 1801 Blood culture Preliminary     3/2/2021 1801 Blood culture Preliminary       These results will be followed up by none    Discharge Disposition   Discharged to nursing home  Condition at discharge: Poor      Hospital Course         Todd Clifford is a 84 year old female with a past medical history of hypertension, severe anxiety, and many year history of heavy smoking who presented to the hospital with concerns of worsening shortness of breath that has been progressively occurring over the past month.  Patient was found to have signs of respiratory distress with O2 saturation of 86-88% on RA.  CTA of the chest shows no PE or other significant findings.  Patient's symptoms seem secondary to COPD exacerbation and she was managed with nebulizer, steroids and antibiotic. Patient had no improvement in her respiratory symptoms with treatment. It  left atrial appendage Cine(s)  injected and visualized utilizing power injector system.Rate (mL/sec) 10 Total Volume (mL) 20 seems that patient had advanced COPD considering h/o 2 PPD smoking history for several years. On further discussion with daughter - it came to light that patient has been having a significant decline in her quality of life. She has been needing support for most of her day to day activities and has been having issues with confusion and significant anxiety issues from her COPD. She has lost > 10% weight over last few months. She has been having debilitating sob at rest and even with minimal activity over past several months. She had been using inhalers but it did not help. On further evaluation in hospital patient was found to have persistent hypercapnia and hypoxia on blood gases. Echo revealed severe pulmonary hypertension likely secondary to COPD. She has been persistently tachycardic at baseline. She also has dysphagia and is on dysphagia diet. Considering all above issues, goals of care discussion was done with daughter and patient and they opted for hospice and comfort care approach. Patient will be discharged to the Nursing home and hospice will follow at Nursing home for further management.            Consultations This Hospital Stay   SOCIAL WORK IP CONSULT  OCCUPATIONAL THERAPY ADULT IP CONSULT  NUTRITION SERVICES ADULT IP CONSULT  PHYSICAL THERAPY ADULT IP CONSULT  SWALLOW EVAL SPEECH PATH AT BEDSIDE IP CONSULT  CARE MANAGEMENT / SOCIAL WORK IP CONSULT    Code Status   No CPR- Do NOT Intubate    Time Spent on this Encounter   I, Drake Colunga MD, personally saw the patient today and spent greater than 30 minutes discharging this patient.       Drake Colunga MD  [address] MEDICAL SURGICAL  [address] [place] [address]  Phone: [phone]  ______________________________________________________________________    Physical Exam   Vital Signs: Temp: 100.7  F (38.2  C) Temp src: Rectal BP: (!) 151/69 Pulse: 98   Resp: 18 SpO2: (!) 89 % O2 Device: Nasal cannula Oxygen Delivery:  3 LPM  Weight: 106 lbs 11.2 oz     GENERAL: The patient is not in any acute distressed. Awake and alert.  HEENT: Nonicteric sclerae, PERRLA, EOMI. Oropharynx clear. Moist mucous membranes. Conjunctivae appear well perfused.  HEART: Regular rate and rhythm without murmurs.  LUNGS: Clear to auscultation bilaterally. No wheezing or crackles.  ABDOMEN: Soft, positive bowel sounds, nontender.  SKIN: No rash, no excessive bruising, petechiae, or purpura.  EXTREMITIES : no rashes, no swelling in legs.  NEUROLOGIC: AxO x 3.  Cranial nerves II-XII intact without motor/sensory deficit.         Primary Care Physician   Zuly Carrington    Discharge Orders      Care Coordination Referral      General info for SNF    Length of Stay Estimate: Long Term Care  Condition at Discharge: Declining  Level of care:skilled   Rehabilitation Potential: Poor  Admission H&P remains valid and up-to-date: Yes  Recent Chemotherapy: N/A  Use Nursing Home Standing Orders: Yes     Mantoux instructions    Give two-step Mantoux (PPD) Per Facility Policy Yes     Reason for your hospital stay    Sob, COPD     Follow Up and recommended labs and tests    Follow up with Nursing home physician within a  week     Activity - Up with nursing assistance     No CPR- Do NOT Intubate     Oxygen Adult/Peds    Oxygen for comfort     Advance Diet as Tolerated    Follow this diet upon discharge: Orders Placed This Encounter      Snacks/Supplements Adult: Boost Plus; Between Meals      Dysphagia Diet Level 2 Mech Altered Thin Liquids (water, ice chips, juice, milk gelatin, ice cream, etc)       Significant Results and Procedures   Most Recent 3 CBC's:  Recent Labs   Lab Test 03/02/21  1816 02/28/21  0751 10/11/20  1655   WBC 6.3 6.4 5.1   HGB 12.7 12.0 13.4   * 99 97    229 237     Most Recent 3 BMP's:  Recent Labs   Lab Test 03/03/21  0536 03/02/21  0546 03/01/21  1228 02/28/21  0751 02/28/21  0751   NA  --  140 138  --  138   POTASSIUM  4.4 3.8 3.7   < > 2.8*   CHLORIDE  --  102 100  --  95   CO2  --  35* 35*  --  35*   BUN  --  12 14  --  19   CR  --  0.60 0.56  0.55  --  0.69   ANIONGAP  --  3 3  --  8   PATRICIA  --  8.9 9.3  --  9.3   GLC  --  112* 128*  --  103*    < > = values in this interval not displayed.       Discharge Medications   Discharge Medication List as of 3/3/2021 12:36 PM      START taking these medications    Details   acetaminophen (TYLENOL) 325 MG tablet Take 2 tablets (650 mg) by mouth every 6 hours as needed for mild pain or fever (temperature over 100  F ), Transitional      albuterol (PROVENTIL) (2.5 MG/3ML) 0.083% neb solution Take 1 vial (2.5 mg) by nebulization every 2 hours as needed for wheezing or shortness of breath / dyspnea, Transitional      artificial saliva (BIOTENE MT) SOLN solution Take 1 mL (1 spray) by mouth every hour as needed for dry mouth, Transitional      artificial tears (GENTEAL) 0.1-0.2-0.3 % ophthalmic solution Place 1 drop into both eyes every hour as needed for dry eyes, Transitional      atropine 1 % ophthalmic solution Place 1-2 drops under the tongue every hour as needed for other (secretions), Transitional      azithromycin (ZITHROMAX) 250 MG tablet Take 1 tablet (250 mg) by mouth daily for 2 days, Disp-6 tablet, R-0, Transitional      glycopyrrolate (GLYCATE) 2 MG tablet Take 1-2 tablets (2-4 mg) by mouth every 4 hours as needed (secretions, may also be given via G-tube or J-tube if needed.), Transitional      mineral oil-hydrophilic petrolatum (AQUAPHOR) external ointment Apply topically every 8 hours as needed for dry skinTransitional      morphine 10 MG/5ML solution Take 1.25 mLs (2.5 mg) by mouth every 6 hours as needed for moderate to severe pain (or dyspnea), Disp-30 mL, R-0, Local Print      morphine sulfate HIGH CONCENTRATE (ROXANOL) 10 mg/0.5 mL (HIGH CONC) solution Place 0.125 mLs (2.5 mg) under the tongue every 6 hours as needed, Disp-30 mL, R-0, Local Print      nicotine (NICODERM  CQ) 21 MG/24HR 24 hr patch Place 1 patch onto the skin daily, Transitional      OLANZapine zydis (ZYPREXA) 5 MG ODT Take 1 tablet (5 mg) by mouth every 6 hours as needed for agitation (delirium, nausea), Disp-15 tablet, R-0, Local Print      ondansetron (ZOFRAN-ODT) 4 MG ODT tab Take 1 tablet (4 mg) by mouth every 6 hours as needed for nausea or vomiting, Transitional      polyethylene glycol (MIRALAX) 17 g packet Take 17 g by mouth daily as needed for constipation, Transitional      predniSONE (DELTASONE) 5 MG tablet Take 4 tablets (20 mg) by mouth daily, Transitional      prochlorperazine (COMPAZINE) 5 MG tablet Take 1 tablet (5 mg) by mouth every 6 hours as needed for vomiting, Transitional      !! QUEtiapine (SEROQUEL) 25 MG tablet Take 0.5 tablets (12.5 mg) by mouth At Bedtime, Disp-15 tablet, R-0, Local Print      !! QUEtiapine (SEROQUEL) 25 MG tablet Take 0.5 tablets (12.5 mg) by mouth every 6 hours as needed (agitation/aggression), Disp-15 tablet, R-0, Local Print      !! senna-docusate (SENOKOT-S/PERICOLACE) 8.6-50 MG tablet Take 1 tablet by mouth 2 times daily as needed for constipation, Transitional      !! senna-docusate (SENOKOT-S/PERICOLACE) 8.6-50 MG tablet Take 1 tablet by mouth 2 times daily, Transitional       !! - Potential duplicate medications found. Please discuss with provider.      CONTINUE these medications which have CHANGED    Details   ALPRAZolam (XANAX) 0.25 MG tablet Take 1 tablet (0.25 mg) by mouth 3 times daily as needed for anxiety, Disp-15 tablet, R-0, Local Print         CONTINUE these medications which have NOT CHANGED    Details   amitriptyline (ELAVIL) 25 MG tablet Take 1 tablet (25 mg) by mouth 3 times daily, Disp-270 tablet, R-3, E-Prescribe      cloNIDine (CATAPRES) 0.1 MG tablet Take 1 tablet (0.1 mg) by mouth daily, Disp-90 tablet, R-1, E-Prescribe      lisinopril (ZESTRIL) 30 MG tablet Take 1 tablet (30 mg) by mouth daily, Disp-90 tablet, R-1, E-Prescribe        "    Allergies   Allergies   Allergen Reactions     Prozac [Fluoxetine]      \"roaring in my head\"   nervous     "

## 2025-01-24 NOTE — PROGRESS NOTES
D/I/A:  Pt is tolerating liquids and foods, ambulating, urinating and pt has a  - daughter and wife.  A+O x4 and making needs known.  CCL access sites CDI; no bleeding or hematoma.  CMS intact.  VSS.  SR on monitor.  Tongue and throat pain improving with tylenol and hurricaine spray.  Post procedure echo done.  Per Dr. Bangura, no concerns seen on echo; pt may discharge home tonight.  IV access removed.  Education completed and outlined in AVS or handout: medications reviewed with pt.  Dr. Bangura saw pt and family at bedside.  Questions answered prior to discharge.  Belongings returned to pt at discharge.    P:  Discharged to self care.  Pt to follow up with appts as per discharge instructions.  Pt brought out to front lobby via wheelchair, accompanied by nurse and family.

## 2025-01-27 NOTE — PROGRESS NOTES
[facility] HEART CARE  CARDIOVASCULAR DIVISION    STRUCTURAL CLINIC RETURN VISIT        PERTINENT CLINICAL HISTORY:     Car Torres is a very pleasant 64 year old male who presents for 1 week Watchman follow-up.He has a history of  CAD s/p PCI to LAD (3/8/24), TIA on CPAP, HTN, HLD and paroxysmal atrial fibrillation s/p PVI ablation (5/30/24) w/[id number] is 3 (age, CAD, HTN) and HADBLED of 3 (age, meds and bleeding dispo) with intolerance to anticoagulation due to bruising and bleeding easily who underwent 27 mm Watchman LAAO on 1/23/25. Intra procedure NESTOR showed no leak or effusion. Rhythm remained stable.  Patient noted to have tongue laceration prior to extubation. Seen by ENT who used Afrin, no sutures needed. While seen in PACU, extubated and oral bleeding stopped. Patient discharged on ASA and Brilinta.     He was in a significant amount of pain for the first few days due to tongue laceration. He used some hurricane spray but stung and only helped temporarily. They wish they would have had pain medications for the first few days. Pain was 10/10 down to 3/10 now. He still has some numbness at the tip of his tongue. He otherwise feels fine. Had slight pain at right puncture site last night, may have overdone. Feels better this morning. Otherwise no chest pain, SOB, orthopnea, PND, leg swelling. No lightheadedness, palpitations, syncope. Very interested in weight loss.  Metformin has been ineffective for weight loss. Would like weight management referral.      PAST MEDICAL HISTORY:     Past Medical History:   Diagnosis Date    CKD (chronic kidney disease) stage 2, GFR 60-89 ml/min 10/21/2010    60 to 80 - have discussed Lisinopril - will consider       Coronary artery disease 01/26/2024    Dementia (H)     Depressive disorder     Diverticulosis of large intestine without hemorrhage 11/18/2015    Incidental finding on CT scan       Heart disease 05/08/2018    Hypercholesterolemia     Hypertension  goal BP (blood pressure) < 140/90     Nonspecific abnormal electrocardiogram (ECG) (EKG) 08/23/2007    Stress testing normal.     Paroxysmal atrial fibrillation (H) 03/17/2024    Sinus node dysfunction (H) 05/23/2024    Sleep apnea     uses a c-pap, severe    Status post coronary angiogram 01/26/2024        PAST SURGICAL HISTORY:     Past Surgical History:   Procedure Laterality Date    COLONOSCOPY N/A 12/22/2020    Procedure: COLONOSCOPY, WITH POLYPECTOMY, CLIP;  Surgeon: Mihir Lang MD;  Location: [facility] OR    COLONOSCOPY N/A 1/10/2022    Procedure: COLONOSCOPY, WITH POLYPECTOMY AND BIOPSY;  Surgeon: Mihir Lang MD;  Location: [facility]    CV CORONARY ANGIOGRAM N/A 1/26/2024    Procedure: Coronary Angiogram;  Surgeon: Elton Wright MD;  Location: [facility] CARDIAC CATH LAB    CV CORONARY LITHOTRIPSY PCI N/A 3/8/2024    Procedure: Percutaneous Coronary Intervention - Lithotripsy;  Surgeon: Elton Wright MD;  Location: [facility] CARDIAC CATH LAB    CV INSTANTANEOUS WAVE-FREE RATIO N/A 1/26/2024    Procedure: Instantaneous Wave-Free Ratio;  Surgeon: Elton Wright MD;  Location: [facility] CARDIAC CATH LAB    CV INTRAVASULAR ULTRASOUND N/A 3/8/2024    Procedure: Intravascular Ultrasound;  Surgeon: Elton Wright MD;  Location: [facility] CARDIAC CATH LAB    CV PCI N/A 3/8/2024    Procedure: Percutaneous Coronary Intervention;  Surgeon: Elton Wright MD;  Location: [facility] CARDIAC CATH LAB    CV RIGHT HEART CATH MEASUREMENTS RECORDED N/A 1/26/2024    Procedure: Right Heart Catheterization;  Surgeon: Elton Wright MD;  Location: [facility] CARDIAC CATH LAB    EP ABLATION PULMONARY VEIN ISOLATION N/A 5/30/2024    Procedure: Ablation Atrial Fibrillation;  Surgeon: Sandee Sheldon MD;  Location: [facility] CV    ESOPHAGOSCOPY, GASTROSCOPY, DUODENOSCOPY (EGD), COMBINED N/A 1/28/2021    Procedure: ESOPHAGOGASTRODUODENOSCOPY, WITH BIOPSY;  Surgeon: Mihir Lang MD;  Location: [facility]  OR    OPEN REDUCTION INTERNAL FIXATION FOOT Left 1/5/2023    Procedure: LISFRANC OPEN REDUCTION INTERNAL FIXATION, LEFT FOOT;  Surgeon: Bailey Ruiz DPM;  Location:  OR    SURGICAL HISTORY OF -       surgery on left index finger        CURRENT MEDICATIONS:     Current Outpatient Medications   Medication Sig Dispense Refill    aspirin 81 MG EC tablet Take 1 tablet (81 mg) by mouth daily.      atorvastatin (LIPITOR) 40 MG tablet Take 1.5 tablets (60 mg) by mouth daily. 135 tablet 3    busPIRone (BUSPAR) 10 MG tablet Take 2 tablets (20 mg) by mouth 2 times daily 360 tablet 4    Cholecalciferol (VITAMIN D PO) Take 5,000 Units by mouth daily One tablet twice daily      co-enzyme Q-10 100 MG CAPS capsule Take 100 mg by mouth daily      hydroCHLOROthiazide (HYDRODIURIL) 25 MG tablet Take 1 tablet (25 mg) by mouth daily. 90 tablet 3    hydrOXYzine HCl (ATARAX) 10 MG tablet Take 1 tablet (10 mg) by mouth 2 times daily as needed for anxiety 180 tablet 5    insulin pen needle (32G X 6 MM) 32G X 6 MM miscellaneous Use 100 pen needles daily or as directed. 100 each 3    lisinopril (ZESTRIL) 20 MG tablet Take 1 tablet (20 mg) by mouth daily 90 tablet 3    metFORMIN (GLUCOPHAGE XR) 500 MG 24 hr tablet Take 1 tablet (500 mg) by mouth daily (with dinner). 90 tablet 3    metoprolol tartrate (LOPRESSOR) 25 MG tablet Take 1 tablet (25 mg) by mouth every 8 hours as needed (Atrial fibrillation with RVR, HR > 120) 30 tablet 1    MULTI VITAMIN MENS PO Take 1 tablet by mouth daily      pramipexole (MIRAPEX) 0.25 MG tablet Take 1 tablet (0.25 mg) by mouth at bedtime. 90 tablet 2    Semaglutide, 2 MG/DOSE, (OZEMPIC) 8 MG/3ML pen Inject 2 mg subcutaneously every 7 days.      sildenafil (VIAGRA) 100 MG tablet Take 1 tablet (100 mg) by mouth daily as needed. 20 tablet 4    ticagrelor (BRILINTA) 90 MG tablet Take 1 tablet (90 mg) by mouth 2 times daily Start this evening. 180 tablet 3        ALLERGIES:   No Known Allergies     FAMILY  HISTORY:     Family History   Problem Relation Age of Onset    Cancer Mother         uterine    Other Cancer Mother         endometrial    Cerebrovascular Disease Mother     Substance Abuse Mother         Alcohol    C.A.D. Father     Hypertension Father     Hyperlipidemia Father     Breast Cancer Maternal Grandmother     Other Cancer Maternal Grandmother         Lung/brain    Substance Abuse Paternal Grandfather     Hypertension Brother     Substance Abuse Brother         Alcohol    Hyperlipidemia Brother     Breast Cancer Other     Breast Cancer Cousin     Other Cancer Other     Cerebrovascular Disease Other         Maternal Aunt    Glaucoma No family hx of     Macular Degeneration No family hx of         SOCIAL HISTORY:     Social History     Socioeconomic History    Marital status:      Spouse name: Luzma    Number of children: 3    Years of education: 14   Occupational History    Occupation: [employer]     Employer: OTHER     Comment: [employer]   Tobacco Use    Smoking status: Former     Current packs/day: 0.00     Average packs/day: 1 pack/day for 25.0 years (25.0 ttl pk-yrs)     Types: Cigarettes, Cigars     Start date: 1973     Quit date: 1998     Years since quittin.0     Passive exposure: Past    Smokeless tobacco: Never    Tobacco comments:     N/A not a smoker   Vaping Use    Vaping status: Never Used   Substance and Sexual Activity    Alcohol use: Yes     Alcohol/week: 10.0 standard drinks of alcohol     Comment: less than 6 per week    Drug use: Not Currently     Types: Amphetamines     Comment: Kratum    Sexual activity: Not Currently     Partners: Female     Birth control/protection: Female Surgical, None     Comment:    Other Topics Concern    Parent/sibling w/ CABG, MI or angioplasty before 65F 55M? Yes     Comment: Father age 50 bypass. Grandfather death in age 40 s heart     Social Drivers of Health     Financial Resource Strain: Low Risk  (3/19/2024)    Financial  Resource Strain     Within the past 12 months, have you or your family members you live with been unable to get utilities (heat, electricity) when it was really needed?: No   Food Insecurity: High Risk (3/19/2024)    Food Insecurity     Within the past 12 months, did you worry that your food would run out before you got money to buy more?: No     Within the past 12 months, did the food you bought just not last and you didn t have money to get more?: Yes   Transportation Needs: Low Risk  (3/19/2024)    Transportation Needs     Within the past 12 months, has lack of transportation kept you from medical appointments, getting your medicines, non-medical meetings or appointments, work, or from getting things that you need?: No   Physical Activity: Inactive (3/19/2024)    Exercise Vital Sign     Days of Exercise per Week: 0 days     Minutes of Exercise per Session: 0 min   Stress: No Stress Concern Present (3/19/2024)    [employer] - Occupational Stress Questionnaire     Feeling of Stress : Not at all   Social Connections: Unknown (3/19/2024)    Social Connection and Isolation Panel [NHANES]     Frequency of Social Gatherings with Friends and Family: Once a week   Recent Concern: Social Connections - Moderately Isolated (3/19/2024)    Social Connection and Isolation Panel [NHANES]     Frequency of Communication with Friends and Family: Three times a week     Frequency of Social Gatherings with Friends and Family: Once a week     Attends Sikhism Services: Never     Active Member of Clubs or Organizations: No     Attends Club or Organization Meetings: Never     Marital Status:    Interpersonal Safety: Low Risk  (1/23/2025)    Interpersonal Safety     Do you feel physically and emotionally safe where you currently live?: Yes     Within the past 12 months, have you been hit, slapped, kicked or otherwise physically hurt by someone?: No     Within the past 12 months, have you been  humiliated or emotionally abused in other ways by your partner or ex-partner?: No   Housing Stability: Low Risk  (3/19/2024)    Housing Stability     Do you have housing? : Yes     Are you worried about losing your housing?: No        REVIEW OF SYSTEMS:     Constitutional: No fevers or chills  Skin: No new rash or itching  Eyes: No acute change in vision  Ears/Nose/Throat: No purulent rhinorrhea, new hearing loss, or new vertigo  Respiratory: No cough or hemoptysis  Cardiovascular: See HPI  Gastrointestinal: No change in appetite, vomiting, hematemesis or diarrhea  Genitourinary: No dysuria or hematuria  Musculoskeletal: No new back pain, neck pain or muscle pain  Neurologic: No new headaches, focal weakness or behavior changes  Psychiatric: No hallucinations, excessive alcohol consumption or illegal drug usage  Hematologic/Lymphatic/Immunologic: No bleeding, chills, fever, night sweats or weight loss  Endocrine: No new cold intolerance, heat intolerance, polyphagia, polydipsia or polyuria      PHYSICAL EXAMINATION:     /72 (BP Location: Right arm, Patient Position: Chair, Cuff Size: Adult Large)   Pulse 63   Wt (!) 149.7 kg (330 lb)   SpO2 96%   BMI 46.03 kg/m      GENERAL: No acute distress.  HEENT: EOMI. Sclerae white, not injected. Nares clear. Pharynx without erythema or exudate.   Neck: No adenopathy. No thyromegaly. No jugular venous distension.   Heart: Regular rate and rhythm. No murmur.   Lungs: Clear to auscultation. No ronchi, wheezes, rales.   Abdomen: Soft, nontender, nondistended. Bowel sounds present.  Extremities: No clubbing, cyanosis, or edema.   Neurologic: Alert and oriented to person/place/time, normal speech and affect. No focal deficits.  Skin: No petechiae, purpura or rash.     LABORATORY DATA:     LIPID RESULTS:  Lab Results   Component Value Date    CHOL 163 09/11/2024    CHOL 161 04/23/2021    HDL 48 09/11/2024    HDL 79 04/23/2021    LDL 87 09/11/2024    LDL 65 04/23/2021     TRIG 140 09/11/2024    TRIG 87 04/23/2021    CHOLHDLRATIO 3.1 07/13/2015       LIVER ENZYME RESULTS:  Lab Results   Component Value Date    AST 22 09/11/2024    AST 15 08/02/2020    ALT 18 09/11/2024    ALT 27 08/02/2020       CBC RESULTS:  Lab Results   Component Value Date    WBC 7.3 01/22/2025    WBC 7.1 12/15/2020    RBC 4.67 01/22/2025    RBC 5.24 12/15/2020    HGB 13.4 01/22/2025    HGB 15.7 12/15/2020    HCT 40.5 01/22/2025    HCT 47.0 12/15/2020    MCV 87 01/22/2025    MCV 90 12/15/2020    MCH 28.7 01/22/2025    MCH 30.0 12/15/2020    MCHC 33.1 01/22/2025    MCHC 33.4 12/15/2020    RDW 14.5 01/22/2025    RDW 13.2 12/15/2020     01/22/2025     12/15/2020       BMP RESULTS:  Lab Results   Component Value Date     01/23/2025     08/02/2020    POTASSIUM 4.1 01/23/2025    POTASSIUM 4.1 01/05/2023    POTASSIUM 4.0 08/02/2020    CHLORIDE 103 01/23/2025    CHLORIDE 100 12/29/2022    CHLORIDE 105 08/02/2020    CO2 22 01/23/2025    CO2 30 12/29/2022    CO2 28 08/02/2020    ANIONGAP 11 01/23/2025    ANIONGAP 6 12/29/2022    ANIONGAP 5 08/02/2020    GLC 99 01/23/2025    GLC 97 01/23/2025     (H) 12/29/2022     (A) 04/21/2021    BUN 20.3 01/23/2025    BUN 17 12/29/2022    BUN 28 08/02/2020    CR 1.22 (H) 01/23/2025    CR 1.17 08/02/2020    GFRESTIMATED 65 01/23/2025    GFRESTIMATED 66 08/02/2020    GFRESTBLACK 77 08/02/2020    SHAHID 8.7 (L) 01/23/2025    SHAHID 8.9 08/02/2020        A1C RESULTS:  Lab Results   Component Value Date    A1C 5.4 09/11/2024       INR RESULTS:  Lab Results   Component Value Date    INR 0.94 01/22/2025    INR 0.99 03/22/2024    INR 0.96 08/18/2007        PROCEDURES & FURTHER ASSESSMENTS:   Post procedure TTE:  Interpretation Summary  Limited echo to assess for pericardial effusion after left atrial appendage  closure.  No pericardial effusion.    Intra procedure NESTOR:  PRE-PROCEDURAL SURVEY:  1. The LV function was 55-60% and the RV function was normal.  2.  There was no evidence of shunting at the level of the interatrial septum.  3. The maximum pre-deployment left atrial appendage orifice width was 20.4 mm.  4. There was no intracardiac thrombus.  5. There was a small amount of pericardial fluid adjacent to the right atrium  at the beginning of the procedure.     POST-PROCEDURAL SURVEY:  1. The 27 mm Watchman FLX device was well-seated in the left atrial appendage.  There was adequate compression of the device. There was no evidence of queenie-  device leak.  2. There was no change in the amount of pericardial fluid.  3. There was a small left-right shunt at the site of the transseptal puncture.  4. The biventricular function was unchanged.        CLINICAL IMPRESSION:     Car Torres is a very pleasant 64 year old male who presents for 1 week Watchman follow-up.    1. Paroxysmal atrial fibrillation:   2. Intolerance to anticoagulation:  Now s/p successful implantation of 27 mm Watchman FLX. Intra-procedure NESTOR showed no evidence of queenie-device leak and no pericardial effusion. Patient noted to have tongue laceration prior to extubation. Seen by ENT who used Afrin, no sutures needed. While seen in PACU, extubated and oral bleeding stopped. Post procedure echo without pericardial effusion. Tongue lac still healing, pain improving.  -  ASA 81 mg daily indefinitely  - Continue Brilinta 90 mg BID x 6 months then discontinue    - SBE prophylaxis x 6 months post LAAO   - 45 days following Watchman device implantation, patient will return for CT to evaluate endothelialization of the device. If adequate seal is assessed (<5mm), dual antiplatelet therapy with Brilinta and aspirin will be continued for total of 6 months from implantation date, with aspirin then continuing lifelong.      3. CAD:   4. Hyperlipidemia:  5. Hypertension:  History of LAD stent in March 2024.  - ASA 81 mg lifelong   -Continue Brilinta 90 mg BID x 6 months  -Continue statin therapy   -Continue  metoprolol, lisinopril and hydrochlorothiazide     6. CKD:  Last creatinine 1.26 GFR greater than 60.     7. TIA:  -Continue CPAP     8.  Obesity (no DM II)   - metformin ineffective   -Weight management referral     9.  Depression  -Continue prior to admission BuSpar and Mirapex      RTC: 6 weeks with CT and labs prior       Julee KLAUS Dorado, CNP  [facility]     A total of 20 minutes spent face to face with patient and > 50% of the time spent counseling and coordinating care.       CC  Patient Care Team:  Mihir Perez MD as PCP - General (Internal Medicine - Pediatrics)  Taniya Estrada NP as Nurse Practitioner (Psychiatry)  Miguel Varela MD as MD (Neurology)  Mihir Perez MD as Assigned PCP  Beka Santillan DO as Assigned Musculoskeletal Provider  ZANA Fair MD as MD (Cardiovascular Disease)  Elton Wright MD as MD (Cardiovascular Disease)  Soila Millard APRN CNP as Nurse Practitioner (Cardiology)  Sandee Sheldno MD as MD (Cardiovascular Disease)  Kristine Gamez APRN CNP as Nurse Practitioner (Cardiology)  Ruthie Clayton, RACHAEL as Registered Nurse  Bettina Parra Psy.D, LP as Assigned Behavioral Health Provider  Miguel Varela MD as Assigned Neuroscience Provider  Kristine Gamez APRN CNP as Assigned Heart and Vascular Provider

## 2025-01-28 ENCOUNTER — OFFICE VISIT (OUTPATIENT)
Dept: CARDIOLOGY | Facility: CLINIC | Age: 65
End: 2025-01-28
Attending: NURSE PRACTITIONER
Payer: MEDICARE

## 2025-01-28 VITALS
BODY MASS INDEX: 46.03 KG/M2 | WEIGHT: 315 LBS | SYSTOLIC BLOOD PRESSURE: 123 MMHG | DIASTOLIC BLOOD PRESSURE: 72 MMHG | HEART RATE: 63 BPM | OXYGEN SATURATION: 96 %

## 2025-01-28 DIAGNOSIS — I48.0 PAROXYSMAL ATRIAL FIBRILLATION (H): ICD-10-CM

## 2025-01-28 DIAGNOSIS — E66.813 CLASS 3 SEVERE OBESITY WITH SERIOUS COMORBIDITY AND BODY MASS INDEX (BMI) OF 45.0 TO 49.9 IN ADULT, UNSPECIFIED OBESITY TYPE (H): ICD-10-CM

## 2025-01-28 DIAGNOSIS — R06.09 DOE (DYSPNEA ON EXERTION): ICD-10-CM

## 2025-01-28 DIAGNOSIS — E66.01 CLASS 3 SEVERE OBESITY WITH SERIOUS COMORBIDITY AND BODY MASS INDEX (BMI) OF 45.0 TO 49.9 IN ADULT, UNSPECIFIED OBESITY TYPE (H): ICD-10-CM

## 2025-01-28 DIAGNOSIS — G47.33 OSA (OBSTRUCTIVE SLEEP APNEA): ICD-10-CM

## 2025-01-28 DIAGNOSIS — Z95.818 PRESENCE OF WATCHMAN LEFT ATRIAL APPENDAGE CLOSURE DEVICE: Primary | ICD-10-CM

## 2025-01-28 PROCEDURE — G0463 HOSPITAL OUTPT CLINIC VISIT: HCPCS | Performed by: NURSE PRACTITIONER

## 2025-01-28 PROCEDURE — 99214 OFFICE O/P EST MOD 30 MIN: CPT | Performed by: NURSE PRACTITIONER

## 2025-01-28 ASSESSMENT — PAIN SCALES - GENERAL: PAINLEVEL_OUTOF10: NO PAIN (0)

## 2025-01-28 NOTE — NURSING NOTE
Chief Complaint   Patient presents with    Follow Up     RETURN LAAO - 1 wk s/p LAAO       Vitals were taken and medications reconciled.    Hitesh Potter, EMT  10:51 AM

## 2025-01-28 NOTE — LETTER
1/28/2025      RE: Car Torres  [address]  [place] [address]       Dear Colleague,    Thank you for the opportunity to participate in the care of your patient, Car Torres, at the [facility] [place] at [facility]. Please see a copy of my visit note below.        STRUCTURAL HEART CARE  CARDIOVASCULAR DIVISION    STRUCTURAL CLINIC RETURN VISIT        PERTINENT CLINICAL HISTORY:     Car Torres is a very pleasant 64 year old male who presents for 1 week Watchman follow-up.He has a history of  CAD s/p PCI to LAD (3/8/24), TIA on CPAP, HTN, HLD and paroxysmal atrial fibrillation s/p PVI ablation (5/30/24) [id number] is 3 (age, CAD, HTN) and HADBLED of 3 (age, meds and bleeding dispo) with intolerance to anticoagulation due to bruising and bleeding easily who underwent 27 mm Watchman LAAO on 1/23/25. Intra procedure NESTOR showed no leak or effusion. Rhythm remained stable.  Patient noted to have tongue laceration prior to extubation. Seen by ENT who used Afrin, no sutures needed. While seen in PACU, extubated and oral bleeding stopped. Patient discharged on ASA and Brilinta.     He was in a significant amount of pain for the first few days due to tongue laceration. He used some hurricane spray but stung and only helped temporarily. They wish they would have had pain medications for the first few days. Pain was 10/10 down to 3/10 now. He still has some numbness at the tip of his tongue. He otherwise feels fine. Had slight pain at right puncture site last night, may have overdone. Feels better this morning. Otherwise no chest pain, SOB, orthopnea, PND, leg swelling. No lightheadedness, palpitations, syncope. Very interested in weight loss.  Metformin has been ineffective for weight loss. Would like weight management referral.      PAST MEDICAL HISTORY:     Past Medical History:   Diagnosis Date     CKD (chronic kidney disease) stage 2,  GFR 60-89 ml/min 10/21/2010    60 to 80 - have discussed Lisinopril - will consider        Coronary artery disease 01/26/2024     Dementia (H)      Depressive disorder      Diverticulosis of large intestine without hemorrhage 11/18/2015    Incidental finding on CT scan        Heart disease 05/08/2018     Hypercholesterolemia      Hypertension goal BP (blood pressure) < 140/90      Nonspecific abnormal electrocardiogram (ECG) (EKG) 08/23/2007    Stress testing normal.      Paroxysmal atrial fibrillation (H) 03/17/2024     Sinus node dysfunction (H) 05/23/2024     Sleep apnea     uses a c-pap, severe     Status post coronary angiogram 01/26/2024        PAST SURGICAL HISTORY:     Past Surgical History:   Procedure Laterality Date     COLONOSCOPY N/A 12/22/2020    Procedure: COLONOSCOPY, WITH POLYPECTOMY, CLIP;  Surgeon: Mihir Lang MD;  Location: [facility] OR     COLONOSCOPY N/A 1/10/2022    Procedure: COLONOSCOPY, WITH POLYPECTOMY AND BIOPSY;  Surgeon: Mihir Lang MD;  Location:  GI     CV CORONARY ANGIOGRAM N/A 1/26/2024    Procedure: Coronary Angiogram;  Surgeon: Elton Wright MD;  Location: [facility] CARDIAC CATH LAB     CV CORONARY LITHOTRIPSY PCI N/A 3/8/2024    Procedure: Percutaneous Coronary Intervention - Lithotripsy;  Surgeon: Elton Wright MD;  Location: [facility] CARDIAC CATH LAB     CV INSTANTANEOUS WAVE-FREE RATIO N/A 1/26/2024    Procedure: Instantaneous Wave-Free Ratio;  Surgeon: Elton Wright MD;  Location: [facility] CARDIAC CATH LAB     CV INTRAVASULAR ULTRASOUND N/A 3/8/2024    Procedure: Intravascular Ultrasound;  Surgeon: Elton Wright MD;  Location: [facility] CARDIAC CATH LAB     CV PCI N/A 3/8/2024    Procedure: Percutaneous Coronary Intervention;  Surgeon: Elton Wright MD;  Location: [facility] CARDIAC CATH LAB     CV RIGHT HEART CATH MEASUREMENTS RECORDED N/A 1/26/2024    Procedure: Right Heart Catheterization;  Surgeon: Elton Wright MD;  Location: [facility]  CARDIAC CATH LAB     EP ABLATION PULMONARY VEIN ISOLATION N/A 5/30/2024    Procedure: Ablation Atrial Fibrillation;  Surgeon: Sandee Sheldon MD;  Location: [facility] CATH LAB CV     ESOPHAGOSCOPY, GASTROSCOPY, DUODENOSCOPY (EGD), COMBINED N/A 1/28/2021    Procedure: ESOPHAGOGASTRODUODENOSCOPY, WITH BIOPSY;  Surgeon: Mihir Lang MD;  Location: [facility] OR     OPEN REDUCTION INTERNAL FIXATION FOOT Left 1/5/2023    Procedure: LISFRANC OPEN REDUCTION INTERNAL FIXATION, LEFT FOOT;  Surgeon: Bailey Ruiz DPM;  Location: [facility] OR     SURGICAL HISTORY OF -       surgery on left index finger        CURRENT MEDICATIONS:     Current Outpatient Medications   Medication Sig Dispense Refill     aspirin 81 MG EC tablet Take 1 tablet (81 mg) by mouth daily.       atorvastatin (LIPITOR) 40 MG tablet Take 1.5 tablets (60 mg) by mouth daily. 135 tablet 3     busPIRone (BUSPAR) 10 MG tablet Take 2 tablets (20 mg) by mouth 2 times daily 360 tablet 4     Cholecalciferol (VITAMIN D PO) Take 5,000 Units by mouth daily One tablet twice daily       co-enzyme Q-10 100 MG CAPS capsule Take 100 mg by mouth daily       hydroCHLOROthiazide (HYDRODIURIL) 25 MG tablet Take 1 tablet (25 mg) by mouth daily. 90 tablet 3     hydrOXYzine HCl (ATARAX) 10 MG tablet Take 1 tablet (10 mg) by mouth 2 times daily as needed for anxiety 180 tablet 5     insulin pen needle (32G X 6 MM) 32G X 6 MM miscellaneous Use 100 pen needles daily or as directed. 100 each 3     lisinopril (ZESTRIL) 20 MG tablet Take 1 tablet (20 mg) by mouth daily 90 tablet 3     metFORMIN (GLUCOPHAGE XR) 500 MG 24 hr tablet Take 1 tablet (500 mg) by mouth daily (with dinner). 90 tablet 3     metoprolol tartrate (LOPRESSOR) 25 MG tablet Take 1 tablet (25 mg) by mouth every 8 hours as needed (Atrial fibrillation with RVR, HR > 120) 30 tablet 1     MULTI VITAMIN MENS PO Take 1 tablet by mouth daily       pramipexole (MIRAPEX) 0.25 MG tablet Take 1 tablet (0.25 mg) by mouth at  bedtime. 90 tablet 2     Semaglutide, 2 MG/DOSE, (OZEMPIC) 8 MG/3ML pen Inject 2 mg subcutaneously every 7 days.       sildenafil (VIAGRA) 100 MG tablet Take 1 tablet (100 mg) by mouth daily as needed. 20 tablet 4     ticagrelor (BRILINTA) 90 MG tablet Take 1 tablet (90 mg) by mouth 2 times daily Start this evening. 180 tablet 3        ALLERGIES:   No Known Allergies     FAMILY HISTORY:     Family History   Problem Relation Age of Onset     Cancer Mother         uterine     Other Cancer Mother         endometrial     Cerebrovascular Disease Mother      Substance Abuse Mother         Alcohol     C.A.D. Father      Hypertension Father      Hyperlipidemia Father      Breast Cancer Maternal Grandmother      Other Cancer Maternal Grandmother         Lung/brain     Substance Abuse Paternal Grandfather      Hypertension Brother      Substance Abuse Brother         Alcohol     Hyperlipidemia Brother      Breast Cancer Other      Breast Cancer Cousin      Other Cancer Other      Cerebrovascular Disease Other         Maternal Aunt     Glaucoma No family hx of      Macular Degeneration No family hx of         SOCIAL HISTORY:     Social History     Socioeconomic History     Marital status:      Spouse name: Luzma     Number of children: 3     Years of education: 14   Occupational History     Occupation: [employer]     Employer: OTHER     Comment: [employer]   Tobacco Use     Smoking status: Former     Current packs/day: 0.00     Average packs/day: 1 pack/day for 25.0 years (25.0 ttl pk-yrs)     Types: Cigarettes, Cigars     Start date: 1973     Quit date: 1998     Years since quittin.0     Passive exposure: Past     Smokeless tobacco: Never     Tobacco comments:     N/A not a smoker   Vaping Use     Vaping status: Never Used   Substance and Sexual Activity     Alcohol use: Yes     Alcohol/week: 10.0 standard drinks of alcohol     Comment: less than 6 per week     Drug use: Not Currently     Types:  Amphetamines     Comment: Kratum     Sexual activity: Not Currently     Partners: Female     Birth control/protection: Female Surgical, None     Comment:    Other Topics Concern     Parent/sibling w/ CABG, MI or angioplasty before 65F 55M? Yes     Comment: Father age 50 bypass. Grandfather death in age 40 s heart     Social Drivers of Health     Financial Resource Strain: Low Risk  (3/19/2024)    Financial Resource Strain      Within the past 12 months, have you or your family members you live with been unable to get utilities (heat, electricity) when it was really needed?: No   Food Insecurity: High Risk (3/19/2024)    Food Insecurity      Within the past 12 months, did you worry that your food would run out before you got money to buy more?: No      Within the past 12 months, did the food you bought just not last and you didn t have money to get more?: Yes   Transportation Needs: Low Risk  (3/19/2024)    Transportation Needs      Within the past 12 months, has lack of transportation kept you from medical appointments, getting your medicines, non-medical meetings or appointments, work, or from getting things that you need?: No   Physical Activity: Inactive (3/19/2024)    Exercise Vital Sign      Days of Exercise per Week: 0 days      Minutes of Exercise per Session: 0 min   Stress: No Stress Concern Present (3/19/2024)    [place] [employer] - Occupational Stress Questionnaire      Feeling of Stress : Not at all   Social Connections: Unknown (3/19/2024)    Social Connection and Isolation Panel [NHANES]      Frequency of Social Gatherings with Friends and Family: Once a week   Recent Concern: Social Connections - Moderately Isolated (3/19/2024)    Social Connection and Isolation Panel [NHANES]      Frequency of Communication with Friends and Family: Three times a week      Frequency of Social Gatherings with Friends and Family: Once a week      Attends Yazdanism Services: Never       Active Member of Clubs or Organizations: No      Attends Club or Organization Meetings: Never      Marital Status:    Interpersonal Safety: Low Risk  (1/23/2025)    Interpersonal Safety      Do you feel physically and emotionally safe where you currently live?: Yes      Within the past 12 months, have you been hit, slapped, kicked or otherwise physically hurt by someone?: No      Within the past 12 months, have you been humiliated or emotionally abused in other ways by your partner or ex-partner?: No   Housing Stability: Low Risk  (3/19/2024)    Housing Stability      Do you have housing? : Yes      Are you worried about losing your housing?: No        REVIEW OF SYSTEMS:     Constitutional: No fevers or chills  Skin: No new rash or itching  Eyes: No acute change in vision  Ears/Nose/Throat: No purulent rhinorrhea, new hearing loss, or new vertigo  Respiratory: No cough or hemoptysis  Cardiovascular: See HPI  Gastrointestinal: No change in appetite, vomiting, hematemesis or diarrhea  Genitourinary: No dysuria or hematuria  Musculoskeletal: No new back pain, neck pain or muscle pain  Neurologic: No new headaches, focal weakness or behavior changes  Psychiatric: No hallucinations, excessive alcohol consumption or illegal drug usage  Hematologic/Lymphatic/Immunologic: No bleeding, chills, fever, night sweats or weight loss  Endocrine: No new cold intolerance, heat intolerance, polyphagia, polydipsia or polyuria      PHYSICAL EXAMINATION:     /72 (BP Location: Right arm, Patient Position: Chair, Cuff Size: Adult Large)   Pulse 63   Wt (!) 149.7 kg (330 lb)   SpO2 96%   BMI 46.03 kg/m      GENERAL: No acute distress.  HEENT: EOMI. Sclerae white, not injected. Nares clear. Pharynx without erythema or exudate.   Neck: No adenopathy. No thyromegaly. No jugular venous distension.   Heart: Regular rate and rhythm. No murmur.   Lungs: Clear to auscultation. No ronchi, wheezes, rales.   Abdomen: Soft, nontender,  nondistended. Bowel sounds present.  Extremities: No clubbing, cyanosis, or edema.   Neurologic: Alert and oriented to person/place/time, normal speech and affect. No focal deficits.  Skin: No petechiae, purpura or rash.     LABORATORY DATA:     LIPID RESULTS:  Lab Results   Component Value Date    CHOL 163 09/11/2024    CHOL 161 04/23/2021    HDL 48 09/11/2024    HDL 79 04/23/2021    LDL 87 09/11/2024    LDL 65 04/23/2021    TRIG 140 09/11/2024    TRIG 87 04/23/2021    CHOLHDLRATIO 3.1 07/13/2015       LIVER ENZYME RESULTS:  Lab Results   Component Value Date    AST 22 09/11/2024    AST 15 08/02/2020    ALT 18 09/11/2024    ALT 27 08/02/2020       CBC RESULTS:  Lab Results   Component Value Date    WBC 7.3 01/22/2025    WBC 7.1 12/15/2020    RBC 4.67 01/22/2025    RBC 5.24 12/15/2020    HGB 13.4 01/22/2025    HGB 15.7 12/15/2020    HCT 40.5 01/22/2025    HCT 47.0 12/15/2020    MCV 87 01/22/2025    MCV 90 12/15/2020    MCH 28.7 01/22/2025    MCH 30.0 12/15/2020    MCHC 33.1 01/22/2025    MCHC 33.4 12/15/2020    RDW 14.5 01/22/2025    RDW 13.2 12/15/2020     01/22/2025     12/15/2020       BMP RESULTS:  Lab Results   Component Value Date     01/23/2025     08/02/2020    POTASSIUM 4.1 01/23/2025    POTASSIUM 4.1 01/05/2023    POTASSIUM 4.0 08/02/2020    CHLORIDE 103 01/23/2025    CHLORIDE 100 12/29/2022    CHLORIDE 105 08/02/2020    CO2 22 01/23/2025    CO2 30 12/29/2022    CO2 28 08/02/2020    ANIONGAP 11 01/23/2025    ANIONGAP 6 12/29/2022    ANIONGAP 5 08/02/2020    GLC 99 01/23/2025    GLC 97 01/23/2025     (H) 12/29/2022     (A) 04/21/2021    BUN 20.3 01/23/2025    BUN 17 12/29/2022    BUN 28 08/02/2020    CR 1.22 (H) 01/23/2025    CR 1.17 08/02/2020    GFRESTIMATED 65 01/23/2025    GFRESTIMATED 66 08/02/2020    GFRESTBLACK 77 08/02/2020    SHAHID 8.7 (L) 01/23/2025    SHAHID 8.9 08/02/2020        A1C RESULTS:  Lab Results   Component Value Date    A1C 5.4 09/11/2024       INR  RESULTS:  Lab Results   Component Value Date    INR 0.94 01/22/2025    INR 0.99 03/22/2024    INR 0.96 08/18/2007        PROCEDURES & FURTHER ASSESSMENTS:   Post procedure TTE:  Interpretation Summary  Limited echo to assess for pericardial effusion after left atrial appendage  closure.  No pericardial effusion.    Intra procedure NESTOR:  PRE-PROCEDURAL SURVEY:  1. The LV function was 55-60% and the RV function was normal.  2. There was no evidence of shunting at the level of the interatrial septum.  3. The maximum pre-deployment left atrial appendage orifice width was 20.4 mm.  4. There was no intracardiac thrombus.  5. There was a small amount of pericardial fluid adjacent to the right atrium  at the beginning of the procedure.     POST-PROCEDURAL SURVEY:  1. The 27 mm Watchman FLX device was well-seated in the left atrial appendage.  There was adequate compression of the device. There was no evidence of queenie-  device leak.  2. There was no change in the amount of pericardial fluid.  3. There was a small left-right shunt at the site of the transseptal puncture.  4. The biventricular function was unchanged.        CLINICAL IMPRESSION:     Car Torres is a very pleasant 64 year old male who presents for 1 week Watchman follow-up.    1. Paroxysmal atrial fibrillation:   2. Intolerance to anticoagulation:  Now s/p successful implantation of 27 mm Watchman FLX. Intra-procedure NESTOR showed no evidence of queenie-device leak and no pericardial effusion. Patient noted to have tongue laceration prior to extubation. Seen by ENT who used Afrin, no sutures needed. While seen in PACU, extubated and oral bleeding stopped. Post procedure echo without pericardial effusion. Tongue lac still healing, pain improving.  -  ASA 81 mg daily indefinitely  - Continue Brilinta 90 mg BID x 6 months then discontinue    - SBE prophylaxis x 6 months post LAAO   - 45 days following Watchman device implantation, patient will return for CT to  evaluate endothelialization of the device. If adequate seal is assessed (<5mm), dual antiplatelet therapy with Brilinta and aspirin will be continued for total of 6 months from implantation date, with aspirin then continuing lifelong.      3. CAD:   4. Hyperlipidemia:  5. Hypertension:  History of LAD stent in March 2024.  - ASA 81 mg lifelong   -Continue Brilinta 90 mg BID x 6 months  -Continue statin therapy   -Continue metoprolol, lisinopril and hydrochlorothiazide     6. CKD:  Last creatinine 1.26 GFR greater than 60.     7. TIA:  -Continue CPAP     8.  Obesity (no DM II)   - metformin ineffective   -Weight management referral     9.  Depression  -Continue prior to admission BuSpar and Mirapex      RTC: 6 weeks with CT and labs prior       KLAUS Goel CNP  [facility]     A total of 20 minutes spent face to face with patient and > 50% of the time spent counseling and coordinating care.       CC  Patient Care Team:  Mihir Perez MD as PCP - General (Internal Medicine - Pediatrics)  Taniya Estrada NP as Nurse Practitioner (Psychiatry)  Miguel Varela MD as MD (Neurology)  Mihir Perez MD as Assigned PCP  Beka Santillan DO as Assigned Musculoskeletal Provider  ZANA Fair MD as MD (Cardiovascular Disease)  Elton Wright MD as MD (Cardiovascular Disease)  Solia Millard APRN CNP as Nurse Practitioner (Cardiology)  Sandee Sheldon MD as MD (Cardiovascular Disease)  Kristine Gamez APRN CNP as Nurse Practitioner (Cardiology)  Ruthie Clayton RN as Registered Nurse  Bettina Parra Psy.D, LP as Assigned Behavioral Health Provider  Miguel Varela MD as Assigned Neuroscience Provider  Kristine Gamez APRN CNP as Assigned Heart and Vascular Provider      Please do not hesitate to contact me if you have any questions/concerns.     Sincerely,     Julee Dorado NP

## 2025-01-28 NOTE — PATIENT INSTRUCTIONS
You were seen today in the Cardiovascular Clinic at the [facility] by:       KLAUS CAICEDO CNP     Your visit summary and instructions are as follows:    Continue aspirin 81 mg daily lifelong.  Continue Brilinta 90 mg BID x 6 months - PLEASE SEND ME A MESSAGE IF BRILINTA IS UNAFFORDABLE and I will switch to Plavix  Delay any nonurgent dental procedures for the first 6 month post watchman - let me know if you need something done and I will in antibiotics .  See weight management for weight loss options   Follow-up with me in 6 weeks with CT, labs and ECG prior     Prevention of Infective (Bacterial) Endocarditis:  You are at increased risk for developing adverse outcomes from infective endocarditis (IE), also known as bacterial endocarditis (BE) because of the new device in your heart. The guidelines for prevention of IE are to give patients antibiotics prior to any dental procedures that involve manipulation of gingival tissue or the periapical region of teeth, or perforation of the oral mucosa:      It is recommended to take Amoxicillin 2 gm by mouth as a single dose 30 to 60 minutes before procedure.     OR if allergic to Penicillin or Ampicillin:     Cephalexin 2 gm by mouth, or  Clindamycin 600 mg by mouth, or  Azithromycin or Clarithromycin 500 mg PO       Thank you for your visit today!      Please MyChart message me or call my nurse if you have any questions or concerns.     During Business Hours:   [phone], Structural Heart  Janki Pedraza     After hours, weekends or holidays:   [phone], Option #4  Ask to speak to the On-Call Cardiologist. Inform them you are a cardiology patient at the [facility].

## 2025-02-13 ENCOUNTER — MYC MEDICAL ADVICE (OUTPATIENT)
Dept: FAMILY MEDICINE | Facility: CLINIC | Age: 65
End: 2025-02-13
Payer: MEDICARE

## 2025-02-13 ENCOUNTER — MYC REFILL (OUTPATIENT)
Dept: FAMILY MEDICINE | Facility: CLINIC | Age: 65
End: 2025-02-13
Payer: MEDICARE

## 2025-02-13 DIAGNOSIS — I10 HYPERTENSION GOAL BP (BLOOD PRESSURE) < 140/90: Chronic | ICD-10-CM

## 2025-02-13 RX ORDER — LISINOPRIL 20 MG/1
20 TABLET ORAL DAILY
Qty: 90 TABLET | Refills: 3 | OUTPATIENT
Start: 2025-02-13

## 2025-02-13 NOTE — TELEPHONE ENCOUNTER
Zarpo message sent to pt. See refill request on today's date:     Tricia Lazcano, RN to Jerry Torres and proxy (Destiny Torres)   SOTO      2/13/25 11:07 AM  [facility], The last time the prescription was written for lisinopril (ZESTRIL) 20 MG tablets (Take 1 tablet daily) for   90 tablets with 3 refills on 3/21/24 (1 year supply)  Since Jerry has the appointment with Dr. Perez on 3/6/25, this refill will be completed again at that time.         Chasity WAGNER RN  [facility]

## 2025-02-17 ENCOUNTER — TELEPHONE (OUTPATIENT)
Dept: CARDIOLOGY | Facility: CLINIC | Age: 65
End: 2025-02-17
Payer: MEDICARE

## 2025-02-17 ENCOUNTER — MYC MEDICAL ADVICE (OUTPATIENT)
Dept: CARDIOLOGY | Facility: CLINIC | Age: 65
End: 2025-02-17
Payer: MEDICARE

## 2025-02-17 DIAGNOSIS — Z95.818 PRESENCE OF WATCHMAN LEFT ATRIAL APPENDAGE CLOSURE DEVICE: Primary | ICD-10-CM

## 2025-02-17 RX ORDER — CLOPIDOGREL BISULFATE 75 MG/1
TABLET ORAL
Qty: 94 TABLET | Refills: 3 | Status: SHIPPED | OUTPATIENT
Start: 2025-02-17

## 2025-02-17 NOTE — TELEPHONE ENCOUNTER
Ok to switch to Plavix per Julee Dorado with loading dose.  Rx sent.  Patient informed.  ALENTYt message sent.    India Stern RN  Cardiology Care Coordinator  [facility]  [phone] option 1    Julee weston, India Cabral RN  Cc: ARCELIA Vasquez Cardiology Nurse  Caller: Unspecified (Today, 10:50 AM)  Yes please stop Brilinta and start plavix 300 mg loading dose x 1 day, followed by 75 mg daily maintenance dose thanks

## 2025-02-17 NOTE — TELEPHONE ENCOUNTER
[facility] Call Center    Phone Message    May a detailed message be left on voicemail: yes     Reason for Call: Medication Question or concern regarding medication   Prescription Clarification  Name of Medication: ticagrelor (BRILINTA) 90 MG tablet   Prescribing Provider: Soila Millard APRN CNP    Pharmacy: [id number] - [place], [place] - [address] AT [address] [facility] [address] [facility] [address] [facility]  P: [phone]  F: [phone]   What on the order needs clarification? Pt's wife- Luzma states that pt can't proceed with medication due to $900 copay. She is requetsing for alternative rx sent to [facility]. Please advise and send pt mcm.     Action Taken: Other: CARDIO    Travel Screening: Not Applicable     Date of Service:

## 2025-02-27 NOTE — PROGRESS NOTES
[facility] HEART CARE  CARDIOVASCULAR DIVISION    STRUCTURAL CLINIC RETURN VISIT        PERTINENT CLINICAL HISTORY:     Car Torres is a very pleasant 64 year old male who presents for 6 week Watchman follow-up.He has a history of  CAD s/p PCI to LAD (3/8/24), TIA on CPAP, HTN, HLD and paroxysmal atrial fibrillation s/p PVI ablation (5/30/24) w/[id number] is 3 (age, CAD, HTN) and HADBLED of 3 (age, meds and bleeding dispo) with intolerance to anticoagulation due to bruising and bleeding easily who underwent 27 mm Watchman LAAO on 1/23/25. Intra procedure NESTOR showed no leak or effusion. Rhythm remained stable.  Patient noted to have tongue laceration prior to extubation. Seen by ENT who used Afrin, no sutures needed. While seen in PACU, extubated and oral bleeding stopped. Patient discharged on ASA and Brilinta.     He was in a significant amount of pain for the first few days due to tongue laceration. He used some hurricane spray but stung and only helped temporarily. They wish they would have had pain medications for the first few days. Pain was 10/10 down to 3/10 now. He still has some numbness at the tip of his tongue. He otherwise feels fine. Had slight pain at right puncture site last night, may have overdone. Feels better this morning. Otherwise no chest pain, SOB, orthopnea, PND, leg swelling. No lightheadedness, palpitations, syncope. Very interested in weight loss.  Metformin has been ineffective for weight loss. Would like weight management referral. Brilinta switched to plavix due to cost.     Interval History 2/28/2025  Reports feeling well. Denies any lightheadedness, dizziness, chest pain or pressure, palpitations, or shortness of breath. No issues with bleeding. Says his tongue feels much better, and is no longer numb. Taking all medications as prescribed. Is anxious to be able to stop taking the blood thinner.      PAST MEDICAL HISTORY:     Past Medical History:   Diagnosis Date    CKD  (chronic kidney disease) stage 2, GFR 60-89 ml/min 10/21/2010    60 to 80 - have discussed Lisinopril - will consider       Coronary artery disease 01/26/2024    Dementia (H)     Depressive disorder     Diverticulosis of large intestine without hemorrhage 11/18/2015    Incidental finding on CT scan       Heart disease 05/08/2018    Hypercholesterolemia     Hypertension goal BP (blood pressure) < 140/90     Nonspecific abnormal electrocardiogram (ECG) (EKG) 08/23/2007    Stress testing normal.     Paroxysmal atrial fibrillation (H) 03/17/2024    Sinus node dysfunction (H) 05/23/2024    Sleep apnea     uses a c-pap, severe    Status post coronary angiogram 01/26/2024        PAST SURGICAL HISTORY:     Past Surgical History:   Procedure Laterality Date    COLONOSCOPY N/A 12/22/2020    Procedure: COLONOSCOPY, WITH POLYPECTOMY, CLIP;  Surgeon: Mihir Lang MD;  Location: [facility] OR    COLONOSCOPY N/A 1/10/2022    Procedure: COLONOSCOPY, WITH POLYPECTOMY AND BIOPSY;  Surgeon: Mihir Lang MD;  Location:  GI    CV CORONARY ANGIOGRAM N/A 1/26/2024    Procedure: Coronary Angiogram;  Surgeon: Elton Wright MD;  Location:  HEART CARDIAC CATH LAB    CV CORONARY LITHOTRIPSY PCI N/A 3/8/2024    Procedure: Percutaneous Coronary Intervention - Lithotripsy;  Surgeon: Elton Wright MD;  Location:  HEART CARDIAC CATH LAB    CV INSTANTANEOUS WAVE-FREE RATIO N/A 1/26/2024    Procedure: Instantaneous Wave-Free Ratio;  Surgeon: Elton Wright MD;  Location:  HEART CARDIAC CATH LAB    CV INTRAVASULAR ULTRASOUND N/A 3/8/2024    Procedure: Intravascular Ultrasound;  Surgeon: Elton Wright MD;  Location: [facility] CARDIAC CATH LAB    CV LEFT ATRIAL APPENDAGE CLOSURE N/A 1/23/2025    Procedure: Left Atrial Appendage Closure;  Surgeon: Elton Wright MD;  Location: [facility] OR    CV PCI N/A 3/8/2024    Procedure: Percutaneous Coronary Intervention;  Surgeon: Elton Wright MD;  Location:  HEART CARDIAC CATH  LAB    CV RIGHT HEART CATH MEASUREMENTS RECORDED N/A 1/26/2024    Procedure: Right Heart Catheterization;  Surgeon: Elton Wright MD;  Location:  HEART CARDIAC CATH LAB    EP ABLATION PULMONARY VEIN ISOLATION N/A 5/30/2024    Procedure: Ablation Atrial Fibrillation;  Surgeon: Sandee Sheldon MD;  Location: [facility] CATH LAB CV    EP LEFT ATRIAL APPENDAGE CLOSURE N/A 1/23/2025    Procedure: Left Atrial Appendage Closure;  Surgeon: Eltno Wright MD;  Location: [facility] OR    ESOPHAGOSCOPY, GASTROSCOPY, DUODENOSCOPY (EGD), COMBINED N/A 1/28/2021    Procedure: ESOPHAGOGASTRODUODENOSCOPY, WITH BIOPSY;  Surgeon: Mihir Lang MD;  Location: [facility] OR    OPEN REDUCTION INTERNAL FIXATION FOOT Left 1/5/2023    Procedure: LISFRANC OPEN REDUCTION INTERNAL FIXATION, LEFT FOOT;  Surgeon: Bailey Ruiz DPM;  Location:  OR    SURGICAL HISTORY OF -       surgery on left index finger        CURRENT MEDICATIONS:     Current Outpatient Medications   Medication Sig Dispense Refill    aspirin 81 MG EC tablet Take 1 tablet (81 mg) by mouth daily.      atorvastatin (LIPITOR) 40 MG tablet Take 1.5 tablets (60 mg) by mouth daily. 135 tablet 3    busPIRone (BUSPAR) 10 MG tablet Take 2 tablets (20 mg) by mouth 2 times daily 360 tablet 4    Cholecalciferol (VITAMIN D PO) Take 5,000 Units by mouth daily One tablet twice daily      clopidogrel (PLAVIX) 75 MG tablet Take a loading dose of 4 tablets (300 mg) on the first day then decrease to 1 tablet (75 mg) daily going forward 94 tablet 3    co-enzyme Q-10 100 MG CAPS capsule Take 100 mg by mouth daily      hydroCHLOROthiazide (HYDRODIURIL) 25 MG tablet Take 1 tablet (25 mg) by mouth daily. 90 tablet 3    hydrOXYzine HCl (ATARAX) 10 MG tablet Take 1 tablet (10 mg) by mouth 2 times daily as needed for anxiety 180 tablet 5    insulin pen needle (32G X 6 MM) 32G X 6 MM miscellaneous Use 100 pen needles daily or as directed. 100 each 3    lisinopril (ZESTRIL) 20 MG tablet Take  1 tablet (20 mg) by mouth daily 90 tablet 3    metFORMIN (GLUCOPHAGE XR) 500 MG 24 hr tablet Take 1 tablet (500 mg) by mouth daily (with dinner). 90 tablet 3    metoprolol tartrate (LOPRESSOR) 25 MG tablet Take 1 tablet (25 mg) by mouth every 8 hours as needed (Atrial fibrillation with RVR, HR > 120) 30 tablet 1    MULTI VITAMIN MENS PO Take 1 tablet by mouth daily      pramipexole (MIRAPEX) 0.25 MG tablet Take 1 tablet (0.25 mg) by mouth at bedtime. 90 tablet 2    Semaglutide, 2 MG/DOSE, (OZEMPIC) 8 MG/3ML pen Inject 2 mg subcutaneously every 7 days.      sildenafil (VIAGRA) 100 MG tablet Take 1 tablet (100 mg) by mouth daily as needed. 20 tablet 4        ALLERGIES:   No Known Allergies     FAMILY HISTORY:     Family History   Problem Relation Age of Onset    Cancer Mother         uterine    Other Cancer Mother         endometrial    Cerebrovascular Disease Mother     Substance Abuse Mother         Alcohol    C.A.D. Father     Hypertension Father     Hyperlipidemia Father     Breast Cancer Maternal Grandmother     Other Cancer Maternal Grandmother         Lung/brain    Substance Abuse Paternal Grandfather     Hypertension Brother     Substance Abuse Brother         Alcohol    Hyperlipidemia Brother     Breast Cancer Other     Breast Cancer Cousin     Other Cancer Other     Cerebrovascular Disease Other         Maternal Aunt    Glaucoma No family hx of     Macular Degeneration No family hx of         SOCIAL HISTORY:     Social History     Socioeconomic History    Marital status:      Spouse name: Luzma    Number of children: 3    Years of education: 14   Occupational History    Occupation: [employer]     Employer: OTHER     Comment:    Tobacco Use    Smoking status: Former     Current packs/day: 0.00     Average packs/day: 1 pack/day for 25.0 years (25.0 ttl pk-yrs)     Types: Cigarettes, Cigars     Start date: 1973     Quit date: 1998     Years since quittin.0     Passive  exposure: Past    Smokeless tobacco: Never    Tobacco comments:     N/A not a smoker   Vaping Use    Vaping status: Never Used   Substance and Sexual Activity    Alcohol use: Yes     Alcohol/week: 10.0 standard drinks of alcohol     Comment: less than 6 per week    Drug use: Not Currently     Types: Amphetamines     Comment: Kratum    Sexual activity: Not Currently     Partners: Female     Birth control/protection: Female Surgical, None     Comment:    Other Topics Concern    Parent/sibling w/ CABG, MI or angioplasty before 65F 55M? Yes     Comment: Father age 50 bypass. Grandfather death in age 40 s heart     Social Drivers of Health     Financial Resource Strain: Low Risk  (3/19/2024)    Financial Resource Strain     Within the past 12 months, have you or your family members you live with been unable to get utilities (heat, electricity) when it was really needed?: No   Food Insecurity: High Risk (3/19/2024)    Food Insecurity     Within the past 12 months, did you worry that your food would run out before you got money to buy more?: No     Within the past 12 months, did the food you bought just not last and you didn t have money to get more?: Yes   Transportation Needs: Low Risk  (3/19/2024)    Transportation Needs     Within the past 12 months, has lack of transportation kept you from medical appointments, getting your medicines, non-medical meetings or appointments, work, or from getting things that you need?: No   Physical Activity: Inactive (3/19/2024)    Exercise Vital Sign     Days of Exercise per Week: 0 days     Minutes of Exercise per Session: 0 min   Stress: No Stress Concern Present (3/19/2024)    [employer] - Occupational Stress Questionnaire     Feeling of Stress : Not at all   Social Connections: Unknown (3/19/2024)    Social Connection and Isolation Panel [NHANES]     Frequency of Social Gatherings with Friends and Family: Once a week   Recent Concern: Social  Connections - Moderately Isolated (3/19/2024)    Social Connection and Isolation Panel [NHANES]     Frequency of Communication with Friends and Family: Three times a week     Frequency of Social Gatherings with Friends and Family: Once a week     Attends Zoroastrian Services: Never     Active Member of Clubs or Organizations: No     Attends Club or Organization Meetings: Never     Marital Status:    Interpersonal Safety: Low Risk  (1/23/2025)    Interpersonal Safety     Do you feel physically and emotionally safe where you currently live?: Yes     Within the past 12 months, have you been hit, slapped, kicked or otherwise physically hurt by someone?: No     Within the past 12 months, have you been humiliated or emotionally abused in other ways by your partner or ex-partner?: No   Housing Stability: Low Risk  (3/19/2024)    Housing Stability     Do you have housing? : Yes     Are you worried about losing your housing?: No        REVIEW OF SYSTEMS:     Constitutional: No fevers or chills  Skin: No new rash or itching  Eyes: No acute change in vision  Ears/Nose/Throat: No purulent rhinorrhea, new hearing loss, or new vertigo  Respiratory: No cough or hemoptysis  Cardiovascular: See HPI  Gastrointestinal: No change in appetite, vomiting, hematemesis or diarrhea  Genitourinary: No dysuria or hematuria  Musculoskeletal: No new back pain, neck pain or muscle pain  Neurologic: No new headaches, focal weakness or behavior changes  Psychiatric: No hallucinations, excessive alcohol consumption or illegal drug usage  Hematologic/Lymphatic/Immunologic: No bleeding, chills, fever, night sweats or weight loss  Endocrine: No new cold intolerance, heat intolerance, polyphagia, polydipsia or polyuria      PHYSICAL EXAMINATION:     /66 (BP Location: Right arm, Patient Position: Chair, Cuff Size: Adult Large)   Pulse 56   Wt (!) 153 kg (337 lb 6.4 oz)   SpO2 97%   BMI 47.06 kg/m      GENERAL: No acute distress.  HEENT:  EOMI. Sclerae white, not injected. Nares clear. Pharynx without erythema or exudate.   Neck: No adenopathy. No thyromegaly. No jugular venous distension.   Heart: Regular rate and rhythm. No murmur.   Lungs: Clear to auscultation. No ronchi, wheezes, rales.   Abdomen: Soft, nontender, nondistended. Bowel sounds present.  Extremities: No clubbing, cyanosis, or edema.   Neurologic: Alert and oriented to person/place/time, normal speech and affect. No focal deficits.  Skin: No petechiae, purpura or rash.     LABORATORY DATA:     LIPID RESULTS:  Lab Results   Component Value Date    CHOL 163 09/11/2024    CHOL 161 04/23/2021    HDL 48 09/11/2024    HDL 79 04/23/2021    LDL 87 09/11/2024    LDL 65 04/23/2021    TRIG 140 09/11/2024    TRIG 87 04/23/2021    CHOLHDLRATIO 3.1 07/13/2015       LIVER ENZYME RESULTS:  Lab Results   Component Value Date    AST 22 09/11/2024    AST 15 08/02/2020    ALT 18 09/11/2024    ALT 27 08/02/2020       CBC RESULTS:  Lab Results   Component Value Date    WBC 7.3 01/22/2025    WBC 7.1 12/15/2020    RBC 4.67 01/22/2025    RBC 5.24 12/15/2020    HGB 13.4 01/22/2025    HGB 15.7 12/15/2020    HCT 40.5 01/22/2025    HCT 47.0 12/15/2020    MCV 87 01/22/2025    MCV 90 12/15/2020    MCH 28.7 01/22/2025    MCH 30.0 12/15/2020    MCHC 33.1 01/22/2025    MCHC 33.4 12/15/2020    RDW 14.5 01/22/2025    RDW 13.2 12/15/2020     01/22/2025     12/15/2020       BMP RESULTS:  Lab Results   Component Value Date     02/28/2025     08/02/2020    POTASSIUM 3.8 02/28/2025    POTASSIUM 4.1 01/05/2023    POTASSIUM 4.0 08/02/2020    CHLORIDE 103 02/28/2025    CHLORIDE 100 12/29/2022    CHLORIDE 105 08/02/2020    CO2 24 02/28/2025    CO2 30 12/29/2022    CO2 28 08/02/2020    ANIONGAP 10 02/28/2025    ANIONGAP 6 12/29/2022    ANIONGAP 5 08/02/2020     (H) 02/28/2025    GLC 99 01/23/2025     (H) 12/29/2022     (A) 04/21/2021    BUN 20.0 02/28/2025    BUN 17 12/29/2022     BUN 28 08/02/2020    CR 1.19 (H) 02/28/2025    CR 1.17 08/02/2020    GFRESTIMATED 67 02/28/2025    GFRESTIMATED 66 08/02/2020    GFRESTBLACK 77 08/02/2020    SHAHID 9.2 02/28/2025    SHAHID 8.9 08/02/2020        A1C RESULTS:  Lab Results   Component Value Date    A1C 5.4 09/11/2024       INR RESULTS:  Lab Results   Component Value Date    INR 0.94 01/22/2025    INR 0.99 03/22/2024    INR 0.96 08/18/2007        PROCEDURES & FURTHER ASSESSMENTS:     CT heart 2/28/25:  IMPRESSION:  1.  There is a 27 mm Watchman FLX Pro closure device in the left  atrial appendage. It is well-seated without evidence of thrombus on  the left atrial side of the device.  2.  There is no contrast opacification of the left atrial appendage.  3.  Please review the separate Radiology report from the original  study site and date for incidental noncardiac findings.      FINDINGS:   1.  There is a 27 mm Watchman FLX Pro closure device in the left  atrial appendage. It is well-seated.   2.  There is no thrombus on the left atrial side of the closure device  on early or delayed imaging.   3.  There is no contrast opacification of the left atrial appendage,  implying complete endothelialization of the occlusion device.   4.  Normal pulmonary venous anatomy with all pulmonary veins draining  into the left atrium.  5.  Coronary images are non-diagnostic for stenosis/atherosclerosis  due to cardiac motion artifact. There is a stent in the LAD. There are  moderate coronary artery calcifications.  6.  The visualized aortic root, ascending aorta and descending  thoracic aorta are normal..    EKG 2/28/2025        Post procedure TTE:  Interpretation Summary  Limited echo to assess for pericardial effusion after left atrial appendage  closure.  No pericardial effusion.    Intra procedure NESTOR:  PRE-PROCEDURAL SURVEY:  1. The LV function was 55-60% and the RV function was normal.  2. There was no evidence of shunting at the level of the interatrial septum.  3. The  maximum pre-deployment left atrial appendage orifice width was 20.4 mm.  4. There was no intracardiac thrombus.  5. There was a small amount of pericardial fluid adjacent to the right atrium  at the beginning of the procedure.     POST-PROCEDURAL SURVEY:  1. The 27 mm Watchman FLX device was well-seated in the left atrial appendage.  There was adequate compression of the device. There was no evidence of queenie-  device leak.  2. There was no change in the amount of pericardial fluid.  3. There was a small left-right shunt at the site of the transseptal puncture.  4. The biventricular function was unchanged.        CLINICAL IMPRESSION:     Car Torres is a very pleasant 64 year old male who presents for one month Watchman follow-up.    1. Paroxysmal atrial fibrillation:   2. Intolerance to anticoagulation:  Now s/p successful implantation of 27 mm Watchman FLX. Intra-procedure NESTOR showed no evidence of queenie-device leak and no pericardial effusion. Patient noted to have tongue laceration prior to extubation. Seen by ENT who used Afrin, no sutures needed. While seen in PACU, extubated and oral bleeding stopped. Post procedure echo without pericardial effusion. Tongue lac healed, pain resolved. CT with well seated LAAO without leak or thrombus. Plan to continue DAPT for a total of 6 months.   -  ASA 81 mg daily lifelong  - Continue Plavix x 6 months s/p LAAO   - SBE prophylaxis x 6 months post LAAO     3. CAD:   4. Hyperlipidemia:  5. Hypertension, at goal:  History of LAD stent in March 2024. Denies angina, doing well.  - ASA 81 mg lifelong   -Continue Plavix x 6 months LAAO  -Continue statin therapy   -Continue metoprolol, lisinopril and hydrochlorothiazide  -lipid panel pending today     6. CKD:  Kidney function stable, creatinine today 1.19 and GFR 67     7. TIA:  -Continue CPAP     8.  Obesity (no DM II)   - metformin ineffective   -Weight management referral     9.  Depression  -Continue prior to admission  BuSpar and Mirapex      RTC: 4-5 months with me      Enoc Calvo, RN, APRN Student      KLAUS Goel CNP  [facility] Structural [facility] Care     A total of 20 minutes spent face to face with patient and > 50% of the time spent counseling and coordinating care.       CC  Patient Care Team:  Mihir Perez MD as PCP - General (Internal Medicine - Pediatrics)  Taniya Estrada NP as Nurse Practitioner (Psychiatry)  Miguel Varela MD as MD (Neurology)  Mihir Perez MD as Assigned PCP  Beka Santillan DO as Assigned Musculoskeletal Provider  ZANA Fair MD as MD (Cardiovascular Disease)  Elton Wright MD as MD (Cardiovascular Disease)  Soila Millard APRN CNP as Nurse Practitioner (Cardiology)  Sandee Sheldon MD as MD (Cardiovascular Disease)  Kristine Gamez APRN CNP as Nurse Practitioner (Cardiology)  Ruthie Clayton, RACHAEL as Registered Nurse  Bettina Parra Psy.D, LP as Assigned Behavioral Health Provider  Miguel Varela MD as Assigned Neuroscience Provider  Soila Millard APRN CNP as Nurse Practitioner (Cardiology)  Julee Dorado NP as Assigned Heart and Vascular Provider

## 2025-02-28 ENCOUNTER — OFFICE VISIT (OUTPATIENT)
Dept: CARDIOLOGY | Facility: CLINIC | Age: 65
End: 2025-02-28
Attending: NURSE PRACTITIONER
Payer: MEDICARE

## 2025-02-28 ENCOUNTER — LAB (OUTPATIENT)
Dept: LAB | Facility: CLINIC | Age: 65
End: 2025-02-28
Payer: MEDICARE

## 2025-02-28 ENCOUNTER — HOSPITAL ENCOUNTER (OUTPATIENT)
Dept: CT IMAGING | Facility: CLINIC | Age: 65
Discharge: HOME | End: 2025-02-28
Attending: NURSE PRACTITIONER
Payer: MEDICARE

## 2025-02-28 VITALS
HEART RATE: 56 BPM | SYSTOLIC BLOOD PRESSURE: 111 MMHG | WEIGHT: 315 LBS | DIASTOLIC BLOOD PRESSURE: 66 MMHG | OXYGEN SATURATION: 97 % | BODY MASS INDEX: 47.06 KG/M2

## 2025-02-28 DIAGNOSIS — I25.10 CORONARY ARTERY DISEASE INVOLVING NATIVE CORONARY ARTERY OF NATIVE HEART WITHOUT ANGINA PECTORIS: ICD-10-CM

## 2025-02-28 DIAGNOSIS — Z95.818 PRESENCE OF WATCHMAN LEFT ATRIAL APPENDAGE CLOSURE DEVICE: Primary | ICD-10-CM

## 2025-02-28 DIAGNOSIS — I48.0 PAROXYSMAL ATRIAL FIBRILLATION (H): ICD-10-CM

## 2025-02-28 LAB
ANION GAP SERPL CALCULATED.3IONS-SCNC: 10 MMOL/L (ref 7–15)
BUN SERPL-MCNC: 20 MG/DL (ref 8–23)
CALCIUM SERPL-MCNC: 9.2 MG/DL (ref 8.8–10.4)
CHLORIDE SERPL-SCNC: 103 MMOL/L (ref 98–107)
CREAT SERPL-MCNC: 1.19 MG/DL (ref 0.67–1.17)
EGFRCR SERPLBLD CKD-EPI 2021: 67 ML/MIN/1.73M2
GLUCOSE SERPL-MCNC: 110 MG/DL (ref 70–99)
HCO3 SERPL-SCNC: 24 MMOL/L (ref 22–29)
POTASSIUM SERPL-SCNC: 3.8 MMOL/L (ref 3.4–5.3)
SODIUM SERPL-SCNC: 137 MMOL/L (ref 135–145)

## 2025-02-28 PROCEDURE — 3078F DIAST BP <80 MM HG: CPT | Performed by: NURSE PRACTITIONER

## 2025-02-28 PROCEDURE — G0463 HOSPITAL OUTPT CLINIC VISIT: HCPCS | Performed by: NURSE PRACTITIONER

## 2025-02-28 PROCEDURE — 36415 COLL VENOUS BLD VENIPUNCTURE: CPT | Performed by: PATHOLOGY

## 2025-02-28 PROCEDURE — 80061 LIPID PANEL: CPT | Performed by: PATHOLOGY

## 2025-02-28 PROCEDURE — 93005 ELECTROCARDIOGRAM TRACING: CPT

## 2025-02-28 PROCEDURE — 80048 BASIC METABOLIC PNL TOTAL CA: CPT | Performed by: PATHOLOGY

## 2025-02-28 PROCEDURE — 99214 OFFICE O/P EST MOD 30 MIN: CPT | Mod: 25 | Performed by: NURSE PRACTITIONER

## 2025-02-28 PROCEDURE — 1126F AMNT PAIN NOTED NONE PRSNT: CPT | Performed by: NURSE PRACTITIONER

## 2025-02-28 PROCEDURE — 75572 CT HRT W/3D IMAGE: CPT

## 2025-02-28 PROCEDURE — 75572 CT HRT W/3D IMAGE: CPT | Mod: 26 | Performed by: INTERNAL MEDICINE

## 2025-02-28 PROCEDURE — 3074F SYST BP LT 130 MM HG: CPT | Performed by: NURSE PRACTITIONER

## 2025-02-28 PROCEDURE — 250N000011 HC RX IP 250 OP 636: Performed by: NURSE PRACTITIONER

## 2025-02-28 RX ORDER — IOPAMIDOL 755 MG/ML
120 INJECTION, SOLUTION INTRAVASCULAR ONCE
Status: COMPLETED | OUTPATIENT
Start: 2025-02-28 | End: 2025-02-28

## 2025-02-28 RX ADMIN — IOPAMIDOL 120 ML: 755 INJECTION, SOLUTION INTRAVENOUS at 10:50

## 2025-02-28 ASSESSMENT — PAIN SCALES - GENERAL: PAINLEVEL_OUTOF10: NO PAIN (0)

## 2025-02-28 NOTE — LETTER
2/28/2025      RE: Car Torres  [address]  [place] [address]       Dear Colleague,    Thank you for the opportunity to participate in the care of your patient, Car Torres, at the [facility]ART [facility] [place] [facility]. Please see a copy of my visit note below.        STRUCTURAL HEART CARE  CARDIOVASCULAR DIVISION    STRUCTURAL CLINIC RETURN VISIT        PERTINENT CLINICAL HISTORY:     Car Torres is a very pleasant 64 year old male who presents for 6 week Watchman follow-up.He has a history of  CAD s/p PCI to LAD (3/8/24), TIA on CPAP, HTN, HLD and paroxysmal atrial fibrillation s/p PVI ablation (5/30/24) [id number] is 3 (age, CAD, HTN) and HADBLED of 3 (age, meds and bleeding dispo) with intolerance to anticoagulation due to bruising and bleeding easily who underwent 27 mm Watchman LAAO on 1/23/25. Intra procedure NESTOR showed no leak or effusion. Rhythm remained stable.  Patient noted to have tongue laceration prior to extubation. Seen by ENT who used Afrin, no sutures needed. While seen in PACU, extubated and oral bleeding stopped. Patient discharged on ASA and Brilinta.     He was in a significant amount of pain for the first few days due to tongue laceration. He used some hurricane spray but stung and only helped temporarily. They wish they would have had pain medications for the first few days. Pain was 10/10 down to 3/10 now. He still has some numbness at the tip of his tongue. He otherwise feels fine. Had slight pain at right puncture site last night, may have overdone. Feels better this morning. Otherwise no chest pain, SOB, orthopnea, PND, leg swelling. No lightheadedness, palpitations, syncope. Very interested in weight loss.  Metformin has been ineffective for weight loss. Would like weight management referral. Brilinta switched to plavix due to cost.     Interval History 2/28/2025  Reports feeling well. Denies any  lightheadedness, dizziness, chest pain or pressure, palpitations, or shortness of breath. No issues with bleeding. Says his tongue feels much better, and is no longer numb. Taking all medications as prescribed. Is anxious to be able to stop taking the blood thinner.      PAST MEDICAL HISTORY:     Past Medical History:   Diagnosis Date     CKD (chronic kidney disease) stage 2, GFR 60-89 ml/min 10/21/2010    60 to 80 - have discussed Lisinopril - will consider        Coronary artery disease 01/26/2024     Dementia (H)      Depressive disorder      Diverticulosis of large intestine without hemorrhage 11/18/2015    Incidental finding on CT scan        Heart disease 05/08/2018     Hypercholesterolemia      Hypertension goal BP (blood pressure) < 140/90      Nonspecific abnormal electrocardiogram (ECG) (EKG) 08/23/2007    Stress testing normal.      Paroxysmal atrial fibrillation (H) 03/17/2024     Sinus node dysfunction (H) 05/23/2024     Sleep apnea     uses a c-pap, severe     Status post coronary angiogram 01/26/2024        PAST SURGICAL HISTORY:     Past Surgical History:   Procedure Laterality Date     COLONOSCOPY N/A 12/22/2020    Procedure: COLONOSCOPY, WITH POLYPECTOMY, CLIP;  Surgeon: Mihir Lang MD;  Location: [facility] OR     COLONOSCOPY N/A 1/10/2022    Procedure: COLONOSCOPY, WITH POLYPECTOMY AND BIOPSY;  Surgeon: Mihir Lang MD;  Location:  GI     CV CORONARY ANGIOGRAM N/A 1/26/2024    Procedure: Coronary Angiogram;  Surgeon: Elton Wright MD;  Location: [facility] CARDIAC CATH LAB     CV CORONARY LITHOTRIPSY PCI N/A 3/8/2024    Procedure: Percutaneous Coronary Intervention - Lithotripsy;  Surgeon: Elton Wright MD;  Location: [facility] CARDIAC CATH LAB     CV INSTANTANEOUS WAVE-FREE RATIO N/A 1/26/2024    Procedure: Instantaneous Wave-Free Ratio;  Surgeon: Elton Wright MD;  Location: [facility] CARDIAC CATH LAB     CV INTRAVASULAR ULTRASOUND N/A 3/8/2024    Procedure: Intravascular  Ultrasound;  Surgeon: Elton Wright MD;  Location:  HEART CARDIAC CATH LAB     CV LEFT ATRIAL APPENDAGE CLOSURE N/A 1/23/2025    Procedure: Left Atrial Appendage Closure;  Surgeon: Elton Wright MD;  Location: [facility] OR     CV PCI N/A 3/8/2024    Procedure: Percutaneous Coronary Intervention;  Surgeon: Elton Wright MD;  Location:  HEART CARDIAC CATH LAB     CV RIGHT HEART CATH MEASUREMENTS RECORDED N/A 1/26/2024    Procedure: Right Heart Catheterization;  Surgeon: Elton Wright MD;  Location:  HEART CARDIAC CATH LAB     EP ABLATION PULMONARY VEIN ISOLATION N/A 5/30/2024    Procedure: Ablation Atrial Fibrillation;  Surgeon: Sandee Sheldon MD;  Location: [facility] CATH LAB CV     EP LEFT ATRIAL APPENDAGE CLOSURE N/A 1/23/2025    Procedure: Left Atrial Appendage Closure;  Surgeon: Elton Wright MD;  Location: [facility] OR     ESOPHAGOSCOPY, GASTROSCOPY, DUODENOSCOPY (EGD), COMBINED N/A 1/28/2021    Procedure: ESOPHAGOGASTRODUODENOSCOPY, WITH BIOPSY;  Surgeon: Mihir Lang MD;  Location: [facility] OR     OPEN REDUCTION INTERNAL FIXATION FOOT Left 1/5/2023    Procedure: LISFRANC OPEN REDUCTION INTERNAL FIXATION, LEFT FOOT;  Surgeon: Bailey Ruiz DPM;  Location:  OR     SURGICAL HISTORY OF -       surgery on left index finger        CURRENT MEDICATIONS:     Current Outpatient Medications   Medication Sig Dispense Refill     aspirin 81 MG EC tablet Take 1 tablet (81 mg) by mouth daily.       atorvastatin (LIPITOR) 40 MG tablet Take 1.5 tablets (60 mg) by mouth daily. 135 tablet 3     busPIRone (BUSPAR) 10 MG tablet Take 2 tablets (20 mg) by mouth 2 times daily 360 tablet 4     Cholecalciferol (VITAMIN D PO) Take 5,000 Units by mouth daily One tablet twice daily       clopidogrel (PLAVIX) 75 MG tablet Take a loading dose of 4 tablets (300 mg) on the first day then decrease to 1 tablet (75 mg) daily going forward 94 tablet 3     co-enzyme Q-10 100 MG CAPS capsule Take 100 mg by mouth  daily       hydroCHLOROthiazide (HYDRODIURIL) 25 MG tablet Take 1 tablet (25 mg) by mouth daily. 90 tablet 3     hydrOXYzine HCl (ATARAX) 10 MG tablet Take 1 tablet (10 mg) by mouth 2 times daily as needed for anxiety 180 tablet 5     insulin pen needle (32G X 6 MM) 32G X 6 MM miscellaneous Use 100 pen needles daily or as directed. 100 each 3     lisinopril (ZESTRIL) 20 MG tablet Take 1 tablet (20 mg) by mouth daily 90 tablet 3     metFORMIN (GLUCOPHAGE XR) 500 MG 24 hr tablet Take 1 tablet (500 mg) by mouth daily (with dinner). 90 tablet 3     metoprolol tartrate (LOPRESSOR) 25 MG tablet Take 1 tablet (25 mg) by mouth every 8 hours as needed (Atrial fibrillation with RVR, HR > 120) 30 tablet 1     MULTI VITAMIN MENS PO Take 1 tablet by mouth daily       pramipexole (MIRAPEX) 0.25 MG tablet Take 1 tablet (0.25 mg) by mouth at bedtime. 90 tablet 2     Semaglutide, 2 MG/DOSE, (OZEMPIC) 8 MG/3ML pen Inject 2 mg subcutaneously every 7 days.       sildenafil (VIAGRA) 100 MG tablet Take 1 tablet (100 mg) by mouth daily as needed. 20 tablet 4        ALLERGIES:   No Known Allergies     FAMILY HISTORY:     Family History   Problem Relation Age of Onset     Cancer Mother         uterine     Other Cancer Mother         endometrial     Cerebrovascular Disease Mother      Substance Abuse Mother         Alcohol     C.A.D. Father      Hypertension Father      Hyperlipidemia Father      Breast Cancer Maternal Grandmother      Other Cancer Maternal Grandmother         Lung/brain     Substance Abuse Paternal Grandfather      Hypertension Brother      Substance Abuse Brother         Alcohol     Hyperlipidemia Brother      Breast Cancer Other      Breast Cancer Cousin      Other Cancer Other      Cerebrovascular Disease Other         Maternal Aunt     Glaucoma No family hx of      Macular Degeneration No family hx of         SOCIAL HISTORY:     Social History     Socioeconomic History     Marital status:      Spouse name:  Luzma     Number of children: 3     Years of education: 14   Occupational History     Occupation: [employer]     Employer: OTHER     Comment:    Tobacco Use     Smoking status: Former     Current packs/day: 0.00     Average packs/day: 1 pack/day for 25.0 years (25.0 ttl pk-yrs)     Types: Cigarettes, Cigars     Start date: 1973     Quit date: 1998     Years since quittin.0     Passive exposure: Past     Smokeless tobacco: Never     Tobacco comments:     N/A not a smoker   Vaping Use     Vaping status: Never Used   Substance and Sexual Activity     Alcohol use: Yes     Alcohol/week: 10.0 standard drinks of alcohol     Comment: less than 6 per week     Drug use: Not Currently     Types: Amphetamines     Comment: Kratum     Sexual activity: Not Currently     Partners: Female     Birth control/protection: Female Surgical, None     Comment:    Other Topics Concern     Parent/sibling w/ CABG, MI or angioplasty before 65F 55M? Yes     Comment: Father age 50 bypass. Grandfather death in age 40 s heart     Social Drivers of Health     Financial Resource Strain: Low Risk  (3/19/2024)    Financial Resource Strain      Within the past 12 months, have you or your family members you live with been unable to get utilities (heat, electricity) when it was really needed?: No   Food Insecurity: High Risk (3/19/2024)    Food Insecurity      Within the past 12 months, did you worry that your food would run out before you got money to buy more?: No      Within the past 12 months, did the food you bought just not last and you didn t have money to get more?: Yes   Transportation Needs: Low Risk  (3/19/2024)    Transportation Needs      Within the past 12 months, has lack of transportation kept you from medical appointments, getting your medicines, non-medical meetings or appointments, work, or from getting things that you need?: No   Physical Activity: Inactive (3/19/2024)    Exercise Vital Sign      Days  of Exercise per Week: 0 days      Minutes of Exercise per Session: 0 min   Stress: No Stress Concern Present (3/19/2024)    [employer] - Occupational Stress Questionnaire      Feeling of Stress : Not at all   Social Connections: Unknown (3/19/2024)    Social Connection and Isolation Panel [NHANES]      Frequency of Social Gatherings with Friends and Family: Once a week   Recent Concern: Social Connections - Moderately Isolated (3/19/2024)    Social Connection and Isolation Panel [NHANES]      Frequency of Communication with Friends and Family: Three times a week      Frequency of Social Gatherings with Friends and Family: Once a week      Attends Jainism Services: Never      Active Member of Clubs or Organizations: No      Attends Club or Organization Meetings: Never      Marital Status:    Interpersonal Safety: Low Risk  (1/23/2025)    Interpersonal Safety      Do you feel physically and emotionally safe where you currently live?: Yes      Within the past 12 months, have you been hit, slapped, kicked or otherwise physically hurt by someone?: No      Within the past 12 months, have you been humiliated or emotionally abused in other ways by your partner or ex-partner?: No   Housing Stability: Low Risk  (3/19/2024)    Housing Stability      Do you have housing? : Yes      Are you worried about losing your housing?: No        REVIEW OF SYSTEMS:     Constitutional: No fevers or chills  Skin: No new rash or itching  Eyes: No acute change in vision  Ears/Nose/Throat: No purulent rhinorrhea, new hearing loss, or new vertigo  Respiratory: No cough or hemoptysis  Cardiovascular: See HPI  Gastrointestinal: No change in appetite, vomiting, hematemesis or diarrhea  Genitourinary: No dysuria or hematuria  Musculoskeletal: No new back pain, neck pain or muscle pain  Neurologic: No new headaches, focal weakness or behavior changes  Psychiatric: No hallucinations, excessive alcohol consumption  or illegal drug usage  Hematologic/Lymphatic/Immunologic: No bleeding, chills, fever, night sweats or weight loss  Endocrine: No new cold intolerance, heat intolerance, polyphagia, polydipsia or polyuria      PHYSICAL EXAMINATION:     /66 (BP Location: Right arm, Patient Position: Chair, Cuff Size: Adult Large)   Pulse 56   Wt (!) 153 kg (337 lb 6.4 oz)   SpO2 97%   BMI 47.06 kg/m      GENERAL: No acute distress.  HEENT: EOMI. Sclerae white, not injected. Nares clear. Pharynx without erythema or exudate.   Neck: No adenopathy. No thyromegaly. No jugular venous distension.   Heart: Regular rate and rhythm. No murmur.   Lungs: Clear to auscultation. No ronchi, wheezes, rales.   Abdomen: Soft, nontender, nondistended. Bowel sounds present.  Extremities: No clubbing, cyanosis, or edema.   Neurologic: Alert and oriented to person/place/time, normal speech and affect. No focal deficits.  Skin: No petechiae, purpura or rash.     LABORATORY DATA:     LIPID RESULTS:  Lab Results   Component Value Date    CHOL 163 09/11/2024    CHOL 161 04/23/2021    HDL 48 09/11/2024    HDL 79 04/23/2021    LDL 87 09/11/2024    LDL 65 04/23/2021    TRIG 140 09/11/2024    TRIG 87 04/23/2021    CHOLHDLRATIO 3.1 07/13/2015       LIVER ENZYME RESULTS:  Lab Results   Component Value Date    AST 22 09/11/2024    AST 15 08/02/2020    ALT 18 09/11/2024    ALT 27 08/02/2020       CBC RESULTS:  Lab Results   Component Value Date    WBC 7.3 01/22/2025    WBC 7.1 12/15/2020    RBC 4.67 01/22/2025    RBC 5.24 12/15/2020    HGB 13.4 01/22/2025    HGB 15.7 12/15/2020    HCT 40.5 01/22/2025    HCT 47.0 12/15/2020    MCV 87 01/22/2025    MCV 90 12/15/2020    MCH 28.7 01/22/2025    MCH 30.0 12/15/2020    MCHC 33.1 01/22/2025    MCHC 33.4 12/15/2020    RDW 14.5 01/22/2025    RDW 13.2 12/15/2020     01/22/2025     12/15/2020       BMP RESULTS:  Lab Results   Component Value Date     02/28/2025     08/02/2020    POTASSIUM 3.8  02/28/2025    POTASSIUM 4.1 01/05/2023    POTASSIUM 4.0 08/02/2020    CHLORIDE 103 02/28/2025    CHLORIDE 100 12/29/2022    CHLORIDE 105 08/02/2020    CO2 24 02/28/2025    CO2 30 12/29/2022    CO2 28 08/02/2020    ANIONGAP 10 02/28/2025    ANIONGAP 6 12/29/2022    ANIONGAP 5 08/02/2020     (H) 02/28/2025    GLC 99 01/23/2025     (H) 12/29/2022     (A) 04/21/2021    BUN 20.0 02/28/2025    BUN 17 12/29/2022    BUN 28 08/02/2020    CR 1.19 (H) 02/28/2025    CR 1.17 08/02/2020    GFRESTIMATED 67 02/28/2025    GFRESTIMATED 66 08/02/2020    GFRESTBLACK 77 08/02/2020    SHAHID 9.2 02/28/2025    SHAHID 8.9 08/02/2020        A1C RESULTS:  Lab Results   Component Value Date    A1C 5.4 09/11/2024       INR RESULTS:  Lab Results   Component Value Date    INR 0.94 01/22/2025    INR 0.99 03/22/2024    INR 0.96 08/18/2007        PROCEDURES & FURTHER ASSESSMENTS:     CT heart 2/28/25:  IMPRESSION:  1.  There is a 27 mm Watchman FLX Pro closure device in the left  atrial appendage. It is well-seated without evidence of thrombus on  the left atrial side of the device.  2.  There is no contrast opacification of the left atrial appendage.  3.  Please review the separate Radiology report from the original  study site and date for incidental noncardiac findings.      FINDINGS:   1.  There is a 27 mm Watchman FLX Pro closure device in the left  atrial appendage. It is well-seated.   2.  There is no thrombus on the left atrial side of the closure device  on early or delayed imaging.   3.  There is no contrast opacification of the left atrial appendage,  implying complete endothelialization of the occlusion device.   4.  Normal pulmonary venous anatomy with all pulmonary veins draining  into the left atrium.  5.  Coronary images are non-diagnostic for stenosis/atherosclerosis  due to cardiac motion artifact. There is a stent in the LAD. There are  moderate coronary artery calcifications.  6.  The visualized aortic root,  ascending aorta and descending  thoracic aorta are normal..    EKG 2/28/2025        Post procedure TTE:  Interpretation Summary  Limited echo to assess for pericardial effusion after left atrial appendage  closure.  No pericardial effusion.    Intra procedure NESTOR:  PRE-PROCEDURAL SURVEY:  1. The LV function was 55-60% and the RV function was normal.  2. There was no evidence of shunting at the level of the interatrial septum.  3. The maximum pre-deployment left atrial appendage orifice width was 20.4 mm.  4. There was no intracardiac thrombus.  5. There was a small amount of pericardial fluid adjacent to the right atrium  at the beginning of the procedure.     POST-PROCEDURAL SURVEY:  1. The 27 mm Watchman FLX device was well-seated in the left atrial appendage.  There was adequate compression of the device. There was no evidence of queenie-  device leak.  2. There was no change in the amount of pericardial fluid.  3. There was a small left-right shunt at the site of the transseptal puncture.  4. The biventricular function was unchanged.        CLINICAL IMPRESSION:     Car Torres is a very pleasant 64 year old male who presents for one month Watchman follow-up.    1. Paroxysmal atrial fibrillation:   2. Intolerance to anticoagulation:  Now s/p successful implantation of 27 mm Watchman FLX. Intra-procedure NESTOR showed no evidence of queenie-device leak and no pericardial effusion. Patient noted to have tongue laceration prior to extubation. Seen by ENT who used Afrin, no sutures needed. While seen in PACU, extubated and oral bleeding stopped. Post procedure echo without pericardial effusion. Tongue lac healed, pain resolved. CT with well seated LAAO without leak or thrombus. Plan to continue DAPT for a total of 6 months.   -  ASA 81 mg daily lifelong  - Continue Plavix x 6 months s/p LAAO   - SBE prophylaxis x 6 months post LAAO     3. CAD:   4. Hyperlipidemia:  5. Hypertension, at goal:  History of LAD stent in  March 2024. Denies angina, doing well.  - ASA 81 mg lifelong   -Continue Plavix x 6 months LAAO  -Continue statin therapy   -Continue metoprolol, lisinopril and hydrochlorothiazide  -lipid panel pending today     6. CKD:  Kidney function stable, creatinine today 1.19 and GFR 67     7. TIA:  -Continue CPAP     8.  Obesity (no DM II)   - metformin ineffective   -Weight management referral     9.  Depression  -Continue prior to admission BuSpar and Mirapex      RTC: 4-5 months with me      Enoc Calvo, RN, APRN Student      KLAUS Goel, CNP  [facility] Structural [facility]     A total of 20 minutes spent face to face with patient and > 50% of the time spent counseling and coordinating care.       CC  Patient Care Team:  Mihir Perez MD as PCP - General (Internal Medicine - Pediatrics)  Taniya Estrada NP as Nurse Practitioner (Psychiatry)  Miguel Varela MD as MD (Neurology)  Mihir Perez MD as Assigned PCP  Beka Santillan DO as Assigned Musculoskeletal Provider  ZANA Fair MD as MD (Cardiovascular Disease)  Elton Wright MD as MD (Cardiovascular Disease)  Soila Millard APRN CNP as Nurse Practitioner (Cardiology)  Sandee Sheldon MD as MD (Cardiovascular Disease)  Kristine Gamez APRN CNP as Nurse Practitioner (Cardiology)  Ruthie Clayton, RACHAEL as Registered Nurse  Bettina Parra Psy.D, LP as Assigned Behavioral Health Provider  Miguel Varela MD as Assigned Neuroscience Provider  Soila Millard APRN CNP as Nurse Practitioner (Cardiology)  Julee Dorado NP as Assigned Heart and Vascular Provider      Please do not hesitate to contact me if you have any questions/concerns.     Sincerely,     Julee Dorado NP

## 2025-02-28 NOTE — NURSING NOTE
Chief Complaint   Patient presents with    Follow Up     RETURN LAAO - 1 mon s/p LAAO     Vitals were taken, medications reconciled, and EKG was performed.    PORSHA Guthrie  2:24 PM

## 2025-02-28 NOTE — PATIENT INSTRUCTIONS
You were seen today in the Cardiovascular Clinic at the [facility] by:       KLAUS CAICEDO CNP     Your visit summary and instructions are as follows:    Continue aspirin 81 mg daily lifelong.  Continue  Plavix x 6 months from watchman   Delay any nonurgent dental procedures for the first 6 month post watchman - let me know if you need something done and I will in antibiotics .  See weight management for weight loss options   Cancel appt with Soila Millard on 3/18 and follow-up with me in July 2025    Prevention of Infective (Bacterial) Endocarditis:  You are at increased risk for developing adverse outcomes from infective endocarditis (IE), also known as bacterial endocarditis (BE) because of the new device in your heart. The guidelines for prevention of IE are to give patients antibiotics prior to any dental procedures that involve manipulation of gingival tissue or the periapical region of teeth, or perforation of the oral mucosa:      It is recommended to take Amoxicillin 2 gm by mouth as a single dose 30 to 60 minutes before procedure.     OR if allergic to Penicillin or Ampicillin:     Cephalexin 2 gm by mouth, or  Clindamycin 600 mg by mouth, or  Azithromycin or Clarithromycin 500 mg PO       Thank you for your visit today!      Please MyChart message me or call my nurse if you have any questions or concerns.     During Business Hours:   [phone], Structural Heart  Janki Pedraza     After hours, weekends or holidays:   [phone], Option #4  Ask to speak to the On-Call Cardiologist. Inform them you are a cardiology patient at the [place].

## 2025-03-01 LAB
CHOLEST SERPL-MCNC: 153 MG/DL
HDLC SERPL-MCNC: 54 MG/DL
LDLC SERPL CALC-MCNC: 66 MG/DL
NONHDLC SERPL-MCNC: 99 MG/DL
TRIGL SERPL-MCNC: 165 MG/DL

## 2025-03-02 ENCOUNTER — MYC MEDICAL ADVICE (OUTPATIENT)
Dept: FAMILY MEDICINE | Facility: CLINIC | Age: 65
End: 2025-03-02
Payer: MEDICARE

## 2025-03-03 ENCOUNTER — VIRTUAL VISIT (OUTPATIENT)
Dept: ENDOCRINOLOGY | Facility: CLINIC | Age: 65
End: 2025-03-03
Payer: MEDICARE

## 2025-03-03 VITALS — HEIGHT: 71 IN | BODY MASS INDEX: 44.1 KG/M2 | WEIGHT: 315 LBS

## 2025-03-03 DIAGNOSIS — R06.09 DOE (DYSPNEA ON EXERTION): ICD-10-CM

## 2025-03-03 DIAGNOSIS — G47.33 OSA (OBSTRUCTIVE SLEEP APNEA): ICD-10-CM

## 2025-03-03 DIAGNOSIS — E66.01 CLASS 3 SEVERE OBESITY WITH SERIOUS COMORBIDITY AND BODY MASS INDEX (BMI) OF 45.0 TO 49.9 IN ADULT, UNSPECIFIED OBESITY TYPE (H): ICD-10-CM

## 2025-03-03 DIAGNOSIS — E66.813 CLASS 3 SEVERE OBESITY WITH SERIOUS COMORBIDITY AND BODY MASS INDEX (BMI) OF 45.0 TO 49.9 IN ADULT, UNSPECIFIED OBESITY TYPE (H): ICD-10-CM

## 2025-03-03 LAB
ATRIAL RATE - MUSE: 56 BPM
DIASTOLIC BLOOD PRESSURE - MUSE: NORMAL MMHG
INTERPRETATION ECG - MUSE: NORMAL
P AXIS - MUSE: 50 DEGREES
PR INTERVAL - MUSE: 140 MS
QRS DURATION - MUSE: 114 MS
QT - MUSE: 432 MS
QTC - MUSE: 416 MS
R AXIS - MUSE: 12 DEGREES
SYSTOLIC BLOOD PRESSURE - MUSE: NORMAL MMHG
T AXIS - MUSE: 33 DEGREES
VENTRICULAR RATE- MUSE: 56 BPM

## 2025-03-03 PROCEDURE — 98002 SYNCH AUDIO-VIDEO NEW MOD 45: CPT | Performed by: INTERNAL MEDICINE

## 2025-03-03 PROCEDURE — 1126F AMNT PAIN NOTED NONE PRSNT: CPT | Mod: 95 | Performed by: INTERNAL MEDICINE

## 2025-03-03 RX ORDER — TOPIRAMATE 25 MG/1
TABLET, FILM COATED ORAL
Qty: 90 TABLET | Refills: 5 | Status: SHIPPED | OUTPATIENT
Start: 2025-03-03

## 2025-03-03 RX ORDER — PHENTERMINE HYDROCHLORIDE 15 MG/1
15 CAPSULE ORAL EVERY MORNING
Qty: 30 CAPSULE | Refills: 5 | Status: SHIPPED | OUTPATIENT
Start: 2025-03-03

## 2025-03-03 ASSESSMENT — PAIN SCALES - GENERAL: PAINLEVEL_OUTOF10: NO PAIN (0)

## 2025-03-03 NOTE — LETTER
"3/3/2025       RE: Car Torres  [address]  [place] [address]     Dear Colleague,    Thank you for referring your patient, Car Torres, to the [facility]NA[facility]. Please see a copy of my visit note below.    Virtual Visit Details    Your visit has ended  Joined the call at 3/3/2025, 8:33:42 am.  Left the call at 3/3/2025, 9:08:09 am    Originating Location (pt. Location): Home    Distant Location (provider location):  Off-site  Platform used for Video Visit: [facility] Medical Weight Management Consult    PATIENT:  Car Torres  MRN:         [id number]  :         1960  FAINA:         3/3/2025    Dear Mihir Perez MD,    I had the pleasure of seeing your patient, Car Torres.  Full intake/assessment done to determine barriers to weight loss success and develop a treatment plan.  Car Torres is a 64 year old male interested in treatment of medical problems associated with weight.  His weight today is 337 lbs 0 oz, Body mass index is 47 kg/m ., and he has the following co-morbidities:      2025     9:48 AM   --   I have the following health issues associated with obesity Heart Disease     Sleep Apnea    I have the following symptoms associated with obesity Fatigue        Proxy-reported            No data to display                    2025     9:48 AM   Referring Provider   Please name the provider who referred you to Medical Weight Management  If you do not know, please answer \"I Don't Know\" Soila Butler (?)        Proxy-reported       Wt Readings from Last 4 Encounters:   25 (!) 152.9 kg (337 lb)   25 (!) 153 kg (337 lb 6.4 oz)   25 (!) 149.7 kg (330 lb)   25 (!) 151.4 kg (333 lb 12.4 oz)           2025     9:48 AM   Weight History   How concerned are you about your weight? Very Concerned    I became overweight As a Child    The " following factors have contributed to my weight gain Mental Health Issues     A Health Crisis     Eating Too Much     Lack of Exercise    I have tried the following methods to lose weight Watching Portions or Calories     Exercise     Medications    My lowest weight since age 16 was 235    My highest weight since age 16 was 345    The most weight I have ever lost was (lbs) 50    I have the following family history of obesity/being overweight My mother is overweight    How has your weight changed over the last year? Lost    How many pounds? 10        Proxy-reported           2/26/2025     9:48 AM   Diet Recall   Glass juice/day 2    Glass milk/day 1    Glass sugary drink/day 0    How many cans/bottles of sugar pop/soda/tea/sports drinks do you drink in a day? 0    Diet drink/day 0    Alcohol Monthly or Less    Drinks/day 1 or 2        Proxy-reported           2/26/2025     9:48 AM   Eating Habits   Generally, my meals include foods like these bread, pasta, rice, potatoes, corn, crackers, sweet dessert, pop, or juice A Few Times a Week    Generally, my meals include foods like these fried meats, brats, burgers, french fries, pizza, cheese, chips, or ice cream Once a Week    Eat fast food (like [employer], [employer], Taco Bell) Never    Eat at a buffet or sit-down restaurant Never    Eat most of my meals in front of the TV or computer Almost Everyday    Often skip meals, eat at random times, have no regular eating times Never    Rarely sit down for a meal but snack or graze throughout Never    Eat extra snacks between meals Once a Week    Eat most of my food at the end of the day Less Than Weekly    Eat in the middle of the night or wake up at night to eat Never    Eat extra snacks to prevent or correct low blood sugar Never    Eat to prevent acid reflux or stomach pain Never    Worry about not having enough food to eat Never    I eat when I am depressed Never    I eat when I am stressed Less Than Weekly    I eat when  I am bored A Few Times a Week    I eat when I am anxious Never    I eat when I am happy or as a reward Once a Week    I feel hungry all the time even if I just have eaten Never    Feeling full is important to me Less Than Weekly    I finish all the food on my plate even if I am already full Everyday    I can't resist eating delicious food or walk past the good food/smell Once a Week    I eat/snack without noticing that I am eating Never    I eat when I am preparing the meal Never    I eat more than usual when I see others eating Never    I have trouble not eating sweets, ice cream, cookies, or chips if they are around the house Almost Everyday    I think about food all day Less Than Weekly    What foods, if any, do you crave? Sweets/Candy/Chocolate        Proxy-reported           2/26/2025     9:48 AM   Activity/Exercise History   How much of a typical 12 hour day do you spend sitting? Most of the Day    How much of a typical 12 hour day do you spend lying down? Less Than Half the Day    How much of a typical day do you spend walking/standing? Less Than Half the Day    How many hours (not including work) do you spend on the TV/Video Games/Computer/Tablet/Phone? 6 Hours or More    How many times a week are you active for the purpose of exercise? 2-3 Times a Week    What keeps you from being more active? Other    How many total minutes do you spend doing some activity for the purpose of exercising when you exercise? 15-30 Minutes        Proxy-reported       PAST MEDICAL HISTORY:  Past Medical History:   Diagnosis Date     CKD (chronic kidney disease) stage 2, GFR 60-89 ml/min 10/21/2010    60 to 80 - have discussed Lisinopril - will consider        Coronary artery disease 01/26/2024     Dementia (H)      Depressive disorder      Diverticulosis of large intestine without hemorrhage 11/18/2015    Incidental finding on CT scan        Heart disease 05/08/2018     Hypercholesterolemia      Hypertension goal BP (blood  pressure) < 140/90      Nonspecific abnormal electrocardiogram (ECG) (EKG) 08/23/2007    Stress testing normal.      Paroxysmal atrial fibrillation (H) 03/17/2024     Sinus node dysfunction (H) 05/23/2024     Sleep apnea     uses a c-pap, severe     Status post coronary angiogram 01/26/2024 2/26/2025     9:48 AM   Work/Social History Reviewed With Patient   My employment status is Retired    How much of your job is spent on the computer or phone? 50%    If you have children, are they overweight? Yes    Who do you live with? Spouse    Who does the food shopping? Both        Proxy-reported           2/26/2025     9:48 AM   Mental Health History Reviewed With Patient   Have you ever been physically or sexually abused? No    How often in the past 2 weeks have you felt little interest or pleasure in doing things? Not at all    Over the past 2 weeks how often have you felt down, depressed, or hopeless? Not at all        Proxy-reported           2/26/2025     9:48 AM   Sleep History Reviewed With Patient   How many hours do you sleep at night? 8        Proxy-reported       MEDICATIONS:   Current Outpatient Medications   Medication Sig Dispense Refill     aspirin 81 MG EC tablet Take 1 tablet (81 mg) by mouth daily.       atorvastatin (LIPITOR) 40 MG tablet Take 1.5 tablets (60 mg) by mouth daily. 135 tablet 3     busPIRone (BUSPAR) 10 MG tablet Take 2 tablets (20 mg) by mouth 2 times daily 360 tablet 4     Cholecalciferol (VITAMIN D PO) Take 5,000 Units by mouth daily One tablet twice daily       clopidogrel (PLAVIX) 75 MG tablet Take a loading dose of 4 tablets (300 mg) on the first day then decrease to 1 tablet (75 mg) daily going forward 94 tablet 3     co-enzyme Q-10 100 MG CAPS capsule Take 100 mg by mouth daily       hydroCHLOROthiazide (HYDRODIURIL) 25 MG tablet Take 1 tablet (25 mg) by mouth daily. 90 tablet 3     hydrOXYzine HCl (ATARAX) 10 MG tablet Take 1 tablet (10 mg) by mouth 2 times daily as needed  "for anxiety 180 tablet 5     insulin pen needle (32G X 6 MM) 32G X 6 MM miscellaneous Use 100 pen needles daily or as directed. 100 each 3     lisinopril (ZESTRIL) 20 MG tablet Take 1 tablet (20 mg) by mouth daily 90 tablet 3     metFORMIN (GLUCOPHAGE XR) 500 MG 24 hr tablet Take 1 tablet (500 mg) by mouth daily (with dinner). 90 tablet 3     metoprolol tartrate (LOPRESSOR) 25 MG tablet Take 1 tablet (25 mg) by mouth every 8 hours as needed (Atrial fibrillation with RVR, HR > 120) 30 tablet 1     MULTI VITAMIN MENS PO Take 1 tablet by mouth daily       pramipexole (MIRAPEX) 0.25 MG tablet Take 1 tablet (0.25 mg) by mouth at bedtime. 90 tablet 2     Semaglutide, 2 MG/DOSE, (OZEMPIC) 8 MG/3ML pen Inject 2 mg subcutaneously every 7 days.       sildenafil (VIAGRA) 100 MG tablet Take 1 tablet (100 mg) by mouth daily as needed. 20 tablet 4       ALLERGIES:   No Known Allergies    PHYSICAL EXAM:  Ht 1.803 m (5' 11\")   Wt (!) 152.9 kg (337 lb)   BMI 47.00 kg/m     A & O x 3  HEENT: NCAT, mucous membranes moist  Respirations unlabored  Location of obesity: Mixed Obesity    ASSESSMENT:  Car is a patient with early onset class 3 obesity without significant element of familial/genetic influence and with current health consequences. He does need aggressive weight loss plan due to heart disease, metabolic syndrome and sleep apnea.  Has been on metformin with no benefit. Tried internet ozempic without benefit and no longer taking.    Car Torres eats a high carb diet, eats most meals in front of TV, and mostly eats during the evening. Has a treadmill in the basement.    His problem is complicated by strong craving/reward pathways    His ability to lose weight is impacted by lack of confidence.    PLAN:    Meal planning - focus on no between meal snacking, aggressive lowering of starches and cheese    Craving/Reward   Ancillary testing:  N/A.  Food Plan:  focus on no between meal snacking, aggressive lowering of " starches and cheese.   Activity Plan:  Activity journal.  Supplementary:  N/A.   Medication:  The patient will begin medication in pursuit of improved medical status as influenced by body weight. He will start phentermine and topiramate. Blood pressure and cardiac status are acceptable.  There is a mutual understanding of the goals and risks of this therapy. The patient is in agreement. He is educated on dosage regimen and possible side effects.    RTC:    12 weeks.    Sincerely,  Papito Barbour MD            Again, thank you for allowing me to participate in the care of your patient.      Sincerely,    Papito Barbour MD

## 2025-03-03 NOTE — PROGRESS NOTES
"    New Medical Weight Management Consult    PATIENT:  Cra Torres  MRN:         [id number]  :         1960  FAINA:         3/3/2025    Dear Mihir Perez MD,    I had the pleasure of seeing your patient, Car Torres.  Full intake/assessment done to determine barriers to weight loss success and develop a treatment plan.  Car Torres is a 64 year old male interested in treatment of medical problems associated with weight.  His weight today is 337 lbs 0 oz, Body mass index is 47 kg/m ., and he has the following co-morbidities:      2025     9:48 AM   --   I have the following health issues associated with obesity Heart Disease     Sleep Apnea    I have the following symptoms associated with obesity Fatigue        Proxy-reported            No data to display                    2025     9:48 AM   Referring Provider   Please name the provider who referred you to Medical Weight Management  If you do not know, please answer \"I Don't Know\" Soila Butler (?)        Proxy-reported       Wt Readings from Last 4 Encounters:   25 (!) 152.9 kg (337 lb)   25 (!) 153 kg (337 lb 6.4 oz)   25 (!) 149.7 kg (330 lb)   25 (!) 151.4 kg (333 lb 12.4 oz)           2025     9:48 AM   Weight History   How concerned are you about your weight? Very Concerned    I became overweight As a Child    The following factors have contributed to my weight gain Mental Health Issues     A Health Crisis     Eating Too Much     Lack of Exercise    I have tried the following methods to lose weight Watching Portions or Calories     Exercise     Medications    My lowest weight since age 16 was 235    My highest weight since age 16 was 345    The most weight I have ever lost was (lbs) 50    I have the following family history of obesity/being overweight My mother is overweight    How has your weight changed over the last year? Lost    How many pounds? 10        Proxy-reported           2025 "     9:48 AM   Diet Recall   Glass juice/day 2    Glass milk/day 1    Glass sugary drink/day 0    How many cans/bottles of sugar pop/soda/tea/sports drinks do you drink in a day? 0    Diet drink/day 0    Alcohol Monthly or Less    Drinks/day 1 or 2        Proxy-reported           2/26/2025     9:48 AM   Eating Habits   Generally, my meals include foods like these bread, pasta, rice, potatoes, corn, crackers, sweet dessert, pop, or juice A Few Times a Week    Generally, my meals include foods like these fried meats, brats, burgers, french fries, pizza, cheese, chips, or ice cream Once a Week    Eat fast food (like [employer], [employer], Taco Bell) Never    Eat at a buffet or sit-down restaurant Never    Eat most of my meals in front of the TV or computer Almost Everyday    Often skip meals, eat at random times, have no regular eating times Never    Rarely sit down for a meal but snack or graze throughout Never    Eat extra snacks between meals Once a Week    Eat most of my food at the end of the day Less Than Weekly    Eat in the middle of the night or wake up at night to eat Never    Eat extra snacks to prevent or correct low blood sugar Never    Eat to prevent acid reflux or stomach pain Never    Worry about not having enough food to eat Never    I eat when I am depressed Never    I eat when I am stressed Less Than Weekly    I eat when I am bored A Few Times a Week    I eat when I am anxious Never    I eat when I am happy or as a reward Once a Week    I feel hungry all the time even if I just have eaten Never    Feeling full is important to me Less Than Weekly    I finish all the food on my plate even if I am already full Everyday    I can't resist eating delicious food or walk past the good food/smell Once a Week    I eat/snack without noticing that I am eating Never    I eat when I am preparing the meal Never    I eat more than usual when I see others eating Never    I have trouble not eating sweets, ice cream,  cookies, or chips if they are around the house Almost Everyday    I think about food all day Less Than Weekly    What foods, if any, do you crave? Sweets/Candy/Chocolate        Proxy-reported           2/26/2025     9:48 AM   Activity/Exercise History   How much of a typical 12 hour day do you spend sitting? Most of the Day    How much of a typical 12 hour day do you spend lying down? Less Than Half the Day    How much of a typical day do you spend walking/standing? Less Than Half the Day    How many hours (not including work) do you spend on the TV/Video Games/Computer/Tablet/Phone? 6 Hours or More    How many times a week are you active for the purpose of exercise? 2-3 Times a Week    What keeps you from being more active? Other    How many total minutes do you spend doing some activity for the purpose of exercising when you exercise? 15-30 Minutes        Proxy-reported       PAST MEDICAL HISTORY:  Past Medical History:   Diagnosis Date    CKD (chronic kidney disease) stage 2, GFR 60-89 ml/min 10/21/2010    60 to 80 - have discussed Lisinopril - will consider       Coronary artery disease 01/26/2024    Dementia (H)     Depressive disorder     Diverticulosis of large intestine without hemorrhage 11/18/2015    Incidental finding on CT scan       Heart disease 05/08/2018    Hypercholesterolemia     Hypertension goal BP (blood pressure) < 140/90     Nonspecific abnormal electrocardiogram (ECG) (EKG) 08/23/2007    Stress testing normal.     Paroxysmal atrial fibrillation (H) 03/17/2024    Sinus node dysfunction (H) 05/23/2024    Sleep apnea     uses a c-pap, severe    Status post coronary angiogram 01/26/2024 2/26/2025     9:48 AM   Work/Social History Reviewed With Patient   My employment status is Retired    How much of your job is spent on the computer or phone? 50%    If you have children, are they overweight? Yes    Who do you live with? Spouse    Who does the food shopping? Both        Proxy-reported            2/26/2025     9:48 AM   Mental Health History Reviewed With Patient   Have you ever been physically or sexually abused? No    How often in the past 2 weeks have you felt little interest or pleasure in doing things? Not at all    Over the past 2 weeks how often have you felt down, depressed, or hopeless? Not at all        Proxy-reported           2/26/2025     9:48 AM   Sleep History Reviewed With Patient   How many hours do you sleep at night? 8        Proxy-reported       MEDICATIONS:   Current Outpatient Medications   Medication Sig Dispense Refill    aspirin 81 MG EC tablet Take 1 tablet (81 mg) by mouth daily.      atorvastatin (LIPITOR) 40 MG tablet Take 1.5 tablets (60 mg) by mouth daily. 135 tablet 3    busPIRone (BUSPAR) 10 MG tablet Take 2 tablets (20 mg) by mouth 2 times daily 360 tablet 4    Cholecalciferol (VITAMIN D PO) Take 5,000 Units by mouth daily One tablet twice daily      clopidogrel (PLAVIX) 75 MG tablet Take a loading dose of 4 tablets (300 mg) on the first day then decrease to 1 tablet (75 mg) daily going forward 94 tablet 3    co-enzyme Q-10 100 MG CAPS capsule Take 100 mg by mouth daily      hydroCHLOROthiazide (HYDRODIURIL) 25 MG tablet Take 1 tablet (25 mg) by mouth daily. 90 tablet 3    hydrOXYzine HCl (ATARAX) 10 MG tablet Take 1 tablet (10 mg) by mouth 2 times daily as needed for anxiety 180 tablet 5    insulin pen needle (32G X 6 MM) 32G X 6 MM miscellaneous Use 100 pen needles daily or as directed. 100 each 3    lisinopril (ZESTRIL) 20 MG tablet Take 1 tablet (20 mg) by mouth daily 90 tablet 3    metFORMIN (GLUCOPHAGE XR) 500 MG 24 hr tablet Take 1 tablet (500 mg) by mouth daily (with dinner). 90 tablet 3    metoprolol tartrate (LOPRESSOR) 25 MG tablet Take 1 tablet (25 mg) by mouth every 8 hours as needed (Atrial fibrillation with RVR, HR > 120) 30 tablet 1    MULTI VITAMIN MENS PO Take 1 tablet by mouth daily      pramipexole (MIRAPEX) 0.25 MG tablet Take 1 tablet (0.25  "mg) by mouth at bedtime. 90 tablet 2    Semaglutide, 2 MG/DOSE, (OZEMPIC) 8 MG/3ML pen Inject 2 mg subcutaneously every 7 days.      sildenafil (VIAGRA) 100 MG tablet Take 1 tablet (100 mg) by mouth daily as needed. 20 tablet 4       ALLERGIES:   No Known Allergies    PHYSICAL EXAM:  Ht 1.803 m (5' 11\")   Wt (!) 152.9 kg (337 lb)   BMI 47.00 kg/m     A & O x 3  HEENT: NCAT, mucous membranes moist  Respirations unlabored  Location of obesity: Mixed Obesity    ASSESSMENT:  Car is a patient with early onset class 3 obesity without significant element of familial/genetic influence and with current health consequences. He does need aggressive weight loss plan due to heart disease, metabolic syndrome and sleep apnea.  Has been on metformin with no benefit. Tried internet ozempic without benefit and no longer taking.    Car Torres eats a high carb diet, eats most meals in front of TV, and mostly eats during the evening. Has a treadmill in the basement.    His problem is complicated by strong craving/reward pathways    His ability to lose weight is impacted by lack of confidence.    PLAN:    Meal planning - focus on no between meal snacking, aggressive lowering of starches and cheese    Craving/Reward   Ancillary testing:  N/A.  Food Plan:  focus on no between meal snacking, aggressive lowering of starches and cheese.   Activity Plan:  Activity journal.  Supplementary:  N/A.   Medication:  The patient will begin medication in pursuit of improved medical status as influenced by body weight. He will start phentermine and topiramate. Blood pressure and cardiac status are acceptable.  There is a mutual understanding of the goals and risks of this therapy. The patient is in agreement. He is educated on dosage regimen and possible side effects.    RTC:    12 weeks.    Sincerely,  Papito Barbour MD        "

## 2025-03-03 NOTE — NURSING NOTE
Current patient location: [address] [facility] [address]    Is the patient currently in the state of [place]? YES    Visit mode: VIDEO    If the visit is dropped, the patient can be reconnected by:VIDEO VISIT: Text to cell phone:   Telephone Information:   Mobile [phone]   Mobile Not on file.       Will anyone else be joining the visit? NO  (If patient encounters technical issues they should call [phone] :[phone])    Are changes needed to the allergy or medication list? No    Are refills needed on medications prescribed by this physician? NO    Rooming Documentation:  Questionnaire(s) completed    Reason for visit: Consult    Merary DARLING

## 2025-03-03 NOTE — PROGRESS NOTES
Virtual Visit Details    Your visit has ended  Joined the call at 3/3/2025, 8:33:42 am.  Left the call at 3/3/2025, 9:08:09 am    Originating Location (pt. Location): Home    Distant Location (provider location):  Off-site  Platform used for Video Visit: Vilma

## 2025-03-06 ENCOUNTER — VIRTUAL VISIT (OUTPATIENT)
Dept: FAMILY MEDICINE | Facility: CLINIC | Age: 65
End: 2025-03-06
Payer: MEDICARE

## 2025-03-06 DIAGNOSIS — F33.1 MAJOR DEPRESSIVE DISORDER, RECURRENT EPISODE, MODERATE (H): Primary | ICD-10-CM

## 2025-03-06 DIAGNOSIS — I10 HYPERTENSION GOAL BP (BLOOD PRESSURE) < 140/90: Chronic | ICD-10-CM

## 2025-03-06 DIAGNOSIS — I48.91 ATRIAL FIBRILLATION WITH RAPID VENTRICULAR RESPONSE (H): ICD-10-CM

## 2025-03-06 DIAGNOSIS — F03.B0 MODERATE DEMENTIA WITHOUT BEHAVIORAL DISTURBANCE, PSYCHOTIC DISTURBANCE, MOOD DISTURBANCE, OR ANXIETY, UNSPECIFIED DEMENTIA TYPE (H): ICD-10-CM

## 2025-03-06 RX ORDER — METOPROLOL TARTRATE 25 MG/1
25 TABLET, FILM COATED ORAL EVERY 8 HOURS PRN
Qty: 30 TABLET | Refills: 4 | Status: SHIPPED | OUTPATIENT
Start: 2025-03-06

## 2025-03-06 RX ORDER — LISINOPRIL 20 MG/1
20 TABLET ORAL DAILY
Qty: 90 TABLET | Refills: 4 | Status: SHIPPED | OUTPATIENT
Start: 2025-03-06

## 2025-03-06 NOTE — PROGRESS NOTES
"Jerry is a 64 year old who is being evaluated via a billable video visit.    How would you like to obtain your AVS? MyChart  If the video visit is dropped, the invitation should be resent by: Text to cell phone: [phone]  Will anyone else be joining your video visit? No  {If patient encounters technical issues they should call [phone] :[id number]}    {PROVIDER CHARTING PREFERENCE:480837}    Subjective   Jerry is a 64 year old, presenting for the following health issues:  Weight Loss (Discuss weight loss medication)        3/6/2025    11:30 AM   Additional Questions   Roomed by Cande     Video Start Time: {video visit start/end time for provider to select:449690}    History of Present Illness       Reason for visit:  Six month follow up requested by MD    He eats 0-1 servings of fruits and vegetables daily.He consumes 1 sweetened beverage(s) daily.He exercises with enough effort to increase his heart rate 20 to 29 minutes per day.  He exercises with enough effort to increase his heart rate 4 days per week.   He is taking medications regularly.        {SUPERLIST (Optional):374402}  {additonal problems for provider to add (Optional):797805}    {ROS Picklists (Optional):641912}      Objective           Vitals:  No vitals were obtained today due to virtual visit.    Physical Exam   {video visit exam brief selected:593974}    {Diagnostic Test Results (Optional):043388}      Video-Visit Details    Type of service:  Video Visit   Video End Time:{video visit start/end time for provider to select:592968}  Originating Location (pt. Location): {video visit patient location:226091::\"Home\"}  {PROVIDER LOCATION On-site should be selected for visits conducted from your clinic location or adjoining [facility] hospital, academic office, or other nearby [facility] building. Off-site should be selected for all other provider locations, including home:021491}  Distant Location (provider location):  {virtual location provider:186900}  Platform used " "for Video Visit: {Virtual Visit Platforms:765239::\"Vilma\"}  Signed Electronically by: Mihir Perez MD  {Email feedback regarding this note to [email or web address]   :676806}  "

## 2025-03-06 NOTE — PROGRESS NOTES
"Jerry is a 64 year old who is being evaluated via a billable video visit.    How would you like to obtain your AVS? MyChart  If the video visit is dropped, the invitation should be resent by: Text to cell phone: [phone]  Will anyone else be joining your video visit? No      Assessment & Plan   Problem List Items Addressed This Visit       Hypertension goal BP (blood pressure) < 140/90 (Chronic)    Relevant Medications    lisinopril (ZESTRIL) 20 MG tablet    Major depressive disorder, recurrent episode, moderate (H) - Primary    Moderate dementia without behavioral disturbance, psychotic disturbance, mood disturbance, or anxiety, unspecified dementia type (H)     Other Visit Diagnoses       Atrial fibrillation with rapid ventricular response (H)        Relevant Medications    metoprolol tartrate (LOPRESSOR) 25 MG tablet           Weight loss with phentemine and topamax   Watchman deployed and off anticoagulants, on plavix   For a few more months     Patient titrating himself off buspar - set up schedule and symptoms are stable currently              BMI  Estimated body mass index is 47 kg/m  as calculated from the following:    Height as of 3/3/25: 1.803 m (5' 11\").    Weight as of 3/3/25: 152.9 kg (337 lb).   Weight management plan: Patient referred to endocrine and/or weight management specialty    Regular exercise      Subjective   Jerry is a 64 year old, presenting for the following health issues:  Weight Loss (Discuss weight loss medication)      3/6/2025    11:30 AM   Additional Questions   Roomed by Cande     Video Start Time:  11:35 AM    HPI    Topamax and phentermine   Getting off buspar   Used to have 2 twice and day   1 twice a day   Week then down one once a day             Review of Systems  Constitutional, HEENT, cardiovascular, pulmonary, gi and gu systems are negative, except as otherwise noted.      Objective           Vitals:  No vitals were obtained today due to virtual visit.    Physical Exam "   GENERAL: alert and no distress  EYES: Eyes grossly normal to inspection.  No discharge or erythema, or obvious scleral/conjunctival abnormalities.  HENT: Normal cephalic/atraumatic.  External ears, nose and mouth without ulcers or lesions.  No nasal drainage visible.  NECK: No asymmetry, visible masses or scars  RESP: No audible wheeze, cough, or visible cyanosis.    SKIN: Visible skin clear. No significant rash, abnormal pigmentation or lesions.  NEURO: Cranial nerves grossly intact.  Mentation and speech appropriate for age.  PSYCH: Appropriate affect, tone, and pace of words    No results found for any visits on 03/06/25.      Video-Visit Details    Type of service:  Video Visit   Video End Time: 11:58 AM  Originating Location (pt. Location): Home    Distant Location (provider location):  On-site  Platform used for Video Visit: Vilma  Signed Electronically by: Mihir Perez MD

## 2025-03-13 ENCOUNTER — MYC MEDICAL ADVICE (OUTPATIENT)
Dept: FAMILY MEDICINE | Facility: CLINIC | Age: 65
End: 2025-03-13
Payer: MEDICARE

## 2025-03-24 ENCOUNTER — OFFICE VISIT (OUTPATIENT)
Dept: FAMILY MEDICINE | Facility: CLINIC | Age: 65
End: 2025-03-24
Payer: MEDICARE

## 2025-03-24 VITALS
RESPIRATION RATE: 18 BRPM | HEIGHT: 71 IN | BODY MASS INDEX: 44.1 KG/M2 | OXYGEN SATURATION: 99 % | DIASTOLIC BLOOD PRESSURE: 84 MMHG | WEIGHT: 315 LBS | SYSTOLIC BLOOD PRESSURE: 130 MMHG | TEMPERATURE: 97.2 F | HEART RATE: 70 BPM

## 2025-03-24 DIAGNOSIS — J01.10 ACUTE NON-RECURRENT FRONTAL SINUSITIS: Primary | ICD-10-CM

## 2025-03-24 PROCEDURE — 3075F SYST BP GE 130 - 139MM HG: CPT | Performed by: FAMILY MEDICINE

## 2025-03-24 PROCEDURE — 99213 OFFICE O/P EST LOW 20 MIN: CPT | Performed by: FAMILY MEDICINE

## 2025-03-24 PROCEDURE — 3079F DIAST BP 80-89 MM HG: CPT | Performed by: FAMILY MEDICINE

## 2025-03-24 RX ORDER — METHYLPREDNISOLONE 4 MG/1
TABLET ORAL
Qty: 21 TABLET | Refills: 0 | Status: SHIPPED | OUTPATIENT
Start: 2025-03-24

## 2025-03-24 RX ORDER — AMOXICILLIN 875 MG/1
875 TABLET, COATED ORAL 2 TIMES DAILY
Qty: 20 TABLET | Refills: 0 | Status: SHIPPED | OUTPATIENT
Start: 2025-03-24 | End: 2025-04-03

## 2025-03-24 NOTE — PROGRESS NOTES
Assessment & Plan     Acute non-recurrent frontal sinusitis  Advised with supportive care  - methylPREDNISolone (MEDROL DOSEPAK) 4 MG tablet therapy pack; Follow Package Directions  - amoxicillin (AMOXIL) 875 MG tablet; Take 1 tablet (875 mg) by mouth 2 times daily for 10 days.        Subjective   Jerry is a 64 year old, presenting for the following health issues:  URI    Patient reports been having sinus congestion postnasal drainage sinus pressure symptoms on and off for over 2 months now.  He tried Flonase and Afrin with little benefit  He does get nasal congested at night, while wearing his CPAP.    He reports symptom gets better and then gets worse, has been having postnasal drainage sometimes cough.    Nasal congested.  He has no chest pain, no short of breath, no cough, no wheezing.  No lower extremity edema.  No history of allergies.        3/24/2025     4:02 PM   Additional Questions   Roomed by scar ching ma     HPI        Acute Illness  Acute illness concerns:   Onset/Duration: 2 months, gets better then gets worse  Symptoms:  Fever: No  Chills/Sweats: No  Headache (location?): No  Sinus Pressure: YES  Conjunctivitis:  No  Ear Pain: YES- ringing/buzzing that started a week ago  Rhinorrhea: No  Congestion: YES  Sore Throat: No  Cough: no  Wheeze: No  Decreased Appetite: No  Nausea: YES- yesterday due to sinus pressure  Vomiting: No  Diarrhea: No  Dysuria/Freq.: No  Dysuria or Hematuria: No  Fatigue/Achiness: YES- fatigue  Sick/Strep Exposure: YES- wife  Therapies tried and outcome: Mucinex, Afrin; started to be effective but no longer effective        Review of Systems  Constitutional, HEENT, cardiovascular, pulmonary, GI, , musculoskeletal, neuro, skin, endocrine and psych systems are negative, except as otherwise noted.      Objective    /84 (BP Location: Right arm, Patient Position: Sitting, Cuff Size: Adult Large)   Pulse 70   Temp 97.2  F (36.2  C) (Tympanic)   Resp 18   Ht 1.803 m (5'  "11\")   Wt (!) 146.8 kg (323 lb 9.6 oz)   SpO2 99%   BMI 45.13 kg/m    Body mass index is 45.13 kg/m .  Physical Exam   GENERAL: alert and no distress  HENT: ear canals and TM's normal, nose and mouth without ulcers or lesions  NECK: no adenopathy, no asymmetry, masses, or scars  RESP: lungs clear to auscultation - no rales, rhonchi or wheezes  CV: regular rate and rhythm, normal S1 S2, no S3 or S4, no murmur, click or rub, no peripheral edema          Signed Electronically by: Christ Silva MD    "

## 2025-03-25 ENCOUNTER — TELEPHONE (OUTPATIENT)
Dept: ENDOCRINOLOGY | Facility: CLINIC | Age: 65
End: 2025-03-25

## 2025-03-25 NOTE — TELEPHONE ENCOUNTER
[facility]  INPATIENT SPEECH THERAPY  [facility] NURSING UNIT    TIME   SLP Individual Minutes  Time In: 3589  Time Out: 0825  Minutes: 18  Timed Code Treatment Minutes: 18 Minutes       []Daily Note  []Progress Note  [x]Discharge Note    Date: 2019  Patient Name: Rasheed Francois        MRN: [id number]    : 10/6/1930  (78 y.o.)  Gender: male   Primary Provider: Delona Denver, MD   Referring Physician: Delona Denver, MD  Diagnosis: Physical deconditioning  Secondary Diagnosis: Cognitive impairment  Date of Last MBS:  N/A  Diet:  Regular diet with thin liquids  Pain:  0/10     Subjective: Pt seen sitting upright in bed finishing consumption of breakfast. Highly frustrated by ST services throughout session, evidenced by multiple sighs and gestures of anger with constant questioning of when he is discharging. Despite ST with extensive education and provision of examples related to functional home tasks, pt with significantly decreased cooperation for third consecutive session. Pt also refused use of pocket talker again during session, which was an overall communication barrier throughout. ST discussed with pt options for discharge; pt refused continued ST services. Following brief chart review, pt does appear to have low cognitive demands; daughter lives in home and assists with all ADL's. ST recommended pt complete daily brain challenging activities to keep cognition active, leaving pt with a crossword puzzle, however pt reported \"I ain't doing those\". Session shortened d/t poor participation. SHORT TERM GOAL #1:  Goal 1: Patient will complete orientation tasks with implementation of compesnatory strategies with 80% accuracy and min cues in order to improve awareness to hospital environment. - GOAL NOT MET  INTERVENTIONS: Orientation- 3/5 indep, 2/5 mod cues for ISAC and DOM    SHORT TERM GOAL #2:  Goal 2: Patient will complete immediate, delayed, and working memory tasks with 80% Patient confirmed scheduled appointment:     Date: 9/22/25  Time: 9:30 AM  Visit type: Return Weight Management  Visit mode: Virtual Visit  Provider:  Dr. Papito Barbour  Location: [facility] [place]    Additional Notes:      dysphonia, or aphonia at this time. Expressive and receptive language appear to be intact for basic communicative interaction, however, noted decreased understanding of complex yes/no questions. Pt HIGHLY reluctant to participate in ST sessions, with increased frustration each session. At this time, pt is refusing to participate in [address] [place] [address] services and declines need for additional services, denying impairments. Pt noted to have low cognitive demands at home, however, would still recommend continued ST services at least for brief time upon discharge to improve successful potential reintegration into home/community settings at discharge.     Patient Tolerance of Treatment:   []Tolerated well [x]Tolerated fair []Required rest breaks []Fatigued    Education:  Learner:  [x]Patient          []Significant Other          []Other  Education provided regarding:  [x]Goals and POC   []Diet and swallowing precautions    [x]Home Exercise Program  [x]Progress and/or discharge information  Method of Education:  [x]Discussion          [x]Demonstration          [x]Handout - crossword HEP         []Other  Evaluation of Education:   [x]Verbalized understanding   []Demonstrates without assistance  [x]Demonstrates with assistance  [x]Needs further instruction     [x]No evidence of learning                  [x]No family present      Plan: []Continue with current plan of care    []Modify current plan of care as follows:    [x]Discharge patient    Discharge Location: Home    Services/Supervision Recommended: [address] services, however, pt continues to decline and deny needs    [x]Patient continues to require treatment by a licensed therapist to address functional deficits as outlined in the established plan of care.     Juan Mcgovern M.S. [id number] [phone]

## 2025-03-26 ENCOUNTER — VIRTUAL VISIT (OUTPATIENT)
Dept: FAMILY MEDICINE | Facility: CLINIC | Age: 65
End: 2025-03-26
Payer: MEDICARE

## 2025-03-26 DIAGNOSIS — N18.2 CKD (CHRONIC KIDNEY DISEASE) STAGE 2, GFR 60-89 ML/MIN: Chronic | ICD-10-CM

## 2025-03-26 DIAGNOSIS — F41.1 GAD (GENERALIZED ANXIETY DISORDER): ICD-10-CM

## 2025-03-26 DIAGNOSIS — I10 HYPERTENSION GOAL BP (BLOOD PRESSURE) < 140/90: Chronic | ICD-10-CM

## 2025-03-26 DIAGNOSIS — F03.B0 MODERATE DEMENTIA WITHOUT BEHAVIORAL DISTURBANCE, PSYCHOTIC DISTURBANCE, MOOD DISTURBANCE, OR ANXIETY, UNSPECIFIED DEMENTIA TYPE (H): Primary | ICD-10-CM

## 2025-03-26 DIAGNOSIS — Z95.818 PRESENCE OF WATCHMAN LEFT ATRIAL APPENDAGE CLOSURE DEVICE: ICD-10-CM

## 2025-03-26 PROCEDURE — 98005 SYNCH AUDIO-VIDEO EST LOW 20: CPT | Performed by: INTERNAL MEDICINE

## 2025-03-26 NOTE — PROGRESS NOTES
Jerry is a 64 year old who is being evaluated via a billable video visit.    How would you like to obtain your AVS? MyChart  If the video visit is dropped, the invitation should be resent by: Text to cell phone: [phone]  Will anyone else be joining your video visit? No      Assessment & Plan   Problem List Items Addressed This Visit       Hypertension goal BP (blood pressure) < 140/90 (Chronic)    CKD (chronic kidney disease) stage 2, GFR 60-89 ml/min (Chronic)    SHAHNAZ (generalized anxiety disorder)    Moderate dementia without behavioral disturbance, psychotic disturbance, mood disturbance, or anxiety, unspecified dementia type (H) - Primary    Presence of Watchman left atrial appendage closure device        Patient stable on the hydroxyzine prn anxiety   Continue medications        Work on weight loss  Regular exercise      Subjective   Jerry is a 64 year old, presenting for the following health issues:  No chief complaint on file.        3/24/2025     4:02 PM   Additional Questions   Roomed by scar ching ma       Video Start Time:  2:00 PM    HPI    Hydroxyzine did not want to sent list of alternatives   Formulary exception 1/1/2025 3/19/2026   Losing weight as well   Down to 323   3-4 months               Review of Systems  Constitutional, HEENT, cardiovascular, pulmonary, gi and gu systems are negative, except as otherwise noted.      Objective       Blood pressure not from time to time       Vitals:  No vitals were obtained today due to virtual visit.    Physical Exam   GENERAL: alert and no distress  EYES: Eyes grossly normal to inspection.  No discharge or erythema, or obvious scleral/conjunctival abnormalities.  HENT: Normal cephalic/atraumatic.  External ears, nose and mouth without ulcers or lesions.  No nasal drainage visible.  NECK: No asymmetry, visible masses or scars  RESP: No audible wheeze, cough, or visible cyanosis.    SKIN: Visible skin clear. No significant rash, abnormal pigmentation or  lesions.  NEURO: Cranial nerves grossly intact.  Mentation and speech appropriate for age.  PSYCH: Appropriate affect, tone, and pace of words    No results found for any visits on 03/26/25.      Video-Visit Details    Type of service:  Video Visit   Video End Time: 2:!8 PM  Originating Location (pt. Location): Home    Distant Location (provider location):  On-site  Platform used for Video Visit: Vilma  Signed Electronically by: Mihir Perez MD

## 2025-03-27 PROBLEM — F41.1 GAD (GENERALIZED ANXIETY DISORDER): Status: ACTIVE | Noted: 2025-03-27

## 2025-04-01 ENCOUNTER — OFFICE VISIT (OUTPATIENT)
Dept: NEUROLOGY | Facility: CLINIC | Age: 65
End: 2025-04-01
Payer: MEDICARE

## 2025-04-01 VITALS
SYSTOLIC BLOOD PRESSURE: 84 MMHG | WEIGHT: 315 LBS | HEART RATE: 65 BPM | DIASTOLIC BLOOD PRESSURE: 49 MMHG | BODY MASS INDEX: 44.69 KG/M2

## 2025-04-01 DIAGNOSIS — R41.89 COGNITIVE AND BEHAVIORAL CHANGES: Primary | ICD-10-CM

## 2025-04-01 DIAGNOSIS — R46.89 COGNITIVE AND BEHAVIORAL CHANGES: Primary | ICD-10-CM

## 2025-04-01 PROBLEM — F03.B0 MODERATE DEMENTIA WITHOUT BEHAVIORAL DISTURBANCE, PSYCHOTIC DISTURBANCE, MOOD DISTURBANCE, OR ANXIETY, UNSPECIFIED DEMENTIA TYPE (H): Status: RESOLVED | Noted: 2022-01-03 | Resolved: 2025-04-01

## 2025-04-01 PROCEDURE — 99213 OFFICE O/P EST LOW 20 MIN: CPT | Performed by: INTERNAL MEDICINE

## 2025-04-01 PROCEDURE — 3078F DIAST BP <80 MM HG: CPT | Performed by: INTERNAL MEDICINE

## 2025-04-01 PROCEDURE — 3074F SYST BP LT 130 MM HG: CPT | Performed by: INTERNAL MEDICINE

## 2025-04-01 NOTE — PROGRESS NOTES
[facility] Neurology Follow Up Visit    Car Torres MRN# [id number]   Age: 64 year old YOB: 1960     Brief history of symptoms: The patient was initially seen in neurologic consultation on 12/23/2020 for evaluation of cognitive changes. Please see the comprehensive neurologic consultation note from that date in the Epic records for details.          Assessment and Plan:   Assessment:  Patient is a 65 y/o male who presents for follow-up of cognitive concerns. Cognitive changes of paranoia, hallucinations, delusions developed initially around spring 2020, with significant worsening by fall 2020. He had no prior history of psychiatric disease. Initial neuropsychological testing showed dysfunction in frontal and subcortical networks. There was concern for a neurodegenerative process given presentation. Thankfully PET scan and CSF testing was reassuring without evidence of neurodegenerative process and repeat neuropsychology testing showed improvement in some areas.     He is doing well at follow-up today. Cognitive changes in 2020 were likely related to episode of major depression and Kratom usage. He has significant improvement of cognition with time.     Plan:  - Call with new/worsening symptoms    Follow up in Neurology clinic as needed if new issues arise    Miguel Varela MD   of Neurology  [facility]  ---------------------------------------------    Interval history:   Patient reports he is doing well today neurologically.     He is doing better.     He has lost some weight since last visit.     Sleep is good at night. Bathroom keeps him up but he feels refreshed in the morning.     Mood is alright, some days crabby. He takes hydroxyzine as needed. He went off of Buspar and this has been ok.     He denies any hallucinations.     He hears a hissing in the ears. It can change pitch. It doesn't really bother him.       Past Medical History:     Patient Active Problem List    Diagnosis    Sciatica    Morbid obesity (H)    Hyperlipidemia LDL goal <130    Hypertension goal BP (blood pressure) < 140/90    CKD (chronic kidney disease) stage 2, GFR 60-89 ml/min    Renal cyst    Diverticulosis of large intestine without hemorrhage    Pulmonary nodule    History of sinus bradycardia    ED (erectile dysfunction)    TIA (obstructive sleep apnea)    Major depressive disorder, recurrent episode, mild with atypical features (H)    Moderate dementia without behavioral disturbance, psychotic disturbance, mood disturbance, or anxiety, unspecified dementia type (H)    Lisfranc dislocation, left, initial encounter    Lisfranc dislocation, left, subsequent encounter    Unstable angina (H)    Coronary artery disease    MCKNIGHT (dyspnea on exertion)    Status post coronary angiogram    Anginal equivalent    Near syncope    Paroxysmal atrial fibrillation (H)    Sinus node dysfunction (H)    Presence of Watchman left atrial appendage closure device    Major depressive disorder, recurrent episode, moderate (H)    SHAHNAZ (generalized anxiety disorder)     Past Medical History:   Diagnosis Date    CKD (chronic kidney disease) stage 2, GFR 60-89 ml/min 10/21/2010    60 to 80 - have discussed Lisinopril - will consider       Coronary artery disease 01/26/2024    Dementia (H)     Depressive disorder     Diverticulosis of large intestine without hemorrhage 11/18/2015    Incidental finding on CT scan       Heart disease 05/08/2018    Hypercholesterolemia     Hypertension goal BP (blood pressure) < 140/90     Nonspecific abnormal electrocardiogram (ECG) (EKG) 08/23/2007    Stress testing normal.     Paroxysmal atrial fibrillation (H) 03/17/2024    Sinus node dysfunction (H) 05/23/2024    Sleep apnea     uses a c-pap, severe    Status post coronary angiogram 01/26/2024        Past Surgical History:     Past Surgical History:   Procedure Laterality Date    COLONOSCOPY N/A 12/22/2020    Procedure: COLONOSCOPY, WITH  POLYPECTOMY, CLIP;  Surgeon: Mihir Lang MD;  Location: [facility] OR    COLONOSCOPY N/A 1/10/2022    Procedure: COLONOSCOPY, WITH POLYPECTOMY AND BIOPSY;  Surgeon: Mihir Lang MD;  Location: [facility] GI    CV CORONARY ANGIOGRAM N/A 1/26/2024    Procedure: Coronary Angiogram;  Surgeon: Elton Wright MD;  Location: [facility] CARDIAC CATH LAB    CV CORONARY LITHOTRIPSY PCI N/A 3/8/2024    Procedure: Percutaneous Coronary Intervention - Lithotripsy;  Surgeon: Elton Wright MD;  Location: [facility] CARDIAC CATH LAB    CV INSTANTANEOUS WAVE-FREE RATIO N/A 1/26/2024    Procedure: Instantaneous Wave-Free Ratio;  Surgeon: Elton Wright MD;  Location: [facility] CARDIAC CATH LAB    CV INTRAVASULAR ULTRASOUND N/A 3/8/2024    Procedure: Intravascular Ultrasound;  Surgeon: Elton Wright MD;  Location: [facility] CARDIAC CATH LAB    CV LEFT ATRIAL APPENDAGE CLOSURE N/A 1/23/2025    Procedure: Left Atrial Appendage Closure;  Surgeon: Elton rWight MD;  Location: [facility] OR    CV PCI N/A 3/8/2024    Procedure: Percutaneous Coronary Intervention;  Surgeon: Elton Wright MD;  Location: [facility] CARDIAC CATH LAB    CV RIGHT HEART CATH MEASUREMENTS RECORDED N/A 1/26/2024    Procedure: Right Heart Catheterization;  Surgeon: Elton Wright MD;  Location: [facility] CARDIAC CATH LAB    EP ABLATION PULMONARY VEIN ISOLATION N/A 5/30/2024    Procedure: Ablation Atrial Fibrillation;  Surgeon: Sandee Sheldon MD;  Location: [facility] CATH LAB CV    EP LEFT ATRIAL APPENDAGE CLOSURE N/A 1/23/2025    Procedure: Left Atrial Appendage Closure;  Surgeon: Elton Wright MD;  Location:  OR    ESOPHAGOSCOPY, GASTROSCOPY, DUODENOSCOPY (EGD), COMBINED N/A 1/28/2021    Procedure: ESOPHAGOGASTRODUODENOSCOPY, WITH BIOPSY;  Surgeon: Mihir Lang MD;  Location: [facility] OR    OPEN REDUCTION INTERNAL FIXATION FOOT Left 1/5/2023    Procedure: LISFRANC OPEN REDUCTION INTERNAL FIXATION, LEFT FOOT;  Surgeon: Bailey Ruiz  DPM;  Location: [facility] OR    SURGICAL HISTORY OF -       surgery on left index finger        Social History:     Social History     Tobacco Use    Smoking status: Former     Current packs/day: 0.00     Average packs/day: 1 pack/day for 25.0 years (25.0 ttl pk-yrs)     Types: Cigarettes, Cigars     Start date: 1973     Quit date: 1998     Years since quittin.1     Passive exposure: Past    Smokeless tobacco: Never    Tobacco comments:     N/A not a smoker   Vaping Use    Vaping status: Never Used   Substance Use Topics    Alcohol use: Yes     Alcohol/week: 10.0 standard drinks of alcohol     Comment: less than 6 per week    Drug use: Not Currently     Types: Amphetamines     Comment: Kratum        Family History:     Family History   Problem Relation Age of Onset    Cancer Mother         uterine    Other Cancer Mother         endometrial    Cerebrovascular Disease Mother     Substance Abuse Mother         Alcohol    C.A.D. Father     Hypertension Father     Hyperlipidemia Father     Breast Cancer Maternal Grandmother     Other Cancer Maternal Grandmother         Lung/brain    Substance Abuse Paternal Grandfather     Hypertension Brother     Substance Abuse Brother         Alcohol    Hyperlipidemia Brother     Breast Cancer Other     Breast Cancer Cousin     Other Cancer Other     Cerebrovascular Disease Other         Maternal Aunt    Glaucoma No family hx of     Macular Degeneration No family hx of         Medications:     Current Outpatient Medications   Medication Sig Dispense Refill    amoxicillin (AMOXIL) 875 MG tablet Take 1 tablet (875 mg) by mouth 2 times daily for 10 days. 20 tablet 0    aspirin 81 MG EC tablet Take 1 tablet (81 mg) by mouth daily.      atorvastatin (LIPITOR) 40 MG tablet Take 1.5 tablets (60 mg) by mouth daily. 135 tablet 3    Cholecalciferol (VITAMIN D PO) Take 5,000 Units by mouth daily One tablet twice daily      clopidogrel (PLAVIX) 75 MG tablet Take a loading dose of 4 tablets  (300 mg) on the first day then decrease to 1 tablet (75 mg) daily going forward 94 tablet 3    co-enzyme Q-10 100 MG CAPS capsule Take 100 mg by mouth daily      hydroCHLOROthiazide (HYDRODIURIL) 25 MG tablet Take 1 tablet (25 mg) by mouth daily. 90 tablet 3    hydrOXYzine HCl (ATARAX) 10 MG tablet Take 1 tablet (10 mg) by mouth 2 times daily as needed for anxiety 180 tablet 5    insulin pen needle (32G X 6 MM) 32G X 6 MM miscellaneous Use 100 pen needles daily or as directed. 100 each 3    lisinopril (ZESTRIL) 20 MG tablet Take 1 tablet (20 mg) by mouth daily. 90 tablet 4    methylPREDNISolone (MEDROL DOSEPAK) 4 MG tablet therapy pack Follow Package Directions 21 tablet 0    metoprolol tartrate (LOPRESSOR) 25 MG tablet Take 1 tablet (25 mg) by mouth every 8 hours as needed (Atrial fibrillation with RVR, HR > 120). 30 tablet 4    MULTI VITAMIN MENS PO Take 1 tablet by mouth daily      phentermine 15 MG capsule Take 1 capsule (15 mg) by mouth every morning. 30 capsule 5    pramipexole (MIRAPEX) 0.25 MG tablet Take 1 tablet (0.25 mg) by mouth at bedtime. 90 tablet 2    sildenafil (VIAGRA) 100 MG tablet Take 1 tablet (100 mg) by mouth daily as needed. 20 tablet 4    topiramate (TOPAMAX) 25 MG tablet 25 mg at bedtime for 1 week, 50 mg at bedtime for 1 week and 75 mg daily at bedtime thereafter 90 tablet 5     No current facility-administered medications for this visit.        Allergies:   No Known Allergies     Review of Systems:   As above     Physical Exam:   Vitals: BP (!) 84/49 (BP Location: Right arm, Patient Position: Sitting, Cuff Size: Adult Large)   Pulse 65   Wt (!) 145.3 kg (320 lb 6.4 oz)   BMI 44.69 kg/m     Repeat blood pressure normal  General: Seated comfortably in no acute distress.  Lungs: breathing comfortably  Neurologic:     Mental Status: Alert and fully oriented. Attentive. 4/5 on delayed recall. Language is fluent. Repetition and naming intact. Serial 7s; not able to do additional values  beyond 93. Cube copy and clock drawing intact.      Data reviewed on previous visits    Imaging:  MRI brain 10/16/2020  IMPRESSION:  1. Exam mildly limited by motion artifact.  2. Few minimal nonspecific white matter lesions.  3. No evidence for intracranial hemorrhage, acute infarct, or any  focal mass lesions.     Laboratory:  TSH, CMP wnl 8/2020  B12, CBC wnl 12/2020    PET brain 4/2021  IMPRESSION:  No significant metabolic deficits when compared to  control database. This may be due to the possible underlying dementia  being too early to characterize, versus the patient's symptomatology  being due to some other process than dementia.    Labs 9/2023 - [place] CSF Alzheimer's disease evaluation normal, Autoimmune encephalopathy panel pending, WBC 0, normal protein/glucose, Neurofilament light chain normal    Pertinent Investigations since last visit:   None      The total time of this encounter today amounted to 25 minutes. This time included time spent with the patient, prep work, ordering tests, and performing post visit documentation.

## 2025-04-01 NOTE — LETTER
4/1/2025      Car Torres  [address]  [facility] [address]      Dear Colleague,    Thank you for referring your patient, Car Torres, to the [facility]UR[facility] [place]. Please see a copy of my visit note below.    [facility] Neurology Follow Up Visit    Car Torres MRN# [id number]   Age: 64 year old YOB: 1960     Brief history of symptoms: The patient was initially seen in neurologic consultation on 12/23/2020 for evaluation of cognitive changes. Please see the comprehensive neurologic consultation note from that date in the Epic records for details.          Assessment and Plan:   Assessment:  Patient is a 63 y/o male who presents for follow-up of cognitive concerns. Cognitive changes of paranoia, hallucinations, delusions developed initially around spring 2020, with significant worsening by fall 2020. He had no prior history of psychiatric disease. Initial neuropsychological testing showed dysfunction in frontal and subcortical networks. There was concern for a neurodegenerative process given presentation. Thankfully PET scan and CSF testing was reassuring without evidence of neurodegenerative process and repeat neuropsychology testing showed improvement in some areas.     He is doing well at follow-up today. Cognitive changes in 2020 were likely related to episode of major depression and Kratom usage. He has significant improvement of cognition with time.     Plan:  - Call with new/worsening symptoms    Follow up in Neurology clinic as needed if new issues arise    Miguel Varela MD   of Neurology  [facility]  ---------------------------------------------    Interval history:   Patient reports he is doing well today neurologically.     He is doing better.     He has lost some weight since last visit.     Sleep is good at night. Bathroom keeps him up but he feels refreshed in the morning.     Mood is alright, some days crabby. He takes  hydroxyzine as needed. He went off of Buspar and this has been ok.     He denies any hallucinations.     He hears a hissing in the ears. It can change pitch. It doesn't really bother him.       Past Medical History:     Patient Active Problem List   Diagnosis     Sciatica     Morbid obesity (H)     Hyperlipidemia LDL goal <130     Hypertension goal BP (blood pressure) < 140/90     CKD (chronic kidney disease) stage 2, GFR 60-89 ml/min     Renal cyst     Diverticulosis of large intestine without hemorrhage     Pulmonary nodule     History of sinus bradycardia     ED (erectile dysfunction)     TIA (obstructive sleep apnea)     Major depressive disorder, recurrent episode, mild with atypical features (H)     Moderate dementia without behavioral disturbance, psychotic disturbance, mood disturbance, or anxiety, unspecified dementia type (H)     Lisfranc dislocation, left, initial encounter     Lisfranc dislocation, left, subsequent encounter     Unstable angina (H)     Coronary artery disease     MCKNIGHT (dyspnea on exertion)     Status post coronary angiogram     Anginal equivalent     Near syncope     Paroxysmal atrial fibrillation (H)     Sinus node dysfunction (H)     Presence of Watchman left atrial appendage closure device     Major depressive disorder, recurrent episode, moderate (H)     SHAHNAZ (generalized anxiety disorder)     Past Medical History:   Diagnosis Date     CKD (chronic kidney disease) stage 2, GFR 60-89 ml/min 10/21/2010    60 to 80 - have discussed Lisinopril - will consider        Coronary artery disease 01/26/2024     Dementia (H)      Depressive disorder      Diverticulosis of large intestine without hemorrhage 11/18/2015    Incidental finding on CT scan        Heart disease 05/08/2018     Hypercholesterolemia      Hypertension goal BP (blood pressure) < 140/90      Nonspecific abnormal electrocardiogram (ECG) (EKG) 08/23/2007    Stress testing normal.      Paroxysmal atrial fibrillation (H) 03/17/2024      Sinus node dysfunction (H) 05/23/2024     Sleep apnea     uses a c-pap, severe     Status post coronary angiogram 01/26/2024        Past Surgical History:     Past Surgical History:   Procedure Laterality Date     COLONOSCOPY N/A 12/22/2020    Procedure: COLONOSCOPY, WITH POLYPECTOMY, CLIP;  Surgeon: Mihir Lang MD;  Location: [facility] OR     COLONOSCOPY N/A 1/10/2022    Procedure: COLONOSCOPY, WITH POLYPECTOMY AND BIOPSY;  Surgeon: Mihir Lang MD;  Location:  GI     CV CORONARY ANGIOGRAM N/A 1/26/2024    Procedure: Coronary Angiogram;  Surgeon: Elton Wright MD;  Location:  HEART CARDIAC CATH LAB     CV CORONARY LITHOTRIPSY PCI N/A 3/8/2024    Procedure: Percutaneous Coronary Intervention - Lithotripsy;  Surgeon: Elton Wright MD;  Location:  HEART CARDIAC CATH LAB     CV INSTANTANEOUS WAVE-FREE RATIO N/A 1/26/2024    Procedure: Instantaneous Wave-Free Ratio;  Surgeon: Elton Wright MD;  Location:  HEART CARDIAC CATH LAB     CV INTRAVASULAR ULTRASOUND N/A 3/8/2024    Procedure: Intravascular Ultrasound;  Surgeon: Elton Wright MD;  Location: [facility] CARDIAC CATH LAB     CV LEFT ATRIAL APPENDAGE CLOSURE N/A 1/23/2025    Procedure: Left Atrial Appendage Closure;  Surgeon: Elton Wright MD;  Location: [facility] OR     CV PCI N/A 3/8/2024    Procedure: Percutaneous Coronary Intervention;  Surgeon: Elton Wright MD;  Location:  HEART CARDIAC CATH LAB     CV RIGHT HEART CATH MEASUREMENTS RECORDED N/A 1/26/2024    Procedure: Right Heart Catheterization;  Surgeon: Elton Wright MD;  Location: [facility] CARDIAC CATH LAB     EP ABLATION PULMONARY VEIN ISOLATION N/A 5/30/2024    Procedure: Ablation Atrial Fibrillation;  Surgeon: Sandee Sheldon MD;  Location: [facility] CATH LAB CV     EP LEFT ATRIAL APPENDAGE CLOSURE N/A 1/23/2025    Procedure: Left Atrial Appendage Closure;  Surgeon: Elton Wright MD;  Location: [facility] OR     ESOPHAGOSCOPY, GASTROSCOPY, DUODENOSCOPY  (EGD), COMBINED N/A 2021    Procedure: ESOPHAGOGASTRODUODENOSCOPY, WITH BIOPSY;  Surgeon: Mihir Lang MD;  Location: [facility] OR     OPEN REDUCTION INTERNAL FIXATION FOOT Left 2023    Procedure: LISFRANC OPEN REDUCTION INTERNAL FIXATION, LEFT FOOT;  Surgeon: Bailey Ruiz DPM;  Location: [facility] OR     SURGICAL HISTORY OF -       surgery on left index finger        Social History:     Social History     Tobacco Use     Smoking status: Former     Current packs/day: 0.00     Average packs/day: 1 pack/day for 25.0 years (25.0 ttl pk-yrs)     Types: Cigarettes, Cigars     Start date: 1973     Quit date: 1998     Years since quittin.1     Passive exposure: Past     Smokeless tobacco: Never     Tobacco comments:     N/A not a smoker   Vaping Use     Vaping status: Never Used   Substance Use Topics     Alcohol use: Yes     Alcohol/week: 10.0 standard drinks of alcohol     Comment: less than 6 per week     Drug use: Not Currently     Types: Amphetamines     Comment: Jennytafrica        Family History:     Family History   Problem Relation Age of Onset     Cancer Mother         uterine     Other Cancer Mother         endometrial     Cerebrovascular Disease Mother      Substance Abuse Mother         Alcohol     C.A.D. Father      Hypertension Father      Hyperlipidemia Father      Breast Cancer Maternal Grandmother      Other Cancer Maternal Grandmother         Lung/brain     Substance Abuse Paternal Grandfather      Hypertension Brother      Substance Abuse Brother         Alcohol     Hyperlipidemia Brother      Breast Cancer Other      Breast Cancer Cousin      Other Cancer Other      Cerebrovascular Disease Other         Maternal Aunt     Glaucoma No family hx of      Macular Degeneration No family hx of         Medications:     Current Outpatient Medications   Medication Sig Dispense Refill     amoxicillin (AMOXIL) 875 MG tablet Take 1 tablet (875 mg) by mouth 2 times daily for 10 days. 20 tablet 0      aspirin 81 MG EC tablet Take 1 tablet (81 mg) by mouth daily.       atorvastatin (LIPITOR) 40 MG tablet Take 1.5 tablets (60 mg) by mouth daily. 135 tablet 3     Cholecalciferol (VITAMIN D PO) Take 5,000 Units by mouth daily One tablet twice daily       clopidogrel (PLAVIX) 75 MG tablet Take a loading dose of 4 tablets (300 mg) on the first day then decrease to 1 tablet (75 mg) daily going forward 94 tablet 3     co-enzyme Q-10 100 MG CAPS capsule Take 100 mg by mouth daily       hydroCHLOROthiazide (HYDRODIURIL) 25 MG tablet Take 1 tablet (25 mg) by mouth daily. 90 tablet 3     hydrOXYzine HCl (ATARAX) 10 MG tablet Take 1 tablet (10 mg) by mouth 2 times daily as needed for anxiety 180 tablet 5     insulin pen needle (32G X 6 MM) 32G X 6 MM miscellaneous Use 100 pen needles daily or as directed. 100 each 3     lisinopril (ZESTRIL) 20 MG tablet Take 1 tablet (20 mg) by mouth daily. 90 tablet 4     methylPREDNISolone (MEDROL DOSEPAK) 4 MG tablet therapy pack Follow Package Directions 21 tablet 0     metoprolol tartrate (LOPRESSOR) 25 MG tablet Take 1 tablet (25 mg) by mouth every 8 hours as needed (Atrial fibrillation with RVR, HR > 120). 30 tablet 4     MULTI VITAMIN MENS PO Take 1 tablet by mouth daily       phentermine 15 MG capsule Take 1 capsule (15 mg) by mouth every morning. 30 capsule 5     pramipexole (MIRAPEX) 0.25 MG tablet Take 1 tablet (0.25 mg) by mouth at bedtime. 90 tablet 2     sildenafil (VIAGRA) 100 MG tablet Take 1 tablet (100 mg) by mouth daily as needed. 20 tablet 4     topiramate (TOPAMAX) 25 MG tablet 25 mg at bedtime for 1 week, 50 mg at bedtime for 1 week and 75 mg daily at bedtime thereafter 90 tablet 5     No current facility-administered medications for this visit.        Allergies:   No Known Allergies     Review of Systems:   As above     Physical Exam:   Vitals: BP (!) 84/49 (BP Location: Right arm, Patient Position: Sitting, Cuff Size: Adult Large)   Pulse 65   Wt (!) 145.3 kg (320  lb 6.4 oz)   BMI 44.69 kg/m     Repeat blood pressure normal  General: Seated comfortably in no acute distress.  Lungs: breathing comfortably  Neurologic:     Mental Status: Alert and fully oriented. Attentive. 4/5 on delayed recall. Language is fluent. Repetition and naming intact. Serial 7s; not able to do additional values beyond 93. Cube copy and clock drawing intact.      Data reviewed on previous visits    Imaging:  MRI brain 10/16/2020  IMPRESSION:  1. Exam mildly limited by motion artifact.  2. Few minimal nonspecific white matter lesions.  3. No evidence for intracranial hemorrhage, acute infarct, or any  focal mass lesions.     Laboratory:  TSH, CMP wnl 8/2020  B12, CBC wnl 12/2020    PET brain 4/2021  IMPRESSION:  No significant metabolic deficits when compared to  control database. This may be due to the possible underlying dementia  being too early to characterize, versus the patient's symptomatology  being due to some other process than dementia.    Labs 9/2023 - [facility] Alzheimer's disease evaluation normal, Autoimmune encephalopathy panel pending, WBC 0, normal protein/glucose, Neurofilament light chain normal    Pertinent Investigations since last visit:   None      The total time of this encounter today amounted to 25 minutes. This time included time spent with the patient, prep work, ordering tests, and performing post visit documentation.      Again, thank you for allowing me to participate in the care of your patient.        Sincerely,        Miguel Varela MD    Electronically signed

## 2025-04-02 ENCOUNTER — MYC MEDICAL ADVICE (OUTPATIENT)
Dept: FAMILY MEDICINE | Facility: CLINIC | Age: 65
End: 2025-04-02

## 2025-04-23 ENCOUNTER — OFFICE VISIT (OUTPATIENT)
Dept: FAMILY MEDICINE | Facility: CLINIC | Age: 65
End: 2025-04-23
Payer: MEDICARE

## 2025-04-23 VITALS
SYSTOLIC BLOOD PRESSURE: 128 MMHG | DIASTOLIC BLOOD PRESSURE: 67 MMHG | OXYGEN SATURATION: 99 % | HEART RATE: 62 BPM | BODY MASS INDEX: 44.1 KG/M2 | TEMPERATURE: 98.2 F | WEIGHT: 315 LBS | HEIGHT: 71 IN | RESPIRATION RATE: 18 BRPM

## 2025-04-23 DIAGNOSIS — J30.0 VASOMOTOR RHINITIS: ICD-10-CM

## 2025-04-23 DIAGNOSIS — G47.33 OSA (OBSTRUCTIVE SLEEP APNEA): Chronic | ICD-10-CM

## 2025-04-23 DIAGNOSIS — N18.2 CKD (CHRONIC KIDNEY DISEASE) STAGE 2, GFR 60-89 ML/MIN: Primary | ICD-10-CM

## 2025-04-23 DIAGNOSIS — G25.81 RESTLESS LEG SYNDROME: ICD-10-CM

## 2025-04-23 DIAGNOSIS — Z95.818 PRESENCE OF WATCHMAN LEFT ATRIAL APPENDAGE CLOSURE DEVICE: ICD-10-CM

## 2025-04-23 DIAGNOSIS — I48.0 PAROXYSMAL ATRIAL FIBRILLATION (H): ICD-10-CM

## 2025-04-23 DIAGNOSIS — I10 HYPERTENSION GOAL BP (BLOOD PRESSURE) < 140/90: Chronic | ICD-10-CM

## 2025-04-23 DIAGNOSIS — N52.01 ERECTILE DYSFUNCTION DUE TO ARTERIAL INSUFFICIENCY: ICD-10-CM

## 2025-04-23 PROCEDURE — 1126F AMNT PAIN NOTED NONE PRSNT: CPT | Performed by: INTERNAL MEDICINE

## 2025-04-23 PROCEDURE — 99214 OFFICE O/P EST MOD 30 MIN: CPT | Performed by: INTERNAL MEDICINE

## 2025-04-23 PROCEDURE — 3074F SYST BP LT 130 MM HG: CPT | Performed by: INTERNAL MEDICINE

## 2025-04-23 PROCEDURE — 3078F DIAST BP <80 MM HG: CPT | Performed by: INTERNAL MEDICINE

## 2025-04-23 RX ORDER — IPRATROPIUM BROMIDE 42 UG/1
2 SPRAY, METERED NASAL 4 TIMES DAILY
Qty: 15 ML | Refills: 2 | Status: SHIPPED | OUTPATIENT
Start: 2025-04-23

## 2025-04-23 ASSESSMENT — PAIN SCALES - GENERAL: PAINLEVEL_OUTOF10: NO PAIN (0)

## 2025-04-23 NOTE — PATIENT INSTRUCTIONS
Let try decreasing the hydrochlorothiazide in 1/2 ( 12.5 mg daily )   The fluctuations are likely from the phentermine ( and don't use sudafed while on this med)   Rinse sinuses at bedtime with saline bottle and then use the new medication

## 2025-04-23 NOTE — PROGRESS NOTES
Assessment & Plan   Problem List Items Addressed This Visit       Hypertension goal BP (blood pressure) < 140/90 (Chronic)    CKD (chronic kidney disease) stage 2, GFR 60-89 ml/min - Primary (Chronic)    TIA (obstructive sleep apnea) (Chronic)    Paroxysmal atrial fibrillation (H)    Presence of Watchman left atrial appendage closure device     Other Visit Diagnoses       Vasomotor rhinitis        Relevant Medications    ipratropium (ATROVENT) 0.06 % nasal spray           Patient with some ED.  Is on b- blocker .  Wondering if he can come off diuretc - he found some reports suggesting it as cause   Reviewed that that is not a common side effect with that class , however his pressures are good and he had low pressures the other day     And then very high pressures     Changes in BP likely from phentermine and pseudofed and recent steroid treatment     Reviewed staying off pseudofed while on phentermine and ok to decrease dose of hydrochlorothiazide               Amber Edwards is a 64 year old, presenting for the following health issues:  Hypertension        4/23/2025    12:16 PM   Additional Questions   Roomed by Cande     History of Present Illness       Hypertension: He presents for follow up of hypertension.  He does check blood pressure  regularly outside of the clinic. Outside blood pressures have been over 140/90. He does not follow a low salt diet.     Vascular Disease:  He presents for follow up of vascular disease.     He never takes nitroglycerin. He takes daily aspirin.    He eats 0-1 servings of fruits and vegetables daily.He consumes 1 sweetened beverage(s) daily.He exercises with enough effort to increase his heart rate 9 or less minutes per day.  He exercises with enough effort to increase his heart rate 3 or less days per week.   He is taking medications regularly.        Blood pressure went very low and then went high   Taking some supplements - testoserone boosting -               Review of  "Systems  Constitutional, HEENT, cardiovascular, pulmonary, gi and gu systems are negative, except as otherwise noted.      Objective    /67 (BP Location: Left arm, Patient Position: Sitting, Cuff Size: Adult Large)   Pulse 62   Temp 98.2  F (36.8  C) (Temporal)   Resp 18   Ht 1.803 m (5' 11\")   Wt (!) 146.1 kg (322 lb)   SpO2 99%   BMI 44.91 kg/m    Body mass index is 44.91 kg/m .  Physical Exam   GENERAL: alert and no distress  EYES: Eyes grossly normal to inspection, PERRL and conjunctivae and sclerae normal  HENT: ear canals and TM's normal, nose and mouth without ulcers or lesions  NECK: no adenopathy, no asymmetry, masses, or scars  RESP: lungs clear to auscultation - no rales, rhonchi or wheezes  CV: regular rate and rhythm, normal S1 S2, no S3 or S4, no murmur, click or rub, no peripheral edema  ABDOMEN: soft, nontender, no hepatosplenomegaly, no masses and bowel sounds normal  MS: no gross musculoskeletal defects noted, no edema  SKIN: no suspicious lesions or rashes  NEURO: Normal strength and tone, mentation intact and speech normal  BACK: no CVA tenderness, no paralumbar tenderness  PSYCH: mentation appears normal, affect normal/bright  LYMPH: no cervical, supraclavicular, axillary, or inguinal adenopathy    No results found for any visits on 04/23/25.        Signed Electronically by: Mihir Perez MD    "

## 2025-04-24 RX ORDER — SILDENAFIL 100 MG/1
100 TABLET, FILM COATED ORAL
Qty: 20 TABLET | Refills: 4 | Status: SHIPPED | OUTPATIENT
Start: 2025-04-24

## 2025-04-24 RX ORDER — PRAMIPEXOLE DIHYDROCHLORIDE 0.25 MG/1
TABLET ORAL
Qty: 90 TABLET | Refills: 2 | Status: SHIPPED | OUTPATIENT
Start: 2025-04-24

## 2025-05-06 ENCOUNTER — PATIENT OUTREACH (OUTPATIENT)
Dept: CARE COORDINATION | Facility: CLINIC | Age: 65
End: 2025-05-06
Payer: MEDICARE

## 2025-05-10 ENCOUNTER — HEALTH MAINTENANCE LETTER (OUTPATIENT)
Age: 65
End: 2025-05-10

## 2025-06-07 PROBLEM — Z98.890 STATUS POST CORONARY ANGIOGRAM: Status: RESOLVED | Noted: 2024-01-26 | Resolved: 2025-06-07

## 2025-06-07 PROBLEM — Z86.79 HISTORY OF SINUS BRADYCARDIA: Status: RESOLVED | Noted: 2018-06-07 | Resolved: 2025-06-07

## 2025-06-07 PROBLEM — I20.89 ANGINAL EQUIVALENT: Status: RESOLVED | Noted: 2024-01-26 | Resolved: 2025-06-07

## 2025-06-07 PROBLEM — R06.09 DOE (DYSPNEA ON EXERTION): Status: RESOLVED | Noted: 2024-01-26 | Resolved: 2025-06-07

## 2025-06-07 PROBLEM — F33.0: Status: RESOLVED | Noted: 2021-12-06 | Resolved: 2025-06-07

## 2025-06-07 PROBLEM — S93.325A LISFRANC DISLOCATION, LEFT, INITIAL ENCOUNTER: Status: RESOLVED | Noted: 2022-12-28 | Resolved: 2025-06-07

## 2025-06-07 PROBLEM — I20.0 UNSTABLE ANGINA (H): Status: RESOLVED | Noted: 2024-01-26 | Resolved: 2025-06-07

## 2025-06-07 PROBLEM — R55 NEAR SYNCOPE: Status: RESOLVED | Noted: 2024-03-17 | Resolved: 2025-06-07

## 2025-06-14 ENCOUNTER — MYC MEDICAL ADVICE (OUTPATIENT)
Dept: ENDOCRINOLOGY | Facility: CLINIC | Age: 65
End: 2025-06-14
Payer: MEDICARE

## 2025-06-14 DIAGNOSIS — G47.33 OSA (OBSTRUCTIVE SLEEP APNEA): ICD-10-CM

## 2025-06-14 DIAGNOSIS — E66.813 CLASS 3 SEVERE OBESITY WITH SERIOUS COMORBIDITY AND BODY MASS INDEX (BMI) OF 45.0 TO 49.9 IN ADULT, UNSPECIFIED OBESITY TYPE (H): ICD-10-CM

## 2025-06-16 ENCOUNTER — LAB (OUTPATIENT)
Dept: LAB | Facility: CLINIC | Age: 65
End: 2025-06-16
Payer: MEDICARE

## 2025-06-16 ENCOUNTER — RESULTS FOLLOW-UP (OUTPATIENT)
Dept: FAMILY MEDICINE | Facility: CLINIC | Age: 65
End: 2025-06-16

## 2025-06-16 DIAGNOSIS — N18.2 CKD (CHRONIC KIDNEY DISEASE) STAGE 2, GFR 60-89 ML/MIN: Primary | ICD-10-CM

## 2025-06-16 DIAGNOSIS — E78.5 DYSLIPIDEMIA: ICD-10-CM

## 2025-06-16 LAB
CHOLEST SERPL-MCNC: 164 MG/DL
FASTING STATUS PATIENT QL REPORTED: YES
HDLC SERPL-MCNC: 66 MG/DL
LDLC SERPL CALC-MCNC: 81 MG/DL
NONHDLC SERPL-MCNC: 98 MG/DL
TRIGL SERPL-MCNC: 87 MG/DL

## 2025-06-16 PROCEDURE — 80061 LIPID PANEL: CPT

## 2025-06-16 PROCEDURE — 36415 COLL VENOUS BLD VENIPUNCTURE: CPT

## 2025-06-16 RX ORDER — TOPIRAMATE 25 MG/1
75 TABLET, FILM COATED ORAL AT BEDTIME
Qty: 90 TABLET | Refills: 3 | Status: SHIPPED | OUTPATIENT
Start: 2025-06-16

## 2025-06-16 NOTE — TELEPHONE ENCOUNTER
Refill of Topiramate sent to pharmacy with updated instructions.  Patient is currently taking 3 tablets at bedtime.  Pharmacy confused since Rx still has ramp dosing.  Rx corrected and sent to pharmacy with refills until pt next appt in Sept.

## 2025-06-17 DIAGNOSIS — E78.5 DYSLIPIDEMIA: Primary | ICD-10-CM

## 2025-06-17 RX ORDER — ATORVASTATIN CALCIUM 80 MG/1
80 TABLET, FILM COATED ORAL DAILY
Qty: 90 TABLET | Refills: 3 | Status: SHIPPED | OUTPATIENT
Start: 2025-06-17

## 2025-06-17 NOTE — TELEPHONE ENCOUNTER
Jose Juan Joy M.D.  Cardiovascular Division  [facility]     Addendum 6/17/2025:     Current LDL 6/16/2025 was 81, goal LDL less than 70 secondary to presence of underlying coronary disease.  Patient supposedly taking Lipitor 60 mg a day     Plan:     1.  Please confirm dose of Lipitor patient is currently taking.       If currently taking 60 mg a day, then increase to 80 mg a day and recheck fasting lipids in 3 months.       If only taking 40 mg a day, then increase to 60 mg a day and recheck fasting lipids in 3 months.

## 2025-06-17 NOTE — TELEPHONE ENCOUNTER
From: Jose Juan Joy MD   Sent: 6/17/2025   8:27 AM CDT   To: [facility]     Please see progress note addendum

## 2025-06-17 NOTE — TELEPHONE ENCOUNTER
Telephone call to patient to relay message from provider.  No answer.  Left VM requesting a return call.  Number provided.

## 2025-06-17 NOTE — TELEPHONE ENCOUNTER
Return call from patient and wife.  After verification, pt notified of results per provider.  Wife confirmed that patient is currently taking 60 mg of Lipitor daily.  Pt and wife verbalize understanding and have no further questions or concerns.   They did ask that a new rx be sent to [facility] in [place] as the patient does not have enough Lipitor to get him by for 3 mos until the next lab.   Rx pended to provider.

## 2025-06-17 NOTE — TELEPHONE ENCOUNTER
Telephone call to patient to find out what dosage of Lipitor the patient is currently taking and to relay the providers instructions.  No answer.  Left a detailed VM requesting a return call.  Number provided.

## 2025-07-02 ENCOUNTER — MEDICAL CORRESPONDENCE (OUTPATIENT)
Dept: HEALTH INFORMATION MANAGEMENT | Facility: CLINIC | Age: 65
End: 2025-07-02

## 2025-07-22 ENCOUNTER — OFFICE VISIT (OUTPATIENT)
Dept: FAMILY MEDICINE | Facility: CLINIC | Age: 65
End: 2025-07-22
Payer: MEDICARE

## 2025-07-22 VITALS
HEART RATE: 68 BPM | BODY MASS INDEX: 42.5 KG/M2 | OXYGEN SATURATION: 98 % | TEMPERATURE: 97.7 F | SYSTOLIC BLOOD PRESSURE: 136 MMHG | RESPIRATION RATE: 20 BRPM | DIASTOLIC BLOOD PRESSURE: 72 MMHG | WEIGHT: 313.8 LBS | HEIGHT: 72 IN

## 2025-07-22 DIAGNOSIS — H69.93 DYSFUNCTION OF BOTH EUSTACHIAN TUBES: Primary | ICD-10-CM

## 2025-07-22 PROCEDURE — 3075F SYST BP GE 130 - 139MM HG: CPT | Performed by: INTERNAL MEDICINE

## 2025-07-22 PROCEDURE — 3078F DIAST BP <80 MM HG: CPT | Performed by: INTERNAL MEDICINE

## 2025-07-22 PROCEDURE — 99213 OFFICE O/P EST LOW 20 MIN: CPT | Performed by: INTERNAL MEDICINE

## 2025-07-22 RX ORDER — AZELASTINE 1 MG/ML
1 SPRAY, METERED NASAL 2 TIMES DAILY
Qty: 30 ML | Refills: 4 | Status: SHIPPED | OUTPATIENT
Start: 2025-07-22

## 2025-07-22 NOTE — PROGRESS NOTES
"  Assessment & Plan   Problem List Items Addressed This Visit    None  Visit Diagnoses         Dysfunction of both eustachian tubes    -  Primary    Relevant Medications    azelastine (ASTELIN) 0.1 % nasal spray           Consider audiology eval if not improved         Subjective   Jerry is a 65 year old, presenting for the following health issues:  Ear Problem (Hearing hissing in his ears since a cold this spring./No pain, no issues with hearing )        7/22/2025     7:10 AM   Additional Questions   Roomed by sloane     Ear Problem    History of Present Illness       Reason for visit:  Ringing in my ears    He eats 0-1 servings of fruits and vegetables daily.He consumes 1 sweetened beverage(s) daily.He exercises with enough effort to increase his heart rate 20 to 29 minutes per day.  He exercises with enough effort to increase his heart rate 4 days per week.   He is taking medications regularly.        Hearing was checked at work - hissing and ringing   Cold this spring it started   Sound machine at night time   Corrugating plant and industrial noise   Wore hearing protection     Hobbies shooting and lous cars and motor cycles     No loss of balance and dizziness  Little bit congestion   Pseudofed -  nasal spray flonase                     Review of Systems  Constitutional, HEENT, cardiovascular, pulmonary, gi and gu systems are negative, except as otherwise noted.      Objective    /72   Pulse 68   Temp 97.7  F (36.5  C) (Temporal)   Resp 20   Ht 1.816 m (5' 11.5\")   Wt (!) 142.3 kg (313 lb 12.8 oz)   SpO2 98%   BMI 43.16 kg/m    Body mass index is 43.16 kg/m .  Physical Exam   GENERAL: alert and no distress  EYES: Eyes grossly normal to inspection, PERRL and conjunctivae and sclerae normal  HENT: ear canals and TM's normal, nose and mouth without ulcers or lesions  NECK: no adenopathy, no asymmetry, masses, or scars  RESP: lungs clear to auscultation - no rales, rhonchi or wheezes  CV: regular rate " and rhythm, normal S1 S2, no S3 or S4, no murmur, click or rub, no peripheral edema  ABDOMEN: soft, nontender, no hepatosplenomegaly, no masses and bowel sounds normal  MS: no gross musculoskeletal defects noted, no edema  SKIN: no suspicious lesions or rashes  NEURO: Normal strength and tone, mentation intact and speech normal  BACK: no CVA tenderness, no paralumbar tenderness  PSYCH: mentation appears normal, affect normal/bright  LYMPH: no cervical, supraclavicular, axillary, or inguinal adenopathy    No results found for any visits on 07/22/25.        Signed Electronically by: Mihir Perez MD

## 2025-07-31 ENCOUNTER — NURSE TRIAGE (OUTPATIENT)
Dept: NURSING | Facility: CLINIC | Age: 65
End: 2025-07-31
Payer: MEDICARE

## 2025-07-31 NOTE — TELEPHONE ENCOUNTER
Wife calling.  My Chart message had been sent to provider with his blood pressure readings and they are low. Wife does not think their machine is giving him an accurate reading.   He was to recheck the blood pressure and he went to [facility], ~5:30pm. Three readings: 98/52, 89/53, 90/48. She thinks the pulse was in the 60s. Hx of high blood pressure, he takes RX:Lisinopril 20mg daily, hydrochlorothiazide 25mg daily.   136/72 with office visit 7/22/25. His ears are still ringing a little bit. He is a little lightheaded.  Wife has ordered a different BP cuff for at home use. Recommended he bring equipment to clinic to check for accuracy. Will go to [facility] to recheck blood pressure again tonight and in the am. If blood pressure is still very low, hold meds and call provider for instructions.     Note found by chart review from 6:11pm from clinic today to schedule a clinic appointment as soon as possible with one of our providers.   He takes his blood pressure in the morning. He will hold tomorrow am until he discusses it with provider.  Encouraged fluid intake, wife does not think he drinks very much..     Several days ago he had a bad bout of diarrhea and had some rectal bleeding: on his underwear. Diarrhea was in the afternoon, stopped later that evening (1 day). His stool has been normal color and consistency.     Call transferred to  for appointment tomorrow am. (Any provider available in office).   911 and ER sx discussed.     Tiffani Craig RN Triage Nurse Advisor 6:37 PM 7/31/2025  Reason for Disposition   [1] Fall in systolic BP > 20 mm Hg from normal AND [2] dizzy, lightheaded, or weak    Additional Information   Negative: Started suddenly after an allergic medicine, an allergic food, or bee sting   Negative: Shock suspected (e.g., cold/pale/clammy skin, too weak to stand, low BP, rapid pulse)   Negative: Difficult to awaken or acting confused (e.g., disoriented, slurred speech)   Negative: Fainted    "Negative: [1] Systolic BP < 90 AND [2] dizzy, lightheaded, or weak   Negative: Chest pain   Negative: Bleeding (e.g., vomiting blood, rectal bleeding or tarry stools, severe vaginal bleeding)(Exception: Fainted from sight of small amount of blood; small cut or abrasion.)   Negative: Extra heartbeats, irregular heart beating, or heart is beating very fast  (i.e., \"palpitations\")   Negative: Sounds like a life-threatening emergency to the triager   Negative: [1] Systolic BP < 80 AND [2] NOT dizzy, lightheaded or weak   Negative: Abdominal pain   Negative: Fever > 100.4 F (38.0 C)   Negative: Major surgery in the past month   Negative: [1] Drinking very little AND [2] dehydration suspected (e.g., no urine > 12 hours, very dry mouth, very lightheaded)    Protocols used: Blood Pressure - Low-A-AH    "

## 2025-08-04 ASSESSMENT — SLEEP AND FATIGUE QUESTIONNAIRES
HOW LIKELY ARE YOU TO NOD OFF OR FALL ASLEEP WHILE SITTING AND TALKING TO SOMEONE: WOULD NEVER DOZE
HOW LIKELY ARE YOU TO NOD OFF OR FALL ASLEEP WHILE WATCHING TV: MODERATE CHANCE OF DOZING
HOW LIKELY ARE YOU TO NOD OFF OR FALL ASLEEP IN A CAR, WHILE STOPPED FOR A FEW MINUTES IN TRAFFIC: WOULD NEVER DOZE
HOW LIKELY ARE YOU TO NOD OFF OR FALL ASLEEP WHILE SITTING INACTIVE IN A PUBLIC PLACE: MODERATE CHANCE OF DOZING
HOW LIKELY ARE YOU TO NOD OFF OR FALL ASLEEP WHEN YOU ARE A PASSENGER IN A CAR FOR AN HOUR WITHOUT A BREAK: WOULD NEVER DOZE
HOW LIKELY ARE YOU TO NOD OFF OR FALL ASLEEP WHILE SITTING QUIETLY AFTER LUNCH WITHOUT ALCOHOL: WOULD NEVER DOZE
HOW LIKELY ARE YOU TO NOD OFF OR FALL ASLEEP WHILE SITTING AND READING: HIGH CHANCE OF DOZING
HOW LIKELY ARE YOU TO NOD OFF OR FALL ASLEEP WHILE LYING DOWN TO REST IN THE AFTERNOON WHEN CIRCUMSTANCES PERMIT: SLIGHT CHANCE OF DOZING

## 2025-08-05 ENCOUNTER — VIRTUAL VISIT (OUTPATIENT)
Dept: SLEEP MEDICINE | Facility: CLINIC | Age: 65
End: 2025-08-05
Payer: MEDICARE

## 2025-08-05 VITALS — HEIGHT: 72 IN | BODY MASS INDEX: 41.45 KG/M2 | WEIGHT: 306 LBS

## 2025-08-05 DIAGNOSIS — G47.33 OSA (OBSTRUCTIVE SLEEP APNEA): Primary | ICD-10-CM

## 2025-08-05 PROCEDURE — 98000 SYNCH AUDIO-VIDEO NEW SF 15: CPT | Performed by: PHYSICIAN ASSISTANT

## 2025-08-05 PROCEDURE — 1125F AMNT PAIN NOTED PAIN PRSNT: CPT | Performed by: PHYSICIAN ASSISTANT

## 2025-08-05 ASSESSMENT — PAIN SCALES - GENERAL: PAINLEVEL_OUTOF10: MODERATE PAIN (6)

## 2025-08-25 ENCOUNTER — CARE COORDINATION (OUTPATIENT)
Dept: CARDIOLOGY | Facility: CLINIC | Age: 65
End: 2025-08-25

## 2025-08-25 ENCOUNTER — MYC REFILL (OUTPATIENT)
Dept: ENDOCRINOLOGY | Facility: CLINIC | Age: 65
End: 2025-08-25
Payer: MEDICARE

## 2025-08-25 ENCOUNTER — OFFICE VISIT (OUTPATIENT)
Dept: CARDIOLOGY | Facility: CLINIC | Age: 65
End: 2025-08-25
Attending: NURSE PRACTITIONER
Payer: MEDICARE

## 2025-08-25 VITALS
SYSTOLIC BLOOD PRESSURE: 128 MMHG | OXYGEN SATURATION: 99 % | WEIGHT: 312 LBS | DIASTOLIC BLOOD PRESSURE: 64 MMHG | BODY MASS INDEX: 42.91 KG/M2 | HEART RATE: 63 BPM

## 2025-08-25 DIAGNOSIS — E78.5 HYPERLIPIDEMIA LDL GOAL <70: ICD-10-CM

## 2025-08-25 DIAGNOSIS — I50.32 CHRONIC HEART FAILURE WITH PRESERVED EJECTION FRACTION (HFPEF) (H): ICD-10-CM

## 2025-08-25 DIAGNOSIS — G47.33 OSA (OBSTRUCTIVE SLEEP APNEA): ICD-10-CM

## 2025-08-25 DIAGNOSIS — Z95.818 PRESENCE OF WATCHMAN LEFT ATRIAL APPENDAGE CLOSURE DEVICE: Primary | ICD-10-CM

## 2025-08-25 DIAGNOSIS — E66.813 CLASS 3 SEVERE OBESITY WITH SERIOUS COMORBIDITY AND BODY MASS INDEX (BMI) OF 45.0 TO 49.9 IN ADULT, UNSPECIFIED OBESITY TYPE (H): ICD-10-CM

## 2025-08-25 LAB
ATRIAL RATE - MUSE: 63 BPM
DIASTOLIC BLOOD PRESSURE - MUSE: NORMAL MMHG
INTERPRETATION ECG - MUSE: NORMAL
P AXIS - MUSE: 48 DEGREES
PR INTERVAL - MUSE: 160 MS
QRS DURATION - MUSE: 136 MS
QT - MUSE: 448 MS
QTC - MUSE: 458 MS
R AXIS - MUSE: 22 DEGREES
SYSTOLIC BLOOD PRESSURE - MUSE: NORMAL MMHG
T AXIS - MUSE: 26 DEGREES
VENTRICULAR RATE- MUSE: 63 BPM

## 2025-08-25 PROCEDURE — G0463 HOSPITAL OUTPT CLINIC VISIT: HCPCS | Performed by: NURSE PRACTITIONER

## 2025-08-25 PROCEDURE — 93005 ELECTROCARDIOGRAM TRACING: CPT

## 2025-08-25 RX ORDER — PHENTERMINE HYDROCHLORIDE 15 MG/1
15 CAPSULE ORAL EVERY MORNING
Qty: 30 CAPSULE | Refills: 3 | Status: SHIPPED | OUTPATIENT
Start: 2025-08-25

## 2025-08-25 RX ORDER — FUROSEMIDE 20 MG/1
20 TABLET ORAL DAILY PRN
Qty: 30 TABLET | Refills: 1 | Status: SHIPPED | OUTPATIENT
Start: 2025-08-25

## 2025-08-25 ASSESSMENT — PAIN SCALES - GENERAL: PAINLEVEL_OUTOF10: NO PAIN (0)

## (undated) DEVICE — ESU GROUND PAD ADULT W/CORD E7507

## (undated) DEVICE — CATH BALLOON NC EMERGE 2.75X15MM H7493926715270

## (undated) DEVICE — ENDO BITE BLOCK ADULT OMNI-BLOC

## (undated) DEVICE — INTRODUCER SHEATH VASC CATH 8.5FR CARTO GIDE STH MED D138502

## (undated) DEVICE — CAST PADDING 4" STERILE CS9044

## (undated) DEVICE — DRAPE C-ARM 60X42" 1013

## (undated) DEVICE — Device

## (undated) DEVICE — CATH BALLO0N SHOCKWAVE C2+ 3.0 X12MM CORONARY C2PIVL3012

## (undated) DEVICE — SUCTION CATH AIRLIFE TRI-FLO W/CONTROL PORT 14FR  T60C

## (undated) DEVICE — SU MONOCRYL 3-0 PS-2 27" Y427H

## (undated) DEVICE — DRILL BIT ARTHREX BB TAK MTP AR-13226

## (undated) DEVICE — DRSG ABDOMINAL 07 1/2X8" 7197D

## (undated) DEVICE — DRAPE IOBAN INCISE 23X17" 6650EZ

## (undated) DEVICE — SU ETHILON 3-0 FS-1 18" 663G

## (undated) DEVICE — BNDG ELASTIC 4"X5YDS UNSTERILE 6611-40

## (undated) DEVICE — ENDO SNARE EXACTO COLD 9MM LOOP 2.4MMX230CM 00711115

## (undated) DEVICE — DRILL BIT ARTHREX BB TAK MTP THREADED AR-13226T

## (undated) DEVICE — DRAPE STERI TOWEL LG 1010

## (undated) DEVICE — TUBING SUCTION 12"X1/4" N612

## (undated) DEVICE — MANIFOLD KIT ANGIO AUTOMATED 014613

## (undated) DEVICE — CATH BALLOON EMERGE 2.75X12MMH7493918912270

## (undated) DEVICE — CAST PADDING 4" STERILE 9044S

## (undated) DEVICE — 8F SOUNDSTAR ECO ULTRASOUND CATHETER

## (undated) DEVICE — GUIDEWIRE ASAHI SION BLUE 190CM J TIP AHW14R104J

## (undated) DEVICE — SOL WATER IRRIG 1000ML BOTTLE 2F7114

## (undated) DEVICE — CUSTOM PACK EP

## (undated) DEVICE — LINEN TOWEL PACK X5 5464

## (undated) DEVICE — CATH ABLATION 8FR 115CM F-J CURVE BI-DIR QDOT MICRO D139504

## (undated) DEVICE — SU ETHILON 3-0 PS-1 18" 1663G

## (undated) DEVICE — WIRE GUIDE HI-TRQ  WHISPER MS JTIP 0.014"X190CM 1005357HJ

## (undated) DEVICE — CATH BALLOON EMERGE 2.0X8MM H7493918908200

## (undated) DEVICE — PROBE TEMP CIRCA S-CATH M ESPH 12-SNSR 3D MAP CS-21EP

## (undated) DEVICE — SYR 30ML SLIP TIP W/O NDL 302833

## (undated) DEVICE — KIT DVC ANGIO IBASIXCOMPAK 13INX20ML 3WAY IN4430

## (undated) DEVICE — GLIDEWIRE TERUMO .035X180CM 1.5,, J-TIP GR3525

## (undated) DEVICE — DRSG GAUZE 4X4" 3033

## (undated) DEVICE — INTRO SHEATH 6FRX25CM PINNACLE RSS606

## (undated) DEVICE — SLEEVE TR BAND RADIAL COMPRESSION DEVICE 29CM XX-RF06L

## (undated) DEVICE — KIT HAND CONTROL ACIST 016795

## (undated) DEVICE — GUIDEWIRE PROTRACK PGTL .025IN DIA 23

## (undated) DEVICE — GW VASC .035IN DIA 260CML 7CML 3 MM RADIUS J CURVE 502455

## (undated) DEVICE — GOWN IMPERVIOUS 2XL BLUE

## (undated) DEVICE — PATCH CARTO 3 EXTERNAL REFERENCE 3D MAPPING CREFP6

## (undated) DEVICE — PACK HEART LEFT CUSTOM

## (undated) DEVICE — DRSG ADAPTIC 3X8" 6113

## (undated) DEVICE — GUIDEWIRE OPTOWIRE 3 W/O GAUGE FACTOR CONNECTOR F1032

## (undated) DEVICE — VALVE HEMOSTASIS .096" COPILOT MECH 1003331

## (undated) DEVICE — NEEDLE 71CM NRG TRANSSEPTAL C0  8F FIX CURVE NRG-E-HF-71-C0

## (undated) DEVICE — DRAPE SHEET REV FOLD 3/4 9349

## (undated) DEVICE — DRILL BIT ARTHREX 1.7MM AR-8916-14

## (undated) DEVICE — CAST PADDING 4" UNSTERILE 9044

## (undated) DEVICE — ELECTRODE DEFIB CADENCE 22550R

## (undated) DEVICE — TRANSDUCER TRAY ARTERIAL 42646-06

## (undated) DEVICE — CATH ANGIO SUPERTORQUE PLUS JL4 6FRX100CM 533620

## (undated) DEVICE — GLOVE EXAM NITRILE LG PF LATEX FREE 5064

## (undated) DEVICE — BLADE KNIFE SURG 15 371115

## (undated) DEVICE — CATH EP 7FR X 115CM DECANAV CA

## (undated) DEVICE — CATH GUIDING BLUE YELLOW PTFE XB3.5 6FRX100CM 67005400

## (undated) DEVICE — SHTH INTRO 0.021IN ID 6FR DIA

## (undated) DEVICE — SHEATH GUIDING R2P DESTINATION 75CM 6FR STERIL GS-R6ST1C75W

## (undated) DEVICE — DRAPE STOCKINETTE IMPERVIOUS 12" 1587

## (undated) DEVICE — CATH GUIDING BLUE YELLOW PTFE JR4 6FRX100CM 67008200

## (undated) DEVICE — FASTENER CATH BALLOON CLAMPX2 STATLOCK 0684-00-493

## (undated) DEVICE — INTRO SHEATH 9FRX10CM PINNACLE RSS902

## (undated) DEVICE — SOL NACL 0.9% IRRIG 1000ML BOTTLE 2F7124

## (undated) DEVICE — INTRO SHEATH 8FRX10CM PINNACLE RSS802

## (undated) DEVICE — SUCTION MANIFOLD NEPTUNE 2 SYS 1 PORT 702-025-000

## (undated) DEVICE — TUBE SET SMARKABLATE IRRIGATION

## (undated) DEVICE — BNDG COBAN 4"X5YDS STERILE

## (undated) DEVICE — CATH GD ST+ 100CM 6FR JR5.0

## (undated) DEVICE — TOURNIQUET CUFF 34" STERILE

## (undated) DEVICE — KIT ENDO TURNOVER/PROCEDURE CARRY-ON 101822

## (undated) DEVICE — PACK EXTREMITY SOP15EXFSD

## (undated) DEVICE — CLIP HEMOCLIP ENDOSCOPIC INSTINCT 2.8X230CM INSC-7-230-SS

## (undated) DEVICE — PIN GUARD 0.062 GREEN C-062

## (undated) DEVICE — INTRO SHEATH 7FRX10CM PINNACLE RSS702

## (undated) DEVICE — TUBING PRESSURE 30"

## (undated) DEVICE — CATHETER OCTARAY LONG SPLINE CURVE F 3-3-3-3-3 D160906

## (undated) DEVICE — CATH US OD 5FR OD SEC 2.9FR EAGLE EYE PLATINUM 0.014 85900P

## (undated) DEVICE — SPECIMEN CONTAINER 3OZ W/FORMALIN 59901

## (undated) RX ORDER — ONDANSETRON 2 MG/ML
INJECTION INTRAMUSCULAR; INTRAVENOUS
Status: DISPENSED
Start: 2023-01-05

## (undated) RX ORDER — PROPOFOL 10 MG/ML
INJECTION, EMULSION INTRAVENOUS
Status: DISPENSED
Start: 2023-01-05

## (undated) RX ORDER — SODIUM CHLORIDE 9 MG/ML
INJECTION, SOLUTION INTRAVENOUS
Status: DISPENSED
Start: 2024-01-26

## (undated) RX ORDER — DEXAMETHASONE SODIUM PHOSPHATE 10 MG/ML
INJECTION, SOLUTION INTRAMUSCULAR; INTRAVENOUS
Status: DISPENSED
Start: 2024-05-30

## (undated) RX ORDER — LIDOCAINE 40 MG/G
CREAM TOPICAL
Status: DISPENSED
Start: 2024-01-26

## (undated) RX ORDER — NICARDIPINE HCL-0.9% SOD CHLOR 1 MG/10 ML
SYRINGE (ML) INTRAVENOUS
Status: DISPENSED
Start: 2024-01-26

## (undated) RX ORDER — SODIUM CHLORIDE 9 MG/ML
INJECTION, SOLUTION INTRAVENOUS
Status: DISPENSED
Start: 2024-03-08

## (undated) RX ORDER — LIDOCAINE HYDROCHLORIDE 10 MG/ML
INJECTION, SOLUTION EPIDURAL; INFILTRATION; INTRACAUDAL; PERINEURAL
Status: DISPENSED
Start: 2023-01-05

## (undated) RX ORDER — FENTANYL CITRATE-0.9 % NACL/PF 10 MCG/ML
PLASTIC BAG, INJECTION (ML) INTRAVENOUS
Status: DISPENSED
Start: 2024-03-08

## (undated) RX ORDER — ONDANSETRON 2 MG/ML
INJECTION INTRAMUSCULAR; INTRAVENOUS
Status: DISPENSED
Start: 2024-05-30

## (undated) RX ORDER — PHENYLEPHRINE HYDROCHLORIDE 10 MG/ML
INJECTION INTRAVENOUS
Status: DISPENSED
Start: 2024-05-30

## (undated) RX ORDER — FENTANYL CITRATE 50 UG/ML
INJECTION, SOLUTION INTRAMUSCULAR; INTRAVENOUS
Status: DISPENSED
Start: 2024-03-08

## (undated) RX ORDER — LIDOCAINE HYDROCHLORIDE 10 MG/ML
INJECTION, SOLUTION EPIDURAL; INFILTRATION; INTRACAUDAL; PERINEURAL
Status: DISPENSED
Start: 2024-05-30

## (undated) RX ORDER — HEPARIN SODIUM 10000 [USP'U]/100ML
INJECTION, SOLUTION INTRAVENOUS
Status: DISPENSED
Start: 2024-05-30

## (undated) RX ORDER — ISOPROTERENOL HYDROCHLORIDE 0.2 MG/ML
INJECTION, SOLUTION INTRAVENOUS
Status: DISPENSED
Start: 2024-05-30

## (undated) RX ORDER — NITROGLYCERIN 5 MG/ML
VIAL (ML) INTRAVENOUS
Status: DISPENSED
Start: 2024-01-26

## (undated) RX ORDER — ONDANSETRON 2 MG/ML
INJECTION INTRAMUSCULAR; INTRAVENOUS
Status: DISPENSED
Start: 2025-01-23

## (undated) RX ORDER — PROPOFOL 10 MG/ML
INJECTION, EMULSION INTRAVENOUS
Status: DISPENSED
Start: 2024-05-30

## (undated) RX ORDER — EPHEDRINE SULFATE 50 MG/ML
INJECTION, SOLUTION INTRAMUSCULAR; INTRAVENOUS; SUBCUTANEOUS
Status: DISPENSED
Start: 2025-01-23

## (undated) RX ORDER — FENTANYL CITRATE 50 UG/ML
INJECTION, SOLUTION INTRAMUSCULAR; INTRAVENOUS
Status: DISPENSED
Start: 2025-01-23

## (undated) RX ORDER — FENTANYL CITRATE 50 UG/ML
INJECTION, SOLUTION INTRAMUSCULAR; INTRAVENOUS
Status: DISPENSED
Start: 2023-01-05

## (undated) RX ORDER — ONDANSETRON 2 MG/ML
INJECTION INTRAMUSCULAR; INTRAVENOUS
Status: DISPENSED
Start: 2020-12-22

## (undated) RX ORDER — FENTANYL CITRATE 0.05 MG/ML
INJECTION, SOLUTION INTRAMUSCULAR; INTRAVENOUS
Status: DISPENSED
Start: 2023-01-05

## (undated) RX ORDER — LIDOCAINE HYDROCHLORIDE 10 MG/ML
INJECTION, SOLUTION EPIDURAL; INFILTRATION; INTRACAUDAL; PERINEURAL
Status: DISPENSED
Start: 2023-09-13

## (undated) RX ORDER — HYDROXYZINE HYDROCHLORIDE 25 MG/1
TABLET, FILM COATED ORAL
Status: DISPENSED
Start: 2025-01-23

## (undated) RX ORDER — ACETAMINOPHEN 325 MG/1
TABLET ORAL
Status: DISPENSED
Start: 2025-01-23

## (undated) RX ORDER — HEPARIN SODIUM 1000 [USP'U]/ML
INJECTION, SOLUTION INTRAVENOUS; SUBCUTANEOUS
Status: DISPENSED
Start: 2024-03-08

## (undated) RX ORDER — FENTANYL CITRATE 50 UG/ML
INJECTION, SOLUTION INTRAMUSCULAR; INTRAVENOUS
Status: DISPENSED
Start: 2024-05-30

## (undated) RX ORDER — ASPIRIN 325 MG
TABLET ORAL
Status: DISPENSED
Start: 2025-01-23

## (undated) RX ORDER — HYDROMORPHONE HYDROCHLORIDE 1 MG/ML
INJECTION, SOLUTION INTRAMUSCULAR; INTRAVENOUS; SUBCUTANEOUS
Status: DISPENSED
Start: 2023-01-05

## (undated) RX ORDER — CEFAZOLIN SODIUM/WATER 3 G/30 ML
SYRINGE (ML) INTRAVENOUS
Status: DISPENSED
Start: 2025-01-23

## (undated) RX ORDER — HEPARIN SODIUM 1000 [USP'U]/ML
INJECTION, SOLUTION INTRAVENOUS; SUBCUTANEOUS
Status: DISPENSED
Start: 2024-01-26

## (undated) RX ORDER — DEXAMETHASONE SODIUM PHOSPHATE 4 MG/ML
INJECTION, SOLUTION INTRA-ARTICULAR; INTRALESIONAL; INTRAMUSCULAR; INTRAVENOUS; SOFT TISSUE
Status: DISPENSED
Start: 2023-01-05

## (undated) RX ORDER — CEFAZOLIN SODIUM/WATER 3 G/30 ML
SYRINGE (ML) INTRAVENOUS
Status: DISPENSED
Start: 2023-01-05

## (undated) RX ORDER — ADENOSINE 3 MG/ML
INJECTION, SOLUTION INTRAVENOUS
Status: DISPENSED
Start: 2024-05-30

## (undated) RX ORDER — DEXAMETHASONE SODIUM PHOSPHATE 4 MG/ML
INJECTION, SOLUTION INTRA-ARTICULAR; INTRALESIONAL; INTRAMUSCULAR; INTRAVENOUS; SOFT TISSUE
Status: DISPENSED
Start: 2025-01-23

## (undated) RX ORDER — FENTANYL CITRATE 50 UG/ML
INJECTION, SOLUTION INTRAMUSCULAR; INTRAVENOUS
Status: DISPENSED
Start: 2021-01-28

## (undated) RX ORDER — DOBUTAMINE HYDROCHLORIDE 200 MG/100ML
INJECTION INTRAVENOUS
Status: DISPENSED
Start: 2018-08-15

## (undated) RX ORDER — FENTANYL CITRATE 50 UG/ML
INJECTION, SOLUTION INTRAMUSCULAR; INTRAVENOUS
Status: DISPENSED
Start: 2024-01-26

## (undated) RX ORDER — BUPIVACAINE HYDROCHLORIDE 5 MG/ML
INJECTION, SOLUTION EPIDURAL; INTRACAUDAL
Status: DISPENSED
Start: 2023-01-05

## (undated) RX ORDER — FENTANYL CITRATE 50 UG/ML
INJECTION, SOLUTION INTRAMUSCULAR; INTRAVENOUS
Status: DISPENSED
Start: 2020-12-22

## (undated) RX ORDER — METOPROLOL TARTRATE 1 MG/ML
INJECTION, SOLUTION INTRAVENOUS
Status: DISPENSED
Start: 2018-08-15

## (undated) RX ORDER — PROTAMINE SULFATE 10 MG/ML
INJECTION, SOLUTION INTRAVENOUS
Status: DISPENSED
Start: 2024-01-26

## (undated) RX ORDER — PROPOFOL 10 MG/ML
INJECTION, EMULSION INTRAVENOUS
Status: DISPENSED
Start: 2025-01-23